# Patient Record
Sex: FEMALE | Race: WHITE | Employment: UNEMPLOYED | URBAN - NONMETROPOLITAN AREA
[De-identification: names, ages, dates, MRNs, and addresses within clinical notes are randomized per-mention and may not be internally consistent; named-entity substitution may affect disease eponyms.]

---

## 2017-01-21 DIAGNOSIS — F41.9 ANXIETY: ICD-10-CM

## 2017-01-23 RX ORDER — SERTRALINE HYDROCHLORIDE 100 MG/1
TABLET, FILM COATED ORAL
Qty: 30 TABLET | Refills: 5 | Status: SHIPPED | OUTPATIENT
Start: 2017-01-23 | End: 2017-07-20 | Stop reason: SDUPTHER

## 2017-02-07 DIAGNOSIS — E11.49 DM (DIABETES MELLITUS), TYPE 2 WITH NEUROLOGICAL COMPLICATIONS (HCC): ICD-10-CM

## 2017-02-07 DIAGNOSIS — I10 ESSENTIAL HYPERTENSION: ICD-10-CM

## 2017-02-07 RX ORDER — CARVEDILOL 3.12 MG/1
3.12 TABLET ORAL 2 TIMES DAILY WITH MEALS
Qty: 60 TABLET | Refills: 5 | Status: SHIPPED | OUTPATIENT
Start: 2017-02-07 | End: 2017-09-13 | Stop reason: SDUPTHER

## 2017-03-08 DIAGNOSIS — E11.49 DM (DIABETES MELLITUS), TYPE 2 WITH NEUROLOGICAL COMPLICATIONS (HCC): ICD-10-CM

## 2017-03-08 RX ORDER — FUROSEMIDE 20 MG/1
TABLET ORAL
Qty: 30 TABLET | Refills: 5 | OUTPATIENT
Start: 2017-03-08

## 2017-03-23 RX ORDER — POTASSIUM CHLORIDE 750 MG/1
CAPSULE, EXTENDED RELEASE ORAL
Qty: 30 CAPSULE | Refills: 5 | Status: SHIPPED | OUTPATIENT
Start: 2017-03-23 | End: 2017-09-13 | Stop reason: SDUPTHER

## 2017-04-17 ENCOUNTER — HOSPITAL ENCOUNTER (EMERGENCY)
Age: 57
Discharge: HOME | End: 2017-04-17
Attending: EMERGENCY MEDICINE
Payer: MEDICARE

## 2017-04-17 ENCOUNTER — APPOINTMENT (OUTPATIENT)
Dept: GENERAL RADIOLOGY | Age: 57
End: 2017-04-17
Payer: MEDICARE

## 2017-04-17 VITALS
TEMPERATURE: 97.9 F | SYSTOLIC BLOOD PRESSURE: 120 MMHG | HEART RATE: 80 BPM | OXYGEN SATURATION: 98 % | RESPIRATION RATE: 16 BRPM | DIASTOLIC BLOOD PRESSURE: 70 MMHG

## 2017-04-17 DIAGNOSIS — S82.141A TIBIAL PLATEAU FRACTURE, RIGHT, CLOSED, INITIAL ENCOUNTER: Primary | ICD-10-CM

## 2017-04-17 LAB
ABSOLUTE EOS #: 0.1 K/UL (ref 0–0.4)
ABSOLUTE LYMPH #: 1.9 K/UL (ref 1.1–2.7)
ABSOLUTE MONO #: 0.5 K/UL (ref 0–1)
ANION GAP SERPL CALCULATED.3IONS-SCNC: 16 MMOL/L (ref 9–17)
BASOPHILS # BLD: 0 % (ref 0–2)
BASOPHILS ABSOLUTE: 0 K/UL (ref 0–0.2)
BUN BLDV-MCNC: 20 MG/DL (ref 6–20)
BUN/CREAT BLD: 20 (ref 9–20)
CALCIUM SERPL-MCNC: 9 MG/DL (ref 8.6–10.4)
CHLORIDE BLD-SCNC: 96 MMOL/L (ref 98–107)
CO2: 21 MMOL/L (ref 20–31)
CREAT SERPL-MCNC: 1.01 MG/DL (ref 0.5–0.9)
DIFFERENTIAL TYPE: YES
EOSINOPHILS RELATIVE PERCENT: 1 % (ref 0–5)
ESTIMATED AVERAGE GLUCOSE: 171 MG/DL
GFR AFRICAN AMERICAN: >60 ML/MIN
GFR NON-AFRICAN AMERICAN: 56 ML/MIN
GFR SERPL CREATININE-BSD FRML MDRD: ABNORMAL ML/MIN/{1.73_M2}
GFR SERPL CREATININE-BSD FRML MDRD: ABNORMAL ML/MIN/{1.73_M2}
GLUCOSE BLD-MCNC: 223 MG/DL (ref 70–99)
HBA1C MFR BLD: 7.6 % (ref 4.8–5.9)
HCT VFR BLD CALC: 30.6 % (ref 36–46)
HEMOGLOBIN: 10.4 G/DL (ref 12–16)
INR BLD: 0.9
LYMPHOCYTES # BLD: 22 % (ref 15–40)
MCH RBC QN AUTO: 29.7 PG (ref 26–34)
MCHC RBC AUTO-ENTMCNC: 34.1 G/DL (ref 31–37)
MCV RBC AUTO: 87.1 FL (ref 80–100)
MONOCYTES # BLD: 6 % (ref 4–8)
PARTIAL THROMBOPLASTIN TIME: 26.3 SEC (ref 21–33)
PDW BLD-RTO: 15.7 % (ref 12.1–15.2)
PLATELET # BLD: 238 K/UL (ref 140–450)
PLATELET ESTIMATE: ABNORMAL
PMV BLD AUTO: ABNORMAL FL (ref 6–12)
POTASSIUM SERPL-SCNC: 4.2 MMOL/L (ref 3.7–5.3)
PROTHROMBIN TIME: 9.7 SEC (ref 9–11.6)
RBC # BLD: 3.51 M/UL (ref 4–5.2)
RBC # BLD: ABNORMAL 10*6/UL
SEG NEUTROPHILS: 71 % (ref 47–75)
SEGMENTED NEUTROPHILS ABSOLUTE COUNT: 6.1 K/UL (ref 2.5–7)
SODIUM BLD-SCNC: 133 MMOL/L (ref 135–144)
WBC # BLD: 8.6 K/UL (ref 3.5–11)
WBC # BLD: ABNORMAL 10*3/UL

## 2017-04-17 PROCEDURE — 85025 COMPLETE CBC W/AUTO DIFF WBC: CPT

## 2017-04-17 PROCEDURE — 80048 BASIC METABOLIC PNL TOTAL CA: CPT

## 2017-04-17 PROCEDURE — 29515 APPLICATION SHORT LEG SPLINT: CPT

## 2017-04-17 PROCEDURE — 99284 EMERGENCY DEPT VISIT MOD MDM: CPT

## 2017-04-17 PROCEDURE — 83036 HEMOGLOBIN GLYCOSYLATED A1C: CPT

## 2017-04-17 PROCEDURE — 73564 X-RAY EXAM KNEE 4 OR MORE: CPT

## 2017-04-17 PROCEDURE — 85610 PROTHROMBIN TIME: CPT

## 2017-04-17 PROCEDURE — 36415 COLL VENOUS BLD VENIPUNCTURE: CPT

## 2017-04-17 PROCEDURE — 71010 XR CHEST LIMITED ONE VIEW: CPT

## 2017-04-17 PROCEDURE — 93005 ELECTROCARDIOGRAM TRACING: CPT

## 2017-04-17 PROCEDURE — 85730 THROMBOPLASTIN TIME PARTIAL: CPT

## 2017-04-17 PROCEDURE — 6370000000 HC RX 637 (ALT 250 FOR IP): Performed by: EMERGENCY MEDICINE

## 2017-04-17 RX ORDER — HYDROCODONE BITARTRATE AND ACETAMINOPHEN 5; 325 MG/1; MG/1
1 TABLET ORAL ONCE
Status: COMPLETED | OUTPATIENT
Start: 2017-04-17 | End: 2017-04-17

## 2017-04-17 RX ORDER — HYDROCODONE BITARTRATE AND ACETAMINOPHEN 5; 325 MG/1; MG/1
1 TABLET ORAL EVERY 6 HOURS PRN
Qty: 6 TABLET | Refills: 0 | Status: SHIPPED | OUTPATIENT
Start: 2017-04-17 | End: 2017-05-02 | Stop reason: SDUPTHER

## 2017-04-17 RX ADMIN — HYDROCODONE BITARTRATE AND ACETAMINOPHEN 1 TABLET: 5; 325 TABLET ORAL at 16:00

## 2017-04-17 ASSESSMENT — PAIN SCALES - GENERAL
PAINLEVEL_OUTOF10: 9
PAINLEVEL_OUTOF10: 0
PAINLEVEL_OUTOF10: 8

## 2017-04-17 ASSESSMENT — PAIN DESCRIPTION - LOCATION: LOCATION: KNEE

## 2017-04-17 ASSESSMENT — PAIN DESCRIPTION - PAIN TYPE: TYPE: ACUTE PAIN

## 2017-04-17 ASSESSMENT — PAIN DESCRIPTION - ORIENTATION: ORIENTATION: RIGHT

## 2017-04-18 ENCOUNTER — OFFICE VISIT (OUTPATIENT)
Dept: FAMILY MEDICINE CLINIC | Age: 57
End: 2017-04-18
Payer: MEDICARE

## 2017-04-18 VITALS
HEART RATE: 105 BPM | OXYGEN SATURATION: 97 % | SYSTOLIC BLOOD PRESSURE: 100 MMHG | HEIGHT: 66 IN | WEIGHT: 158 LBS | DIASTOLIC BLOOD PRESSURE: 60 MMHG | BODY MASS INDEX: 25.39 KG/M2

## 2017-04-18 DIAGNOSIS — S82.141D TIBIAL PLATEAU FRACTURE, RIGHT, CLOSED, WITH ROUTINE HEALING, SUBSEQUENT ENCOUNTER: Primary | ICD-10-CM

## 2017-04-18 DIAGNOSIS — E11.49 DM (DIABETES MELLITUS), TYPE 2 WITH NEUROLOGICAL COMPLICATIONS (HCC): ICD-10-CM

## 2017-04-18 DIAGNOSIS — I25.83 CORONARY ARTERY DISEASE DUE TO LIPID RICH PLAQUE: ICD-10-CM

## 2017-04-18 DIAGNOSIS — Z01.818 PRE-OPERATIVE CLEARANCE: ICD-10-CM

## 2017-04-18 DIAGNOSIS — I25.10 CORONARY ARTERY DISEASE DUE TO LIPID RICH PLAQUE: ICD-10-CM

## 2017-04-18 PROCEDURE — 99214 OFFICE O/P EST MOD 30 MIN: CPT | Performed by: FAMILY MEDICINE

## 2017-04-18 ASSESSMENT — ENCOUNTER SYMPTOMS
RESPIRATORY NEGATIVE: 1
GASTROINTESTINAL NEGATIVE: 1
RHINORRHEA: 1

## 2017-05-02 ENCOUNTER — OFFICE VISIT (OUTPATIENT)
Dept: ORTHOPEDIC SURGERY | Age: 57
End: 2017-05-02
Payer: MEDICARE

## 2017-05-02 VITALS — BODY MASS INDEX: 25.4 KG/M2 | HEIGHT: 66 IN | WEIGHT: 158.07 LBS

## 2017-05-02 DIAGNOSIS — S82.131A RIGHT MEDIAL TIBIAL PLATEAU FRACTURE, CLOSED, INITIAL ENCOUNTER: Primary | ICD-10-CM

## 2017-05-02 PROCEDURE — 99204 OFFICE O/P NEW MOD 45 MIN: CPT | Performed by: ORTHOPAEDIC SURGERY

## 2017-05-02 RX ORDER — HYDROCODONE BITARTRATE AND ACETAMINOPHEN 5; 325 MG/1; MG/1
1 TABLET ORAL EVERY 6 HOURS PRN
Qty: 30 TABLET | Refills: 0 | Status: SHIPPED | OUTPATIENT
Start: 2017-05-02 | End: 2017-06-19 | Stop reason: ALTCHOICE

## 2017-05-11 ENCOUNTER — TELEPHONE (OUTPATIENT)
Dept: ORTHOPEDIC SURGERY | Age: 57
End: 2017-05-11

## 2017-06-19 ENCOUNTER — HOSPITAL ENCOUNTER (EMERGENCY)
Age: 57
Discharge: HOME | End: 2017-06-19
Attending: FAMILY MEDICINE
Payer: MEDICARE

## 2017-06-19 VITALS
RESPIRATION RATE: 20 BRPM | TEMPERATURE: 97.2 F | DIASTOLIC BLOOD PRESSURE: 79 MMHG | OXYGEN SATURATION: 98 % | HEART RATE: 102 BPM | SYSTOLIC BLOOD PRESSURE: 112 MMHG

## 2017-06-19 DIAGNOSIS — K04.7 DENTAL ABSCESS: Primary | ICD-10-CM

## 2017-06-19 PROCEDURE — 99282 EMERGENCY DEPT VISIT SF MDM: CPT

## 2017-06-19 PROCEDURE — 6370000000 HC RX 637 (ALT 250 FOR IP): Performed by: FAMILY MEDICINE

## 2017-06-19 RX ORDER — HYDROCODONE BITARTRATE AND ACETAMINOPHEN 5; 325 MG/1; MG/1
1 TABLET ORAL ONCE
Status: COMPLETED | OUTPATIENT
Start: 2017-06-19 | End: 2017-06-19

## 2017-06-19 RX ORDER — HYDROCODONE BITARTRATE AND ACETAMINOPHEN 7.5; 325 MG/1; MG/1
1 TABLET ORAL EVERY 8 HOURS PRN
Qty: 8 TABLET | Refills: 0 | Status: SHIPPED | OUTPATIENT
Start: 2017-06-19 | End: 2017-09-13 | Stop reason: ALTCHOICE

## 2017-06-19 RX ORDER — AMOXICILLIN 500 MG/1
500 CAPSULE ORAL 3 TIMES DAILY
Qty: 30 CAPSULE | Refills: 0 | Status: SHIPPED | OUTPATIENT
Start: 2017-06-19 | End: 2017-06-29

## 2017-06-19 RX ORDER — AMOXICILLIN 500 MG/1
500 CAPSULE ORAL ONCE
Status: COMPLETED | OUTPATIENT
Start: 2017-06-19 | End: 2017-06-19

## 2017-06-19 RX ADMIN — AMOXICILLIN 500 MG: 500 CAPSULE ORAL at 22:41

## 2017-06-19 RX ADMIN — HYDROCODONE BITARTRATE AND ACETAMINOPHEN 1 TABLET: 5; 325 TABLET ORAL at 22:42

## 2017-06-19 ASSESSMENT — PAIN SCALES - GENERAL
PAINLEVEL_OUTOF10: 9
PAINLEVEL_OUTOF10: 8

## 2017-07-20 DIAGNOSIS — F41.9 ANXIETY: ICD-10-CM

## 2017-07-20 RX ORDER — SERTRALINE HYDROCHLORIDE 100 MG/1
TABLET, FILM COATED ORAL
Qty: 30 TABLET | Refills: 0 | Status: SHIPPED | OUTPATIENT
Start: 2017-07-20 | End: 2017-09-13 | Stop reason: SDUPTHER

## 2017-09-07 DIAGNOSIS — E11.49 DM (DIABETES MELLITUS), TYPE 2 WITH NEUROLOGICAL COMPLICATIONS (HCC): ICD-10-CM

## 2017-09-13 ENCOUNTER — OFFICE VISIT (OUTPATIENT)
Dept: FAMILY MEDICINE CLINIC | Age: 57
End: 2017-09-13
Payer: MEDICARE

## 2017-09-13 ENCOUNTER — TELEPHONE (OUTPATIENT)
Dept: CARDIOLOGY CLINIC | Age: 57
End: 2017-09-13

## 2017-09-13 VITALS — BODY MASS INDEX: 25.71 KG/M2 | DIASTOLIC BLOOD PRESSURE: 42 MMHG | SYSTOLIC BLOOD PRESSURE: 86 MMHG | WEIGHT: 160 LBS

## 2017-09-13 DIAGNOSIS — E11.49 DM (DIABETES MELLITUS), TYPE 2 WITH NEUROLOGICAL COMPLICATIONS (HCC): ICD-10-CM

## 2017-09-13 DIAGNOSIS — E78.00 PURE HYPERCHOLESTEROLEMIA: ICD-10-CM

## 2017-09-13 DIAGNOSIS — J20.9 ACUTE BRONCHITIS, UNSPECIFIED ORGANISM: ICD-10-CM

## 2017-09-13 DIAGNOSIS — Z95.810 ICD (IMPLANTABLE CARDIOVERTER-DEFIBRILLATOR) IN PLACE: ICD-10-CM

## 2017-09-13 DIAGNOSIS — Z98.890 H/O MITRAL VALVE REPAIR: ICD-10-CM

## 2017-09-13 DIAGNOSIS — I10 ESSENTIAL HYPERTENSION: Primary | ICD-10-CM

## 2017-09-13 DIAGNOSIS — E55.9 VITAMIN D DEFICIENCY: ICD-10-CM

## 2017-09-13 DIAGNOSIS — I25.10 CORONARY ARTERY DISEASE INVOLVING NATIVE CORONARY ARTERY OF NATIVE HEART WITHOUT ANGINA PECTORIS: Primary | ICD-10-CM

## 2017-09-13 DIAGNOSIS — Z95.1 HX OF CABG: ICD-10-CM

## 2017-09-13 DIAGNOSIS — I25.2 HX OF MYOCARDIAL INFARCTION: ICD-10-CM

## 2017-09-13 DIAGNOSIS — F41.9 ANXIETY: ICD-10-CM

## 2017-09-13 DIAGNOSIS — E78.5 HYPERLIPIDEMIA, UNSPECIFIED HYPERLIPIDEMIA TYPE: ICD-10-CM

## 2017-09-13 DIAGNOSIS — Z12.11 SCREENING FOR COLON CANCER: ICD-10-CM

## 2017-09-13 PROCEDURE — 99214 OFFICE O/P EST MOD 30 MIN: CPT | Performed by: FAMILY MEDICINE

## 2017-09-13 RX ORDER — POTASSIUM CHLORIDE 750 MG/1
CAPSULE, EXTENDED RELEASE ORAL
Qty: 30 CAPSULE | Refills: 5 | Status: SHIPPED | OUTPATIENT
Start: 2017-09-13 | End: 2018-04-11 | Stop reason: SDUPTHER

## 2017-09-13 RX ORDER — SERTRALINE HYDROCHLORIDE 100 MG/1
TABLET, FILM COATED ORAL
Qty: 30 TABLET | Refills: 5 | Status: SHIPPED | OUTPATIENT
Start: 2017-09-13 | End: 2018-04-11 | Stop reason: SDUPTHER

## 2017-09-13 RX ORDER — DOXYCYCLINE HYCLATE 100 MG
100 TABLET ORAL 2 TIMES DAILY
Qty: 14 TABLET | Refills: 0 | Status: SHIPPED | OUTPATIENT
Start: 2017-09-13 | End: 2017-09-20

## 2017-09-13 RX ORDER — CARVEDILOL 3.12 MG/1
3.12 TABLET ORAL 2 TIMES DAILY WITH MEALS
Qty: 60 TABLET | Refills: 5 | Status: SHIPPED | OUTPATIENT
Start: 2017-09-13 | End: 2018-03-19 | Stop reason: SDUPTHER

## 2017-09-13 RX ORDER — GABAPENTIN 300 MG/1
300 CAPSULE ORAL 2 TIMES DAILY
Qty: 60 CAPSULE | Refills: 5 | Status: SHIPPED | OUTPATIENT
Start: 2017-09-13 | End: 2018-03-16 | Stop reason: SDUPTHER

## 2017-09-13 RX ORDER — CLOPIDOGREL BISULFATE 75 MG/1
TABLET ORAL
Qty: 30 TABLET | Refills: 5 | Status: SHIPPED | OUTPATIENT
Start: 2017-09-13 | End: 2018-04-11 | Stop reason: SDUPTHER

## 2017-09-13 RX ORDER — GENTAMICIN SULFATE 1 MG/G
CREAM TOPICAL
Refills: 0 | COMMUNITY
Start: 2017-07-26 | End: 2018-10-29

## 2017-09-13 RX ORDER — FUROSEMIDE 20 MG/1
TABLET ORAL
Qty: 30 TABLET | Refills: 5 | Status: SHIPPED | OUTPATIENT
Start: 2017-09-13 | End: 2018-04-11 | Stop reason: SDUPTHER

## 2017-09-13 RX ORDER — LISINOPRIL 2.5 MG/1
TABLET ORAL
Qty: 30 TABLET | Refills: 5 | Status: SHIPPED | OUTPATIENT
Start: 2017-09-13 | End: 2018-04-11 | Stop reason: SDUPTHER

## 2017-09-13 RX ORDER — ATORVASTATIN CALCIUM 80 MG/1
80 TABLET, FILM COATED ORAL DAILY
Qty: 30 TABLET | Refills: 5 | Status: SHIPPED | OUTPATIENT
Start: 2017-09-13 | End: 2018-03-19 | Stop reason: SDUPTHER

## 2017-09-13 ASSESSMENT — ENCOUNTER SYMPTOMS
BLURRED VISION: 0
DIARRHEA: 0
VISUAL CHANGE: 0
EYE REDNESS: 0
SHORTNESS OF BREATH: 0
RHINORRHEA: 1
TROUBLE SWALLOWING: 0
VOMITING: 0
ABDOMINAL PAIN: 0
EYE DISCHARGE: 0
BLOOD IN STOOL: 0
COUGH: 1
SORE THROAT: 0

## 2017-09-13 ASSESSMENT — PATIENT HEALTH QUESTIONNAIRE - PHQ9
1. LITTLE INTEREST OR PLEASURE IN DOING THINGS: 0
SUM OF ALL RESPONSES TO PHQ9 QUESTIONS 1 & 2: 0
2. FEELING DOWN, DEPRESSED OR HOPELESS: 0
SUM OF ALL RESPONSES TO PHQ QUESTIONS 1-9: 0

## 2017-09-15 ENCOUNTER — HOSPITAL ENCOUNTER (EMERGENCY)
Age: 57
Discharge: HOME | End: 2017-09-15
Attending: EMERGENCY MEDICINE
Payer: MEDICARE

## 2017-09-15 VITALS
RESPIRATION RATE: 16 BRPM | DIASTOLIC BLOOD PRESSURE: 74 MMHG | SYSTOLIC BLOOD PRESSURE: 116 MMHG | OXYGEN SATURATION: 95 % | TEMPERATURE: 98.2 F | HEART RATE: 95 BPM

## 2017-09-15 DIAGNOSIS — K08.89 TOOTHACHE: Primary | ICD-10-CM

## 2017-09-15 PROCEDURE — 99282 EMERGENCY DEPT VISIT SF MDM: CPT

## 2017-09-15 PROCEDURE — 6370000000 HC RX 637 (ALT 250 FOR IP): Performed by: EMERGENCY MEDICINE

## 2017-09-15 RX ORDER — PENICILLIN V POTASSIUM 250 MG/1
500 TABLET ORAL ONCE
Status: COMPLETED | OUTPATIENT
Start: 2017-09-15 | End: 2017-09-15

## 2017-09-15 RX ORDER — PENICILLIN V POTASSIUM 500 MG/1
500 TABLET ORAL 3 TIMES DAILY
Qty: 30 TABLET | Refills: 0 | Status: SHIPPED | OUTPATIENT
Start: 2017-09-15 | End: 2017-09-25

## 2017-09-15 RX ORDER — TRAMADOL HYDROCHLORIDE 50 MG/1
50 TABLET ORAL EVERY 6 HOURS PRN
Qty: 4 TABLET | Refills: 0 | Status: SHIPPED | OUTPATIENT
Start: 2017-09-15 | End: 2017-09-16

## 2017-09-15 RX ADMIN — PENICILLIN V POTASIUM 500 MG: 250 TABLET ORAL at 15:15

## 2017-09-15 ASSESSMENT — ENCOUNTER SYMPTOMS
ALLERGIC/IMMUNOLOGIC NEGATIVE: 1
FACIAL SWELLING: 0
RHINORRHEA: 0
GASTROINTESTINAL NEGATIVE: 1
EYES NEGATIVE: 1
RESPIRATORY NEGATIVE: 1
TRISMUS: 0
SINUS PRESSURE: 0
SORE THROAT: 0

## 2017-09-15 ASSESSMENT — PAIN DESCRIPTION - PAIN TYPE: TYPE: ACUTE PAIN

## 2017-09-15 ASSESSMENT — PAIN DESCRIPTION - ONSET: ONSET: SUDDEN

## 2017-09-15 ASSESSMENT — PAIN DESCRIPTION - LOCATION: LOCATION: TEETH

## 2017-09-15 ASSESSMENT — PAIN SCALES - GENERAL: PAINLEVEL_OUTOF10: 8

## 2017-09-15 ASSESSMENT — PAIN DESCRIPTION - ORIENTATION: ORIENTATION: RIGHT;LOWER

## 2017-09-15 ASSESSMENT — PAIN DESCRIPTION - PROGRESSION: CLINICAL_PROGRESSION: NOT CHANGED

## 2017-09-15 ASSESSMENT — PAIN DESCRIPTION - FREQUENCY: FREQUENCY: CONTINUOUS

## 2017-09-15 ASSESSMENT — PAIN DESCRIPTION - DESCRIPTORS: DESCRIPTORS: ACHING

## 2017-09-22 ENCOUNTER — HOSPITAL ENCOUNTER (OUTPATIENT)
Age: 57
Discharge: HOME | End: 2017-09-22
Payer: MEDICARE

## 2017-09-22 DIAGNOSIS — F41.9 ANXIETY: ICD-10-CM

## 2017-09-22 DIAGNOSIS — Z98.890 H/O MITRAL VALVE REPAIR: ICD-10-CM

## 2017-09-22 DIAGNOSIS — I25.2 HX OF MYOCARDIAL INFARCTION: ICD-10-CM

## 2017-09-22 DIAGNOSIS — E11.49 DM (DIABETES MELLITUS), TYPE 2 WITH NEUROLOGICAL COMPLICATIONS (HCC): ICD-10-CM

## 2017-09-22 DIAGNOSIS — E55.9 VITAMIN D DEFICIENCY: ICD-10-CM

## 2017-09-22 DIAGNOSIS — E78.5 HYPERLIPIDEMIA, UNSPECIFIED HYPERLIPIDEMIA TYPE: ICD-10-CM

## 2017-09-22 DIAGNOSIS — Z95.810 ICD (IMPLANTABLE CARDIOVERTER-DEFIBRILLATOR) IN PLACE: ICD-10-CM

## 2017-09-22 DIAGNOSIS — Z95.1 HX OF CABG: ICD-10-CM

## 2017-09-22 DIAGNOSIS — I25.10 CORONARY ARTERY DISEASE INVOLVING NATIVE CORONARY ARTERY OF NATIVE HEART WITHOUT ANGINA PECTORIS: ICD-10-CM

## 2017-09-22 LAB
ABSOLUTE EOS #: 0.1 K/UL (ref 0–0.4)
ABSOLUTE LYMPH #: 3 K/UL (ref 1–4.8)
ABSOLUTE MONO #: 0.6 K/UL (ref 0–1)
ALBUMIN SERPL-MCNC: 3.7 G/DL (ref 3.5–5.2)
ALBUMIN/GLOBULIN RATIO: ABNORMAL (ref 1–2.5)
ALP BLD-CCNC: 129 U/L (ref 35–104)
ALT SERPL-CCNC: 18 U/L (ref 5–33)
ANION GAP SERPL CALCULATED.3IONS-SCNC: 12 MMOL/L (ref 9–17)
AST SERPL-CCNC: 10 U/L
BASOPHILS # BLD: 1 %
BASOPHILS ABSOLUTE: 0 K/UL (ref 0–0.2)
BILIRUB SERPL-MCNC: 0.26 MG/DL (ref 0.3–1.2)
BUN BLDV-MCNC: 37 MG/DL (ref 6–20)
BUN/CREAT BLD: 28 (ref 9–20)
CALCIUM SERPL-MCNC: 9.5 MG/DL (ref 8.6–10.4)
CHLORIDE BLD-SCNC: 102 MMOL/L (ref 98–107)
CHOLESTEROL/HDL RATIO: 11.4
CHOLESTEROL: 286 MG/DL
CO2: 22 MMOL/L (ref 20–31)
CREAT SERPL-MCNC: 1.31 MG/DL (ref 0.5–0.9)
CREATININE URINE: 133.3 MG/DL (ref 28–217)
DIFFERENTIAL TYPE: YES
EOSINOPHILS RELATIVE PERCENT: 2 %
ESTIMATED AVERAGE GLUCOSE: 148 MG/DL
GFR AFRICAN AMERICAN: 51 ML/MIN
GFR NON-AFRICAN AMERICAN: 42 ML/MIN
GFR SERPL CREATININE-BSD FRML MDRD: ABNORMAL ML/MIN/{1.73_M2}
GFR SERPL CREATININE-BSD FRML MDRD: ABNORMAL ML/MIN/{1.73_M2}
GLUCOSE BLD-MCNC: 108 MG/DL (ref 70–99)
HBA1C MFR BLD: 6.8 % (ref 4.8–5.9)
HCT VFR BLD CALC: 32.2 % (ref 36–46)
HDLC SERPL-MCNC: 25 MG/DL
HEMOGLOBIN: 11.1 G/DL (ref 12–16)
LDL CHOLESTEROL: ABNORMAL MG/DL (ref 0–130)
LYMPHOCYTES # BLD: 36 %
MAGNESIUM: 1.8 MG/DL (ref 1.6–2.6)
MCH RBC QN AUTO: 30.1 PG (ref 26–34)
MCHC RBC AUTO-ENTMCNC: 34.3 G/DL (ref 31–37)
MCV RBC AUTO: 87.8 FL (ref 80–100)
MICROALBUMIN/CREAT 24H UR: 38 MG/L
MICROALBUMIN/CREAT UR-RTO: 29 MCG/MG CREAT
MONOCYTES # BLD: 7 %
PATIENT FASTING?: YES
PDW BLD-RTO: 15.7 % (ref 12.1–15.2)
PLATELET # BLD: 234 K/UL (ref 140–450)
PLATELET ESTIMATE: ABNORMAL
PMV BLD AUTO: ABNORMAL FL (ref 6–12)
POTASSIUM SERPL-SCNC: 4.9 MMOL/L (ref 3.7–5.3)
RBC # BLD: 3.67 M/UL (ref 4–5.2)
RBC # BLD: ABNORMAL 10*6/UL
SEG NEUTROPHILS: 54 %
SEGMENTED NEUTROPHILS ABSOLUTE COUNT: 4.5 K/UL (ref 2.5–7)
SODIUM BLD-SCNC: 136 MMOL/L (ref 135–144)
TOTAL PROTEIN: 7.9 G/DL (ref 6.4–8.3)
TRIGL SERPL-MCNC: 892 MG/DL
TSH SERPL DL<=0.05 MIU/L-ACNC: 0.51 MIU/L (ref 0.3–5)
VITAMIN D 25-HYDROXY: 7.8 NG/ML (ref 30–100)
VLDLC SERPL CALC-MCNC: ABNORMAL MG/DL (ref 1–30)
WBC # BLD: 8.3 K/UL (ref 3.5–11)
WBC # BLD: ABNORMAL 10*3/UL

## 2017-09-22 PROCEDURE — 85025 COMPLETE CBC W/AUTO DIFF WBC: CPT

## 2017-09-22 PROCEDURE — 80053 COMPREHEN METABOLIC PANEL: CPT

## 2017-09-22 PROCEDURE — 83036 HEMOGLOBIN GLYCOSYLATED A1C: CPT

## 2017-09-22 PROCEDURE — 93005 ELECTROCARDIOGRAM TRACING: CPT

## 2017-09-22 PROCEDURE — 82570 ASSAY OF URINE CREATININE: CPT

## 2017-09-22 PROCEDURE — 83721 ASSAY OF BLOOD LIPOPROTEIN: CPT

## 2017-09-22 PROCEDURE — 83735 ASSAY OF MAGNESIUM: CPT

## 2017-09-22 PROCEDURE — 36415 COLL VENOUS BLD VENIPUNCTURE: CPT

## 2017-09-22 PROCEDURE — 80061 LIPID PANEL: CPT

## 2017-09-22 PROCEDURE — 82306 VITAMIN D 25 HYDROXY: CPT

## 2017-09-22 PROCEDURE — 84443 ASSAY THYROID STIM HORMONE: CPT

## 2017-09-22 PROCEDURE — 82043 UR ALBUMIN QUANTITATIVE: CPT

## 2017-09-24 LAB — LDL CHOLESTEROL DIRECT: 36 MG/DL

## 2017-09-25 ENCOUNTER — OFFICE VISIT (OUTPATIENT)
Dept: CARDIOLOGY CLINIC | Age: 57
End: 2017-09-25
Payer: MEDICARE

## 2017-09-25 VITALS
SYSTOLIC BLOOD PRESSURE: 110 MMHG | WEIGHT: 157 LBS | BODY MASS INDEX: 25.23 KG/M2 | OXYGEN SATURATION: 98 % | HEART RATE: 105 BPM | DIASTOLIC BLOOD PRESSURE: 60 MMHG

## 2017-09-25 DIAGNOSIS — E11.49 DM (DIABETES MELLITUS), TYPE 2 WITH NEUROLOGICAL COMPLICATIONS (HCC): Primary | ICD-10-CM

## 2017-09-25 DIAGNOSIS — I42.9 CARDIOMYOPATHY, UNSPECIFIED TYPE (HCC): ICD-10-CM

## 2017-09-25 DIAGNOSIS — I25.10 CORONARY ARTERY DISEASE INVOLVING NATIVE CORONARY ARTERY OF NATIVE HEART WITHOUT ANGINA PECTORIS: ICD-10-CM

## 2017-09-25 DIAGNOSIS — E55.9 VITAMIN D DEFICIENCY DISEASE: ICD-10-CM

## 2017-09-25 DIAGNOSIS — Z98.890 H/O MITRAL VALVE REPAIR: ICD-10-CM

## 2017-09-25 DIAGNOSIS — I10 ESSENTIAL HYPERTENSION: ICD-10-CM

## 2017-09-25 DIAGNOSIS — Z95.1 HX OF CABG: ICD-10-CM

## 2017-09-25 DIAGNOSIS — Z95.810 ICD (IMPLANTABLE CARDIOVERTER-DEFIBRILLATOR) IN PLACE: ICD-10-CM

## 2017-09-25 DIAGNOSIS — I25.2 HX OF MYOCARDIAL INFARCTION: ICD-10-CM

## 2017-09-25 DIAGNOSIS — E78.00 PURE HYPERCHOLESTEROLEMIA: ICD-10-CM

## 2017-09-25 PROCEDURE — 99214 OFFICE O/P EST MOD 30 MIN: CPT | Performed by: INTERNAL MEDICINE

## 2017-09-25 PROCEDURE — 93289 INTERROG DEVICE EVAL HEART: CPT | Performed by: INTERNAL MEDICINE

## 2017-09-25 NOTE — LETTER
Mere Meraz M.D. [address] [place] [address], [place] [address]  [phone]          2017          Suzette Mina MD  [address] [place] [address], [facility]      RE:  Keturah Lombardi  :     Dear Dr. Jason Austin:    CHIEF COMPLAINT:  Fatigue. HISTORY OF PRESENT ILLNESS:  I had the pleasure of seeing Mrs. Halie Powers in our office on 2017. She is a pleasant 58-year-old female who had acute chest discomfort on 2015, and was found to have a non-ST-elevation myocardial infarction, was transferred to [facility]. [facility]. Troponin was elevated with no changes in her EKG, with the troponin being 5.56. She had a catheterization on 2015, that showed severe CAD with an occluded large right coronary artery, LAD had 90% disease, diagonal 90%, circumflex small and nondominant. She had an EF of 20%. She had bypass surgery on 2015, with LIMA to the LAD, a vein graft to the diagonal, a vein graft to the right coronary artery. She also had a mitral valve repair with a 30 mm Andi-Pérez prosthetic mitral valve. EF increased to 30% to 35%. Three days later, she had a VF arrest.  Another catheterization on 2015, showed widely patent grafts, with patent LIMA to LAD, patent vein graft to the diagonal, patent vein graft to the right coronary artery. EF was 30% to 35%. She had an ICD placed by Dr. Mejia Bell on 2015, which is a [employer], model number is [phone], single ventricular lead. She has been treated for diabetes by Dr. Jason Austin. She, unfortunately, is not exercising. She continues to smoke, although she is hoping to be off smoking by . She has had no hospitalizations. She did break her wrist and fractured her kneecap from a fall, with the wrist in 2017 and knee in 2017. She denies any chest pain or heaviness. No palpitation.   No shortness of accommodation. Extraocular movements were intact. Mucous membranes were dry. NECK:  No JVD. Good carotid pulses. No carotid bruits. No lymphadenopathy or thyromegaly. CARDIOVASCULAR EXAM:  S1 and S2 were normal.  No S3 or S4. Soft systolic blowing type murmur. No diastolic murmur. PMI was normal.  No lift, thrust, or pericardial friction rub. LUNGS:  Quite clear to auscultation and percussion. ABDOMEN:  Soft and nontender. Good bowel sounds. The aorta was not enlarged. No hepatomegaly, splenomegaly. EXTREMITIES:  Good femoral pulses. Good pedal pulses. No pedal edema. Skin was warm and dry. No calf tenderness. Nail beds pink. Good cap refill. PULSES:  Bilateral symmetrical radial, brachial and carotid pulses. No carotid bruits. Good femoral and pedal pulses. NEUROLOGIC EXAM:  Within normal limits. PSYCHIATRIC EXAM:  Within normal limits. LABORATORY DATA:  From 09/22/2016, sodium 136, potassium 4.9, BUN 37, creatinine 1.31. Magnesium 1.8. GFR was 42. Calcium 9.5. Cholesterol 206, with an HDL of 25, triglycerides 892. ALT was 18, AST was 10. Hemoglobin A1c was 6.8. Vitamin D was 7.8. White count 8.3, hemoglobin 11.1, with a platelet count of 308,057. EKG showed sinus rhythm with nonspecific ST changes. IMPRESSION:  1. Severe coronary artery disease. 2.  Non-ST-elevation myocardial infarction on 05/25/2015. 3.  Catheterization on 05/26/2015, by Dr. Jana Pulido showed occluded right coronary artery with subtotal occluded, very large, the LAD 90%, with nondominant circumflex. EF at 20%. 4.  Open heart surgery by Dr. Seble Palmer on 06/04/2015, with LIMA to LAD, vein graft to the diagonal, vein graft to the right coronary artery. 5.  Ventricular fibrillation with cardiac arrest on 06/08, with repeat catheterization showing widely patent bypass grafts.   6.  ICD implanted on 06/09/2015, which is a [address] [place] [address] [employer], model number 2092, which is still at beginning of life. 7.  Ejection fraction of 40%. 8.  Severe hyperlipidemia, primarily with triglycerides being elevated now at 900.  9.  Chronic renal insufficiency with creatinine 1.43.  10.  Insulin dependent diabetes with a hemoglobin A1c of 6.8. PLAN:  1. See in 1 year in followup. 2.  Risk modification with stopping smoking. 3.  No change of medications. 4.  ICD check in 6 months in our office. 5.  Start vitamin D 5000 units daily. DISCUSSION:  Mrs. Osmel Vu is doing well from a cardiac standpoint. She has had no chest pain or chest discomfort. No unusual shortness of breath. She unfortunately is still smoking, hopefully she will be able to stop this. At this point, she is doing well with no chest pain and I will see her in 1 year in followup. Thank you very much for allowing me the privilege of seeing Mrs. Osmel Vu. If you have any questions on my thoughts, please do not hesitate to contact me.     Sincerely,        Lois Goldberg    D:09/25/2017 13:12:50               T: 09/26/2017 1:55:33     [id number]  Job#: [id number]     Doc#: [id number]

## 2017-09-25 NOTE — MR AVS SNAPSHOT
After Visit Summary             Laine Ahmadi [facility]   2017 11:30 AM   Office Visit    Description:  Female : 1960   Provider:  Benjamin Bennett MD   Department:  [facility]              Your Follow-Up and Future Appointments         Below is a list of your follow-up and future appointments. This may not be a complete list as you may have made appointments directly with providers that we are not aware of or your providers may have made some for you. Please call your providers to confirm appointments. It is important to keep your appointments. Please bring your current insurance card, photo ID, co-pay, and all medication bottles to your appointment. If self-pay, payment is expected at the time of service. Your To-Do List     Future Appointments Provider Department Dept Phone    2018 10:00 AM Benjamin Bennett MD [facility] [phone]    If this is a fasting lab, please do not eat or drink past midnight other than water. 3/13/2018 11:00 AM Bernarda Whitt MD [facility] [address] [phone]    Please arrive 15 minutes prior to appointment, bring photo ID and insurance card.     2018 1:00 PM Benjamin Bennett MD [facility] [phone]    Future Orders Complete By Expires    CBC Auto Differential [FPK0438 Custom]  2018    Comprehensive Metabolic Panel [GYN17 Custom]  2018    EKG 12 Lead [EKG1 Custom]  2018    Lipid Panel [LAB18 Custom]  2018    Magnesium [GMG880 Custom]  2018    TSH with Reflex [AQA0529 Custom]  2018    Vitamin D 25 Hydroxy [POO631 Custom]  2018    XR CHEST STANDARD (2 VW) [80054 Custom]  2018         Information from Your Visit        Department     Name Address Phone [facility] [address] [phone]      You Were Seen for: Comments    DM (diabetes mellitus), type 2 with neurological complications (HCC)   [094688]         Vital Signs     Blood Pressure Pulse Weight Oxygen Saturation Body Mass Index Smoking Status    110/60 105 157 lb (71.2 kg) 98% 25.23 kg/m2 Current Some Day Smoker      Additional Information about your Body Mass Index (BMI)           Your BMI as listed above is considered overweight (25.0-29.9). BMI is an estimate of body fat, calculated from your height and weight. The higher your BMI, the greater your risk of heart disease, high blood pressure, type 2 diabetes, stroke, gallstones, arthritis, sleep apnea, and certain cancers. BMI is not perfect. It may overestimate body fat in athletes and people who are more muscular. If your body fat is high you can improve your BMI by decreasing your calorie intake and becoming more physically active.      Learn more at: [email or web address]          Instructions    Start Vit D 5000 units daily otc  Will follow up in one year with [facility] check   Will check ICD/Herscher in 6 months here  In office-ICD check only in 6 months              Medications and Orders      Your Current Medications Are              Cholecalciferol (VITAMIN D3) 5000 units TABS Take 5,000 Units by mouth daily    penicillin v potassium (VEETID) 500 MG tablet Take 1 tablet by mouth 3 times daily for 10 days    carvedilol (COREG) 3.125 MG tablet Take 1 tablet by mouth 2 times daily (with meals)    atorvastatin (LIPITOR) 80 MG tablet Take 1 tablet by mouth daily    gabapentin (NEURONTIN) 300 MG capsule Take 1 capsule by mouth 2 times daily    insulin detemir (LEVEMIR FLEXPEN) 100 UNIT/ML injection pen Patient also uses a slide scale with the levem    potassium chloride (MICRO-K) 10 MEQ extended release capsule take 1 capsule by mouth daily    sertraline (ZOLOFT) 100 MG tablet take 1 tablet by mouth once daily    clopidogrel (PLAVIX) 75 MG tablet take 1 tablet by mouth daily lisinopril (PRINIVIL;ZESTRIL) 2.5 MG tablet take 1 tablet by mouth daily    furosemide (LASIX) 20 MG tablet take 1 tablet by mouth once daily    metFORMIN (GLUCOPHAGE) 1000 MG tablet Take 1 tablet by mouth 2 times daily (with meals)    gentamicin (GARAMYCIN) 0.1 % cream     glucose monitoring kit (FREESTYLE) monitoring kit 1 kit by Does not apply route daily as needed Please dispense meter covered by patients insurance. glucose blood VI test strips (EXACTECH TEST) strip 1 each by In Vitro route daily Pt testing BS once daily and prn    Lancets MISC Pt testing BS once daily and prn    Multiple Vitamins-Minerals (THERAPEUTIC MULTIVITAMIN-MINERALS) tablet Take 1 tablet by mouth daily (with breakfast)      Allergies              Nsaids     nausea    Codeine Nausea And Vomiting         Additional Information        Basic Information     Date Of Birth Sex Race Ethnicity Preferred Language    1960 Female White Non-/Non  English      Problem List as of 9/25/2017  Date Reviewed: 9/13/2017                DM (diabetes mellitus), type 2 with neurological complications ([facility]    Anxiety    CAD (coronary artery disease)    Hx of CABG    ICD (implantable cardioverter-defibrillator) in place    H/O mitral valve repair    Hx of myocardial infarction    Hyperlipidemia    Hypertension    Cardiomyopathy ([address]    Onychomycosis of toenail    S/P cardiac cath-Patent grafts 6/5/15    Acute coronary syndrome ([facility]      Immunizations as of 9/25/2017     Name Date    Influenza Virus Vaccine 11/19/2015      Preventive Care        Date Due    Hepatitis C screening is recommended for all adults regardless of risk factors born between [facility] at least once (lifetime) who have never been tested. 1960    HIV screening is recommended for all people regardless of risk factors  aged 15-65 years at least once (lifetime) who have never been HIV tested.  9/19/1974    Tetanus Combination Vaccine (1 - Tdap) 9/19/1978 8. Enter your e-mail address. You will receive e-mail notification when new information is available in [address]. Click Sign Up. You can now view your medical record. Additional Information  If you have questions, please contact the physician practice where you receive care. Remember, [employer] is NOT to be used for urgent needs. For medical emergencies, dial 911. For questions regarding your [employer] account call [phone]. If you have a clinical question, please call your doctor's office.

## 2017-09-26 LAB
EKG ATRIAL RATE: 81 BPM
EKG P AXIS: 58 DEGREES
EKG P-R INTERVAL: 156 MS
EKG Q-T INTERVAL: 388 MS
EKG QRS DURATION: 88 MS
EKG QTC CALCULATION (BAZETT): 450 MS
EKG R AXIS: 25 DEGREES
EKG T AXIS: 24 DEGREES
EKG VENTRICULAR RATE: 81 BPM

## 2017-10-01 NOTE — PROGRESS NOTES
Ov Dr. Lorin Floyd for one year follow up  ICD checked by [facility] today  Pt states medications have not changed  No hospitalizations  Broke wrist and fx knee cap from fall  Wrist in Feb and knee in June  Both from a fall-no surgery of either one  No chest pain heaviness or palpitations  No sob  No dizziness or lightheadedness  Right ankle swelling from injury   Still smoking but better  Hoping to be off by thanksgiving     Start Vit D 5000 units daily otc  Will follow up in one year with [facility] check   Will check ICD/Coggon in 6 months here  In office-ICD check only in 6 months
Extraocular movements were intact. Mucous membranes were dry. NECK:  No JVD. Good carotid pulses. No carotid bruits. No lymphadenopathy or thyromegaly. CARDIOVASCULAR EXAM:  S1 and S2 were normal.  No S3 or S4. Soft systolic blowing type murmur. No diastolic murmur. PMI was normal.  No lift, thrust, or pericardial friction rub. LUNGS:  Quite clear to auscultation and percussion. ABDOMEN:  Soft and nontender. Good bowel sounds. The aorta was not enlarged. No hepatomegaly, splenomegaly. EXTREMITIES:  Good femoral pulses. Good pedal pulses. No pedal edema. Skin was warm and dry. No calf tenderness. Nail beds pink. Good cap refill. PULSES:  Bilateral symmetrical radial, brachial and carotid pulses. No carotid bruits. Good femoral and pedal pulses. NEUROLOGIC EXAM:  Within normal limits. PSYCHIATRIC EXAM:  Within normal limits. LABORATORY DATA:  From 09/22/2016, sodium 136, potassium 4.9, BUN 37, creatinine 1.31. Magnesium 1.8. GFR was 42. Calcium 9.5. Cholesterol 206, with an HDL of 25, triglycerides 892. ALT was 18, AST was 10. Hemoglobin A1c was 6.8. Vitamin D was 7.8. White count 8.3, hemoglobin 11.1, with a platelet count of 735,543. EKG showed sinus rhythm with nonspecific ST changes. IMPRESSION:  1. Severe coronary artery disease. 2.  Non-ST-elevation myocardial infarction on 05/25/2015. 3.  Catheterization on 05/26/2015, by Dr. Sanders Dose showed occluded right coronary artery with subtotal occluded, very large, the LAD 90%, with nondominant circumflex. EF at 20%. 4.  Open heart surgery by Dr. Uri Gupta on 06/04/2015, with LIMA to LAD, vein graft to the diagonal, vein graft to the right coronary artery. 5.  Ventricular fibrillation with cardiac arrest on 06/08, with repeat catheterization showing widely patent bypass grafts. 6.  ICD implanted on 06/09/2015, which is a [employer], model number 2092, which is still at beginning of life.   7.  Ejection

## 2017-11-02 ENCOUNTER — TELEPHONE (OUTPATIENT)
Dept: OTHER | Facility: CLINIC | Age: 57
End: 2017-11-02

## 2017-11-02 DIAGNOSIS — E11.49 DM (DIABETES MELLITUS), TYPE 2 WITH NEUROLOGICAL COMPLICATIONS (HCC): ICD-10-CM

## 2017-11-02 NOTE — TELEPHONE ENCOUNTER
Patient in need of refill for her levemir   I do see that we sent in a prescription for her on 9-13-17 but pharm states they didn't get it and also it shows denied but [facility] is not sure why it is denied if they didn't even get the RX.    Pending to [facility] again

## 2017-11-07 ENCOUNTER — APPOINTMENT (OUTPATIENT)
Dept: GENERAL RADIOLOGY | Age: 57
End: 2017-11-07
Payer: MEDICARE

## 2017-11-07 ENCOUNTER — HOSPITAL ENCOUNTER (EMERGENCY)
Age: 57
Discharge: HOME | End: 2017-11-07
Attending: FAMILY MEDICINE
Payer: MEDICARE

## 2017-11-07 VITALS
HEIGHT: 67 IN | BODY MASS INDEX: 24.64 KG/M2 | TEMPERATURE: 97 F | WEIGHT: 157 LBS | DIASTOLIC BLOOD PRESSURE: 87 MMHG | SYSTOLIC BLOOD PRESSURE: 106 MMHG | RESPIRATION RATE: 16 BRPM | HEART RATE: 100 BPM | OXYGEN SATURATION: 100 %

## 2017-11-07 DIAGNOSIS — S82.102A CLOSED FRACTURE OF PROXIMAL END OF LEFT TIBIA, UNSPECIFIED FRACTURE MORPHOLOGY, INITIAL ENCOUNTER: Primary | ICD-10-CM

## 2017-11-07 DIAGNOSIS — S82.402A CLOSED FRACTURE OF SHAFT OF LEFT FIBULA, UNSPECIFIED FRACTURE MORPHOLOGY, INITIAL ENCOUNTER: ICD-10-CM

## 2017-11-07 LAB
GLUCOSE BLD-MCNC: 201 MG/DL
GLUCOSE BLD-MCNC: 201 MG/DL (ref 65–99)

## 2017-11-07 PROCEDURE — 6370000000 HC RX 637 (ALT 250 FOR IP): Performed by: FAMILY MEDICINE

## 2017-11-07 PROCEDURE — 82947 ASSAY GLUCOSE BLOOD QUANT: CPT

## 2017-11-07 PROCEDURE — 99284 EMERGENCY DEPT VISIT MOD MDM: CPT

## 2017-11-07 PROCEDURE — 73110 X-RAY EXAM OF WRIST: CPT

## 2017-11-07 PROCEDURE — 73564 X-RAY EXAM KNEE 4 OR MORE: CPT

## 2017-11-07 RX ORDER — ACETAMINOPHEN 500 MG
1000 TABLET ORAL ONCE
Status: COMPLETED | OUTPATIENT
Start: 2017-11-07 | End: 2017-11-07

## 2017-11-07 RX ORDER — TRAMADOL HYDROCHLORIDE 50 MG/1
50 TABLET ORAL EVERY 6 HOURS PRN
Qty: 12 TABLET | Refills: 0 | Status: SHIPPED | OUTPATIENT
Start: 2017-11-07 | End: 2018-04-11 | Stop reason: ALTCHOICE

## 2017-11-07 RX ADMIN — ACETAMINOPHEN 1000 MG: 500 TABLET ORAL at 18:11

## 2017-11-07 ASSESSMENT — PAIN DESCRIPTION - PAIN TYPE: TYPE: ACUTE PAIN

## 2017-11-07 ASSESSMENT — PAIN SCALES - GENERAL
PAINLEVEL_OUTOF10: 9
PAINLEVEL_OUTOF10: 9
PAINLEVEL_OUTOF10: 7

## 2017-11-07 ASSESSMENT — PAIN DESCRIPTION - ORIENTATION
ORIENTATION_2: LEFT
ORIENTATION: LEFT

## 2017-11-07 ASSESSMENT — PAIN DESCRIPTION - LOCATION
LOCATION_2: ARM
LOCATION: LEG

## 2017-11-07 ASSESSMENT — PAIN DESCRIPTION - DESCRIPTORS: DESCRIPTORS_2: CONSTANT

## 2017-11-07 ASSESSMENT — PAIN DESCRIPTION - INTENSITY: RATING_2: 7

## 2017-11-07 NOTE — ED PROVIDER NOTES
eMERGENCY dEPARTMENT eNCOUnter        Dash Rosales    Chief Complaint   Patient presents with    Leg Pain     Left knee to foot. Deadra Alar yesterday    Wrist Pain     Left medial.  Deadra Alar yesterday       HPI    Heriberto Wilburn is a 61 y.o. female who presents with left knee and wrist pain after falling yesterday at home. She used a  brace on her left knee to help bear weight and move around today. This knee brace was used last year on her right knee. The fall was resulted from an accident on a wet floor at home. No LOC. No apparent defib firing. No neck pain after fall. The left wrist and knee are the only injured areas after this fall. Pain is severe and not relieved by Tylenol. REVIEW OF SYSTEMS    All body systems reviewed and Pertinent positives and negative findings mentioned in the history of present illness.     PAST MEDICAL HISTORY    Past Medical History:   Diagnosis Date    CAD (coronary artery disease)     [facility]omy[facility]     Depression     Diabetes mellitus ([facility]     H/O mitral valve repair     History of fractured kneecap 2017    right    Hx of CABG     Hx of myocardial infarction     Hyperlipidemia     Hypertension     ICD (implantable cardioverter-defibrillator) in place     Kidney calculi     Osteoarthritis     Renal calculi        SURGICAL HISTORY    Past Surgical History:   Procedure Laterality Date    ANKLE SURGERY Right 2009    plate and screws     CARDIAC DEFIBRILLATOR PLACEMENT  06/01/15    CHOLECYSTECTOMY  1990    CORONARY ARTERY BYPASS GRAFT  05/28/15    X 3- Dr Phyllis CABELLO-LAD<SVG-Diag, SVG-PDA, MV Repair with 30 min CE IMR ring    HYSTERECTOMY  1990    KIDNEY STONE SURGERY Left 2010    MITRAL VALVE SURGERY  6/4/15    TUBAL LIGATION         CURRENT MEDICATIONS    Current Outpatient Rx   Medication Sig Dispense Refill    insulin detemir (LEVEMIR FLEXPEN) 100 UNIT/ML injection pen Patient also uses a slide scale with the levem 5 Pen 5    Cholecalciferol (VITAMIN D3) 5000 units TABS Take 5,000 Units by mouth daily      carvedilol (COREG) 3.125 MG tablet Take 1 tablet by mouth 2 times daily (with meals) 60 tablet 5    atorvastatin (LIPITOR) 80 MG tablet Take 1 tablet by mouth daily 30 tablet 5    gabapentin (NEURONTIN) 300 MG capsule Take 1 capsule by mouth 2 times daily 60 capsule 5    potassium chloride (MICRO-K) 10 MEQ extended release capsule take 1 capsule by mouth daily 30 capsule 5    sertraline (ZOLOFT) 100 MG tablet take 1 tablet by mouth once daily 30 tablet 5    clopidogrel (PLAVIX) 75 MG tablet take 1 tablet by mouth daily 30 tablet 5    lisinopril (PRINIVIL;ZESTRIL) 2.5 MG tablet take 1 tablet by mouth daily 30 tablet 5    furosemide (LASIX) 20 MG tablet take 1 tablet by mouth once daily 30 tablet 5    metFORMIN (GLUCOPHAGE) 1000 MG tablet Take 1 tablet by mouth 2 times daily (with meals) 60 tablet 5    gentamicin (GARAMYCIN) 0.1 % cream   0    glucose monitoring kit (FREESTYLE) monitoring kit 1 kit by Does not apply route daily as needed Please dispense meter covered by patients insurance.  1 kit 0    glucose blood VI test strips (EXACTECH TEST) strip 1 each by In Vitro route daily Pt testing BS once daily and prn 100 each 3    Lancets MISC Pt testing BS once daily and prn 100 each 3    Multiple Vitamins-Minerals (THERAPEUTIC MULTIVITAMIN-MINERALS) tablet Take 1 tablet by mouth daily (with breakfast) 60 tablet 3       ALLERGIES    Allergies   Allergen Reactions    Nsaids      nausea    Codeine Nausea And Vomiting       FAMILY HISTORY    Family History   Problem Relation Age of Onset    Stroke Father 76    Other Father      low BS    Diabetes Mother        SOCIAL HISTORY    Social History     Social History    Marital status:      Spouse name: N/A    Number of children: N/A    Years of education: N/A     Social History Main Topics    Smoking status: Current Some Day Smoker     Packs/day: 0.50     Years: 32.00     Types: Cigarettes     Last attempt to quit: 5/26/2015    Smokeless tobacco: Never Used    Alcohol use No    Drug use: No    Sexual activity: Not on file     Other Topics Concern    Not on file     Social History Narrative    No narrative on file       PHYSICAL EXAM    VITAL SIGNS: /87   Pulse 100   Temp 97 °F (36.1 °C) (Oral)   Resp 16   Ht 5' 7\" (1.702 m)   Wt 157 lb (71.2 kg)   SpO2 100%   BMI 24.59 kg/m²   Constitutional:  Well developed, well nourished, no acute distress, non-toxic appearance, significant left knee pain   HENT:  Atraumatic, external ears normal, nose normal, oropharynx moist.  Neck- normal range of motion, no tenderness, supple   Respiratory:  No respiratory distress, normal breath sounds. Cardiovascular:  Normal rate, normal rhythm, no murmurs, good peripheral pulses. GI:  Soft, nondistended, normal bowel sounds, nontender   Musculoskeletal:  Left wrist tenderness without gross deformity. Thumb movement preserved. Right hand movement normal and non painful. There is left knee pain and swelling. Flexion and extension are restricted but done. Tenderness on the anterior aspect of the knee with tibial plateau tenderness also. There is lateral knee tenderness without gross deformity. No hip tenderness. No ankle or foot tenderness in the left side. Integument:  Well hydrated, no rash   Neurologic:  Alert and oriented female. She has right wrist and left knee pain and tenderness. Her movements are voluntary and stable with no gross motor weakness noted. No tremor. Facial expression is symmetrical.    RADIOLOGY/PROCEDURES    Right wrist x-ray shows no fracture or dislocation. Left knee x-ray shows effusion and tibial plateau fracture as well as nondisplaced proximal fibular fracture. ED COURSE & MEDICAL DECISION MAKING    Pertinent Labs & Imaging studies reviewed.  (See chart for details)    Summation      Patient Course: 50-year-old female with right wrist and left knee pain after a fall at home. X-rays show no acute fracture dislocation but old deformity of the right distal radius. Left knee x-ray shows tibial plateau fracture and nondisplaced proximal fibular fracture. Patient treated with left knee immobilizer and Ace wrapping. Tramadol for pain. X-rays were shown to the patient. As well as discussion of treatment and use of crutches. She is accepting of treatment. Stable at discharge. ED Medications administered this visit:    Medications   acetaminophen (TYLENOL) tablet 1,000 mg (1,000 mg Oral Given 11/7/17 1811)       New Prescriptions from this visit:    Discharge Medication List as of 11/7/2017  7:40 PM      START taking these medications    Details   traMADol (ULTRAM) 50 MG tablet Take 1 tablet by mouth every 6 hours as needed for Pain ., Disp-12 tablet, R-0Print             Follow-up:  Vika Nelson DO  [facility] [address]    Schedule an appointment as soon as possible for a visit in 1 week          Final Impression:   1. Closed fracture of proximal end of left tibia, unspecified fracture morphology, initial encounter    2.  Closed fracture of shaft of left fibula, unspecified fracture morphology, initial encounter               (Please note that portions of this note were completed with a voice recognition program.  Efforts were made to edit the dictations but occasionally words are mis-transcribed.)       Sammi Good DO  11/09/17 0204

## 2018-01-29 ENCOUNTER — NURSE ONLY (OUTPATIENT)
Dept: CARDIOLOGY CLINIC | Age: 58
End: 2018-01-29
Payer: MEDICARE

## 2018-01-29 DIAGNOSIS — Z95.810 ICD (IMPLANTABLE CARDIOVERTER-DEFIBRILLATOR) IN PLACE: ICD-10-CM

## 2018-01-29 PROCEDURE — 93288 INTERROG EVL PM/LDLS PM IP: CPT | Performed by: INTERNAL MEDICINE

## 2018-03-16 DIAGNOSIS — E11.49 DM (DIABETES MELLITUS), TYPE 2 WITH NEUROLOGICAL COMPLICATIONS (HCC): ICD-10-CM

## 2018-03-16 RX ORDER — GABAPENTIN 300 MG/1
300 CAPSULE ORAL 2 TIMES DAILY
Qty: 60 CAPSULE | Refills: 0 | Status: SHIPPED | OUTPATIENT
Start: 2018-03-16 | End: 2018-04-11 | Stop reason: SDUPTHER

## 2018-03-16 NOTE — TELEPHONE ENCOUNTER
Gabapentin 300 mg    Liset    Rescheduled her appointment for 3/22. She said the only medicine she will run out of his her gabapentin. Next Visit Date:  Future Appointments  Date Time Provider Hong Mckeon   3[facility]22/2018 2:00 PM [facility]   9/24/2018 1:00 PM MD William Parra [facility]       Health Maintenance   Topic Date Due    Hepatitis C screen  1960    HIV screen  09/19/1974    DTaP/Tdap/Td vaccine (1 - Tdap) 09/19/1978    Pneumococcal med risk (1 of 1 - PPSV23) 09/19/1978    Cervical cancer screen  09/19/1980    Shingles Vaccine (1 of 2 - 2 Dose Series) 09/19/2009    Colon cancer screen colonoscopy  09/19/2009    Diabetic retinal exam  05/07/2017    Flu vaccine (1) 09/01/2017    Breast cancer screen  02/03/2018    Diabetic foot exam  07/25/2018    A1C test (Diabetic or Prediabetic)  09/22/2018    Diabetic microalbuminuria test  09/22/2018    Lipid screen  09/22/2018    Potassium monitoring  09/22/2018    Creatinine monitoring  09/22/2018       Hemoglobin A1C (%)   Date Value   09/22/2017 6.8 (H)   04/17/2017 7.6 (H)   12/23/2016 8.1             ( goal A1C is < 7)   Microalb/Crt.  Ratio (mcg/mg creat)   Date Value   09/22/2017 29 (H)     LDL Cholesterol (mg/dL)   Date Value   09/22/2017            (goal LDL is <100)   AST (U/L)   Date Value   09/22/2017 10     ALT (U/L)   Date Value   09/22/2017 18     BUN (mg/dL)   Date Value   09/22/2017 37 (H)     BP Readings from Last 3 Encounters:   11/07/17 106/87   09/25/17 110/60   09/15/17 116/74          (goal 120/80)    All Future Testing planned in [facility]  Lab Frequency Next Occurrence   EKG 12 Lead Once 09/13/2018   POCT Fecal Immunochemical Test (FIT) Once 09/13/2018   Vitamin D 25 Hydroxy Once 09/25/2018   TSH with Reflex Once 09/25/2018   CBC Auto Differential Once 09/25/2018   Comprehensive Metabolic Panel Once 57/81/4897   Lipid Panel Once 09/25/2018   Magnesium Once 09/25/2018   EKG 12 Lead

## 2018-03-19 DIAGNOSIS — E11.49 DM (DIABETES MELLITUS), TYPE 2 WITH NEUROLOGICAL COMPLICATIONS (HCC): ICD-10-CM

## 2018-03-19 DIAGNOSIS — I10 ESSENTIAL HYPERTENSION: ICD-10-CM

## 2018-03-19 RX ORDER — CARVEDILOL 3.12 MG/1
TABLET ORAL
Qty: 60 TABLET | Refills: 0 | Status: SHIPPED | OUTPATIENT
Start: 2018-03-19 | End: 2018-04-11 | Stop reason: SDUPTHER

## 2018-03-19 RX ORDER — ATORVASTATIN CALCIUM 80 MG/1
80 TABLET, FILM COATED ORAL DAILY
Qty: 30 TABLET | Refills: 0 | Status: SHIPPED | OUTPATIENT
Start: 2018-03-19 | End: 2018-04-11 | Stop reason: SDUPTHER

## 2018-04-11 ENCOUNTER — OFFICE VISIT (OUTPATIENT)
Dept: FAMILY MEDICINE CLINIC | Age: 58
End: 2018-04-11
Payer: MEDICARE

## 2018-04-11 VITALS
WEIGHT: 166 LBS | OXYGEN SATURATION: 94 % | HEART RATE: 84 BPM | DIASTOLIC BLOOD PRESSURE: 64 MMHG | SYSTOLIC BLOOD PRESSURE: 120 MMHG | BODY MASS INDEX: 26 KG/M2

## 2018-04-11 DIAGNOSIS — I10 ESSENTIAL HYPERTENSION: ICD-10-CM

## 2018-04-11 DIAGNOSIS — Z12.12 SCREENING FOR COLORECTAL CANCER: ICD-10-CM

## 2018-04-11 DIAGNOSIS — E11.49 DM (DIABETES MELLITUS), TYPE 2 WITH NEUROLOGICAL COMPLICATIONS (HCC): Primary | ICD-10-CM

## 2018-04-11 DIAGNOSIS — I25.10 CORONARY ARTERY DISEASE INVOLVING NATIVE CORONARY ARTERY OF NATIVE HEART WITHOUT ANGINA PECTORIS: ICD-10-CM

## 2018-04-11 DIAGNOSIS — Z12.31 VISIT FOR SCREENING MAMMOGRAM: ICD-10-CM

## 2018-04-11 DIAGNOSIS — F41.9 ANXIETY: ICD-10-CM

## 2018-04-11 DIAGNOSIS — E78.00 PURE HYPERCHOLESTEROLEMIA: ICD-10-CM

## 2018-04-11 DIAGNOSIS — Z23 NEED FOR 23-POLYVALENT PNEUMOCOCCAL POLYSACCHARIDE VACCINE: ICD-10-CM

## 2018-04-11 DIAGNOSIS — Z12.11 SCREENING FOR COLORECTAL CANCER: ICD-10-CM

## 2018-04-11 PROCEDURE — G8427 DOCREV CUR MEDS BY ELIG CLIN: HCPCS | Performed by: FAMILY MEDICINE

## 2018-04-11 PROCEDURE — 2022F DILAT RTA XM EVC RTNOPTHY: CPT | Performed by: FAMILY MEDICINE

## 2018-04-11 PROCEDURE — 90471 IMMUNIZATION ADMIN: CPT | Performed by: FAMILY MEDICINE

## 2018-04-11 PROCEDURE — 99214 OFFICE O/P EST MOD 30 MIN: CPT | Performed by: FAMILY MEDICINE

## 2018-04-11 PROCEDURE — 3046F HEMOGLOBIN A1C LEVEL >9.0%: CPT | Performed by: FAMILY MEDICINE

## 2018-04-11 PROCEDURE — G8419 CALC BMI OUT NRM PARAM NOF/U: HCPCS | Performed by: FAMILY MEDICINE

## 2018-04-11 PROCEDURE — 3014F SCREEN MAMMO DOC REV: CPT | Performed by: FAMILY MEDICINE

## 2018-04-11 PROCEDURE — 4004F PT TOBACCO SCREEN RCVD TLK: CPT | Performed by: FAMILY MEDICINE

## 2018-04-11 PROCEDURE — 3017F COLORECTAL CA SCREEN DOC REV: CPT | Performed by: FAMILY MEDICINE

## 2018-04-11 PROCEDURE — 90732 PPSV23 VACC 2 YRS+ SUBQ/IM: CPT | Performed by: FAMILY MEDICINE

## 2018-04-11 PROCEDURE — G8598 ASA/ANTIPLAT THER USED: HCPCS | Performed by: FAMILY MEDICINE

## 2018-04-11 RX ORDER — GABAPENTIN 300 MG/1
300 CAPSULE ORAL 2 TIMES DAILY
Qty: 60 CAPSULE | Refills: 5 | Status: ON HOLD | OUTPATIENT
Start: 2018-04-11 | End: 2018-09-04 | Stop reason: HOSPADM

## 2018-04-11 RX ORDER — POTASSIUM CHLORIDE 750 MG/1
CAPSULE, EXTENDED RELEASE ORAL
Qty: 30 CAPSULE | Refills: 5 | Status: SHIPPED | OUTPATIENT
Start: 2018-04-11 | End: 2018-09-24

## 2018-04-11 RX ORDER — CARVEDILOL 3.12 MG/1
TABLET ORAL
Qty: 60 TABLET | Refills: 5 | Status: SHIPPED | OUTPATIENT
Start: 2018-04-11 | End: 2018-09-24 | Stop reason: SDUPTHER

## 2018-04-11 RX ORDER — CLOPIDOGREL BISULFATE 75 MG/1
TABLET ORAL
Qty: 30 TABLET | Refills: 5 | Status: ON HOLD | OUTPATIENT
Start: 2018-04-11 | End: 2018-09-04

## 2018-04-11 RX ORDER — LISINOPRIL 2.5 MG/1
TABLET ORAL
Qty: 30 TABLET | Refills: 5 | Status: ON HOLD | OUTPATIENT
Start: 2018-04-11 | End: 2018-09-04 | Stop reason: HOSPADM

## 2018-04-11 RX ORDER — FUROSEMIDE 20 MG/1
TABLET ORAL
Qty: 30 TABLET | Refills: 5 | Status: ON HOLD | OUTPATIENT
Start: 2018-04-11 | End: 2018-09-04 | Stop reason: HOSPADM

## 2018-04-11 RX ORDER — ATORVASTATIN CALCIUM 80 MG/1
80 TABLET, FILM COATED ORAL DAILY
Qty: 30 TABLET | Refills: 5 | Status: SHIPPED | OUTPATIENT
Start: 2018-04-11 | End: 2018-09-24 | Stop reason: SDUPTHER

## 2018-04-11 RX ORDER — SERTRALINE HYDROCHLORIDE 100 MG/1
TABLET, FILM COATED ORAL
Qty: 30 TABLET | Refills: 5 | Status: SHIPPED | OUTPATIENT
Start: 2018-04-11 | End: 2018-09-24 | Stop reason: SDUPTHER

## 2018-04-11 ASSESSMENT — ENCOUNTER SYMPTOMS
ABDOMINAL PAIN: 0
CONSTIPATION: 0
EYE DISCHARGE: 0
VOMITING: 0
DIARRHEA: 0
NAUSEA: 0
BLOOD IN STOOL: 0
SHORTNESS OF BREATH: 0
EYE REDNESS: 0
COUGH: 0

## 2018-04-12 ASSESSMENT — ENCOUNTER SYMPTOMS: CHEST TIGHTNESS: 0

## 2018-04-13 ENCOUNTER — CARE COORDINATION (OUTPATIENT)
Dept: CARE COORDINATION | Age: 58
End: 2018-04-13

## 2018-04-16 ENCOUNTER — CARE COORDINATION (OUTPATIENT)
Dept: CARE COORDINATION | Age: 58
End: 2018-04-16

## 2018-04-20 ENCOUNTER — CARE COORDINATION (OUTPATIENT)
Dept: CARE COORDINATION | Age: 58
End: 2018-04-20

## 2018-04-24 DIAGNOSIS — E11.49 DM (DIABETES MELLITUS), TYPE 2 WITH NEUROLOGICAL COMPLICATIONS (HCC): ICD-10-CM

## 2018-08-16 ENCOUNTER — TELEPHONE (OUTPATIENT)
Dept: CARDIOLOGY CLINIC | Age: 58
End: 2018-08-16

## 2018-08-16 DIAGNOSIS — Z95.1 HX OF CABG: ICD-10-CM

## 2018-08-16 DIAGNOSIS — E78.49 OTHER HYPERLIPIDEMIA: ICD-10-CM

## 2018-08-16 DIAGNOSIS — I42.9 CARDIOMYOPATHY, UNSPECIFIED TYPE (HCC): ICD-10-CM

## 2018-08-16 DIAGNOSIS — Z98.890 H/O MITRAL VALVE REPAIR: ICD-10-CM

## 2018-08-16 DIAGNOSIS — E11.49 DM (DIABETES MELLITUS), TYPE 2 WITH NEUROLOGICAL COMPLICATIONS (HCC): ICD-10-CM

## 2018-08-16 DIAGNOSIS — Z95.810 ICD (IMPLANTABLE CARDIOVERTER-DEFIBRILLATOR) IN PLACE: ICD-10-CM

## 2018-08-16 DIAGNOSIS — E55.9 VITAMIN D DEFICIENCY: ICD-10-CM

## 2018-08-16 DIAGNOSIS — I25.10 CORONARY ARTERY DISEASE INVOLVING NATIVE CORONARY ARTERY OF NATIVE HEART WITHOUT ANGINA PECTORIS: Primary | ICD-10-CM

## 2018-08-16 DIAGNOSIS — I10 ESSENTIAL HYPERTENSION: ICD-10-CM

## 2018-08-23 ENCOUNTER — APPOINTMENT (OUTPATIENT)
Dept: GENERAL RADIOLOGY | Age: 58
End: 2018-08-23
Payer: MEDICARE

## 2018-08-23 ENCOUNTER — HOSPITAL ENCOUNTER (INPATIENT)
Age: 58
LOS: 12 days | Discharge: HOME | DRG: 469 | End: 2018-09-04
Attending: EMERGENCY MEDICINE | Admitting: INTERNAL MEDICINE
Payer: MEDICARE

## 2018-08-23 ENCOUNTER — APPOINTMENT (OUTPATIENT)
Dept: ULTRASOUND IMAGING | Age: 58
DRG: 469 | End: 2018-08-23
Payer: MEDICARE

## 2018-08-23 ENCOUNTER — APPOINTMENT (OUTPATIENT)
Dept: DIALYSIS | Age: 58
DRG: 469 | End: 2018-08-23
Payer: MEDICARE

## 2018-08-23 ENCOUNTER — APPOINTMENT (OUTPATIENT)
Dept: GENERAL RADIOLOGY | Age: 58
DRG: 469 | End: 2018-08-23
Payer: MEDICARE

## 2018-08-23 ENCOUNTER — APPOINTMENT (OUTPATIENT)
Dept: CT IMAGING | Age: 58
End: 2018-08-23
Payer: MEDICARE

## 2018-08-23 ENCOUNTER — APPOINTMENT (OUTPATIENT)
Dept: CT IMAGING | Age: 58
DRG: 469 | End: 2018-08-23
Payer: MEDICARE

## 2018-08-23 ENCOUNTER — HOSPITAL ENCOUNTER (EMERGENCY)
Age: 58
Discharge: ANOTHER HOSPITAL | End: 2018-08-23
Attending: EMERGENCY MEDICINE
Payer: MEDICARE

## 2018-08-23 VITALS
SYSTOLIC BLOOD PRESSURE: 112 MMHG | RESPIRATION RATE: 14 BRPM | OXYGEN SATURATION: 100 % | DIASTOLIC BLOOD PRESSURE: 40 MMHG | HEART RATE: 55 BPM

## 2018-08-23 DIAGNOSIS — N17.9 ACUTE RENAL FAILURE, UNSPECIFIED ACUTE RENAL FAILURE TYPE (HCC): ICD-10-CM

## 2018-08-23 DIAGNOSIS — E87.5 HYPERKALEMIA: Primary | ICD-10-CM

## 2018-08-23 DIAGNOSIS — I95.0 IDIOPATHIC HYPOTENSION: Primary | ICD-10-CM

## 2018-08-23 DIAGNOSIS — T68.XXXA HYPOTHERMIA, INITIAL ENCOUNTER: ICD-10-CM

## 2018-08-23 DIAGNOSIS — A41.9 SEPSIS, DUE TO UNSPECIFIED ORGANISM: ICD-10-CM

## 2018-08-23 DIAGNOSIS — E87.20 ACIDOSIS: ICD-10-CM

## 2018-08-23 DIAGNOSIS — A41.9 SEPTICEMIA (HCC): ICD-10-CM

## 2018-08-23 DIAGNOSIS — E87.5 HYPERKALEMIA: ICD-10-CM

## 2018-08-23 PROBLEM — R57.0 CARDIOGENIC SHOCK (HCC): Status: ACTIVE | Noted: 2018-08-23

## 2018-08-23 PROBLEM — E11.10 DKA (DIABETIC KETOACIDOSES): Status: ACTIVE | Noted: 2018-08-23

## 2018-08-23 PROBLEM — G93.41 METABOLIC ENCEPHALOPATHY: Status: ACTIVE | Noted: 2018-08-23

## 2018-08-23 PROBLEM — R65.10 SIRS (SYSTEMIC INFLAMMATORY RESPONSE SYNDROME) (HCC): Status: ACTIVE | Noted: 2018-08-23

## 2018-08-23 PROBLEM — G93.40 ENCEPHALOPATHY: Status: ACTIVE | Noted: 2018-08-23

## 2018-08-23 PROBLEM — G93.41 ACUTE METABOLIC ENCEPHALOPATHY: Status: ACTIVE | Noted: 2018-08-23

## 2018-08-23 PROBLEM — R07.9 CHEST PAIN: Status: ACTIVE | Noted: 2018-08-23

## 2018-08-23 LAB
-: ABNORMAL
-: NORMAL
ABSOLUTE EOS #: 0 K/UL (ref 0–0.4)
ABSOLUTE EOS #: 0 K/UL (ref 0–0.4)
ABSOLUTE IMMATURE GRANULOCYTE: 0.38 K/UL (ref 0–0.3)
ABSOLUTE IMMATURE GRANULOCYTE: ABNORMAL K/UL (ref 0–0.3)
ABSOLUTE LYMPH #: 1.53 K/UL (ref 1–4.8)
ABSOLUTE LYMPH #: 2.9 K/UL (ref 1–4.8)
ABSOLUTE MONO #: 0 K/UL (ref 0–1)
ABSOLUTE MONO #: 0.57 K/UL (ref 0.1–0.8)
ACTION: NORMAL
ALBUMIN SERPL-MCNC: 3.7 G/DL (ref 3.5–5.2)
ALBUMIN/GLOBULIN RATIO: ABNORMAL (ref 1–2.5)
ALLEN TEST: ABNORMAL
ALLEN TEST: NEGATIVE
ALLEN TEST: POSITIVE
ALP BLD-CCNC: 97 U/L (ref 35–104)
ALT SERPL-CCNC: 22 U/L (ref 5–33)
AMMONIA: 34 UMOL/L (ref 11–51)
AMORPHOUS: ABNORMAL
AMORPHOUS: NORMAL
ANION GAP SERPL CALCULATED.3IONS-SCNC: 17 MMOL/L (ref 9–17)
ANION GAP SERPL CALCULATED.3IONS-SCNC: 21 MMOL/L (ref 9–17)
ANION GAP SERPL CALCULATED.3IONS-SCNC: 22 MMOL/L (ref 9–17)
ANION GAP SERPL CALCULATED.3IONS-SCNC: 23 MMOL/L (ref 9–17)
ANION GAP SERPL CALCULATED.3IONS-SCNC: 29 MMOL/L (ref 9–17)
ANION GAP SERPL CALCULATED.3IONS-SCNC: ABNORMAL MMOL/L (ref 9–17)
ANION GAP: 11 MMOL/L (ref 7–16)
ANION GAP: 16 MMOL/L (ref 7–16)
AST SERPL-CCNC: 22 U/L
BACTERIA: ABNORMAL
BACTERIA: NORMAL
BASOPHILS # BLD: 0 % (ref 0–2)
BASOPHILS # BLD: 1 % (ref 0–2)
BASOPHILS ABSOLUTE: 0 K/UL (ref 0–0.2)
BASOPHILS ABSOLUTE: 0.1 K/UL (ref 0–0.2)
BETA-HYDROXYBUTYRATE: 3.81 MMOL/L (ref 0.02–0.27)
BILIRUB SERPL-MCNC: 0.28 MG/DL (ref 0.3–1.2)
BILIRUBIN URINE: NEGATIVE
BILIRUBIN URINE: NEGATIVE
BNP INTERPRETATION: ABNORMAL
BUN BLDV-MCNC: 42 MG/DL (ref 6–20)
BUN BLDV-MCNC: 47 MG/DL (ref 6–20)
BUN BLDV-MCNC: 47 MG/DL (ref 6–20)
BUN BLDV-MCNC: 51 MG/DL (ref 6–20)
BUN BLDV-MCNC: 85 MG/DL (ref 6–20)
BUN BLDV-MCNC: 88 MG/DL (ref 6–20)
BUN/CREAT BLD: 10 (ref 9–20)
BUN/CREAT BLD: ABNORMAL (ref 9–20)
CALCIUM IONIZED: 1.12 MMOL/L (ref 1.13–1.33)
CALCIUM SERPL-MCNC: 7.3 MG/DL (ref 8.6–10.4)
CALCIUM SERPL-MCNC: 7.8 MG/DL (ref 8.6–10.4)
CALCIUM SERPL-MCNC: 8.4 MG/DL (ref 8.6–10.4)
CALCIUM SERPL-MCNC: 8.5 MG/DL (ref 8.6–10.4)
CASTS UA: ABNORMAL /LPF
CASTS UA: NORMAL /LPF (ref 0–8)
CHLORIDE BLD-SCNC: 101 MMOL/L (ref 98–107)
CHLORIDE BLD-SCNC: 102 MMOL/L (ref 98–107)
CHLORIDE BLD-SCNC: 96 MMOL/L (ref 98–107)
CHLORIDE BLD-SCNC: 98 MMOL/L (ref 98–107)
CO2: 12 MMOL/L (ref 20–31)
CO2: 13 MMOL/L (ref 20–31)
CO2: 13 MMOL/L (ref 20–31)
CO2: 15 MMOL/L (ref 20–31)
CO2: 7 MMOL/L (ref 20–31)
CO2: <6 MMOL/L (ref 20–31)
COLOR: ABNORMAL
COLOR: YELLOW
COMMENT UA: ABNORMAL
COMMENT UA: ABNORMAL
CORTISOL COLLECTION INFO: ABNORMAL
CORTISOL: 130.5 UG/DL (ref 2.7–18.4)
CREAT SERPL-MCNC: 5.38 MG/DL (ref 0.5–0.9)
CREAT SERPL-MCNC: 5.47 MG/DL (ref 0.5–0.9)
CREAT SERPL-MCNC: 5.86 MG/DL (ref 0.5–0.9)
CREAT SERPL-MCNC: 6.21 MG/DL (ref 0.5–0.9)
CREAT SERPL-MCNC: 8.73 MG/DL (ref 0.5–0.9)
CREAT SERPL-MCNC: 9.04 MG/DL (ref 0.5–0.9)
CREATININE URINE: 42.4 MG/DL (ref 28–217)
CRYSTALS, UA: ABNORMAL /HPF
CRYSTALS, UA: NORMAL /HPF
DATE AND TIME: NORMAL
DIFFERENTIAL TYPE: ABNORMAL
DIFFERENTIAL TYPE: YES
DIGOXIN DATE LAST DOSE: ABNORMAL
DIGOXIN DOSE AMOUNT: ABNORMAL
DIGOXIN DOSE TIME: ABNORMAL
DIGOXIN LEVEL: <0.3 NG/ML (ref 0.5–2)
EKG ATRIAL RATE: 42 BPM
EKG ATRIAL RATE: 79 BPM
EKG P AXIS: -44 DEGREES
EKG P-R INTERVAL: 672 MS
EKG Q-T INTERVAL: 516 MS
EKG Q-T INTERVAL: 672 MS
EKG QRS DURATION: 170 MS
EKG QRS DURATION: 190 MS
EKG QTC CALCULATION (BAZETT): 561 MS
EKG QTC CALCULATION (BAZETT): 602 MS
EKG R AXIS: -70 DEGREES
EKG R AXIS: -82 DEGREES
EKG T AXIS: 47 DEGREES
EKG T AXIS: 79 DEGREES
EKG VENTRICULAR RATE: 42 BPM
EKG VENTRICULAR RATE: 82 BPM
EOSINOPHILS RELATIVE PERCENT: 0 % (ref 0–5)
EOSINOPHILS RELATIVE PERCENT: 0 % (ref 1–4)
EPITHELIAL CELLS UA: ABNORMAL /HPF
EPITHELIAL CELLS UA: NORMAL /HPF (ref 0–5)
FIO2: 100
FIO2: 30
FIO2: 50
FIO2: ABNORMAL
FREE KAPPA/LAMBDA RATIO: 2.98 (ref 0.26–1.65)
GFR AFRICAN AMERICAN: 10 ML/MIN
GFR AFRICAN AMERICAN: 10 ML/MIN
GFR AFRICAN AMERICAN: 5 ML/MIN
GFR AFRICAN AMERICAN: 6 ML/MIN
GFR AFRICAN AMERICAN: 8 ML/MIN
GFR AFRICAN AMERICAN: 9 ML/MIN
GFR NON-AFRICAN AMERICAN: 4 ML/MIN
GFR NON-AFRICAN AMERICAN: 5 ML/MIN
GFR NON-AFRICAN AMERICAN: 5 ML/MIN
GFR NON-AFRICAN AMERICAN: 7 ML/MIN
GFR NON-AFRICAN AMERICAN: 7 ML/MIN
GFR NON-AFRICAN AMERICAN: 8 ML/MIN
GFR NON-AFRICAN AMERICAN: 8 ML/MIN
GFR NON-AFRICAN AMERICAN: 9 ML/MIN
GFR SERPL CREATININE-BSD FRML MDRD: 11 ML/MIN
GFR SERPL CREATININE-BSD FRML MDRD: 6 ML/MIN
GFR SERPL CREATININE-BSD FRML MDRD: ABNORMAL ML/MIN/{1.73_M2}
GLUCOSE BLD-MCNC: 137 MG/DL (ref 65–105)
GLUCOSE BLD-MCNC: 142 MG/DL (ref 70–99)
GLUCOSE BLD-MCNC: 151 MG/DL (ref 65–105)
GLUCOSE BLD-MCNC: 152 MG/DL (ref 65–105)
GLUCOSE BLD-MCNC: 159 MG/DL (ref 65–105)
GLUCOSE BLD-MCNC: 169 MG/DL (ref 70–99)
GLUCOSE BLD-MCNC: 171 MG/DL (ref 65–105)
GLUCOSE BLD-MCNC: 177 MG/DL (ref 70–99)
GLUCOSE BLD-MCNC: 183 MG/DL (ref 65–105)
GLUCOSE BLD-MCNC: 192 MG/DL (ref 65–105)
GLUCOSE BLD-MCNC: 203 MG/DL (ref 70–99)
GLUCOSE BLD-MCNC: 205 MG/DL (ref 65–105)
GLUCOSE BLD-MCNC: 219 MG/DL (ref 70–99)
GLUCOSE BLD-MCNC: 222 MG/DL (ref 65–105)
GLUCOSE BLD-MCNC: 226 MG/DL (ref 65–105)
GLUCOSE BLD-MCNC: 232 MG/DL (ref 74–100)
GLUCOSE BLD-MCNC: 258 MG/DL (ref 70–99)
GLUCOSE BLD-MCNC: 265 MG/DL (ref 74–100)
GLUCOSE BLD-MCNC: 493 MG/DL (ref 74–100)
GLUCOSE URINE: ABNORMAL
GLUCOSE URINE: NEGATIVE
HAPTOGLOBIN: 146 MG/DL (ref 30–200)
HBV SURFACE AB TITR SER: <3.5 MIU/ML
HCO3 VENOUS: 5 MMOL/L (ref 24–28)
HCO3 VENOUS: 5.3 MMOL/L (ref 22–29)
HCT VFR BLD CALC: 32.3 % (ref 36–46)
HCT VFR BLD CALC: 34.6 % (ref 36.3–47.1)
HEMOGLOBIN: 10.3 G/DL (ref 11.9–15.1)
HEMOGLOBIN: 10.6 G/DL (ref 12–16)
HEPATITIS B CORE TOTAL ANTIBODY: NONREACTIVE
HEPATITIS B SURFACE ANTIGEN: NONREACTIVE
IMMATURE GRANULOCYTES: 2 %
IMMATURE GRANULOCYTES: ABNORMAL %
INR BLD: 1.1
KAPPA FREE LIGHT CHAINS QNT: 22.86 MG/DL (ref 0.37–1.94)
KETONES, URINE: ABNORMAL
KETONES, URINE: NEGATIVE
LACTATE DEHYDROGENASE: 309 U/L (ref 135–214)
LACTIC ACID, WHOLE BLOOD: 10.9 MMOL/L (ref 0.7–2.1)
LACTIC ACID, WHOLE BLOOD: 3.4 MMOL/L (ref 0.7–2.1)
LACTIC ACID, WHOLE BLOOD: 7.4 MMOL/L (ref 0.7–2.1)
LACTIC ACID: 8.9 MMOL/L (ref 0.5–2.2)
LAMBDA FREE LIGHT CHAINS QNT: 7.68 MG/DL (ref 0.57–2.63)
LEUKOCYTE ESTERASE, URINE: ABNORMAL
LEUKOCYTE ESTERASE, URINE: NEGATIVE
LYMPHOCYTES # BLD: 16 % (ref 15–40)
LYMPHOCYTES # BLD: 8 % (ref 24–44)
MAGNESIUM: 1.7 MG/DL (ref 1.6–2.6)
MAGNESIUM: 2 MG/DL (ref 1.6–2.6)
MAGNESIUM: 2.6 MG/DL (ref 1.6–2.6)
MCH RBC QN AUTO: 30.3 PG (ref 25.2–33.5)
MCH RBC QN AUTO: 30.3 PG (ref 26–34)
MCHC RBC AUTO-ENTMCNC: 29.8 G/DL (ref 28.4–34.8)
MCHC RBC AUTO-ENTMCNC: 32.8 G/DL (ref 31–37)
MCV RBC AUTO: 101.8 FL (ref 82.6–102.9)
MCV RBC AUTO: 92.3 FL (ref 80–100)
MODE: ABNORMAL
MONOCYTES # BLD: 0 % (ref 4–8)
MONOCYTES # BLD: 3 % (ref 1–7)
MORPHOLOGY: ABNORMAL
MRSA, DNA, NASAL: NORMAL
MUCUS: ABNORMAL
MUCUS: NORMAL
NEGATIVE BASE EXCESS, ART: 10 (ref 0–2)
NEGATIVE BASE EXCESS, ART: 24 (ref 0–2)
NEGATIVE BASE EXCESS, VEN: 27 (ref 0–2)
NEGATIVE BASE EXCESS, VEN: 27 (ref 0–5)
NITRITE, URINE: NEGATIVE
NITRITE, URINE: NEGATIVE
NOTIFY: NORMAL
NRBC AUTOMATED: 0 PER 100 WBC
NRBC AUTOMATED: ABNORMAL PER 100 WBC
O2 DEVICE/FLOW/%: ABNORMAL
O2 SAT, VEN: 65 % (ref 60–85)
O2 SAT, VEN: 99 % (ref 70–75)
OTHER OBSERVATIONS UA: ABNORMAL
OTHER OBSERVATIONS UA: NORMAL
PATIENT TEMP: ABNORMAL
PCO2, VEN: 22 MM HG (ref 41–51)
PCO2, VEN: 27.5 MM HG (ref 41–51)
PDW BLD-RTO: 14.5 % (ref 11.8–14.4)
PDW BLD-RTO: 15.9 % (ref 12.1–15.2)
PH UA: 5 (ref 5–8)
PH UA: 5.5 (ref 5–8)
PH VENOUS: 6.89 (ref 7.32–7.43)
PH VENOUS: 6.96 (ref 7.32–7.43)
PHOSPHORUS: 5.2 MG/DL (ref 2.6–4.5)
PHOSPHORUS: 6 MG/DL (ref 2.6–4.5)
PLATELET # BLD: 267 K/UL (ref 138–453)
PLATELET # BLD: 443 K/UL (ref 140–450)
PLATELET ESTIMATE: ABNORMAL
PLATELET ESTIMATE: ABNORMAL
PMV BLD AUTO: 10.3 FL (ref 8.1–13.5)
PMV BLD AUTO: ABNORMAL FL (ref 6–12)
PO2, VEN: 215 MM HG (ref 25–40)
PO2, VEN: 56.2 MM HG (ref 30–50)
POC CHLORIDE: 109 MMOL/L (ref 98–107)
POC CHLORIDE: 116 MMOL/L (ref 98–107)
POC CREATININE: 5.01 MG/DL (ref 0.51–1.19)
POC CREATININE: 8.56 MG/DL (ref 0.51–1.19)
POC HCO3: 15 MMOL/L (ref 21–28)
POC HCO3: 7 MMOL/L (ref 21–28)
POC HEMATOCRIT: 33 % (ref 36–46)
POC HEMATOCRIT: 41 % (ref 36–46)
POC HEMOGLOBIN: 11.1 G/DL (ref 12–16)
POC HEMOGLOBIN: 14.1 G/DL (ref 12–16)
POC IONIZED CALCIUM: 0.98 MMOL/L (ref 1.15–1.33)
POC IONIZED CALCIUM: 1.1 MMOL/L (ref 1.15–1.33)
POC LACTIC ACID: 10 MMOL/L (ref 0.56–1.39)
POC LACTIC ACID: 3.85 MMOL/L (ref 0.56–1.39)
POC O2 SATURATION: 95 % (ref 94–98)
POC O2 SATURATION: 99 % (ref 94–98)
POC PCO2 TEMP: ABNORMAL MM HG
POC PCO2: 29.6 MM HG (ref 35–48)
POC PCO2: 31.7 MM HG (ref 35–48)
POC PH TEMP: ABNORMAL
POC PH: 6.99 (ref 7.35–7.45)
POC PH: 7.28 (ref 7.35–7.45)
POC PO2 TEMP: ABNORMAL MM HG
POC PO2: 217.9 MM HG (ref 83–108)
POC PO2: 85.6 MM HG (ref 83–108)
POC POTASSIUM: 4.4 MMOL/L (ref 3.5–4.5)
POC POTASSIUM: 8.3 MMOL/L (ref 3.5–4.5)
POC SODIUM: 135 MMOL/L (ref 138–146)
POC SODIUM: 137 MMOL/L (ref 138–146)
POSITIVE BASE EXCESS, ART: ABNORMAL (ref 0–3)
POSITIVE BASE EXCESS, ART: ABNORMAL (ref 0–3)
POSITIVE BASE EXCESS, VEN: ABNORMAL (ref 0–3)
POSITIVE BASE EXCESS, VEN: ABNORMAL (ref 0–5)
POTASSIUM SERPL-SCNC: 4.7 MMOL/L (ref 3.7–5.3)
POTASSIUM SERPL-SCNC: 4.9 MMOL/L (ref 3.7–5.3)
POTASSIUM SERPL-SCNC: 5 MMOL/L (ref 3.7–5.3)
POTASSIUM SERPL-SCNC: 5 MMOL/L (ref 3.7–5.3)
POTASSIUM SERPL-SCNC: 8.5 MMOL/L (ref 3.7–5.3)
POTASSIUM SERPL-SCNC: 9.7 MMOL/L (ref 3.7–5.3)
PRO-BNP: 6488 PG/ML
PROTEIN UA: ABNORMAL
PROTEIN UA: ABNORMAL
PROTHROMBIN TIME: 11.2 SEC (ref 9–12)
RBC # BLD: 3.4 M/UL (ref 3.95–5.11)
RBC # BLD: 3.5 M/UL (ref 4–5.2)
RBC # BLD: ABNORMAL 10*6/UL
RBC # BLD: ABNORMAL 10*6/UL
RBC UA: ABNORMAL /HPF (ref 0–2)
RBC UA: NORMAL /HPF (ref 0–4)
READ BACK: NO
RENAL EPITHELIAL, UA: ABNORMAL /HPF
RENAL EPITHELIAL, UA: NORMAL /HPF
SAMPLE SITE: ABNORMAL
SEG NEUTROPHILS: 83 % (ref 47–75)
SEG NEUTROPHILS: 87 % (ref 36–66)
SEGMENTED NEUTROPHILS ABSOLUTE COUNT: 15.2 K/UL (ref 2.5–7)
SEGMENTED NEUTROPHILS ABSOLUTE COUNT: 16.62 K/UL (ref 1.8–7.7)
SODIUM BLD-SCNC: 132 MMOL/L (ref 135–144)
SODIUM BLD-SCNC: 133 MMOL/L (ref 135–144)
SODIUM BLD-SCNC: 134 MMOL/L (ref 135–144)
SODIUM BLD-SCNC: 135 MMOL/L (ref 135–144)
SODIUM BLD-SCNC: 135 MMOL/L (ref 135–144)
SODIUM BLD-SCNC: 138 MMOL/L (ref 135–144)
SPECIFIC GRAVITY UA: 1.01 (ref 1–1.03)
SPECIFIC GRAVITY UA: 1.02 (ref 1–1.03)
SPECIMEN DESCRIPTION: NORMAL
TCO2 (CALC), ART: 16 MMOL/L (ref 22–29)
TCO2 (CALC), ART: 8 MMOL/L (ref 22–29)
TOTAL CK: 221 U/L (ref 26–192)
TOTAL CO2, VENOUS: 6 MMOL/L (ref 21–31)
TOTAL CO2, VENOUS: 6 MMOL/L (ref 23–30)
TOTAL PROTEIN, URINE: 850 MG/DL
TOTAL PROTEIN: 7.1 G/DL (ref 6.4–8.3)
TRICHOMONAS: ABNORMAL
TRICHOMONAS: NORMAL
TROPONIN INTERP: ABNORMAL
TROPONIN T: 0.04 NG/ML
TROPONIN T: 0.11 NG/ML
TROPONIN T: 0.24 NG/ML
TROPONIN T: 0.36 NG/ML
TURBIDITY: ABNORMAL
TURBIDITY: CLEAR
URINE HGB: ABNORMAL
URINE HGB: ABNORMAL
UROBILINOGEN, URINE: NORMAL
UROBILINOGEN, URINE: NORMAL
WBC # BLD: 18.2 K/UL (ref 3.5–11)
WBC # BLD: 19.1 K/UL (ref 3.5–11.3)
WBC # BLD: ABNORMAL 10*3/UL
WBC # BLD: ABNORMAL 10*3/UL
WBC UA: ABNORMAL /HPF
WBC UA: NORMAL /HPF (ref 0–5)
YEAST: ABNORMAL
YEAST: NORMAL

## 2018-08-23 PROCEDURE — 71045 X-RAY EXAM CHEST 1 VIEW: CPT

## 2018-08-23 PROCEDURE — 83880 ASSAY OF NATRIURETIC PEPTIDE: CPT

## 2018-08-23 PROCEDURE — 84155 ASSAY OF PROTEIN SERUM: CPT

## 2018-08-23 PROCEDURE — 94002 VENT MGMT INPAT INIT DAY: CPT

## 2018-08-23 PROCEDURE — 83735 ASSAY OF MAGNESIUM: CPT

## 2018-08-23 PROCEDURE — 6370000000 HC RX 637 (ALT 250 FOR IP): Performed by: EMERGENCY MEDICINE

## 2018-08-23 PROCEDURE — 93005 ELECTROCARDIOGRAM TRACING: CPT

## 2018-08-23 PROCEDURE — 82803 BLOOD GASES ANY COMBINATION: CPT

## 2018-08-23 PROCEDURE — 76775 US EXAM ABDO BACK WALL LIM: CPT

## 2018-08-23 PROCEDURE — 96374 THER/PROPH/DIAG INJ IV PUSH: CPT

## 2018-08-23 PROCEDURE — 2580000003 HC RX 258: Performed by: STUDENT IN AN ORGANIZED HEALTH CARE EDUCATION/TRAINING PROGRAM

## 2018-08-23 PROCEDURE — 94664 DEMO&/EVAL PT USE INHALER: CPT

## 2018-08-23 PROCEDURE — 82010 KETONE BODYS QUAN: CPT

## 2018-08-23 PROCEDURE — 84132 ASSAY OF SERUM POTASSIUM: CPT

## 2018-08-23 PROCEDURE — 6360000002 HC RX W HCPCS: Performed by: STUDENT IN AN ORGANIZED HEALTH CARE EDUCATION/TRAINING PROGRAM

## 2018-08-23 PROCEDURE — 83516 IMMUNOASSAY NONANTIBODY: CPT

## 2018-08-23 PROCEDURE — S0028 INJECTION, FAMOTIDINE, 20 MG: HCPCS | Performed by: STUDENT IN AN ORGANIZED HEALTH CARE EDUCATION/TRAINING PROGRAM

## 2018-08-23 PROCEDURE — 83605 ASSAY OF LACTIC ACID: CPT

## 2018-08-23 PROCEDURE — 82947 ASSAY GLUCOSE BLOOD QUANT: CPT

## 2018-08-23 PROCEDURE — 81001 URINALYSIS AUTO W/SCOPE: CPT

## 2018-08-23 PROCEDURE — 96375 TX/PRO/DX INJ NEW DRUG ADDON: CPT

## 2018-08-23 PROCEDURE — 87641 MR-STAPH DNA AMP PROBE: CPT

## 2018-08-23 PROCEDURE — 80162 ASSAY OF DIGOXIN TOTAL: CPT

## 2018-08-23 PROCEDURE — 90935 HEMODIALYSIS ONE EVALUATION: CPT

## 2018-08-23 PROCEDURE — 82550 ASSAY OF CK (CPK): CPT

## 2018-08-23 PROCEDURE — 84165 PROTEIN E-PHORESIS SERUM: CPT

## 2018-08-23 PROCEDURE — 36556 INSERT NON-TUNNEL CV CATH: CPT

## 2018-08-23 PROCEDURE — 94762 N-INVAS EAR/PLS OXIMTRY CONT: CPT

## 2018-08-23 PROCEDURE — 84156 ASSAY OF PROTEIN URINE: CPT

## 2018-08-23 PROCEDURE — 94770 HC ETCO2 MONITOR DAILY: CPT

## 2018-08-23 PROCEDURE — 92950 HEART/LUNG RESUSCITATION CPR: CPT

## 2018-08-23 PROCEDURE — 36600 WITHDRAWAL OF ARTERIAL BLOOD: CPT

## 2018-08-23 PROCEDURE — 5A1945Z RESPIRATORY VENTILATION, 24-96 CONSECUTIVE HOURS: ICD-10-PCS | Performed by: INTERNAL MEDICINE

## 2018-08-23 PROCEDURE — 84484 ASSAY OF TROPONIN QUANT: CPT

## 2018-08-23 PROCEDURE — 82330 ASSAY OF CALCIUM: CPT

## 2018-08-23 PROCEDURE — 2500000003 HC RX 250 WO HCPCS: Performed by: STUDENT IN AN ORGANIZED HEALTH CARE EDUCATION/TRAINING PROGRAM

## 2018-08-23 PROCEDURE — 82435 ASSAY OF BLOOD CHLORIDE: CPT

## 2018-08-23 PROCEDURE — 80053 COMPREHEN METABOLIC PANEL: CPT

## 2018-08-23 PROCEDURE — 86317 IMMUNOASSAY INFECTIOUS AGENT: CPT

## 2018-08-23 PROCEDURE — 99285 EMERGENCY DEPT VISIT HI MDM: CPT

## 2018-08-23 PROCEDURE — 31500 INSERT EMERGENCY AIRWAY: CPT

## 2018-08-23 PROCEDURE — 80048 BASIC METABOLIC PNL TOTAL CA: CPT

## 2018-08-23 PROCEDURE — 94003 VENT MGMT INPAT SUBQ DAY: CPT

## 2018-08-23 PROCEDURE — 2500000003 HC RX 250 WO HCPCS

## 2018-08-23 PROCEDURE — 87040 BLOOD CULTURE FOR BACTERIA: CPT

## 2018-08-23 PROCEDURE — 70450 CT HEAD/BRAIN W/O DYE: CPT

## 2018-08-23 PROCEDURE — 99291 CRITICAL CARE FIRST HOUR: CPT | Performed by: INTERNAL MEDICINE

## 2018-08-23 PROCEDURE — 36415 COLL VENOUS BLD VENIPUNCTURE: CPT

## 2018-08-23 PROCEDURE — 83010 ASSAY OF HAPTOGLOBIN QUANT: CPT

## 2018-08-23 PROCEDURE — 83615 LACTATE (LD) (LDH) ENZYME: CPT

## 2018-08-23 PROCEDURE — 96376 TX/PRO/DX INJ SAME DRUG ADON: CPT

## 2018-08-23 PROCEDURE — 82565 ASSAY OF CREATININE: CPT

## 2018-08-23 PROCEDURE — 2500000003 HC RX 250 WO HCPCS: Performed by: EMERGENCY MEDICINE

## 2018-08-23 PROCEDURE — 6360000002 HC RX W HCPCS: Performed by: INTERNAL MEDICINE

## 2018-08-23 PROCEDURE — 82533 TOTAL CORTISOL: CPT

## 2018-08-23 PROCEDURE — 84100 ASSAY OF PHOSPHORUS: CPT

## 2018-08-23 PROCEDURE — 82570 ASSAY OF URINE CREATININE: CPT

## 2018-08-23 PROCEDURE — 87086 URINE CULTURE/COLONY COUNT: CPT

## 2018-08-23 PROCEDURE — 85025 COMPLETE CBC W/AUTO DIFF WBC: CPT

## 2018-08-23 PROCEDURE — 83883 ASSAY NEPHELOMETRY NOT SPEC: CPT

## 2018-08-23 PROCEDURE — 84295 ASSAY OF SERUM SODIUM: CPT

## 2018-08-23 PROCEDURE — 85610 PROTHROMBIN TIME: CPT

## 2018-08-23 PROCEDURE — 87340 HEPATITIS B SURFACE AG IA: CPT

## 2018-08-23 PROCEDURE — 6360000002 HC RX W HCPCS: Performed by: EMERGENCY MEDICINE

## 2018-08-23 PROCEDURE — 6370000000 HC RX 637 (ALT 250 FOR IP): Performed by: STUDENT IN AN ORGANIZED HEALTH CARE EDUCATION/TRAINING PROGRAM

## 2018-08-23 PROCEDURE — 2000000000 HC ICU R&B

## 2018-08-23 PROCEDURE — 6360000002 HC RX W HCPCS

## 2018-08-23 PROCEDURE — 82140 ASSAY OF AMMONIA: CPT

## 2018-08-23 PROCEDURE — 85014 HEMATOCRIT: CPT

## 2018-08-23 PROCEDURE — 86704 HEP B CORE ANTIBODY TOTAL: CPT

## 2018-08-23 PROCEDURE — 2580000003 HC RX 258: Performed by: EMERGENCY MEDICINE

## 2018-08-23 RX ORDER — CALCIUM GLUCONATE 94 MG/ML
INJECTION, SOLUTION INTRAVENOUS
Status: DISCONTINUED
Start: 2018-08-23 | End: 2018-08-23 | Stop reason: HOSPADM

## 2018-08-23 RX ORDER — HEPARIN SODIUM 5000 [USP'U]/ML
8000 INJECTION, SOLUTION INTRAVENOUS; SUBCUTANEOUS PRN
Status: DISCONTINUED | OUTPATIENT
Start: 2018-08-23 | End: 2018-09-05 | Stop reason: HOSPADM

## 2018-08-23 RX ORDER — VANCOMYCIN HYDROCHLORIDE 1 G/200ML
1000 INJECTION, SOLUTION INTRAVENOUS EVERY 12 HOURS
Status: DISCONTINUED | OUTPATIENT
Start: 2018-08-23 | End: 2018-08-23

## 2018-08-23 RX ORDER — CALCIUM CHLORIDE 100 MG/ML
INJECTION INTRAVENOUS; INTRAVENTRICULAR
Status: DISPENSED
Start: 2018-08-23 | End: 2018-08-23

## 2018-08-23 RX ORDER — DEXTROSE AND SODIUM CHLORIDE 5; .45 G/100ML; G/100ML
INJECTION, SOLUTION INTRAVENOUS CONTINUOUS PRN
Status: DISCONTINUED | OUTPATIENT
Start: 2018-08-23 | End: 2018-08-26

## 2018-08-23 RX ORDER — 0.9 % SODIUM CHLORIDE 0.9 %
1000 INTRAVENOUS SOLUTION INTRAVENOUS ONCE
Status: COMPLETED | OUTPATIENT
Start: 2018-08-23 | End: 2018-08-23

## 2018-08-23 RX ORDER — MAGNESIUM SULFATE 1 G/100ML
1 INJECTION INTRAVENOUS PRN
Status: DISCONTINUED | OUTPATIENT
Start: 2018-08-23 | End: 2018-08-23

## 2018-08-23 RX ORDER — DEXTROSE MONOHYDRATE 50 MG/ML
INJECTION, SOLUTION INTRAVENOUS
Status: DISCONTINUED
Start: 2018-08-23 | End: 2018-08-23 | Stop reason: HOSPADM

## 2018-08-23 RX ORDER — 0.9 % SODIUM CHLORIDE 0.9 %
15 INTRAVENOUS SOLUTION INTRAVENOUS ONCE
Status: COMPLETED | OUTPATIENT
Start: 2018-08-23 | End: 2018-08-23

## 2018-08-23 RX ORDER — DEXTROSE MONOHYDRATE 25 G/50ML
25 INJECTION, SOLUTION INTRAVENOUS ONCE
Status: COMPLETED | OUTPATIENT
Start: 2018-08-23 | End: 2018-08-23

## 2018-08-23 RX ORDER — ALBUTEROL SULFATE 2.5 MG/3ML
SOLUTION RESPIRATORY (INHALATION)
Status: COMPLETED
Start: 2018-08-23 | End: 2018-08-23

## 2018-08-23 RX ORDER — POTASSIUM CHLORIDE 7.45 MG/ML
10 INJECTION INTRAVENOUS PRN
Status: DISCONTINUED | OUTPATIENT
Start: 2018-08-23 | End: 2018-08-23

## 2018-08-23 RX ORDER — DEXTROSE MONOHYDRATE 25 G/50ML
12.5 INJECTION, SOLUTION INTRAVENOUS PRN
Status: DISCONTINUED | OUTPATIENT
Start: 2018-08-23 | End: 2018-09-05 | Stop reason: HOSPADM

## 2018-08-23 RX ORDER — CALCIUM CHLORIDE 100 MG/ML
1 INJECTION INTRAVENOUS; INTRAVENTRICULAR ONCE
Status: COMPLETED | OUTPATIENT
Start: 2018-08-23 | End: 2018-08-23

## 2018-08-23 RX ORDER — NICOTINE POLACRILEX 4 MG
15 LOZENGE BUCCAL PRN
Status: DISCONTINUED | OUTPATIENT
Start: 2018-08-23 | End: 2018-09-05 | Stop reason: HOSPADM

## 2018-08-23 RX ORDER — MIDAZOLAM HYDROCHLORIDE 1 MG/ML
2 INJECTION INTRAMUSCULAR; INTRAVENOUS ONCE
Status: COMPLETED | OUTPATIENT
Start: 2018-08-23 | End: 2018-08-23

## 2018-08-23 RX ORDER — SODIUM CHLORIDE 9 MG/ML
INJECTION, SOLUTION INTRAVENOUS CONTINUOUS
Status: DISCONTINUED | OUTPATIENT
Start: 2018-08-23 | End: 2018-08-26

## 2018-08-23 RX ORDER — HEPARIN SODIUM 5000 [USP'U]/ML
9500 INJECTION, SOLUTION INTRAVENOUS; SUBCUTANEOUS PRN
Status: DISCONTINUED | OUTPATIENT
Start: 2018-08-23 | End: 2018-09-05 | Stop reason: HOSPADM

## 2018-08-23 RX ORDER — DEXTROSE MONOHYDRATE 50 MG/ML
100 INJECTION, SOLUTION INTRAVENOUS PRN
Status: DISCONTINUED | OUTPATIENT
Start: 2018-08-23 | End: 2018-09-05 | Stop reason: HOSPADM

## 2018-08-23 RX ORDER — ETOMIDATE 2 MG/ML
15 INJECTION INTRAVENOUS ONCE
Status: COMPLETED | OUTPATIENT
Start: 2018-08-23 | End: 2018-08-23

## 2018-08-23 RX ORDER — HEPARIN SODIUM (PORCINE) LOCK FLUSH IV SOLN 100 UNIT/ML 100 UNIT/ML
300 SOLUTION INTRAVENOUS ONCE
Status: DISCONTINUED | OUTPATIENT
Start: 2018-08-23 | End: 2018-08-29

## 2018-08-23 RX ORDER — HEPARIN SODIUM 5000 [USP'U]/ML
5000 INJECTION, SOLUTION INTRAVENOUS; SUBCUTANEOUS EVERY 8 HOURS SCHEDULED
Status: DISCONTINUED | OUTPATIENT
Start: 2018-08-23 | End: 2018-09-03

## 2018-08-23 RX ORDER — SUCCINYLCHOLINE CHLORIDE 20 MG/ML
150 INJECTION INTRAMUSCULAR; INTRAVENOUS ONCE
Status: COMPLETED | OUTPATIENT
Start: 2018-08-23 | End: 2018-08-23

## 2018-08-23 RX ORDER — 0.9 % SODIUM CHLORIDE 0.9 %
30 INTRAVENOUS SOLUTION INTRAVENOUS ONCE
Status: COMPLETED | OUTPATIENT
Start: 2018-08-23 | End: 2018-08-23

## 2018-08-23 RX ADMIN — Medication 1 MG/HR: at 05:26

## 2018-08-23 RX ADMIN — HEPARIN SODIUM 9500 UNITS: 5000 INJECTION, SOLUTION INTRAVENOUS; SUBCUTANEOUS at 08:35

## 2018-08-23 RX ADMIN — HYDROCORTISONE SODIUM SUCCINATE 100 MG: 100 INJECTION, POWDER, FOR SOLUTION INTRAMUSCULAR; INTRAVENOUS at 12:15

## 2018-08-23 RX ADMIN — HYDROCORTISONE SODIUM SUCCINATE 100 MG: 100 INJECTION, POWDER, FOR SOLUTION INTRAMUSCULAR; INTRAVENOUS at 18:10

## 2018-08-23 RX ADMIN — MAGNESIUM SULFATE HEPTAHYDRATE 2 G: 500 INJECTION, SOLUTION INTRAMUSCULAR; INTRAVENOUS at 13:17

## 2018-08-23 RX ADMIN — CEFEPIME 2 G: 2 INJECTION, POWDER, FOR SOLUTION INTRAVENOUS at 09:42

## 2018-08-23 RX ADMIN — HYDROCORTISONE SODIUM SUCCINATE 100 MG: 100 INJECTION, POWDER, FOR SOLUTION INTRAMUSCULAR; INTRAVENOUS at 03:02

## 2018-08-23 RX ADMIN — SODIUM CHLORIDE 1000 ML: 9 INJECTION, SOLUTION INTRAVENOUS at 06:25

## 2018-08-23 RX ADMIN — SODIUM CHLORIDE 3.33 UNITS/HR: 9 INJECTION, SOLUTION INTRAVENOUS at 09:46

## 2018-08-23 RX ADMIN — HEPARIN SODIUM 5000 UNITS: 5000 INJECTION, SOLUTION INTRAVENOUS; SUBCUTANEOUS at 22:40

## 2018-08-23 RX ADMIN — SODIUM CHLORIDE 1000 ML: 9 INJECTION, SOLUTION INTRAVENOUS at 05:22

## 2018-08-23 RX ADMIN — Medication 16 MG: at 13:12

## 2018-08-23 RX ADMIN — MIDAZOLAM 2 MG: 1 INJECTION INTRAMUSCULAR; INTRAVENOUS at 03:00

## 2018-08-23 RX ADMIN — ETOMIDATE 15 MG: 2 INJECTION, SOLUTION INTRAVENOUS at 02:22

## 2018-08-23 RX ADMIN — DEXTROSE AND SODIUM CHLORIDE: 5; 450 INJECTION, SOLUTION INTRAVENOUS at 13:05

## 2018-08-23 RX ADMIN — Medication 1250 MG: at 10:41

## 2018-08-23 RX ADMIN — MIDAZOLAM 2 MG: 1 INJECTION INTRAMUSCULAR; INTRAVENOUS at 03:25

## 2018-08-23 RX ADMIN — SODIUM BICARBONATE 50 MEQ: 84 INJECTION, SOLUTION INTRAVENOUS at 02:58

## 2018-08-23 RX ADMIN — Medication 150 MG: at 02:23

## 2018-08-23 RX ADMIN — HEPARIN SODIUM 8000 UNITS: 5000 INJECTION, SOLUTION INTRAVENOUS; SUBCUTANEOUS at 08:34

## 2018-08-23 RX ADMIN — NOREPINEPHRINE BITARTRATE 2 MCG/MIN: 1 INJECTION INTRAVENOUS at 04:41

## 2018-08-23 RX ADMIN — INSULIN HUMAN 10 UNITS: 100 INJECTION, SOLUTION PARENTERAL at 04:59

## 2018-08-23 RX ADMIN — INSULIN HUMAN 10 UNITS: 100 INJECTION, SOLUTION PARENTERAL at 05:50

## 2018-08-23 RX ADMIN — SODIUM CHLORIDE 1000 ML: 9 INJECTION, SOLUTION INTRAVENOUS at 07:55

## 2018-08-23 RX ADMIN — MIDAZOLAM 2 MG: 1 INJECTION INTRAMUSCULAR; INTRAVENOUS at 02:40

## 2018-08-23 RX ADMIN — HEPARIN SODIUM 5000 UNITS: 5000 INJECTION, SOLUTION INTRAVENOUS; SUBCUTANEOUS at 14:09

## 2018-08-23 RX ADMIN — PIPERACILLIN SODIUM,TAZOBACTAM SODIUM 3.38 G: 3; .375 INJECTION, POWDER, FOR SOLUTION INTRAVENOUS at 03:25

## 2018-08-23 RX ADMIN — DEXTROSE MONOHYDRATE 25 G: 25 INJECTION, SOLUTION INTRAVENOUS at 04:55

## 2018-08-23 RX ADMIN — FAMOTIDINE 20 MG: 10 INJECTION, SOLUTION INTRAVENOUS at 12:14

## 2018-08-23 RX ADMIN — DEXTROSE AND SODIUM CHLORIDE: 5; 450 INJECTION, SOLUTION INTRAVENOUS at 20:16

## 2018-08-23 RX ADMIN — SODIUM CHLORIDE: 9 INJECTION, SOLUTION INTRAVENOUS at 03:27

## 2018-08-23 RX ADMIN — CALCIUM GLUCONATE 2 G: 94 INJECTION, SOLUTION INTRAVENOUS at 03:24

## 2018-08-23 RX ADMIN — ALBUTEROL SULFATE 5 MG: 2.5 SOLUTION RESPIRATORY (INHALATION) at 02:50

## 2018-08-23 RX ADMIN — SODIUM BICARBONATE 50 MEQ: 84 INJECTION, SOLUTION INTRAVENOUS at 03:03

## 2018-08-23 RX ADMIN — DEXTROSE AND SODIUM CHLORIDE: 5; 450 INJECTION, SOLUTION INTRAVENOUS at 09:51

## 2018-08-23 RX ADMIN — CALCIUM CHLORIDE 1 G: 100 INJECTION, SOLUTION INTRAVENOUS; INTRAVENTRICULAR at 04:45

## 2018-08-23 RX ADMIN — DEXTROSE MONOHYDRATE 25 G: 500 INJECTION PARENTERAL at 02:56

## 2018-08-23 RX ADMIN — INSULIN HUMAN 10 UNITS: 100 INJECTION, SOLUTION PARENTERAL at 02:55

## 2018-08-23 RX ADMIN — Medication 4 MG/HR: at 18:53

## 2018-08-23 RX ADMIN — SODIUM CHLORIDE 1145 ML: 9 INJECTION, SOLUTION INTRAVENOUS at 09:05

## 2018-08-23 ASSESSMENT — PAIN SCALES - GENERAL
PAINLEVEL_OUTOF10: 0

## 2018-08-23 ASSESSMENT — PULMONARY FUNCTION TESTS
PIF_VALUE: 23
PIF_VALUE: 12
PIF_VALUE: 14
PIF_VALUE: 10
PIF_VALUE: 13
PIF_VALUE: 24
PIF_VALUE: 12

## 2018-08-23 NOTE — PLAN OF CARE
Problem: OXYGENATION/RESPIRATORY FUNCTION  Goal: Patient will maintain patent airway  Outcome: Ongoing    Goal: Patient will achieve/maintain normal respiratory rate/effort  Respiratory rate and effort will be within normal limits for the patient   Outcome: Ongoing      Problem: MECHANICAL VENTILATION  Goal: Patient will maintain patent airway  Outcome: Ongoing    Goal: Oral health is maintained or improved  Outcome: Ongoing    Goal: ET tube will be managed safely  Outcome: Ongoing    Goal: Ability to express needs and understand communication  Outcome: Ongoing    Goal: Mobility/activity is maintained at optimum level for patient  Outcome: Ongoing      Problem: SKIN INTEGRITY  Goal: Skin integrity is maintained or improved  Outcome: Ongoing      Comments: MECHANICAL VENTILATION     [x]  PROVIDE OPTIMAL VENTILATION  [x]   ASSESS FOR EXTUBATION READINESS  [x]   ASSESS FOR WEANING READINESS  []  EXTUBATE AS TOLERATED  [x]  IMPLEMENT ADULT MECHANICAL VENTILATION PROTOCOL  [x]  MAINTAIN ADEQUATE OXYGENATION  [x]  PERFORM SPONTANEOUS WEANING TRIAL AS TOLERATED

## 2018-08-23 NOTE — ED PROVIDER NOTES
eMERGENCY dEPARTMENT eNCOUnter      CHIEF COMPLAINT    No chief complaint on file. HPI    Alesha Garduno is a 61 y.o. female who presentsto ED from home via EMS, they were called for CP, and general body weakness just prior to arrival. When EMS arrived her SBP in 60's and sats were 100%, HR in 40-50's. No drug abuse or overdose. Pt has HTN and on Lisinopril, coreg, and lasix. She is a diabetic and her FS was 183. No fever/ vomiting or abd pain. She has a pacemaker.  History is limited from EMS, and family unreachable    PAST MEDICAL HISTORY    Past Medical History:   Diagnosis Date    CAD (coronary artery disease)     [facility]     Depression     Diabetes mellitus ([facility]     H/O mitral valve repair     History of fractured kneecap 2017    right    Hx of CABG     Hx of myocardial infarction     Hyperlipidemia     Hypertension     ICD (implantable cardioverter-defibrillator) in place     Kidney calculi     Osteoarthritis     Renal calculi        SURGICAL HISTORY    Past Surgical History:   Procedure Laterality Date    ANKLE SURGERY Right 2009    plate and screws     CARDIAC DEFIBRILLATOR PLACEMENT  06/01/15    CHOLECYSTECTOMY  1990    CORONARY ARTERY BYPASS GRAFT  05/28/15    X 3- Dr Peña Favorite [place]-LAD<SVG-Diag, SVG-PDA, MV Repair with 30 min CE IMR ring    HYSTERECTOMY  1990    KIDNEY STONE SURGERY Left 2010    MITRAL VALVE SURGERY  6/4/15    TUBAL LIGATION         CURRENT MEDICATIONS    Current Outpatient Rx   Medication Sig Dispense Refill    Insulin Pen Needle 32G X 4 MM MISC 1 each by Does not apply route daily 100 each 3    insulin glargine (BASAGLAR KWIKPEN) 100 UNIT/ML injection pen Take 30 units SC every AM 5 pen 5    carvedilol (COREG) 3.125 MG tablet take 1 tablet by mouth twice a day with meals 60 tablet 5    atorvastatin (LIPITOR) 80 MG tablet Take 1 tablet by mouth daily 30 tablet 5    metFORMIN (GLUCOPHAGE) 1000 MG tablet take 1 tablet by mouth twice a day with meals 60 tablet 5    gabapentin (NEURONTIN) 300 MG capsule Take 1 capsule by mouth 2 times daily for 30 days. . 60 capsule 5    potassium chloride (MICRO-K) 10 MEQ extended release capsule take 1 capsule by mouth daily 30 capsule 5    sertraline (ZOLOFT) 100 MG tablet take 1 tablet by mouth once daily 30 tablet 5    clopidogrel (PLAVIX) 75 MG tablet take 1 tablet by mouth daily 30 tablet 5    lisinopril (PRINIVIL;ZESTRIL) 2.5 MG tablet take 1 tablet by mouth daily 30 tablet 5    furosemide (LASIX) 20 MG tablet take 1 tablet by mouth once daily 30 tablet 5    insulin detemir (LEVEMIR FLEXPEN) 100 UNIT/ML injection pen Patient also uses a slide scale with the levem 5 Pen 5    Cholecalciferol (VITAMIN D3) 5000 units TABS Take 5,000 Units by mouth daily      gentamicin (GARAMYCIN) 0.1 % cream   0    glucose monitoring kit (FREESTYLE) monitoring kit 1 kit by Does not apply route daily as needed Please dispense meter covered by patients insurance. 1 kit 0    glucose blood VI test strips (EXACTECH TEST) strip 1 each by In Vitro route daily Pt testing BS once daily and prn 100 each 3    Lancets MISC Pt testing BS once daily and prn 100 each 3    Multiple Vitamins-Minerals (THERAPEUTIC MULTIVITAMIN-MINERALS) tablet Take 1 tablet by mouth daily (with breakfast) 60 tablet 3       ALLERGIES    Allergies   Allergen Reactions    Nsaids      nausea    Codeine Nausea And Vomiting       FAMILY HISTORY    Family History   Problem Relation Age of Onset    Stroke Father 76    Other Father         low BS    Diabetes Mother        SOCIAL HISTORY    Social History     Social History    Marital status:      Spouse name: N/A    Number of children: N/A    Years of education: N/A     Social History Main Topics    Smoking status: Current Some Day Smoker     Packs/day: 0.50     Years: 32.00     Types: Cigarettes     Last attempt to quit: 5/26/2015    Smokeless tobacco: Never Used    Alcohol use No    Drug use:  No so for a repeat protection patient was intubated with succinylcholine etomidate while labs were pending. Later patient is found to be septic, acute hyperkalemia with acute renal failure, severe lactic acidosis. [facility] has been immediately initiated within few minutes upon patient arrival, discussed with Dr. Parul Lombardi at [facility] who has accepted the patient and he was being updated as patient's labs returned. Dr. Regan Barron arrived in [address] [place] [address] and continued to manage her care while they were getting ready to transport her to [facility]. Patient was given 2 A of bicarb, IV Zosyn, 3 L IV fluid, D50 1 amp, regular insulin 10 units, albuterol nebulizer for her hyperkalemia, calcium gluconate 2 g, Solu-Medrol 100 mg, Versed as needed for sedation. Dr. Parul Lombardi has been informed to notify and get the dialysis ready for emergent dialysis when patient arrives. Patient systolic blood pressure stayed above 100 at the time of transfer.     ED Medications administered this visit:    Medications   0.9 % sodium chloride bolus ( Intravenous New Bag 8/23/18 0327)   calcium gluconate 10 % injection (  Canceled Entry 8/23/18 0333)   dextrose 5 % solution (  Canceled Entry 8/23/18 0334)   piperacillin-tazobactam (ZOSYN) 3.375 g in dextrose 5 % 50 mL IVPB (mini-bag) (0 g Intravenous Patient Transferred to Other Facility 8/23/18 0329)   calcium gluconate 2 g in dextrose 5 % 100 mL IVPB (0 g Intravenous Stopped 8/23/18 0325)   insulin regular (HUMULIN R;NOVOLIN R) injection 10 Units (10 Units Intravenous Given 8/23/18 0255)   dextrose 50 % solution 25 g (25 g Intravenous Given 8/23/18 0256)   albuterol (PROVENTIL) (2.5 MG/3ML) 0.083% nebulizer solution (5 mg  Given 8/23/18 0250)   sodium bicarbonate 8.4 % injection 50 mEq (50 mEq Intravenous Given 8/23/18 0303)   sodium bicarbonate 8.4 % injection 50 mEq (50 mEq Intravenous Given 8/23/18 0258)   hydrocortisone sodium succinate PF (SOLU-CORTEF) injection 100 mg (100 mg Intravenous Given 8/23/18 0302)       New Prescriptions from this visit:    New Prescriptions    No medications on file       Follow-up:  No follow-up provider specified. CC time was 1 hr in direct pt care and d/w transfer physician. Final Impression:   1. Idiopathic hypotension    2. Hypothermia, initial encounter    3. Acidosis    4. Acute renal failure, unspecified acute renal failure type ([address]    5. Hyperkalemia    6.  Septicemia ([facility] [address]               (Please note that portions of this note were completed with a voice recognition program.  Efforts were made to edit the dictations but occasionally words are mis-transcribed.)          Linh Baltazar MD  08/23/18 8303

## 2018-08-23 NOTE — ED NOTES
Critical results from lab: Potassium 8.51, creatinine 8.73, CO2 6.5. Physician notified.       Jerry Kirk, RN  08/23/18 4739 Robin Lyon RN  08/23/18 1356

## 2018-08-23 NOTE — PROGRESS NOTES
Dialysis Post Treatment Note  Patient tolerated treatment well. Denies complaints at time of discharge. Vitals:    08/23/18 1117   BP: 122/70   Pulse: 96   Resp:    Temp:    SpO2:      Pre-Weight = 76.3kg  Post-weight = Weight: 166 lb 7.2 oz (75.5 kg)  Total Liters Processed = Total Liters Processed (l/min): 32.3 l/min  Rinseback Volume (mL) = Rinseback Volume (ml): 370 ml  Net Removal (mL) = 850  Pt tolerated treatment. Pt on vent and Levophed gtt. Orders received from Dr Kenny Starks to run patient for 2 hours and repeat K+ in 30 min.  Will cont to monitor

## 2018-08-23 NOTE — ED NOTES
Bed: 14  Expected date:   Expected time:   Means of arrival:   Comments:     Demetrius Newell RN  08/23/18 3714

## 2018-08-23 NOTE — PROGRESS NOTES
Pharmacy Note  Vancomycin Consult    Dat Holguin is a 61 y.o. female started on Vancomycin for empiric coverage; consult received from Dr. Sury Gardner to manage therapy. Also receiving the following antibiotics: Cefepime. Patient Active Problem List   Diagnosis    Acute coronary syndrome ([facility] [facility]    S/P cardiac cath-Patent grafts 6/5/15    Onychomycosis of toenail    CAD (coronary artery disease)    Hx of CABG    ICD (implantable cardioverter-defibrillator) in place    H/O mitral valve repair    Hx of myocardial infarction    Hyperlipidemia    Hypertension    Cardiomyopathy ([facility] [facility]    DM (diabetes mellitus), type 2 with neurological complications ([facility] [address] [facility]    Anxiety    [facility]    Hyperkalemia    Lactic acidosis    Acute metabolic encephalopathy    SIRS [facility]ic inflammatory response syndrome) ([facility]    Metabolic encephalopathy       Allergies:  Nsaids and Codeine     Temp max:   93.2  Recent Labs      08/23/18   0225  08/23/18   0433   BUN  88*  85*       Recent Labs      08/23/18   0430  08/23/18   0433   CREATININE  8.56*  8.73*       Recent Labs      08/23/18   0225  08/23/18   0433   WBC  18.2*  19.1*       No intake or output data in the 24 hours ending 08/23/18 0644    Culture Date      Source                       Results      Ht Readings from Last 1 Encounters:   08/23/18 5' 5\" (1.651 m)        Wt Readings from Last 1 Encounters:   08/23/18 165 lb (74.8 kg)         Body mass index is 27.46 kg/m². Estimated Creatinine Clearance: 7 mL/min (A) (based on SCr of 8.73 mg/dL [facility])). Goal Trough Level: 10-20 mcg/mL    Assessment/Plan:  Patient developed CHAD and had hemodialysis today. Will initiate vancomycin 1250 mg IV x1 dose now. Timing of trough level will be determined based on culture results, renal function, and clinical response. Thank you for the consult. Will continue to follow. Axel Meneses. D.    8/23/2018  6:46 AM

## 2018-08-23 NOTE — ED NOTES
Pt arrived to unit via [facility] from [facility] for AMS and low BP  Pt has elevated K, BUN, Creat  Pt given insulin and dextrose at [facility]  Pt given Versed enroute to [place]. Maury De Paz RN  08/23/18 1131

## 2018-08-23 NOTE — ED PROVIDER NOTES
[facility]  ED  Emergency Department Encounter  Emergency Medicine Resident     Pt Name: Romie Gonzalez  MRN: [id number]  [place] 1960  Date of evaluation: 8/23/18  PCP:  Bisi Lemus MD    [address] [place] [address]       Chief Complaint   Patient presents with    Altered Mental Status       HISTORY OF PRESENT ILLNESS  (Location/Symptom, Timing/Onset, Context/Setting, Quality, Duration, Modifying Factors, Severity.)      Romie Gonzalez is a 61 y.o. female who presents As a transfer patient from [facility]. The [facility] crew stated that patient went in to the emergency department with some chest pain, diaphoretic, not feeling well. Patient was intubated promptly due to tachypnea and acute respiratory distress. After labs are drawn and it was known the patient had an elevated potassium at 9.0, elevated creatinine, and abnormal EKG changes due to these abnormalities. Interventions were started per [facility] such as insulin, DuoNeb, calcium gluconate. Here in the emergency department patient arrived intubated and on sepsis protocol, having Zosyn started, fluids are hung, but source of infection is not known at this time. Patient is currently not making urine, sedated with Versed and vitals are appropriate and stable. Nephrology was contacted right away for emergent dialysis. Critical care unit was counseled for bed placement. It was agreed upon the patient will be transferred up to critical care unit, and she would go to dialysis from there. PAST MEDICAL / SURGICAL / SOCIAL / FAMILY HISTORY      has a past medical history of CAD (coronary artery disease); Cardiomyopathy ([address] Depression; Diabetes mellitus ([address] H/O mitral valve repair; History of fractured kneecap; Hx of CABG; Hx of myocardial infarction; Hyperlipidemia;  Hypertension; ICD (implantable cardioverter-defibrillator) in place; Kidney calculi; Osteoarthritis; and Renal calculi.     has a past surgical history that includes Cholecystectomy (1990); Hysterectomy (1990); Ankle surgery (Right, 2009); Kidney stone surgery (Left, 2010); Tubal ligation; Mitral valve surgery (6/4/15); Cardiac defibrillator placement (06/01/15); and Coronary artery bypass graft (05/28/15). Social History     Social History    Marital status:      Spouse name: N/A    Number of children: N/A    Years of education: N/A     Occupational History    Not on file. Social History Main Topics    Smoking status: Current Some Day Smoker     Packs/day: 0.50     Years: 32.00     Types: Cigarettes     Last attempt to quit: 5/26/2015    Smokeless tobacco: Never Used    Alcohol use No    Drug use: No    Sexual activity: Not on file     Other Topics Concern    Not on file     Social History Narrative    No narrative on file       Family History   Problem Relation Age of Onset    Stroke Father 76    Other Father         low BS    Diabetes Mother         Allergies:  Nsaids and Codeine    Home Medications:  Prior to Admission medications    Medication Sig Start Date End Date Taking? Authorizing Provider   Insulin Pen Needle 32G X 4 MM MISC 1 each by Does not apply route daily 4/25/18   Amie Shannon MD   insulin glargine Melodie Bottom) 100 UNIT/ML injection pen Take 30 units SC every AM 4/24/18   Amie Shannon MD   carvedilol (COREG) 3.125 MG tablet take 1 tablet by mouth twice a day with meals 4/11/18   Amie Shannon MD   atorvastatin (LIPITOR) 80 MG tablet Take 1 tablet by mouth daily 4/11/18   Amie Shannon MD   metFORMIN (GLUCOPHAGE) 1000 MG tablet take 1 tablet by mouth twice a day with meals 4/11/18   Amie Shannon MD   gabapentin (NEURONTIN) 300 MG capsule Take 1 capsule by mouth 2 times daily for 30 days. . 4/11/18 5/11/18  Amie Shannon MD   potassium chloride (MICRO-K) 10 MEQ extended release capsule take 1 capsule by mouth daily 4/11/18   Amie Shannon MD   sertraline (ZOLOFT) 100 MG tablet take 1 tablet by mouth once daily 4/11/18   Emiliano Rosa MD   clopidogrel (PLAVIX) 75 MG tablet take 1 tablet by mouth daily 4/11/18   Emiliano Rosa MD   lisinopril (PRINIVIL;ZESTRIL) 2.5 MG tablet take 1 tablet by mouth daily 4/11/18   Emiliano Rosa MD   furosemide (LASIX) 20 MG tablet take 1 tablet by mouth once daily 4/11/18   Emiliano Rosa MD   insulin detemir (LEVEMIR FLEXPEN) 100 UNIT/ML injection pen Patient also uses a slide scale with the levem 11/2/17   Emiliano Rosa MD   Cholecalciferol (VITAMIN D3) 5000 units TABS Take 5,000 Units by mouth daily    Historical Provider, MD   gentamicin (GARAMYCIN) 0.1 % cream  7/26/17   Historical Provider, MD   glucose monitoring kit (FREESTYLE) monitoring kit 1 kit by Does not apply route daily as needed Please dispense meter covered by patients insurance. 11/19/15   Emiliano Rosa MD   glucose blood VI test strips (EXACTECH TEST) strip 1 each by In Vitro route daily Pt testing BS once daily and prn 11/19/15   Emiliano Rosa MD   Lancets MISC Pt testing BS once daily and prn 11/19/15   Emiliano Rosa MD   Multiple Vitamins-Minerals (THERAPEUTIC MULTIVITAMIN-MINERALS) tablet Take 1 tablet by mouth daily (with breakfast) 6/12/15   Lucy Hernandez MD       REVIEW OF SYSTEMS    (2-9 systems for level 4, 10 or more for level 5)      Review of systems unable to be obtained due to patient being intubated at this time. PHYSICAL EXAM   (up to 7 for level 4, 8 or more for level 5)      INITIAL VITALS:   ED Triage Vitals   BP Temp Temp Source Pulse Resp SpO2 Height Weight   08/23/18 0402 08/23/18 0529 08/23/18 0529 08/23/18 0402 08/23/18 0402 08/23/18 0402 08/23/18 0417 08/23/18 0431   (!) 128/110 93.2 °F (34 °C) CORE 83 26 99 % 5' 5\" (1.651 m) 165 lb (74.8 kg)       Physical Exam   Constitutional: She appears well-developed. HENT:   Head: Normocephalic. Neck: No JVD present.    Pulmonary/Chest: Effort normal.   Bilateral breath sounds present   Abdominal: Soft.   Genitourinary:   Genitourinary Comments: Urinary catheter placed, patient is not making urine at this time   Neurological:   Intubated   Skin:   Cool and clammy       DIFFERENTIAL  DIAGNOSIS     PLAN (LABS / Dami Tiwari / EKG):  Orders Placed This Encounter   Procedures    MRSA DNA Probe, Nasal    XR CHEST PORTABLE    CT Head WO Contrast    Brain natriuretic peptide    Protime-INR    Digoxin level    Basic Metabolic Panel    Hemoglobin and hematocrit, blood    SODIUM (POC)    POTASSIUM (POC)    CHLORIDE (POC)    CALCIUM, IONIC (POC)    Lactic Acid, Whole Blood    CBC Auto Differential    Urinalysis Reflex to Culture    Beta-Hydroxybutyrate    Cortisol Total    HYPOGLYCEMIA TREATMENT: blood glucose less than 50 mg/dL and patient  ALERT and TOLERATING PO    HYPOGLYCEMIA TREATMENT: blood glucose less than 70 mg/dL and patient ALERT and TOLERATING PO    HYPOGLYCEMIA TREATMENT: blood glucose less than 70 mg/dL and patient NOT ALERT or NPO    Inpatient consult to Critical Care    Inpatient consult to Nephrology    Pharmacy to Dose: Vancomycin    Pulse oximetry, continuous    End Tidal CO2 Continuous    ABG draw    Initiate RT Adult Mechanical Ventilation Protocol    Mechanical Ventilation with default initial settings    Initiate Oxygen Therapy Protocol    ABG draw    POCT troponin    POC Blood Gas and Chemistry    Venous Blood Gas, POC    Creatinine W/GFR Point of Care    Lactic Acid, POC    POCT Glucose    Anion Gap (Calc) POC    Arterial Blood Gas, POC    POCT Glucose    POCT glucose    POCT Glucose    EKG 12 Lead    EKG 12 Lead    Hemodialysis inpatient    PATIENT STATUS (FROM ED OR OR/PROCEDURAL) Inpatient    Restraints non-violent or non-self-destructive       MEDICATIONS ORDERED:  Orders Placed This Encounter   Medications    midazolam (VERSED) 100 mg in dextrose 5% 100 mL infusion    calcium gluconate IVPB     MANUEL JAIN: cabinet override    calcium chloride 10 % injection     MANUEL JAIN: cabinet override    calcium chloride 10 % injection 1 g    insulin regular (HUMULIN R;NOVOLIN R) injection 10 Units    dextrose 50 % solution 25 g    0.9 % sodium chloride bolus    norepinephrine (LEVOPHED) 16 mg in dextrose 5 % 250 mL infusion    norepinephrine (LEVOPHED) infusion     MANUEL JAIN: cabinet override    heparin flush 100 UNIT/ML injection 300 Units    heparin flush 100 UNIT/ML injection 300 Units    insulin regular (HUMULIN R;NOVOLIN R) injection 10 Units    0.9 % sodium chloride bolus    insulin regular (HUMULIN R;NOVOLIN R) 100 Units in sodium chloride 0.9 % 100 mL infusion    cefepime (MAXIPIME) 2 g IVPB minibag     x1 dose per Dr Juan Shea's phone order,    DISCONTD: vancomycin (VANCOCIN) 1000 mg in dextrose 5% 200 mL IVPB    glucose (GLUTOSE) 40 % oral gel 15 g    dextrose 50 % solution 12.5 g    glucagon (rDNA) injection 1 mg    dextrose 5 % solution    vancomycin (VANCOCIN) 1250 mg in dextrose 5 % 250 mL IVPB    vancomycin (VANCOCIN) intermittent dosing (placeholder)    0.9 % sodium chloride bolus       DDX: Sepsis, hyperkalemia, arrhythmia, dysrhythmia, acute kidney injury, pneumonia, UTI, cerebral vascular accident     Initial MDM/Plan: 61 y.o. female who presents to the emergency department by [address], patient had argument intubated and worked up with sepsis protocol started on appropriate interventions for hyperkalemia. Prior to arriving here in the emergency department we were informed that patient's potassium was at 9.0, that 10 units of insulin with an amp of D50, and calcium gluconate had been started in route. When patient arrived emergency department nephrology was consult and an emergent dialysis was set up. Patient has had EKG changes with prolonged QRS above 190 ms.   While in the emergency department patient had a creatinine level of 8.73, received 5 L of normal saline, started on Zosyn antibiotics, received 20 units insulin with one amp of D50 due to hyperglycemia, a Julio Cesar catheter was placed for emergent dialysis in the right femoral vein, respiratory therapy performed and ABG, and levo fed presser was started due to persistent hypotension. Patient was transported up to critical care unit where dialysis team was waiting to perform emergent dialysis. Patient still needs a head CT without contrast.  Patient received this on the floor.     DIAGNOSTIC RESULTS / EMERGENCY DEPARTMENT COURSE / MDM     LABS:  Labs Reviewed   BRAIN NATRIURETIC PEPTIDE - Abnormal; Notable for the following:        Result Value    Pro-BNP 6,488 (*)     All other components within normal limits   DIGOXIN LEVEL - Abnormal; Notable for the following:     Digoxin Lvl <0.3 (*)     All other components within normal limits   BASIC METABOLIC PANEL - Abnormal; Notable for the following:     Glucose 258 (*)     BUN 85 (*)     CREATININE 8.73 (*)     Calcium 7.8 (*)     Sodium 134 (*)     Potassium 8.5 (*)     CO2 7 (*)     Anion Gap 29 (*)     GFR Non- 5 (*)     GFR  6 (*)     All other components within normal limits   HGB/HCT - Abnormal; Notable for the following:     POC Hemoglobin 11.1 (*)     POC Hematocrit 33 (*)     All other components within normal limits   SODIUM (POC) - Abnormal; Notable for the following:     POC Sodium 137 (*)     All other components within normal limits   POTASSIUM (POC) - Abnormal; Notable for the following:     POC Potassium 8.3 (*)     All other components within normal limits   CHLORIDE (POC) - Abnormal; Notable for the following:     POC Chloride 116 (*)     All other components within normal limits   CALCIUM, IONIC (POC) - Abnormal; Notable for the following:     POC Ionized Calcium 0.98 (*)     All other components within normal limits   LACTIC ACID, WHOLE BLOOD - Abnormal; Notable for the following:     Lactic Acid, Whole Blood 10.9 (*)     All other components within normal limits   CBC WITH AUTO DIFFERENTIAL - Abnormal; Notable for the following:     WBC 19.1 (*)     RBC 3.40 (*)     Hemoglobin 10.3 (*)     Hematocrit 34.6 (*)     RDW 14.5 (*)     Immature Granulocytes 2 (*)     Seg Neutrophils 87 (*)     Lymphocytes 8 (*)     Eosinophils % 0 (*)     Absolute Immature Granulocyte 0.38 (*)     Segs Absolute 16.62 (*)     All other components within normal limits   VENOUS BLOOD GAS, POINT OF CARE - Abnormal; Notable for the following:     pH, Galileo 6.890 (*)     pCO2, Galileo 27.5 (*)     pO2, Galileo 56.2 (*)     HCO3, Venous 5.3 (*)     Total CO2, Venous 6 (*)     Negative Base Excess, Galileo 27 (*)     All other components within normal limits   CREATININE W/GFR POINT OF CARE - Abnormal; Notable for the following:     POC Creatinine 8.56 (*)     GFR Comment 6 (*)     GFR Non- 5 (*)     All other components within normal limits   LACTIC ACID,POINT OF CARE - Abnormal; Notable for the following:     POC Lactic Acid 10.00 (*)     All other components within normal limits   POCT GLUCOSE - Abnormal; Notable for the following:     POC Glucose 265 (*)     All other components within normal limits   ARTERIAL BLOOD GAS, POC - Abnormal; Notable for the following:     POC pH 6.985 (*)     POC pCO2 29.6 (*)     POC PO2 217.9 (*)     POC HCO3 7.0 (*)     TCO2 (calc), Art 8 (*)     Negative Base Excess, Art 24 (*)     POC O2 SAT 99 (*)     All other components within normal limits   POCT GLUCOSE - Abnormal; Notable for the following:     POC Glucose 493 (*)     All other components within normal limits   MRSA DNA PROBE, NASAL   PROTIME-INR   URINE RT REFLEX TO CULTURE   BETA-HYDROXYBUTYRATE   CORTISOL TOTAL   ANION GAP (CALC) POC   POCT TROPONIN   POCT TROPONIN   POC BLOOD GAS AND CHEMISTRY   POCT GLUCOSE         RADIOLOGY:  Ct Head Wo Contrast    Result Date: 8/23/2018  No acute intracranial abnormality. 5 mm skin lesion adjacent to the left ear. Correlate with physical examination.      Xr Chest Portable    Result Date: 8/23/2018  IMAGES REVIEWED: XR CHEST PORTABLE COMPARISON: Chest radiograph dated 4/17/2017. CLINICAL INDICATION: Status post intubation. FINDINGS: An endotracheal tube terminates approximately 3.1 cm above the tylor. There are postsurgical changes of the chest with median sternotomy wires and a prosthetic cardiac valve in place. A left anterior chest cardiac pacemaker device is in a stable position. The cardiac silhouette is enlarged though stable in size. There is mild interstitial edema. There is no significant pneumothorax or pleural effusion. No acute osseous abnormality is seen. 1. Endotracheal tube terminating approximately 3.1 cm above the tylor. 2. Stable enlarged cardiac silhouette with mild interstitial edema. Xr Chest Portable    Result Date: 8/23/2018  EXAMINATION: SINGLE XRAY VIEW OF THE CHEST 8/23/2018 4:28 am COMPARISON: August 23, 2018 HISTORY: ORDERING SYSTEM PROVIDED HISTORY: intubated TECHNOLOGIST PROVIDED HISTORY: intubated FINDINGS: Endotracheal tube terminates 4.5 cm above the tylor. Left chest pacer device is stable. Cardiac valve prosthesis. Median sternotomy wires are present. Cardiomediastinal silhouette is stable with suggestion of cardiomegaly. Improved central pulmonary vascular congestion. No pneumothorax or significant pleural effusion. No pneumothorax. Enteric tube tip projects over left upper abdominal quadrant. Improved central pulmonary vascular congestion. Placement of enteric tube with tip projecting over left upper abdominal quadrant. Otherwise, stable examination. Stable cardiomegaly.          EKG  EKG Interpretation    Interpreted by me [id number]    Abnormal EKG  Widened QRS at 190 ms  Chief A at 82 bpm      Clinical Impression: no acute changes and normal EKG    All EKG's are interpreted by the Emergency Department Physician who either signs or Co-signs this chart in the absence of a cardiologist.    EMERGENCY DEPARTMENT COURSE:  ED Seldinger technique. The guidewire was then removed and all ports aspirated and flushed appropriately. The catheter then secured using silk suture and a temporary sterile dressing was applied. No immediate complication was evident. All sponge, instrument and needle counts were correct at the completion of the procedure. Kyle Bhardwaj DO  7:11 AM, 8/23/18         CONSULTS:  IP CONSULT TO CRITICAL CARE  IP CONSULT TO NEPHROLOGY  PHARMACY TO DOSE VANCOMYCIN    CRITICAL CARE:  Please see attending note    FINAL IMPRESSION      1. Hyperkalemia    2. Acute renal failure, unspecified acute renal failure type ([address]    3.  Sepsis, due to unspecified organism [facility])          DISPOSITION / [address] Admitted 08/23/2018 05:39:27 AM      PATIENT REFERRED TO:  Octavia Diggs MD  [address] [address]  [phone]            DISCHARGE MEDICATIONS:  Current Discharge Medication List          Daniela Mistry DO  Emergency Medicine Resident    (Please note that portions of this note were completed with a voice recognition program.  Efforts were made to edit the dictations but occasionally words are mis-transcribed.)       Daniela Mistry DO  Resident  08/23/18 1601

## 2018-08-23 NOTE — FLOWSHEET NOTE
Pt arrived to MICU via stretcher on monitor and ventilator from ED. Pt transferred into MICU bed and placed on bedside monitor. Old linen removed and weight taken so that emergent dialysis could be started. Will continue to monitor.

## 2018-08-23 NOTE — CONSULTS
Nephrology Consult Note    Reason for Consult:  Life-threatening hyperkalemia and acute renal failure  Requesting Physician:  ER services   Chief Complaint:  Brought in with chest pain     History Obtained   There is no clear history available. Most of this history is scattered by reviewing chart, discussion with ER physician and talking to her daughter. According to her daughter she had a dinner with her mother last night. By the time she left she was feeling fine and not complaining of anything. As per ER notes patient called EMS because of chest pain. When she presented to her local ER patient was hypotensive, bradycardic and had a markedly elevated potassium of 9.7 with EKG changes. Her creatinine was also elevated at 9.0 at the time of initial presentation. Patient receive conservative treatment for hyperkalemia and life flighted to send [facility] ER. I receive a call from send [facility] and machine was repaired in her room. Patient was dialyzed emergently in ICU. Patient received 2 hours of dialysis. Her most recent potassium is from 8:10 AM was 4.9. Patient has a high lactic acid levels of 11, her initial venous pH was 6.9 with FiO2 of 100% and her bicarbonate was less than 6. After dialysis her bicarb is now up to 12. Patient is anuric since admission. .           Her important past medical history includes a history of coronary artery bypass surgery, valve replacement in 2015. Patient also has a diagnosis of cardiomyopathy and is status post AICD placement. There is a history of recurrent urinary tract infection and sepsis in the past    Patient did not receive any CT scan with contrast.  There is no history of recent acute illness or use of antibiotic as per my information. Patient has a history of recurrent urinary tract infection.     Past Medical History:        Diagnosis Date    CAD (coronary artery disease)     Cardiomy[facility] [address]     Depression     Diabetes mellitus ([facility] ON 2018] cefepime (MAXIPIME) 1 g IVPB extended (mini-bag) Q24H   magnesium sulfate 2 g in dextrose 5 % 100 mL IVPB Once       Allergies:  Nsaids and Codeine    Social History:   Social History     Social History    Marital status:      Spouse name: N/A    Number of children: N/A    Years of education: N/A     Occupational History    Not on file. Social History Main Topics    Smoking status: Current Some Day Smoker     Packs/day: 0.50     Years: 32.00     Types: Cigarettes     Last attempt to quit: 2015    Smokeless tobacco: Never Used    Alcohol use No    Drug use: No    Sexual activity: Not on file     Other Topics Concern    Not on file     Social History Narrative    No narrative on file       Family History:   Family History   Problem Relation Age of Onset    Stroke Father 76    Other Father         low BS    Diabetes Mother        Review of Systems:    Patient is intubated and sedated    Objective:  CURRENT TEMPERATURE:  Temp: 97.2 °F (36.2 °C)  MAXIMUM TEMPERATURE OVER 24HRS:  Temp (24hrs), Av.7 °F (35.4 °C), Min:93.2 °F (34 °C), Max:97.7 °F (36.5 °C)    CURRENT RESPIRATORY RATE:  Resp: 20  CURRENT PULSE:  Pulse: 106  CURRENT BLOOD PRESSURE:  BP: 122/70  24HR BLOOD PRESSURE RANGE:  Systolic (14KOV), GEY:129 , Min:68 , BLN:782   ; Diastolic (08YJZ), TZB:50, Min:35, Max:110    24HR INTAKE/OUTPUT:    Intake/Output Summary (Last 24 hours) at 18 1152  Last data filed at 18 0825   Gross per 24 hour   Intake                0 ml   Output             1220 ml   Net            -1220 ml     Patient Vitals for the past 96 hrs (Last 3 readings):   Weight   18 0825 166 lb 7.2 oz (75.5 kg)   18 0553 168 lb 3.4 oz (76.3 kg)   18 0431 165 lb (74.8 kg)     Physical Exam:  General appearance: Aching was intubated and sedated.   She received some additional dose of fentanyl during line placement less than 15 minutes ago  Skin: Edema on upper extremity noted 6.  Chronic kidney disease baseline creatinine around 1-1.2 mg/dL and early stage III chronic kidney disease most likely due to diabetic nephrosclerosis  7. History of chronic kidney disease    Plan:  1. Will Check Renal Ultrasound to r/o element of obstruction and to assess the kidney size/echotexture. 2.  Urine for protein and creatinine  3. Repeat ABG, and serum electrolytes  4. Serum protein electrophoresis and free light chain  5. Vasculitis titers  #6. Agreed to continue normal saline at 150 mL per hour  7. Based on AVG will decide if there is any need for a bicarb Infusion    Patient is critically ill. Her daughter was updated with her clinical condition. Thank you for the consultation. Please do not hesitate to call with questions.     Electronically signed by Rozina Ortiz MD on 8/23/2018 at 11:52 AM

## 2018-08-23 NOTE — H&P
Laterality Date    ANKLE SURGERY Right 2009    plate and screws     CARDIAC DEFIBRILLATOR PLACEMENT  06/01/15    CHOLECYSTECTOMY  1990    CORONARY ARTERY BYPASS GRAFT  05/28/15    X 3- Dr Babs CABELLO-LAD<SVG-Diag, SVG-PDA, MV Repair with 30 min CE IMR ring    HYSTERECTOMY  1990    KIDNEY STONE SURGERY Left 2010    MITRAL VALVE SURGERY  6/4/15    TUBAL LIGATION         Allergies: Allergies   Allergen Reactions    Nsaids      nausea    Codeine Nausea And Vomiting         Home Meds:   Prior to Admission medications    Medication Sig Start Date End Date Taking? Authorizing Provider   Insulin Pen Needle 32G X 4 MM MISC 1 each by Does not apply route daily 4/25/18   Kashif Davis MD   insulin glargine [facility]) 100 UNIT/ML injection pen Take 30 units SC every AM 4/24/18   Kashif Davis MD   carvedilol (COREG) 3.125 MG tablet take 1 tablet by mouth twice a day with meals 4/11/18   Kashif Davis MD   atorvastatin (LIPITOR) 80 MG tablet Take 1 tablet by mouth daily 4/11/18   Kashif Davis MD   metFORMIN (GLUCOPHAGE) 1000 MG tablet take 1 tablet by mouth twice a day with meals 4/11/18   Kashif Davis MD   gabapentin (NEURONTIN) 300 MG capsule Take 1 capsule by mouth 2 times daily for 30 days. . 4/11/18 5/11/18  Kashif Davis MD   potassium chloride (MICRO-K) 10 MEQ extended release capsule take 1 capsule by mouth daily 4/11/18   Kashif Davis MD   sertraline (ZOLOFT) 100 MG tablet take 1 tablet by mouth once daily 4/11/18   Kashif Davis MD   clopidogrel (PLAVIX) 75 MG tablet take 1 tablet by mouth daily 4/11/18   Kashif Davis MD   lisinopril (PRINIVIL;ZESTRIL) 2.5 MG tablet take 1 tablet by mouth daily 4/11/18   Kashif Davis MD   furosemide (LASIX) 20 MG tablet take 1 tablet by mouth once daily 4/11/18   Kashif Davis MD   insulin detemir (LEVEMIR FLEXPEN) 100 UNIT/ML injection pen Patient also uses a slide scale with the levem 11/2/17   Kashif Davis MD Cholecalciferol (VITAMIN D3) 5000 units TABS Take 5,000 Units by mouth daily    Historical Provider, MD   gentamicin (GARAMYCIN) 0.1 % cream  7/26/17   Historical Provider, MD   glucose monitoring kit (FREESTYLE) monitoring kit 1 kit by Does not apply route daily as needed Please dispense meter covered by patients insurance. 11/19/15   Reynaldo Cardona MD   glucose blood VI test strips (EXACTECH TEST) strip 1 each by In Vitro route daily Pt testing BS once daily and prn 11/19/15   Reynaldo Cardona MD   Lancets MISC Pt testing BS once daily and prn 11/19/15   Reynaldo Cardona MD   Multiple Vitamins-Minerals (THERAPEUTIC MULTIVITAMIN-MINERALS) tablet Take 1 tablet by mouth daily (with breakfast) 6/12/15   Kayli Wolf MD       Social History:   TOBACCO:   reports that she has been smoking Cigarettes. She has a 16.00 pack-year smoking history. She has never used smokeless tobacco.  ETOH:   reports that she does not drink alcohol. DRUGS:  reports that she does not use drugs. OCCUPATION:      Family History:   Family History   Problem Relation Age of Onset    Stroke Father 76    Other Father         low BS    Diabetes Mother            REVIEW OF SYSTEMS (ROS):  Review of Systems -   · Could not be obtained as patient intubated    Physical Exam:    Vitals: BP (!) 92/56   Pulse 84   Resp 22   Ht 5' 5\" (1.651 m)   Wt 165 lb (74.8 kg)   SpO2 99%   BMI 27.46 kg/m²     Last Body weight:   Wt Readings from Last 3 Encounters:   08/23/18 165 lb (74.8 kg)   04/11/18 166 lb (75.3 kg)   11/07/17 157 lb (71.2 kg)       Body Mass Index : Body mass index is 27.46 kg/m². PHYSICAL EXAMINATION :  · General appearance: intubated , does not follow commands  · HEENT: Head: Normocephalic, no lesions, without obvious abnormality.   · Lungs: clear to auscultation bilaterally  · Heart: regular rate and rhythm, S1, S2 normal, no murmur  · Abdomen: soft, non-tender; bowel sounds normal; no masses,  no organomegaly  · Extremities: extremities normal, atraumatic, no cyanosis or edema  · Neurological: Intubated, does not follow commands    · Eye no icterus no redness  Right femoral Julio Cesar catheter    Any additional physical findings:    VENT SETTINGS (Comprehensive) (if applicable):  Vent Information  Ventilator Started: Yes  Vent Type: C2G5 Gann  Vent Mode: AC/APV  Vt Ordered: 460 mL  Rate Set: 15 bmp  FiO2 : 50 %  Sensitivity: 5  PEEP/CPAP: 5  I Time/ I Time %: 0.9 s  Humidification Source: E  Additional Respiratory  Assessments  Pulse: 84  Resp: 22  SpO2: 99 %  End Tidal CO2: 21 (%)  Position: Supine  Humidification Source: E    Laboratory findings:-    CBC:   Recent Labs      08/23/18 0225   WBC  18.2*   HGB  10.6*   PLT  443     BMP:    Recent Labs      08/23/18 0225   NA  132*   K  9.7*   CL  96*   CO2  <6*   BUN  88*   CREATININE  9.04*   GLUCOSE  169*     S. Calcium:  Recent Labs      08/23/18 0225   CALCIUM  8.4*     S. Ionized Calcium:No results for input(s): IONCA in the last 72 hours. S. Magnesium:  Recent Labs      08/23/18 0235   MG  2.6     S. Phosphorus:No results for input(s): PHOS in the last 72 hours. S. Glucose:No results for input(s): POCGLU in the last 72 hours. Glycosylated hemoglobin A1C: No results for input(s): LABA1C in the last 72 hours. INR: No results for input(s): INR in the last 72 hours. Hepatic functions:   Recent Labs      08/23/18 0225   ALKPHOS  97   ALT  22   AST  22   PROT  7.1   BILITOT  0.28*   LABALBU  3.7     Pancreatic functions:  Recent Labs      08/23/18 0225   LACTA  8.9*     S. Lactic Acid:   Recent Labs      08/23/18 0225   LACTA  8.9*     Cardiac enzymes:  Recent Labs      08/23/18 0241   CKTOTAL  221*     BNP:No results for input(s): BNP in the last 72 hours. Lipid profile: No results for input(s): CHOL, TRIG, HDL, LDLCALC in the last 72 hours.     Invalid input(s): LDL  Blood Gases: No results found for: PH, PCO2, PO2, HCO3, O2SAT  Thyroid functions:   Lab Results   Component Value Date    TSH 0.51 09/22/2017        Urinalysis:     Microbiology:    Cultures during this admission:     Blood cultures:                 [] None drawn      [] Negative             []  Positive (Details:  )  Urine Culture:                   [] None drawn      [] Negative             []  Positive (Details:  )  Sputum Culture:               [] None drawn       [] Negative             []  Positive (Details:  )   Endotracheal aspirate:     [] None drawn       [] Negative             []  Positive (Details:  )         -----------------------------------------------------------------  Radiological reports:    (See actual reports for details)      Recent Cardiac Catheterization summary:   (See actual reports for details)    Electrocardiogram:     Last Echocardiogram findings:   (See actual reports for details)       Assessment and Plan       Active Problems:    CHAD (acute kidney injury) ([facility] [address]    Hyperkalemia    Lactic acidosis    Acute metabolic encephalopathy    [facility] response [facility]  Resolved Problems:                 Plan:    -Continue with dialysis,follow-up nephrology recommendation  -Will give boluses of fluids, monitor urine output  -Trend  lactic acid  - Follow-up EKG   -follow-up 2-D echo, urine and blood culture  -Follow-up CBC and BMP  -Trend the Trops-IF trending up will consult cardiology  -Follow-up urine drug screen and blood tox screen  -Follow up ABG  -Beta hydroxy butyrate positive, started pt on DKA protocol  Follow-up cortisol level  -Will give 1 dose of  vancomycin and cefepime for now  -continue Levophed drip, will Place central line                Tyson Garcia M.D.  PGY -2  Department of Internal Medicine/ Critical care  [facility])             8/23/2018, 4:34 AM

## 2018-08-23 NOTE — PROGRESS NOTES
PHARMACY NOTE:    The electrolyte replacement protocol for potassium/magnesium has been discontinued per P&T guidelines because the patient has reduced renal function (CrCl < 30 mL/min). The patient's most recent potassium & magnesium levels are:  Recent Labs      08/23/18   0225  08/23/18   0235  08/23/18   0433  08/23/18   0810   K  9.7*   --   8.5*  4.9   MG   --   2.6   --   1.7     Estimated Creatinine Clearance: 11 mL/min (A) (based on SCr of 5.47 mg/dL [facility] AT [facility])). For patients with decreased renal function (below 30ml/min) needing potassium/magnesium supplementation, please order individual bolus doses with appropriate monitoring. Please contact the inpatient pharmacy with any concerns. Thank you. Dannielle Schwab, Pharm. D.   PGY2 Critical Care Pharmacy Resident  8/23/2018 9:48 AM

## 2018-08-23 NOTE — ED NOTES
IV fluids wide open x 2 IV sites, prepare for intubation to protect airway.        Pita Correa RN  08/23/18 [id number]

## 2018-08-23 NOTE — ED NOTES
Dtr Zahra Rogel calls asking for update, informed that mother was transferred early this am to [facility] and was assigned room [id number] [facility]  08/23/18 8555

## 2018-08-23 NOTE — PROGRESS NOTES
Cardiac Testing     ECHO 6/21/16: EF 40%, good result from MV repair with mild MR. ICD 6/9/15: [employer] device placed for VF arrest s/p CABG. CATH 6/4/15: Patent grafts. LIMA-LAD, SVG-PDA, SVG-D. EF 30%. CABG/MV REPAIR 5/28/15: LIMA-LAD, SVG-PDA, SVG-D. MV repair. Done by Dr. Dashawn Mckeon.

## 2018-08-23 NOTE — ED NOTES
Intubated with 7.5 ETT, 22 at the lip, bilateral breath sounds heard.        Kali Monreal RN  08/23/18 1662

## 2018-08-24 ENCOUNTER — APPOINTMENT (OUTPATIENT)
Dept: GENERAL RADIOLOGY | Age: 58
DRG: 469 | End: 2018-08-24
Payer: MEDICARE

## 2018-08-24 LAB
-: NORMAL
ABSOLUTE EOS #: <0.03 K/UL (ref 0–0.44)
ABSOLUTE IMMATURE GRANULOCYTE: 0.09 K/UL (ref 0–0.3)
ABSOLUTE LYMPH #: 1.01 K/UL (ref 1.1–3.7)
ABSOLUTE MONO #: 0.81 K/UL (ref 0.1–1.2)
ALBUMIN (CALCULATED): 4.1 G/DL (ref 3.2–5.2)
ALBUMIN PERCENT: 62 % (ref 45–65)
ALBUMIN SERPL-MCNC: 3.1 G/DL (ref 3.5–5.2)
ALLEN TEST: NEGATIVE
ALPHA 1 PERCENT: 3 % (ref 3–6)
ALPHA 2 PERCENT: 10 % (ref 6–13)
ALPHA-1-GLOBULIN: 0.2 G/DL (ref 0.1–0.4)
ALPHA-2-GLOBULIN: 0.7 G/DL (ref 0.5–0.9)
ANION GAP SERPL CALCULATED.3IONS-SCNC: 16 MMOL/L (ref 9–17)
ANION GAP SERPL CALCULATED.3IONS-SCNC: 17 MMOL/L (ref 9–17)
ANION GAP SERPL CALCULATED.3IONS-SCNC: 20 MMOL/L (ref 9–17)
ANION GAP SERPL CALCULATED.3IONS-SCNC: 20 MMOL/L (ref 9–17)
ANION GAP SERPL CALCULATED.3IONS-SCNC: 21 MMOL/L (ref 9–17)
BASOPHILS # BLD: 0 % (ref 0–2)
BASOPHILS ABSOLUTE: <0.03 K/UL (ref 0–0.2)
BETA GLOBULIN: 0.9 G/DL (ref 0.5–1.1)
BETA PERCENT: 13 % (ref 11–19)
BUN BLDV-MCNC: 48 MG/DL (ref 6–20)
BUN BLDV-MCNC: 49 MG/DL (ref 6–20)
BUN/CREAT BLD: ABNORMAL (ref 9–20)
CALCIUM IONIZED: 1.02 MMOL/L (ref 1.13–1.33)
CALCIUM IONIZED: 1.14 MMOL/L (ref 1.13–1.33)
CALCIUM SERPL-MCNC: 7.4 MG/DL (ref 8.6–10.4)
CALCIUM SERPL-MCNC: 7.5 MG/DL (ref 8.6–10.4)
CALCIUM SERPL-MCNC: 7.6 MG/DL (ref 8.6–10.4)
CALCIUM SERPL-MCNC: 7.7 MG/DL (ref 8.6–10.4)
CALCIUM SERPL-MCNC: 7.7 MG/DL (ref 8.6–10.4)
CHLORIDE BLD-SCNC: 100 MMOL/L (ref 98–107)
CHLORIDE BLD-SCNC: 103 MMOL/L (ref 98–107)
CHLORIDE BLD-SCNC: 96 MMOL/L (ref 98–107)
CHLORIDE BLD-SCNC: 96 MMOL/L (ref 98–107)
CHLORIDE BLD-SCNC: 99 MMOL/L (ref 98–107)
CO2: 12 MMOL/L (ref 20–31)
CO2: 13 MMOL/L (ref 20–31)
CO2: 13 MMOL/L (ref 20–31)
CO2: 14 MMOL/L (ref 20–31)
CO2: 17 MMOL/L (ref 20–31)
CREAT SERPL-MCNC: 6.24 MG/DL (ref 0.5–0.9)
CREAT SERPL-MCNC: 6.3 MG/DL (ref 0.5–0.9)
CREAT SERPL-MCNC: 6.42 MG/DL (ref 0.5–0.9)
CREAT SERPL-MCNC: 6.46 MG/DL (ref 0.5–0.9)
CREAT SERPL-MCNC: 6.54 MG/DL (ref 0.5–0.9)
DIFFERENTIAL TYPE: ABNORMAL
EKG ATRIAL RATE: 87 BPM
EKG ATRIAL RATE: 96 BPM
EKG P AXIS: 44 DEGREES
EKG P AXIS: 58 DEGREES
EKG P-R INTERVAL: 148 MS
EKG P-R INTERVAL: 150 MS
EKG Q-T INTERVAL: 332 MS
EKG Q-T INTERVAL: 338 MS
EKG QRS DURATION: 88 MS
EKG QRS DURATION: 92 MS
EKG QTC CALCULATION (BAZETT): 399 MS
EKG QTC CALCULATION (BAZETT): 427 MS
EKG R AXIS: -2 DEGREES
EKG R AXIS: 31 DEGREES
EKG T AXIS: -161 DEGREES
EKG T AXIS: -76 DEGREES
EKG VENTRICULAR RATE: 87 BPM
EKG VENTRICULAR RATE: 96 BPM
EOSINOPHILS RELATIVE PERCENT: 0 % (ref 1–4)
FIO2: 60
GAMMA GLOBULIN %: 12 % (ref 9–20)
GAMMA GLOBULIN: 0.8 G/DL (ref 0.5–1.5)
GFR AFRICAN AMERICAN: 8 ML/MIN
GFR NON-AFRICAN AMERICAN: 7 ML/MIN
GFR SERPL CREATININE-BSD FRML MDRD: ABNORMAL ML/MIN/{1.73_M2}
GLUCOSE BLD-MCNC: 119 MG/DL (ref 65–105)
GLUCOSE BLD-MCNC: 122 MG/DL (ref 65–105)
GLUCOSE BLD-MCNC: 122 MG/DL (ref 70–99)
GLUCOSE BLD-MCNC: 132 MG/DL (ref 65–105)
GLUCOSE BLD-MCNC: 133 MG/DL (ref 65–105)
GLUCOSE BLD-MCNC: 136 MG/DL (ref 65–105)
GLUCOSE BLD-MCNC: 137 MG/DL (ref 65–105)
GLUCOSE BLD-MCNC: 141 MG/DL (ref 65–105)
GLUCOSE BLD-MCNC: 143 MG/DL (ref 65–105)
GLUCOSE BLD-MCNC: 144 MG/DL (ref 70–99)
GLUCOSE BLD-MCNC: 149 MG/DL (ref 65–105)
GLUCOSE BLD-MCNC: 155 MG/DL (ref 65–105)
GLUCOSE BLD-MCNC: 156 MG/DL (ref 65–105)
GLUCOSE BLD-MCNC: 157 MG/DL (ref 65–105)
GLUCOSE BLD-MCNC: 160 MG/DL (ref 70–99)
GLUCOSE BLD-MCNC: 165 MG/DL (ref 65–105)
GLUCOSE BLD-MCNC: 168 MG/DL (ref 65–105)
GLUCOSE BLD-MCNC: 169 MG/DL (ref 65–105)
GLUCOSE BLD-MCNC: 172 MG/DL (ref 65–105)
GLUCOSE BLD-MCNC: 175 MG/DL (ref 70–99)
GLUCOSE BLD-MCNC: 178 MG/DL (ref 65–105)
GLUCOSE BLD-MCNC: 180 MG/DL (ref 65–105)
GLUCOSE BLD-MCNC: 202 MG/DL (ref 65–105)
GLUCOSE BLD-MCNC: 204 MG/DL (ref 65–105)
GLUCOSE BLD-MCNC: 232 MG/DL (ref 65–105)
GLUCOSE BLD-MCNC: 238 MG/DL (ref 70–99)
GLUCOSE BLD-MCNC: 243 MG/DL (ref 65–105)
HBV SURFACE AB TITR SER: <3.5 MIU/ML
HCT VFR BLD CALC: 26.6 % (ref 36.3–47.1)
HEMOGLOBIN: 8.6 G/DL (ref 11.9–15.1)
HEPATITIS B CORE TOTAL ANTIBODY: NONREACTIVE
HEPATITIS B SURFACE ANTIGEN: NONREACTIVE
IMMATURE GRANULOCYTES: 1 %
LYMPHOCYTES # BLD: 9 % (ref 24–43)
MAGNESIUM: 1.6 MG/DL (ref 1.6–2.6)
MAGNESIUM: 1.8 MG/DL (ref 1.6–2.6)
MCH RBC QN AUTO: 30.3 PG (ref 25.2–33.5)
MCHC RBC AUTO-ENTMCNC: 32.3 G/DL (ref 28.4–34.8)
MCV RBC AUTO: 93.7 FL (ref 82.6–102.9)
MODE: ABNORMAL
MONOCYTES # BLD: 7 % (ref 3–12)
NEGATIVE BASE EXCESS, ART: 9 (ref 0–2)
NRBC AUTOMATED: 0 PER 100 WBC
O2 DEVICE/FLOW/%: ABNORMAL
PATHOLOGIST: NORMAL
PATIENT TEMP: ABNORMAL
PDW BLD-RTO: 14.6 % (ref 11.8–14.4)
PHOSPHORUS: 5.9 MG/DL (ref 2.6–4.5)
PHOSPHORUS: 6 MG/DL (ref 2.6–4.5)
PLATELET # BLD: 143 K/UL (ref 138–453)
PLATELET ESTIMATE: ABNORMAL
PMV BLD AUTO: 10 FL (ref 8.1–13.5)
POC HCO3: 16 MMOL/L (ref 21–28)
POC O2 SATURATION: 95 % (ref 94–98)
POC PCO2 TEMP: ABNORMAL MM HG
POC PCO2: 31.1 MM HG (ref 35–48)
POC PH TEMP: ABNORMAL
POC PH: 7.32 (ref 7.35–7.45)
POC PO2 TEMP: ABNORMAL MM HG
POC PO2: 80.6 MM HG (ref 83–108)
POSITIVE BASE EXCESS, ART: ABNORMAL (ref 0–3)
POTASSIUM SERPL-SCNC: 4.3 MMOL/L (ref 3.7–5.3)
POTASSIUM SERPL-SCNC: 4.4 MMOL/L (ref 3.7–5.3)
POTASSIUM SERPL-SCNC: 4.7 MMOL/L (ref 3.7–5.3)
POTASSIUM SERPL-SCNC: 4.9 MMOL/L (ref 3.7–5.3)
POTASSIUM SERPL-SCNC: 5 MMOL/L (ref 3.7–5.3)
POTASSIUM SERPL-SCNC: 5.1 MMOL/L (ref 3.7–5.3)
PROTEIN ELECTROPHORESIS, SERUM: NORMAL
RBC # BLD: 2.84 M/UL (ref 3.95–5.11)
RBC # BLD: ABNORMAL 10*6/UL
REASON FOR REJECTION: NORMAL
SAMPLE SITE: ABNORMAL
SEG NEUTROPHILS: 83 % (ref 36–65)
SEGMENTED NEUTROPHILS ABSOLUTE COUNT: 9.64 K/UL (ref 1.5–8.1)
SODIUM BLD-SCNC: 129 MMOL/L (ref 135–144)
SODIUM BLD-SCNC: 130 MMOL/L (ref 135–144)
SODIUM BLD-SCNC: 132 MMOL/L (ref 135–144)
SODIUM BLD-SCNC: 133 MMOL/L (ref 135–144)
SODIUM BLD-SCNC: 133 MMOL/L (ref 135–144)
TCO2 (CALC), ART: 17 MMOL/L (ref 22–29)
TOTAL PROT. SUM,%: 100 % (ref 98–102)
TOTAL PROT. SUM: 6.7 G/DL (ref 6.3–8.2)
TOTAL PROTEIN: 6.6 G/DL (ref 6.4–8.3)
TSH SERPL DL<=0.05 MIU/L-ACNC: 0.49 MIU/L (ref 0.3–5)
VANCOMYCIN TROUGH DATE LAST DOSE: NORMAL
VANCOMYCIN TROUGH DOSE AMOUNT: NORMAL
VANCOMYCIN TROUGH TIME LAST DOSE: NORMAL
VANCOMYCIN TROUGH: 13.6 UG/ML (ref 10–20)
WBC # BLD: 11.6 K/UL (ref 3.5–11.3)
WBC # BLD: ABNORMAL 10*3/UL
ZZ NTE CLEAN UP: ORDERED TEST: NORMAL
ZZ NTE WITH NAME CLEAN UP: SPECIMEN SOURCE: NORMAL

## 2018-08-24 PROCEDURE — 6360000002 HC RX W HCPCS: Performed by: INTERNAL MEDICINE

## 2018-08-24 PROCEDURE — 87340 HEPATITIS B SURFACE AG IA: CPT

## 2018-08-24 PROCEDURE — 94003 VENT MGMT INPAT SUBQ DAY: CPT

## 2018-08-24 PROCEDURE — 94640 AIRWAY INHALATION TREATMENT: CPT

## 2018-08-24 PROCEDURE — 6370000000 HC RX 637 (ALT 250 FOR IP): Performed by: EMERGENCY MEDICINE

## 2018-08-24 PROCEDURE — 6360000002 HC RX W HCPCS: Performed by: EMERGENCY MEDICINE

## 2018-08-24 PROCEDURE — 2580000003 HC RX 258: Performed by: EMERGENCY MEDICINE

## 2018-08-24 PROCEDURE — 94770 HC ETCO2 MONITOR DAILY: CPT

## 2018-08-24 PROCEDURE — 93005 ELECTROCARDIOGRAM TRACING: CPT

## 2018-08-24 PROCEDURE — 94660 CPAP INITIATION&MGMT: CPT

## 2018-08-24 PROCEDURE — 86704 HEP B CORE ANTIBODY TOTAL: CPT

## 2018-08-24 PROCEDURE — 84132 ASSAY OF SERUM POTASSIUM: CPT

## 2018-08-24 PROCEDURE — 2580000003 HC RX 258: Performed by: STUDENT IN AN ORGANIZED HEALTH CARE EDUCATION/TRAINING PROGRAM

## 2018-08-24 PROCEDURE — 2500000003 HC RX 250 WO HCPCS: Performed by: STUDENT IN AN ORGANIZED HEALTH CARE EDUCATION/TRAINING PROGRAM

## 2018-08-24 PROCEDURE — 83735 ASSAY OF MAGNESIUM: CPT

## 2018-08-24 PROCEDURE — 82947 ASSAY GLUCOSE BLOOD QUANT: CPT

## 2018-08-24 PROCEDURE — 99291 CRITICAL CARE FIRST HOUR: CPT | Performed by: INTERNAL MEDICINE

## 2018-08-24 PROCEDURE — 82330 ASSAY OF CALCIUM: CPT

## 2018-08-24 PROCEDURE — 93306 TTE W/DOPPLER COMPLETE: CPT

## 2018-08-24 PROCEDURE — 80202 ASSAY OF VANCOMYCIN: CPT

## 2018-08-24 PROCEDURE — 84443 ASSAY THYROID STIM HORMONE: CPT

## 2018-08-24 PROCEDURE — 82040 ASSAY OF SERUM ALBUMIN: CPT

## 2018-08-24 PROCEDURE — 85025 COMPLETE CBC W/AUTO DIFF WBC: CPT

## 2018-08-24 PROCEDURE — 36415 COLL VENOUS BLD VENIPUNCTURE: CPT

## 2018-08-24 PROCEDURE — 94762 N-INVAS EAR/PLS OXIMTRY CONT: CPT

## 2018-08-24 PROCEDURE — S0028 INJECTION, FAMOTIDINE, 20 MG: HCPCS | Performed by: STUDENT IN AN ORGANIZED HEALTH CARE EDUCATION/TRAINING PROGRAM

## 2018-08-24 PROCEDURE — 71045 X-RAY EXAM CHEST 1 VIEW: CPT

## 2018-08-24 PROCEDURE — 82803 BLOOD GASES ANY COMBINATION: CPT

## 2018-08-24 PROCEDURE — 6370000000 HC RX 637 (ALT 250 FOR IP): Performed by: STUDENT IN AN ORGANIZED HEALTH CARE EDUCATION/TRAINING PROGRAM

## 2018-08-24 PROCEDURE — 2000000000 HC ICU R&B

## 2018-08-24 PROCEDURE — 84100 ASSAY OF PHOSPHORUS: CPT

## 2018-08-24 PROCEDURE — 6360000002 HC RX W HCPCS: Performed by: STUDENT IN AN ORGANIZED HEALTH CARE EDUCATION/TRAINING PROGRAM

## 2018-08-24 PROCEDURE — 80048 BASIC METABOLIC PNL TOTAL CA: CPT

## 2018-08-24 PROCEDURE — 86317 IMMUNOASSAY INFECTIOUS AGENT: CPT

## 2018-08-24 RX ORDER — ATORVASTATIN CALCIUM 80 MG/1
80 TABLET, FILM COATED ORAL DAILY
Status: DISCONTINUED | OUTPATIENT
Start: 2018-08-24 | End: 2018-09-05 | Stop reason: HOSPADM

## 2018-08-24 RX ORDER — 0.9 % SODIUM CHLORIDE 0.9 %
150 INTRAVENOUS SOLUTION INTRAVENOUS PRN
Status: DISCONTINUED | OUTPATIENT
Start: 2018-08-24 | End: 2018-09-05 | Stop reason: HOSPADM

## 2018-08-24 RX ORDER — CLOPIDOGREL BISULFATE 75 MG/1
75 TABLET ORAL DAILY
Status: DISCONTINUED | OUTPATIENT
Start: 2018-08-24 | End: 2018-08-31

## 2018-08-24 RX ORDER — 0.9 % SODIUM CHLORIDE 0.9 %
250 INTRAVENOUS SOLUTION INTRAVENOUS PRN
Status: DISCONTINUED | OUTPATIENT
Start: 2018-08-24 | End: 2018-09-05 | Stop reason: HOSPADM

## 2018-08-24 RX ORDER — ALBUTEROL SULFATE 2.5 MG/3ML
2.5 SOLUTION RESPIRATORY (INHALATION) EVERY 6 HOURS PRN
Status: DISCONTINUED | OUTPATIENT
Start: 2018-08-24 | End: 2018-09-05 | Stop reason: HOSPADM

## 2018-08-24 RX ORDER — FUROSEMIDE 10 MG/ML
40 INJECTION INTRAMUSCULAR; INTRAVENOUS ONCE
Status: COMPLETED | OUTPATIENT
Start: 2018-08-24 | End: 2018-08-24

## 2018-08-24 RX ADMIN — HEPARIN SODIUM 5000 UNITS: 5000 INJECTION, SOLUTION INTRAVENOUS; SUBCUTANEOUS at 13:39

## 2018-08-24 RX ADMIN — FAMOTIDINE 20 MG: 10 INJECTION, SOLUTION INTRAVENOUS at 09:02

## 2018-08-24 RX ADMIN — HYDROCORTISONE SODIUM SUCCINATE 100 MG: 100 INJECTION, POWDER, FOR SOLUTION INTRAMUSCULAR; INTRAVENOUS at 04:28

## 2018-08-24 RX ADMIN — CEFEPIME HYDROCHLORIDE 1 G: 1 INJECTION, POWDER, FOR SOLUTION INTRAMUSCULAR; INTRAVENOUS at 09:31

## 2018-08-24 RX ADMIN — ATORVASTATIN CALCIUM 80 MG: 80 TABLET, FILM COATED ORAL at 20:24

## 2018-08-24 RX ADMIN — DEXTROSE AND SODIUM CHLORIDE: 5; 450 INJECTION, SOLUTION INTRAVENOUS at 21:39

## 2018-08-24 RX ADMIN — DEXTROSE AND SODIUM CHLORIDE: 5; 450 INJECTION, SOLUTION INTRAVENOUS at 09:34

## 2018-08-24 RX ADMIN — HEPARIN SODIUM 5000 UNITS: 5000 INJECTION, SOLUTION INTRAVENOUS; SUBCUTANEOUS at 06:25

## 2018-08-24 RX ADMIN — VANCOMYCIN HYDROCHLORIDE 1250 MG: 10 INJECTION, POWDER, LYOPHILIZED, FOR SOLUTION INTRAVENOUS at 16:18

## 2018-08-24 RX ADMIN — SODIUM CHLORIDE 5.6 UNITS/HR: 9 INJECTION, SOLUTION INTRAVENOUS at 20:22

## 2018-08-24 RX ADMIN — CALCIUM GLUCONATE 2 G: 98 INJECTION, SOLUTION INTRAVENOUS at 22:38

## 2018-08-24 RX ADMIN — HYDROCORTISONE SODIUM SUCCINATE 100 MG: 100 INJECTION, POWDER, FOR SOLUTION INTRAMUSCULAR; INTRAVENOUS at 12:11

## 2018-08-24 RX ADMIN — HYDROCORTISONE SODIUM SUCCINATE 100 MG: 100 INJECTION, POWDER, FOR SOLUTION INTRAMUSCULAR; INTRAVENOUS at 20:24

## 2018-08-24 RX ADMIN — ALBUTEROL SULFATE 2.5 MG: 2.5 SOLUTION RESPIRATORY (INHALATION) at 15:33

## 2018-08-24 RX ADMIN — FUROSEMIDE 40 MG: 10 INJECTION, SOLUTION INTRAMUSCULAR; INTRAVENOUS at 12:37

## 2018-08-24 RX ADMIN — HEPARIN SODIUM 5000 UNITS: 5000 INJECTION, SOLUTION INTRAVENOUS; SUBCUTANEOUS at 21:40

## 2018-08-24 RX ADMIN — CLOPIDOGREL 75 MG: 75 TABLET, FILM COATED ORAL at 15:04

## 2018-08-24 ASSESSMENT — PULMONARY FUNCTION TESTS
PIF_VALUE: 17
PIF_VALUE: 15
PIF_VALUE: 17
PIF_VALUE: 12
PIF_VALUE: 15
PIF_VALUE: 17

## 2018-08-24 ASSESSMENT — PAIN SCALES - GENERAL
PAINLEVEL_OUTOF10: 0

## 2018-08-24 NOTE — PROGRESS NOTES
Patient extubated per order following positive leak test with ET tube cuff deflated. Placed on 3l nasal cannula; Sa02 98%. Procedure tolerated well.

## 2018-08-24 NOTE — PLAN OF CARE
Problem: MECHANICAL VENTILATION  Goal: Patient will maintain patent airway  Outcome: Met This Shift    Goal: Oral health is maintained or improved  Outcome: Met This Shift    Goal: ET tube will be managed safely  Outcome: Met This Shift    Goal: Ability to express needs and understand communication  Outcome: Met This Shift    Goal: Mobility/activity is maintained at optimum level for patient  Outcome: Met This Shift      Problem: SKIN INTEGRITY  Goal: Skin integrity is maintained or improved  Outcome: Met This Shift

## 2018-08-24 NOTE — PROGRESS NOTES
OSMOLARITY:  No results found for: OSMOU  URINE CREATININE:  Lab Results   Component Value Date    LABCREA 42.4 08/23/2018     URINE EOSINOPHILS: No components found for: EOSU  URINE PROTEIN:  No results found for: TPU  URINALYSIS:  U/A: Lab Results   Component Value Date    NITRU NEGATIVE 08/23/2018    COLORU ORANGE 08/23/2018    PHUR 5.5 08/23/2018    WBCUA TOO NUMEROUS TO COUNT 08/23/2018    RBCUA TOO NUMEROUS TO COUNT 08/23/2018    MUCUS NOT REPORTED 08/23/2018    TRICHOMONAS NOT REPORTED 08/23/2018    YEAST NOT REPORTED 08/23/2018    BACTERIA NOT REPORTED 08/23/2018    SPECGRAV 1.015 08/23/2018    LEUKOCYTESUR TRACE 08/23/2018    UROBILINOGEN Normal 08/23/2018    BILIRUBINUR NEGATIVE 08/23/2018    GLUCOSEU 2+ 08/23/2018    KETUA TRACE 08/23/2018    AMORPHOUS NOT REPORTED 08/23/2018     ANTIGBM:No results found for: GBMABIGG      RADIOLOGY      Reviewed as available. ASSESSMENT    1. Acute kidney injury of uncertain etiology. Patient was dialyzed yesterday for acute renal failure and hyperkalemia. Her potassium is 5 today but there has been significant improvement in her urine output  2. Severe hyperkalemia: Due to acute renal failure   3. Severe metabolic acidosis with lactic acidosis: Relatively better   4. Long-standing diabetes  5. CARIOMYOPATHY AND CHF   6. Chronic kidney disease baseline creatinine around 1-1.2 mg/dL and early stage III chronic kidney disease most likely due to diabetic nephrosclerosis      PLAN      1. Decrease IV fluid to 25 ML per hour after DKA protocol  2. Lasix 40 mg IV times one  3. Repeat BMP this afternoon  4. Will follow with you      Please do not hesitate to call with questions.     Electronically signed by Richy Miller MD on 8/24/2018 at 10:00 AM

## 2018-08-24 NOTE — PLAN OF CARE
Problem: Restraint Use - Nonviolent/Non-Self-Destructive Behavior:  Goal: Absence of restraint indications  Absence of restraint indications   Outcome: Completed Date Met: 08/24/18    Goal: Absence of restraint-related injury  Absence of restraint-related injury   Outcome: Completed Date Met: 08/24/18

## 2018-08-24 NOTE — PROGRESS NOTES
[] Anuric      OBJECTIVE:     VITAL SIGNS:  /83   Pulse 104   Temp 97.9 °F (36.6 °C) (Core)   Resp 27   Ht 5' 5\" (1.651 m)   Wt 166 lb 7.2 oz (75.5 kg)   SpO2 95%   BMI 27.70 kg/m²   Tmax over 24 hours:  Temp (24hrs), Av.9 °F (37.2 °C), Min:97.9 °F (36.6 °C), Max:99.9 °F (37.7 °C)      Patient Vitals for the past 6 hrs:   BP Temp Temp src Pulse Resp SpO2   18 1400 135/83 - - 104 27 95 %   18 1300 125/65 - - 93 20 100 %   18 1222 - - - 110 23 100 %   18 1200 108/60 97.9 °F (36.6 °C) CORE 89 20 100 %   18 1100 (!) 106/57 - - 88 20 100 %   18 1000 (!) 113/56 - - 92 22 99 %   18 0900 110/67 - - 86 21 99 %   18 0848 - - - 85 22 99 %         Intake/Output Summary (Last 24 hours) at 18 1405  Last data filed at 18 1300   Gross per 24 hour   Intake             3781 ml   Output             1254 ml   Net             2527 ml     Wt Readings from Last 2 Encounters:   18 166 lb 7.2 oz (75.5 kg)   18 166 lb (75.3 kg)     Body mass index is 27.7 kg/m².         PHYSICAL EXAMINATION:  Constitutional: intubated and sedated  EENT: PERRLA, EOMI, sclera clear  Respiratory: intubated; scattered expiratory wheezes; bilateral rhonchi  Cardiovascular: regular rate and rhythm, normal S1, S2, no murmur noted   Abdomen: soft, nontender, nondistended, no masses or organomegaly  Extremities:  peripheral pulses normal, no pedal edema, no clubbing or cyanosis  Neuro: moves all 4 extremities     Any additional physical findings:    MEDICATIONS:    Scheduled Meds:   atorvastatin  80 mg Oral Daily    [START ON 2018] vitamin D  5,000 Units Oral Daily    clopidogrel  75 mg Oral Daily    vancomycin  1,250 mg Intravenous Once    heparin flush  300 Units Intercatheter Once    heparin flush  300 Units Intercatheter Once    vancomycin (VANCOCIN) intermittent dosing (placeholder)   Other RX Placeholder    insulin regular  0.1 Units/kg Intravenous Once    heparin (porcine)  5,000 Units Subcutaneous 3 times per day    famotidine (PEPCID) injection  20 mg Intravenous Daily    hydrocortisone sodium succinate PF  100 mg Intravenous Q8H    cefepime (MAXIPIME) 1 g IVPB extended (mini-bag)  1 g Intravenous Q24H     Continuous Infusions:   midazolam Stopped (08/24/18 1238)    norepinephrine Stopped (08/23/18 2005)    dextrose      sodium chloride      dextrose 5 % and 0.45 % NaCl 150 mL/hr at 08/24/18 0934    insulin (HUMAN R) non-weight based infusion 2.1 Units/hr (08/24/18 1309)     PRN Meds:     sodium chloride 250 mL PRN   sodium chloride 150 mL PRN   albuterol 2.5 mg Q6H PRN   glucose 15 g PRN   dextrose 12.5 g PRN   glucagon (rDNA) 1 mg PRN   dextrose 100 mL/hr PRN   heparin (porcine) 9,500 Units PRN   heparin (porcine) 8,000 Units PRN   dextrose 12.5 g PRN   dextrose 5 % and 0.45 % NaCl  Continuous PRN         VENT SETTINGS (Comprehensive) (if applicable):  Vent Information  Ventilator Started: Yes  Ventilator Stopped: (S) Yes  Ventilation Day(s): 1  Vent Type: Servo i  Vent Mode: CPAP  Vt Ordered: 460 mL  Rate Set: 15 bmp  Pressure Support: 6 cmH20  FiO2 : 30 %  Sensitivity: 5  PEEP/CPAP: 5  I Time/ I Time %: 0.9 s  Humidification Source: HME  Additional Respiratory  Assessments  Pulse: 104  Resp: 27  SpO2: 95 %  End Tidal CO2: 25 (%)  Position: Supine  Humidification Source: HME  Oral Care Completed?: Yes  Oral Care: Mouth swabbed, Mouth suctioned, Lip moisturizer applied  Subglottic Suction Done?: Yes      Laboratory findings:    Complete Blood Count:   Recent Labs      08/23/18   0225  08/23/18   0433  08/24/18   1224   WBC  18.2*  19.1*  11.6*   HGB  10.6*  10.3*  8.6*   HCT  32.3*  34.6*  26.6*   PLT  443  267  143        Last 3 Blood Glucose:   Recent Labs      08/23/18   2333  08/24/18   0514  08/24/18   1224   GLUCOSE  122*  144*  175*        PT/INR:    Lab Results   Component Value Date    PROTIME 11.2 08/23/2018    INR 1.1 08/23/2018

## 2018-08-24 NOTE — PROGRESS NOTES
[facility]   Progress Note                   Date:   8/24/2018  Patient name: Nicole Ruiz  Date of admission:  8/23/2018  4:01 AM  MRN:   [id number]  YOB: 1960  PCP: Kathy Schaeffer MD    Reason for Admission:    Chief Complaint   Patient presents with    Altered Mental Status       Subjective:     Patient remains to be intubated, she is off pressors. Heart rate  and sinus. No episode of bradycardia Troponin trending upward. creatinine trending upward and bicarb low. AICD interrogation showed normal AICD function and no tachy events recorded.     Medications:   Scheduled Meds:   heparin flush  300 Units Intercatheter Once    heparin flush  300 Units Intercatheter Once    vancomycin (VANCOCIN) intermittent dosing (placeholder)   Other RX Placeholder    insulin regular  0.1 Units/kg Intravenous Once    heparin (porcine)  5,000 Units Subcutaneous 3 times per day    famotidine (PEPCID) injection  20 mg Intravenous Daily    hydrocortisone sodium succinate PF  100 mg Intravenous Q8H    cefepime (MAXIPIME) 1 g IVPB extended (mini-bag)  1 g Intravenous Q24H       Continuous Infusions:   midazolam 5 mg/hr (08/24/18 0445)    norepinephrine Stopped (08/23/18 2005)    dextrose      sodium chloride      dextrose 5 % and 0.45 % NaCl 150 mL/hr at 08/23/18 2016    insulin (HUMAN R) non-weight based infusion 0.97 Units/hr (08/24/18 0905)       CBC:   Recent Labs      08/23/18   0225  08/23/18   0433   WBC  18.2*  19.1*   HGB  10.6*  10.3*   PLT  443  267     BMP:    Recent Labs      08/23/18 2028  08/23/18   2333  08/24/18   0514   NA  133*  133*  133*   K  5.0  5.1  5.0  4.7   CL  101  100  103   CO2  15*  17*  13*   BUN  47*  48*  49*   CREATININE  6.21*  6.24*  6.42*   GLUCOSE  142*  122*  144*     Hepatic:   Recent Labs      08/23/18 0225   AST  22   ALT  22   BILITOT  0.28*   ALKPHOS  97     Troponin: No results for input(s): TROPONINI in the last 72 hours.  BNP: No results for input(s): BNP in the last 72 hours. Lipids: No results for input(s): CHOL, HDL in the last 72 hours. Invalid input(s): LDLCALCU  INR:   Recent Labs      08/23/18   0433   INR  1.1       Objective:   Vitals: BP (!) 109/57   Pulse 85   Temp 99.1 °F (37.3 °C) (Core)   Resp 22   Ht 5' 5\" (1.651 m)   Wt 166 lb 7.2 oz (75.5 kg)   SpO2 99%   BMI 27.70 kg/m²     Respiratory: On mechanical vet   Resp Auscultation: coarse breath sounds bilaterally   Cardiovascular:  · The apical impulse is not displaced  · Heart tones are crisp and normal. regular S1 and S2. Murmurs: None   · Jugular venous pulsation Normal  · Thre is no carotid bruit   · Peripheral pulses are symmetrical and full   Abdomen:  · No masses or tenderness  · Bowel sounds present  Extremities:  ·  No Cyanosis or Clubbing  ·  Lower extremity edema: No edema   ·  Skin: Warm and dry  Neurological:  · Not done          Previous cardiac testing:      ECHO 6/21/16: EF 40%, good result from MV repair with mild MR.      ICD 6/9/15: [employer] device placed for VF arrest s/p CABG.    CATH 6/4/15: Patent grafts. LIMA-LAD, SVG-PDA, SVG-D. EF 30%.      CABG/MV REPAIR 5/28/15: LIMA-LAD, SVG-PDA, SVG-D. MV repair. Done by Dr. Janee Conterras. Assessment:     1. Acute renal failure requiring HD  2. Severe hyperkalemia with ECG changes, resolved   3. Acute resp failure requiring mechanical vent   4. MV CAD S/p CABG and mitral valve repair in 2015   5. Ischemic CMP with H/o v fib arrest after CABG s/p ICD (last EF in 2016 was 40%)  6.  Elevated troponin likely secondary to CHAD on CKD and hypotension      Principal Problem:    Chest pain  Active Problems:    S/P cardiac cath-Patent grafts 6/5/15    CAD (coronary artery disease)    Hx of CABG    ICD (implantable cardioverter-defibrillator) in place    H/O mitral valve repair    Hypertension    DM (diabetes mellitus), type 2 with neurological complications ([facility] [address]    CHAD (acute kidney

## 2018-08-25 ENCOUNTER — APPOINTMENT (OUTPATIENT)
Dept: GENERAL RADIOLOGY | Age: 58
DRG: 469 | End: 2018-08-25
Payer: MEDICARE

## 2018-08-25 LAB
ABSOLUTE EOS #: 0 K/UL (ref 0–0.44)
ABSOLUTE IMMATURE GRANULOCYTE: 0.12 K/UL (ref 0–0.3)
ABSOLUTE LYMPH #: 0.37 K/UL (ref 1.1–3.7)
ABSOLUTE MONO #: 0.62 K/UL (ref 0.1–1.2)
ALLEN TEST: ABNORMAL
ANION GAP SERPL CALCULATED.3IONS-SCNC: 15 MMOL/L (ref 9–17)
ANION GAP SERPL CALCULATED.3IONS-SCNC: 17 MMOL/L (ref 9–17)
ANION GAP SERPL CALCULATED.3IONS-SCNC: 18 MMOL/L (ref 9–17)
ANION GAP SERPL CALCULATED.3IONS-SCNC: 19 MMOL/L (ref 9–17)
BASOPHILS # BLD: 0 % (ref 0–2)
BASOPHILS ABSOLUTE: 0 K/UL (ref 0–0.2)
BETA-HYDROXYBUTYRATE: 0.09 MMOL/L (ref 0.02–0.27)
BUN BLDV-MCNC: 49 MG/DL (ref 6–20)
BUN BLDV-MCNC: 50 MG/DL (ref 6–20)
BUN BLDV-MCNC: 51 MG/DL (ref 6–20)
BUN BLDV-MCNC: 52 MG/DL (ref 6–20)
BUN/CREAT BLD: ABNORMAL (ref 9–20)
CALCIUM IONIZED: 1.07 MMOL/L (ref 1.13–1.33)
CALCIUM IONIZED: 1.16 MMOL/L (ref 1.13–1.33)
CALCIUM SERPL-MCNC: 7.7 MG/DL (ref 8.6–10.4)
CALCIUM SERPL-MCNC: 7.9 MG/DL (ref 8.6–10.4)
CALCIUM SERPL-MCNC: 7.9 MG/DL (ref 8.6–10.4)
CALCIUM SERPL-MCNC: 8 MG/DL (ref 8.6–10.4)
CALCIUM SERPL-MCNC: 8.1 MG/DL (ref 8.6–10.4)
CALCIUM SERPL-MCNC: 8.3 MG/DL (ref 8.6–10.4)
CARBOXYHEMOGLOBIN: 1.7 % (ref 0–5)
CHLORIDE BLD-SCNC: 95 MMOL/L (ref 98–107)
CHLORIDE BLD-SCNC: 98 MMOL/L (ref 98–107)
CHLORIDE BLD-SCNC: 98 MMOL/L (ref 98–107)
CHLORIDE BLD-SCNC: 99 MMOL/L (ref 98–107)
CO2: 15 MMOL/L (ref 20–31)
CO2: 17 MMOL/L (ref 20–31)
CO2: 18 MMOL/L (ref 20–31)
CO2: 20 MMOL/L (ref 20–31)
CREAT SERPL-MCNC: 5.69 MG/DL (ref 0.5–0.9)
CREAT SERPL-MCNC: 5.98 MG/DL (ref 0.5–0.9)
CREAT SERPL-MCNC: 6.09 MG/DL (ref 0.5–0.9)
CREAT SERPL-MCNC: 6.12 MG/DL (ref 0.5–0.9)
CREAT SERPL-MCNC: 6.19 MG/DL (ref 0.5–0.9)
CREAT SERPL-MCNC: 6.38 MG/DL (ref 0.5–0.9)
CULTURE: NORMAL
DIFFERENTIAL TYPE: ABNORMAL
EOSINOPHILS RELATIVE PERCENT: 0 % (ref 1–4)
FIO2: ABNORMAL
GFR AFRICAN AMERICAN: 8 ML/MIN
GFR AFRICAN AMERICAN: 8 ML/MIN
GFR AFRICAN AMERICAN: 9 ML/MIN
GFR NON-AFRICAN AMERICAN: 7 ML/MIN
GFR NON-AFRICAN AMERICAN: 8 ML/MIN
GFR SERPL CREATININE-BSD FRML MDRD: ABNORMAL ML/MIN/{1.73_M2}
GLUCOSE BLD-MCNC: 130 MG/DL (ref 70–99)
GLUCOSE BLD-MCNC: 145 MG/DL (ref 65–105)
GLUCOSE BLD-MCNC: 149 MG/DL (ref 65–105)
GLUCOSE BLD-MCNC: 154 MG/DL (ref 70–99)
GLUCOSE BLD-MCNC: 157 MG/DL (ref 65–105)
GLUCOSE BLD-MCNC: 166 MG/DL (ref 65–105)
GLUCOSE BLD-MCNC: 167 MG/DL (ref 65–105)
GLUCOSE BLD-MCNC: 171 MG/DL (ref 65–105)
GLUCOSE BLD-MCNC: 172 MG/DL (ref 65–105)
GLUCOSE BLD-MCNC: 172 MG/DL (ref 70–99)
GLUCOSE BLD-MCNC: 178 MG/DL (ref 65–105)
GLUCOSE BLD-MCNC: 181 MG/DL (ref 70–99)
GLUCOSE BLD-MCNC: 185 MG/DL (ref 65–105)
GLUCOSE BLD-MCNC: 186 MG/DL (ref 65–105)
GLUCOSE BLD-MCNC: 187 MG/DL (ref 65–105)
GLUCOSE BLD-MCNC: 188 MG/DL (ref 65–105)
GLUCOSE BLD-MCNC: 189 MG/DL (ref 65–105)
GLUCOSE BLD-MCNC: 190 MG/DL (ref 65–105)
GLUCOSE BLD-MCNC: 190 MG/DL (ref 65–105)
GLUCOSE BLD-MCNC: 191 MG/DL (ref 65–105)
GLUCOSE BLD-MCNC: 192 MG/DL (ref 65–105)
GLUCOSE BLD-MCNC: 192 MG/DL (ref 70–99)
GLUCOSE BLD-MCNC: 192 MG/DL (ref 70–99)
GLUCOSE BLD-MCNC: 193 MG/DL (ref 65–105)
GLUCOSE BLD-MCNC: 193 MG/DL (ref 65–105)
GLUCOSE BLD-MCNC: 195 MG/DL (ref 65–105)
GLUCOSE BLD-MCNC: 197 MG/DL (ref 65–105)
GLUCOSE BLD-MCNC: 198 MG/DL (ref 65–105)
GLUCOSE BLD-MCNC: 198 MG/DL (ref 65–105)
GLUCOSE BLD-MCNC: 208 MG/DL (ref 65–105)
HCO3 VENOUS: 18.4 MMOL/L (ref 24–30)
HCT VFR BLD CALC: 30 % (ref 36.3–47.1)
HEMOGLOBIN: 10.1 G/DL (ref 11.9–15.1)
IMMATURE GRANULOCYTES: 1 %
LYMPHOCYTES # BLD: 3 % (ref 24–43)
Lab: NORMAL
MAGNESIUM: 1.5 MG/DL (ref 1.6–2.6)
MAGNESIUM: 2 MG/DL (ref 1.6–2.6)
MCH RBC QN AUTO: 30.1 PG (ref 25.2–33.5)
MCHC RBC AUTO-ENTMCNC: 33.7 G/DL (ref 28.4–34.8)
MCV RBC AUTO: 89.3 FL (ref 82.6–102.9)
METHEMOGLOBIN: ABNORMAL % (ref 0–1.5)
MODE: ABNORMAL
MONOCYTES # BLD: 5 % (ref 3–12)
MORPHOLOGY: NORMAL
NEGATIVE BASE EXCESS, VEN: 5.4 MMOL/L (ref 0–2)
NOTIFICATION TIME: ABNORMAL
NOTIFICATION: ABNORMAL
NRBC AUTOMATED: 0 PER 100 WBC
O2 DEVICE/FLOW/%: ABNORMAL
O2 SAT, VEN: 88.9 % (ref 60–85)
OXYHEMOGLOBIN: ABNORMAL % (ref 95–98)
PATIENT TEMP: 37
PCO2, VEN, TEMP ADJ: ABNORMAL MMHG (ref 39–55)
PCO2, VEN: 31.8 (ref 39–55)
PDW BLD-RTO: 14.1 % (ref 11.8–14.4)
PEEP/CPAP: ABNORMAL
PH VENOUS: 7.38 (ref 7.32–7.42)
PH, VEN, TEMP ADJ: ABNORMAL (ref 7.32–7.42)
PHOSPHORUS: 4.8 MG/DL (ref 2.6–4.5)
PHOSPHORUS: 5 MG/DL (ref 2.6–4.5)
PLATELET # BLD: 149 K/UL (ref 138–453)
PLATELET ESTIMATE: ABNORMAL
PMV BLD AUTO: 10.2 FL (ref 8.1–13.5)
PO2, VEN, TEMP ADJ: ABNORMAL MMHG (ref 30–50)
PO2, VEN: 56.2 (ref 30–50)
POSITIVE BASE EXCESS, VEN: ABNORMAL MMOL/L (ref 0–2)
POTASSIUM SERPL-SCNC: 3.7 MMOL/L (ref 3.7–5.3)
POTASSIUM SERPL-SCNC: 3.8 MMOL/L (ref 3.7–5.3)
POTASSIUM SERPL-SCNC: 3.8 MMOL/L (ref 3.7–5.3)
POTASSIUM SERPL-SCNC: 3.9 MMOL/L (ref 3.7–5.3)
POTASSIUM SERPL-SCNC: 4 MMOL/L (ref 3.7–5.3)
POTASSIUM SERPL-SCNC: 4 MMOL/L (ref 3.7–5.3)
POTASSIUM SERPL-SCNC: 4.1 MMOL/L (ref 3.7–5.3)
POTASSIUM SERPL-SCNC: 4.2 MMOL/L (ref 3.7–5.3)
PSV: ABNORMAL
PT. POSITION: ABNORMAL
RBC # BLD: 3.36 M/UL (ref 3.95–5.11)
RBC # BLD: ABNORMAL 10*6/UL
RESPIRATORY RATE: ABNORMAL
SAMPLE SITE: ABNORMAL
SEG NEUTROPHILS: 91 % (ref 36–65)
SEGMENTED NEUTROPHILS ABSOLUTE COUNT: 11.19 K/UL (ref 1.5–8.1)
SET RATE: ABNORMAL
SODIUM BLD-SCNC: 130 MMOL/L (ref 135–144)
SODIUM BLD-SCNC: 131 MMOL/L (ref 135–144)
SODIUM BLD-SCNC: 132 MMOL/L (ref 135–144)
SODIUM BLD-SCNC: 132 MMOL/L (ref 135–144)
SPECIMEN DESCRIPTION: NORMAL
STATUS: NORMAL
TEXT FOR RESPIRATORY: ABNORMAL
TOTAL HB: ABNORMAL G/DL (ref 12–16)
TOTAL RATE: ABNORMAL
VANCOMYCIN RANDOM DATE LAST DOSE: NORMAL
VANCOMYCIN RANDOM DOSE AMOUNT: NORMAL
VANCOMYCIN RANDOM TIME LAST DOSE: NORMAL
VANCOMYCIN RANDOM: 22.5 UG/ML
VT: ABNORMAL
WBC # BLD: 12.3 K/UL (ref 3.5–11.3)
WBC # BLD: ABNORMAL 10*3/UL

## 2018-08-25 PROCEDURE — 83735 ASSAY OF MAGNESIUM: CPT

## 2018-08-25 PROCEDURE — 82805 BLOOD GASES W/O2 SATURATION: CPT

## 2018-08-25 PROCEDURE — 97162 PT EVAL MOD COMPLEX 30 MIN: CPT

## 2018-08-25 PROCEDURE — 82330 ASSAY OF CALCIUM: CPT

## 2018-08-25 PROCEDURE — 82947 ASSAY GLUCOSE BLOOD QUANT: CPT

## 2018-08-25 PROCEDURE — 85025 COMPLETE CBC W/AUTO DIFF WBC: CPT

## 2018-08-25 PROCEDURE — 2000000000 HC ICU R&B

## 2018-08-25 PROCEDURE — 6370000000 HC RX 637 (ALT 250 FOR IP): Performed by: STUDENT IN AN ORGANIZED HEALTH CARE EDUCATION/TRAINING PROGRAM

## 2018-08-25 PROCEDURE — 6370000000 HC RX 637 (ALT 250 FOR IP): Performed by: EMERGENCY MEDICINE

## 2018-08-25 PROCEDURE — G8978 MOBILITY CURRENT STATUS: HCPCS

## 2018-08-25 PROCEDURE — 84132 ASSAY OF SERUM POTASSIUM: CPT

## 2018-08-25 PROCEDURE — 36415 COLL VENOUS BLD VENIPUNCTURE: CPT

## 2018-08-25 PROCEDURE — 80048 BASIC METABOLIC PNL TOTAL CA: CPT

## 2018-08-25 PROCEDURE — 94762 N-INVAS EAR/PLS OXIMTRY CONT: CPT

## 2018-08-25 PROCEDURE — 82010 KETONE BODYS QUAN: CPT

## 2018-08-25 PROCEDURE — 80202 ASSAY OF VANCOMYCIN: CPT

## 2018-08-25 PROCEDURE — G8979 MOBILITY GOAL STATUS: HCPCS

## 2018-08-25 PROCEDURE — 6360000002 HC RX W HCPCS: Performed by: NURSE PRACTITIONER

## 2018-08-25 PROCEDURE — 84100 ASSAY OF PHOSPHORUS: CPT

## 2018-08-25 PROCEDURE — 6360000002 HC RX W HCPCS: Performed by: STUDENT IN AN ORGANIZED HEALTH CARE EDUCATION/TRAINING PROGRAM

## 2018-08-25 PROCEDURE — 71045 X-RAY EXAM CHEST 1 VIEW: CPT

## 2018-08-25 PROCEDURE — 2580000003 HC RX 258: Performed by: STUDENT IN AN ORGANIZED HEALTH CARE EDUCATION/TRAINING PROGRAM

## 2018-08-25 PROCEDURE — 2580000003 HC RX 258: Performed by: HOSPITALIST

## 2018-08-25 PROCEDURE — 6360000002 HC RX W HCPCS: Performed by: HOSPITALIST

## 2018-08-25 PROCEDURE — 94660 CPAP INITIATION&MGMT: CPT

## 2018-08-25 PROCEDURE — 97116 GAIT TRAINING THERAPY: CPT

## 2018-08-25 PROCEDURE — 99291 CRITICAL CARE FIRST HOUR: CPT | Performed by: INTERNAL MEDICINE

## 2018-08-25 PROCEDURE — 97530 THERAPEUTIC ACTIVITIES: CPT

## 2018-08-25 RX ORDER — CARVEDILOL 3.12 MG/1
3.12 TABLET ORAL 2 TIMES DAILY
Status: DISCONTINUED | OUTPATIENT
Start: 2018-08-25 | End: 2018-09-05 | Stop reason: HOSPADM

## 2018-08-25 RX ORDER — FUROSEMIDE 10 MG/ML
40 INJECTION INTRAMUSCULAR; INTRAVENOUS ONCE
Status: COMPLETED | OUTPATIENT
Start: 2018-08-25 | End: 2018-08-25

## 2018-08-25 RX ADMIN — DEXTROSE AND SODIUM CHLORIDE: 5; 450 INJECTION, SOLUTION INTRAVENOUS at 21:59

## 2018-08-25 RX ADMIN — CEFEPIME HYDROCHLORIDE 1 G: 1 INJECTION, POWDER, FOR SOLUTION INTRAMUSCULAR; INTRAVENOUS at 09:31

## 2018-08-25 RX ADMIN — HYDROCORTISONE SODIUM SUCCINATE 50 MG: 100 INJECTION, POWDER, FOR SOLUTION INTRAMUSCULAR; INTRAVENOUS at 11:03

## 2018-08-25 RX ADMIN — HYDROCORTISONE SODIUM SUCCINATE 100 MG: 100 INJECTION, POWDER, FOR SOLUTION INTRAMUSCULAR; INTRAVENOUS at 03:10

## 2018-08-25 RX ADMIN — HEPARIN SODIUM 5000 UNITS: 5000 INJECTION, SOLUTION INTRAVENOUS; SUBCUTANEOUS at 08:14

## 2018-08-25 RX ADMIN — CARVEDILOL 3.12 MG: 3.12 TABLET, FILM COATED ORAL at 09:19

## 2018-08-25 RX ADMIN — HEPARIN SODIUM 5000 UNITS: 5000 INJECTION, SOLUTION INTRAVENOUS; SUBCUTANEOUS at 21:58

## 2018-08-25 RX ADMIN — VANCOMYCIN HYDROCHLORIDE 1250 MG: 10 INJECTION, POWDER, LYOPHILIZED, FOR SOLUTION INTRAVENOUS at 23:46

## 2018-08-25 RX ADMIN — HEPARIN SODIUM 5000 UNITS: 5000 INJECTION, SOLUTION INTRAVENOUS; SUBCUTANEOUS at 13:57

## 2018-08-25 RX ADMIN — DEXTROSE AND SODIUM CHLORIDE: 5; 450 INJECTION, SOLUTION INTRAVENOUS at 05:00

## 2018-08-25 RX ADMIN — MAGNESIUM SULFATE HEPTAHYDRATE 2 G: 500 INJECTION, SOLUTION INTRAMUSCULAR; INTRAVENOUS at 13:56

## 2018-08-25 RX ADMIN — ATORVASTATIN CALCIUM 80 MG: 80 TABLET, FILM COATED ORAL at 20:06

## 2018-08-25 RX ADMIN — DEXTROSE AND SODIUM CHLORIDE: 5; 450 INJECTION, SOLUTION INTRAVENOUS at 09:35

## 2018-08-25 RX ADMIN — CALCIUM GLUCONATE 2 G: 98 INJECTION, SOLUTION INTRAVENOUS at 11:21

## 2018-08-25 RX ADMIN — CARVEDILOL 3.12 MG: 3.12 TABLET, FILM COATED ORAL at 20:06

## 2018-08-25 RX ADMIN — CLOPIDOGREL 75 MG: 75 TABLET, FILM COATED ORAL at 09:20

## 2018-08-25 RX ADMIN — VITAMIN D, TAB 1000IU (100/BT) 5000 UNITS: 25 TAB at 09:19

## 2018-08-25 RX ADMIN — FUROSEMIDE 40 MG: 10 INJECTION, SOLUTION INTRAMUSCULAR; INTRAVENOUS at 09:35

## 2018-08-25 RX ADMIN — HYDROCORTISONE SODIUM SUCCINATE 50 MG: 100 INJECTION, POWDER, FOR SOLUTION INTRAMUSCULAR; INTRAVENOUS at 18:11

## 2018-08-25 ASSESSMENT — PULMONARY FUNCTION TESTS
PIF_VALUE: 19
PIF_VALUE: 15
PIF_VALUE: 15
PIF_VALUE: 18
PIF_VALUE: 16
PIF_VALUE: 18

## 2018-08-25 ASSESSMENT — PAIN SCALES - GENERAL
PAINLEVEL_OUTOF10: 0

## 2018-08-25 NOTE — PROGRESS NOTES
Physical Therapy    Facility/Department: [address] MICU  Initial Assessment    NAME: Uvaldo Philip  : 1960  MRN: [id number]    Date of Service: 2018    Discharge Recommendations:  Continue to assess pending progress, Subacute/Skilled Nursing Facility      Patient Diagnosis(es): The primary encounter diagnosis was Hyperkalemia. Diagnoses of Acute renal failure, unspecified acute renal failure type (HCC) and Sepsis, due to unspecified organism [facility]) were also pertinent to this visit. has a past medical history of CAD (coronary artery disease); Cardiomyopathy ([facility] [address] Depression; Diabetes mellitus ([facility] [address] H/O mitral valve repair; History of fractured kneecap; Hx of CABG; Hx of myocardial infarction; Hyperlipidemia; Hypertension; ICD (implantable cardioverter-defibrillator) in place; Kidney calculi; Osteoarthritis; and Renal calculi.   has a past surgical history that includes Cholecystectomy (); Hysterectomy (); Ankle surgery (Right, ); Kidney stone surgery (Left, ); Tubal ligation; Mitral valve surgery (6/4/15); Cardiac defibrillator placement (06/01/15); and Coronary artery bypass graft (05/28/15).     Restrictions  Restrictions/Precautions  Restrictions/Precautions: General Precautions, Fall Risk     Vision/Hearing  Vision: Within Functional Limits  Hearing: Within functional limits       Subjective  General  Chart Reviewed: Yes  Response To Previous Treatment: Not applicable  Family / Caregiver Present: No  Follows Commands: Within Functional Limits  Pain Screening  Patient Currently in Pain: Denies    Orientation  Orientation  Overall Orientation Status: Within Normal Limits    Social/Functional History  Social/Functional History  Lives With: Spouse  Type of Home:  ([facility])  Home Layout: One level  Home Access: Level entry  Bathroom Toilet: Standard  Bathroom Accessibility: Accessible  ADL Assistance: [address] [place] [address] Avenue: Independent  Homemaking Responsibilities:

## 2018-08-25 NOTE — PROGRESS NOTES
Nephrology Progress Note      SUBJECTIVE       F/U on acute hyperkalemia  Had dialysis on 18 secondary to severe hyperkalemia. Received Lasix 40mg IV X1 yesterday. Urine output 8225 last 24 hours. No change in weight. On D5.45 @ 140/hr for diabetic protocol. Potassium 4.0 this morning, with creatinine 6.12, CO2 15  Extubated yesterday but on BiPap. Patient alert and following commands. Blood pressures stable and off pressors      OBJECTIVE      CURRENT TEMPERATURE:  Temp: 100.4 °F (38 °C)  MAXIMUM TEMPERATURE OVER 24HRS:  Temp (24hrs), Av.9 °F (37.2 °C), Min:97.9 °F (36.6 °C), Max:100.4 °F (38 °C)    CURRENT RESPIRATORY RATE:  Resp: (!) 33  CURRENT PULSE:  Pulse: 119  CURRENT BLOOD PRESSURE:  BP: 132/73  24HR BLOOD PRESSURE RANGE:  Systolic (88JSK), KJI:183 , Min:106 , OPL:755   ; Diastolic (51GLQ), QIM:59, Min:56, Max:85    24HR INTAKE/OUTPUT:    Intake/Output Summary (Last 24 hours) at 18 4324  Last data filed at 18 0700   Gross per 24 hour   Intake             4032 ml   Output             8225 ml   Net            -4193 ml       PHYSICAL EXAM      GENERAL APPEARANCE:Awake, alert, mild respiratory distress  SKIN: warm and dry, no rash or erythema  EYES: conjunctivae normal and sclera anicteric  NECK: carotids without bruits bilaterally JVD: None Left IJ triple lumen  PULMONARY: coarse rhonchi present- throughout  CADRDIOVASCULAR: normal S1 and S2, no gallops and intact distal pulses.  Tachycardic  ABDOMEN: soft nontender, bowel sounds present, no organomegaly,  no ascites  EXTREMITIES: no cyanosis, clubbing or edema  Right Groin QM    CURRENT MEDICATIONS        carvedilol (COREG) tablet 3.125 mg BID   0.9 % sodium chloride bolus PRN   0.9 % sodium chloride bolus PRN   albuterol (PROVENTIL) nebulizer solution 2.5 mg Q6H PRN   atorvastatin (LIPITOR) tablet 80 mg Daily   vitamin D (CHOLECALCIFEROL) tablet 5,000 Units Daily   clopidogrel (PLAVIX) tablet 75 mg Daily   midazolam (VERSED) 100 mg results found for: ALEJANDRO  SPEP: Lab Results   Component Value Date    PROT 6.6 08/23/2018    ALBCAL 4.1 08/23/2018    ALBPCT 62 08/23/2018    LABALPH 0.2 08/23/2018    LABALPH 0.7 08/23/2018    A1PCT 3 08/23/2018    A2PCT 10 08/23/2018    LABBETA 0.9 08/23/2018    BETAPCT 13 08/23/2018    GAMGLOB 0.8 08/23/2018    GGPCT 12 08/23/2018    PATH ELECTRONICALLY SIGNED. Princess Ruth M.D. 08/23/2018     Urine Creatinine:    Lab Results   Component Value Date    LABCREA 42.4 08/23/2018     Urine Eosinophils: No components found for: EOSU  Urine Protein:  No results found for: TPU  Urinalysis:  U/A: Lab Results   Component Value Date    NITRU NEGATIVE 08/23/2018    COLORU ORANGE 08/23/2018    PHUR 5.5 08/23/2018    WBCUA TOO NUMEROUS TO COUNT 08/23/2018    RBCUA TOO NUMEROUS TO COUNT 08/23/2018    MUCUS NOT REPORTED 08/23/2018    TRICHOMONAS NOT REPORTED 08/23/2018    YEAST NOT REPORTED 08/23/2018    BACTERIA NOT REPORTED 08/23/2018    SPECGRAV 1.015 08/23/2018    LEUKOCYTESUR TRACE 08/23/2018    UROBILINOGEN Normal 08/23/2018    BILIRUBINUR NEGATIVE 08/23/2018    GLUCOSEU 2+ 08/23/2018    KETUA TRACE 08/23/2018    AMORPHOUS NOT REPORTED 08/23/2018       BMP: Recent Labs      08/25/18   0448   NA  132*   K  4.0   CL  98   CO2  15*   BUN  50*   CREATININE  6.12*   GLUCOSE  172*   CALCIUM  7.9*        Phosphorus:    Recent Labs      08/23/18 2040 08/24/18   1224  08/24/18 2039   PHOS  5.2*  6.0*  5.9*     Magnesium:   Recent Labs      08/23/18 2040 08/24/18   1224  08/24/18   2039   MG  2.0  1.8  1.6     Albumin:   Recent Labs      08/23/18 0225  08/24/18   1224   LABALBU  3.7  3.1*   Results for Janey Raffi (MRN [id number]) as of 8/25/2018 16:01   Ref. Range 8/23/2018 18:09   Creatinine, Ur Latest Ref Range: 28.0 - 217.0 mg/dL 42.4   Total Protein, Urine Latest Units: mg/dL 850       Radiology:  CXR 8/24/18  1.  Support devices as above.  The enteric tube is not well visualized past   the mid mediastinum,

## 2018-08-25 NOTE — PROGRESS NOTES
Pharmacy Vancomycin Consult     Vancomycin Day: 3  Current Dosing: dosing by levels    Indication: febrile with leukocytosis, positive SIRS, source of infection unknown  Goal range:15-20 mcg/mL    Temp max:  100.4F     Recent Labs      08/25/18   1219  08/25/18   1357   BUN  51*  49*       Recent Labs      08/25/18   1219  08/25/18   1357   CREATININE  6.09*  5.69*       Recent Labs      08/24/18   1224  08/25/18   0448   WBC  11.6*  12.3*         Intake/Output Summary (Last 24 hours) at 08/25/18 1725  Last data filed at 08/25/18 1600   Gross per 24 hour   Intake 4314 ml   Output 8160 ml   Net -3846 ml          Culture Date      Source                       Results  8/23/18                Urine Cx                      NGF  8/23/18                MRSA                          Negative  8/23/18                BCx                             NG @ 2 day    Ht Readings from Last 1 Encounters:   08/23/18 5' 5\" (1.651 m)        Wt Readings from Last 1 Encounters:   08/25/18 166 lb 0.1 oz (75.3 kg)         Body mass index is 27.62 kg/m². Estimated Creatinine Clearance: 11 mL/min (A) (based on SCr of 5.69 mg/dL [facility] [place] [facility])). Vancomycin level: 22.3 mcg/mL (drawn 22 hours post dose)    Assessment/Plan:  1) Per MICU team, will continue broad spectrum antibiotics for at least one more day. Based on patient's pharmacokinetics, therapeutic trough will be obtained by 8/26 @ 0000. Will schedule a dose of Vancomycin 1250 mg for this time. 2) The next vancomycin level will be ordered for 8/27/18 unless clinically indicated sooner. (Pharmacy will order)  3) Pharmacy will follow patient renal function, Vancomycin levels and doses with you during the course of therapy. Additional recommendations will appear in follow up notes. Yoko Caceres PharmJoon D.   PGY2 Critical Care Pharmacy Resident  8/25/2018 5:54 PM

## 2018-08-25 NOTE — PROGRESS NOTES
Critical Care Team - Daily Progress Note      Date and time: 8/25/2018 12:06 PM  Patient's name:  Tammy Moss  Medical Record Number: [id number]  Patient's account/billing number: [de-identified]  Patient's YOB: 1960  Age: 61 y.o. Date of Admission: 8/23/2018  4:01 AM  Length of stay during current admission: 2      Primary Care Physician: Octavia Diggs MD    Code Status: Prior    Reason for ICU admission: metabolic encephalopathy, elevated Cr, hyperkalemia,      SUBJECTIVE:     OVERNIGHT EVENTS:      No  acute events overnight. Patient seen chart reviewed. Had a temperature of 100.4, blood pressure stable. Tachycardic.blood sugars 190 mg/dL  Denies any fresh complaints. Leukocytosis increased 12.3, on broad spectrum antibiotics. Output good after diuresis 4 L.       AWAKE & FOLLOWING COMMANDS:  [] No   [x] Yes    CURRENT VENTILATION STATUS:     [] Ventilator  [x] BIPAP  [] Nasal Cannula [] Room Air      IF INTUBATED, ET TUBE MARKING AT LOWER LIP:       cms    SECRETIONS Amount:  [] Small [x] Moderate  [] Large  [] None  Color:     [] White [x] Colored  [] Bloody    SEDATION:  RAAS Score:  [] Propofol gtt  [] Versed gtt  [] Ativan gtt   [] No Sedation    PARALYZED:  [] No    [] Yes    DIARRHEA:                [x] No                [] Yes  (C. Difficile status: [] positive                                                                                                                       [] negative                                                                                                                     [] pending)    VASOPRESSORS:  [x] No    [] Yes    If yes -   [] Levophed       [] Dopamine     [] Vasopressin       [] Dobutamine  [] Phenylephrine         [] Epinephrine    CENTRAL LINES:     [] No   [x] Yes   (Date of Insertion:   )           If yes -     [] Right IJ     [x] Left IJ [] Right Femoral [] Left Femoral                   [] Right Subclavian [] Left Subclavian [] Negative             []  Positive (Details:  )     Other pertinent Labs:       Radiology/Imaging:       ASSESSMENT:     Principal Problem:    Chest pain  Active Problems:    S/P cardiac cath-Patent grafts 6/5/15    CAD (coronary artery disease)    Hx of CABG    ICD (implantable cardioverter-defibrillator) in place    H/O mitral valve repair    Hypertension    DM (diabetes mellitus), type 2 with neurological complications ([facility])    CHAD (acute kidney injury) ([facility] [address]    Hyperkalemia    Lactic acidosis    Acute metabolic encephalopathy    SIRS (systemic inflammatory response syndrome) ([facility])    Metabolic encephalopathy    DKA (diabetic ketoacidoses) ([facility] [address]    Cardiogenic shock ([facility] [address]  Resolved Problems:    * No resolved hospital problems. *        PLAN:     WEAN PER PROTOCOL:  [] No   [] Yes  [x] N/A    DISCONTINUE ANY LABS:   [x] No   [] Yes    ICU PROPHYLAXIS:  Stress ulcer:  [] PPI Agent  [x] [id number] [] Sucralfate  [] Other:  VTE:   [] Enoxaparin  [] Unfract. Heparin Subcut  [] EPC Cuffs    NUTRITION: (Diet: Diet NPO Effective Now)    HOME MEDICATIONS RECONCILED: [x] No  [] Yes    INSULIN DRIP:   [] No   [x] Yes    CONSULTATION NEEDED:  [] No   [x] Yes    FAMILY UPDATED:    [x] No   [] Yes    TRANSFER OUT OF ICU:   [x] No   [] Yes    ADDITIONAL PLAN:    Extubated yesterday and is on BiPAP  Is off pressors  Febrile with elevated white count, on broad-spectrum antibiotics vancomycin and cefepime. Hydrocortisone decreased to 50 mg every 8 hours. IN DKA on DKA protocol on insulin drip, Anion gap 19, carbonate 15, will check serum beta hydroxybutyrate and bridge to lantus. Dextrose half normal saline at 150 mL per hour. nothing mouth for now. Nephrology following possible dialysis. Hyponatremia stable. diuresis with Lasix intermittently. Echo shows EF of 40-45%, right ventricular ystolic pressure 34 mmHg. ICD with good functioning. Cardiology following along. Plavix Lipitor Coreg continued.   Heparin for DVT prophylaxis.       Edenilson Augustin MD      Department of [place]         8/25/2018, 12:06 PM

## 2018-08-25 NOTE — PROGRESS NOTES
BRONCHOSPASM/BRONCHOCONSTRICTION     [x]         IMPROVE AERATION/BREATH SOUNDS  [x]   ADMINISTER BRONCHODILATOR THERAPY AS APPROPRIATE  [x]   ASSESS BREATH SOUNDS  []   IMPLEMENT AEROSOL/MDI PROTOCOL  NON INVASIVE VENTILATION  PROVIDE OPTIMAL VENTILATION/ACCEPTABLE SP02  IMPLEMENT NON INVASIVE VENTILATION PROTOCOL  ASSESSMENT SKIN INTEGRITY  PATIENT EDUCATION AS NEEDED  BIPAP AS NEEDED[x]   PATIENT EDUCATION AS NEEDED

## 2018-08-26 LAB
ANION GAP SERPL CALCULATED.3IONS-SCNC: 16 MMOL/L (ref 9–17)
ANION GAP SERPL CALCULATED.3IONS-SCNC: 16 MMOL/L (ref 9–17)
ANION GAP SERPL CALCULATED.3IONS-SCNC: 17 MMOL/L (ref 9–17)
ANION GAP SERPL CALCULATED.3IONS-SCNC: 18 MMOL/L (ref 9–17)
BETA-HYDROXYBUTYRATE: 0.06 MMOL/L (ref 0.02–0.27)
BUN BLDV-MCNC: 52 MG/DL (ref 6–20)
BUN BLDV-MCNC: 56 MG/DL (ref 6–20)
BUN BLDV-MCNC: 59 MG/DL (ref 6–20)
BUN BLDV-MCNC: 60 MG/DL (ref 6–20)
BUN/CREAT BLD: ABNORMAL (ref 9–20)
CALCIUM IONIZED: 1.09 MMOL/L (ref 1.13–1.33)
CALCIUM IONIZED: 1.09 MMOL/L (ref 1.13–1.33)
CALCIUM SERPL-MCNC: 7.7 MG/DL (ref 8.6–10.4)
CALCIUM SERPL-MCNC: 7.9 MG/DL (ref 8.6–10.4)
CALCIUM SERPL-MCNC: 8 MG/DL (ref 8.6–10.4)
CALCIUM SERPL-MCNC: 8.2 MG/DL (ref 8.6–10.4)
CHLORIDE BLD-SCNC: 96 MMOL/L (ref 98–107)
CHLORIDE BLD-SCNC: 96 MMOL/L (ref 98–107)
CHLORIDE BLD-SCNC: 97 MMOL/L (ref 98–107)
CHLORIDE BLD-SCNC: 98 MMOL/L (ref 98–107)
CO2: 17 MMOL/L (ref 20–31)
CO2: 18 MMOL/L (ref 20–31)
CO2: 19 MMOL/L (ref 20–31)
CO2: 20 MMOL/L (ref 20–31)
CREAT SERPL-MCNC: 5.33 MG/DL (ref 0.5–0.9)
CREAT SERPL-MCNC: 5.62 MG/DL (ref 0.5–0.9)
CREAT SERPL-MCNC: 5.81 MG/DL (ref 0.5–0.9)
CREAT SERPL-MCNC: 5.89 MG/DL (ref 0.5–0.9)
CREATININE URINE: 33.5 MG/DL (ref 28–217)
GFR AFRICAN AMERICAN: 10 ML/MIN
GFR AFRICAN AMERICAN: 9 ML/MIN
GFR NON-AFRICAN AMERICAN: 7 ML/MIN
GFR NON-AFRICAN AMERICAN: 7 ML/MIN
GFR NON-AFRICAN AMERICAN: 8 ML/MIN
GFR NON-AFRICAN AMERICAN: 8 ML/MIN
GFR SERPL CREATININE-BSD FRML MDRD: ABNORMAL ML/MIN/{1.73_M2}
GLUCOSE BLD-MCNC: 160 MG/DL (ref 65–105)
GLUCOSE BLD-MCNC: 161 MG/DL (ref 65–105)
GLUCOSE BLD-MCNC: 164 MG/DL (ref 65–105)
GLUCOSE BLD-MCNC: 165 MG/DL (ref 65–105)
GLUCOSE BLD-MCNC: 167 MG/DL (ref 65–105)
GLUCOSE BLD-MCNC: 170 MG/DL (ref 65–105)
GLUCOSE BLD-MCNC: 173 MG/DL (ref 65–105)
GLUCOSE BLD-MCNC: 175 MG/DL (ref 65–105)
GLUCOSE BLD-MCNC: 176 MG/DL (ref 65–105)
GLUCOSE BLD-MCNC: 177 MG/DL (ref 65–105)
GLUCOSE BLD-MCNC: 179 MG/DL (ref 70–99)
GLUCOSE BLD-MCNC: 180 MG/DL (ref 65–105)
GLUCOSE BLD-MCNC: 181 MG/DL (ref 65–105)
GLUCOSE BLD-MCNC: 184 MG/DL (ref 65–105)
GLUCOSE BLD-MCNC: 184 MG/DL (ref 70–99)
GLUCOSE BLD-MCNC: 190 MG/DL (ref 65–105)
GLUCOSE BLD-MCNC: 193 MG/DL (ref 70–99)
GLUCOSE BLD-MCNC: 194 MG/DL (ref 70–99)
GLUCOSE BLD-MCNC: 199 MG/DL (ref 65–105)
HCT VFR BLD CALC: 25.7 % (ref 36.3–47.1)
HEMOGLOBIN: 8.6 G/DL (ref 11.9–15.1)
MAGNESIUM: 1.8 MG/DL (ref 1.6–2.6)
MCH RBC QN AUTO: 30 PG (ref 25.2–33.5)
MCHC RBC AUTO-ENTMCNC: 33.5 G/DL (ref 28.4–34.8)
MCV RBC AUTO: 89.5 FL (ref 82.6–102.9)
NRBC AUTOMATED: 0 PER 100 WBC
PDW BLD-RTO: 13.9 % (ref 11.8–14.4)
PHOSPHORUS: 4.6 MG/DL (ref 2.6–4.5)
PLATELET # BLD: ABNORMAL K/UL (ref 138–453)
PLATELET, FLUORESCENCE: 142 K/UL (ref 138–453)
PLATELET, IMMATURE FRACTION: 2.6 % (ref 1.1–10.3)
PMV BLD AUTO: ABNORMAL FL (ref 8.1–13.5)
POTASSIUM SERPL-SCNC: 3.6 MMOL/L (ref 3.7–5.3)
POTASSIUM SERPL-SCNC: 3.6 MMOL/L (ref 3.7–5.3)
POTASSIUM SERPL-SCNC: 3.7 MMOL/L (ref 3.7–5.3)
POTASSIUM SERPL-SCNC: 3.7 MMOL/L (ref 3.7–5.3)
RBC # BLD: 2.87 M/UL (ref 3.95–5.11)
RHEUMATOID FACTOR: 24.1 IU/ML
SODIUM BLD-SCNC: 130 MMOL/L (ref 135–144)
SODIUM BLD-SCNC: 131 MMOL/L (ref 135–144)
SODIUM BLD-SCNC: 133 MMOL/L (ref 135–144)
SODIUM BLD-SCNC: 134 MMOL/L (ref 135–144)
TOTAL PROTEIN, URINE: 33 MG/DL
URINE TOTAL PROTEIN CREATININE RATIO: 0.99 (ref 0–0.2)
VANCOMYCIN RANDOM DATE LAST DOSE: NORMAL
VANCOMYCIN RANDOM DOSE AMOUNT: NORMAL
VANCOMYCIN RANDOM TIME LAST DOSE: NORMAL
VANCOMYCIN RANDOM: 27.4 UG/ML
WBC # BLD: 10.1 K/UL (ref 3.5–11.3)

## 2018-08-26 PROCEDURE — 6360000002 HC RX W HCPCS: Performed by: HOSPITALIST

## 2018-08-26 PROCEDURE — 99291 CRITICAL CARE FIRST HOUR: CPT | Performed by: INTERNAL MEDICINE

## 2018-08-26 PROCEDURE — 86146 BETA-2 GLYCOPROTEIN ANTIBODY: CPT

## 2018-08-26 PROCEDURE — 82330 ASSAY OF CALCIUM: CPT

## 2018-08-26 PROCEDURE — 85055 RETICULATED PLATELET ASSAY: CPT

## 2018-08-26 PROCEDURE — 82010 KETONE BODYS QUAN: CPT

## 2018-08-26 PROCEDURE — 86147 CARDIOLIPIN ANTIBODY EA IG: CPT

## 2018-08-26 PROCEDURE — 83516 IMMUNOASSAY NONANTIBODY: CPT

## 2018-08-26 PROCEDURE — 84156 ASSAY OF PROTEIN URINE: CPT

## 2018-08-26 PROCEDURE — 86038 ANTINUCLEAR ANTIBODIES: CPT

## 2018-08-26 PROCEDURE — 85027 COMPLETE CBC AUTOMATED: CPT

## 2018-08-26 PROCEDURE — 6370000000 HC RX 637 (ALT 250 FOR IP): Performed by: STUDENT IN AN ORGANIZED HEALTH CARE EDUCATION/TRAINING PROGRAM

## 2018-08-26 PROCEDURE — 6370000000 HC RX 637 (ALT 250 FOR IP): Performed by: HOSPITALIST

## 2018-08-26 PROCEDURE — 80048 BASIC METABOLIC PNL TOTAL CA: CPT

## 2018-08-26 PROCEDURE — 83735 ASSAY OF MAGNESIUM: CPT

## 2018-08-26 PROCEDURE — 6360000002 HC RX W HCPCS: Performed by: STUDENT IN AN ORGANIZED HEALTH CARE EDUCATION/TRAINING PROGRAM

## 2018-08-26 PROCEDURE — 84100 ASSAY OF PHOSPHORUS: CPT

## 2018-08-26 PROCEDURE — 94762 N-INVAS EAR/PLS OXIMTRY CONT: CPT

## 2018-08-26 PROCEDURE — 94660 CPAP INITIATION&MGMT: CPT

## 2018-08-26 PROCEDURE — 6360000002 HC RX W HCPCS: Performed by: INTERNAL MEDICINE

## 2018-08-26 PROCEDURE — 80202 ASSAY OF VANCOMYCIN: CPT

## 2018-08-26 PROCEDURE — 2580000003 HC RX 258: Performed by: STUDENT IN AN ORGANIZED HEALTH CARE EDUCATION/TRAINING PROGRAM

## 2018-08-26 PROCEDURE — 6370000000 HC RX 637 (ALT 250 FOR IP): Performed by: EMERGENCY MEDICINE

## 2018-08-26 PROCEDURE — 36415 COLL VENOUS BLD VENIPUNCTURE: CPT

## 2018-08-26 PROCEDURE — 86431 RHEUMATOID FACTOR QUANT: CPT

## 2018-08-26 PROCEDURE — 2000000000 HC ICU R&B

## 2018-08-26 PROCEDURE — 82570 ASSAY OF URINE CREATININE: CPT

## 2018-08-26 RX ORDER — INSULIN GLARGINE 100 [IU]/ML
20 INJECTION, SOLUTION SUBCUTANEOUS ONCE
Status: COMPLETED | OUTPATIENT
Start: 2018-08-26 | End: 2018-08-26

## 2018-08-26 RX ORDER — POTASSIUM CHLORIDE 20MEQ/15ML
20 LIQUID (ML) ORAL ONCE
Status: COMPLETED | OUTPATIENT
Start: 2018-08-26 | End: 2018-08-26

## 2018-08-26 RX ORDER — FUROSEMIDE 10 MG/ML
40 INJECTION INTRAMUSCULAR; INTRAVENOUS ONCE
Status: COMPLETED | OUTPATIENT
Start: 2018-08-26 | End: 2018-08-26

## 2018-08-26 RX ORDER — SODIUM CHLORIDE 9 MG/ML
INJECTION, SOLUTION INTRAVENOUS CONTINUOUS
Status: DISCONTINUED | OUTPATIENT
Start: 2018-08-26 | End: 2018-08-29

## 2018-08-26 RX ADMIN — DEXTROSE AND SODIUM CHLORIDE: 5; 450 INJECTION, SOLUTION INTRAVENOUS at 08:23

## 2018-08-26 RX ADMIN — HEPARIN SODIUM 5000 UNITS: 5000 INJECTION, SOLUTION INTRAVENOUS; SUBCUTANEOUS at 05:53

## 2018-08-26 RX ADMIN — HYDROCORTISONE SODIUM SUCCINATE 50 MG: 100 INJECTION, POWDER, FOR SOLUTION INTRAMUSCULAR; INTRAVENOUS at 03:07

## 2018-08-26 RX ADMIN — POTASSIUM CHLORIDE 20 MEQ: 40 SOLUTION ORAL at 22:31

## 2018-08-26 RX ADMIN — CEFEPIME HYDROCHLORIDE 1 G: 1 INJECTION, POWDER, FOR SOLUTION INTRAMUSCULAR; INTRAVENOUS at 10:10

## 2018-08-26 RX ADMIN — HYDROCORTISONE SODIUM SUCCINATE 50 MG: 100 INJECTION, POWDER, FOR SOLUTION INTRAMUSCULAR; INTRAVENOUS at 10:11

## 2018-08-26 RX ADMIN — ATORVASTATIN CALCIUM 80 MG: 80 TABLET, FILM COATED ORAL at 20:07

## 2018-08-26 RX ADMIN — HEPARIN SODIUM 5000 UNITS: 5000 INJECTION, SOLUTION INTRAVENOUS; SUBCUTANEOUS at 21:56

## 2018-08-26 RX ADMIN — FUROSEMIDE 40 MG: 10 INJECTION, SOLUTION INTRAMUSCULAR; INTRAVENOUS at 15:23

## 2018-08-26 RX ADMIN — HYDROCORTISONE SODIUM SUCCINATE 50 MG: 100 INJECTION, POWDER, FOR SOLUTION INTRAMUSCULAR; INTRAVENOUS at 18:27

## 2018-08-26 RX ADMIN — DEXTROSE AND SODIUM CHLORIDE: 5; 450 INJECTION, SOLUTION INTRAVENOUS at 08:22

## 2018-08-26 RX ADMIN — CARVEDILOL 3.12 MG: 3.12 TABLET, FILM COATED ORAL at 08:46

## 2018-08-26 RX ADMIN — CARVEDILOL 3.12 MG: 3.12 TABLET, FILM COATED ORAL at 20:07

## 2018-08-26 RX ADMIN — CLOPIDOGREL 75 MG: 75 TABLET, FILM COATED ORAL at 08:46

## 2018-08-26 RX ADMIN — INSULIN LISPRO 1 UNITS: 100 INJECTION, SOLUTION INTRAVENOUS; SUBCUTANEOUS at 16:34

## 2018-08-26 RX ADMIN — Medication 1 G: at 23:41

## 2018-08-26 RX ADMIN — SODIUM CHLORIDE 2.6 UNITS/HR: 9 INJECTION, SOLUTION INTRAVENOUS at 07:20

## 2018-08-26 RX ADMIN — INSULIN LISPRO 1 UNITS: 100 INJECTION, SOLUTION INTRAVENOUS; SUBCUTANEOUS at 20:06

## 2018-08-26 RX ADMIN — SODIUM CHLORIDE: 9 INJECTION, SOLUTION INTRAVENOUS at 15:23

## 2018-08-26 RX ADMIN — VITAMIN D, TAB 1000IU (100/BT) 5000 UNITS: 25 TAB at 08:46

## 2018-08-26 RX ADMIN — INSULIN GLARGINE 20 UNITS: 100 INJECTION, SOLUTION SUBCUTANEOUS at 12:24

## 2018-08-26 RX ADMIN — HEPARIN SODIUM 5000 UNITS: 5000 INJECTION, SOLUTION INTRAVENOUS; SUBCUTANEOUS at 14:22

## 2018-08-26 ASSESSMENT — PULMONARY FUNCTION TESTS
PIF_VALUE: 18
PIF_VALUE: 16
PIF_VALUE: 14
PIF_VALUE: 15
PIF_VALUE: 16
PIF_VALUE: 17
PIF_VALUE: 17

## 2018-08-26 ASSESSMENT — PAIN SCALES - GENERAL
PAINLEVEL_OUTOF10: 0

## 2018-08-26 NOTE — PLAN OF CARE
Problem: Nutrition  Goal: Optimal nutrition therapy  Outcome: Ongoing  Nutrition Problem: Inadequate oral intake  Intervention: Food and/or Nutrient Delivery: Start Diabetic Tube Feeding with goal of 60 ml/hr  Nutritional Goals: Once nutrition initiated, meet % of estimated nutrition needs.

## 2018-08-26 NOTE — PROGRESS NOTES
Nutrition Assessment    Type and Reason for Visit: Reassess, Consult (TF ordering/management )    Nutrition Recommendations: Start Diabetic Tube Feeding-suggest goal of 60 ml/hr to provide 1728 kcal and 86 g pro/day. Monitor TF tolerance/adequacy and labs-modify TF as needed. Malnutrition Assessment:  · Malnutrition Status: Insufficient data    Nutrition Diagnosis:   · Problem: Inadequate oral intake  · Etiology: related to Impaired respiratory function-inability to consume food     Signs and symptoms:  as evidenced by NPO status, need for Nutrition Support-EN    Nutrition Assessment:  · Subjective Assessment: Pt was extubated on 8/24/18 and is currently on bipap. Per RN, pt is unable to stay off Bipap so plan to start tube feeding. · Current Nutrition Therapies:  · Oral Diet Orders: NPO   · Tube Feeding (TF) Orders:   · Formula: Diabetic  · Rate (ml/hr):ordered to start today     · Goal TF & Flush Orders Provides: goal of 60 ml/hr will provide 1728 kcal and 86 g pro per day  · Anthropometric Measures:  · Ht: 5' 5\" (165.1 cm)   · Current Body Wt: 167 lb 15.9 oz (76.2 kg)  · Admission Body Wt: 166 lb 7.2 oz (75.5 kg)  · Ideal Body Wt: 125 lb (56.7 kg), % Ideal Body 133%  · BMI Classification: BMI 25.0 - 29.9 Overweight  · Comparative Standards (Estimated Nutrition Needs):  · Estimated Daily Total Kcal: 0581-0928 kcal  · Estimated Daily Protein (g):  gm protein    Estimated Intake vs Estimated Needs: Intake Less Than Needs    Nutrition Risk Level: High    Nutrition Interventions:   Start Diabetic Tube Feeding-suggest goal of 60 ml/hr to provide 1728 kcal and 86 g pro/day. Continued Inpatient Monitoring, Education Not Indicated    Nutrition Evaluation:   · Evaluation: Progressing toward goals   · Goals: Once nutrition initiated, meet % of estimated nutrition needs.    · Monitoring: TF Intake, TF Tolerance, NPO Status, Diet Progression, Weight, Comparative Standards, Pertinent Labs    See

## 2018-08-26 NOTE — PROGRESS NOTES
BRONCHOSPASM/BRONCHOCONSTRICTION     [x]         IMPROVE AERATION/BREATH SOUNDS  [x]   ADMINISTER BRONCHODILATOR THERAPY AS APPROPRIATE  NON INVASIVE VENTILATION  PROVIDE OPTIMAL VENTILATION/ACCEPTABLE SP02  IMPLEMENT NON INVASIVE VENTILATION PROTOCOL  ASSESSMENT SKIN INTEGRITY  PATIENT EDUCATION AS NEEDED  BIPAP AS NEEDED   ASSESS BREATH SOUNDS  []   IMPLEMENT AEROSOL/MDI PROTOCOL  [x]   PATIENT EDUCATION AS NEEDED

## 2018-08-26 NOTE — PROGRESS NOTES
Oral care done. Mouth moisture applied. Pt. Instructed to cough. Non productive cough. Mask changed to full face proformax.

## 2018-08-26 NOTE — PROGRESS NOTES
PROVIDE ADEQUATE OXYGENATION WITH ACCEPTABLE SP02/ABG'S    [x]  IDENTIFY APPROPRIATE OXYGEN THERAPY  [x]   MONITOR SP02/ABG'S AS NEEDED   [x]   PATIENT EDUCATION AS NEEDED    NON INVASIVE VENTILATION  PROVIDE OPTIMAL VENTILATION/ACCEPTABLE SP02  IMPLEMENT NON INVASIVE VENTILATION PROTOCOL  ASSESSMENT SKIN INTEGRITY  PATIENT EDUCATION AS NEEDED  BIPAP AS NEEDED

## 2018-08-26 NOTE — PROGRESS NOTES
(CHOLECALCIFEROL) tablet 5,000 Units Daily   clopidogrel (PLAVIX) tablet 75 mg Daily   heparin flush 100 UNIT/ML injection 300 Units Once   heparin flush 100 UNIT/ML injection 300 Units Once   glucose (GLUTOSE) 40 % oral gel 15 g PRN   dextrose 50 % solution 12.5 g PRN   glucagon (rDNA) injection 1 mg PRN   dextrose 5 % solution PRN   vancomycin (VANCOCIN) intermittent dosing (placeholder) RX Placeholder   heparin (porcine) injection 9,500 Units PRN   heparin (porcine) injection 8,000 Units PRN   dextrose 50 % solution 12.5 g PRN   0.9 % sodium chloride infusion Continuous   dextrose 5 % and 0.45 % sodium chloride infusion Continuous PRN   insulin regular (HUMULIN R;NOVOLIN R) injection 8 Units Once   insulin regular (HUMULIN R;NOVOLIN R) 100 Units in sodium chloride 0.9 % 100 mL infusion Continuous   heparin (porcine) injection 5,000 Units 3 times per day   cefepime (MAXIPIME) 1 g IVPB extended (mini-bag) Q24H         LABS    CBC:   Recent Labs      08/24/18   1224  08/25/18   0448  08/26/18   0524   WBC  11.6*  12.3*  10.1   RBC  2.84*  3.36*  2.87*   HGB  8.6*  10.1*  8.6*   HCT  26.6*  30.0*  25.7*   MCV  93.7  89.3  89.5   MCH  30.3  30.1  30.0   MCHC  32.3  33.7  33.5   RDW  14.6*  14.1  13.9   PLT  143  149  See Reflexed IPF Result   MPV  10.0  10.2  NOT REPORTED      BMP:   Recent Labs      08/25/18   1357  08/25/18   1849  08/26/18   0021   NA  130*  130*  130*   K  3.8  3.8  3.7  3.7   CL  95*  95*  96*   CO2  17*  20  18*   BUN  49*  52*  52*   CREATININE  5.69*  5.98*  5.62*   GLUCOSE  192*  154*  193*   CALCIUM  8.3*  8.1*  7.9*      Phosphorus:    Recent Labs      08/26/18   0524   PHOS  4.6*     Magnesium:   Recent Labs      08/26/18   0524   MG  1.8     Albumin:   Recent Labs      08/24/18   1224   LABALBU  3.1*     ALEJANDRO: No results found for: ALEJANDRO  SPEP:   Lab Results   Component Value Date    PROT 6.6 08/23/2018    ALBCAL 4.1 08/23/2018    ALBPCT 62 08/23/2018    LABALPH 0.2 08/23/2018    LABALPH increased extensive bilateral pulmonary disease consistent with   developing pulmonary edema     CXR 8/24/18  1. Support devices as above.  The enteric tube is not well visualized past   the mid mediastinum, which may be related to technique. 2. Enlarged cardiac silhouette with prominence of the pulmonary vasculature. 3. Development of patchy opacities within the lungs bilaterally, which may   represent pulmonary edema     ECHO 8/24/18  Summary  Left ventricle is in the upper limits of normal in size with moderately  reduced systolic function. Estimated ejection fraction is 40-45%. Left atrium is mildly dilated. Normal right ventricular size and function. Pacemaker / ICD lead seen in right ventricle. Aortic sclerosis and mild focal calcification without stenosis. Mitral avril repair is noted. Mean gradient across the mitral valve is 6 mmHg  Mild mitral regurgitation. Mild tricuspid regurgitation. Estimated right ventricular systolic pressure is 34 mmHg. Assessment:  1. Acute Kidney Injury: Etiology for acute renal failure is not clear at this point. Her serology for vasculitis is still pending. A day before her hospitalization she was feeling absolutely fine. The only thing she can remember) this hospitalization is the weakness of her legs  2. Severe Hyperkalemia secondary to #1. Resolved  3. Chronic Kidney Disease with baseline 1-1.2 secondary to diabetic nephrosclerosis  4. Hypoxia and pulmonary congestion  5. DM  6. CMP and CHF:  EF 40-45% s/p ICD  7. CAD/CABG:  Cath 6/2018 grafts patent   8. Pulmonary congestion versus infiltrate      Plan:  1. Making excellent urine output. Over 4 liters last 24 hours   2. Consider decreasing or discontinuing IVF . Blood sugars <200 consistently  3. ?? Repeat IV Lasix today. Will d/w Dr. Sundar Skinner    Please do not hesitate to call with questions.     Electronically signed by Oanh Fabian CNP on 8/26/2018 at 8:03 AM     Attending Physician Statement  I have discussed the care of Caitlin Keyes, including pertinent history and exam findings with the resident/fellow. I have reviewed the key elements of all parts of the encounter with the resident/fellow. I have seen and examined the patient with the resident/fellow. I agree with the assessment and plan and status of the problem list as documented.   Addiitionally I recommend lasix 40 mg IV now  Follow vasculitis profiles  Hep lock IV fluids  Continue feedings  Heavy proteinuria based on protein to creatinine ratio and will repeat    Masha Martínez

## 2018-08-26 NOTE — PROGRESS NOTES
Critical Care Team - Daily Progress Note      Date and time: 8/26/2018 1:10 PM  Patient's name:  Regan Higgins  Medical Record Number: [id number]  Patient's account/billing number: [de-identified]  Patient's YOB: 1960  Age: 61 y.o.   Date of Admission: 8/23/2018  4:01 AM  Length of stay during current admission: 3      Primary Care Physician: Kevon Mauricio MD  ICU Attending Physician:   []Dr. Charisse Webb  []Dr. Keeley Rebollar  [x]Dr. Kirk Jacobson  []Dr. Law Factor    Code Status: Prior    Reason for ICU admission:   Metabolic encephalopathy, CHAD, DKA, hyperkalemia, intubation      SUBJECTIVE:     OVERNIGHT EVENTS:       None    AWAKE & FOLLOWING COMMANDS:  [] No   [x] Yes    CURRENT VENTILATION STATUS:     [] Ventilator  [x] BIPAP  [] Nasal Cannula [] Room Air      IF INTUBATED, ET TUBE MARKING AT LOWER LIP:       cms    SECRETIONS Amount:  [] Small [] Moderate  [] Large  [x] None  Color:     [] White [] Colored  [] Bloody    SEDATION:  RAAS Score:  [] Propofol gtt  [] Versed gtt  [] Ativan gtt   [x] No Sedation    PARALYZED:  [x] No    [] Yes    DIARRHEA:                [x] No                [] Yes  (C. Difficile status: [] positive                                                                                                                       [] negative                                                                                                                     [] pending)    VASOPRESSORS:  [x] No    [] Yes    If yes -   [] Levophed       [] Dopamine     [] Vasopressin       [] Dobutamine  [] Phenylephrine         [] Epinephrine    CENTRAL LINES:     [] No   [x] Yes   (Date of Insertion: 8/23 )           If yes -     [] Right IJ     [x] Left IJ [x] Right Femoral [] Left Femoral                   [] Right Subclavian [] Left Subclavian       DYER'S CATHETER:   [] No   [x] Yes  (Date of Insertion:   )     URINE OUTPUT:            [x] Good   [] Low              [] Anuric      OBJECTIVE:     VITAL SIGNS:  BP 113/66   Pulse 101   Temp 100.2 °F (37.9 °C) (Core)   Resp 27   Ht 5' 5\" (1.651 m)   Wt 167 lb 14.4 oz (76.2 kg)   SpO2 96%   BMI 27.94 kg/m²   Tmax over 24 hours:  Temp (24hrs), Av °F (37.8 °C), Min:99 °F (37.2 °C), Max:100.2 °F (37.9 °C)      Patient Vitals for the past 6 hrs:   BP Temp Temp src Pulse Resp SpO2   18 1200 113/66 100.2 °F (37.9 °C) CORE 101 27 96 %   18 1147 - - - 100 26 96 %   18 1100 (!) 107/58 - - 102 29 95 %   18 1000 92/61 - - 107 (!) 32 97 %   18 0900 106/62 - - 106 28 94 %   18 0807 - - - 107 28 98 %   18 0800 (!) 107/57 100.2 °F (37.9 °C) CORE 107 28 98 %         Intake/Output Summary (Last 24 hours) at 18 1310  Last data filed at 18 1300   Gross per 24 hour   Intake          3889.68 ml   Output             3690 ml   Net           199.68 ml     Wt Readings from Last 2 Encounters:   18 167 lb 14.4 oz (76.2 kg)   18 166 lb (75.3 kg)     Body mass index is 27.94 kg/m². PHYSICAL EXAMINATION:  Constitutional: Alert, follows commands  EENT: PERRLA, EOMI, sclera clear, anicteric, oropharynx clear, no lesions, neck supple with midline trachea. Left internal jugular central line dressing clean and dry  Neck: Supple, symmetrical, trachea midline, no adenopathy,  no jvd, skin normal  Respiratory: BiPAP in place, rhonchorous breath sounds, no wheezes or rales and unlabored breathing.  No intercostal tenderness  Cardiovascular: Mildly tachycardic, normal S1, S2, no murmur noted  Abdomen: soft, nontender, nondistended, no masses or organomegaly  Extremities:   no pedal edema, no clubbing or cyanosis         MEDICATIONS:    Scheduled Meds:   carvedilol  3.125 mg Oral BID    hydrocortisone sodium succinate PF  50 mg Intravenous Q8H    atorvastatin  80 mg Oral Daily    vitamin D  5,000 Units Oral Daily    clopidogrel  75 mg Oral Daily    heparin flush  300 Units Intercatheter Once    heparin flush  300 Units Intercatheter Once    vancomycin (VANCOCIN) intermittent dosing (placeholder)   Other RX Placeholder    insulin regular  0.1 Units/kg Intravenous Once    heparin (porcine)  5,000 Units Subcutaneous 3 times per day    cefepime (MAXIPIME) 1 g IVPB extended (mini-bag)  1 g Intravenous Q24H     Continuous Infusions:   dextrose      sodium chloride      dextrose 5 % and 0.45 % NaCl 150 mL/hr at 08/26/18 0823    insulin (HUMAN R) non-weight based infusion 2.14 Units/kg/hr (08/26/18 1300)     PRN Meds:     sodium chloride 250 mL PRN   sodium chloride 150 mL PRN   albuterol 2.5 mg Q6H PRN   glucose 15 g PRN   dextrose 12.5 g PRN   glucagon (rDNA) 1 mg PRN   dextrose 100 mL/hr PRN   heparin (porcine) 9,500 Units PRN   heparin (porcine) 8,000 Units PRN   dextrose 12.5 g PRN   dextrose 5 % and 0.45 % NaCl  Continuous PRN         VENT SETTINGS (Comprehensive) (if applicable):  Vent Information  Ventilator Started: Yes  Ventilator Stopped: (S) Yes  Ventilation Day(s): 1  Vent Type: Servo i  Vent Mode: NIV/PC  Vt Ordered: 460 mL  Pressure Ordered: 8  Rate Set: 14 bmp  Pressure Support: 8 cmH20  FiO2 : 40 %  Sensitivity: 5  PEEP/CPAP: 6  I Time/ I Time %: 0.9 s  Humidification Source: HME  Additional Respiratory  Assessments  Pulse: 101  Resp: 27  SpO2: 96 %  End Tidal CO2: 25 (%)  Position: Semi-Simon's  Humidification Source: HME  Oral Care Completed?: Yes  Oral Care: Mouth moisturizer, Mouth swabbed  Subglottic Suction Done?: Yes    ABGs:     Laboratory findings:    Complete Blood Count:   Recent Labs      08/24/18   1224  08/25/18   0448  08/26/18   0524   WBC  11.6*  12.3*  10.1   HGB  8.6*  10.1*  8.6*   HCT  26.6*  30.0*  25.7*   PLT  143  149  See Reflexed IPF Result        Last 3 Blood Glucose:   Recent Labs      08/25/18   1849  08/26/18   0021  08/26/18   0827   GLUCOSE  154*  193*  184*        PT/INR:    Lab Results   Component Value Date    PROTIME 11.2 08/23/2018    INR 1.1 08/23/2018     PTT:    Lab Results Component Value Date    APTT 26.3 04/17/2017       Comprehensive Metabolic Profile:   Recent Labs      08/24/18   1224   08/25/18   1849  08/26/18   0021  08/26/18   0827   NA  132*   < >  130*  130*  131*   K  4.9   < >  3.7  3.7  3.6*   CL  99   < >  95*  96*  98   CO2  12*   < >  20  18*  17*   BUN  48*   < >  52*  52*  56*   CREATININE  6.54*   < >  5.98*  5.62*  5.33*   GLUCOSE  175*   < >  154*  193*  184*   CALCIUM  7.4*   < >  8.1*  7.9*  7.7*   LABALBU  3.1*   --    --    --    --     < > = values in this interval not displayed. Magnesium:   Lab Results   Component Value Date    MG 1.8 08/26/2018     Phosphorus:   Lab Results   Component Value Date    PHOS 4.6 08/26/2018     Ionized Calcium:   Lab Results   Component Value Date    CAION 1.09 08/26/2018        Urinalysis:     Troponin: No results for input(s): TROPONINI in the last 72 hours.     Microbiology:    Cultures during this admission:     Blood cultures:                 [] None drawn      [x] Negative             []  Positive (Details:  )  Urine Culture:                   [] None drawn      [x] Negative             []  Positive (Details:  )  Sputum Culture:               [x] None drawn       [] Negative             []  Positive (Details:  )   Endotracheal aspirate:     [x] None drawn       [] Negative             []  Positive (Details:  )     Other pertinent Labs:       Radiology/Imaging:     Chest Xray (8/26/2018):    ASSESSMENT:     Patient Active Problem List    Diagnosis Date Noted    CHAD (acute kidney injury) ([facility] 08/23/2018    Hyperkalemia 08/23/2018    Lactic acidosis 08/23/2018    Acute metabolic encephalopathy 49/31/6655    SIRS (systemic inflammatory response syndrome) ([address] 11/92/4047    Metabolic encephalopathy 91/43/2748    Chest pain 08/23/2018    DKA (diabetic ketoacidoses) ([facility] 08/23/2018    Cardiogenic shock ([facility] [facility] 08/23/2018    DM (diabetes mellitus), type 2 with neurological complications ([facility] [facility] 88/11/4598    Anxiety 01/26/2016    Hyperlipidemia 12/07/2015    CAD (coronary artery disease)     Hx of CABG     ICD (implantable cardioverter-defibrillator) in place     H/O mitral valve repair     Hx of myocardial infarction     Hypertension     Cardiomyopathy ([facility]     Onychomycosis of toenail 11/19/2015    S/P cardiac cath-Patent grafts 6/5/15 06/04/2015    Acute coronary syndrome ([facility] 05/26/2015       PLAN:     WEAN PER PROTOCOL:  [] No   [] Yes  [x] N/A    DISCONTINUE ANY LABS:   [x] No   [] Yes    ICU PROPHYLAXIS:  Stress ulcer:  [] PPI Agent  [] [id number] [] Sucralfate  [x] Other:  VTE:   [] Enoxaparin  [x] Unfract. Heparin Subcut  [] EPC Cuffs    NUTRITION:  [x] NPO [] Tube Feeding (Specify: ) [] TPN  [] PO (Diet: DIET TUBE FEED CONTINUOUS/CYCLIC NPO; Diabetic; Nasogastric)    HOME MEDICATIONS RECONCILED: [x] No  [] Yes    INSULIN DRIP:   [] No   [x] Yes    CONSULTATION NEEDED:  [] No   [x] Yes    FAMILY UPDATED:    [x] No   [] Yes    TRANSFER OUT OF ICU:   [x] No   [] Yes    Neurological  - Alert, follows commands  Cardiovascular  - HR and BP stable  - Cardiology consult- increase coreg, continue lipitor/plavix  Respiratory  - Stable on bipap  - CXR (8/25)- developing pulmonary edema  GI/Endo  - Diabetic Ketoacidosis- resolved  - GI Prophylaxis- none  - Bowel Regimen- none  - Diet: start tube feeds  - betahydroxybutyrate 0.06 despite anion gap 16, bicarbonate 18- likely related to CHAD  - Discontinue insulin drip- start long acting insulin, discontinue dextrose containing solution  Renal/Fluid/Electrolyte  - Acute kidney injury requiring hemodialysis  - Urine output- 2.3 mL/kg/hr  - IV fluids- D5 0.45% NS at 150 mL/hr switch to normal saline  - BUN  52  Cr 5.62 stable from prior  - Last hemodialysis 8/23  Heme/ID  - Positive SIRS  - T Max 37.9 C  - Urine and blood cultures- no growth  - Continue Cefepime/vancomycin  Other  - DVT Prophylaxis- heparin    ADDITIONAL PLAN:  1. Switch to long acting insulin  2. Discontinue dextrose containing IV fluids  3. Start tube feeds  4. Continue antibiotics  5. CHAD- stable from prior  6. Continue to monitor respiratory status  7. Discontinue indwelling villa  8.  Check ALEJANDRO, RF, anti-GBM      Tracie Altman DO  Critical Care Resident, PGY-2  Department of Internal Medicine/ Critical care  [facility])             8/26/2018, 1:10 PM

## 2018-08-26 NOTE — PROGRESS NOTES
[id number])       CBC:   Recent Labs      08/24/18   1224  08/25/18   0448  08/26/18   0524   WBC  11.6*  12.3*  10.1   HGB  8.6*  10.1*  8.6*   PLT  143  149  See Reflexed IPF Result     BMP:    Recent Labs      08/25/18   1357  08/25/18   1849  08/26/18   0021   NA  130*  130*  130*   K  3.8  3.8  3.7  3.7   CL  95*  95*  96*   CO2  17*  20  18*   BUN  49*  52*  52*   CREATININE  5.69*  5.98*  5.62*   GLUCOSE  192*  154*  193*     Hepatic:   No results for input(s): AST, ALT, ALB, BILITOT, ALKPHOS in the last 72 hours. Troponin: No results for input(s): TROPONINI in the last 72 hours. BNP: No results for input(s): BNP in the last 72 hours. Lipids: No results for input(s): CHOL, HDL in the last 72 hours. Invalid input(s): LDLCALCU  INR:   No results for input(s): INR in the last 72 hours. Objective:   Vitals: BP (!) 111/54   Pulse 110   Temp 100.2 °F (37.9 °C) (Axillary)   Resp 28   Ht 5' 5\" (1.651 m)   Wt 166 lb 0.1 oz (75.3 kg)   SpO2 97%   BMI 27.62 kg/m²     Respiratory:  Resp Auscultation: coarse breath sounds bilaterally. Cardiovascular:  · The apical impulse is not displaced  · Heart tones are crisp and normal. regular S1 and S2. Murmurs: None   · Jugular venous pulsation Normal  · Thre is no carotid bruit   · Peripheral pulses are symmetrical and full   Abdomen:  · No masses or tenderness  · Bowel sounds present  Extremities:  ·  No Cyanosis or Clubbing  ·  Lower extremity edema: No edema   ·  Skin: Warm and dry  Neurological:  · Not done         Previous cardiac testing:      ECHO 6/21/16: EF 40%, good result from MV repair with mild MR.      ICD 6/9/15: [employer] device placed for VF arrest s/p CABG.    CATH 6/4/15: Patent grafts. LIMA-LAD, SVG-PDA, SVG-D. EF 30%.      CABG/MV REPAIR 5/28/15: LIMA-LAD, SVG-PDA, SVG-D. MV repair. Done by Dr. Darien Hoover.     ECHO 08/24/2018  Summary  Left ventricle is in the upper limits of normal in size with moderately  reduced systolic

## 2018-08-27 ENCOUNTER — APPOINTMENT (OUTPATIENT)
Dept: GENERAL RADIOLOGY | Age: 58
DRG: 469 | End: 2018-08-27
Payer: MEDICARE

## 2018-08-27 PROBLEM — E87.20 LACTIC ACIDOSIS: Status: RESOLVED | Noted: 2018-08-23 | Resolved: 2018-08-27

## 2018-08-27 PROBLEM — G93.41 METABOLIC ENCEPHALOPATHY: Status: RESOLVED | Noted: 2018-08-23 | Resolved: 2018-08-27

## 2018-08-27 PROBLEM — E87.5 HYPERKALEMIA: Status: RESOLVED | Noted: 2018-08-23 | Resolved: 2018-08-27

## 2018-08-27 PROBLEM — E11.10 DKA (DIABETIC KETOACIDOSES): Status: RESOLVED | Noted: 2018-08-23 | Resolved: 2018-08-27

## 2018-08-27 PROBLEM — G93.41 ACUTE METABOLIC ENCEPHALOPATHY: Status: RESOLVED | Noted: 2018-08-23 | Resolved: 2018-08-27

## 2018-08-27 PROBLEM — R07.9 CHEST PAIN: Status: RESOLVED | Noted: 2018-08-23 | Resolved: 2018-08-27

## 2018-08-27 PROBLEM — R57.0 CARDIOGENIC SHOCK (HCC): Status: RESOLVED | Noted: 2018-08-23 | Resolved: 2018-08-27

## 2018-08-27 LAB
ANION GAP SERPL CALCULATED.3IONS-SCNC: 17 MMOL/L (ref 9–17)
ANION GAP SERPL CALCULATED.3IONS-SCNC: 20 MMOL/L (ref 9–17)
ANTI-NUCLEAR ANTIBODY (ANA): ABNORMAL
BUN BLDV-MCNC: 67 MG/DL (ref 6–20)
BUN BLDV-MCNC: 74 MG/DL (ref 6–20)
BUN/CREAT BLD: ABNORMAL (ref 9–20)
BUN/CREAT BLD: ABNORMAL (ref 9–20)
CALCIUM IONIZED: 1.01 MMOL/L (ref 1.13–1.33)
CALCIUM IONIZED: 1.16 MMOL/L (ref 1.13–1.33)
CALCIUM SERPL-MCNC: 8.3 MG/DL (ref 8.6–10.4)
CALCIUM SERPL-MCNC: 8.4 MG/DL (ref 8.6–10.4)
CHLORIDE BLD-SCNC: 100 MMOL/L (ref 98–107)
CHLORIDE BLD-SCNC: 100 MMOL/L (ref 98–107)
CO2: 16 MMOL/L (ref 20–31)
CO2: 18 MMOL/L (ref 20–31)
COMPLEMENT C3: 111 MG/DL (ref 90–180)
COMPLEMENT C4: 35 MG/DL (ref 10–40)
CREAT SERPL-MCNC: 5.92 MG/DL (ref 0.5–0.9)
CREAT SERPL-MCNC: 6.01 MG/DL (ref 0.5–0.9)
GFR AFRICAN AMERICAN: 9 ML/MIN
GFR AFRICAN AMERICAN: 9 ML/MIN
GFR NON-AFRICAN AMERICAN: 7 ML/MIN
GFR NON-AFRICAN AMERICAN: 7 ML/MIN
GFR SERPL CREATININE-BSD FRML MDRD: ABNORMAL ML/MIN/{1.73_M2}
GLUCOSE BLD-MCNC: 161 MG/DL (ref 65–105)
GLUCOSE BLD-MCNC: 188 MG/DL (ref 65–105)
GLUCOSE BLD-MCNC: 203 MG/DL (ref 70–99)
GLUCOSE BLD-MCNC: 205 MG/DL (ref 65–105)
GLUCOSE BLD-MCNC: 231 MG/DL (ref 65–105)
GLUCOSE BLD-MCNC: 247 MG/DL (ref 70–99)
MAGNESIUM: 2 MG/DL (ref 1.6–2.6)
PHOSPHORUS: 5.3 MG/DL (ref 2.6–4.5)
POTASSIUM SERPL-SCNC: 4.1 MMOL/L (ref 3.7–5.3)
POTASSIUM SERPL-SCNC: 4.2 MMOL/L (ref 3.7–5.3)
SODIUM BLD-SCNC: 135 MMOL/L (ref 135–144)
SODIUM BLD-SCNC: 136 MMOL/L (ref 135–144)
VANCOMYCIN RANDOM DATE LAST DOSE: NORMAL
VANCOMYCIN RANDOM DOSE AMOUNT: NORMAL
VANCOMYCIN RANDOM TIME LAST DOSE: NORMAL
VANCOMYCIN RANDOM: 28.7 UG/ML

## 2018-08-27 PROCEDURE — 94660 CPAP INITIATION&MGMT: CPT

## 2018-08-27 PROCEDURE — 82330 ASSAY OF CALCIUM: CPT

## 2018-08-27 PROCEDURE — 82947 ASSAY GLUCOSE BLOOD QUANT: CPT

## 2018-08-27 PROCEDURE — 2580000003 HC RX 258: Performed by: STUDENT IN AN ORGANIZED HEALTH CARE EDUCATION/TRAINING PROGRAM

## 2018-08-27 PROCEDURE — 6370000000 HC RX 637 (ALT 250 FOR IP): Performed by: HOSPITALIST

## 2018-08-27 PROCEDURE — 80202 ASSAY OF VANCOMYCIN: CPT

## 2018-08-27 PROCEDURE — 6360000002 HC RX W HCPCS: Performed by: HOSPITALIST

## 2018-08-27 PROCEDURE — 2000000000 HC ICU R&B

## 2018-08-27 PROCEDURE — 97530 THERAPEUTIC ACTIVITIES: CPT

## 2018-08-27 PROCEDURE — 99291 CRITICAL CARE FIRST HOUR: CPT | Performed by: INTERNAL MEDICINE

## 2018-08-27 PROCEDURE — 80048 BASIC METABOLIC PNL TOTAL CA: CPT

## 2018-08-27 PROCEDURE — 83735 ASSAY OF MAGNESIUM: CPT

## 2018-08-27 PROCEDURE — 86334 IMMUNOFIX E-PHORESIS SERUM: CPT

## 2018-08-27 PROCEDURE — 6360000002 HC RX W HCPCS: Performed by: STUDENT IN AN ORGANIZED HEALTH CARE EDUCATION/TRAINING PROGRAM

## 2018-08-27 PROCEDURE — 94762 N-INVAS EAR/PLS OXIMTRY CONT: CPT

## 2018-08-27 PROCEDURE — 6360000002 HC RX W HCPCS: Performed by: INTERNAL MEDICINE

## 2018-08-27 PROCEDURE — 84100 ASSAY OF PHOSPHORUS: CPT

## 2018-08-27 PROCEDURE — 6370000000 HC RX 637 (ALT 250 FOR IP): Performed by: EMERGENCY MEDICINE

## 2018-08-27 PROCEDURE — 86160 COMPLEMENT ANTIGEN: CPT

## 2018-08-27 PROCEDURE — 71045 X-RAY EXAM CHEST 1 VIEW: CPT

## 2018-08-27 PROCEDURE — 97110 THERAPEUTIC EXERCISES: CPT

## 2018-08-27 PROCEDURE — 90935 HEMODIALYSIS ONE EVALUATION: CPT

## 2018-08-27 PROCEDURE — 6370000000 HC RX 637 (ALT 250 FOR IP): Performed by: STUDENT IN AN ORGANIZED HEALTH CARE EDUCATION/TRAINING PROGRAM

## 2018-08-27 PROCEDURE — 36415 COLL VENOUS BLD VENIPUNCTURE: CPT

## 2018-08-27 PROCEDURE — 5A1D70Z PERFORMANCE OF URINARY FILTRATION, INTERMITTENT, LESS THAN 6 HOURS PER DAY: ICD-10-PCS | Performed by: INTERNAL MEDICINE

## 2018-08-27 RX ORDER — INSULIN GLARGINE 100 [IU]/ML
20 INJECTION, SOLUTION SUBCUTANEOUS DAILY
Status: DISCONTINUED | OUTPATIENT
Start: 2018-08-28 | End: 2018-08-29

## 2018-08-27 RX ORDER — FUROSEMIDE 10 MG/ML
40 INJECTION INTRAMUSCULAR; INTRAVENOUS ONCE
Status: COMPLETED | OUTPATIENT
Start: 2018-08-27 | End: 2018-08-27

## 2018-08-27 RX ORDER — INSULIN GLARGINE 100 [IU]/ML
20 INJECTION, SOLUTION SUBCUTANEOUS ONCE
Status: COMPLETED | OUTPATIENT
Start: 2018-08-27 | End: 2018-08-27

## 2018-08-27 RX ORDER — DIPHENHYDRAMINE HCL 25 MG
25 TABLET ORAL ONCE
Status: COMPLETED | OUTPATIENT
Start: 2018-08-28 | End: 2018-08-28

## 2018-08-27 RX ADMIN — HEPARIN SODIUM 9500 UNITS: 5000 INJECTION, SOLUTION INTRAVENOUS; SUBCUTANEOUS at 19:15

## 2018-08-27 RX ADMIN — CARVEDILOL 3.12 MG: 3.12 TABLET, FILM COATED ORAL at 21:14

## 2018-08-27 RX ADMIN — CEFEPIME HYDROCHLORIDE 1 G: 1 INJECTION, POWDER, FOR SOLUTION INTRAMUSCULAR; INTRAVENOUS at 10:07

## 2018-08-27 RX ADMIN — HYDROCORTISONE SODIUM SUCCINATE 50 MG: 100 INJECTION, POWDER, FOR SOLUTION INTRAMUSCULAR; INTRAVENOUS at 11:00

## 2018-08-27 RX ADMIN — SODIUM CHLORIDE: 9 INJECTION, SOLUTION INTRAVENOUS at 02:53

## 2018-08-27 RX ADMIN — HEPARIN SODIUM 5000 UNITS: 5000 INJECTION, SOLUTION INTRAVENOUS; SUBCUTANEOUS at 21:08

## 2018-08-27 RX ADMIN — HEPARIN SODIUM 5000 UNITS: 5000 INJECTION, SOLUTION INTRAVENOUS; SUBCUTANEOUS at 06:08

## 2018-08-27 RX ADMIN — HEPARIN SODIUM 5000 UNITS: 5000 INJECTION, SOLUTION INTRAVENOUS; SUBCUTANEOUS at 14:22

## 2018-08-27 RX ADMIN — HEPARIN SODIUM 8000 UNITS: 5000 INJECTION, SOLUTION INTRAVENOUS; SUBCUTANEOUS at 19:15

## 2018-08-27 RX ADMIN — ATORVASTATIN CALCIUM 80 MG: 80 TABLET, FILM COATED ORAL at 21:14

## 2018-08-27 RX ADMIN — VITAMIN D, TAB 1000IU (100/BT) 5000 UNITS: 25 TAB at 08:02

## 2018-08-27 RX ADMIN — INSULIN LISPRO 2 UNITS: 100 INJECTION, SOLUTION INTRAVENOUS; SUBCUTANEOUS at 08:12

## 2018-08-27 RX ADMIN — INSULIN LISPRO 2 UNITS: 100 INJECTION, SOLUTION INTRAVENOUS; SUBCUTANEOUS at 16:27

## 2018-08-27 RX ADMIN — HYDROCORTISONE SODIUM SUCCINATE 50 MG: 100 INJECTION, POWDER, FOR SOLUTION INTRAMUSCULAR; INTRAVENOUS at 02:49

## 2018-08-27 RX ADMIN — CARVEDILOL 3.12 MG: 3.12 TABLET, FILM COATED ORAL at 08:02

## 2018-08-27 RX ADMIN — INSULIN LISPRO 1 UNITS: 100 INJECTION, SOLUTION INTRAVENOUS; SUBCUTANEOUS at 12:10

## 2018-08-27 RX ADMIN — FUROSEMIDE 40 MG: 10 INJECTION, SOLUTION INTRAMUSCULAR; INTRAVENOUS at 13:19

## 2018-08-27 RX ADMIN — INSULIN GLARGINE 20 UNITS: 100 INJECTION, SOLUTION SUBCUTANEOUS at 14:22

## 2018-08-27 RX ADMIN — CLOPIDOGREL 75 MG: 75 TABLET, FILM COATED ORAL at 08:02

## 2018-08-27 RX ADMIN — INSULIN LISPRO 1 UNITS: 100 INJECTION, SOLUTION INTRAVENOUS; SUBCUTANEOUS at 21:07

## 2018-08-27 ASSESSMENT — PAIN SCALES - GENERAL
PAINLEVEL_OUTOF10: 0

## 2018-08-27 ASSESSMENT — PULMONARY FUNCTION TESTS
PIF_VALUE: 18
PIF_VALUE: 18
PIF_VALUE: 17

## 2018-08-27 NOTE — PROGRESS NOTES
Reflexed IPF Result      Last 3 Blood Glucose:   Recent Labs      08/26/18   1723  08/26/18   2140  08/27/18   0828   GLUCOSE  194*  179*  203*      PT/INR:    Lab Results   Component Value Date    PROTIME 11.2 08/23/2018    INR 1.1 08/23/2018     PTT:    Lab Results   Component Value Date    APTT 26.3 04/17/2017       Comprehensive Metabolic Profile:   Recent Labs      08/26/18   1723  08/26/18   2140  08/27/18   0828   NA  134*  133*  135   K  3.7  3.6*  4.1   CL  97*  96*  100   CO2  19*  20  18*   BUN  59*  60*  67*   CREATININE  5.81*  5.89*  5.92*   GLUCOSE  194*  179*  203*   CALCIUM  8.2*  8.0*  8.3*      Magnesium:   Lab Results   Component Value Date    MG 2.0 08/27/2018     Phosphorus:   Lab Results   Component Value Date    PHOS 5.3 08/27/2018     Ionized Calcium:   Lab Results   Component Value Date    CAION 1.16 08/27/2018      Urinalysis:     Troponin: No results for input(s): TROPONINI in the last 72 hours.     Microbiology:    Cultures during this admission:     Blood cultures:                 [] None drawn      [x] Negative             []  Positive (Details:  )  Urine Culture:                   [] None drawn      [x] Negative             []  Positive (Details:  )  Sputum Culture:               [x] None drawn       [] Negative             []  Positive (Details:  )   Endotracheal aspirate:     [x] None drawn       [] Negative             []  Positive (Details:  )     Other pertinent Labs:       Radiology/Imaging:     Chest Xray (8/27/2018): slight decrease in pulmonary edema when compared to CXR from 8/26    ASSESSMENT:     Principal Problem (Resolved):    Chest pain  Active Problems:    S/P cardiac cath-Patent grafts 6/5/15    CAD (coronary artery disease)    Hx of CABG    ICD (implantable cardioverter-defibrillator) in place    H/O mitral valve repair    Hypertension    DM (diabetes mellitus), type 2 with neurological complications ([facility] [address]    CHAD (acute kidney injury) ([facility] [address]    SIRS (systemic inflammatory response syndrome) ([facility])  Resolved Problems:    Hyperkalemia    Lactic acidosis    Acute metabolic encephalopathy    Metabolic encephalopathy    DKA (diabetic ketoacidoses) ([facility])    Cardiogenic shock ([facility])      PLAN:     WEAN PER PROTOCOL:  [] No   [] Yes  [x] N/A    DISCONTINUE ANY LABS:   [x] No   [] Yes    ICU PROPHYLAXIS:  Stress ulcer:  [] PPI Agent  [] [id number] [] Sucralfate  [x] Other:  VTE:   [] Enoxaparin  [x] Unfract. Heparin Subcut  [] EPC Cuffs    NUTRITION:  [] NPO [x] Tube Feeding (Specify: at goal ) [] TPN  [] PO (Diet: DIET TUBE FEED CONTINUOUS/CYCLIC NPO; Diabetic; Nasogastric; 10; 60; 24)    HOME MEDICATIONS RECONCILED: [x] No  [] Yes    INSULIN DRIP:   [x] No   [] Yes    CONSULTATION NEEDED:  [] No   [x] Yes    FAMILY UPDATED:    [x] No   [] Yes    TRANSFER OUT OF ICU:   [x] No   [] Yes    ADDITIONAL PLAN:    Neurological  - Alert, follows commands  Cardiovascular  - HR and BP stable  - Cardiology consult- continue coreg, lipitor/plavix  Respiratory  - Stable on high flow nasal cannula, BiPAP when napping/overnight  - CXR (8/27)- pulmonary edema with some improvement  GI/Endo  - Diabetic Ketoacidosis- resolved  - GI Prophylaxis- none  - Bowel Regimen- none  - Diet: tube feeds at goal  - Continue long acting insulin and and low dose ISS  Renal/Fluid/Electrolyte  - Acute renal failure requiring hemodialysis on 8/23-resolved  -serology for vasculitis (possible etiology for acute renal failure)-RF elevated at 24, rest of serology pending  - Urine output- 1.4 mL/kg/hr   - IV fluids- heplock  - BUN  67  Cr 5.92 -both trending upwards  - Last hemodialysis 8/23  Heme/ID  - Positive SIRS  - T Max 37.9 C  - Urine and blood cultures- no growth  - Continue Cefepime/vancomycin  Other  - DVT Prophylaxis- heparin  - Dispo- remain in ICU     ADDITIONAL PLAN:  1. Continue long acting insulin  2. Continue low dose ISS  3. Continue tube feeds  4. Continue antibiotics  5.  CHAD- creatinine trending upwards; given dose of Lasix 40mg today  6. Continue to monitor respiratory status  7. Follow up ALEJANDRO, RF, anti-GBM    Jamta PHILIP Navarro.              Critical care resident, PGY-2  Department of Internal Medicine/ Critical care  [facility])             8/27/2018, 1:26 PM

## 2018-08-27 NOTE — CARE COORDINATION
Attempted to meet with pt to discuss possible need for OP dialysis. Pt on Bipap and lab present. Will f/u.

## 2018-08-27 NOTE — PROGRESS NOTES
recommendations will appear in follow up notes. Pradeep Tomlin Pharm. D.   PGY2 Critical Care Pharmacy Resident  8/27/2018 3:35 PM

## 2018-08-27 NOTE — PLAN OF CARE
Problem: Nutrition  Goal: Optimal nutrition therapy  Outcome: Ongoing  Nutrition Problem: Inadequate oral intake  Intervention: Food and/or Nutrient Delivery: Continue Tube Feeding  Nutritional Goals: meet % of estimated nutrition needs

## 2018-08-27 NOTE — PROGRESS NOTES
Nephrology Progress Note      SUBJECTIVE      Patient was seen and examined. Had dialysis on 18 secondary to severe hyperkalemia. Patient on BIPAP, still has +ve sob. Patient denied any complaints, chest pain, SOB, nausea, vomiting, abdominal pain, diarrhea  CXR with pulmonary congestion  -120  Creatinine 6.01, ALEJANDRO equivocal, CO2 16, protein creatinine ratio 0.99, K 4.2  Urine output 2.4 l last 24 hrs    OBJECTIVE      CURRENT TEMPERATURE:  Temp: 98.2 °F (36.8 °C)  MAXIMUM TEMPERATURE OVER 24HRS:  Temp (24hrs), Av.9 °F (37.2 °C), Min:98.2 °F (36.8 °C), Max:99.7 °F (37.6 °C)    CURRENT RESPIRATORY RATE:  Resp: 21  CURRENT PULSE:  Pulse: 91  CURRENT BLOOD PRESSURE:  BP: 105/61  24HR BLOOD PRESSURE RANGE:  Systolic (90SNL), KWT:679 , Min:102 , CIE:154   ; Diastolic (32IPI), UWV:91, Min:56, Max:91    24HR INTAKE/OUTPUT:      Intake/Output Summary (Last 24 hours) at 18 1333  Last data filed at 18 1200   Gross per 24 hour   Intake          1073.15 ml   Output             2140 ml   Net         -1066.85 ml       PHYSICAL EXAM      GENERAL APPEARANCE:Awake, alert, mild respiratory distress  SKIN: warm and dry, no rash or erythema  EYES: conjunctivae normal and sclera anicteric  NECK: carotids without bruits bilaterally JVD: None Left IJ triple lumen  PULMONARY: coarse rhonchi present- throughout  CADRDIOVASCULAR: normal S1 and S2, no gallops and intact distal pulses.  Tachycardic  ABDOMEN: soft nontender, bowel sounds present, no organomegaly,  no ascites  EXTREMITIES: no cyanosis, clubbing or edema  Right Groin QM    CURRENT MEDICATIONS        insulin glargine (LANTUS) injection vial 20 Units Once   insulin lispro (HUMALOG) injection vial 0-6 Units TID WC   insulin lispro (HUMALOG) injection vial 0-3 Units Nightly   0.9 % sodium chloride infusion Continuous   carvedilol (COREG) tablet 3.125 mg BID   0.9 % sodium chloride bolus PRN   0.9 % sodium chloride bolus PRN   albuterol (PROVENTIL) nebulizer solution 2.5 mg Q6H PRN   atorvastatin (LIPITOR) tablet 80 mg Daily   vitamin D (CHOLECALCIFEROL) tablet 5,000 Units Daily   clopidogrel (PLAVIX) tablet 75 mg Daily   heparin flush 100 UNIT/ML injection 300 Units Once   heparin flush 100 UNIT/ML injection 300 Units Once   glucose (GLUTOSE) 40 % oral gel 15 g PRN   dextrose 50 % solution 12.5 g PRN   glucagon (rDNA) injection 1 mg PRN   dextrose 5 % solution PRN   vancomycin (VANCOCIN) intermittent dosing (placeholder) RX Placeholder   heparin (porcine) injection 9,500 Units PRN   heparin (porcine) injection 8,000 Units PRN   dextrose 50 % solution 12.5 g PRN   insulin regular (HUMULIN R;NOVOLIN R) injection 8 Units Once   heparin (porcine) injection 5,000 Units 3 times per day   cefepime (MAXIPIME) 1 g IVPB extended (mini-bag) Q24H         LABS    CBC:   Recent Labs      08/25/18   0448  08/26/18   0524   WBC  12.3*  10.1   RBC  3.36*  2.87*   HGB  10.1*  8.6*   HCT  30.0*  25.7*   MCV  89.3  89.5   MCH  30.1  30.0   MCHC  33.7  33.5   RDW  14.1  13.9   PLT  149  See Reflexed IPF Result   MPV  10.2  NOT REPORTED      BMP:   Recent Labs      08/26/18   1723  08/26/18   2140  08/27/18   0828   NA  134*  133*  135   K  3.7  3.6*  4.1   CL  97*  96*  100   CO2  19*  20  18*   BUN  59*  60*  67*   CREATININE  5.81*  5.89*  5.92*   GLUCOSE  194*  179*  203*   CALCIUM  8.2*  8.0*  8.3*      Phosphorus:    Recent Labs      08/27/18   0448   PHOS  5.3*     Magnesium:   Recent Labs      08/27/18   0448   MG  2.0     Albumin:   No results for input(s): LABALBU in the last 72 hours. ALEJANDRO:   Lab Results   Component Value Date    ALEJANDRO  08/26/2018     This test is equivocal, which represents a borderline ALEJANDRO result.   Suggest     SPEP:   Lab Results   Component Value Date    PROT 6.6 08/23/2018    ALBCAL 4.1 08/23/2018    ALBPCT 62 08/23/2018    LABALPH 0.2 08/23/2018    LABALPH 0.7 08/23/2018    A1PCT 3 08/23/2018    A2PCT 10 08/23/2018    LABBETA 0.9 08/23/2018 with interstitial opacity on more   confluent airspace disease.       A nasogastric tube extends into the stomach.  Left-sided AICD again noted. Costophrenic angles appear sharp.  No pneumothorax.           Impression   New and increased extensive bilateral pulmonary disease consistent with   developing pulmonary edema     CXR 8/24/18  1. Support devices as above.  The enteric tube is not well visualized past   the mid mediastinum, which may be related to technique. 2. Enlarged cardiac silhouette with prominence of the pulmonary vasculature. 3. Development of patchy opacities within the lungs bilaterally, which may   represent pulmonary edema     ECHO 8/24/18  Summary  Left ventricle is in the upper limits of normal in size with moderately  reduced systolic function. Estimated ejection fraction is 40-45%. Left atrium is mildly dilated. Normal right ventricular size and function. Pacemaker / ICD lead seen in right ventricle. Aortic sclerosis and mild focal calcification without stenosis. Mitral avril repair is noted. Mean gradient across the mitral valve is 6 mmHg  Mild mitral regurgitation. Mild tricuspid regurgitation. Estimated right ventricular systolic pressure is 34 mmHg. Assessment:  1. Acute Kidney Injury: Etiology for acute renal failure is not clear at this point. Her serology for vasculitis is still pending. A day before her hospitalization she was feeling absolutely fine. The only thing she can remember this hospitalization is the weakness of her legs. 2. Severe Hyperkalemia secondary to #1. Resolved  3. Chronic Kidney Disease with baseline 1-1.2 secondary to diabetic nephrosclerosis  4. Hypoxia and pulmonary congestion  5. DM  6. CMP and CHF:  EF 40-45% s/p ICD  7. CAD/CABG:  Cath 6/2018 grafts patent   8. Pulmonary congestion versus infiltrate      Plan:  1. Will do hemodialysis today. Orders confirmed with HD nurse. 2. Monitor intake and output, daily weights  3.  Follow up

## 2018-08-27 NOTE — PROGRESS NOTES
Nutrition Assessment    Type and Reason for Visit: Reassess    Nutrition Recommendations:Continue current Tube Feeding. Monitor TF tolerance/intake and labs- modify formula as needed. Malnutrition Assessment:  · Malnutrition Status: Insufficient data    Nutrition Diagnosis:   · Problem: Inadequate oral intake  · Etiology: related to resp distress, need for bipap/high flow NC   Signs and symptoms:  as evidenced by NPO status due to medical condition, Nutrition support - EN    Nutrition Assessment:  · Subjective Assessment: Pt remains NPO. TF started yesterday- tolerating well per RN. Noted BM x 2 today. · Current Nutrition Therapies:  · Oral Diet Orders: NPO   · Tube Feeding (TF) Orders:   · Formula: Diabetic  · Rate (ml/hr):60 ml/hr    · Volume (ml/day): 1440 ml/day   · Duration: Continuous 24hrs  · TF Residuals: Less than or equal to 250ml  · Current TF & Flush Orders Provides: 1728 kcal and 86 g pro per day  · Goal TF & Flush Orders Provides: 1728 kcal and 86 g pro per day  · Anthropometric Measures:  · Ht: 5' 5\" (165.1 cm)   · Current Body Wt: 165 lb 12.6 oz (75.2 kg)  · Admission Body Wt: 166 lb 7.2 oz (75.5 kg)  · Ideal Body Wt: 125 lb (56.7 kg), % Ideal Body 133%  · BMI Classification: BMI 25.0 - 29.9 Overweight  · Comparative Standards (Estimated Nutrition Needs):  · Estimated Daily Total Kcal: 5107-0318 kcal  · Estimated Daily Protein (g):  gm protein    Estimated Intake vs Estimated Needs: Intake Meets Needs    Nutrition Risk Level: High    Nutrition Interventions:   Continue current Tube Feeding. Monitor TF tolerance/intake and labs- modify formula as needed.   Continued Inpatient Monitoring, Education Not Indicated    Nutrition Evaluation:   · Evaluation: Goal achieved   · Goals: meet % of estimated nutrition needs   · Monitoring: TF Intake, TF Tolerance, Weight, Comparative Standards, Pertinent Labs, NPO Status, Diet Progression    See Adult Nutrition Doc Flowsheet for more

## 2018-08-27 NOTE — PLAN OF CARE
Problem: MECHANICAL VENTILATION  Goal: Patient will maintain patent airway  Outcome: Completed Date Met: 08/27/18    Goal: Oral health is maintained or improved  Outcome: Completed Date Met: 08/27/18    Goal: ET tube will be managed safely  Outcome: Completed Date Met: 08/27/18    Goal: Ability to express needs and understand communication  Outcome: Completed Date Met: 08/27/18    Goal: Mobility/activity is maintained at optimum level for patient  Outcome: Completed Date Met: 08/27/18

## 2018-08-27 NOTE — PLAN OF CARE
Problem: Risk for Impaired Skin Integrity  Goal: Tissue integrity - skin and mucous membranes  Structural intactness and normal physiological function of skin and  mucous membranes.    Outcome: Ongoing      Problem: Falls - Risk of:  Goal: Will remain free from falls  Will remain free from falls   Outcome: Ongoing    Goal: Absence of physical injury  Absence of physical injury   Outcome: Ongoing

## 2018-08-27 NOTE — PROGRESS NOTES
increased RR with mobility)  Ambulation 1  Surface: level tile       Supine Exercises: Ankle Pumps, Gluteal sets, Hamstring Sets, Heel Slides, Hip ABD/ADD, Hip IR/ER, Quad Sets, SAQ, SLR. Reps: 10x AROM  Upper extremity exercises: Bicep curl, shoulder flexion/extension, punches, tricep curl, shoulder abduction/adduction. Reps: 10x AROM            Assessment   Body structures, Functions, Activity limitations: Decreased functional mobility ; Decreased endurance;Decreased balance;Decreased strength  Assessment: Pt participated in supine ther ex and bed mobility this date on high flow. SPO2 maintained 95% or greater throughout, RR increased to 35+ with exercises and mobility. Prognosis: Good  Patient Education: importance of mobility, plan for tx  REQUIRES PT FOLLOW UP: Yes  Activity Tolerance  Activity Tolerance: Patient limited by endurance          Goals  Short term goals  Time Frame for Short term goals: 14 visit  Short term goal 1: Pt to ambulate 50ft with least restrictive AD and no LOB. Short term goal 2: Pt to demonstrate good standing balance. Short term goal 3: Pt to perform bed mob and transfers independently. Short term goal 4: Pt to tolerate 20-30 mins ther ex/act for improved strength and endurance for ADL's. Patient Goals   Patient goals : none stated    Plan    Plan  Times per week: 5-6 x week  Times per day: Daily  Current Treatment Recommendations: Strengthening, Balance Training, Functional Mobility Training, Transfer Training, Gait Training, Endurance Training, Safety Education & Training, Home Exercise Program, Neuromuscular Re-education  Safety Devices  Type of devices:  All fall risk precautions in place  Restraints  Initially in place: No     Therapy Time   Individual Concurrent Group Co-treatment   Time In 1010         Time Out 1035         Minutes [address] [place], [place]

## 2018-08-28 LAB
ANCA MYELOPEROXIDASE: 11 AU/ML
ANCA MYELOPEROXIDASE: 7 AU/ML
ANCA PROTEINASE 3: 15 AU/ML
ANCA PROTEINASE 3: 20 AU/ML
ANION GAP SERPL CALCULATED.3IONS-SCNC: 16 MMOL/L (ref 9–17)
ANTICARDIOLIPIN IGA ANTIBODY: 2.1 APU
ANTICARDIOLIPIN IGG ANTIBODY: 2 GPU
BUN BLDV-MCNC: 39 MG/DL (ref 6–20)
BUN BLDV-MCNC: 48 MG/DL (ref 6–20)
BUN BLDV-MCNC: 60 MG/DL (ref 6–20)
BUN/CREAT BLD: ABNORMAL (ref 9–20)
CALCIUM IONIZED: 1.13 MMOL/L (ref 1.13–1.33)
CALCIUM SERPL-MCNC: 8.3 MG/DL (ref 8.6–10.4)
CALCIUM SERPL-MCNC: 8.5 MG/DL (ref 8.6–10.4)
CALCIUM SERPL-MCNC: 8.8 MG/DL (ref 8.6–10.4)
CARDIOLIPIN AB IGM: 2.6 MPU
CHLORIDE BLD-SCNC: 92 MMOL/L (ref 98–107)
CHLORIDE BLD-SCNC: 95 MMOL/L (ref 98–107)
CHLORIDE BLD-SCNC: 95 MMOL/L (ref 98–107)
CO2: 21 MMOL/L (ref 20–31)
CO2: 22 MMOL/L (ref 20–31)
CO2: 24 MMOL/L (ref 20–31)
CREAT SERPL-MCNC: 3.53 MG/DL (ref 0.5–0.9)
CREAT SERPL-MCNC: 3.91 MG/DL (ref 0.5–0.9)
CREAT SERPL-MCNC: 4.59 MG/DL (ref 0.5–0.9)
GBM ANTIBODY IGG: 12 AU/ML
GFR AFRICAN AMERICAN: 12 ML/MIN
GFR AFRICAN AMERICAN: 14 ML/MIN
GFR AFRICAN AMERICAN: 16 ML/MIN
GFR NON-AFRICAN AMERICAN: 10 ML/MIN
GFR NON-AFRICAN AMERICAN: 12 ML/MIN
GFR NON-AFRICAN AMERICAN: 13 ML/MIN
GFR SERPL CREATININE-BSD FRML MDRD: ABNORMAL ML/MIN/{1.73_M2}
GLUCOSE BLD-MCNC: 131 MG/DL (ref 65–105)
GLUCOSE BLD-MCNC: 139 MG/DL (ref 65–105)
GLUCOSE BLD-MCNC: 139 MG/DL (ref 70–99)
GLUCOSE BLD-MCNC: 153 MG/DL (ref 70–99)
GLUCOSE BLD-MCNC: 158 MG/DL (ref 65–105)
GLUCOSE BLD-MCNC: 166 MG/DL (ref 70–99)
GLUCOSE BLD-MCNC: 182 MG/DL (ref 65–105)
MAGNESIUM: 1.8 MG/DL (ref 1.6–2.6)
PATHOLOGIST: NORMAL
PHOSPHORUS: 3.7 MG/DL (ref 2.6–4.5)
POTASSIUM SERPL-SCNC: 3.8 MMOL/L (ref 3.7–5.3)
POTASSIUM SERPL-SCNC: 3.8 MMOL/L (ref 3.7–5.3)
POTASSIUM SERPL-SCNC: 4.5 MMOL/L (ref 3.7–5.3)
SERUM IFX INTERP: NORMAL
SODIUM BLD-SCNC: 132 MMOL/L (ref 135–144)
SODIUM BLD-SCNC: 132 MMOL/L (ref 135–144)
SODIUM BLD-SCNC: 133 MMOL/L (ref 135–144)

## 2018-08-28 PROCEDURE — 6370000000 HC RX 637 (ALT 250 FOR IP): Performed by: STUDENT IN AN ORGANIZED HEALTH CARE EDUCATION/TRAINING PROGRAM

## 2018-08-28 PROCEDURE — 6370000000 HC RX 637 (ALT 250 FOR IP): Performed by: HOSPITALIST

## 2018-08-28 PROCEDURE — 82330 ASSAY OF CALCIUM: CPT

## 2018-08-28 PROCEDURE — 2060000000 HC ICU INTERMEDIATE R&B

## 2018-08-28 PROCEDURE — 82947 ASSAY GLUCOSE BLOOD QUANT: CPT

## 2018-08-28 PROCEDURE — 99254 IP/OBS CNSLTJ NEW/EST MOD 60: CPT | Performed by: INTERNAL MEDICINE

## 2018-08-28 PROCEDURE — 97110 THERAPEUTIC EXERCISES: CPT

## 2018-08-28 PROCEDURE — 36415 COLL VENOUS BLD VENIPUNCTURE: CPT

## 2018-08-28 PROCEDURE — 6360000002 HC RX W HCPCS: Performed by: STUDENT IN AN ORGANIZED HEALTH CARE EDUCATION/TRAINING PROGRAM

## 2018-08-28 PROCEDURE — 6360000002 HC RX W HCPCS: Performed by: HOSPITALIST

## 2018-08-28 PROCEDURE — 6360000002 HC RX W HCPCS: Performed by: EMERGENCY MEDICINE

## 2018-08-28 PROCEDURE — 84100 ASSAY OF PHOSPHORUS: CPT

## 2018-08-28 PROCEDURE — 2580000003 HC RX 258: Performed by: INTERNAL MEDICINE

## 2018-08-28 PROCEDURE — 6370000000 HC RX 637 (ALT 250 FOR IP): Performed by: EMERGENCY MEDICINE

## 2018-08-28 PROCEDURE — 94762 N-INVAS EAR/PLS OXIMTRY CONT: CPT

## 2018-08-28 PROCEDURE — 6360000002 HC RX W HCPCS: Performed by: INTERNAL MEDICINE

## 2018-08-28 PROCEDURE — 94640 AIRWAY INHALATION TREATMENT: CPT

## 2018-08-28 PROCEDURE — 80048 BASIC METABOLIC PNL TOTAL CA: CPT

## 2018-08-28 PROCEDURE — 51798 US URINE CAPACITY MEASURE: CPT

## 2018-08-28 PROCEDURE — 83735 ASSAY OF MAGNESIUM: CPT

## 2018-08-28 PROCEDURE — 2580000003 HC RX 258: Performed by: HOSPITALIST

## 2018-08-28 PROCEDURE — 97530 THERAPEUTIC ACTIVITIES: CPT

## 2018-08-28 RX ORDER — FUROSEMIDE 10 MG/ML
60 INJECTION INTRAMUSCULAR; INTRAVENOUS 2 TIMES DAILY
Status: DISCONTINUED | OUTPATIENT
Start: 2018-08-28 | End: 2018-08-29

## 2018-08-28 RX ORDER — ACETAMINOPHEN 325 MG/1
650 TABLET ORAL EVERY 8 HOURS PRN
Status: DISCONTINUED | OUTPATIENT
Start: 2018-08-28 | End: 2018-09-05 | Stop reason: HOSPADM

## 2018-08-28 RX ORDER — IPRATROPIUM BROMIDE AND ALBUTEROL SULFATE 2.5; .5 MG/3ML; MG/3ML
1 SOLUTION RESPIRATORY (INHALATION) EVERY 4 HOURS PRN
Status: DISCONTINUED | OUTPATIENT
Start: 2018-08-28 | End: 2018-09-05 | Stop reason: HOSPADM

## 2018-08-28 RX ADMIN — ALBUTEROL SULFATE 2.5 MG: 2.5 SOLUTION RESPIRATORY (INHALATION) at 09:36

## 2018-08-28 RX ADMIN — ACETAMINOPHEN 650 MG: 325 TABLET ORAL at 08:02

## 2018-08-28 RX ADMIN — HEPARIN SODIUM 5000 UNITS: 5000 INJECTION, SOLUTION INTRAVENOUS; SUBCUTANEOUS at 22:38

## 2018-08-28 RX ADMIN — FUROSEMIDE 60 MG: 10 INJECTION, SOLUTION INTRAMUSCULAR; INTRAVENOUS at 20:33

## 2018-08-28 RX ADMIN — INSULIN LISPRO 1 UNITS: 100 INJECTION, SOLUTION INTRAVENOUS; SUBCUTANEOUS at 20:29

## 2018-08-28 RX ADMIN — VITAMIN D, TAB 1000IU (100/BT) 5000 UNITS: 25 TAB at 08:29

## 2018-08-28 RX ADMIN — HEPARIN SODIUM 5000 UNITS: 5000 INJECTION, SOLUTION INTRAVENOUS; SUBCUTANEOUS at 06:44

## 2018-08-28 RX ADMIN — INSULIN GLARGINE 20 UNITS: 100 INJECTION, SOLUTION SUBCUTANEOUS at 08:05

## 2018-08-28 RX ADMIN — ATORVASTATIN CALCIUM 80 MG: 80 TABLET, FILM COATED ORAL at 20:23

## 2018-08-28 RX ADMIN — Medication 1 G: at 03:24

## 2018-08-28 RX ADMIN — FUROSEMIDE 60 MG: 10 INJECTION, SOLUTION INTRAMUSCULAR; INTRAVENOUS at 14:58

## 2018-08-28 RX ADMIN — INSULIN LISPRO 1 UNITS: 100 INJECTION, SOLUTION INTRAVENOUS; SUBCUTANEOUS at 08:02

## 2018-08-28 RX ADMIN — CARVEDILOL 3.12 MG: 3.12 TABLET, FILM COATED ORAL at 20:23

## 2018-08-28 RX ADMIN — CEFEPIME HYDROCHLORIDE 1 G: 1 INJECTION, POWDER, FOR SOLUTION INTRAMUSCULAR; INTRAVENOUS at 10:24

## 2018-08-28 RX ADMIN — CARVEDILOL 3.12 MG: 3.12 TABLET, FILM COATED ORAL at 08:31

## 2018-08-28 RX ADMIN — HEPARIN SODIUM 5000 UNITS: 5000 INJECTION, SOLUTION INTRAVENOUS; SUBCUTANEOUS at 14:56

## 2018-08-28 RX ADMIN — DIPHENHYDRAMINE HCL 25 MG: 25 TABLET ORAL at 00:36

## 2018-08-28 RX ADMIN — CLOPIDOGREL 75 MG: 75 TABLET, FILM COATED ORAL at 08:31

## 2018-08-28 ASSESSMENT — PAIN SCALES - GENERAL
PAINLEVEL_OUTOF10: 0
PAINLEVEL_OUTOF10: 8

## 2018-08-28 ASSESSMENT — PAIN DESCRIPTION - PAIN TYPE: TYPE: ACUTE PAIN

## 2018-08-28 ASSESSMENT — PAIN DESCRIPTION - LOCATION: LOCATION: HEAD

## 2018-08-28 NOTE — PROGRESS NOTES
x 2    Current Vitals:     /65   Pulse 95   Temp 99.5 °F (37.5 °C)   Resp 25   Ht 5' 5\" (1.651 m)   Wt 157 lb 10.1 oz (71.5 kg)   SpO2 92%   BMI 26.23 kg/m²     Cultures:     Blood cultures:                 [] None drawn      [x] Negative             []  Positive (Details:  )  Urine Culture:                   [] None drawn      [x] Negative             []  Positive (Details:  )  Sputum Culture:               [x] None drawn       [] Negative             []  Positive (Details:  )   Endotracheal aspirate:     [x] None drawn       [] Negative             []  Positive (Details:  )     Consults:     1. Cardiology  2. Nephrology  3. Heme/onc (f/u recommendations)    Assessment:     Patient Active Problem List    Diagnosis Date Noted    CHAD (acute kidney injury) ([facility] 08/23/2018    SIRS (systemic inflammatory response syndrome) ([facility] 08/23/2018    DM (diabetes mellitus), type 2 with neurological complications ([facility] 44/93/1403    Anxiety 01/26/2016    Hyperlipidemia 12/07/2015    CAD (coronary artery disease)     Hx of CABG     ICD (implantable cardioverter-defibrillator) in place     H/O mitral valve repair     Hx of myocardial infarction     Hypertension     Cardiomyopathy ([facility]     Onychomycosis of toenail 11/19/2015    S/P cardiac cath-Patent grafts 6/5/15 06/04/2015    Acute coronary syndrome ([facility] 05/26/2015     Recommended Follow-up:     1. Continue long acting insulin  2. Continue low dose ISS  3. Continue renal diet  4. Continue antibiotics, last day is 8/29  5. Continue to monitor kidney function/urine output; f/u nephrology recommendations  6. F/U heme/onc recommendations (consulted due to elevated free light chains)  7. Continue to monitor respiratory status    Above mentioned assessment and plan was discussed by me with the admitting medicine resident.  The medicine team assigned to the patient by medicine admitting resident will be following up the patient from now onwards on the

## 2018-08-28 NOTE — PROGRESS NOTES
valve repair in 2015   Ischemic CMP with H/o v fib arrest after CABG s/p ICD (last EF in 2016 was 40%)      Plan of Treatment:     1. HD and diuresis per nephro  2. Cont plavix, statin and BB  3.  Will need further ischemia work up with a functional stress test once acute issues are resolved       Electronically signed by Marcin Alcaraz MD on 8/28/2018 at [address] [place] [address] Cardiology  [phone]

## 2018-08-28 NOTE — PROGRESS NOTES
[] Right Subclavian [] Left Subclavian     DYER'S CATHETER:   [x] No   [] Yes  (Date of Insertion:   )     URINE OUTPUT:            [x] Good   [] Low              [] Anuric    OBJECTIVE:     VITAL SIGNS:  /65   Pulse 95   Temp 99.5 °F (37.5 °C)   Resp 25   Ht 5' 5\" (1.651 m)   Wt 157 lb 10.1 oz (71.5 kg)   SpO2 92%   BMI 26.23 kg/m²   Tmax over 24 hours:  Temp (24hrs), Av.2 °F (36.8 °C), Min:97.7 °F (36.5 °C), Max:99.5 °F (37.5 °C)    Patient Vitals for the past 6 hrs:   Resp SpO2   18 0938 25 92 %       Intake/Output Summary (Last 24 hours) at 18 1507  Last data filed at 18 0800   Gross per 24 hour   Intake             1446 ml   Output             4000 ml   Net            -2554 ml     Wt Readings from Last 2 Encounters:   18 157 lb 10.1 oz (71.5 kg)   18 166 lb (75.3 kg)     Body mass index is 26.23 kg/m². PHYSICAL EXAMINATION:  Constitutional: Awake, Alert, following commands  EENT: PERRLA, EOMI, sclera clear, anicteric, oropharynx clear, no lesions, neck supple with midline trachea. Left internal jugular central line dressing clean and dry  Neck: Supple, symmetrical, trachea midline, no adenopathy,  no jvd, skin normal  Respiratory: High flow nasal cannula in place, rhonchorous breath sounds with minimal rales at the bases, no wheezes or labored breathing.  No intercostal tenderness  Cardiovascular: regular rate and rhythm, normal S1, S2, no murmur noted  Abdomen: soft, nontender, nondistended, no masses or organomegaly  Extremities:   no pedal edema, no clubbing or cyanosis    MEDICATIONS:    Scheduled Meds:   furosemide  60 mg Intravenous BID    insulin glargine  20 Units Subcutaneous Daily    insulin lispro  0-6 Units Subcutaneous TID WC    insulin lispro  0-3 Units Subcutaneous Nightly    carvedilol  3.125 mg Oral BID    atorvastatin  80 mg Oral Daily    vitamin D  5,000 Units Oral Daily    clopidogrel  75 mg Oral Daily    heparin flush  300 Units Intercatheter Once    heparin flush  300 Units Intercatheter Once    vancomycin (VANCOCIN) intermittent dosing (placeholder)   Other RX Placeholder    insulin regular  0.1 Units/kg Intravenous Once    heparin (porcine)  5,000 Units Subcutaneous 3 times per day    cefepime (MAXIPIME) 1 g IVPB extended (mini-bag)  1 g Intravenous Q24H     Continuous Infusions:   sodium chloride 10 mL/hr at 08/27/18 0253    dextrose       PRN Meds:     acetaminophen 650 mg Q8H PRN   ipratropium-albuterol 1 ampule Q4H PRN   sodium chloride 250 mL PRN   sodium chloride 150 mL PRN   albuterol 2.5 mg Q6H PRN   glucose 15 g PRN   dextrose 12.5 g PRN   glucagon (rDNA) 1 mg PRN   dextrose 100 mL/hr PRN   heparin (porcine) 9,500 Units PRN   heparin (porcine) 8,000 Units PRN   dextrose 12.5 g PRN     VENT SETTINGS (Comprehensive) (if applicable):  Vent Information  Ventilator Started: Yes  Ventilator Stopped: (S) Yes  Ventilation Day(s): 1  Vent Type: Servo i  Vent Mode: NIV/PC  Vt Ordered: 460 mL  Pressure Ordered: 8  Rate Set: 14 bmp  Pressure Support: 8 cmH20  FiO2 : (S) 30 %  Sensitivity: 5  PEEP/CPAP: 8  I Time/ I Time %: 0.9 s  Humidification Source: Heated wire  Humidification Temp: 31  Humidification Temp Measured: 32  Circuit Condensation: Drained  Additional Respiratory  Assessments  Pulse: 95  Resp: 25  SpO2: 92 %  End Tidal CO2: 25 (%)  Position: Semi-Simon's  Humidification Source: Heated wire  Humidification Temp: 31  Circuit Condensation: Drained  Oral Care Completed?: Yes  Oral Care: Mouth moisturizer  Subglottic Suction Done?: Yes    ABGs:     Laboratory findings:    Complete Blood Count:   Recent Labs      08/26/18   0524   WBC  10.1   HGB  8.6*   HCT  25.7*   PLT  See Reflexed IPF Result      Last 3 Blood Glucose:   Recent Labs      08/27/18   1515  08/28/18   0017  08/28/18   0745   GLUCOSE  247*  139*  153*      PT/INR:    Lab Results   Component Value Date    PROTIME 11.2 08/23/2018    INR 1.1 08/23/2018 PROTOCOL:  [] No   [] Yes  [x] N/A    DISCONTINUE ANY LABS:   [x] No   [] Yes    ICU PROPHYLAXIS:  Stress ulcer:  [] PPI Agent  [] [id number] [] Sucralfate  [x] Other:  VTE:   [] Enoxaparin  [x] Unfract. Heparin Subcut  [] EPC Cuffs    NUTRITION:  [] NPO [x] Tube Feeding (Specify: at goal ) [] TPN  [] PO (Diet: DIET RENAL;)    HOME MEDICATIONS RECONCILED: [x] No  [] Yes    INSULIN DRIP:   [x] No   [] Yes    CONSULTATION NEEDED:  [] No   [x] Yes    FAMILY UPDATED:    [x] No   [] Yes    TRANSFER OUT OF ICU:   [x] No   [] Yes    ADDITIONAL PLAN:    Neurological  - Alert, follows commands  Cardiovascular  - HR and BP stable  - Cardiology consult- continue coreg, lipitor/plavix  Respiratory  - Stable on high flow nasal cannula  - CXR (8/27)- pulmonary edema with some improvement from prior  GI/Endo  - Diabetic Ketoacidosis- resolved  - GI Prophylaxis- none  - Bowel Regimen- none  - currently receiving tube feeds via NG, at goal, able to take PO meds  - Continue long acting insulin and and low dose ISS, blood sugar well controlled 158 this AM  Renal/Fluid/Electrolyte  - Acute renal failure requiring hemodialysis on 8/23; repeat dialysis on 8/27 due to creatinine trending upwards, 3.4L taken off  -creatinine today 3.91 (5.92 yesterday)  -RF elevated at 24, C3/C4 Complement levels normal; immunofixation neg for monoclonal immunoglobulin  - Urine output- adequate, continue to monitor  - IV fluids- heplock  Heme/ID  - T Max 37.9 C  - Urine and blood cultures- no growth  - Continue Cefepime/vancomycin (7 day course, last day is tomorrow)  -consult Heme/onc for elevated free light chains; f/u recommendations  Other  - DVT Prophylaxis- heparin  - Dispo- transfer to [facility]     ADDITIONAL PLAN:  1. Continue long acting insulin  2. Continue low dose ISS  3. Start on renal diet, remove NG  4. Continue antibiotics  5. Continue to monitor kidney function  6. Continue to monitor respiratory status  7.  Removal L IJ central line    Brisa Jose Antonio Zayas M.D.              Critical care resident, PGY-2  Department of Internal Medicine/ Critical care  [facility])             8/28/2018, 10:32 AM

## 2018-08-28 NOTE — PROGRESS NOTES
Nephrology Progress Note      SUBJECTIVE      Patient was seen and examined. Had dialysis on 18 secondary to severe hyperkalemia and repeat on 18. Was volume overloaded yesterday and HD helped, she is off BIPAP now and on NC improving on 3.5 L now. Got 3.4 L off with HD on 18 and made 1.2 L of urine last 24 hrs. Patient denied any complaints, chest pain, SOB, nausea, vomiting, abdominal pain, diarrhea  -120    Urine output 1240 in last 24 hrs, weight down by 3.2 kg yesterday   ANCA neg, anti GBM antibody negative, complement levels normal     Pre-Weight = 74.7kg  Post-weight = Weight: 157 lb 10.1 oz (71.5 kg)  Total Liters Processed = Total Liters Processed (l/min): 55.5 l/min  Rinseback Volume (mL) = Rinseback Volume (ml): 370 ml  Net Removal (mL) = 3030  Total Fluid removed = 3400  Length of treatment=180 minutes  OBJECTIVE      CURRENT TEMPERATURE:  Temp: 99.5 °F (37.5 °C)  MAXIMUM TEMPERATURE OVER 24HRS:  Temp (24hrs), Av.2 °F (36.8 °C), Min:97.7 °F (36.5 °C), Max:99.5 °F (37.5 °C)    CURRENT RESPIRATORY RATE:  Resp: 25  CURRENT PULSE:  Pulse: 95  CURRENT BLOOD PRESSURE:  BP: 129/65  24HR BLOOD PRESSURE RANGE:  Systolic (90BBC), BLX:224 , Min:82 , RUT:450   ; Diastolic (98PNF), XP, Min:53, Max:73    24HR INTAKE/OUTPUT:      Intake/Output Summary (Last 24 hours) at 18 1240  Last data filed at 18 0800   Gross per 24 hour   Intake             1446 ml   Output             4000 ml   Net            -2554 ml       PHYSICAL EXAM      GENERAL APPEARANCE:Awake, alert on NC  SKIN: warm and dry, no rash or erythema  EYES: conjunctivae normal and sclera anicteric  NECK: carotids without bruits bilaterally JVD: None Left IJ triple lumen  PULMONARY: coarse rhonchi present- throughout, expiratory wheezing   CADRDIOVASCULAR: normal S1 and S2, no gallops and intact distal pulses.    ABDOMEN: soft nontender, bowel sounds present, no organomegaly,  no ascites  EXTREMITIES: no Albumin:   No results for input(s): LABALBU in the last 72 hours. ALEJANDRO:   Lab Results   Component Value Date    ALEJANDRO  08/26/2018     This test is equivocal, which represents a borderline ALEJANDRO result. Suggest     SPEP:   Lab Results   Component Value Date    PROT 6.6 08/23/2018    ALBCAL 4.1 08/23/2018    ALBPCT 62 08/23/2018    LABALPH 0.2 08/23/2018    LABALPH 0.7 08/23/2018    A1PCT 3 08/23/2018    A2PCT 10 08/23/2018    LABBETA 0.9 08/23/2018    BETAPCT 13 08/23/2018    GAMGLOB 0.8 08/23/2018    GGPCT 12 08/23/2018    PATH ELECTRONICALLY SIGNED. Princess Ruth M.D. 08/23/2018     Urine Creatinine:    Lab Results   Component Value Date    LABCREA 33.5 08/26/2018     Urine Eosinophils: No components found for: EOSU  Urine Protein:  No results found for: TPU  Urinalysis:  U/A:   Lab Results   Component Value Date    NITRU NEGATIVE 08/23/2018    COLORU ORANGE 08/23/2018    PHUR 5.5 08/23/2018    WBCUA TOO NUMEROUS TO COUNT 08/23/2018    RBCUA TOO NUMEROUS TO COUNT 08/23/2018    MUCUS NOT REPORTED 08/23/2018    TRICHOMONAS NOT REPORTED 08/23/2018    YEAST NOT REPORTED 08/23/2018    BACTERIA NOT REPORTED 08/23/2018    SPECGRAV 1.015 08/23/2018    LEUKOCYTESUR TRACE 08/23/2018    UROBILINOGEN Normal 08/23/2018    BILIRUBINUR NEGATIVE 08/23/2018    GLUCOSEU 2+ 08/23/2018    KETUA TRACE 08/23/2018    AMORPHOUS NOT REPORTED 08/23/2018       BMP:   Recent Labs      08/28/18   0745   NA  133*   K  3.8   CL  95*   CO2  22   BUN  48*   CREATININE  3.91*   GLUCOSE  153*   CALCIUM  8.5*        Phosphorus:    Recent Labs      08/26/18   0524  08/27/18   0448  08/28/18   0457   PHOS  4.6*  5.3*  3.7     Magnesium:   Recent Labs      08/26/18   0524  08/27/18   0448  08/28/18   0457   MG  1.8  2.0  1.8     Albumin:   No results for input(s): LABALBU in the last 72 hours. Results for Janey Rater (MRN [id number]) as of 8/25/2018 16:01   Ref.  Range 8/23/2018 18:09   Creatinine, Ur Latest Ref Range: 28.0 - 217.0 mg/dL 42.4 to atrophy/medical renal  disease.  No hydronephrosis of either kidney. Assessment:  1. Acute Kidney Injury: non-oliguric likely due to Ischemic ATN was in DKA, hypotensive required pressors. Baseline Cr 1-1.2. Required HD on admission day due electrolyte imbalances. ALEJANDRO equivocal, RF 24, K 22.86, L 7.68, K/L 2.98, ANCA neg, anti GBM antibody negative, complement levels normal, SPEP normal, IFX pending. UPC 0.99.  US 8/23/18: Right Kidney 12.2mc, Left 11.3cm, Right renal cortex 1.0cm, Left cortex 0.7 cm. Parenchymal thinning of both kidneys can relate to atrophy/medical renal  disease.  No hydronephrosis of either kidney. 2. Severe Hyperkalemia secondary to #1. Resolved  3. Chronic Kidney Disease with baseline 1-1.2 secondary to diabetic nephrosclerosis  4. Hypoxia and pulmonary congestion  5. DM 2  6. CMP and CHF:  EF 40-45% s/p ICD  7. CAD/CABG:  Cath 6/2018 grafts patent  8. Pulmonary congestion versus infiltrate  9. DKA- resolved. 10. Hypotension - off pressors now. Plan:  1. No acute need for hemodialysis today, will continue to assess daily. 2. Monitor intake and output, daily weights. 3. Follow up BMP, IFX  4. Start Lasix 60mg IV BID   5. Check PVR if >150, please place villa and if retention +ve might need Urology eval.   6. Will continue to follow       Please do not hesitate to call with questions. Attending Physician Statement  I have discussed the care of Pola Hampton, including pertinent history and exam findings,  with the Fellow/Resident/CNP. I have reviewed the key elements of all parts of the encounter with the Fellow/ Resident. I agree with the assessment, plan and orders as documented by the resident. Jacek Angel MD   Nephrology [address] [place] [address]

## 2018-08-28 NOTE — PLAN OF CARE
BRONCHOSPASM/BRONCHOCONSTRICTION     [x]         IMPROVE AERATION/BREATH SOUNDS  [x]   ADMINISTER BRONCHODILATOR THERAPY AS APPROPRIATE  [x]   ASSESS BREATH SOUNDS  []   IMPLEMENT AEROSOL/MDI PROTOCOL  [x]   PATIENT EDUCATION AS NEEDED    PROVIDE ADEQUATE OXYGENATION WITH ACCEPTABLE SP02/ABG'S    [x]  IDENTIFY APPROPRIATE OXYGEN THERAPY  [x]   MONITOR SP02/ABG'S AS NEEDED   [x]   PATIENT EDUCATION AS NEEDED

## 2018-08-28 NOTE — CARE COORDINATION
Nephrology monitoring and evaluating daily for dialysis need. Per Nephrology SW to hold off on making OP dialysis arrangements at this time. SW will continue to follow/monitor and assist with OP dialysis arrangements if needed.

## 2018-08-28 NOTE — CONSULTS
Provider Last Rate Last Dose    acetaminophen (TYLENOL) tablet 650 mg  650 mg Oral Q8H PRN Brisa Trent DO   650 mg at 08/28/18 0802    ipratropium-albuterol (DUONEB) nebulizer solution 1 ampule  1 ampule Inhalation Q4H PRN Nella Bradley MD        furosemide (LASIX) injection 60 mg  60 mg Intravenous BID Mary Jo Morataya MD   60 mg at 08/28/18 1458    insulin glargine (LANTUS) injection vial 20 Units  20 Units Subcutaneous Daily Mars Sol MD   20 Units at 08/28/18 0805    insulin lispro (HUMALOG) injection vial 0-6 Units  0-6 Units Subcutaneous TID WC Mars Sol MD   1 Units at 08/28/18 0802    insulin lispro (HUMALOG) injection vial 0-3 Units  0-3 Units Subcutaneous Nightly Mars Sol MD   1 Units at 08/27/18 2107    0.9 % sodium chloride infusion   Intravenous Continuous Luiza Jasso MD 10 mL/hr at 08/27/18 0253      carvedilol (COREG) tablet 3.125 mg  3.125 mg Oral BID Perez Clifton MD   3.125 mg at 08/28/18 0831    0.9 % sodium chloride bolus  250 mL Intravenous PRN Alex Kim MD        0.9 % sodium chloride bolus  150 mL Intravenous PRN Alex Kim MD        albuterol (PROVENTIL) nebulizer solution 2.5 mg  2.5 mg Nebulization Q6H PRN Rosalinda Prabhakar MD   2.5 mg at 08/28/18 0936    atorvastatin (LIPITOR) tablet 80 mg  80 mg Oral Daily Rosalinda Prabhakar MD   80 mg at 08/27/18 2114    vitamin D (CHOLECALCIFEROL) tablet 5,000 Units  5,000 Units Oral Daily Rosalinda Prabhakar MD   5,000 Units at 08/28/18 0829    clopidogrel (PLAVIX) tablet 75 mg  75 mg Oral Daily Rosalinda Prabhakar MD   75 mg at 08/28/18 0831    heparin flush 100 UNIT/ML injection 300 Units  300 Units Intercatheter Once [employer], DO        heparin flush 100 UNIT/ML injection 300 Units  300 Units Intercatheter Once [employer], DO        glucose (GLUTOSE) 40 % oral gel 15 g  15 g Oral PRN Marija Shea MD        dextrose 50 % solution 12.5 g  12.5 g Intravenous PRN My Kern MD        glucagon (HH) 0.50 - 0.90 mg/dL    Bun/Cre Ratio NOT REPORTED 9 - 20    Calcium 7.7 (L) 8.6 - 10.4 mg/dL    Sodium 130 (L) 135 - 144 mmol/L    Potassium 4.3 3.7 - 5.3 mmol/L    Chloride 96 (L) 98 - 107 mmol/L    CO2 14 (L) 20 - 31 mmol/L    Anion Gap 20 (H) 9 - 17 mmol/L    GFR Non-African American 7 (L) >60 mL/min    GFR  8 (L) >60 mL/min    GFR Comment          GFR Staging NOT REPORTED    CALCIUM, IONIZED   Result Value Ref Range    Calcium, Ion 1.02 (L) 1.13 - 1.33 mmol/L   Magnesium   Result Value Ref Range    Magnesium 1.6 1.6 - 2.6 mg/dL   Phosphorus   Result Value Ref Range    Phosphorus 5.9 (H) 2.6 - 4.5 mg/dL   Basic Metabolic Panel   Result Value Ref Range    Glucose 130 (H) 70 - 99 mg/dL    BUN 50 (H) 6 - 20 mg/dL    CREATININE 6.38 (HH) 0.50 - 0.90 mg/dL    Bun/Cre Ratio NOT REPORTED 9 - 20    Calcium 7.7 (L) 8.6 - 10.4 mg/dL    Sodium 132 (L) 135 - 144 mmol/L    Potassium 4.2 3.7 - 5.3 mmol/L    Chloride 99 98 - 107 mmol/L    CO2 15 (L) 20 - 31 mmol/L    Anion Gap 18 (H) 9 - 17 mmol/L    GFR Non-African American 7 (L) >60 mL/min    GFR  8 (L) >60 mL/min    GFR Comment          GFR Staging NOT REPORTED    Vancomycin, trough   Result Value Ref Range    Vancomycin Tr 13.6 10.0 - 20.0 ug/mL    Vancomycin Trough Dose amount NOT REPORTED     Vancomycin Trough Date last dose NOT REPORTED     Vancomycin Trough Time last dose NOT REPORTED    POTASSIUM   Result Value Ref Range    Potassium 4.1 3.7 - 5.3 mmol/L   TSH without Reflex   Result Value Ref Range    TSH 0.49 0.30 - 5.00 mIU/L   Basic Metabolic Panel   Result Value Ref Range    Glucose 172 (H) 70 - 99 mg/dL    BUN 50 (H) 6 - 20 mg/dL    CREATININE 6.12 (HH) 0.50 - 0.90 mg/dL    Bun/Cre Ratio NOT REPORTED 9 - 20    Calcium 7.9 (L) 8.6 - 10.4 mg/dL    Sodium 132 (L) 135 - 144 mmol/L    Potassium 4.0 3.7 - 5.3 mmol/L    Chloride 98 98 - 107 mmol/L    CO2 15 (L) 20 - 31 mmol/L    Anion Gap 19 (H) 9 - 17 mmol/L    GFR Non-African Phosphorus   Result Value Ref Range    Phosphorus 4.8 (H) 2.6 - 4.5 mg/dL   BLOOD GAS, VENOUS   Result Value Ref Range    pH, Galileo 7.382 7.320 - 7.420    pCO2, Galileo 31.8 (L) 39 - 55    pO2, Galileo 56.2 (H) 30 - 50    HCO3, Venous 18.4 (L) 24 - 30 mmol/L    Positive Base Excess, Galileo NOT REPORTED 0.0 - 2.0 mmol/L    Negative Base Excess, Galileo 5.4 (H) 0.0 - 2.0 mmol/L    O2 Sat, Galileo 88.9 (H) 60.0 - 85.0 %    Total Hb NOT REPORTED 12.0 - 16.0 g/dl    Oxyhemoglobin NOT REPORTED 95.0 - 98.0 %    Carboxyhemoglobin 1.7 0 - 5 %    Methemoglobin NOT REPORTED 0.0 - 1.5 %    Pt Temp 37.0     pH, Galileo, Temp Adj NOT REPORTED 7.320 - 7.420    pCO2, Galileo, Temp Adj NOT REPORTED 39 - 55 mmHg    pO2, Galileo, Temp Adj NOT REPORTED 30 - 50 mmHg    O2 Device/Flow/% NOT REPORTED     Respiratory Rate NOT REPORTED     Antonio Test NOT REPORTED     Sample Site NOT REPORTED     Pt.  Position NOT REPORTED     Mode NOT REPORTED     Set Rate NOT REPORTED     Total Rate NOT REPORTED     VT NOT REPORTED     FIO2 INFORMATION NOT PROVIDED     Peep/Cpap NOT REPORTED     PSV NOT REPORTED     Text for Respiratory NOT REPORTED     NOTIFICATION NOT REPORTED     NOTIFICATION TIME NOT REPORTED    Beta-Hydroxybutyrate   Result Value Ref Range    Beta-Hydroxybutyrate 0.09 0.02 - 0.27 mmol/L   CBC   Result Value Ref Range    WBC 10.1 3.5 - 11.3 k/uL    RBC 2.87 (L) 3.95 - 5.11 m/uL    Hemoglobin 8.6 (L) 11.9 - 15.1 g/dL    Hematocrit 25.7 (L) 36.3 - 47.1 %    MCV 89.5 82.6 - 102.9 fL    MCH 30.0 25.2 - 33.5 pg    MCHC 33.5 28.4 - 34.8 g/dL    RDW 13.9 11.8 - 14.4 %    Platelets See Reflexed IPF Result 138 - 453 k/uL    MPV NOT REPORTED 8.1 - 13.5 fL    NRBC Automated 0.0 0.0 per 100 WBC   Basic Metabolic Panel   Result Value Ref Range    Glucose 193 (H) 70 - 99 mg/dL    BUN 52 (H) 6 - 20 mg/dL    CREATININE 5.62 (HH) 0.50 - 0.90 mg/dL    Bun/Cre Ratio NOT REPORTED 9 - 20    Calcium 7.9 (L) 8.6 - 10.4 mg/dL    Sodium 130 (L) 135 - 144 mmol/L    Potassium 3.7 3.7 - 5.3 NOT REPORTED     POC pCO2 Temp NOT REPORTED mm Hg    POC pO2 Temp NOT REPORTED mm Hg   Creatinine W/GFR Point of Care   Result Value Ref Range    POC Creatinine 5.01 (HH) 0.51 - 1.19 mg/dL    GFR Comment 11 (L) >60 mL/min    GFR Non-African American 9 (L) >60 mL/min    GFR Comment         Lactic Acid, POC   Result Value Ref Range    POC Lactic Acid 3.85 (H) 0.56 - 1.39 mmol/L   POCT Glucose   Result Value Ref Range    POC Glucose 232 (H) 74 - 100 mg/dL   Notification Panel, POC   Result Value Ref Range    Action NotifyRN     NOTIFY asim     READ BACK No     Date/Time 08/23/201812:33:00    Anion Gap (Calc) POC   Result Value Ref Range    Anion Gap 11 7 - 16 mmol/L   POC Glucose Fingerstick   Result Value Ref Range    POC Glucose 222 (H) 65 - 105 mg/dL   POC Glucose Fingerstick   Result Value Ref Range    POC Glucose 192 (H) 65 - 105 mg/dL   POC Glucose Fingerstick   Result Value Ref Range    POC Glucose 183 (H) 65 - 105 mg/dL   POC Glucose Fingerstick   Result Value Ref Range    POC Glucose 151 (H) 65 - 105 mg/dL   POC Glucose Fingerstick   Result Value Ref Range    POC Glucose 159 (H) 65 - 105 mg/dL   POC Glucose Fingerstick   Result Value Ref Range    POC Glucose 152 (H) 65 - 105 mg/dL   POC Glucose Fingerstick   Result Value Ref Range    POC Glucose 137 (H) 65 - 105 mg/dL   POC Glucose Fingerstick   Result Value Ref Range    POC Glucose 122 (H) 65 - 105 mg/dL   POC Glucose Fingerstick   Result Value Ref Range    POC Glucose 133 (H) 65 - 105 mg/dL   POC Glucose Fingerstick   Result Value Ref Range    POC Glucose 132 (H) 65 - 105 mg/dL   Arterial Blood Gas, POC   Result Value Ref Range    POC pH 7.320 (L) 7.350 - 7.450    POC pCO2 31.1 (L) 35.0 - 48.0 mm Hg    POC PO2 80.6 (L) 83.0 - 108.0 mm Hg    POC HCO3 16.0 (L) 21.0 - 28.0 mmol/L    TCO2 (calc), Art 17 (L) 22.0 - 29.0 mmol/L    Negative Base Excess, Art 9 (H) 0.0 - 2.0    Positive Base Excess, Art NOT REPORTED 0.0 - 3.0    POC O2 SAT 95 94.0 - 98.0 %    O2 Device/Flow/% Adult Ventilator     Antonio Test NEGATIVE     Sample Site Left Radial Artery     Mode NOT REPORTED     FIO2 60.0     Pt Temp NOT REPORTED     POC pH Temp NOT REPORTED     POC pCO2 Temp NOT REPORTED mm Hg    POC pO2 Temp NOT REPORTED mm Hg   POC Glucose Fingerstick   Result Value Ref Range    POC Glucose 119 (H) 65 - 105 mg/dL   POC Glucose Fingerstick   Result Value Ref Range    POC Glucose 137 (H) 65 - 105 mg/dL   POC Glucose Fingerstick   Result Value Ref Range    POC Glucose 149 (H) 65 - 105 mg/dL   POC Glucose Fingerstick   Result Value Ref Range    POC Glucose 143 (H) 65 - 105 mg/dL   POC Glucose Fingerstick   Result Value Ref Range    POC Glucose 178 (H) 65 - 105 mg/dL   POC Glucose Fingerstick   Result Value Ref Range    POC Glucose 155 (H) 65 - 105 mg/dL   POC Glucose Fingerstick   Result Value Ref Range    POC Glucose 157 (H) 65 - 105 mg/dL   POC Glucose Fingerstick   Result Value Ref Range    POC Glucose 204 (H) 65 - 105 mg/dL   POC Glucose Fingerstick   Result Value Ref Range    POC Glucose 180 (H) 65 - 105 mg/dL   POC Glucose Fingerstick   Result Value Ref Range    POC Glucose 165 (H) 65 - 105 mg/dL   POC Glucose Fingerstick   Result Value Ref Range    POC Glucose 168 (H) 65 - 105 mg/dL   POC Glucose Fingerstick   Result Value Ref Range    POC Glucose 169 (H) 65 - 105 mg/dL   POC Glucose Fingerstick   Result Value Ref Range    POC Glucose 202 (H) 65 - 105 mg/dL   POC Glucose Fingerstick   Result Value Ref Range    POC Glucose 243 (H) 65 - 105 mg/dL   POC Glucose Fingerstick   Result Value Ref Range    POC Glucose 232 (H) 65 - 105 mg/dL   POC Glucose Fingerstick   Result Value Ref Range    POC Glucose 172 (H) 65 - 105 mg/dL   POC Glucose Fingerstick   Result Value Ref Range    POC Glucose 156 (H) 65 - 105 mg/dL   POC Glucose Fingerstick   Result Value Ref Range    POC Glucose 141 (H) 65 - 105 mg/dL   POC Glucose Fingerstick   Result Value Ref Range    POC Glucose 136 (H) 65 - 105 mg/dL   POC mg/dL   POC Glucose Fingerstick   Result Value Ref Range    POC Glucose 197 (H) 65 - 105 mg/dL   POC Glucose Fingerstick   Result Value Ref Range    POC Glucose 193 (H) 65 - 105 mg/dL   POC Glucose Fingerstick   Result Value Ref Range    POC Glucose 184 (H) 65 - 105 mg/dL   POC Glucose Fingerstick   Result Value Ref Range    POC Glucose 199 (H) 65 - 105 mg/dL   POC Glucose Fingerstick   Result Value Ref Range    POC Glucose 175 (H) 65 - 105 mg/dL   POC Glucose Fingerstick   Result Value Ref Range    POC Glucose 181 (H) 65 - 105 mg/dL   POC Glucose Fingerstick   Result Value Ref Range    POC Glucose 164 (H) 65 - 105 mg/dL   POC Glucose Fingerstick   Result Value Ref Range    POC Glucose 173 (H) 65 - 105 mg/dL   POC Glucose Fingerstick   Result Value Ref Range    POC Glucose 165 (H) 65 - 105 mg/dL   POC Glucose Fingerstick   Result Value Ref Range    POC Glucose 190 (H) 65 - 105 mg/dL   POC Glucose Fingerstick   Result Value Ref Range    POC Glucose 180 (H) 65 - 105 mg/dL   POC Glucose Fingerstick   Result Value Ref Range    POC Glucose 175 (H) 65 - 105 mg/dL   POC Glucose Fingerstick   Result Value Ref Range    POC Glucose 175 (H) 65 - 105 mg/dL   POC Glucose Fingerstick   Result Value Ref Range    POC Glucose 160 (H) 65 - 105 mg/dL   POC Glucose Fingerstick   Result Value Ref Range    POC Glucose 161 (H) 65 - 105 mg/dL   POC Glucose Fingerstick   Result Value Ref Range    POC Glucose 167 (H) 65 - 105 mg/dL   POC Glucose Fingerstick   Result Value Ref Range    POC Glucose 170 (H) 65 - 105 mg/dL   POC Glucose Fingerstick   Result Value Ref Range    POC Glucose 177 (H) 65 - 105 mg/dL   POC Glucose Fingerstick   Result Value Ref Range    POC Glucose 176 (H) 65 - 105 mg/dL   POC Glucose Fingerstick   Result Value Ref Range    POC Glucose 205 (H) 65 - 105 mg/dL   POC Glucose Fingerstick   Result Value Ref Range    POC Glucose 161 (H) 65 - 105 mg/dL   POC Glucose Fingerstick   Result Value Ref Range    POC Glucose 231 (H) There is no evidence of hydrocephalus. Vascular calcifications present. ORBITS: The visualized portion of the orbits demonstrate no acute abnormality. SINUSES: The visualized paranasal sinuses and mastoid air cells demonstrate no acute abnormality. SOFT TISSUES/SKULL:  Left periauricular 5 mm skin lesion, not appreciated on prior exam.  No acute calvarial fracture identified. Nasal bone deviates toward the left. No acute intracranial abnormality. 5 mm skin lesion adjacent to the left ear. Correlate with physical examination. Xr Chest Portable    Result Date: 8/27/2018  EXAMINATION: SINGLE XRAY VIEW OF THE CHEST 8/27/2018 5:42 am COMPARISON: 08/25/2018 HISTORY: ORDERING SYSTEM PROVIDED HISTORY: follow up infiltrates TECHNOLOGIST PROVIDED HISTORY: follow up infiltrates FINDINGS: NG tube and left subclavian central line remains in place. Left transvenous ICD and prosthetic heart valve unchanged. Stable cardiomediastinal silhouette. Diffuse interstitial and airspace opacities again demonstrated but showing slight decrease in severity. No pleural effusion or pneumothorax. Slight decrease in pulmonary edema. Xr Chest Portable    Result Date: 8/25/2018  EXAMINATION: SINGLE XRAY VIEW OF THE CHEST 8/25/2018 7:01 pm COMPARISON: August 24, 2018 HISTORY: ORDERING SYSTEM PROVIDED HISTORY: CHF TECHNOLOGIST PROVIDED HISTORY: CHF Acuity: Unknown Type of Exam: Unknown FINDINGS: There is new diffuse opacity of both lungs, with interstitial opacity on more confluent airspace disease. A nasogastric tube extends into the stomach. Left-sided AICD again noted. Costophrenic angles appear sharp. No pneumothorax. New and increased extensive bilateral pulmonary disease consistent with developing pulmonary edema     Xr Chest Portable    Result Date: 8/24/2018  EXAMINATION: SINGLE XRAY VIEW OF THE CHEST 8/24/2018 8:27 am COMPARISON: 08/23/2018.  HISTORY: ORDERING SYSTEM PROVIDED HISTORY: thick secretions TECHNOLOGIST postsurgical changes of the chest with median sternotomy wires and a prosthetic cardiac valve in place. A left anterior chest cardiac pacemaker device is in a stable position. The cardiac silhouette is enlarged though stable in size. There is mild interstitial edema. There is no significant pneumothorax or pleural effusion. No acute osseous abnormality is seen. 1. Endotracheal tube terminating approximately 3.1 cm above the tylor. 2. Stable enlarged cardiac silhouette with mild interstitial edema. Xr Chest Portable    Result Date: 8/23/2018  EXAMINATION: SINGLE XRAY VIEW OF THE CHEST 8/23/2018 4:28 am COMPARISON: August 23, 2018 HISTORY: ORDERING SYSTEM PROVIDED HISTORY: intubated TECHNOLOGIST PROVIDED HISTORY: intubated FINDINGS: Endotracheal tube terminates 4.5 cm above the tylor. Left chest pacer device is stable. Cardiac valve prosthesis. Median sternotomy wires are present. Cardiomediastinal silhouette is stable with suggestion of cardiomegaly. Improved central pulmonary vascular congestion. No pneumothorax or significant pleural effusion. No pneumothorax. Enteric tube tip projects over left upper abdominal quadrant. Improved central pulmonary vascular congestion. Placement of enteric tube with tip projecting over left upper abdominal quadrant. Otherwise, stable examination. Stable cardiomegaly. Us Retroperitoneal Limited    Result Date: 8/23/2018  EXAMINATION: RETROPERITONEAL ULTRASOUND OF THE KIDNEYS AND URINARY BLADDER 8/23/2018 COMPARISON: None available at the time of dictation. HISTORY: ORDERING SYSTEM PROVIDED HISTORY: RENAL FAILURE, ACUTE (KIDNEY INJURY) FINDINGS: Kidneys: The right kidney measures 12.2 cm in length and the left kidney measures 11.3 cm in length. Right renal cortex measures approximately 1.0 cm. Left renal cortex measures approximately 0.7 cm. Kidneys demonstrate normal cortical echogenicity. No evidence of hydronephrosis or intrarenal stones.  Bladder: Urinary bladder is decompressed and not visualized secondary to Garcia catheter placement. Parenchymal thinning of both kidneys can relate to atrophy/medical renal disease. No hydronephrosis of either kidney. Primary Problem  Chest pain    Active Hospital Problems    Diagnosis Date Noted    CHAD (acute kidney injury) ([facility] [address] [N17.9] 08/23/2018    SIRS (systemic inflammatory response syndrome) (HCC) [R65.10] 08/23/2018    DM (diabetes mellitus), type 2 with neurological complications ([facility] [address] [A93.37] 01/26/2016    CAD (coronary artery disease) [I25.10]     Hx of CABG [Z95.1]     ICD (implantable cardioverter-defibrillator) in place [Z95.810]     H/O mitral valve repair [Z98.890]     Hypertension [I10]     S/P cardiac cath-Patent grafts 6/5/15 [Z98.890] 06/04/2015         IMPRESSION:     Acute kidney injury  Abnormal free light chain ratio  Congestive heart failure and had a myopathy  Coronary artery disease  Diabetic ketoacidosis  anemia      RECOMMENDATIONS:    Personally reviewed results of lab workup imaging studies. Patient has abnormal free light chain ratio which can be explained by renal insufficiency however free light chain only paraproteinemia is also in differential.  We will order urine immunofixation studies. Will order w/u anemia   Do not recommend bone marrow biopsy at this point. Continue supportive care and current management. Discussed with patient and Nurse. Thank you for asking us to see this patient. Darlene Bassett MD          This note is created with the assistance of a speech recognition program.  While intending to generate a document that actually reflects the content of the visit, the document can still have some errors including those of syntax and sound a like substitutions which may escape proof reading. It such instances, actual meaning can be extrapolated by contextual diversion.

## 2018-08-29 LAB
ANION GAP SERPL CALCULATED.3IONS-SCNC: 18 MMOL/L (ref 9–17)
ANTI B2-GLYCOPROTEIN IGG: 0 SGU (ref 0–20)
ANTI B2-GLYCOPROTEIN IGM: 3 SMU (ref 0–20)
BUN BLDV-MCNC: 67 MG/DL (ref 6–20)
BUN/CREAT BLD: ABNORMAL (ref 9–20)
CALCIUM IONIZED: 1.14 MMOL/L (ref 1.13–1.33)
CALCIUM SERPL-MCNC: 8.4 MG/DL (ref 8.6–10.4)
CHLORIDE BLD-SCNC: 97 MMOL/L (ref 98–107)
CO2: 21 MMOL/L (ref 20–31)
CREAT SERPL-MCNC: 5.4 MG/DL (ref 0.5–0.9)
CULTURE: NORMAL
CULTURE: NORMAL
GFR AFRICAN AMERICAN: 10 ML/MIN
GFR NON-AFRICAN AMERICAN: 8 ML/MIN
GFR SERPL CREATININE-BSD FRML MDRD: ABNORMAL ML/MIN/{1.73_M2}
GFR SERPL CREATININE-BSD FRML MDRD: ABNORMAL ML/MIN/{1.73_M2}
GLUCOSE BLD-MCNC: 109 MG/DL (ref 65–105)
GLUCOSE BLD-MCNC: 125 MG/DL (ref 65–105)
GLUCOSE BLD-MCNC: 163 MG/DL (ref 65–105)
GLUCOSE BLD-MCNC: 179 MG/DL (ref 65–105)
GLUCOSE BLD-MCNC: 90 MG/DL (ref 65–105)
GLUCOSE BLD-MCNC: 93 MG/DL (ref 70–99)
LACTIC ACID, WHOLE BLOOD: 0.7 MMOL/L (ref 0.7–2.1)
LACTIC ACID: NORMAL MMOL/L
Lab: NORMAL
Lab: NORMAL
MAGNESIUM: 2.1 MG/DL (ref 1.6–2.6)
PHOSPHORUS: 5.5 MG/DL (ref 2.6–4.5)
POTASSIUM SERPL-SCNC: 4 MMOL/L (ref 3.7–5.3)
SODIUM BLD-SCNC: 136 MMOL/L (ref 135–144)
SPECIMEN DESCRIPTION: NORMAL
SPECIMEN DESCRIPTION: NORMAL
STATUS: NORMAL
STATUS: NORMAL
TROPONIN INTERP: ABNORMAL
TROPONIN T: 0.47 NG/ML
TROPONIN T: 0.51 NG/ML
TROPONIN T: 0.56 NG/ML
URINE IFX INTERP: NORMAL
URINE IFX SPECIMEN: NORMAL
URINE TOTAL PROTEIN: 85 MG/DL
VOLUME: NORMAL ML

## 2018-08-29 PROCEDURE — 82330 ASSAY OF CALCIUM: CPT

## 2018-08-29 PROCEDURE — 2580000003 HC RX 258: Performed by: STUDENT IN AN ORGANIZED HEALTH CARE EDUCATION/TRAINING PROGRAM

## 2018-08-29 PROCEDURE — 6370000000 HC RX 637 (ALT 250 FOR IP): Performed by: STUDENT IN AN ORGANIZED HEALTH CARE EDUCATION/TRAINING PROGRAM

## 2018-08-29 PROCEDURE — 84156 ASSAY OF PROTEIN URINE: CPT

## 2018-08-29 PROCEDURE — 51702 INSERT TEMP BLADDER CATH: CPT

## 2018-08-29 PROCEDURE — 2060000002 HC BURN ICU INTERMEDIATE R&B

## 2018-08-29 PROCEDURE — 97530 THERAPEUTIC ACTIVITIES: CPT

## 2018-08-29 PROCEDURE — 94762 N-INVAS EAR/PLS OXIMTRY CONT: CPT

## 2018-08-29 PROCEDURE — 99233 SBSQ HOSP IP/OBS HIGH 50: CPT | Performed by: INTERNAL MEDICINE

## 2018-08-29 PROCEDURE — 80307 DRUG TEST PRSMV CHEM ANLYZR: CPT

## 2018-08-29 PROCEDURE — 83036 HEMOGLOBIN GLYCOSYLATED A1C: CPT

## 2018-08-29 PROCEDURE — G0480 DRUG TEST DEF 1-7 CLASSES: HCPCS

## 2018-08-29 PROCEDURE — 6360000002 HC RX W HCPCS: Performed by: STUDENT IN AN ORGANIZED HEALTH CARE EDUCATION/TRAINING PROGRAM

## 2018-08-29 PROCEDURE — 84484 ASSAY OF TROPONIN QUANT: CPT

## 2018-08-29 PROCEDURE — 99232 SBSQ HOSP IP/OBS MODERATE 35: CPT | Performed by: INTERNAL MEDICINE

## 2018-08-29 PROCEDURE — 83605 ASSAY OF LACTIC ACID: CPT

## 2018-08-29 PROCEDURE — 36415 COLL VENOUS BLD VENIPUNCTURE: CPT

## 2018-08-29 PROCEDURE — 6370000000 HC RX 637 (ALT 250 FOR IP): Performed by: HOSPITALIST

## 2018-08-29 PROCEDURE — 97116 GAIT TRAINING THERAPY: CPT

## 2018-08-29 PROCEDURE — 84100 ASSAY OF PHOSPHORUS: CPT

## 2018-08-29 PROCEDURE — 97110 THERAPEUTIC EXERCISES: CPT

## 2018-08-29 PROCEDURE — 83735 ASSAY OF MAGNESIUM: CPT

## 2018-08-29 PROCEDURE — 86335 IMMUNFIX E-PHORSIS/URINE/CSF: CPT

## 2018-08-29 PROCEDURE — 82947 ASSAY GLUCOSE BLOOD QUANT: CPT

## 2018-08-29 PROCEDURE — 6370000000 HC RX 637 (ALT 250 FOR IP): Performed by: EMERGENCY MEDICINE

## 2018-08-29 PROCEDURE — 6360000002 HC RX W HCPCS: Performed by: INTERNAL MEDICINE

## 2018-08-29 PROCEDURE — 80048 BASIC METABOLIC PNL TOTAL CA: CPT

## 2018-08-29 RX ORDER — SODIUM CHLORIDE 0.9 % (FLUSH) 0.9 %
10 SYRINGE (ML) INJECTION PRN
Status: DISCONTINUED | OUTPATIENT
Start: 2018-08-29 | End: 2018-09-05 | Stop reason: HOSPADM

## 2018-08-29 RX ORDER — SODIUM CHLORIDE 0.9 % (FLUSH) 0.9 %
10 SYRINGE (ML) INJECTION EVERY 12 HOURS SCHEDULED
Status: DISCONTINUED | OUTPATIENT
Start: 2018-08-29 | End: 2018-09-05 | Stop reason: HOSPADM

## 2018-08-29 RX ORDER — ONDANSETRON 2 MG/ML
4 INJECTION INTRAMUSCULAR; INTRAVENOUS EVERY 6 HOURS PRN
Status: DISCONTINUED | OUTPATIENT
Start: 2018-08-29 | End: 2018-09-05 | Stop reason: HOSPADM

## 2018-08-29 RX ORDER — 0.9 % SODIUM CHLORIDE 0.9 %
2000 INTRAVENOUS SOLUTION INTRAVENOUS ONCE
Status: DISCONTINUED | OUTPATIENT
Start: 2018-08-29 | End: 2018-08-29

## 2018-08-29 RX ORDER — FUROSEMIDE 10 MG/ML
80 INJECTION INTRAMUSCULAR; INTRAVENOUS 2 TIMES DAILY
Status: DISCONTINUED | OUTPATIENT
Start: 2018-08-29 | End: 2018-08-30

## 2018-08-29 RX ORDER — SODIUM CHLORIDE 9 MG/ML
INJECTION, SOLUTION INTRAVENOUS CONTINUOUS
Status: DISCONTINUED | OUTPATIENT
Start: 2018-08-29 | End: 2018-08-29

## 2018-08-29 RX ORDER — INSULIN GLARGINE 100 [IU]/ML
15 INJECTION, SOLUTION SUBCUTANEOUS DAILY
Status: DISCONTINUED | OUTPATIENT
Start: 2018-08-30 | End: 2018-08-30

## 2018-08-29 RX ADMIN — SODIUM CHLORIDE 2000 ML: 9 INJECTION, SOLUTION INTRAVENOUS at 12:30

## 2018-08-29 RX ADMIN — VITAMIN D, TAB 1000IU (100/BT) 5000 UNITS: 25 TAB at 09:36

## 2018-08-29 RX ADMIN — FUROSEMIDE 60 MG: 10 INJECTION, SOLUTION INTRAMUSCULAR; INTRAVENOUS at 09:42

## 2018-08-29 RX ADMIN — CLOPIDOGREL 75 MG: 75 TABLET, FILM COATED ORAL at 09:36

## 2018-08-29 RX ADMIN — CARVEDILOL 3.12 MG: 3.12 TABLET, FILM COATED ORAL at 09:36

## 2018-08-29 RX ADMIN — HEPARIN SODIUM 5000 UNITS: 5000 INJECTION, SOLUTION INTRAVENOUS; SUBCUTANEOUS at 16:43

## 2018-08-29 RX ADMIN — INSULIN LISPRO 1 UNITS: 100 INJECTION, SOLUTION INTRAVENOUS; SUBCUTANEOUS at 09:38

## 2018-08-29 RX ADMIN — INSULIN LISPRO 1 UNITS: 100 INJECTION, SOLUTION INTRAVENOUS; SUBCUTANEOUS at 22:11

## 2018-08-29 RX ADMIN — HEPARIN SODIUM 5000 UNITS: 5000 INJECTION, SOLUTION INTRAVENOUS; SUBCUTANEOUS at 07:33

## 2018-08-29 RX ADMIN — CARVEDILOL 3.12 MG: 3.12 TABLET, FILM COATED ORAL at 22:04

## 2018-08-29 RX ADMIN — SODIUM CHLORIDE: 9 INJECTION, SOLUTION INTRAVENOUS at 04:30

## 2018-08-29 RX ADMIN — FUROSEMIDE 80 MG: 10 INJECTION, SOLUTION INTRAMUSCULAR; INTRAVENOUS at 18:01

## 2018-08-29 RX ADMIN — Medication 10 ML: at 21:00

## 2018-08-29 RX ADMIN — ATORVASTATIN CALCIUM 80 MG: 80 TABLET, FILM COATED ORAL at 22:04

## 2018-08-29 ASSESSMENT — PAIN SCALES - GENERAL
PAINLEVEL_OUTOF10: 0

## 2018-08-29 NOTE — PROGRESS NOTES
negative for myalgias, arthralgias, pain, joint swelling,and bone pain   Neurological: negative for headaches, dizziness, seizures, weakness, numbness        Objective:     Vitals: BP (!) 141/65   Pulse 87   Temp 97.7 °F (36.5 °C) (Oral)   Resp 17   Ht 5' 5\" (1.651 m)   Wt 159 lb 9.8 oz (72.4 kg)   SpO2 99%   BMI 26.56 kg/m²   General appearance - well appearing, no in pain or distress   Mental status - alert and cooperative   Eyes - pupils equal and reactive, extraocular eye movements intact   Ears - bilateral TM's and external ear canals normal   Mouth - mucous membranes moist, pharynx normal without lesions   Neck - supple, no significant adenopathy   Lymphatics - no palpable lymphadenopathy, no hepatosplenomegaly   Chest - clear to auscultation, no wheezes, rales or rhonchi, symmetric air entry   Heart - normal rate, regular rhythm, normal S1, S2, no murmurs  Abdomen - soft, nontender, nondistended, no masses or organomegaly   Neurological - alert, oriented, normal speech, no focal findings or movement disorder noted   Musculoskeletal - no joint tenderness, deformity or swelling   Extremities - peripheral pulses normal, no pedal edema, no clubbing or cyanosis   Skin - normal coloration and turgor, no rashes, no suspicious skin lesions noted ,        Data:    I/O this shift:  In: 1300 [I.V.:1300]  Out: 58 [Urine:58]  In: 1449 [I.V.:1449]  Out: 315 [Urine:315]    CBC: No results for input(s): WBC, HGB, PLT in the last 72 hours. BMP:    Recent Labs      08/28/18   0745  08/28/18   1554  08/29/18   0540   NA  133*  132*  136   K  3.8  4.5  4.0   CL  95*  95*  97*   CO2  22  21  21   BUN  48*  60*  67*   CREATININE  3.91*  4.59*  5.40*   GLUCOSE  153*  166*  93     Hepatic: No results for input(s): AST, ALT, ALB, BILITOT, ALKPHOS in the last 72 hours. INR: No results for input(s): INR in the last 72 hours. PTT:No results for input(s): PTT in the last 72 hours.            PMH:  Past Medical History: Diagnosis Date    CAD (coronary artery disease)     Cardiomyopathy [facility] [address]     Depression     Diabetes mellitus ([address]     H/O mitral valve repair     History of fractured kneecap 2017    right    Hx of CABG     Hx of myocardial infarction     Hyperlipidemia     Hypertension     ICD (implantable cardioverter-defibrillator) in place     Kidney calculi     Osteoarthritis     Renal calculi       Allergies: Allergies   Allergen Reactions    Nsaids      nausea    Codeine Nausea And Vomiting        Assessment        Primary Problem:  Chest pain   Current Problems:  Active Hospital Problems    Diagnosis Date Noted    Elevated serum immunoglobulin free light chains [R76.8]     CHAD (acute kidney injury) ([facility] [address] [N17.9] 08/23/2018    SIRS (systemic inflammatory response syndrome) (HCC) [R65.10] 08/23/2018    DM (diabetes mellitus), type 2 with neurological complications ([facility] [address] [E78.70] 01/26/2016    CAD (coronary artery disease) [I25.10]     Hx of CABG [Z95.1]     ICD (implantable cardioverter-defibrillator) in place [Z95.810]     H/O mitral valve repair [Z98.890]     Hypertension [I10]     S/P cardiac cath-Patent grafts 6/5/15 [Z98.890] 06/04/2015     Acute kidney injury  Abnormal free light chain ratio  Congestive heart failure and had a myopathy  Coronary artery disease  Diabetic ketoacidosis  anemia      Plan   Personally reviewed results of lab workup imaging studies. Patient has abnormal free light chain ratio which can be explained by renal insufficiency however free light chain only paraproteinemia is also in differential.  We will order urine immunofixation studies. Do not recommend bone marrow biopsy at this point. Monitor H&H  Continue supportive care and current management.        Discussed with patient and Nurse.        Thank you for asking us to see this patient.       This note is created with the assistance of a speech recognition program.  While intending to generate a document that actually reflects the content of the visit, the document can still have some errors including those of syntax and sound a like substitutions which may escape proof reading. It such instances, actual meaning can be extrapolated by contextual diversion.

## 2018-08-29 NOTE — PROGRESS NOTES
ICU PATIENT TRANSFER NOTE        Patient:  Regan Higgins  YOB: 1960    MRN: [id number]     Acct: [de-identified]     Admit date: 8/23/2018    Code Status:-  Full    Reason for ICU Admission:-       SUPPORT DEVICES: [] Ventilator [] BIPAP  [] Nasal Cannula [x] Room Air    Consultations:- [] Cardiology [] Nephrology  [] Hemo onco  [] GI                               [] ID [] ENT  [] Rheum [] Endo   []Physiotherapy                                 Others    NUTRITION:  [] NPO [] Tube Feeding (Specify:  [] TPN  [] PO    Central Lines:- [] No   [] Yes           If yes - Days/Date of Insertion        Pt seen and Chart reviewed. ICU COURSE :-    58-year-old female with past medical history coronary artery disease status post CABG, ICD in place, hypertension, diabetes, hyperlipidemia presented to the hospital after being a transfer from outlying facility. She was found to be in metabolic encephalopathy secondary to acute renal failure as well as respiratory failure for which she was intubated. She was found to be in lactic acidosis hyperkalemic diabetic Ketoacidosis, was dialyzed on the day of admission and started on pressors. During her stay she was extubated next day on 8/24, transition to BiPAP and, pressors was weaned off. .    Echo done showed ejection fraction of 40-45%. During her stay in the ICU she was dialyzed twice first on the day of admission then on 8/27. She was found to have bilateral pulmonary infiltrates and was treated with vancomycin and cefepime.   Blood cultures have been negative so far        Physical Exam:  Vitals: BP (!) 141/65   Pulse 87   Temp 97.7 °F (36.5 °C) (Oral)   Resp 17   Ht 5' 5\" (1.651 m)   Wt 159 lb 9.8 oz (72.4 kg)   SpO2 99%   BMI 26.56 kg/m²   24 hour intake/output:  Intake/Output Summary (Last 24 hours) at 08/29/18 1419  Last data filed at 08/29/18 1301   Gross per 24 hour   Intake

## 2018-08-29 NOTE — PROGRESS NOTES
Critical Care Team - Daily Progress Note      Date and time: 8/29/2018 9:03 AM  Patient's name:  Familia Soriano Record Number: [id number]  Patient's account/billing number: [de-identified]  Patient's YOB: 1960  Age: 61 y.o. Date of Admission: 8/23/2018  4:01 AM  Length of stay during current admission: 6    Primary Care Physician: Debo Agudelo MD  ICU Attending Physician: Dr. Susan Schmidt    Code Status: Prior    Reason for ICU admission: Metabolic encephalopathy, respiratory failure w/ intubation, acute renal failure, DKA, hyperkalemia    SUBJECTIVE:     OVERNIGHT EVENTS:    No acute events overnight  Afebrile,VSS  Patient eating and drinking fine  Continue chest pain, nausea vomiting, vomiting, shortness of breath          Urine output in the last 24 hours: In: 302 [P. O.:50; I.V.:252]  Out: 100 [Urine:100]    Patient Vitals for the past 96 hrs (Last 3 readings):   Weight   08/27/18 1915 157 lb 10.1 oz (71.5 kg)   08/27/18 1600 164 lb 10.9 oz (74.7 kg)   08/27/18 0400 165 lb 11.2 oz (75.2 kg)     AWAKE & FOLLOWING COMMANDS:  [] No   [x] Yes    CURRENT VENTILATION STATUS:     [] Ventilator  [] BIPAP  [x] high flow Nasal Cannula [] Room Air      SECRETIONS Amount:  [] Small [] Moderate  [] Large  [x] None  Color:     [] White [] Colored  [] Bloody    SEDATION:  RAAS Score:  [] Propofol gtt  [] Versed gtt  [] Ativan gtt   [x] No Sedation    PARALYZED:  [x] No    [] Yes    DIARRHEA:                [x] No                [] Yes  (C. Difficile status: [] positive                                                                                                                       [] negative                                                                                                                     [] pending)    VASOPRESSORS:  [x] No    [] Yes    If yes -   [] Levophed       [] Dopamine     [] Vasopressin       [] Dobutamine  [] Phenylephrine         [] Epinephrine    CENTRAL LINES:     [] No Meds:   furosemide  60 mg Intravenous BID    insulin glargine  20 Units Subcutaneous Daily    insulin lispro  0-6 Units Subcutaneous TID WC    insulin lispro  0-3 Units Subcutaneous Nightly    carvedilol  3.125 mg Oral BID    atorvastatin  80 mg Oral Daily    vitamin D  5,000 Units Oral Daily    clopidogrel  75 mg Oral Daily    heparin flush  300 Units Intercatheter Once    heparin flush  300 Units Intercatheter Once    vancomycin (VANCOCIN) intermittent dosing (placeholder)   Other RX Placeholder    insulin regular  0.1 Units/kg Intravenous Once    heparin (porcine)  5,000 Units Subcutaneous 3 times per day    cefepime (MAXIPIME) 1 g IVPB extended (mini-bag)  1 g Intravenous Q24H     Continuous Infusions:   sodium chloride 10 mL/hr at 08/29/18 0430    dextrose       PRN Meds:     acetaminophen 650 mg Q8H PRN   ipratropium-albuterol 1 ampule Q4H PRN   sodium chloride 250 mL PRN   sodium chloride 150 mL PRN   albuterol 2.5 mg Q6H PRN   glucose 15 g PRN   dextrose 12.5 g PRN   glucagon (rDNA) 1 mg PRN   dextrose 100 mL/hr PRN   heparin (porcine) 9,500 Units PRN   heparin (porcine) 8,000 Units PRN   dextrose 12.5 g PRN     VENT SETTINGS (Comprehensive) (if applicable):  Vent Information  Ventilator Started: Yes  Ventilator Stopped: (S) Yes  Ventilation Day(s): 1  Vent Type: Servo i  Vent Mode: NIV/PC  Vt Ordered: 460 mL  Pressure Ordered: 8  Rate Set: 14 bmp  Pressure Support: 8 cmH20  FiO2 : (S) 30 %  Sensitivity: 5  PEEP/CPAP: 8  I Time/ I Time %: 0.9 s  Humidification Source: Heated wire  Humidification Temp: 31  Humidification Temp Measured: 32  Circuit Condensation: Drained  Additional Respiratory  Assessments  Pulse: 92  Resp: 20  SpO2: 99 %  End Tidal CO2: 25 (%)  Position: Semi-Simon's  Humidification Source: Heated wire  Humidification Temp: 31  Circuit Condensation: Drained  Oral Care Completed?: Yes  Oral Care: Mouth moisturizer  Subglottic Suction Done?: Yes    ABGs:     Laboratory

## 2018-08-29 NOTE — PLAN OF CARE
Problem: OXYGENATION/RESPIRATORY FUNCTION  Goal: Patient will maintain patent airway  Outcome: Ongoing    Goal: Patient will achieve/maintain normal respiratory rate/effort  Respiratory rate and effort will be within normal limits for the patient   Outcome: Ongoing      Problem: SKIN INTEGRITY  Goal: Skin integrity is maintained or improved  Outcome: Ongoing      Problem: Nutrition  Goal: Optimal nutrition therapy  Outcome: Ongoing      Problem: Risk for Impaired Skin Integrity  Goal: Tissue integrity - skin and mucous membranes  Structural intactness and normal physiological function of skin and  mucous membranes.    Outcome: Ongoing      Problem: Falls - Risk of:  Goal: Will remain free from falls  Will remain free from falls   Outcome: Ongoing    Goal: Absence of physical injury  Absence of physical injury   Outcome: Ongoing      Problem: Musculor/Skeletal Functional Status  Goal: Highest potential functional level  Outcome: Ongoing    Goal: Absence of falls  Outcome: Ongoing

## 2018-08-29 NOTE — PROGRESS NOTES
Nephrology Progress Note      SUBJECTIVE      Patient was seen and examined. Had dialysis on 18 secondary to severe hyperkalemia and repeat on 18. Cr 5.4 today, previous 4.59, K 4, Bicarb 21. Got 3.4 L off with HD on 18 and UOP not accurate in EHR for last 24 hrs, had unmeasured ones also. Patient denied any complaints, chest pain, SOB, nausea, vomiting, abdominal pain, diarrhea  SBP 's    Weights since last 3 days 75.2 --> 72 kgs --> 72.4 kgs    ANCA neg, anti GBM antibody negative, complement levels normal     OBJECTIVE      CURRENT TEMPERATURE:  Temp: 98.2 °F (36.8 °C)  MAXIMUM TEMPERATURE OVER 24HRS:  Temp (24hrs), Av.6 °F (37 °C), Min:98.2 °F (36.8 °C), Max:99.5 °F (37.5 °C)    CURRENT RESPIRATORY RATE:  Resp: 20  CURRENT PULSE:  Pulse: 92  CURRENT BLOOD PRESSURE:  BP: 127/62  24HR BLOOD PRESSURE RANGE:  Systolic (94UQY), CHRISTOPHER:110 , Min:94 , OYU:675   ; Diastolic (74FNJ), AFM:88, Min:42, Max:72    24HR INTAKE/OUTPUT:      Intake/Output Summary (Last 24 hours) at 18 1153  Last data filed at 18 1100   Gross per 24 hour   Intake              520 ml   Output              100 ml   Net              420 ml       PHYSICAL EXAM      GENERAL APPEARANCE:Awake, alert on NC  SKIN: warm and dry, no rash or erythema  EYES: conjunctivae normal and sclera anicteric  NECK: carotids without bruits bilaterally JVD: None   PULMONARY: coarse rhonchi present- throughout, expiratory wheezing   CADRDIOVASCULAR: normal S1 and S2, no gallops and intact distal pulses.    ABDOMEN: soft nontender, bowel sounds present, no organomegaly,  no ascites  EXTREMITIES: no cyanosis, clubbing or edema  Right Groin Julio Cesar    CURRENT MEDICATIONS        [START ON 2018] insulin glargine (LANTUS) injection vial 15 Units Daily   acetaminophen (TYLENOL) tablet 650 mg Q8H PRN   ipratropium-albuterol (DUONEB) nebulizer solution 1 ampule Q4H PRN   furosemide (LASIX) injection 60 mg BID   insulin lispro (HUMALOG) injection vial 0-6 Units TID WC   insulin lispro (HUMALOG) injection vial 0-3 Units Nightly   0.9 % sodium chloride infusion Continuous   carvedilol (COREG) tablet 3.125 mg BID   0.9 % sodium chloride bolus PRN   0.9 % sodium chloride bolus PRN   albuterol (PROVENTIL) nebulizer solution 2.5 mg Q6H PRN   atorvastatin (LIPITOR) tablet 80 mg Daily   vitamin D (CHOLECALCIFEROL) tablet 5,000 Units Daily   clopidogrel (PLAVIX) tablet 75 mg Daily   heparin flush 100 UNIT/ML injection 300 Units Once   heparin flush 100 UNIT/ML injection 300 Units Once   glucose (GLUTOSE) 40 % oral gel 15 g PRN   dextrose 50 % solution 12.5 g PRN   glucagon (rDNA) injection 1 mg PRN   dextrose 5 % solution PRN   heparin (porcine) injection 9,500 Units PRN   heparin (porcine) injection 8,000 Units PRN   dextrose 50 % solution 12.5 g PRN   insulin regular (HUMULIN R;NOVOLIN R) injection 8 Units Once   heparin (porcine) injection 5,000 Units 3 times per day         LABS    CBC:   No results for input(s): WBC, RBC, HGB, HCT, MCV, MCH, MCHC, RDW, PLT, MPV in the last 72 hours. BMP:   Recent Labs      08/28/18   0745  08/28/18   1554  08/29/18   0540   NA  133*  132*  136   K  3.8  4.5  4.0   CL  95*  95*  97*   CO2  22  21  21   BUN  48*  60*  67*   CREATININE  3.91*  4.59*  5.40*   GLUCOSE  153*  166*  93   CALCIUM  8.5*  8.8  8.4*      Phosphorus:    Recent Labs      08/29/18   0540   PHOS  5.5*     Magnesium:   Recent Labs      08/29/18   0540   MG  2.1     ALEJANDRO:   Lab Results   Component Value Date    ALEJANDRO  08/26/2018     This test is equivocal, which represents a borderline ALEJANDRO result.   Suggest     SPEP:   Lab Results   Component Value Date    PROT 6.6 08/23/2018    ALBCAL 4.1 08/23/2018    ALBPCT 62 08/23/2018    LABALPH 0.2 08/23/2018    LABALPH 0.7 08/23/2018    A1PCT 3 08/23/2018    A2PCT 10 08/23/2018    LABBETA 0.9 08/23/2018    BETAPCT 13 08/23/2018    GAMGLOB 0.8 08/23/2018    GGPCT 12 08/23/2018    PATH ELECTRONICALLY SIGNED. Aung Freedman M.D. 08/27/2018     Urine Creatinine:    Lab Results   Component Value Date    LABCREA 33.5 08/26/2018     Urinalysis:  U/A:   Lab Results   Component Value Date    NITRU NEGATIVE 08/23/2018    COLORU ORANGE 08/23/2018    PHUR 5.5 08/23/2018    WBCUA TOO NUMEROUS TO COUNT 08/23/2018    RBCUA TOO NUMEROUS TO COUNT 08/23/2018    MUCUS NOT REPORTED 08/23/2018    TRICHOMONAS NOT REPORTED 08/23/2018    YEAST NOT REPORTED 08/23/2018    BACTERIA NOT REPORTED 08/23/2018    SPECGRAV 1.015 08/23/2018    LEUKOCYTESUR TRACE 08/23/2018    UROBILINOGEN Normal 08/23/2018    BILIRUBINUR NEGATIVE 08/23/2018    GLUCOSEU 2+ 08/23/2018    KETUA TRACE 08/23/2018    AMORPHOUS NOT REPORTED 08/23/2018       BMP:   Recent Labs      08/29/18   0540   NA  136   K  4.0   CL  97*   CO2  21   BUN  67*   CREATININE  5.40*   GLUCOSE  93   CALCIUM  8.4*        Phosphorus:    Recent Labs      08/27/18   0448  08/28/18   0457  08/29/18   0540   PHOS  5.3*  3.7  5.5*     Magnesium:   Recent Labs      08/27/18   0448  08/28/18   0457  08/29/18   0540   MG  2.0  1.8  2.1     Albumin:   No results for input(s): LABALBU in the last 72 hours. Results for Nghia Baeza (MRN [id number]) as of 8/25/2018 16:01   Ref. Range 8/23/2018 18:09   Creatinine, Ur Latest Ref Range: 28.0 - 217.0 mg/dL 42.4   Total Protein, Urine Latest Units: mg/dL 850       Radiology:    CXR 8/27/2018  NG tube and left subclavian central line remains in place.  Left transvenous  ICD and prosthetic heart valve unchanged.  Stable cardiomediastinal  silhouette.  Diffuse interstitial and airspace opacities again demonstrated  but showing slight decrease in severity.  No pleural effusion or pneumothorax. CXR 8/25/18  FINDINGS:   There is new diffuse opacity of both lungs, with interstitial opacity on more   confluent airspace disease.       A nasogastric tube extends into the stomach.  Left-sided AICD again noted.    Costophrenic angles appear sharp.  No pneumothorax.           Impression   New and increased extensive bilateral pulmonary disease consistent with   developing pulmonary edema     CXR 8/24/18  1. Support devices as above.  The enteric tube is not well visualized past   the mid mediastinum, which may be related to technique. 2. Enlarged cardiac silhouette with prominence of the pulmonary vasculature. 3. Development of patchy opacities within the lungs bilaterally, which may   represent pulmonary edema     ECHO 8/24/18  Summary  Left ventricle is in the upper limits of normal in size with moderately  reduced systolic function. Estimated ejection fraction is 40-45%. Left atrium is mildly dilated. Normal right ventricular size and function. Pacemaker / ICD lead seen in right ventricle. Aortic sclerosis and mild focal calcification without stenosis. Mitral avril repair is noted. Mean gradient across the mitral valve is 6 mmHg  Mild mitral regurgitation. Mild tricuspid regurgitation. Estimated right ventricular systolic pressure is 34 mmHg. Renal US 8/23/18: The right kidney measures 12.2 cm in length and the left kidney measures 11.3  cm in length.  Right renal cortex measures approximately 1.0 cm.  Left renal  cortex measures approximately 0.7 cm. Impression:   Parenchymal thinning of both kidneys can relate to atrophy/medical renal  disease.  No hydronephrosis of either kidney. Assessment:  1. Acute Kidney Injury: non-oliguric likely due to Ischemic ATN was in DKA, hypotensive required pressors. Baseline Cr 1-1.2. Required HD on admission day due electrolyte imbalances and repeat on 8/27/18. ALEJANDRO equivocal, RF 24, K 22.86, L 7.68, K/L 2.98, ANCA neg, anti GBM antibody negative, complement levels normal, SPEP normal, IFX Neg., UPC 0.99.  US 8/23/18: Right Kidney 12.2mc, Left 11.3cm, Right renal cortex 1.0cm, Left cortex 0.7 cm.    Parenchymal thinning of both kidneys can relate to atrophy/medical renal  disease.  No hydronephrosis of

## 2018-08-29 NOTE — PROGRESS NOTES
Luanne Day, [facility]. Metabolic encephalopathy [V60.99] . Vitals:    08/29/18 1203   BP: (!) 141/65   Pulse: 87   Resp: 17   Temp: 97.7 °F (36.5 °C)   SpO2: 99%   . Patients home meds are   Prior to Admission medications    Medication Sig Start Date End Date Taking? Authorizing Provider   Insulin Pen Needle 32G X 4 MM MISC 1 each by Does not apply route daily 4/25/18   Misael Dhillon MD   insulin glargine [facility]) 100 UNIT/ML injection pen Take 30 units SC every AM 4/24/18   Misael Dhillon MD   carvedilol (COREG) 3.125 MG tablet take 1 tablet by mouth twice a day with meals 4/11/18   Misael Dhillon MD   atorvastatin (LIPITOR) 80 MG tablet Take 1 tablet by mouth daily 4/11/18   Misael Dhillon MD   metFORMIN (GLUCOPHAGE) 1000 MG tablet take 1 tablet by mouth twice a day with meals 4/11/18   Misael Dhillon MD   gabapentin (NEURONTIN) 300 MG capsule Take 1 capsule by mouth 2 times daily for 30 days. . 4/11/18 5/11/18  Misael Dhillon MD   potassium chloride (MICRO-K) 10 MEQ extended release capsule take 1 capsule by mouth daily 4/11/18   Misael Dhillon MD   sertraline (ZOLOFT) 100 MG tablet take 1 tablet by mouth once daily 4/11/18   Misael Dhillon MD   clopidogrel (PLAVIX) 75 MG tablet take 1 tablet by mouth daily 4/11/18   Misael Dhillon MD   lisinopril (PRINIVIL;ZESTRIL) 2.5 MG tablet take 1 tablet by mouth daily 4/11/18   Misael Dhillon MD   furosemide (LASIX) 20 MG tablet take 1 tablet by mouth once daily 4/11/18   Misael Dhillon MD   insulin detemir (LEVEMIR FLEXPEN) 100 UNIT/ML injection pen Patient also uses a slide scale with the levem 11/2/17   Misael Dhillon MD   Cholecalciferol (VITAMIN D3) 5000 units TABS Take 5,000 Units by mouth daily    Historical Provider, MD   gentamicin (GARAMYCIN) 0.1 % cream  7/26/17   Historical Provider, MD   glucose monitoring kit (FREESTYLE) monitoring kit 1 kit by Does not apply route daily as needed Please dispense

## 2018-08-29 NOTE — PROGRESS NOTES
Physical Therapy  Facility/Department: [address] BURN UNIT  Daily Treatment Note  NAME: Jeimy Talbot  : 1960  MRN: [id number]    Date of Service: 2018    Discharge Recommendations:  IP Rehab   In my professional opinion, this patient could tolerate a total of 3 hours of combined therapies post-acute care. Patient Diagnosis(es): The primary encounter diagnosis was Hyperkalemia. Diagnoses of Acute renal failure, unspecified acute renal failure type (HCC) and Sepsis, due to unspecified organism [facility]) were also pertinent to this visit. has a past medical history of CAD (coronary artery disease); Cardiomyopathy ([facility] [address] Depression; Diabetes mellitus ([facility] [address] H/O mitral valve repair; History of fractured kneecap; Hx of CABG; Hx of myocardial infarction; Hyperlipidemia; Hypertension; ICD (implantable cardioverter-defibrillator) in place; Kidney calculi; Osteoarthritis; and Renal calculi.   has a past surgical history that includes Cholecystectomy (); Hysterectomy (); Ankle surgery (Right, ); Kidney stone surgery (Left, ); Tubal ligation; Mitral valve surgery (6/4/15); Cardiac defibrillator placement (06/01/15); and Coronary artery bypass graft (05/28/15). Restrictions  Restrictions/Precautions  Restrictions/Precautions: General Precautions, Fall Risk  Position Activity Restriction  Other position/activity restrictions: OOB to chair okay per RN  Subjective   General  Chart Reviewed: Yes  Response To Previous Treatment: Patient with no complaints from previous session. Family / Caregiver Present: No  Subjective  Subjective: Per RN, patient okay for PT. Patient agreeable. Denied pain. Eager to ambulate.            Orientation  Orientation  Overall Orientation Status: Within Normal Limits  Objective   Bed mobility  Rolling to Left: Stand by assistance  Rolling to Right: Stand by assistance  Supine to Sit: Stand by assistance (HOB elevated and time/encouragement to complete)  Sit to Supine: Minimal assistance (LE management)  Scooting: Stand by assistance  Transfers  Sit to Stand: Minimal Assistance  Stand to sit: Minimal Assistance  Comment: Verbal cues for controlled descent. Multiple trials of STS. Educated on Daren Marquis. Ambulation  Ambulation?: Yes  Ambulation 1  Surface: level tile  Device: Rolling Walker  Other Apparatus: Wheelchair follow;O2 (2L)  Assistance: Contact guard assistance  Quality of Gait: narrow DURAN, very slow lexx but has increased step length  Distance: 75ft x3  Comments: SpO2 stable. Extended time provided to complete. Balance  Posture: Good  Sitting - Static: Good  Sitting - Dynamic: Good  Standing - Static: Good  Standing - Dynamic: Fair  Comments: standing balance assessed with RW  Exercises  Quad Sets: 10x  Gluteal Sets: 10x  Ankle Pumps: 10x   Seated LE exercise program: Long Arc Quads, hip abduction/adduction, heel/toe raises, and marches. Reps: 15x    Assessment   Body structures, Functions, Activity limitations: Decreased functional mobility ; Decreased endurance;Decreased balance;Decreased strength  Assessment: Sigificant improvement with gait training; 75ft x2 with RW and CGA w/c follow and 2L O2 - seated rest breaks. Recommending IP rehab to return patient to [facility]. Prognosis: Good  Patient Education: Dc recs  REQUIRES PT FOLLOW UP: Yes  Activity Tolerance  Activity Tolerance: Patient limited by endurance; Patient Tolerated treatment well     Goals  Short term goals  Time Frame for Short term goals: 14 visit  Short term goal 1: Pt to ambulate 50ft with least restrictive AD and no LOB. Short term goal 2: Pt to demonstrate good standing balance. Short term goal 3: Pt to perform bed mob and transfers independently. Short term goal 4: Pt to tolerate 20-30 mins ther ex/act for improved strength and endurance for ADL's.    Patient Goals   Patient goals : none stated    Plan    Plan  Times per week: 5-6 x week  Times per day: Daily  Current Treatment Recommendations: Strengthening, Balance Training, Functional Mobility Training, Transfer Training, Gait Training, Endurance Training, Safety Education & Training, Home Exercise Program, Neuromuscular Re-education  Safety Devices  Type of devices: Call light within reach, Gait belt, Nurse notified  Restraints  Initially in place: No     Therapy Time   Individual Concurrent Group Co-treatment   Time In 0951         Time Out 1039         Minutes [address] [place], [place]

## 2018-08-30 ENCOUNTER — APPOINTMENT (OUTPATIENT)
Dept: DIALYSIS | Age: 58
DRG: 469 | End: 2018-08-30
Payer: MEDICARE

## 2018-08-30 LAB
-: NORMAL
ABSOLUTE EOS #: 0.16 K/UL (ref 0–0.44)
ABSOLUTE IMMATURE GRANULOCYTE: 0.19 K/UL (ref 0–0.3)
ABSOLUTE LYMPH #: 1.42 K/UL (ref 1.1–3.7)
ABSOLUTE MONO #: 0.69 K/UL (ref 0.1–1.2)
ABSOLUTE RETIC #: 0.03 M/UL (ref 0.03–0.08)
ALBUMIN SERPL-MCNC: 2.7 G/DL (ref 3.5–5.2)
ALBUMIN/GLOBULIN RATIO: 0.7 (ref 1–2.5)
ALP BLD-CCNC: 85 U/L (ref 35–104)
ALT SERPL-CCNC: 10 U/L (ref 5–33)
AMPHETAMINE SCREEN URINE: NEGATIVE
ANION GAP SERPL CALCULATED.3IONS-SCNC: 21 MMOL/L (ref 9–17)
AST SERPL-CCNC: 11 U/L
BARBITURATE SCREEN URINE: NEGATIVE
BASOPHILS # BLD: 0 % (ref 0–2)
BASOPHILS ABSOLUTE: <0.03 K/UL (ref 0–0.2)
BENZODIAZEPINE SCREEN, URINE: POSITIVE
BILIRUB SERPL-MCNC: 0.29 MG/DL (ref 0.3–1.2)
BILIRUBIN DIRECT: 0.1 MG/DL
BILIRUBIN, INDIRECT: 0.19 MG/DL (ref 0–1)
BLOOD BANK SPECIMEN: NORMAL
BUN BLDV-MCNC: 81 MG/DL (ref 6–20)
BUN/CREAT BLD: ABNORMAL (ref 9–20)
BUPRENORPHINE URINE: ABNORMAL
CALCIUM IONIZED: 1.09 MMOL/L (ref 1.13–1.33)
CALCIUM SERPL-MCNC: 8.2 MG/DL (ref 8.6–10.4)
CANNABINOID SCREEN URINE: NEGATIVE
CHLORIDE BLD-SCNC: 101 MMOL/L (ref 98–107)
CO2: 17 MMOL/L (ref 20–31)
COCAINE METABOLITE, URINE: NEGATIVE
CREAT SERPL-MCNC: 7.2 MG/DL (ref 0.5–0.9)
DIFFERENTIAL TYPE: ABNORMAL
EOSINOPHILS RELATIVE PERCENT: 2 % (ref 1–4)
ESTIMATED AVERAGE GLUCOSE: 117 MG/DL
FERRITIN: 862 UG/L (ref 13–150)
GFR AFRICAN AMERICAN: 7 ML/MIN
GFR NON-AFRICAN AMERICAN: 6 ML/MIN
GFR SERPL CREATININE-BSD FRML MDRD: ABNORMAL ML/MIN/{1.73_M2}
GFR SERPL CREATININE-BSD FRML MDRD: ABNORMAL ML/MIN/{1.73_M2}
GLOBULIN: ABNORMAL G/DL (ref 1.5–3.8)
GLUCOSE BLD-MCNC: 116 MG/DL (ref 70–99)
GLUCOSE BLD-MCNC: 149 MG/DL (ref 65–105)
GLUCOSE BLD-MCNC: 179 MG/DL (ref 65–105)
GLUCOSE BLD-MCNC: 218 MG/DL (ref 65–105)
GLUCOSE BLD-MCNC: 98 MG/DL (ref 65–105)
HBA1C MFR BLD: 5.7 % (ref 4–6)
HCT VFR BLD CALC: 21.8 % (ref 36.3–47.1)
HCT VFR BLD CALC: 27.3 % (ref 36.3–47.1)
HCT VFR BLD CALC: 27.8 % (ref 36.3–47.1)
HEMOGLOBIN: 6.6 G/DL (ref 11.9–15.1)
HEMOGLOBIN: 8.6 G/DL (ref 11.9–15.1)
HEMOGLOBIN: 8.7 G/DL (ref 11.9–15.1)
IMMATURE GRANULOCYTES: 3 %
IMMATURE RETIC FRACT: 10.6 % (ref 2.7–18.3)
IRON SATURATION: 30 % (ref 20–55)
IRON: 62 UG/DL (ref 37–145)
LYMPHOCYTES # BLD: 21 % (ref 24–43)
MAGNESIUM: 2.2 MG/DL (ref 1.6–2.6)
MCH RBC QN AUTO: 29.5 PG (ref 25.2–33.5)
MCHC RBC AUTO-ENTMCNC: 30.3 G/DL (ref 28.4–34.8)
MCV RBC AUTO: 97.3 FL (ref 82.6–102.9)
MDMA URINE: ABNORMAL
METHADONE SCREEN, URINE: NEGATIVE
METHAMPHETAMINE, URINE: ABNORMAL
MONOCYTES # BLD: 10 % (ref 3–12)
NRBC AUTOMATED: 0 PER 100 WBC
OPIATES, URINE: NEGATIVE
OXYCODONE SCREEN URINE: NEGATIVE
PDW BLD-RTO: 13.5 % (ref 11.8–14.4)
PHENCYCLIDINE, URINE: NEGATIVE
PHOSPHORUS: 7.3 MG/DL (ref 2.6–4.5)
PLATELET # BLD: 191 K/UL (ref 138–453)
PLATELET ESTIMATE: ABNORMAL
PMV BLD AUTO: 10.5 FL (ref 8.1–13.5)
POTASSIUM SERPL-SCNC: 4.3 MMOL/L (ref 3.7–5.3)
PROPOXYPHENE, URINE: ABNORMAL
RBC # BLD: 2.24 M/UL (ref 3.95–5.11)
RBC # BLD: ABNORMAL 10*6/UL
REASON FOR REJECTION: NORMAL
RETIC %: 1.4 % (ref 0.5–1.9)
RETIC HEMOGLOBIN: 28.2 PG (ref 28.2–35.7)
SEG NEUTROPHILS: 63 % (ref 36–65)
SEGMENTED NEUTROPHILS ABSOLUTE COUNT: 4.21 K/UL (ref 1.5–8.1)
SODIUM BLD-SCNC: 139 MMOL/L (ref 135–144)
TEST INFORMATION: ABNORMAL
TOTAL IRON BINDING CAPACITY: 210 UG/DL (ref 250–450)
TOTAL PROTEIN: 6.6 G/DL (ref 6.4–8.3)
TRICYCLIC ANTIDEPRESSANTS, UR: ABNORMAL
TROPONIN INTERP: ABNORMAL
TROPONIN T: 0.49 NG/ML
TROPONIN T: 0.51 NG/ML
TROPONIN T: 0.52 NG/ML
UNSATURATED IRON BINDING CAPACITY: 148 UG/DL (ref 112–347)
WBC # BLD: 6.7 K/UL (ref 3.5–11.3)
WBC # BLD: ABNORMAL 10*3/UL
ZZ NTE CLEAN UP: ORDERED TEST: NORMAL
ZZ NTE WITH NAME CLEAN UP: SPECIMEN SOURCE: NORMAL

## 2018-08-30 PROCEDURE — 83550 IRON BINDING TEST: CPT

## 2018-08-30 PROCEDURE — 86920 COMPATIBILITY TEST SPIN: CPT

## 2018-08-30 PROCEDURE — 2060000002 HC BURN ICU INTERMEDIATE R&B

## 2018-08-30 PROCEDURE — 6370000000 HC RX 637 (ALT 250 FOR IP): Performed by: EMERGENCY MEDICINE

## 2018-08-30 PROCEDURE — 6370000000 HC RX 637 (ALT 250 FOR IP): Performed by: INTERNAL MEDICINE

## 2018-08-30 PROCEDURE — 85018 HEMOGLOBIN: CPT

## 2018-08-30 PROCEDURE — 90935 HEMODIALYSIS ONE EVALUATION: CPT

## 2018-08-30 PROCEDURE — 85025 COMPLETE CBC W/AUTO DIFF WBC: CPT

## 2018-08-30 PROCEDURE — 6370000000 HC RX 637 (ALT 250 FOR IP): Performed by: STUDENT IN AN ORGANIZED HEALTH CARE EDUCATION/TRAINING PROGRAM

## 2018-08-30 PROCEDURE — 51702 INSERT TEMP BLADDER CATH: CPT

## 2018-08-30 PROCEDURE — 82947 ASSAY GLUCOSE BLOOD QUANT: CPT

## 2018-08-30 PROCEDURE — 6360000002 HC RX W HCPCS: Performed by: INTERNAL MEDICINE

## 2018-08-30 PROCEDURE — 82330 ASSAY OF CALCIUM: CPT

## 2018-08-30 PROCEDURE — 2580000003 HC RX 258: Performed by: STUDENT IN AN ORGANIZED HEALTH CARE EDUCATION/TRAINING PROGRAM

## 2018-08-30 PROCEDURE — 36415 COLL VENOUS BLD VENIPUNCTURE: CPT

## 2018-08-30 PROCEDURE — 80048 BASIC METABOLIC PNL TOTAL CA: CPT

## 2018-08-30 PROCEDURE — 80076 HEPATIC FUNCTION PANEL: CPT

## 2018-08-30 PROCEDURE — 84484 ASSAY OF TROPONIN QUANT: CPT

## 2018-08-30 PROCEDURE — 6370000000 HC RX 637 (ALT 250 FOR IP): Performed by: HOSPITALIST

## 2018-08-30 PROCEDURE — 86900 BLOOD TYPING SEROLOGIC ABO: CPT

## 2018-08-30 PROCEDURE — 86901 BLOOD TYPING SEROLOGIC RH(D): CPT

## 2018-08-30 PROCEDURE — 85014 HEMATOCRIT: CPT

## 2018-08-30 PROCEDURE — P9016 RBC LEUKOCYTES REDUCED: HCPCS

## 2018-08-30 PROCEDURE — 83540 ASSAY OF IRON: CPT

## 2018-08-30 PROCEDURE — 6360000002 HC RX W HCPCS: Performed by: STUDENT IN AN ORGANIZED HEALTH CARE EDUCATION/TRAINING PROGRAM

## 2018-08-30 PROCEDURE — 85045 AUTOMATED RETICULOCYTE COUNT: CPT

## 2018-08-30 PROCEDURE — 99233 SBSQ HOSP IP/OBS HIGH 50: CPT | Performed by: INTERNAL MEDICINE

## 2018-08-30 PROCEDURE — 82728 ASSAY OF FERRITIN: CPT

## 2018-08-30 PROCEDURE — 83735 ASSAY OF MAGNESIUM: CPT

## 2018-08-30 PROCEDURE — 84100 ASSAY OF PHOSPHORUS: CPT

## 2018-08-30 PROCEDURE — 36430 TRANSFUSION BLD/BLD COMPNT: CPT

## 2018-08-30 PROCEDURE — 86850 RBC ANTIBODY SCREEN: CPT

## 2018-08-30 RX ORDER — MORPHINE SULFATE 2 MG/ML
2 INJECTION, SOLUTION INTRAMUSCULAR; INTRAVENOUS ONCE
Status: DISCONTINUED | OUTPATIENT
Start: 2018-08-30 | End: 2018-08-30

## 2018-08-30 RX ORDER — 0.9 % SODIUM CHLORIDE 0.9 %
250 INTRAVENOUS SOLUTION INTRAVENOUS ONCE
Status: DISCONTINUED | OUTPATIENT
Start: 2018-08-30 | End: 2018-09-05 | Stop reason: HOSPADM

## 2018-08-30 RX ORDER — UREA 10 %
1 LOTION (ML) TOPICAL ONCE
Status: COMPLETED | OUTPATIENT
Start: 2018-08-30 | End: 2018-08-30

## 2018-08-30 RX ORDER — FUROSEMIDE 40 MG/1
80 TABLET ORAL 2 TIMES DAILY
Status: DISCONTINUED | OUTPATIENT
Start: 2018-08-30 | End: 2018-09-04

## 2018-08-30 RX ADMIN — Medication 10 ML: at 21:00

## 2018-08-30 RX ADMIN — CARVEDILOL 3.12 MG: 3.12 TABLET, FILM COATED ORAL at 21:00

## 2018-08-30 RX ADMIN — FUROSEMIDE 80 MG: 40 TABLET ORAL at 17:26

## 2018-08-30 RX ADMIN — Medication 1 MG: at 01:31

## 2018-08-30 RX ADMIN — HEPARIN SODIUM 8000 UNITS: 5000 INJECTION, SOLUTION INTRAVENOUS; SUBCUTANEOUS at 12:15

## 2018-08-30 RX ADMIN — INSULIN LISPRO 1 UNITS: 100 INJECTION, SOLUTION INTRAVENOUS; SUBCUTANEOUS at 14:03

## 2018-08-30 RX ADMIN — ATORVASTATIN CALCIUM 80 MG: 80 TABLET, FILM COATED ORAL at 21:01

## 2018-08-30 RX ADMIN — HEPARIN SODIUM 5000 UNITS: 5000 INJECTION, SOLUTION INTRAVENOUS; SUBCUTANEOUS at 14:03

## 2018-08-30 RX ADMIN — INSULIN LISPRO 1 UNITS: 100 INJECTION, SOLUTION INTRAVENOUS; SUBCUTANEOUS at 21:06

## 2018-08-30 RX ADMIN — HEPARIN SODIUM 9500 UNITS: 5000 INJECTION, SOLUTION INTRAVENOUS; SUBCUTANEOUS at 12:15

## 2018-08-30 RX ADMIN — HEPARIN SODIUM 5000 UNITS: 5000 INJECTION, SOLUTION INTRAVENOUS; SUBCUTANEOUS at 01:34

## 2018-08-30 RX ADMIN — ACETAMINOPHEN 650 MG: 325 TABLET ORAL at 21:06

## 2018-08-30 RX ADMIN — VITAMIN D, TAB 1000IU (100/BT) 5000 UNITS: 25 TAB at 08:24

## 2018-08-30 RX ADMIN — INSULIN LISPRO 1 UNITS: 100 INJECTION, SOLUTION INTRAVENOUS; SUBCUTANEOUS at 17:26

## 2018-08-30 RX ADMIN — ACETAMINOPHEN 650 MG: 325 TABLET ORAL at 01:31

## 2018-08-30 RX ADMIN — Medication 10 ML: at 08:24

## 2018-08-30 RX ADMIN — INSULIN GLARGINE 15 UNITS: 100 INJECTION, SOLUTION SUBCUTANEOUS at 08:25

## 2018-08-30 RX ADMIN — CARVEDILOL 3.12 MG: 3.12 TABLET, FILM COATED ORAL at 08:24

## 2018-08-30 RX ADMIN — CLOPIDOGREL 75 MG: 75 TABLET, FILM COATED ORAL at 08:24

## 2018-08-30 ASSESSMENT — PAIN SCALES - GENERAL
PAINLEVEL_OUTOF10: 0
PAINLEVEL_OUTOF10: 7
PAINLEVEL_OUTOF10: 6
PAINLEVEL_OUTOF10: 0

## 2018-08-30 ASSESSMENT — ENCOUNTER SYMPTOMS
NAUSEA: 0
RESPIRATORY NEGATIVE: 1
GASTROINTESTINAL NEGATIVE: 1
VOMITING: 0

## 2018-08-30 NOTE — PROGRESS NOTES
Dialysis Post Treatment Note  Patient tolerated treatment well. Denies complaints at time of discharge. Vitals:    08/30/18 1300   BP: 107/71   Pulse: 77   Resp: 16   Temp: 97.9 °F (36.6 °C)   SpO2: 100%     Pre-Weight = 73.3kg  Post-weight = Weight: 158 lb 1.1 oz (71.7 kg)  Total Liters Processed = Total Liters Processed (l/min): 69.7 l/min  Rinseback Volume (mL) = Rinseback Volume (ml): 370 ml   Intake  = 300 ml (1 unit of blood)  Total fluid removed = 2610  Net Removal (mL) = 1940  Length of treatment=180  Lines flushed and aspirated w/o difficulty, upon initiation of tx, high AP pressure noted, lines flushed but high AP persisted. Lines reversed, tx completed with reversed lines. Pt received 1 unit of PRBC.

## 2018-08-30 NOTE — CARE COORDINATION
Called [place] at [facility] at [facility]. She is checking to see if they are in network. Return call from [place], they are out of network. Will need new SNF choice. Pt will need a Permcath and outpatient dialysis set up. Per [place] making those arrangements. 1500 spoke with pt. She would like referrals sent to   1. [facility] ([facility])  [phone] f  [phone] p  [facility]  3. [place] in [place]  Referrals sent to [facility]. 1610 spoke with Jeff Baez at [facility]. She states they can accept pt and they are in network with [facility]. Updated pt would need permcath and OP dialysis arranged. Isaac Galvan will be covering tomorrow for admissions. Will need to know anticipated dc date to start precert. 1630 spoke with Cortney Hernandes at [facility] 13 bed. They do not take [facility].

## 2018-08-30 NOTE — CARE COORDINATION
Called [facility] and spoke with Vashti Nelson. Vashti Nelson stated that pt has been financially cleared and is just pending authorization. Vashti Nelson called [facility] clinic for chair time, but clinic is currently closed. Lesley to call writer back in the morning and provide a chair schedule. Writer called [facility], and facility is closed for the day. Writer to f/u with [facility] for chair schedule.

## 2018-08-30 NOTE — PLAN OF CARE
Problem: OXYGENATION/RESPIRATORY FUNCTION  Goal: Patient will maintain patent airway  Outcome: Met This Shift    Goal: Patient will achieve/maintain normal respiratory rate/effort  Respiratory rate and effort will be within normal limits for the patient   Outcome: Met This Shift      Problem: SKIN INTEGRITY  Goal: Skin integrity is maintained or improved  Outcome: Ongoing      Problem: Risk for Impaired Skin Integrity  Goal: Tissue integrity - skin and mucous membranes  Structural intactness and normal physiological function of skin and  mucous membranes.    Outcome: Ongoing      Problem: Falls - Risk of:  Goal: Will remain free from falls  Will remain free from falls   Outcome: Ongoing    Goal: Absence of physical injury  Absence of physical injury   Outcome: Ongoing

## 2018-08-30 NOTE — CARE COORDINATION
Dialysis social worker has been following for potential OP dialysis needs. Spoke with pt about potential need for OP dialysis yesterday, 8/29/18 and pt was agreeable to writer sending a referral to [facility]missions for the [facility] location. Notified by Dr. Arabella Willingham, Nephrology that pt will need OP dialysis and a tunnel cath. Called [facility]missions to check on the status of referral sent in yesterday, 8/29/18. Reginald Bazzi is the coordinator working on case, but is on vacation til Tues. Admissions informed writer that pt's records have been sent to the [facility] location and that they are awaiting financial clearance. Writer informed to call after lunch for update. Called [facility] and spoke with Omar Keating. Omar Keating stated that if pt is financially cleared and able to come to their facility pt would follow with Dr. Fernanda Pimentel. SW will continue to f/u. Will update pt as able. Addendum 11:06 am.   Faxed updated Nephrology notes to [facility] and requested unit [facility].

## 2018-08-30 NOTE — PROGRESS NOTES
[facility]   Progress Note                   Date:   8/30/2018  Patient name: Ailyn John  Date of admission:  8/23/2018  4:01 AM  MRN:   [id number]  YOB: 1960  PCP: Reynaldo Cardona MD    Reason for Admission: Metabolic encephalopathy [Y06.20]    Subjective:       Clinical Changes / Abnormalities: Seen on HD. Denies CP or SOB. States she is tired. H/H today noted - 6.6/21.8. NO new cardiac issues/concers noted. ROS    Medications:   Scheduled Meds:   sodium chloride  250 mL Intravenous Once    furosemide  80 mg Oral BID    sodium chloride flush  10 mL Intravenous 2 times per day    insulin lispro  0-6 Units Subcutaneous TID WC    insulin lispro  0-3 Units Subcutaneous Nightly    carvedilol  3.125 mg Oral BID    atorvastatin  80 mg Oral Daily    vitamin D  5,000 Units Oral Daily    clopidogrel  75 mg Oral Daily    heparin (porcine)  5,000 Units Subcutaneous 3 times per day     Continuous Infusions:   dextrose       CBC:   Recent Labs      08/30/18   0439   WBC  6.7   HGB  6.6*   PLT  191     BMP:    Recent Labs      08/28/18   1554  08/29/18   0540  08/30/18   0439   NA  132*  136  139   K  4.5  4.0  4.3   CL  95*  97*  101   CO2  21  21  17*   BUN  60*  67*  81*   CREATININE  4.59*  5.40*  7.20*   GLUCOSE  166*  93  116*     Hepatic: No results for input(s): AST, ALT, ALB, BILITOT, ALKPHOS in the last 72 hours. Troponin: No results for input(s): TROPONINI in the last 72 hours. BNP: No results for input(s): BNP in the last 72 hours. Lipids: No results for input(s): CHOL, HDL in the last 72 hours. Invalid input(s): LDLCALCU  INR: No results for input(s): INR in the last 72 hours.     Objective:   Vitals: /67   Pulse 77   Temp 97.4 °F (36.3 °C) (Oral)   Resp 17   Ht 5' 5\" (1.651 m)   Wt 161 lb 9.6 oz (73.3 kg)   SpO2 100%   BMI 26.89 kg/m²   General appearance: alert and cooperative with exam  HEENT: Head: Normocephalic, no lesions, without obvious abnormality. Neck: no JVD, trachea midline, no adenopathy  Lungs: Clear to auscultation, dimishished throughout. NO rales  Heart: Regular rate and rhythm, s1/s2 auscultated, no murmurs. SR  Abdomen: soft, non-tender, bowel sounds active  Extremities: +2 b/l LE edema  Neurologic: not done    ECHO 6/21/16: EF 40%, good result from MV repair with mild MR.      ICD 6/9/15: [employer] device placed for VF arrest s/p CABG.    CATH 6/4/15: Patent grafts. LIMA-LAD, SVG-PDA, SVG-D. EF 30%.      CABG/MV REPAIR 5/28/15: LIMA-LAD, SVG-PDA, SVG-D. MV repair. Done by Dr. Robert Ayoub.        ECHO 08/24/2018  Summary  Left ventricle is in the upper limits of normal in size with moderately  reduced systolic function. Estimated ejection fraction is 40-45%. Left atrium is mildly dilated. Normal right ventricular size and function. Pacemaker / ICD lead seen in right ventricle. Aortic sclerosis and mild focal calcification without stenosis. Mitral avril repair is noted. Mean gradient across the mitral valve is 6 mmHg  Mild mitral regurgitation. Mild tricuspid regurgitation. Estimated right ventricular systolic pressure is 34 mmHg.       Assessment / Acute Cardiac Problems:   1. Acute renal failure requiring HD  2. Severe hyperkalemia  3. Acute resp failure s/p extubation  4. Type II MI secondary to CHAD, hypoxia  5. MV CAD s/p CABG 2015  6. MV repair  7.  Ischemic CMP with h/o VF arrest post CABG - AICD in situ    Patient Active Problem List:     Acute coronary syndrome (HCC)     S/P cardiac cath-Patent grafts 6/5/15     Onychomycosis of toenail     CAD (coronary artery disease)     Hx of CABG     ICD (implantable cardioverter-defibrillator) in place     H/O mitral valve repair     Hx of myocardial infarction     Hyperlipidemia     Hypertension     Cardiomyopathy ([address]     DM (diabetes mellitus), type 2 with neurological complications ([address]     Anxiety     CHAD (acute kidney injury) ([address]     SIRS (systemic

## 2018-08-30 NOTE — PROGRESS NOTES
Intake             1332 ml   Output              583 ml   Net              749 ml     Last 3 weights: Wt Readings from Last 3 Encounters:   08/29/18 159 lb 9.8 oz (72.4 kg)   04/11/18 166 lb (75.3 kg)   11/07/17 157 lb (71.2 kg)       Physical Examination:   Physical Exam   Constitutional: She is oriented to person, place, and time. She appears well-developed. HENT:   Head: Normocephalic. Eyes: Pupils are equal, round, and reactive to light. Neck: Normal range of motion. Cardiovascular: Normal rate and regular rhythm. Pulmonary/Chest: Effort normal. No respiratory distress. She has no wheezes. Abdominal: Soft. She exhibits no distension. Musculoskeletal: Normal range of motion. She exhibits no edema. Neurological: She is alert and oriented to person, place, and time. Skin: Skin is warm. No erythema. Labs:-    CBC:   Recent Labs      08/30/18   0439   WBC  6.7   HGB  6.6*   PLT  191     BMP:  Recent Labs      08/28/18   1554  08/29/18   0540  08/30/18   0439   NA  132*  136  139   K  4.5  4.0  4.3   CL  95*  97*  101   CO2  21  21  17*   BUN  60*  67*  81*   CREATININE  4.59*  5.40*  7.20*   GLUCOSE  166*  93  116*     Calcium:  Recent Labs      08/30/18   0439   CALCIUM  8.2*     Ionized Calcium:No results for input(s): IONCA in the last 72 hours. Magnesium:  Recent Labs      08/30/18   0439   MG  2.2     Phosphorus:  Recent Labs      08/30/18   0439   PHOS  7.3*     BNP:No results for input(s): BNP in the last 72 hours. Glucose:  Recent Labs      08/29/18   1837  08/29/18   2208  08/30/18   0817   POCGLU  109*  179*  98     HgbA1C:   Recent Labs      08/29/18   1256   LABA1C  5.7     INR: No results for input(s): INR in the last 72 hours. Hepatic: No results for input(s): ALKPHOS, ALT, AST, PROT, BILITOT, BILIDIR, LABALBU in the last 72 hours.   Amylase and Lipase:  Recent Labs      08/29/18   1256   LACTA  NOT REPORTED     Lactic Acid:   Recent Labs      08/29/18   1256   LACTA  NOT REPORTED     CARDIAC ENZYMES:No results for input(s): CKTOTAL, CKMB, CKMBINDEX, TROPONINI in the last 72 hours. BNP: No results for input(s): BNP in the last 72 hours. Lipids: No results for input(s): CHOL, TRIG, HDL, LDLCALC in the last 72 hours. Invalid input(s): LDL  ABGs: No results found for: PH, PCO2, PO2, HCO3, O2SAT  Thyroid:   Lab Results   Component Value Date    TSH 0.49 08/24/2018      Urinalysis: Color, UA   Date Value Ref Range Status   08/23/2018 ORANGE (A) YEL Final     Comment:     INTERPRET WITH CAUTION DUE TO INTENSE COLOR OF URINE.     pH, UA   Date Value Ref Range Status   08/23/2018 5.5 5.0 - 8.0 Final     Specific Gravity, UA   Date Value Ref Range Status   08/23/2018 1.015 1.005 - 1.030 Final     Protein, UA   Date Value Ref Range Status   08/23/2018 4+ (A) NEG Final     RBC, UA   Date Value Ref Range Status   08/23/2018 TOO NUMEROUS TO COUNT 0 - 4 /HPF Final     Comment:     Reference range defined for non-centrifuged specimen.      Bacteria, UA   Date Value Ref Range Status   08/23/2018 NOT REPORTED NONE Final     Nitrite, Urine   Date Value Ref Range Status   08/23/2018 NEGATIVE NEG Final     WBC, UA   Date Value Ref Range Status   08/23/2018 TOO NUMEROUS TO COUNT 0 - 5 /HPF Final     Leukocyte Esterase, Urine   Date Value Ref Range Status   08/23/2018 TRACE (A) NEG Final     Yeast, UA   Date Value Ref Range Status   08/23/2018 NOT REPORTED NONE Final     Glucose, Ur   Date Value Ref Range Status   08/23/2018 2+ (A) NEG Final     Bilirubin Urine   Date Value Ref Range Status   08/23/2018 NEGATIVE NEG Final         Assessment:  Principal Problem (Resolved):    Chest pain  Active Problems:    S/P cardiac cath-Patent grafts 6/5/15    CAD (coronary artery disease)    Hx of CABG    ICD (implantable cardioverter-defibrillator) in place    H/O mitral valve repair    Hypertension    DM (diabetes mellitus), type 2 with neurological complications ([facility]    CHAD (acute kidney injury) ([facility]    SIRS

## 2018-08-31 ENCOUNTER — APPOINTMENT (OUTPATIENT)
Dept: DIALYSIS | Age: 58
DRG: 469 | End: 2018-08-31
Payer: MEDICARE

## 2018-08-31 LAB
ABSOLUTE EOS #: 0.15 K/UL (ref 0–0.44)
ABSOLUTE IMMATURE GRANULOCYTE: 0.32 K/UL (ref 0–0.3)
ABSOLUTE LYMPH #: 1.96 K/UL (ref 1.1–3.7)
ABSOLUTE MONO #: 0.86 K/UL (ref 0.1–1.2)
ACETAMINOPHEN LEVEL: <5 UG/ML (ref 10–30)
AMPHETAMINE: NEGATIVE NG/ML
ANION GAP SERPL CALCULATED.3IONS-SCNC: 17 MMOL/L (ref 9–17)
BARBITURATES: NEGATIVE NG/ML
BASOPHILS # BLD: 0 % (ref 0–2)
BASOPHILS ABSOLUTE: <0.03 K/UL (ref 0–0.2)
BENZODIAZEPINES: POSITIVE NG/ML
BUN BLDV-MCNC: 60 MG/DL (ref 6–20)
BUN/CREAT BLD: ABNORMAL (ref 9–20)
BUPRENORPHINE: NEGATIVE NG/ML
CALCIUM IONIZED: 1.07 MMOL/L (ref 1.13–1.33)
CALCIUM SERPL-MCNC: 8.3 MG/DL (ref 8.6–10.4)
CHLORIDE BLD-SCNC: 100 MMOL/L (ref 98–107)
CO2: 18 MMOL/L (ref 20–31)
COCAINE: NEGATIVE NG/ML
CREAT SERPL-MCNC: 5.78 MG/DL (ref 0.5–0.9)
DIFFERENTIAL TYPE: ABNORMAL
DRUGS OF ABUSE COMMENT: ABNORMAL
EOSINOPHILS RELATIVE PERCENT: 2 % (ref 1–4)
ETHANOL PERCENT: <0.01 %
ETHANOL: <10 MG/DL
GFR AFRICAN AMERICAN: 9 ML/MIN
GFR NON-AFRICAN AMERICAN: 8 ML/MIN
GFR SERPL CREATININE-BSD FRML MDRD: ABNORMAL ML/MIN/{1.73_M2}
GFR SERPL CREATININE-BSD FRML MDRD: ABNORMAL ML/MIN/{1.73_M2}
GLUCOSE BLD-MCNC: 107 MG/DL (ref 70–99)
GLUCOSE BLD-MCNC: 142 MG/DL (ref 65–105)
GLUCOSE BLD-MCNC: 186 MG/DL (ref 65–105)
GLUCOSE BLD-MCNC: 231 MG/DL (ref 65–105)
HCT VFR BLD CALC: 26.1 % (ref 36.3–47.1)
HEMOGLOBIN: 8.1 G/DL (ref 11.9–15.1)
IMMATURE GRANULOCYTES: 4 %
LYMPHOCYTES # BLD: 22 % (ref 24–43)
MAGNESIUM: 2.2 MG/DL (ref 1.6–2.6)
MCH RBC QN AUTO: 29.2 PG (ref 25.2–33.5)
MCHC RBC AUTO-ENTMCNC: 31 G/DL (ref 28.4–34.8)
MCV RBC AUTO: 94.2 FL (ref 82.6–102.9)
METHADONE: NEGATIVE NG/ML
METHAMPHETAMINE: NEGATIVE NG/ML
MONOCYTES # BLD: 10 % (ref 3–12)
NRBC AUTOMATED: 0 PER 100 WBC
OPIATES: NEGATIVE NG/ML
OXYCODONE: NEGATIVE NG/ML
PDW BLD-RTO: 14.1 % (ref 11.8–14.4)
PHENCYCLIDINE: NEGATIVE NG/ML
PHOSPHORUS: 5.9 MG/DL (ref 2.6–4.5)
PLATELET # BLD: 224 K/UL (ref 138–453)
PLATELET ESTIMATE: ABNORMAL
PMV BLD AUTO: 10.6 FL (ref 8.1–13.5)
POTASSIUM SERPL-SCNC: 4.6 MMOL/L (ref 3.7–5.3)
RBC # BLD: 2.77 M/UL (ref 3.95–5.11)
RBC # BLD: ABNORMAL 10*6/UL
SALICYLATE LEVEL: <1 MG/DL (ref 3–10)
SEG NEUTROPHILS: 64 % (ref 36–65)
SEGMENTED NEUTROPHILS ABSOLUTE COUNT: 5.75 K/UL (ref 1.5–8.1)
SODIUM BLD-SCNC: 135 MMOL/L (ref 135–144)
SURGICAL PATHOLOGY REPORT: NORMAL
THC: POSITIVE NG/ML
TOXIC TRICYCLIC SC,BLOOD: NEGATIVE
WBC # BLD: 9.1 K/UL (ref 3.5–11.3)
WBC # BLD: ABNORMAL 10*3/UL

## 2018-08-31 PROCEDURE — 6370000000 HC RX 637 (ALT 250 FOR IP): Performed by: HOSPITALIST

## 2018-08-31 PROCEDURE — 6360000002 HC RX W HCPCS: Performed by: INTERNAL MEDICINE

## 2018-08-31 PROCEDURE — 99233 SBSQ HOSP IP/OBS HIGH 50: CPT | Performed by: INTERNAL MEDICINE

## 2018-08-31 PROCEDURE — 6360000002 HC RX W HCPCS: Performed by: STUDENT IN AN ORGANIZED HEALTH CARE EDUCATION/TRAINING PROGRAM

## 2018-08-31 PROCEDURE — 97530 THERAPEUTIC ACTIVITIES: CPT

## 2018-08-31 PROCEDURE — 99232 SBSQ HOSP IP/OBS MODERATE 35: CPT | Performed by: INTERNAL MEDICINE

## 2018-08-31 PROCEDURE — 82330 ASSAY OF CALCIUM: CPT

## 2018-08-31 PROCEDURE — 6370000000 HC RX 637 (ALT 250 FOR IP): Performed by: INTERNAL MEDICINE

## 2018-08-31 PROCEDURE — 2580000003 HC RX 258: Performed by: STUDENT IN AN ORGANIZED HEALTH CARE EDUCATION/TRAINING PROGRAM

## 2018-08-31 PROCEDURE — 90937 HEMODIALYSIS REPEATED EVAL: CPT

## 2018-08-31 PROCEDURE — 36415 COLL VENOUS BLD VENIPUNCTURE: CPT

## 2018-08-31 PROCEDURE — 80048 BASIC METABOLIC PNL TOTAL CA: CPT

## 2018-08-31 PROCEDURE — 2060000002 HC BURN ICU INTERMEDIATE R&B

## 2018-08-31 PROCEDURE — 97166 OT EVAL MOD COMPLEX 45 MIN: CPT | Performed by: OCCUPATIONAL THERAPIST

## 2018-08-31 PROCEDURE — 6370000000 HC RX 637 (ALT 250 FOR IP): Performed by: STUDENT IN AN ORGANIZED HEALTH CARE EDUCATION/TRAINING PROGRAM

## 2018-08-31 PROCEDURE — G8988 SELF CARE GOAL STATUS: HCPCS | Performed by: OCCUPATIONAL THERAPIST

## 2018-08-31 PROCEDURE — 97110 THERAPEUTIC EXERCISES: CPT

## 2018-08-31 PROCEDURE — 85025 COMPLETE CBC W/AUTO DIFF WBC: CPT

## 2018-08-31 PROCEDURE — 86900 BLOOD TYPING SEROLOGIC ABO: CPT

## 2018-08-31 PROCEDURE — 6370000000 HC RX 637 (ALT 250 FOR IP): Performed by: EMERGENCY MEDICINE

## 2018-08-31 PROCEDURE — 84100 ASSAY OF PHOSPHORUS: CPT

## 2018-08-31 PROCEDURE — 97535 SELF CARE MNGMENT TRAINING: CPT | Performed by: OCCUPATIONAL THERAPIST

## 2018-08-31 PROCEDURE — G8987 SELF CARE CURRENT STATUS: HCPCS | Performed by: OCCUPATIONAL THERAPIST

## 2018-08-31 PROCEDURE — 94762 N-INVAS EAR/PLS OXIMTRY CONT: CPT

## 2018-08-31 PROCEDURE — P9016 RBC LEUKOCYTES REDUCED: HCPCS

## 2018-08-31 PROCEDURE — 51702 INSERT TEMP BLADDER CATH: CPT

## 2018-08-31 PROCEDURE — 82947 ASSAY GLUCOSE BLOOD QUANT: CPT

## 2018-08-31 PROCEDURE — 83735 ASSAY OF MAGNESIUM: CPT

## 2018-08-31 RX ORDER — UREA 10 %
3 LOTION (ML) TOPICAL NIGHTLY PRN
Status: DISCONTINUED | OUTPATIENT
Start: 2018-08-31 | End: 2018-08-31

## 2018-08-31 RX ORDER — LEVOFLOXACIN 500 MG/1
500 TABLET, FILM COATED ORAL DAILY
Status: DISCONTINUED | OUTPATIENT
Start: 2018-08-31 | End: 2018-09-01

## 2018-08-31 RX ORDER — UREA 10 %
3 LOTION (ML) TOPICAL ONCE
Status: COMPLETED | OUTPATIENT
Start: 2018-08-31 | End: 2018-08-31

## 2018-08-31 RX ORDER — 0.9 % SODIUM CHLORIDE 0.9 %
250 INTRAVENOUS SOLUTION INTRAVENOUS ONCE
Status: DISCONTINUED | OUTPATIENT
Start: 2018-08-31 | End: 2018-09-05 | Stop reason: HOSPADM

## 2018-08-31 RX ADMIN — FUROSEMIDE 80 MG: 40 TABLET ORAL at 17:13

## 2018-08-31 RX ADMIN — HEPARIN SODIUM 8000 UNITS: 5000 INJECTION, SOLUTION INTRAVENOUS; SUBCUTANEOUS at 12:00

## 2018-08-31 RX ADMIN — HEPARIN SODIUM 5000 UNITS: 5000 INJECTION, SOLUTION INTRAVENOUS; SUBCUTANEOUS at 00:00

## 2018-08-31 RX ADMIN — LEVOFLOXACIN 500 MG: 500 TABLET, FILM COATED ORAL at 13:04

## 2018-08-31 RX ADMIN — ATORVASTATIN CALCIUM 80 MG: 80 TABLET, FILM COATED ORAL at 20:38

## 2018-08-31 RX ADMIN — HEPARIN SODIUM 5000 UNITS: 5000 INJECTION, SOLUTION INTRAVENOUS; SUBCUTANEOUS at 14:16

## 2018-08-31 RX ADMIN — FUROSEMIDE 80 MG: 40 TABLET ORAL at 13:05

## 2018-08-31 RX ADMIN — HEPARIN SODIUM 9500 UNITS: 5000 INJECTION, SOLUTION INTRAVENOUS; SUBCUTANEOUS at 12:00

## 2018-08-31 RX ADMIN — VITAMIN D, TAB 1000IU (100/BT) 5000 UNITS: 25 TAB at 13:04

## 2018-08-31 RX ADMIN — CARVEDILOL 3.12 MG: 3.12 TABLET, FILM COATED ORAL at 13:05

## 2018-08-31 RX ADMIN — INSULIN LISPRO 1 UNITS: 100 INJECTION, SOLUTION INTRAVENOUS; SUBCUTANEOUS at 21:48

## 2018-08-31 RX ADMIN — Medication 10 ML: at 20:39

## 2018-08-31 RX ADMIN — Medication 3 MG: at 02:04

## 2018-08-31 RX ADMIN — Medication 10 ML: at 13:04

## 2018-08-31 RX ADMIN — HEPARIN SODIUM 5000 UNITS: 5000 INJECTION, SOLUTION INTRAVENOUS; SUBCUTANEOUS at 06:20

## 2018-08-31 RX ADMIN — INSULIN LISPRO 1 UNITS: 100 INJECTION, SOLUTION INTRAVENOUS; SUBCUTANEOUS at 17:22

## 2018-08-31 RX ADMIN — INSULIN LISPRO 1 UNITS: 100 INJECTION, SOLUTION INTRAVENOUS; SUBCUTANEOUS at 12:55

## 2018-08-31 RX ADMIN — HEPARIN SODIUM 5000 UNITS: 5000 INJECTION, SOLUTION INTRAVENOUS; SUBCUTANEOUS at 21:47

## 2018-08-31 RX ADMIN — Medication 3 MG: at 23:35

## 2018-08-31 ASSESSMENT — ENCOUNTER SYMPTOMS
VOMITING: 0
NAUSEA: 0
RESPIRATORY NEGATIVE: 1
GASTROINTESTINAL NEGATIVE: 1

## 2018-08-31 ASSESSMENT — PAIN SCALES - GENERAL
PAINLEVEL_OUTOF10: 0

## 2018-08-31 NOTE — PROGRESS NOTES
Physical Therapy  Facility/Department: [address] BURN UNIT  Daily Treatment Note  NAME: Eileen Taveras  : 1960  MRN: [id number]    Date of Service: 2018    Discharge Recommendations:  IP Rehab   PT Equipment Recommendations  Equipment Needed:  (CTA, pt currently using RW and does not own AD)    Patient Diagnosis(es): The primary encounter diagnosis was Hyperkalemia. Diagnoses of Acute renal failure, unspecified acute renal failure type (HCC) and Sepsis, due to unspecified organism [facility]) were also pertinent to this visit. has a past medical history of CAD (coronary artery disease); Cardiomyopathy ([facility] Depression; Diabetes mellitus ([facility] H/O mitral valve repair; History of fractured kneecap; Hx of CABG; Hx of myocardial infarction; Hyperlipidemia; Hypertension; ICD (implantable cardioverter-defibrillator) in place; Kidney calculi; Osteoarthritis; and Renal calculi.   has a past surgical history that includes Cholecystectomy (); Hysterectomy (); Ankle surgery (Right, ); Kidney stone surgery (Left, ); Tubal ligation; Mitral valve surgery (6/4/15); Cardiac defibrillator placement (06/01/15); and Coronary artery bypass graft (05/28/15). Restrictions  Restrictions/Precautions  Restrictions/Precautions: General Precautions, Fall Risk  Position Activity Restriction  Other position/activity restrictions: OOB to chair okay per RN  Subjective   General  Response To Previous Treatment: Patient with no complaints from previous session. Family / Caregiver Present: No  Subjective  Subjective: RN and pt agreeable to PT. Pt supine in bed upon arrival, very pleasant and cooperative throughout.   Pain Screening  Patient Currently in Pain: Denies  Vital Signs  Patient Currently in Pain: Denies            Objective   Bed mobility  Rolling to Left: Supervision  Rolling to Right: Supervision  Supine to Sit: Supervision  Sit to Supine: Supervision  Scooting: Supervision  Comment: significant improvement

## 2018-08-31 NOTE — PLAN OF CARE
Problem: Nutrition  Goal: Optimal nutrition therapy  Outcome: Ongoing  Nutrition Problem: Inadequate oral intake  Intervention: Food and/or Nutrient Delivery: Continue current diet - Encourage intakes and provide ONS as/if needed.   Nutritional Goals: meet % of estimated nutrition needs

## 2018-08-31 NOTE — PROGRESS NOTES
7 20.13 3.68 92%   8 22.86 3.43 86%   9 25.33 3.17 80%   10 27.31 2.96 75%   11 29.04 2.79 70%   12 30.60 2.68 67%   13 32.03 2.62 63%   14 33.39 2.61 60%   15 34.69 2.65 56%   16 35.96 2.71 53%   17 37.26 2.82 50%   18 38.66 2.97 47%   19 40.22 3.20 43%   20 42.03 3.55 38%   21 44.27 4.08 33%   22 47.10 4.81 26%   23 51.12 5.88 16%   24 57.54 7.36 0%     Goals  Short term goals  Time Frame for Short term goals: By discharge pt analisa. Rosangela Alter term goal 1: demo functional mobility with supervision  Short term goal 2: demo functional transfers with supervision  Short term goal 3: demo UB ADL with set up and supervision  Short term goal 4: demo LB ADL with set up and CGA  Short term goal 5: demo 30+ min activity tolerance to engage in functional tasks with no signs of SOB       Therapy Time:   Individual Concurrent Group Co-treatment   Time In  1:18-1:28  3:47-4:06         Time Out           Minutes  29 min               OLLIE Bingham/JOVANI

## 2018-08-31 NOTE — PROGRESS NOTES
with neurological complications (HCC)     Anxiety     CHAD (acute kidney injury) ([facility]     SIRS (systemic inflammatory response syndrome) (HCC)     Elevated serum immunoglobulin free light chains      Plan of Treatment:   1. Diuretics per nephro. 2. Continue  statin, & BB. Restart Plavix after perma cath. 3. HGB improved, 8.1  today. . Recommend HGB >=9 for optimal cardiac perfusion  4. Further ischemic work-up once acute issues resolve.  Will plan OP stress test.      Electronically signed by MICKEY Johnson CNP on 8/31/2018 at 8:47 [id number] [facility].  [phone]

## 2018-08-31 NOTE — PROGRESS NOTES
changes, change in bowel habits and hair loss   Musculoskeletal: negative for myalgias, arthralgias, pain, joint swelling,and bone pain   Neurological: negative for headaches, dizziness, seizures, weakness, numbness        Objective:     Vitals: BP (!) 103/52   Pulse 77   Temp 98 °F (36.7 °C) (Oral)   Resp 22   Ht 5' 5\" (1.651 m)   Wt 155 lb 10.3 oz (70.6 kg)   SpO2 96%   BMI 25.90 kg/m²   General appearance - well appearing, no in pain or distress   Mental status - alert and cooperative   Eyes - pupils equal and reactive, extraocular eye movements intact   Ears - bilateral TM's and external ear canals normal   Mouth - mucous membranes moist, pharynx normal without lesions   Neck - supple, no significant adenopathy   Lymphatics - no palpable lymphadenopathy, no hepatosplenomegaly   Chest - clear to auscultation, no wheezes, rales or rhonchi, symmetric air entry   Heart - normal rate, regular rhythm, normal S1, S2, no murmurs  Abdomen - soft, nontender, nondistended, no masses or organomegaly   Neurological - alert, oriented, normal speech, no focal findings or movement disorder noted   Musculoskeletal - no joint tenderness, deformity or swelling   Extremities - peripheral pulses normal, no pedal edema, no clubbing or cyanosis   Skin - normal coloration and turgor, no rashes, no suspicious skin lesions noted ,        Data:    No intake/output data recorded.   In: 48 [P.O.:50]  Out: 115 [Urine:115]    CBC:   Recent Labs      08/30/18   0439  08/30/18   1447  08/30/18   1836  08/31/18   0548   WBC  6.7   --    --   9.1   HGB  6.6*  8.7*  8.6*  8.1*   PLT  191   --    --   224     BMP:    Recent Labs      08/29/18   0540  08/30/18   0439  08/31/18   0548   NA  136  139  135   K  4.0  4.3  4.6   CL  97*  101  100   CO2  21  17*  18*   BUN  67*  81*  60*   CREATININE  5.40*  7.20*  5.78*   GLUCOSE  93  116*  107*     Hepatic:   Recent Labs      08/30/18   0825   AST  11   ALT  10   BILITOT  0.29*   ALKPHOS  85 INR: No results for input(s): INR in the last 72 hours. PTT:No results for input(s): PTT in the last 72 hours. PMH:  Past Medical History:   Diagnosis Date    CAD (coronary artery disease)     Cardiomyopathy ([facility]     Depression     Diabetes mellitus ([facility]     H/O mitral valve repair     History of fractured kneecap 2017    right    Hx of CABG     Hx of myocardial infarction     Hyperlipidemia     Hypertension     ICD (implantable cardioverter-defibrillator) in place     Kidney calculi     Osteoarthritis     Renal calculi       Allergies: Allergies   Allergen Reactions    Nsaids      nausea    Codeine Nausea And Vomiting        Assessment        Primary Problem:  Chest pain   Current Problems:  Active Hospital Problems    Diagnosis Date Noted    Elevated serum immunoglobulin free light chains [R76.8]     [facility] [N17.9] 08/23/2018    SIRS (systemic inflammatory response syndrome) (HCC) [R65.10] 08/23/2018    Diabetic ketoacidosis without coma associated with type 2 diabetes mellitus ([facility] [E11.10] 08/23/2018    DM (diabetes mellitus), type 2 with neurological complications ([facility] [W83.74] 01/26/2016    CAD (coronary artery disease) [I25.10]     Hx of CABG [Z95.1]     ICD (implantable cardioverter-defibrillator) in place [Z95.810]     H/O mitral valve repair [Z98.890]     Hypertension [I10]     S/P cardiac cath-Patent grafts 6/5/15 [Z98.890] 06/04/2015     Acute kidney injury  Abnormal free light chain ratio  Congestive heart failure and had a myopathy  Coronary artery disease  Diabetic ketoacidosis  anemia      Plan   Personally reviewed results of lab workup imaging studies. Patient has abnormal free light chain ratio which can be explained by renal insufficiency. Urine immunofixation ordered to rule out free light chain only paraproteinemia, report release by lab but immunofixation results not reported? ?  Do not recommend bone marrow biopsy at

## 2018-08-31 NOTE — PROGRESS NOTES
[address]  Occupational Therapy Not Seen Note     Patient not available for Occupational Therapy due to:     [] Testing:     [x] Hemodialysis     [] Blood Transfusion in Progress     []Refusal by Patient:      [] Surgery/Procedure:     [] Strict Bedrest     [] Sedation     [] Spine Precautions      [] Pt being transferred to palliative care at this time. Spoke with pt/family and OT services to be defered.     [] Pt independent with functional mobility and functional tasks.  Pt with no OT acute care needs at this time, will defer OT eval.     [] Other    Next Scheduled Treatment: 9/1/18     Signature: OLLIE Jim/L

## 2018-08-31 NOTE — PROGRESS NOTES
Dialysis Post Treatment Note  Patient tolerated treatment well. Denies complaints at time of discharge. Vitals:    08/31/18 1130   BP: 111/71   Pulse: 81   Resp:    Temp: 98.1 °F (36.7 °C)   SpO2:    *  Pre-Weight =72.6  Post-weight = Weight: 160 lb 0.9 oz (72.6 kg)  Total Liters Processed = Total Liters Processed (l/min): 66.6 l/min  Rinseback Volume (mL) = Rinseback Volume (ml): 310 ml  Net Removal (mL) = @[id number]  Length of treatment=3 hrs    Pt tolerated tx well denies discomfort. Pt received 1 unit of PRBC during treatment.  No complaints at this time

## 2018-08-31 NOTE — PROGRESS NOTES
IM RESIDENT PROGRESS NOTE        Patient:  Mady Lazcano  YOB: 1960    MRN: [id number]     Acct: [de-identified]     Admit date: 8/23/2018    Pt seen and Chart reviewed. Subjective:   No acute issues overnight  No fevers, chills, or rigors   No nausea, vomiting, or diarrhea reported   Was being taken again for dialysis this am  No chest pain or shortness of breath       Review of Systems   Constitutional: Negative. Negative for chills and fever. HENT: Negative. Respiratory: Negative. Cardiovascular: Negative. Negative for chest pain and leg swelling. Gastrointestinal: Negative. Negative for nausea and vomiting. Genitourinary: Negative.         Diet:  DIET CARB CONTROL;      Medications:Current Inpatient    Scheduled Meds:   sodium chloride  250 mL Intravenous Once    furosemide  80 mg Oral BID    sodium chloride flush  10 mL Intravenous 2 times per day    insulin lispro  0-6 Units Subcutaneous TID WC    insulin lispro  0-3 Units Subcutaneous Nightly    carvedilol  3.125 mg Oral BID    atorvastatin  80 mg Oral Daily    vitamin D  5,000 Units Oral Daily    clopidogrel  75 mg Oral Daily    heparin (porcine)  5,000 Units Subcutaneous 3 times per day     Continuous Infusions:   dextrose       PRN Meds:sodium chloride flush, magnesium hydroxide, ondansetron, acetaminophen, ipratropium-albuterol, sodium chloride, sodium chloride, albuterol, glucose, dextrose, glucagon (rDNA), dextrose, heparin (porcine), heparin (porcine), dextrose    Objective:    Physical Exam:  Vitals: BP (!) 103/52   Pulse 77   Temp 98 °F (36.7 °C) (Oral)   Resp 22   Ht 5' 5\" (1.651 m)   Wt 155 lb 10.3 oz (70.6 kg)   SpO2 96%   BMI 25.90 kg/m²   24 hour intake/output:    Intake/Output Summary (Last 24 hours) at 08/31/18 0845  Last data filed at 08/31/18 0400   Gross per 24 hour   Intake              450 ml   Output             2760 ml Net            -2310 ml     Last 3 weights: Wt Readings from Last 3 Encounters:   08/30/18 155 lb 10.3 oz (70.6 kg)   04/11/18 166 lb (75.3 kg)   11/07/17 157 lb (71.2 kg)       Physical Examination:   Physical Exam   Constitutional: She is oriented to person, place, and time. She appears well-developed. HENT:   Head: Normocephalic. Eyes: Pupils are equal, round, and reactive to light. Neck: Normal range of motion. Cardiovascular: Normal rate and regular rhythm. Pulmonary/Chest: Effort normal. No respiratory distress. She has no wheezes. Abdominal: Soft. She exhibits no distension. Musculoskeletal: Normal range of motion. She exhibits no edema. Neurological: She is alert and oriented to person, place, and time. Skin: Skin is warm. No erythema. Labs:-    CBC:   Recent Labs      08/30/18   0439  08/30/18   1447  08/30/18   1836  08/31/18   0548   WBC  6.7   --    --   9.1   HGB  6.6*  8.7*  8.6*  8.1*   PLT  191   --    --   224     BMP:    Recent Labs      08/29/18   0540  08/30/18   0439  08/31/18   0548   NA  136  139  135   K  4.0  4.3  4.6   CL  97*  101  100   CO2  21  17*  18*   BUN  67*  81*  60*   CREATININE  5.40*  7.20*  5.78*   GLUCOSE  93  116*  107*     Calcium:  Recent Labs      08/31/18   0548   CALCIUM  8.3*     Ionized Calcium:No results for input(s): IONCA in the last 72 hours. Magnesium:  Recent Labs      08/31/18   0548   MG  2.2     Phosphorus:  Recent Labs      08/31/18   0548   PHOS  5.9*     BNP:No results for input(s): BNP in the last 72 hours. Glucose:  Recent Labs      08/30/18   1307  08/30/18   1715  08/30/18   1946   POCGLU  149*  179*  218*     HgbA1C:   Recent Labs      08/29/18   1256   LABA1C  5.7     INR: No results for input(s): INR in the last 72 hours.   Hepatic:   Recent Labs      08/30/18   0825   ALKPHOS  85   ALT  10   AST  11   PROT  6.6   BILITOT  0.29*   BILIDIR  0.10   LABALBU  2.7*     Amylase and Lipase:  Recent Labs      08/29/18   0544 LACTA  NOT REPORTED     Lactic Acid:   Recent Labs      08/29/18   1256   LACTA  NOT REPORTED     CARDIAC ENZYMES:No results for input(s): CKTOTAL, CKMB, CKMBINDEX, TROPONINI in the last 72 hours. BNP: No results for input(s): BNP in the last 72 hours. Lipids: No results for input(s): CHOL, TRIG, HDL, LDLCALC in the last 72 hours. Invalid input(s): LDL  ABGs: No results found for: PH, PCO2, PO2, HCO3, O2SAT  Thyroid:   Lab Results   Component Value Date    TSH 0.49 08/24/2018      Urinalysis:   Color, UA   Date Value Ref Range Status   08/23/2018 ORANGE (A) YEL Final     Comment:     INTERPRET WITH CAUTION DUE TO INTENSE COLOR OF URINE.     pH, UA   Date Value Ref Range Status   08/23/2018 5.5 5.0 - 8.0 Final     Specific Gravity, UA   Date Value Ref Range Status   08/23/2018 1.015 1.005 - 1.030 Final     Protein, UA   Date Value Ref Range Status   08/23/2018 4+ (A) NEG Final     RBC, UA   Date Value Ref Range Status   08/23/2018 TOO NUMEROUS TO COUNT 0 - 4 /HPF Final     Comment:     Reference range defined for non-centrifuged specimen.      Bacteria, UA   Date Value Ref Range Status   08/23/2018 NOT REPORTED NONE Final     Nitrite, Urine   Date Value Ref Range Status   08/23/2018 NEGATIVE NEG Final     WBC, UA   Date Value Ref Range Status   08/23/2018 TOO NUMEROUS TO COUNT 0 - 5 /HPF Final     Leukocyte Esterase, Urine   Date Value Ref Range Status   08/23/2018 TRACE (A) NEG Final     Yeast, UA   Date Value Ref Range Status   08/23/2018 NOT REPORTED NONE Final     Glucose, Ur   Date Value Ref Range Status   08/23/2018 2+ (A) NEG Final     Bilirubin Urine   Date Value Ref Range Status   08/23/2018 NEGATIVE NEG Final         Assessment:  Principal Problem (Resolved):    Chest pain  Active Problems:    S/P cardiac cath-Patent grafts 6/5/15    CAD (coronary artery disease)    Hx of CABG    ICD (implantable cardioverter-defibrillator) in place    H/O mitral valve repair    Hypertension    DM (diabetes mellitus), type 2 with neurological complications (HCC)    CHAD (acute kidney injury) ([facility]    SIRS (systemic inflammatory response syndrome) ([facility]    Diabetic ketoacidosis without coma associated with type 2 diabetes mellitus (HCC)    Elevated serum immunoglobulin free light chains  Resolved Problems:    Hyperkalemia    Lactic acidosis    Acute metabolic encephalopathy    Metabolic encephalopathy    Cardiogenic shock ([facility]      Plan:  Acute kidney injury on CKD with baseline 1-1.2 creatinine 2/2 diabetic nephrosclerosis  nephrology on board,   On Lasix IV 80 mg BID per them,   HD planned again for today  Will need out patient HD  Plan for perm cath  After stopping plavix for 5 days  Parenchymal thinning of both kidneys can relate to atrophy/medical renal  disease  Anemia  Hb 8.1 this morning  Cardio recommend >9 hb for proper cardiac perfusion  1 unit blood transfusion yesterday, transfuse 2nd unit now per cardiology    Pulmonary infiltrates received Cefepime and Vancomycin in ICU,   will start Levofloxacin Po for 3 more days    Diabetes mellitus-   hemoglobin A1c 5.7,   will d/c Lantus, keep on medium dose insulin sliding scale,   need Metformin on d/c    History of coronary artery disease-  continue Coreg, Lipitor  Stop plavix for 5 days for perm cath insertion    Elevated Troponin`s likely 2/2 to fluid overload, will continue monitoring  Cardio to follow out pt  No acute complains    PT/OT/SW for discharge plan      Freddy Maza MD             8/31/2018, 8:45 AM     Attending Physician Statement  I have discussed the care of Ailyn John, including pertinent history and exam findings with the resident. I have reviewed the key elements of all parts of the encounter with the resident. I have seen and examined the patient with the resident and the key elements of all parts of the encounter have been performed by me.     Principal Problem (Resolved):    Chest pain  Active Problems:    S/P cardiac cath-Patent grafts

## 2018-09-01 LAB
ABO/RH: NORMAL
ANION GAP SERPL CALCULATED.3IONS-SCNC: 17 MMOL/L (ref 9–17)
ANTIBODY SCREEN: NEGATIVE
ARM BAND NUMBER: NORMAL
BLD PROD TYP BPU: NORMAL
BLD PROD TYP BPU: NORMAL
BUN BLDV-MCNC: 47 MG/DL (ref 6–20)
BUN/CREAT BLD: ABNORMAL (ref 9–20)
CALCIUM IONIZED: 1.13 MMOL/L (ref 1.13–1.33)
CALCIUM SERPL-MCNC: 8.4 MG/DL (ref 8.6–10.4)
CHLORIDE BLD-SCNC: 101 MMOL/L (ref 98–107)
CO2: 19 MMOL/L (ref 20–31)
CREAT SERPL-MCNC: 5.24 MG/DL (ref 0.5–0.9)
CROSSMATCH RESULT: NORMAL
CROSSMATCH RESULT: NORMAL
DISPENSE STATUS BLOOD BANK: NORMAL
DISPENSE STATUS BLOOD BANK: NORMAL
EXPIRATION DATE: NORMAL
GFR AFRICAN AMERICAN: 10 ML/MIN
GFR NON-AFRICAN AMERICAN: 8 ML/MIN
GFR SERPL CREATININE-BSD FRML MDRD: ABNORMAL ML/MIN/{1.73_M2}
GFR SERPL CREATININE-BSD FRML MDRD: ABNORMAL ML/MIN/{1.73_M2}
GLUCOSE BLD-MCNC: 118 MG/DL (ref 65–105)
GLUCOSE BLD-MCNC: 135 MG/DL (ref 70–99)
GLUCOSE BLD-MCNC: 154 MG/DL (ref 65–105)
GLUCOSE BLD-MCNC: 157 MG/DL (ref 65–105)
GLUCOSE BLD-MCNC: 206 MG/DL (ref 65–105)
MAGNESIUM: 1.8 MG/DL (ref 1.6–2.6)
PATHOLOGIST REVIEW: NORMAL
PHOSPHORUS: 5.5 MG/DL (ref 2.6–4.5)
POTASSIUM SERPL-SCNC: 4 MMOL/L (ref 3.7–5.3)
SODIUM BLD-SCNC: 137 MMOL/L (ref 135–144)
TRANSFUSION STATUS: NORMAL
TRANSFUSION STATUS: NORMAL
UNIT DIVISION: 0
UNIT DIVISION: 0
UNIT NUMBER: NORMAL
UNIT NUMBER: NORMAL

## 2018-09-01 PROCEDURE — 99232 SBSQ HOSP IP/OBS MODERATE 35: CPT | Performed by: INTERNAL MEDICINE

## 2018-09-01 PROCEDURE — 2580000003 HC RX 258: Performed by: STUDENT IN AN ORGANIZED HEALTH CARE EDUCATION/TRAINING PROGRAM

## 2018-09-01 PROCEDURE — 6370000000 HC RX 637 (ALT 250 FOR IP): Performed by: STUDENT IN AN ORGANIZED HEALTH CARE EDUCATION/TRAINING PROGRAM

## 2018-09-01 PROCEDURE — 83735 ASSAY OF MAGNESIUM: CPT

## 2018-09-01 PROCEDURE — 6370000000 HC RX 637 (ALT 250 FOR IP): Performed by: HOSPITALIST

## 2018-09-01 PROCEDURE — 36415 COLL VENOUS BLD VENIPUNCTURE: CPT

## 2018-09-01 PROCEDURE — 84100 ASSAY OF PHOSPHORUS: CPT

## 2018-09-01 PROCEDURE — 80048 BASIC METABOLIC PNL TOTAL CA: CPT

## 2018-09-01 PROCEDURE — 94762 N-INVAS EAR/PLS OXIMTRY CONT: CPT

## 2018-09-01 PROCEDURE — 82947 ASSAY GLUCOSE BLOOD QUANT: CPT

## 2018-09-01 PROCEDURE — 6370000000 HC RX 637 (ALT 250 FOR IP): Performed by: EMERGENCY MEDICINE

## 2018-09-01 PROCEDURE — 6370000000 HC RX 637 (ALT 250 FOR IP): Performed by: INTERNAL MEDICINE

## 2018-09-01 PROCEDURE — 6360000002 HC RX W HCPCS: Performed by: STUDENT IN AN ORGANIZED HEALTH CARE EDUCATION/TRAINING PROGRAM

## 2018-09-01 PROCEDURE — 2060000002 HC BURN ICU INTERMEDIATE R&B

## 2018-09-01 PROCEDURE — 82330 ASSAY OF CALCIUM: CPT

## 2018-09-01 RX ADMIN — ATORVASTATIN CALCIUM 80 MG: 80 TABLET, FILM COATED ORAL at 20:55

## 2018-09-01 RX ADMIN — CARVEDILOL 3.12 MG: 3.12 TABLET, FILM COATED ORAL at 20:55

## 2018-09-01 RX ADMIN — INSULIN LISPRO 1 UNITS: 100 INJECTION, SOLUTION INTRAVENOUS; SUBCUTANEOUS at 21:00

## 2018-09-01 RX ADMIN — HEPARIN SODIUM 5000 UNITS: 5000 INJECTION, SOLUTION INTRAVENOUS; SUBCUTANEOUS at 17:18

## 2018-09-01 RX ADMIN — INSULIN LISPRO 2 UNITS: 100 INJECTION, SOLUTION INTRAVENOUS; SUBCUTANEOUS at 12:15

## 2018-09-01 RX ADMIN — LEVOFLOXACIN 500 MG: 500 TABLET, FILM COATED ORAL at 08:59

## 2018-09-01 RX ADMIN — FUROSEMIDE 80 MG: 40 TABLET ORAL at 08:59

## 2018-09-01 RX ADMIN — Medication 10 ML: at 20:55

## 2018-09-01 RX ADMIN — INSULIN LISPRO 1 UNITS: 100 INJECTION, SOLUTION INTRAVENOUS; SUBCUTANEOUS at 08:59

## 2018-09-01 RX ADMIN — Medication 10 ML: at 09:06

## 2018-09-01 RX ADMIN — FUROSEMIDE 80 MG: 40 TABLET ORAL at 19:06

## 2018-09-01 RX ADMIN — HEPARIN SODIUM 5000 UNITS: 5000 INJECTION, SOLUTION INTRAVENOUS; SUBCUTANEOUS at 06:20

## 2018-09-01 RX ADMIN — VITAMIN D, TAB 1000IU (100/BT) 5000 UNITS: 25 TAB at 08:59

## 2018-09-01 ASSESSMENT — PAIN SCALES - GENERAL: PAINLEVEL_OUTOF10: 0

## 2018-09-01 NOTE — PROGRESS NOTES
NEPHROLOGY PROGRESS NOTE      SUBJECTIVE     Had HD yesterday. Uneventful. Feels fine. Oliguric. BP stable. PO decent. OBJECTIVE     Vitals:    09/01/18 0000 09/01/18 0507 09/01/18 0840 09/01/18 0856   BP: 115/65 (!) 107/57 105/62 105/62   Pulse: 88 88 89    Resp: 22 19 16    Temp:  98 °F (36.7 °C) 98.8 °F (37.1 °C)    TempSrc:  Oral Oral    SpO2: 95% 97% 96%    Weight:       Height:         24HR INTAKE/OUTPUT:      Intake/Output Summary (Last 24 hours) at 09/01/18 1317  Last data filed at 09/01/18 0929   Gross per 24 hour   Intake              200 ml   Output              315 ml   Net             -115 ml       General appearance:Awake, alert, in no acute distress  HEENT: PERRLA  Respiratory::vesicular breath sounds,no wheeze/crackles  Cardiovascular:S1 S2 normal,no gallop or organic murmur. Abdomen:Non tender/non distended. Bowel sounds present  Extremities: No Cyanosis or Clubbing,Lower extremity edema  Neurological:Alert and oriented. No abnormalities of mood, affect, memory, mentation, or behavior are noted      MEDICATIONS     Scheduled Meds:    sodium chloride  250 mL Intravenous Once    sodium chloride  250 mL Intravenous Once    furosemide  80 mg Oral BID    sodium chloride flush  10 mL Intravenous 2 times per day    insulin lispro  0-6 Units Subcutaneous TID WC    insulin lispro  0-3 Units Subcutaneous Nightly    carvedilol  3.125 mg Oral BID    atorvastatin  80 mg Oral Daily    vitamin D  5,000 Units Oral Daily    heparin (porcine)  5,000 Units Subcutaneous 3 times per day     Continuous Infusions:    dextrose       PRN Meds:  sodium chloride flush, magnesium hydroxide, ondansetron, acetaminophen, ipratropium-albuterol, sodium chloride, sodium chloride, albuterol, glucose, dextrose, glucagon (rDNA), dextrose, heparin (porcine), heparin (porcine), dextrose  Home Meds:                Prescriptions Prior to Admission: Insulin Pen Needle 32G X 4 MM MISC, 1 each by Does not apply route daily  insulin --        Last 3 CBC:  Recent Labs      08/30/18   0439  08/30/18   1447  08/30/18   1836  08/31/18   0548   WBC  6.7   --    --   9.1   RBC  2.24*   --    --   2.77*   HGB  6.6*  8.7*  8.6*  8.1*   HCT  21.8*  27.8*  27.3*  26.1*   MCV  97.3   --    --   94.2   MCH  29.5   --    --   29.2   MCHC  30.3   --    --   31.0   RDW  13.5   --    --   14.1   PLT  191   --    --   224   MPV  10.5   --    --   10.6       ASSESSMENT   1. Acute Kidney Injury: non-oliguric likely due to Ischemic ATN - DKA, hypotensive required pressors - HD Dependent    Baseline Cr 1-1.2. ALEJANDRO equivocal, RF 24, K 22.86, L 7.68, K/L 2.98, ANCA neg, anti GBM antibody negative, complement levels normal, SPEP normal, IFX Neg., UPC 0.99.  US 8/23/18: Right Kidney 12.2mc, Left 11.3cm, Right renal cortex 1.0cm, Left cortex 0.7 cm. Parenchymal thinning of both kidneys can relate to atrophy/medical renal  disease.  No hydronephrosis of either kidney. 2. Chronic Kidney Disease with baseline 1-1.2 secondary to diabetic nephrosclerosis  3. DKA - DM 2  4. CMP and CHF:  EF 40-45% s/p ICD  5. CAD/CABG:  Cath 6/2018 grafts patent    . PLAN     1.DC Radha  2. Perm cath next week Tuesday  3. HD Monday  4.  Plavix on hold    Please do not hesitate to call with questions    This note is created with the assistance of a speech-recognition program. While intending to generate a document that actually reflects the content of the visit, no guarantees can be provided that every mistake has been identified and corrected by editing    Maribeth Gillespie MD, [facility]), [address]   9/1/2018 1:17 PM  [facility]

## 2018-09-01 NOTE — PROGRESS NOTES
NEPHROLOGY PROGRESS NOTE      SUBJECTIVE     Patient was seen and examined on HD. No e/o renal recovery. Has groin cath. Tolerating HD well. No SOB  PO decent    OBJECTIVE     Vitals:    08/31/18 1249 08/31/18 1304 08/31/18 1700 08/31/18 2031   BP: 119/75 119/75 121/66 95/63   Pulse: 90 90 84 90   Resp: 14 21 23 19   Temp:   98.2 °F (36.8 °C) 97.8 °F (36.6 °C)   TempSrc:   Oral Oral   SpO2:  94% 96% 95%   Weight:       Height:         24HR INTAKE/OUTPUT:      Intake/Output Summary (Last 24 hours) at 08/31/18 2109  Last data filed at 08/31/18 1809   Gross per 24 hour   Intake              350 ml   Output              185 ml   Net              165 ml       General appearance:Awake, alert, in no acute distress  HEENT: PERRLA  Respiratory::vesicular breath sounds,no wheeze/crackles  Cardiovascular:S1 S2 normal,no gallop or organic murmur. Abdomen:Non tender/non distended. Bowel sounds present  Extremities: No Cyanosis or Clubbing,Lower extremity edema  Neurological:Alert and oriented. No abnormalities of mood, affect, memory, mentation, or behavior are noted      MEDICATIONS     Scheduled Meds:    sodium chloride  250 mL Intravenous Once    levofloxacin  500 mg Oral Daily    sodium chloride  250 mL Intravenous Once    furosemide  80 mg Oral BID    sodium chloride flush  10 mL Intravenous 2 times per day    insulin lispro  0-6 Units Subcutaneous TID WC    insulin lispro  0-3 Units Subcutaneous Nightly    carvedilol  3.125 mg Oral BID    atorvastatin  80 mg Oral Daily    vitamin D  5,000 Units Oral Daily    heparin (porcine)  5,000 Units Subcutaneous 3 times per day     Continuous Infusions:    dextrose       PRN Meds:  sodium chloride flush, magnesium hydroxide, ondansetron, acetaminophen, ipratropium-albuterol, sodium chloride, sodium chloride, albuterol, glucose, dextrose, glucagon (rDNA), dextrose, heparin (porcine), heparin (porcine), dextrose  Home Meds:                Prescriptions Prior to 0.29*   --    ALKPHOS   --    --   85   --    AST   --    --   11   --    ALT   --    --   10   --        Last 3 CBC:  Recent Labs      08/30/18   0439  08/30/18   1447  08/30/18   1836  08/31/18   0548   WBC  6.7   --    --   9.1   RBC  2.24*   --    --   2.77*   HGB  6.6*  8.7*  8.6*  8.1*   HCT  21.8*  27.8*  27.3*  26.1*   MCV  97.3   --    --   94.2   MCH  29.5   --    --   29.2   MCHC  30.3   --    --   31.0   RDW  13.5   --    --   14.1   PLT  191   --    --   224   MPV  10.5   --    --   10.6       ASSESSMENT   1. Acute Kidney Injury: non-oliguric likely due to Ischemic ATN - DKA, hypotensive required pressors - HD Dependent    Baseline Cr 1-1.2. ALEJANDRO equivocal, RF 24, K 22.86, L 7.68, K/L 2.98, ANCA neg, anti GBM antibody negative, complement levels normal, SPEP normal, IFX Neg., UPC 0.99.  US 8/23/18: Right Kidney 12.2mc, Left 11.3cm, Right renal cortex 1.0cm, Left cortex 0.7 cm. Parenchymal thinning of both kidneys can relate to atrophy/medical renal  disease.  No hydronephrosis of either kidney. 2. Chronic Kidney Disease with baseline 1-1.2 secondary to diabetic nephrosclerosis  3. DKA - DM 2  4. CMP and CHF:  EF 40-45% s/p ICD  5. CAD/CABG:  Cath 6/2018 grafts patent  6. Pulmonary congestion versus infiltrate  . PLAN     1. No evidence of renal recovery yet. Seen on HD. Orders reviewed with HD nurse. 2. On PO Lasix  3. Arranging for outpatient HD placement  4. Tunneled cath once ok with IR. Patient on Plavix. 5. Will follow     Please do not hesitate to call with questions    This note is created with the assistance of a speech-recognition program. While intending to generate a document that actually reflects the content of the visit, no guarantees can be provided that every mistake has been identified and corrected by editing    Jacek Vazquez MD  [facility]

## 2018-09-02 LAB
ANION GAP SERPL CALCULATED.3IONS-SCNC: 21 MMOL/L (ref 9–17)
BUN BLDV-MCNC: 65 MG/DL (ref 6–20)
BUN/CREAT BLD: ABNORMAL (ref 9–20)
CALCIUM IONIZED: 1.11 MMOL/L (ref 1.13–1.33)
CALCIUM SERPL-MCNC: 8.6 MG/DL (ref 8.6–10.4)
CHLORIDE BLD-SCNC: 98 MMOL/L (ref 98–107)
CO2: 17 MMOL/L (ref 20–31)
CREAT SERPL-MCNC: 7.01 MG/DL (ref 0.5–0.9)
GFR AFRICAN AMERICAN: 7 ML/MIN
GFR NON-AFRICAN AMERICAN: 6 ML/MIN
GFR SERPL CREATININE-BSD FRML MDRD: ABNORMAL ML/MIN/{1.73_M2}
GFR SERPL CREATININE-BSD FRML MDRD: ABNORMAL ML/MIN/{1.73_M2}
GLUCOSE BLD-MCNC: 133 MG/DL (ref 65–105)
GLUCOSE BLD-MCNC: 141 MG/DL (ref 70–99)
GLUCOSE BLD-MCNC: 147 MG/DL (ref 65–105)
GLUCOSE BLD-MCNC: 168 MG/DL (ref 65–105)
GLUCOSE BLD-MCNC: 191 MG/DL (ref 65–105)
MAGNESIUM: 1.9 MG/DL (ref 1.6–2.6)
PHOSPHORUS: 7 MG/DL (ref 2.6–4.5)
POTASSIUM SERPL-SCNC: 4.5 MMOL/L (ref 3.7–5.3)
SODIUM BLD-SCNC: 136 MMOL/L (ref 135–144)

## 2018-09-02 PROCEDURE — 6370000000 HC RX 637 (ALT 250 FOR IP): Performed by: HOSPITALIST

## 2018-09-02 PROCEDURE — 80048 BASIC METABOLIC PNL TOTAL CA: CPT

## 2018-09-02 PROCEDURE — 6370000000 HC RX 637 (ALT 250 FOR IP): Performed by: EMERGENCY MEDICINE

## 2018-09-02 PROCEDURE — 94762 N-INVAS EAR/PLS OXIMTRY CONT: CPT

## 2018-09-02 PROCEDURE — 2060000002 HC BURN ICU INTERMEDIATE R&B

## 2018-09-02 PROCEDURE — 82330 ASSAY OF CALCIUM: CPT

## 2018-09-02 PROCEDURE — 6370000000 HC RX 637 (ALT 250 FOR IP): Performed by: STUDENT IN AN ORGANIZED HEALTH CARE EDUCATION/TRAINING PROGRAM

## 2018-09-02 PROCEDURE — 99232 SBSQ HOSP IP/OBS MODERATE 35: CPT | Performed by: INTERNAL MEDICINE

## 2018-09-02 PROCEDURE — 6360000002 HC RX W HCPCS: Performed by: STUDENT IN AN ORGANIZED HEALTH CARE EDUCATION/TRAINING PROGRAM

## 2018-09-02 PROCEDURE — 82947 ASSAY GLUCOSE BLOOD QUANT: CPT

## 2018-09-02 PROCEDURE — 83735 ASSAY OF MAGNESIUM: CPT

## 2018-09-02 PROCEDURE — 97110 THERAPEUTIC EXERCISES: CPT

## 2018-09-02 PROCEDURE — 2580000003 HC RX 258: Performed by: STUDENT IN AN ORGANIZED HEALTH CARE EDUCATION/TRAINING PROGRAM

## 2018-09-02 PROCEDURE — 6370000000 HC RX 637 (ALT 250 FOR IP): Performed by: INTERNAL MEDICINE

## 2018-09-02 PROCEDURE — 84100 ASSAY OF PHOSPHORUS: CPT

## 2018-09-02 PROCEDURE — 36415 COLL VENOUS BLD VENIPUNCTURE: CPT

## 2018-09-02 RX ORDER — UREA 10 %
3 LOTION (ML) TOPICAL NIGHTLY PRN
Status: DISCONTINUED | OUTPATIENT
Start: 2018-09-02 | End: 2018-09-05 | Stop reason: HOSPADM

## 2018-09-02 RX ADMIN — Medication 10 ML: at 08:44

## 2018-09-02 RX ADMIN — INSULIN LISPRO 1 UNITS: 100 INJECTION, SOLUTION INTRAVENOUS; SUBCUTANEOUS at 12:24

## 2018-09-02 RX ADMIN — FUROSEMIDE 80 MG: 40 TABLET ORAL at 08:44

## 2018-09-02 RX ADMIN — ACETAMINOPHEN 650 MG: 325 TABLET ORAL at 09:06

## 2018-09-02 RX ADMIN — ATORVASTATIN CALCIUM 80 MG: 80 TABLET, FILM COATED ORAL at 20:12

## 2018-09-02 RX ADMIN — HEPARIN SODIUM 5000 UNITS: 5000 INJECTION, SOLUTION INTRAVENOUS; SUBCUTANEOUS at 01:04

## 2018-09-02 RX ADMIN — VITAMIN D, TAB 1000IU (100/BT) 5000 UNITS: 25 TAB at 08:44

## 2018-09-02 RX ADMIN — FUROSEMIDE 80 MG: 40 TABLET ORAL at 17:19

## 2018-09-02 RX ADMIN — INSULIN LISPRO 1 UNITS: 100 INJECTION, SOLUTION INTRAVENOUS; SUBCUTANEOUS at 20:13

## 2018-09-02 RX ADMIN — Medication 10 ML: at 20:14

## 2018-09-02 RX ADMIN — Medication 3 MG: at 22:30

## 2018-09-02 RX ADMIN — INSULIN LISPRO 1 UNITS: 100 INJECTION, SOLUTION INTRAVENOUS; SUBCUTANEOUS at 17:27

## 2018-09-02 RX ADMIN — HEPARIN SODIUM 5000 UNITS: 5000 INJECTION, SOLUTION INTRAVENOUS; SUBCUTANEOUS at 17:05

## 2018-09-02 RX ADMIN — CARVEDILOL 3.12 MG: 3.12 TABLET, FILM COATED ORAL at 20:12

## 2018-09-02 ASSESSMENT — PAIN DESCRIPTION - ONSET: ONSET: ON-GOING

## 2018-09-02 ASSESSMENT — PAIN DESCRIPTION - FREQUENCY: FREQUENCY: INTERMITTENT

## 2018-09-02 ASSESSMENT — PAIN DESCRIPTION - ORIENTATION: ORIENTATION: RIGHT;LEFT

## 2018-09-02 ASSESSMENT — PAIN SCALES - GENERAL: PAINLEVEL_OUTOF10: 6

## 2018-09-02 ASSESSMENT — PAIN DESCRIPTION - PAIN TYPE: TYPE: CHRONIC PAIN

## 2018-09-02 ASSESSMENT — PAIN DESCRIPTION - DESCRIPTORS: DESCRIPTORS: BURNING;CRAMPING

## 2018-09-02 ASSESSMENT — PAIN DESCRIPTION - LOCATION: LOCATION: FOOT

## 2018-09-02 NOTE — PROGRESS NOTES
2.77*   HGB  8.7*  8.6*  8.1*   HCT  27.8*  27.3*  26.1*   MCV   --    --   94.2   RDW   --    --   14.1   PLT   --    --   224     BMP: Recent Labs      08/31/18   0548  09/01/18   0441  09/02/18   0437   NA  135  137  136   K  4.6  4.0  4.5   CL  100  101  98   CO2  18*  19*  17*   PHOS  5.9*  5.5*  7.0*   BUN  60*  47*  65*   CREATININE  5.78*  5.24*  7.01*     FASTING LIPID PANEL:  Lab Results   Component Value Date    CHOL 286 (H) 09/22/2017    HDL 25 (L) 09/22/2017    TRIG 892 (H) 09/22/2017     LIVER PROFILE: Recent Labs      08/30/18   0825   AST  11   ALT  10   BILIDIR  0.10   BILITOT  0.29*   ALKPHOS  85      Assessment and Plan:     1. Acute kidney injury on CKD with baseline 1-1.2 creatinine 2/2 diabetic nephrosclerosis              nephrology on board              On Lasix IV 80 mg BID              Plan for perm cath after stopping plavix for 5 days. Heparin SQ morning dose on Tuesday not to be given to this patient. Garcia to be removed today   HD on monday     2. Anemia              Hb 8.1 today               Cardio recommend >9 hb for proper cardiac perfusion              Will continue to monitor H&H     3. Pulmonary infiltrates               received Cefepime and Vancomycin in ICU     4. Diabetes mellitus              Hemoglobin A1c 5.7              On medium dose insulin sliding scale, Lantus discontinued                5. History of coronary artery disease              Continue Coreg, Lipitor              Stop plavix for 5 days for perm cath insertion     6. Elevated Troponin`s likely 2/2 to fluid overload, will continue monitoring              Cardio to follow out pt              No acute complains       Discharge planning - Possible Tuesday after [facility] Placement. Dialysis placement at [facility].       Abelino Quigley MD  PGY-1, Department of Internal Medicine  [place], [place]  9/2/2018 7:47 AM    Attending [address] [place] [address] Problems    Diagnosis Date Noted    Elevated serum immunoglobulin free light chains [R76.8]     [facility] [N17.9] 08/23/2018    SIRS (systemic inflammatory response syndrome) (HCC) [R65.10] 08/23/2018    Diabetic ketoacidosis without coma associated with type 2 diabetes mellitus ([facility] [E11.10] 08/23/2018    DM (diabetes mellitus), type 2 with neurological complications [facility] [N67.40] 01/26/2016    CAD (coronary artery disease) [I25.10]     Hx of CABG [Z95.1]     ICD (implantable cardioverter-defibrillator) in place [Z95.810]     H/O mitral valve repair [Z98.890]     Hypertension [I10]     S/P cardiac cath-Patent grafts 6/5/15 [Z98.890] 06/04/2015       I have discussed the case, including pertinent history and exam findings with the resident and the team.  I have seen and examined the patient and the key elements of the encounter have been performed by me. I agree with the assessment, plan and orders as documented by the resident.     Await perm cath for continuing HD    Debby Cardona MD, [address] [place] [address] [facility] Medicine Associate & [facility]  Associate  Department of Internal Medicine  Internal Medicine Clerkship - [facility]la D[facility]  9/2/2018, 6:14 PM

## 2018-09-02 NOTE — PROGRESS NOTES
8.1*   HCT  27.8*  27.3*  26.1*   MCV   --    --   94.2   MCH   --    --   29.2   MCHC   --    --   31.0   RDW   --    --   14.1   PLT   --    --   224   MPV   --    --   10.6       ASSESSMENT     1. Acute Kidney Injury: non-oliguric likely due to Ischemic ATN - DKA, hypotensive required pressors - HD Dependent    Baseline Cr 1-1.2. ALEJANDRO equivocal, RF 24, K 22.86, L 7.68, K/L 2.98, ANCA neg, anti GBM antibody negative, complement levels normal, SPEP normal, IFX Neg., UPC 0.99.  US 8/23/18: Right Kidney 12.2mc, Left 11.3cm, Right renal cortex 1.0cm, Left cortex 0.7 cm. Parenchymal thinning of both kidneys can relate to atrophy/medical renal  disease.  No hydronephrosis of either kidney. 2. Chronic Kidney Disease with baseline 1-1.2 secondary to diabetic nephrosclerosis  3. DKA - DM 2  4. CMP and CHF:  EF 40-45% s/p ICD  5. CAD/CABG:  Cath 6/2018 grafts patent    . PLAN     1. Avoid Nephrotoxins  2. Perm cath next week Tuesday  3. HD Monday  4. Plavix on hold  5.   Arranging for outpatient hemodialysis placement at [facility]    Please do not hesitate to call with questions    This note is created with the assistance of a speech-recognition program. While intending to generate a document that actually reflects the content of the visit, no guarantees can be provided that every mistake has been identified and corrected by editing    Shelia Fulton MD, MRCP [facility] Staffrod   9/2/2018 12:30 PM  [facility]

## 2018-09-02 NOTE — PROGRESS NOTES
Physical Therapy  Facility/Department: [address] BURN UNIT  Daily Treatment Note  NAME: Ailyn John  : 1960  MRN: [id number]    Date of Service: 2018    Discharge Recommendations:  IP Rehab        Patient Diagnosis(es): The primary encounter diagnosis was Hyperkalemia. Diagnoses of Acute renal failure, unspecified acute renal failure type (HCC) and Sepsis, due to unspecified organism [facility]) were also pertinent to this visit. has a past medical history of CAD (coronary artery disease); Cardiomyopathy ([facility] Depression; Diabetes mellitus ([facility] H/O mitral valve repair; History of fractured kneecap; Hx of CABG; Hx of myocardial infarction; Hyperlipidemia; Hypertension; ICD (implantable cardioverter-defibrillator) in place; Kidney calculi; Osteoarthritis; and Renal calculi.   has a past surgical history that includes Cholecystectomy (); Hysterectomy (); Ankle surgery (Right, ); Kidney stone surgery (Left, ); Tubal ligation; Mitral valve surgery (6/4/15); Cardiac defibrillator placement (06/01/15); and Coronary artery bypass graft (05/28/15). Restrictions  Restrictions/Precautions  Restrictions/Precautions: General Precautions, Fall Risk  Position Activity Restriction  Other position/activity restrictions: OOB to chair okay per RN  Subjective   General  Chart Reviewed: Yes  Response To Previous Treatment: Patient with no complaints from previous session. Family / Caregiver Present: No  Subjective  Subjective: Per RN, patient okay for PT. RN requesting patient stay in bed d/t femoral line. Patient agreeable to bed exercises. Denied pain. Would still like to DC home. Orientation  Orientation  Overall Orientation Status: Within Normal Limits  Objective   Bed mobility  Comment: JOSE DAVID  Transfers  Comment: JOSE DAVID  Ambulation  Ambulation?: No  Stairs/Curb  Stairs?: No     Balance  Comments: JOSE DAVID    Exercise  3 HEP provided - 2 UE's and 1 LE.  Red tband provided (placed on bed hand

## 2018-09-03 ENCOUNTER — APPOINTMENT (OUTPATIENT)
Dept: DIALYSIS | Age: 58
DRG: 469 | End: 2018-09-03
Payer: MEDICARE

## 2018-09-03 PROBLEM — R65.10 SIRS (SYSTEMIC INFLAMMATORY RESPONSE SYNDROME) (HCC): Status: RESOLVED | Noted: 2018-08-23 | Resolved: 2018-09-03

## 2018-09-03 LAB
ANION GAP SERPL CALCULATED.3IONS-SCNC: 20 MMOL/L (ref 9–17)
BUN BLDV-MCNC: 76 MG/DL (ref 6–20)
BUN/CREAT BLD: ABNORMAL (ref 9–20)
CALCIUM IONIZED: 1.15 MMOL/L (ref 1.13–1.33)
CALCIUM SERPL-MCNC: 8.5 MG/DL (ref 8.6–10.4)
CANNABINOID, BLOOD: 62 NG/ML
CHLORIDE BLD-SCNC: 100 MMOL/L (ref 98–107)
CO2: 17 MMOL/L (ref 20–31)
CREAT SERPL-MCNC: 8.84 MG/DL (ref 0.5–0.9)
GFR AFRICAN AMERICAN: 6 ML/MIN
GFR NON-AFRICAN AMERICAN: 5 ML/MIN
GFR SERPL CREATININE-BSD FRML MDRD: ABNORMAL ML/MIN/{1.73_M2}
GFR SERPL CREATININE-BSD FRML MDRD: ABNORMAL ML/MIN/{1.73_M2}
GLUCOSE BLD-MCNC: 143 MG/DL (ref 65–105)
GLUCOSE BLD-MCNC: 159 MG/DL (ref 70–99)
GLUCOSE BLD-MCNC: 163 MG/DL (ref 65–105)
GLUCOSE BLD-MCNC: 172 MG/DL (ref 65–105)
MAGNESIUM: 1.7 MG/DL (ref 1.6–2.6)
PHOSPHORUS: 7.7 MG/DL (ref 2.6–4.5)
POTASSIUM SERPL-SCNC: 4.5 MMOL/L (ref 3.7–5.3)
SODIUM BLD-SCNC: 137 MMOL/L (ref 135–144)

## 2018-09-03 PROCEDURE — 6370000000 HC RX 637 (ALT 250 FOR IP): Performed by: HOSPITALIST

## 2018-09-03 PROCEDURE — 83735 ASSAY OF MAGNESIUM: CPT

## 2018-09-03 PROCEDURE — 82947 ASSAY GLUCOSE BLOOD QUANT: CPT

## 2018-09-03 PROCEDURE — 90937 HEMODIALYSIS REPEATED EVAL: CPT

## 2018-09-03 PROCEDURE — 6370000000 HC RX 637 (ALT 250 FOR IP): Performed by: EMERGENCY MEDICINE

## 2018-09-03 PROCEDURE — 94762 N-INVAS EAR/PLS OXIMTRY CONT: CPT

## 2018-09-03 PROCEDURE — 80048 BASIC METABOLIC PNL TOTAL CA: CPT

## 2018-09-03 PROCEDURE — 82330 ASSAY OF CALCIUM: CPT

## 2018-09-03 PROCEDURE — 99232 SBSQ HOSP IP/OBS MODERATE 35: CPT | Performed by: INTERNAL MEDICINE

## 2018-09-03 PROCEDURE — 6360000002 HC RX W HCPCS: Performed by: INTERNAL MEDICINE

## 2018-09-03 PROCEDURE — 2060000002 HC BURN ICU INTERMEDIATE R&B

## 2018-09-03 PROCEDURE — 84100 ASSAY OF PHOSPHORUS: CPT

## 2018-09-03 PROCEDURE — 6360000002 HC RX W HCPCS: Performed by: STUDENT IN AN ORGANIZED HEALTH CARE EDUCATION/TRAINING PROGRAM

## 2018-09-03 PROCEDURE — 2580000003 HC RX 258: Performed by: STUDENT IN AN ORGANIZED HEALTH CARE EDUCATION/TRAINING PROGRAM

## 2018-09-03 PROCEDURE — 36415 COLL VENOUS BLD VENIPUNCTURE: CPT

## 2018-09-03 RX ORDER — HEPARIN SODIUM 5000 [USP'U]/ML
8000 INJECTION, SOLUTION INTRAVENOUS; SUBCUTANEOUS ONCE
Status: COMPLETED | OUTPATIENT
Start: 2018-09-03 | End: 2018-09-03

## 2018-09-03 RX ORDER — HEPARIN SODIUM 5000 [USP'U]/ML
9500 INJECTION, SOLUTION INTRAVENOUS; SUBCUTANEOUS ONCE
Status: COMPLETED | OUTPATIENT
Start: 2018-09-03 | End: 2018-09-03

## 2018-09-03 RX ORDER — HEPARIN SODIUM 5000 [USP'U]/ML
5000 INJECTION, SOLUTION INTRAVENOUS; SUBCUTANEOUS EVERY 8 HOURS SCHEDULED
Status: DISCONTINUED | OUTPATIENT
Start: 2018-09-04 | End: 2018-09-05 | Stop reason: HOSPADM

## 2018-09-03 RX ORDER — TRAZODONE HYDROCHLORIDE 50 MG/1
50 TABLET ORAL NIGHTLY
Status: COMPLETED | OUTPATIENT
Start: 2018-09-04 | End: 2018-09-04

## 2018-09-03 RX ADMIN — INSULIN LISPRO 1 UNITS: 100 INJECTION, SOLUTION INTRAVENOUS; SUBCUTANEOUS at 09:38

## 2018-09-03 RX ADMIN — SERTRALINE 100 MG: 50 TABLET, FILM COATED ORAL at 03:35

## 2018-09-03 RX ADMIN — ATORVASTATIN CALCIUM 80 MG: 80 TABLET, FILM COATED ORAL at 20:04

## 2018-09-03 RX ADMIN — ALTEPLASE 2 MG: 2.2 INJECTION, POWDER, LYOPHILIZED, FOR SOLUTION INTRAVENOUS at 15:05

## 2018-09-03 RX ADMIN — HEPARIN SODIUM 5000 UNITS: 5000 INJECTION, SOLUTION INTRAVENOUS; SUBCUTANEOUS at 00:29

## 2018-09-03 RX ADMIN — INSULIN LISPRO 1 UNITS: 100 INJECTION, SOLUTION INTRAVENOUS; SUBCUTANEOUS at 19:04

## 2018-09-03 RX ADMIN — INSULIN LISPRO 1 UNITS: 100 INJECTION, SOLUTION INTRAVENOUS; SUBCUTANEOUS at 21:06

## 2018-09-03 RX ADMIN — ALTEPLASE 2 MG: 2.2 INJECTION, POWDER, LYOPHILIZED, FOR SOLUTION INTRAVENOUS at 15:00

## 2018-09-03 RX ADMIN — VITAMIN D, TAB 1000IU (100/BT) 5000 UNITS: 25 TAB at 09:31

## 2018-09-03 RX ADMIN — Medication 10 ML: at 09:32

## 2018-09-03 RX ADMIN — HEPARIN SODIUM 9500 UNITS: 5000 INJECTION, SOLUTION INTRAVENOUS; SUBCUTANEOUS at 18:47

## 2018-09-03 RX ADMIN — Medication 3 MG: at 21:06

## 2018-09-03 RX ADMIN — HEPARIN SODIUM 5000 UNITS: 5000 INJECTION, SOLUTION INTRAVENOUS; SUBCUTANEOUS at 19:05

## 2018-09-03 RX ADMIN — INSULIN LISPRO 1 UNITS: 100 INJECTION, SOLUTION INTRAVENOUS; SUBCUTANEOUS at 13:02

## 2018-09-03 RX ADMIN — HEPARIN SODIUM 5000 UNITS: 5000 INJECTION, SOLUTION INTRAVENOUS; SUBCUTANEOUS at 09:31

## 2018-09-03 RX ADMIN — HEPARIN SODIUM 8000 UNITS: 5000 INJECTION, SOLUTION INTRAVENOUS; SUBCUTANEOUS at 18:45

## 2018-09-03 ASSESSMENT — PAIN SCALES - GENERAL
PAINLEVEL_OUTOF10: 0

## 2018-09-03 ASSESSMENT — ENCOUNTER SYMPTOMS
COUGH: 0
SHORTNESS OF BREATH: 0
ABDOMINAL PAIN: 0
NAUSEA: 0
VOMITING: 0

## 2018-09-03 NOTE — PROGRESS NOTES
Dialysis Post Treatment Note  Patient tolerated treatment well. Denies complaints at time of discharge.    Vitals:    09/03/18 1900   BP: 100/64   Pulse: 91   Resp: 18   Temp: 98.6 °F (37 °C)   SpO2: 98%     Pre-Weight = 71.7kg  Post-weight = Weight: 153 lb 10.6 oz (69.7 kg)  Total Liters Processed = Total Liters Processed (l/min): 41 l/min (see flowsheet for first half of tx LP)  Rinseback Volume (mL) = Rinseback Volume (ml): 370 ml  Net Removal (mL) = 69.7kg  Length of treatment= 3 hours

## 2018-09-03 NOTE — PROGRESS NOTES
NEPHROLOGY PROGRESS NOTE      SUBJECTIVE     Seen in dialysis. Needs perm cath. Plavix has been on hold  No c/o chest pain or dyspnea. Urine output yesterday 850. On Lasix 80mg IV BID. OBJECTIVE     Vitals:    09/03/18 0340 09/03/18 0800 09/03/18 0915 09/03/18 1215   BP: 116/65 95/68 101/76 128/75   Pulse: 85 70 77 78   Resp: 15 20 18 20   Temp: 97.9 °F (36.6 °C) 97.7 °F (36.5 °C)  98.2 °F (36.8 °C)   TempSrc: Oral Oral  Oral   SpO2: 98% 97% 97% 95%   Weight:       Height:         24HR INTAKE/OUTPUT:      Intake/Output Summary (Last 24 hours) at 09/03/18 1438  Last data filed at 09/03/18 1215   Gross per 24 hour   Intake              255 ml   Output              550 ml   Net             -295 ml     General appearance:Awake, alert, in no acute distress  HEENT: PERRLA  Respiratory::vesicular breath sounds,no wheeze/crackles  Cardiovascular:S1 S2 normal,no gallop or organic murmur. Abdomen:Non tender/non distended. Bowel sounds present  Extremities: No Cyanosis or Clubbing,Lower extremity edema  Neurological:Alert and oriented. No abnormalities of mood, affect, memory, mentation, or behavior are noted  Right groin QM    MEDICATIONS     Scheduled Meds:    sertraline  100 mg Oral Daily    sodium chloride  250 mL Intravenous Once    sodium chloride  250 mL Intravenous Once    furosemide  80 mg Oral BID    sodium chloride flush  10 mL Intravenous 2 times per day    insulin lispro  0-6 Units Subcutaneous TID WC    insulin lispro  0-3 Units Subcutaneous Nightly    carvedilol  3.125 mg Oral BID    atorvastatin  80 mg Oral Daily    vitamin D  5,000 Units Oral Daily    heparin (porcine)  5,000 Units Subcutaneous 3 times per day   history    INVESTIGATIONS     Last 3 CMP:    Recent Labs      09/01/18   0441  09/02/18   0437  09/03/18   0458   NA  137  136  137   K  4.0  4.5  4.5   CL  101  98  100   CO2  19*  17*  17*   BUN  47*  65*  76*   CREATININE  5.24*  7.01*  8.84*   CALCIUM  8.4*  8.6  8.5*

## 2018-09-03 NOTE — PROGRESS NOTES
Notified Dr. Dwayne Benavidez of pt's elevated arterial pressures, despite all interventions. Tx terminated, all blood returned. Cathflo instilled into arterial and venous lumens, per MD orders.

## 2018-09-03 NOTE — PROGRESS NOTES
[facility]     Department of Internal Medicine - Staff Internal Medicine Service     DAILY PROGRESS NOTE - TEAM       Patient:  Nataly Valadez  YOB: 1960  MRN: [id number]     Acct: [de-identified]     Admit date: 8/23/2018  Admitting Diagnosis: CHAD (acute kidney injury) ([facility] [address]    Subjective:   Pt seen and Chart reviewed. Pt has been waiting for TR[facility] procedure tomorrow  Has no symptoms today  Denies any chest pain, SOB, palpitation  Did not sleep well last night- home medication trazodone was resumed  No nausea or vomiting, eating well  Ambulating in room  HD today  Remains oliguric  SPO2 dropped in night to 70 as per RN. Currently saturating at 99. Will take off oxygen for now and will monitor for any desaturation. Review of Systems   Constitutional: Negative for chills and fever. Respiratory: Negative for cough and shortness of breath. Cardiovascular: Negative for chest pain and leg swelling. Gastrointestinal: Negative for abdominal pain, nausea and vomiting. Skin: Negative. Neurological: Negative for dizziness. Objective:   /65   Pulse 85   Temp 97.9 °F (36.6 °C) (Oral)   Resp 15   Ht 5' 5\" (1.651 m)   Wt 160 lb 0.9 oz (72.6 kg)   SpO2 98%   BMI 26.63 kg/m²       PHYSICAL EXAMINATION :    Physical Exam   Constitutional: She is oriented to person, place, and time. HENT:   Mouth/Throat: Oropharynx is clear and moist.   Neck: Neck supple. Cardiovascular: Normal rate, regular rhythm and normal heart sounds. Exam reveals no friction rub. No murmur heard. Pulmonary/Chest: No respiratory distress. She has no rales. Abdominal: Soft. She exhibits no distension. There is no tenderness. Musculoskeletal: She exhibits no edema. Neurological: She is alert and oriented to person, place, and time.              Intake/Output Summary (Last 24 hours) at 09/03/18 0858  Last data filed at 09/02/18 2003   Gross per 24 hour   Intake Internal Medicine  Internal Medicine Clerkship - Don Ferguson  9/3/2018, 4:17 PM

## 2018-09-03 NOTE — PROGRESS NOTES
Cathflo withdrawn, arterial and venous lumens patent, and flush well. Tx initiated per orders for remaining two hours of tx. Pt vitals stable, no acute distress noted.

## 2018-09-04 ENCOUNTER — APPOINTMENT (OUTPATIENT)
Dept: INTERVENTIONAL RADIOLOGY/VASCULAR | Age: 58
DRG: 469 | End: 2018-09-04
Payer: MEDICARE

## 2018-09-04 VITALS
SYSTOLIC BLOOD PRESSURE: 142 MMHG | RESPIRATION RATE: 23 BRPM | TEMPERATURE: 97.9 F | HEIGHT: 65 IN | BODY MASS INDEX: 25.6 KG/M2 | WEIGHT: 153.66 LBS | HEART RATE: 84 BPM | OXYGEN SATURATION: 98 % | DIASTOLIC BLOOD PRESSURE: 62 MMHG

## 2018-09-04 LAB
ANION GAP SERPL CALCULATED.3IONS-SCNC: 19 MMOL/L (ref 9–17)
BUN BLDV-MCNC: 51 MG/DL (ref 6–20)
BUN/CREAT BLD: ABNORMAL (ref 9–20)
CALCIUM IONIZED: 1.06 MMOL/L (ref 1.13–1.33)
CALCIUM SERPL-MCNC: 8.2 MG/DL (ref 8.6–10.4)
CHLORIDE BLD-SCNC: 100 MMOL/L (ref 98–107)
CO2: 17 MMOL/L (ref 20–31)
CREAT SERPL-MCNC: 6.38 MG/DL (ref 0.5–0.9)
GFR AFRICAN AMERICAN: 8 ML/MIN
GFR NON-AFRICAN AMERICAN: 7 ML/MIN
GFR SERPL CREATININE-BSD FRML MDRD: ABNORMAL ML/MIN/{1.73_M2}
GFR SERPL CREATININE-BSD FRML MDRD: ABNORMAL ML/MIN/{1.73_M2}
GLUCOSE BLD-MCNC: 135 MG/DL (ref 65–105)
GLUCOSE BLD-MCNC: 159 MG/DL (ref 65–105)
GLUCOSE BLD-MCNC: 159 MG/DL (ref 65–105)
GLUCOSE BLD-MCNC: 203 MG/DL (ref 70–99)
GLUCOSE BLD-MCNC: 240 MG/DL (ref 65–105)
INR BLD: 0.9
MAGNESIUM: 1.6 MG/DL (ref 1.6–2.6)
PARTIAL THROMBOPLASTIN TIME: 24.7 SEC (ref 20.5–30.5)
PHOSPHORUS: 6.3 MG/DL (ref 2.6–4.5)
POTASSIUM SERPL-SCNC: 4 MMOL/L (ref 3.7–5.3)
PROTHROMBIN TIME: 10.2 SEC (ref 9–12)
SODIUM BLD-SCNC: 136 MMOL/L (ref 135–144)

## 2018-09-04 PROCEDURE — 97110 THERAPEUTIC EXERCISES: CPT

## 2018-09-04 PROCEDURE — 77001 FLUOROGUIDE FOR VEIN DEVICE: CPT | Performed by: RADIOLOGY

## 2018-09-04 PROCEDURE — 99232 SBSQ HOSP IP/OBS MODERATE 35: CPT | Performed by: INTERNAL MEDICINE

## 2018-09-04 PROCEDURE — 6370000000 HC RX 637 (ALT 250 FOR IP): Performed by: STUDENT IN AN ORGANIZED HEALTH CARE EDUCATION/TRAINING PROGRAM

## 2018-09-04 PROCEDURE — 6370000000 HC RX 637 (ALT 250 FOR IP): Performed by: EMERGENCY MEDICINE

## 2018-09-04 PROCEDURE — 6360000002 HC RX W HCPCS: Performed by: RADIOLOGY

## 2018-09-04 PROCEDURE — C1769 GUIDE WIRE: HCPCS

## 2018-09-04 PROCEDURE — 02HV33Z INSERTION OF INFUSION DEVICE INTO SUPERIOR VENA CAVA, PERCUTANEOUS APPROACH: ICD-10-PCS | Performed by: RADIOLOGY

## 2018-09-04 PROCEDURE — 76937 US GUIDE VASCULAR ACCESS: CPT | Performed by: RADIOLOGY

## 2018-09-04 PROCEDURE — 85730 THROMBOPLASTIN TIME PARTIAL: CPT

## 2018-09-04 PROCEDURE — 6370000000 HC RX 637 (ALT 250 FOR IP): Performed by: INTERNAL MEDICINE

## 2018-09-04 PROCEDURE — C1894 INTRO/SHEATH, NON-LASER: HCPCS

## 2018-09-04 PROCEDURE — C1750 CATH, HEMODIALYSIS,LONG-TERM: HCPCS

## 2018-09-04 PROCEDURE — 83735 ASSAY OF MAGNESIUM: CPT

## 2018-09-04 PROCEDURE — 36558 INSERT TUNNELED CV CATH: CPT | Performed by: RADIOLOGY

## 2018-09-04 PROCEDURE — 97535 SELF CARE MNGMENT TRAINING: CPT

## 2018-09-04 PROCEDURE — 2580000003 HC RX 258: Performed by: RADIOLOGY

## 2018-09-04 PROCEDURE — 85610 PROTHROMBIN TIME: CPT

## 2018-09-04 PROCEDURE — 84100 ASSAY OF PHOSPHORUS: CPT

## 2018-09-04 PROCEDURE — 2709999900 HC NON-CHARGEABLE SUPPLY

## 2018-09-04 PROCEDURE — 80048 BASIC METABOLIC PNL TOTAL CA: CPT

## 2018-09-04 PROCEDURE — 6370000000 HC RX 637 (ALT 250 FOR IP): Performed by: HOSPITALIST

## 2018-09-04 PROCEDURE — 36415 COLL VENOUS BLD VENIPUNCTURE: CPT

## 2018-09-04 PROCEDURE — 82947 ASSAY GLUCOSE BLOOD QUANT: CPT

## 2018-09-04 PROCEDURE — 82330 ASSAY OF CALCIUM: CPT

## 2018-09-04 RX ORDER — FUROSEMIDE 80 MG
80 TABLET ORAL DAILY
Qty: 60 TABLET | Refills: 3 | Status: SHIPPED | OUTPATIENT
Start: 2018-09-05 | End: 2018-10-29

## 2018-09-04 RX ORDER — LANOLIN ALCOHOL/MO/W.PET/CERES
325 CREAM (GRAM) TOPICAL 2 TIMES DAILY
Qty: 90 TABLET | Refills: 3 | Status: SHIPPED | OUTPATIENT
Start: 2018-09-04 | End: 2018-09-24

## 2018-09-04 RX ORDER — CLOPIDOGREL BISULFATE 75 MG/1
TABLET ORAL
Qty: 30 TABLET | Refills: 5 | Status: ON HOLD | OUTPATIENT
Start: 2018-09-05 | End: 2018-11-06

## 2018-09-04 RX ORDER — FENTANYL CITRATE 50 UG/ML
INJECTION, SOLUTION INTRAMUSCULAR; INTRAVENOUS
Status: COMPLETED | OUTPATIENT
Start: 2018-09-04 | End: 2018-09-04

## 2018-09-04 RX ORDER — INSULIN GLARGINE 100 [IU]/ML
5 INJECTION, SOLUTION SUBCUTANEOUS NIGHTLY
Status: DISCONTINUED | OUTPATIENT
Start: 2018-09-04 | End: 2018-09-05 | Stop reason: HOSPADM

## 2018-09-04 RX ORDER — MIDODRINE HYDROCHLORIDE 5 MG/1
5 TABLET ORAL 3 TIMES DAILY
Status: DISCONTINUED | OUTPATIENT
Start: 2018-09-04 | End: 2018-09-05 | Stop reason: HOSPADM

## 2018-09-04 RX ORDER — INSULIN GLARGINE 100 [IU]/ML
5 INJECTION, SOLUTION SUBCUTANEOUS NIGHTLY
Qty: 1 VIAL | Refills: 3 | Status: SHIPPED | OUTPATIENT
Start: 2018-09-04 | End: 2018-10-26

## 2018-09-04 RX ORDER — MIDODRINE HYDROCHLORIDE 5 MG/1
5 TABLET ORAL 3 TIMES DAILY
Qty: 90 TABLET | Refills: 3 | Status: SHIPPED | OUTPATIENT
Start: 2018-09-04 | End: 2019-04-03 | Stop reason: SDUPTHER

## 2018-09-04 RX ORDER — HEPARIN SODIUM 5000 [USP'U]/ML
INJECTION, SOLUTION INTRAVENOUS; SUBCUTANEOUS
Status: COMPLETED | OUTPATIENT
Start: 2018-09-04 | End: 2018-09-04

## 2018-09-04 RX ORDER — FUROSEMIDE 40 MG/1
80 TABLET ORAL DAILY
Status: DISCONTINUED | OUTPATIENT
Start: 2018-09-05 | End: 2018-09-05 | Stop reason: HOSPADM

## 2018-09-04 RX ORDER — ACETAMINOPHEN 325 MG/1
650 TABLET ORAL EVERY 8 HOURS PRN
Qty: 120 TABLET | Refills: 0 | Status: SHIPPED | OUTPATIENT
Start: 2018-09-04 | End: 2018-11-03 | Stop reason: ALTCHOICE

## 2018-09-04 RX ORDER — CEFAZOLIN SODIUM 1 G/50ML
1 INJECTION, SOLUTION INTRAVENOUS ONCE
Status: COMPLETED | OUTPATIENT
Start: 2018-09-04 | End: 2018-09-04

## 2018-09-04 RX ORDER — SODIUM CHLORIDE 9 MG/ML
INJECTION, SOLUTION INTRAVENOUS CONTINUOUS
Status: DISCONTINUED | OUTPATIENT
Start: 2018-09-04 | End: 2018-09-05 | Stop reason: HOSPADM

## 2018-09-04 RX ADMIN — VITAMIN D, TAB 1000IU (100/BT) 5000 UNITS: 25 TAB at 20:34

## 2018-09-04 RX ADMIN — MIDODRINE HYDROCHLORIDE 5 MG: 5 TABLET ORAL at 20:34

## 2018-09-04 RX ADMIN — CEFAZOLIN SODIUM 1 G: 1 INJECTION, SOLUTION INTRAVENOUS at 17:30

## 2018-09-04 RX ADMIN — TRAZODONE HYDROCHLORIDE 50 MG: 50 TABLET ORAL at 00:11

## 2018-09-04 RX ADMIN — HEPARIN SODIUM 1000 UNITS: 5000 INJECTION, SOLUTION INTRAVENOUS; SUBCUTANEOUS at 18:50

## 2018-09-04 RX ADMIN — MIDODRINE HYDROCHLORIDE 5 MG: 5 TABLET ORAL at 12:19

## 2018-09-04 RX ADMIN — SODIUM CHLORIDE: 9 INJECTION, SOLUTION INTRAVENOUS at 08:56

## 2018-09-04 RX ADMIN — CARVEDILOL 3.12 MG: 3.12 TABLET, FILM COATED ORAL at 08:56

## 2018-09-04 RX ADMIN — INSULIN LISPRO 1 UNITS: 100 INJECTION, SOLUTION INTRAVENOUS; SUBCUTANEOUS at 14:01

## 2018-09-04 RX ADMIN — FENTANYL CITRATE 50 MCG: 50 INJECTION INTRAMUSCULAR; INTRAVENOUS at 18:40

## 2018-09-04 RX ADMIN — HEPARIN SODIUM 1000 UNITS: 5000 INJECTION, SOLUTION INTRAVENOUS; SUBCUTANEOUS at 18:49

## 2018-09-04 RX ADMIN — SERTRALINE 100 MG: 50 TABLET, FILM COATED ORAL at 08:56

## 2018-09-04 RX ADMIN — INSULIN LISPRO 2 UNITS: 100 INJECTION, SOLUTION INTRAVENOUS; SUBCUTANEOUS at 21:03

## 2018-09-04 RX ADMIN — CARVEDILOL 3.12 MG: 3.12 TABLET, FILM COATED ORAL at 20:34

## 2018-09-04 RX ADMIN — ATORVASTATIN CALCIUM 80 MG: 80 TABLET, FILM COATED ORAL at 20:34

## 2018-09-04 ASSESSMENT — ENCOUNTER SYMPTOMS
HEARTBURN: 0
COUGH: 0
NAUSEA: 0
HEMOPTYSIS: 0

## 2018-09-04 ASSESSMENT — PAIN SCALES - GENERAL: PAINLEVEL_OUTOF10: 0

## 2018-09-04 NOTE — PROGRESS NOTES
PATIENT REFUSES TO WEAR BIPAP     [x] Risks and benefits explained to patient   [x] Patient refuses to wear Bipap stating  Don't need it any more  [x] Patient verbalizes understanding of information presented.

## 2018-09-04 NOTE — CARE COORDINATION
Noted pt to get perm cath for dialysis this day. Pt has a confirmed chair schedule at [facility] at 7 am. Pt to start OP dialysis at [facility], Wednesday 9/5/18 at 6:20 am, if discharged this day. Pt will be following Dr. Jd Wu. Nephrology updated on OP dialysis schedule. Provided pt and CM with updated OP dialysis schedule and facility information. Pt voiced no questions/concerns at this time. Noted CM working on pre cert. Addendum 3:48 pm.  Faxed updated notes to [facility].

## 2018-09-04 NOTE — BRIEF OP NOTE
Brief Postoperative Note    Henny Simons  YOB: 1960  [id number]    Pre-operative Diagnosis: Diabetic ketoacidosis w CHAD; groin Julio Cesar. For D/C    Post-operative Diagnosis: Same    Procedure: Rt IJ permcath insertion    Anesthesia: Local    Surgeons/Assistants: Berkley Hernandez MD; Lucy ROY    Estimated Blood Loss: less than 50     Complications: None    Specimens: Was Not Obtained    Findings: 19 cm x 14.5 F Permcath inserted via right IJ with tip in the RA; good blood withdrawals demonstrated. Catheter flushed and ready for use at this time.     Electronically signed by Berkley Hernandez MD on 9/4/2018 at 6:51 PM

## 2018-09-04 NOTE — PROGRESS NOTES
Physical Therapy  Facility/Department: [address] BURN UNIT  Daily Treatment Note  NAME: Caitlin Keyes  : 1960  MRN: [id number]    Date of Service: 2018    Discharge Recommendations:  IP Rehab        Patient Diagnosis(es): The primary encounter diagnosis was Hyperkalemia. Diagnoses of Acute renal failure, unspecified acute renal failure type (HCC) and Sepsis, due to unspecified organism [facility]) were also pertinent to this visit. has a past medical history of CAD (coronary artery disease); Cardiomyopathy ([facility] [address] Depression; Diabetes mellitus ([facility] [address] H/O mitral valve repair; History of fractured kneecap; Hx of CABG; Hx of myocardial infarction; Hyperlipidemia; Hypertension; ICD (implantable cardioverter-defibrillator) in place; Kidney calculi; Osteoarthritis; and Renal calculi.   has a past surgical history that includes Cholecystectomy (); Hysterectomy (); Ankle surgery (Right, ); Kidney stone surgery (Left, ); Tubal ligation; Mitral valve surgery (6/4/15); Cardiac defibrillator placement (06/01/15); and Coronary artery bypass graft (05/28/15). Restrictions  Restrictions/Precautions  Restrictions/Precautions: General Precautions, Fall Risk  Position Activity Restriction  Other position/activity restrictions: OOB to chair okay per RN  Subjective   General  Chart Reviewed: Yes  Response To Previous Treatment: Patient with no complaints from previous session. Family / Caregiver Present: No  Subjective  Subjective: Per RN, patient okay for PT. RN requesting patient stay in bed d/t femoral line. Patient agreeable to bed exercises. Denied pain. Orientation  Orientation  Overall Orientation Status: Within Normal Limits  Objective   Bed mobility  Comment: JOSE DAVID  Transfers  Comment: JOSE DAVID  Ambulation  Ambulation?: No  Stairs/Curb  Stairs?: No  Balance  Comments: JOSE DAVID     Exercise  B ankle pumps, quad sets, HS sets, glut set. Reps: 10x  L LE exercises: heel slides, SLR, hip abd/add, hip IR/ER.

## 2018-09-04 NOTE — PROGRESS NOTES
Occupational Therapy  Facility/Department: [address] BURN UNIT  Daily Treatment Note  NAME: Jennifer Wood  : 1960  MRN: [id number]    Date of Service: 2018    Discharge Recommendations:  Subacute/Skilled Nursing Facility       Patient Diagnosis(es): The primary encounter diagnosis was Hyperkalemia. Diagnoses of Acute renal failure, unspecified acute renal failure type (HCC) and Sepsis, due to unspecified organism [facility]) were also pertinent to this visit. has a past medical history of CAD (coronary artery disease); Cardiomyopathy ([facility] [address] Depression; Diabetes mellitus ([facility] [address] H/O mitral valve repair; History of fractured kneecap; Hx of CABG; Hx of myocardial infarction; Hyperlipidemia; Hypertension; ICD (implantable cardioverter-defibrillator) in place; Kidney calculi; Osteoarthritis; and Renal calculi.   has a past surgical history that includes Cholecystectomy (); Hysterectomy (); Ankle surgery (Right, ); Kidney stone surgery (Left, ); Tubal ligation; Mitral valve surgery (6/4/15); Cardiac defibrillator placement (06/01/15); and Coronary artery bypass graft (05/28/15). Restrictions  Restrictions/Precautions  Restrictions/Precautions: General Precautions, Fall Risk  Position Activity Restriction  Other position/activity restrictions: OOB to chair okay per RN  Subjective   General  Patient assessed for rehabilitation services?: Yes  Family / Caregiver Present: No  Pain Assessment  Patient Currently in Pain: No  Vital Signs  Patient Currently in Pain: No   Orientation  Orientation  Overall Orientation Status: Within Functional Limits  Objective    Pt in bed upon arrival scheduled to leave soon for perm cath  and removal of groin cath. Pt reports completing ADLs this am but agreeable to demo transfers. Pt receptive to ed while seated on EOB and retired to bed at end of session.       Balance  Sitting Balance: Independent (seated on EOB)  Standing Balance: Stand by assistance  Standing

## 2018-09-04 NOTE — PROGRESS NOTES
NEPHROLOGY PROGRESS NOTE      SUBJECTIVE     Had HD yesterday. Uneventful. Blood pressures running on the low normal side. She is asymptomatic. Scheduled to get tunnel catheter placement done today. She also has a groin catheter which will be removed. Appetite is decent. No chest pain or shortness of breath. She already has a scheduled appointment for outpatient hemodialysis. OBJECTIVE     Vitals:    09/04/18 0202 09/04/18 0400 09/04/18 0505 09/04/18 0605   BP: (!) 97/55 (!) 96/52 (!) 94/58 (!) 90/49   Pulse: 86 81 89 84   Resp: 24 22 20 19   Temp:  98.1 °F (36.7 °C)     TempSrc:  Oral     SpO2:  98%     Weight:       Height:         24HR INTAKE/OUTPUT:      Intake/Output Summary (Last 24 hours) at 09/04/18 0936  Last data filed at 09/03/18 1215   Gross per 24 hour   Intake               75 ml   Output                0 ml   Net               75 ml       General appearance:Awake, alert, in no acute distress  HEENT: PERRLA  Respiratory::vesicular breath sounds,no wheeze/crackles  Cardiovascular:S1 S2 normal,no gallop or organic murmur. Abdomen:Non tender/non distended. Bowel sounds present  Extremities: No Cyanosis or Clubbing,Lower extremity edema  Neurological:Alert and oriented. No abnormalities of mood, affect, memory, mentation, or behavior are noted      MEDICATIONS     Scheduled Meds:    ceFAZolin  1 g Intravenous Once    [START ON 9/5/2018] furosemide  80 mg Oral Daily    midodrine  5 mg Oral TID    sertraline  100 mg Oral Daily    sterile water  2.2 mL Other Once    sterile water  2.2 mL Other Once    heparin (porcine)  5,000 Units Subcutaneous 3 times per day    sodium chloride  250 mL Intravenous Once    sodium chloride  250 mL Intravenous Once    sodium chloride flush  10 mL Intravenous 2 times per day    insulin lispro  0-6 Units Subcutaneous TID WC    insulin lispro  0-3 Units Subcutaneous Nightly    carvedilol  3.125 mg Oral BID    atorvastatin  80 mg Oral Daily    vitamin D

## 2018-09-04 NOTE — PROGRESS NOTES
hours) at 09/04/18 1108  Last data filed at 09/03/18 1215   Gross per 24 hour   Intake               75 ml   Output                0 ml   Net               75 ml         Medications:      ceFAZolin  1 g Intravenous Once    [START ON 9/5/2018] furosemide  80 mg Oral Daily    midodrine  5 mg Oral TID    sertraline  100 mg Oral Daily    sterile water  2.2 mL Other Once    sterile water  2.2 mL Other Once    heparin (porcine)  5,000 Units Subcutaneous 3 times per day    sodium chloride  250 mL Intravenous Once    sodium chloride  250 mL Intravenous Once    sodium chloride flush  10 mL Intravenous 2 times per day    insulin lispro  0-6 Units Subcutaneous TID WC    insulin lispro  0-3 Units Subcutaneous Nightly    carvedilol  3.125 mg Oral BID    atorvastatin  80 mg Oral Daily    vitamin D  5,000 Units Oral Daily       Diagnostic Labs and Imaging      BMP: Recent Labs      09/02/18   0437  09/03/18   0458  09/04/18   0447   NA  136  137  136   K  4.5  4.5  4.0   CL  98  100  100   CO2  17*  17*  17*   PHOS  7.0*  7.7*  6.3*   BUN  65*  76*  51*   CREATININE  7.01*  8.84*  6.38*     PT/INR:   Recent Labs      09/04/18 0818   PROTIME  10.2   INR  0.9     APTT:   Recent Labs      09/04/18 0818   APTT  24.7     FASTING LIPID PANEL:  Lab Results   Component Value Date    CHOL 286 (H) 09/22/2017    HDL 25 (L) 09/22/2017    TRIG 892 (H) 09/22/2017     Assessment and Plan:     1. Permcath placement today, D/C groin cath. On Lasix 80mg BID. Plavix to be resumed from tomorrow  2. Continue low dose Insulin Correction scale. Added Lantus 5 units nightly. Will continue to monitor blood glucose. 3. Hb 8.1 as of 8/31. Stable. Will check for H&H. Will be discharged of FeSO4 tablets. 4. Continue Coreg, lipitor for CAD. Plavix will be resumed tomorrow  5. Zoloft resumed  6. DVT Prophylaxis - On 5000 units SQ Every 8 hours     Discharge planning - Possibly today after PermCath placement.  Dialysis placement at [facility] on MWF. Enid Edwards MD  PGY-1, Department of Internal Medicine  [facility]  9/4/2018 11:08 AM    Attending Physician Statement    Active Hospital Problems    Diagnosis Date Noted    Elevated serum immunoglobulin free light chains [R76.8]     [facility] [N17.9] 08/23/2018    Diabetic ketoacidosis without coma associated with type 2 diabetes mellitus ([facility] [E11.10] 08/23/2018    DM (diabetes mellitus), type 2 with neurological complications ([facility] [address] [E17.27] 01/26/2016    CAD (coronary artery disease) [I25.10]     Hx of CABG [Z95.1]     ICD (implantable cardioverter-defibrillator) in place [Z95.810]     H/O mitral valve repair [Z98.890]     Hypertension [I10]     S/P cardiac cath-Patent grafts 6/5/15 [Z98.890] 06/04/2015       I have discussed the case, including pertinent history and exam findings with the resident and the team.  I have seen and examined the patient and the key elements of the encounter have been performed by me. I agree with the assessment, plan and orders as documented by the resident.       Bianca Eid MD, [address] [place] [address] Internal Medicine Associate & [facility]  Associate  Department of Internal Medicine  Internal Medicine Clerkship - [place]  9/4/2018, 11:45 AM

## 2018-09-05 NOTE — CARE COORDINATION
Discharge [facility] Case Management Department  Written by: William Moreno RN    Patient Name: Nataly Valadez  Attending Provider: No att. providers found  Admit Date: 2018  4:01 AM  MRN: [id number]  Account: [de-identified]                     : 1960  Discharge Date: 2018      Disposition: home with family.     William Moreno RN

## 2018-09-06 LAB
7-AMINOCLONAZEPAM: <5 NG/ML
ALPHA HYDROXYALPRAZOLAM: <5 NG/ML
ALPRAZOLAM: <5 NG/ML
CHLORDIAZEPOXIDE: <20 NG/ML
CLONAZEPAM: <5 NG/ML
DIAZEPAM: <5 NG/ML
LORAZEPAM: <20 NG/ML
MIDAZOLAM: <20 NG/ML
MIDAZOLAM: <20 NG/ML
NORDIAZEPAM: <20 NG/ML
OXAZEPAM: <20 NG/ML
TEMAZEPAM: <20 NG/ML

## 2018-09-07 NOTE — DISCHARGE SUMMARY
capsule take 1 capsule by mouth daily, Disp-30 capsule, R-5Normal      sertraline (ZOLOFT) 100 MG tablet take 1 tablet by mouth once daily, Disp-30 tablet, R-5Normal      Cholecalciferol (VITAMIN D3) 5000 units TABS Take 5,000 Units by mouth dailyHistorical Med      gentamicin (GARAMYCIN) 0.1 % cream R-0, Historical Med      glucose monitoring kit (FREESTYLE) monitoring kit DAILY PRN Starting 11/19/2015, Until Discontinued, Disp-1 kit, R-0, NormalPlease dispense meter covered by patients insurance.       glucose blood VI test strips (EXACTECH TEST) strip DAILY Starting 11/19/2015, Until Discontinued, Disp-100 each, R-3, NormalPt testing BS once daily and prn      Lancets MISC Disp-100 each, R-3, NormalPt testing BS once daily and prn      Multiple Vitamins-Minerals (THERAPEUTIC MULTIVITAMIN-MINERALS) tablet Take 1 tablet by mouth daily (with breakfast), Disp-60 tablet, R-3         STOP taking these medications       metFORMIN (GLUCOPHAGE) 1000 MG tablet Comments:   Reason for Stopping:         gabapentin (NEURONTIN) 300 MG capsule Comments:   Reason for Stopping:         lisinopril (PRINIVIL;ZESTRIL) 2.5 MG tablet Comments:   Reason for Stopping:         insulin detemir (LEVEMIR FLEXPEN) 100 UNIT/ML injection pen Comments:   Reason for Stopping:               Activity: activity as tolerated    Diet: renal diet    Follow-up:    Yoseph Albarran MD  [address]  [phone]            Follow up labs: None     Follow up imaging: None    Note that over 30 minutes was spent in preparing discharge papers, discussing discharge with patient, medication review, etc.      Jens Gonzales MD         Department of Internal Medicine  [facility] [place]         9/7/2018, 1:52 PM

## 2018-09-24 ENCOUNTER — OFFICE VISIT (OUTPATIENT)
Dept: FAMILY MEDICINE CLINIC | Age: 58
End: 2018-09-24
Payer: MEDICARE

## 2018-09-24 VITALS
HEART RATE: 100 BPM | DIASTOLIC BLOOD PRESSURE: 52 MMHG | SYSTOLIC BLOOD PRESSURE: 80 MMHG | OXYGEN SATURATION: 98 % | WEIGHT: 151 LBS | BODY MASS INDEX: 25.13 KG/M2

## 2018-09-24 DIAGNOSIS — Z12.11 SCREENING FOR COLON CANCER: ICD-10-CM

## 2018-09-24 DIAGNOSIS — I25.810 CORONARY ARTERY DISEASE INVOLVING AUTOLOGOUS VEIN CORONARY BYPASS GRAFT WITHOUT ANGINA PECTORIS: ICD-10-CM

## 2018-09-24 DIAGNOSIS — Z12.31 VISIT FOR SCREENING MAMMOGRAM: ICD-10-CM

## 2018-09-24 DIAGNOSIS — E78.00 PURE HYPERCHOLESTEROLEMIA: ICD-10-CM

## 2018-09-24 DIAGNOSIS — F41.9 ANXIETY: ICD-10-CM

## 2018-09-24 DIAGNOSIS — I10 ESSENTIAL HYPERTENSION: ICD-10-CM

## 2018-09-24 DIAGNOSIS — E11.49 DM (DIABETES MELLITUS), TYPE 2 WITH NEUROLOGICAL COMPLICATIONS (HCC): Primary | ICD-10-CM

## 2018-09-24 LAB
CREATININE URINE POCT: NORMAL
MICROALBUMIN/CREAT 24H UR: NORMAL MG/G{CREAT}
MICROALBUMIN/CREAT UR-RTO: NORMAL

## 2018-09-24 PROCEDURE — G8427 DOCREV CUR MEDS BY ELIG CLIN: HCPCS | Performed by: FAMILY MEDICINE

## 2018-09-24 PROCEDURE — 3044F HG A1C LEVEL LT 7.0%: CPT | Performed by: FAMILY MEDICINE

## 2018-09-24 PROCEDURE — G8419 CALC BMI OUT NRM PARAM NOF/U: HCPCS | Performed by: FAMILY MEDICINE

## 2018-09-24 PROCEDURE — 99214 OFFICE O/P EST MOD 30 MIN: CPT | Performed by: FAMILY MEDICINE

## 2018-09-24 PROCEDURE — 1111F DSCHRG MED/CURRENT MED MERGE: CPT | Performed by: FAMILY MEDICINE

## 2018-09-24 PROCEDURE — 2022F DILAT RTA XM EVC RTNOPTHY: CPT | Performed by: FAMILY MEDICINE

## 2018-09-24 PROCEDURE — 3017F COLORECTAL CA SCREEN DOC REV: CPT | Performed by: FAMILY MEDICINE

## 2018-09-24 PROCEDURE — 82044 UR ALBUMIN SEMIQUANTITATIVE: CPT | Performed by: FAMILY MEDICINE

## 2018-09-24 PROCEDURE — 4004F PT TOBACCO SCREEN RCVD TLK: CPT | Performed by: FAMILY MEDICINE

## 2018-09-24 PROCEDURE — G8598 ASA/ANTIPLAT THER USED: HCPCS | Performed by: FAMILY MEDICINE

## 2018-09-24 RX ORDER — SERTRALINE HYDROCHLORIDE 100 MG/1
TABLET, FILM COATED ORAL
Qty: 30 TABLET | Refills: 5 | Status: SHIPPED | OUTPATIENT
Start: 2018-09-24 | End: 2019-08-07 | Stop reason: SDUPTHER

## 2018-09-24 RX ORDER — ATORVASTATIN CALCIUM 80 MG/1
80 TABLET, FILM COATED ORAL DAILY
Qty: 30 TABLET | Refills: 5 | Status: SHIPPED | OUTPATIENT
Start: 2018-09-24 | End: 2019-05-06 | Stop reason: SDUPTHER

## 2018-09-24 RX ORDER — CARVEDILOL 3.12 MG/1
TABLET ORAL
Qty: 60 TABLET | Refills: 5 | Status: SHIPPED | OUTPATIENT
Start: 2018-09-24 | End: 2019-09-12 | Stop reason: SDUPTHER

## 2018-09-24 ASSESSMENT — ENCOUNTER SYMPTOMS
ABDOMINAL PAIN: 0
COUGH: 0
EYE REDNESS: 0
CHEST TIGHTNESS: 0
FACIAL SWELLING: 0
BLOOD IN STOOL: 0
DIARRHEA: 0
SHORTNESS OF BREATH: 0
EYE DISCHARGE: 0
CONSTIPATION: 0
VISUAL CHANGE: 0
NAUSEA: 0
VOMITING: 0

## 2018-09-24 ASSESSMENT — PATIENT HEALTH QUESTIONNAIRE - PHQ9
SUM OF ALL RESPONSES TO PHQ QUESTIONS 1-9: 0
SUM OF ALL RESPONSES TO PHQ9 QUESTIONS 1 & 2: 0
1. LITTLE INTEREST OR PLEASURE IN DOING THINGS: 0
2. FEELING DOWN, DEPRESSED OR HOPELESS: 0
SUM OF ALL RESPONSES TO PHQ QUESTIONS 1-9: 0

## 2018-09-24 NOTE — PROGRESS NOTES
MD   Insulin Pen Needle 32G X 4 MM MISC 1 each by Does not apply route daily 4/25/18   Bianca Kaiser MD   gentamicin (GARAMYCIN) 0.1 % cream  7/26/17   Historical Provider, MD   glucose monitoring kit (FREESTYLE) monitoring kit 1 kit by Does not apply route daily as needed Please dispense meter covered by patients insurance. 11/19/15   Bianca Kaiser MD   glucose blood VI test strips (EXACTECH TEST) strip 1 each by In Vitro route daily Pt testing BS once daily and prn 11/19/15   Bianca Kaiser MD   Lancets MISC Pt testing BS once daily and prn 11/19/15   Bianca Kaiser MD        Allergies:       Nsaids and Codeine    Social History:     Tobacco:    reports that she has been smoking Cigarettes. She has a 16.00 pack-year smoking history. She has never used smokeless tobacco.  Alcohol:      reports that she does not drink alcohol. Drug Use:  reports that she does not use drugs. Family History:     Family History   Problem Relation Age of Onset    Stroke Father 76    Other Father         low BS    Diabetes Mother        Review of Systems:       Review of Systems   Constitutional: Negative for chills, fatigue, fever, unexpected weight change and weight gain. HENT: Negative for congestion and facial swelling. Eyes: Negative for discharge, redness and visual disturbance. Respiratory: Negative for cough, chest tightness and shortness of breath. Cardiovascular: Negative for chest pain, palpitations and leg swelling. Gastrointestinal: Negative for abdominal pain, blood in stool, constipation, diarrhea, nausea and vomiting. Genitourinary: Negative for difficulty urinating, dysuria and hematuria. Musculoskeletal: Negative for joint swelling and neck stiffness. Skin: Negative for rash. Neurological: Negative for dizziness, light-headedness and headaches. Psychiatric/Behavioral: Negative for confusion and sleep disturbance.          Physical Exam:     Physical Exam   Constitutional: She is oriented to person, place, and time. She appears well-developed and well-nourished. HENT:   Head: Normocephalic and atraumatic. Eyes: Pupils are equal, round, and reactive to light. Conjunctivae are normal. Right eye exhibits no discharge. Left eye exhibits no discharge. Neck: Neck supple. No thyromegaly present. Cardiovascular: Normal rate, regular rhythm and normal heart sounds. No murmur heard. Pulmonary/Chest: Effort normal and breath sounds normal. No respiratory distress. She has no wheezes. Abdominal: Soft. Bowel sounds are normal. She exhibits no distension. There is no tenderness. Musculoskeletal: She exhibits no edema. Lymphadenopathy:     She has no cervical adenopathy. Neurological: She is alert and oriented to person, place, and time. Skin: Skin is warm and dry. No rash noted. No erythema. Psychiatric: She has a normal mood and affect. Her behavior is normal.   Vitals reviewed. Vitals:  BP (!) 80/52 (Site: Left Upper Arm)   Pulse 100   Wt 151 lb (68.5 kg)   SpO2 98%   BMI 25.13 kg/m²       Data:     Lab Results   Component Value Date     09/04/2018    K 4.0 09/04/2018     09/04/2018    CO2 17 09/04/2018    BUN 51 09/04/2018    CREATININE 6.38 09/04/2018    GLUCOSE 203 09/04/2018    PROT 6.6 08/30/2018    LABALBU 2.7 08/30/2018    BILITOT 0.29 08/30/2018    ALKPHOS 85 08/30/2018    AST 11 08/30/2018    ALT 10 08/30/2018     Lab Results   Component Value Date    WBC 9.1 08/31/2018    RBC 2.77 08/31/2018    HGB 8.1 08/31/2018    HCT 26.1 08/31/2018    MCV 94.2 08/31/2018    MCH 29.2 08/31/2018    MCHC 31.0 08/31/2018    RDW 14.1 08/31/2018     08/31/2018    MPV 10.6 08/31/2018     Lab Results   Component Value Date    TSH 0.49 08/24/2018     Lab Results   Component Value Date    CHOL 286 09/22/2017    HDL 25 09/22/2017    LABA1C 5.7 08/29/2018          Assessment/Plan:       1. Pure hypercholesterolemia  Stable on on lipitor     2.  Essential hypertension  Stable on the coreg   - carvedilol (COREG) 3.125 MG tablet; take 1 tablet by mouth twice a day with meals  Dispense: 60 tablet; Refill: 5    3. DM (diabetes mellitus), type 2 with [facility] complications ([facility]  F/U 6mos, in office  HgbA1C. Do daily foot checks and yearly eye exams  Pt will have all other labs next month with Dr Jay Lopez  - POCT microalbumin  -  DIABETES FOOT EXAM    4. Coronary artery disease involving autologous vein coronary bypass graft without angina pectoris  Stable and has 6mos ck up in Oct with Dr Jay Lopez-- EKG, CXR and labs    5. Anxiety  Stable on the zoloft  - sertraline (ZOLOFT) 100 MG tablet; take 1 tablet by mouth once daily  Dispense: 30 tablet; Refill: 5    6. Visit for screening mammogram  Needs order   - ALEE DIGITAL SCREEN W CAD BILATERAL; Future    7. Screening for colon cancer  Return FIT test   - POCT Fecal Immunochemical Test (FIT); Future        Valentina received counseling on the following healthy behaviors: nutrition and exercise  Reviewed prior labs and health maintenance  Continue current medications, diet and exercise. Discussed use, benefit, and side effects of prescribed medications. Barriers to medication compliance addressed. Patient given educational materials - see patient instructions  Was a self-tracking handout given in paper form or via [employer]? Yes    Requested Prescriptions     Signed Prescriptions Disp Refills    carvedilol (COREG) 3.125 MG tablet 60 tablet 5     Sig: take 1 tablet by mouth twice a day with meals    atorvastatin (LIPITOR) 80 MG tablet 30 tablet 5     Sig: Take 1 tablet by mouth daily    sertraline (ZOLOFT) 100 MG tablet 30 tablet 5     Sig: take 1 tablet by mouth once daily       All patient questions answered. Patient voiced understanding. Quality Measures    Body mass index is 25.13 kg/m². Normal. Weight control planned discussed Healthy diet and regular exercise. BP: (!) 80/52 Blood pressure is low.  Treatment plan

## 2018-10-11 ENCOUNTER — HOSPITAL ENCOUNTER (OUTPATIENT)
Dept: MAMMOGRAPHY | Age: 58
Discharge: HOME | End: 2018-10-13
Payer: MEDICARE

## 2018-10-11 DIAGNOSIS — Z12.31 VISIT FOR SCREENING MAMMOGRAM: ICD-10-CM

## 2018-10-11 PROCEDURE — 77067 SCR MAMMO BI INCL CAD: CPT

## 2018-10-16 DIAGNOSIS — E11.49 DM (DIABETES MELLITUS), TYPE 2 WITH NEUROLOGICAL COMPLICATIONS (HCC): ICD-10-CM

## 2018-10-16 DIAGNOSIS — I10 ESSENTIAL HYPERTENSION: ICD-10-CM

## 2018-10-16 RX ORDER — GABAPENTIN 300 MG/1
CAPSULE ORAL
Qty: 60 CAPSULE | Refills: 5 | OUTPATIENT
Start: 2018-10-16

## 2018-10-16 RX ORDER — LISINOPRIL 2.5 MG/1
TABLET ORAL
Qty: 30 TABLET | Refills: 5 | OUTPATIENT
Start: 2018-10-16

## 2018-10-23 ENCOUNTER — TELEPHONE (OUTPATIENT)
Dept: FAMILY MEDICINE CLINIC | Age: 58
End: 2018-10-23

## 2018-10-23 NOTE — TELEPHONE ENCOUNTER
If only new medicine then she may try to stop the calcium acetate? ? Is she following up with kidney specialist?? Also she may try taking zyrtec 10mg and stop the benadryl so not causing her to be as sleepy. Also if continues for another few weeks she may need to make an appt.

## 2018-10-26 ENCOUNTER — HOSPITAL ENCOUNTER (OUTPATIENT)
Age: 58
Discharge: HOME | End: 2018-10-26
Payer: MEDICARE

## 2018-10-26 ENCOUNTER — HOSPITAL ENCOUNTER (OUTPATIENT)
Age: 58
Discharge: HOME | End: 2018-10-28
Payer: MEDICARE

## 2018-10-26 ENCOUNTER — TELEPHONE (OUTPATIENT)
Dept: FAMILY MEDICINE CLINIC | Age: 58
End: 2018-10-26

## 2018-10-26 ENCOUNTER — HOSPITAL ENCOUNTER (OUTPATIENT)
Dept: GENERAL RADIOLOGY | Age: 58
Discharge: HOME | End: 2018-10-28
Payer: MEDICARE

## 2018-10-26 DIAGNOSIS — Z98.890 H/O MITRAL VALVE REPAIR: ICD-10-CM

## 2018-10-26 DIAGNOSIS — E11.49 DM (DIABETES MELLITUS), TYPE 2 WITH NEUROLOGICAL COMPLICATIONS (HCC): ICD-10-CM

## 2018-10-26 DIAGNOSIS — Z95.1 HX OF CABG: ICD-10-CM

## 2018-10-26 DIAGNOSIS — E55.9 VITAMIN D DEFICIENCY: ICD-10-CM

## 2018-10-26 DIAGNOSIS — Z95.810 ICD (IMPLANTABLE CARDIOVERTER-DEFIBRILLATOR) IN PLACE: ICD-10-CM

## 2018-10-26 DIAGNOSIS — I42.9 CARDIOMYOPATHY, UNSPECIFIED TYPE (HCC): ICD-10-CM

## 2018-10-26 DIAGNOSIS — I10 ESSENTIAL HYPERTENSION: ICD-10-CM

## 2018-10-26 DIAGNOSIS — I25.10 CORONARY ARTERY DISEASE INVOLVING NATIVE CORONARY ARTERY OF NATIVE HEART WITHOUT ANGINA PECTORIS: ICD-10-CM

## 2018-10-26 DIAGNOSIS — E78.49 OTHER HYPERLIPIDEMIA: ICD-10-CM

## 2018-10-26 LAB
ABSOLUTE EOS #: 0.1 K/UL (ref 0–0.4)
ABSOLUTE IMMATURE GRANULOCYTE: ABNORMAL K/UL (ref 0–0.3)
ABSOLUTE LYMPH #: 1.7 K/UL (ref 1–4.8)
ABSOLUTE MONO #: 0.5 K/UL (ref 0–1)
ALBUMIN SERPL-MCNC: 4.4 G/DL (ref 3.5–5.2)
ALBUMIN/GLOBULIN RATIO: ABNORMAL (ref 1–2.5)
ALP BLD-CCNC: 106 U/L (ref 35–104)
ALT SERPL-CCNC: 14 U/L (ref 5–33)
ANION GAP SERPL CALCULATED.3IONS-SCNC: 14 MMOL/L (ref 9–17)
AST SERPL-CCNC: 10 U/L
BASOPHILS # BLD: 0 % (ref 0–2)
BASOPHILS ABSOLUTE: 0 K/UL (ref 0–0.2)
BILIRUB SERPL-MCNC: 0.44 MG/DL (ref 0.3–1.2)
BUN BLDV-MCNC: 29 MG/DL (ref 6–20)
BUN/CREAT BLD: 24 (ref 9–20)
CALCIUM SERPL-MCNC: 9.9 MG/DL (ref 8.6–10.4)
CHLORIDE BLD-SCNC: 98 MMOL/L (ref 98–107)
CHOLESTEROL/HDL RATIO: 8.6
CHOLESTEROL: 267 MG/DL
CO2: 22 MMOL/L (ref 20–31)
CREAT SERPL-MCNC: 1.19 MG/DL (ref 0.5–0.9)
DIFFERENTIAL TYPE: YES
EKG ATRIAL RATE: 91 BPM
EKG P AXIS: 73 DEGREES
EKG P-R INTERVAL: 156 MS
EKG Q-T INTERVAL: 392 MS
EKG QRS DURATION: 88 MS
EKG QTC CALCULATION (BAZETT): 482 MS
EKG R AXIS: 74 DEGREES
EKG T AXIS: -14 DEGREES
EKG VENTRICULAR RATE: 91 BPM
EOSINOPHILS RELATIVE PERCENT: 2 % (ref 0–5)
ESTIMATED AVERAGE GLUCOSE: 186 MG/DL
GFR AFRICAN AMERICAN: 56 ML/MIN
GFR NON-AFRICAN AMERICAN: 46 ML/MIN
GFR SERPL CREATININE-BSD FRML MDRD: ABNORMAL ML/MIN/{1.73_M2}
GFR SERPL CREATININE-BSD FRML MDRD: ABNORMAL ML/MIN/{1.73_M2}
GLUCOSE BLD-MCNC: 392 MG/DL (ref 70–99)
HBA1C MFR BLD: 8.1 % (ref 4.8–5.9)
HCT VFR BLD CALC: 32.2 % (ref 36–46)
HDLC SERPL-MCNC: 31 MG/DL
HEMOGLOBIN: 10.9 G/DL (ref 12–16)
IMMATURE GRANULOCYTES: ABNORMAL %
LDL CHOLESTEROL DIRECT: 43 MG/DL
LDL CHOLESTEROL: ABNORMAL MG/DL (ref 0–130)
LYMPHOCYTES # BLD: 19 % (ref 15–40)
MAGNESIUM: 2 MG/DL (ref 1.6–2.6)
MCH RBC QN AUTO: 31.6 PG (ref 26–34)
MCHC RBC AUTO-ENTMCNC: 33.9 G/DL (ref 31–37)
MCV RBC AUTO: 93.3 FL (ref 80–100)
MONOCYTES # BLD: 6 % (ref 4–8)
NRBC AUTOMATED: ABNORMAL PER 100 WBC
PATIENT FASTING?: YES
PDW BLD-RTO: 16.7 % (ref 12.1–15.2)
PLATELET # BLD: 235 K/UL (ref 140–450)
PLATELET ESTIMATE: ABNORMAL
PMV BLD AUTO: ABNORMAL FL (ref 6–12)
POTASSIUM SERPL-SCNC: 4.8 MMOL/L (ref 3.7–5.3)
RBC # BLD: 3.45 M/UL (ref 4–5.2)
RBC # BLD: ABNORMAL 10*6/UL
SEG NEUTROPHILS: 73 % (ref 47–75)
SEGMENTED NEUTROPHILS ABSOLUTE COUNT: 6.5 K/UL (ref 2.5–7)
SODIUM BLD-SCNC: 134 MMOL/L (ref 135–144)
TOTAL PROTEIN: 7.9 G/DL (ref 6.4–8.3)
TRIGL SERPL-MCNC: 1427 MG/DL
TSH SERPL DL<=0.05 MIU/L-ACNC: 1.16 MIU/L (ref 0.3–5)
VITAMIN D 25-HYDROXY: 11.6 NG/ML (ref 30–100)
VLDLC SERPL CALC-MCNC: ABNORMAL MG/DL (ref 1–30)
WBC # BLD: 8.8 K/UL (ref 3.5–11)
WBC # BLD: ABNORMAL 10*3/UL

## 2018-10-26 PROCEDURE — 71046 X-RAY EXAM CHEST 2 VIEWS: CPT

## 2018-10-26 PROCEDURE — 84443 ASSAY THYROID STIM HORMONE: CPT

## 2018-10-26 PROCEDURE — 82306 VITAMIN D 25 HYDROXY: CPT

## 2018-10-26 PROCEDURE — 80061 LIPID PANEL: CPT

## 2018-10-26 PROCEDURE — 93005 ELECTROCARDIOGRAM TRACING: CPT

## 2018-10-26 PROCEDURE — 83036 HEMOGLOBIN GLYCOSYLATED A1C: CPT

## 2018-10-26 PROCEDURE — 85025 COMPLETE CBC W/AUTO DIFF WBC: CPT

## 2018-10-26 PROCEDURE — 36415 COLL VENOUS BLD VENIPUNCTURE: CPT

## 2018-10-26 PROCEDURE — 80053 COMPREHEN METABOLIC PANEL: CPT

## 2018-10-26 PROCEDURE — 83735 ASSAY OF MAGNESIUM: CPT

## 2018-10-26 PROCEDURE — 83721 ASSAY OF BLOOD LIPOPROTEIN: CPT

## 2018-10-26 NOTE — TELEPHONE ENCOUNTER
received call from nicholas moreira as he had received critical lab for patients sugar of 392  After speaking with pt she is feeling just fine but she is unable to get the lantus from the pharm as insurance has denied it. After speaking with Dr Maria Leon (Dr Kenna Charles off on [address]) pt informed to  basaglar in the office and we will send over a rx for this to [facility] and she is to take 15 units of this.    rx sent to [facility] for basaglar

## 2018-10-29 ENCOUNTER — OFFICE VISIT (OUTPATIENT)
Dept: CARDIOLOGY CLINIC | Age: 58
End: 2018-10-29
Payer: MEDICARE

## 2018-10-29 DIAGNOSIS — E78.00 PURE HYPERCHOLESTEROLEMIA: ICD-10-CM

## 2018-10-29 DIAGNOSIS — E11.49 DM (DIABETES MELLITUS), TYPE 2 WITH NEUROLOGICAL COMPLICATIONS (HCC): ICD-10-CM

## 2018-10-29 DIAGNOSIS — I25.10 CORONARY ARTERY DISEASE INVOLVING NATIVE CORONARY ARTERY OF NATIVE HEART WITHOUT ANGINA PECTORIS: Primary | ICD-10-CM

## 2018-10-29 DIAGNOSIS — Z95.810 ICD (IMPLANTABLE CARDIOVERTER-DEFIBRILLATOR) IN PLACE: ICD-10-CM

## 2018-10-29 DIAGNOSIS — E55.9 VITAMIN D DEFICIENCY DISEASE: ICD-10-CM

## 2018-10-29 DIAGNOSIS — I10 ESSENTIAL HYPERTENSION: ICD-10-CM

## 2018-10-29 PROCEDURE — 3017F COLORECTAL CA SCREEN DOC REV: CPT | Performed by: INTERNAL MEDICINE

## 2018-10-29 PROCEDURE — G8427 DOCREV CUR MEDS BY ELIG CLIN: HCPCS | Performed by: INTERNAL MEDICINE

## 2018-10-29 PROCEDURE — G8598 ASA/ANTIPLAT THER USED: HCPCS | Performed by: INTERNAL MEDICINE

## 2018-10-29 PROCEDURE — 3045F PR MOST RECENT HEMOGLOBIN A1C LEVEL 7.0-9.0%: CPT | Performed by: INTERNAL MEDICINE

## 2018-10-29 PROCEDURE — G8419 CALC BMI OUT NRM PARAM NOF/U: HCPCS | Performed by: INTERNAL MEDICINE

## 2018-10-29 PROCEDURE — G8484 FLU IMMUNIZE NO ADMIN: HCPCS | Performed by: INTERNAL MEDICINE

## 2018-10-29 PROCEDURE — 2022F DILAT RTA XM EVC RTNOPTHY: CPT | Performed by: INTERNAL MEDICINE

## 2018-10-29 PROCEDURE — 4004F PT TOBACCO SCREEN RCVD TLK: CPT | Performed by: INTERNAL MEDICINE

## 2018-10-29 PROCEDURE — 99214 OFFICE O/P EST MOD 30 MIN: CPT | Performed by: INTERNAL MEDICINE

## 2018-10-29 NOTE — LETTER
Anise Service, M.D. [address] [place] [address], [place] [address]  [phone]        2018        MD [place] [address], [facility]    RE:   lGoria Jensen  :  1960    Dear Dr. John Diaz:    CHIEF COMPLAINT:  1. Coronary artery disease. 2.  VF arrest status post ICD placed on 2015, which is [employer]. HISTORY OF PRESENT ILLNESS:  I had the pleasure of seeing Mrs. Harley King in the office on 10/29/2018. She is a pleasant 71-year-old female who had acute chest discomfort on 2015, and was found to have a non-ST-elevation myocardial infarction, transferred to [facility]. Madan. Her troponin was 5.56. Catheterization on 2015, showed occluded large right coronary artery, LAD had 90% disease, diagonal 90% disease, circumflex small and nondominant, with an EF of 20%. She had bypass surgery on 2015 with a LIMA to the LAD, vein graft to the diagonal and vein graft to the right coronary artery, with a mitral valve repair with a 30 mm Andi-Pérez prosthetic mitral valve, EF improved to 30% to 35%. Three days later, she had a VF arrest and another catheterization on 2015, showed widely patent bypass grafts. EF was 30% to 35%. She had an ICD placed by Dr. Azeem Bullard on 2015, which is a [employer], model# 7136 single ventricular lead. She continues to smoke 1 pack of cigarettes per day. She became very ill on , with hypotension and sepsis. She was intubated and transferred to [facility]. Edin's on 2018, she was discharged on 2018. She had acute renal injury and was dialyzed for approximately 2 months. She was also intubated and was on vasopressors with an elevated lactic acidosis. She was treated with vancomycin and cefepime. She has actually recovered fairly nicely.   She went off dialysis and her

## 2018-11-03 ENCOUNTER — HOSPITAL ENCOUNTER (EMERGENCY)
Age: 58
Discharge: ANOTHER HOSPITAL | End: 2018-11-04
Attending: FAMILY MEDICINE
Payer: MEDICARE

## 2018-11-03 ENCOUNTER — APPOINTMENT (OUTPATIENT)
Dept: CT IMAGING | Age: 58
End: 2018-11-03
Payer: MEDICARE

## 2018-11-03 DIAGNOSIS — J81.0 ACUTE PULMONARY EDEMA (HCC): ICD-10-CM

## 2018-11-03 DIAGNOSIS — E86.0 DEHYDRATION: Primary | ICD-10-CM

## 2018-11-03 DIAGNOSIS — E11.65 UNCONTROLLED TYPE 2 DIABETES MELLITUS WITH HYPERGLYCEMIA (HCC): ICD-10-CM

## 2018-11-03 DIAGNOSIS — R09.02 HYPOXIA: ICD-10-CM

## 2018-11-03 DIAGNOSIS — N20.1 URETEROLITHIASIS: ICD-10-CM

## 2018-11-03 LAB
ABSOLUTE EOS #: 0.1 K/UL (ref 0–0.4)
ABSOLUTE IMMATURE GRANULOCYTE: ABNORMAL K/UL (ref 0–0.3)
ABSOLUTE LYMPH #: 1.4 K/UL (ref 1–4.8)
ABSOLUTE MONO #: 0.5 K/UL (ref 0–1)
ALBUMIN SERPL-MCNC: 4.4 G/DL (ref 3.5–5.2)
ALBUMIN/GLOBULIN RATIO: ABNORMAL (ref 1–2.5)
ALP BLD-CCNC: 100 U/L (ref 35–104)
ALT SERPL-CCNC: 11 U/L (ref 5–33)
ANION GAP SERPL CALCULATED.3IONS-SCNC: 13 MMOL/L (ref 9–17)
AST SERPL-CCNC: 10 U/L
BASOPHILS # BLD: 0 % (ref 0–2)
BASOPHILS ABSOLUTE: 0 K/UL (ref 0–0.2)
BILIRUB SERPL-MCNC: 0.3 MG/DL (ref 0.3–1.2)
BUN BLDV-MCNC: 31 MG/DL (ref 6–20)
BUN/CREAT BLD: 24 (ref 9–20)
CALCIUM SERPL-MCNC: 9.4 MG/DL (ref 8.6–10.4)
CHLORIDE BLD-SCNC: 100 MMOL/L (ref 98–107)
CO2: 22 MMOL/L (ref 20–31)
CREAT SERPL-MCNC: 1.3 MG/DL (ref 0.5–0.9)
DIFFERENTIAL TYPE: YES
EOSINOPHILS RELATIVE PERCENT: 1 % (ref 0–5)
GFR AFRICAN AMERICAN: 51 ML/MIN
GFR NON-AFRICAN AMERICAN: 42 ML/MIN
GFR SERPL CREATININE-BSD FRML MDRD: ABNORMAL ML/MIN/{1.73_M2}
GFR SERPL CREATININE-BSD FRML MDRD: ABNORMAL ML/MIN/{1.73_M2}
GLUCOSE BLD-MCNC: 364 MG/DL (ref 70–99)
HCT VFR BLD CALC: 30.8 % (ref 36–46)
HEMOGLOBIN: 10.6 G/DL (ref 12–16)
IMMATURE GRANULOCYTES: ABNORMAL %
LYMPHOCYTES # BLD: 15 % (ref 15–40)
MCH RBC QN AUTO: 32 PG (ref 26–34)
MCHC RBC AUTO-ENTMCNC: 34.3 G/DL (ref 31–37)
MCV RBC AUTO: 93.1 FL (ref 80–100)
MONOCYTES # BLD: 5 % (ref 4–8)
NRBC AUTOMATED: ABNORMAL PER 100 WBC
PDW BLD-RTO: 15.3 % (ref 12.1–15.2)
PLATELET # BLD: 192 K/UL (ref 140–450)
PLATELET ESTIMATE: ABNORMAL
PMV BLD AUTO: ABNORMAL FL (ref 6–12)
POTASSIUM SERPL-SCNC: 4.3 MMOL/L (ref 3.7–5.3)
RBC # BLD: 3.31 M/UL (ref 4–5.2)
RBC # BLD: ABNORMAL 10*6/UL
SEG NEUTROPHILS: 79 % (ref 47–75)
SEGMENTED NEUTROPHILS ABSOLUTE COUNT: 7.1 K/UL (ref 2.5–7)
SODIUM BLD-SCNC: 135 MMOL/L (ref 135–144)
TOTAL PROTEIN: 7.9 G/DL (ref 6.4–8.3)
WBC # BLD: 9 K/UL (ref 3.5–11)
WBC # BLD: ABNORMAL 10*3/UL

## 2018-11-03 PROCEDURE — 96374 THER/PROPH/DIAG INJ IV PUSH: CPT

## 2018-11-03 PROCEDURE — 2580000003 HC RX 258: Performed by: FAMILY MEDICINE

## 2018-11-03 PROCEDURE — 96375 TX/PRO/DX INJ NEW DRUG ADDON: CPT

## 2018-11-03 PROCEDURE — 80053 COMPREHEN METABOLIC PANEL: CPT

## 2018-11-03 PROCEDURE — 81001 URINALYSIS AUTO W/SCOPE: CPT

## 2018-11-03 PROCEDURE — 51702 INSERT TEMP BLADDER CATH: CPT

## 2018-11-03 PROCEDURE — 96361 HYDRATE IV INFUSION ADD-ON: CPT

## 2018-11-03 PROCEDURE — 74176 CT ABD & PELVIS W/O CONTRAST: CPT

## 2018-11-03 PROCEDURE — 6360000002 HC RX W HCPCS: Performed by: FAMILY MEDICINE

## 2018-11-03 PROCEDURE — 85025 COMPLETE CBC W/AUTO DIFF WBC: CPT

## 2018-11-03 PROCEDURE — 99285 EMERGENCY DEPT VISIT HI MDM: CPT

## 2018-11-03 RX ORDER — 0.9 % SODIUM CHLORIDE 0.9 %
500 INTRAVENOUS SOLUTION INTRAVENOUS ONCE
Status: COMPLETED | OUTPATIENT
Start: 2018-11-03 | End: 2018-11-04

## 2018-11-03 RX ORDER — ONDANSETRON 2 MG/ML
4 INJECTION INTRAMUSCULAR; INTRAVENOUS ONCE
Status: COMPLETED | OUTPATIENT
Start: 2018-11-03 | End: 2018-11-03

## 2018-11-03 RX ADMIN — HYDROMORPHONE HYDROCHLORIDE 1 MG: 1 INJECTION, SOLUTION INTRAMUSCULAR; INTRAVENOUS; SUBCUTANEOUS at 23:41

## 2018-11-03 RX ADMIN — SODIUM CHLORIDE 500 ML: 9 INJECTION, SOLUTION INTRAVENOUS at 23:39

## 2018-11-03 RX ADMIN — ONDANSETRON 4 MG: 2 INJECTION INTRAMUSCULAR; INTRAVENOUS at 23:41

## 2018-11-03 ASSESSMENT — PAIN SCALES - GENERAL
PAINLEVEL_OUTOF10: 9
PAINLEVEL_OUTOF10: 9

## 2018-11-03 ASSESSMENT — PAIN DESCRIPTION - LOCATION: LOCATION: FLANK

## 2018-11-03 ASSESSMENT — PAIN DESCRIPTION - DESCRIPTORS: DESCRIPTORS: SHARP;ACHING

## 2018-11-03 ASSESSMENT — PAIN DESCRIPTION - FREQUENCY: FREQUENCY: CONTINUOUS

## 2018-11-03 ASSESSMENT — PAIN DESCRIPTION - ORIENTATION: ORIENTATION: LEFT

## 2018-11-03 ASSESSMENT — PAIN DESCRIPTION - PAIN TYPE: TYPE: ACUTE PAIN

## 2018-11-03 NOTE — Clinical Note
Patient will be transferred to [facility] in [place]. I spoke with Zhao Hernandes N.P. and she accepted transfer. She will be admitted to Dr. Justo Munoz.

## 2018-11-04 ENCOUNTER — APPOINTMENT (OUTPATIENT)
Dept: GENERAL RADIOLOGY | Age: 58
End: 2018-11-04
Payer: MEDICARE

## 2018-11-04 ENCOUNTER — APPOINTMENT (OUTPATIENT)
Dept: CT IMAGING | Age: 58
End: 2018-11-04
Payer: MEDICARE

## 2018-11-04 ENCOUNTER — HOSPITAL ENCOUNTER (INPATIENT)
Age: 58
LOS: 2 days | Discharge: HOME | DRG: 465 | End: 2018-11-06
Attending: FAMILY MEDICINE | Admitting: FAMILY MEDICINE
Payer: MEDICARE

## 2018-11-04 VITALS
RESPIRATION RATE: 19 BRPM | DIASTOLIC BLOOD PRESSURE: 64 MMHG | OXYGEN SATURATION: 95 % | HEART RATE: 91 BPM | TEMPERATURE: 97.9 F | HEIGHT: 65 IN | BODY MASS INDEX: 27.49 KG/M2 | WEIGHT: 165 LBS | SYSTOLIC BLOOD PRESSURE: 134 MMHG

## 2018-11-04 DIAGNOSIS — J81.0 ACUTE PULMONARY EDEMA (HCC): Primary | ICD-10-CM

## 2018-11-04 PROBLEM — J81.1 CHRONIC PULMONARY EDEMA: Status: ACTIVE | Noted: 2018-11-04

## 2018-11-04 PROBLEM — N13.30 HYDRONEPHROSIS: Status: ACTIVE | Noted: 2018-11-04

## 2018-11-04 PROBLEM — N13.2 URETERAL STONE WITH HYDRONEPHROSIS: Status: ACTIVE | Noted: 2018-11-04

## 2018-11-04 LAB
-: ABNORMAL
AMORPHOUS: ABNORMAL
ANION GAP SERPL CALCULATED.3IONS-SCNC: 13 MMOL/L (ref 9–17)
BACTERIA: ABNORMAL
BILIRUBIN URINE: NEGATIVE
BNP INTERPRETATION: ABNORMAL
BUN BLDV-MCNC: 24 MG/DL (ref 6–20)
BUN/CREAT BLD: 20 (ref 9–20)
CALCIUM SERPL-MCNC: 8.9 MG/DL (ref 8.6–10.4)
CASTS UA: ABNORMAL /LPF
CHLORIDE BLD-SCNC: 104 MMOL/L (ref 98–107)
CHP ED QC CHECK: NORMAL
CHP ED QC CHECK: NORMAL
CO2: 22 MMOL/L (ref 20–31)
COLOR: YELLOW
COMMENT UA: ABNORMAL
CREAT SERPL-MCNC: 1.22 MG/DL (ref 0.5–0.9)
CRYSTALS, UA: ABNORMAL /HPF
D-DIMER QUANTITATIVE: 0.62 MG/L FEU (ref 0–0.5)
EKG ATRIAL RATE: 91 BPM
EKG ATRIAL RATE: 97 BPM
EKG P AXIS: 42 DEGREES
EKG P AXIS: 53 DEGREES
EKG P-R INTERVAL: 154 MS
EKG P-R INTERVAL: 160 MS
EKG Q-T INTERVAL: 374 MS
EKG Q-T INTERVAL: 416 MS
EKG QRS DURATION: 90 MS
EKG QRS DURATION: 92 MS
EKG QTC CALCULATION (BAZETT): 474 MS
EKG QTC CALCULATION (BAZETT): 511 MS
EKG R AXIS: 17 DEGREES
EKG R AXIS: 20 DEGREES
EKG T AXIS: 47 DEGREES
EKG T AXIS: 8 DEGREES
EKG VENTRICULAR RATE: 91 BPM
EKG VENTRICULAR RATE: 97 BPM
EPITHELIAL CELLS UA: ABNORMAL /HPF
ESTIMATED AVERAGE GLUCOSE: 200 MG/DL
GFR AFRICAN AMERICAN: 55 ML/MIN
GFR NON-AFRICAN AMERICAN: 45 ML/MIN
GFR SERPL CREATININE-BSD FRML MDRD: ABNORMAL ML/MIN/{1.73_M2}
GFR SERPL CREATININE-BSD FRML MDRD: ABNORMAL ML/MIN/{1.73_M2}
GLUCOSE BLD-MCNC: 221 MG/DL (ref 74–100)
GLUCOSE BLD-MCNC: 283 MG/DL
GLUCOSE BLD-MCNC: 283 MG/DL (ref 65–99)
GLUCOSE BLD-MCNC: 302 MG/DL (ref 65–99)
GLUCOSE BLD-MCNC: 310 MG/DL (ref 70–99)
GLUCOSE BLD-MCNC: 325 MG/DL (ref 65–99)
GLUCOSE BLD-MCNC: 347 MG/DL (ref 65–99)
GLUCOSE BLD-MCNC: 366 MG/DL (ref 70–99)
GLUCOSE BLD-MCNC: 373 MG/DL
GLUCOSE BLD-MCNC: 373 MG/DL (ref 65–99)
GLUCOSE URINE: ABNORMAL
HBA1C MFR BLD: 8.6 % (ref 4.8–5.9)
KETONES, URINE: NEGATIVE
LACTIC ACID, WHOLE BLOOD: NORMAL MMOL/L (ref 0.7–2.1)
LACTIC ACID: 2.1 MMOL/L (ref 0.5–2.2)
LEUKOCYTE ESTERASE, URINE: NEGATIVE
MUCUS: ABNORMAL
NITRITE, URINE: NEGATIVE
OTHER OBSERVATIONS UA: ABNORMAL
PH UA: 6 (ref 5–8)
POTASSIUM SERPL-SCNC: 4 MMOL/L (ref 3.7–5.3)
PRO-BNP: ABNORMAL PG/ML
PROTEIN UA: ABNORMAL
RBC UA: ABNORMAL /HPF (ref 0–2)
RENAL EPITHELIAL, UA: ABNORMAL /HPF
SODIUM BLD-SCNC: 139 MMOL/L (ref 135–144)
SPECIFIC GRAVITY UA: 1.01 (ref 1–1.03)
TRICHOMONAS: ABNORMAL
TROPONIN INTERP: NORMAL
TROPONIN INTERP: NORMAL
TROPONIN T: <0.03 NG/ML
TROPONIN T: <0.03 NG/ML
TURBIDITY: CLEAR
URINE HGB: ABNORMAL
UROBILINOGEN, URINE: NORMAL
WBC UA: ABNORMAL /HPF
YEAST: ABNORMAL

## 2018-11-04 PROCEDURE — 36415 COLL VENOUS BLD VENIPUNCTURE: CPT

## 2018-11-04 PROCEDURE — 83036 HEMOGLOBIN GLYCOSYLATED A1C: CPT

## 2018-11-04 PROCEDURE — 83880 ASSAY OF NATRIURETIC PEPTIDE: CPT

## 2018-11-04 PROCEDURE — 6360000002 HC RX W HCPCS: Performed by: FAMILY MEDICINE

## 2018-11-04 PROCEDURE — 2580000003 HC RX 258: Performed by: FAMILY MEDICINE

## 2018-11-04 PROCEDURE — 96375 TX/PRO/DX INJ NEW DRUG ADDON: CPT

## 2018-11-04 PROCEDURE — 82947 ASSAY GLUCOSE BLOOD QUANT: CPT

## 2018-11-04 PROCEDURE — 71045 X-RAY EXAM CHEST 1 VIEW: CPT

## 2018-11-04 PROCEDURE — 93005 ELECTROCARDIOGRAM TRACING: CPT

## 2018-11-04 PROCEDURE — 6370000000 HC RX 637 (ALT 250 FOR IP): Performed by: FAMILY MEDICINE

## 2018-11-04 PROCEDURE — 96361 HYDRATE IV INFUSION ADD-ON: CPT

## 2018-11-04 PROCEDURE — 84443 ASSAY THYROID STIM HORMONE: CPT

## 2018-11-04 PROCEDURE — 87040 BLOOD CULTURE FOR BACTERIA: CPT

## 2018-11-04 PROCEDURE — 2580000003 HC RX 258

## 2018-11-04 PROCEDURE — 96376 TX/PRO/DX INJ SAME DRUG ADON: CPT

## 2018-11-04 PROCEDURE — 85379 FIBRIN DEGRADATION QUANT: CPT

## 2018-11-04 PROCEDURE — 1200000000 HC SEMI PRIVATE

## 2018-11-04 PROCEDURE — 80048 BASIC METABOLIC PNL TOTAL CA: CPT

## 2018-11-04 PROCEDURE — 84439 ASSAY OF FREE THYROXINE: CPT

## 2018-11-04 PROCEDURE — 83605 ASSAY OF LACTIC ACID: CPT

## 2018-11-04 PROCEDURE — 87086 URINE CULTURE/COLONY COUNT: CPT

## 2018-11-04 PROCEDURE — 84484 ASSAY OF TROPONIN QUANT: CPT

## 2018-11-04 RX ORDER — CARVEDILOL 3.12 MG/1
3.12 TABLET ORAL 2 TIMES DAILY WITH MEALS
Status: DISCONTINUED | OUTPATIENT
Start: 2018-11-04 | End: 2018-11-06 | Stop reason: HOSPADM

## 2018-11-04 RX ORDER — FUROSEMIDE 10 MG/ML
20 INJECTION INTRAMUSCULAR; INTRAVENOUS ONCE
Status: COMPLETED | OUTPATIENT
Start: 2018-11-04 | End: 2018-11-04

## 2018-11-04 RX ORDER — ATORVASTATIN CALCIUM 40 MG/1
80 TABLET, FILM COATED ORAL DAILY
Status: DISCONTINUED | OUTPATIENT
Start: 2018-11-04 | End: 2018-11-06 | Stop reason: HOSPADM

## 2018-11-04 RX ORDER — FUROSEMIDE 10 MG/ML
40 INJECTION INTRAMUSCULAR; INTRAVENOUS 2 TIMES DAILY
Status: DISCONTINUED | OUTPATIENT
Start: 2018-11-04 | End: 2018-11-06 | Stop reason: HOSPADM

## 2018-11-04 RX ORDER — SERTRALINE HYDROCHLORIDE 100 MG/1
100 TABLET, FILM COATED ORAL DAILY
Status: DISCONTINUED | OUTPATIENT
Start: 2018-11-04 | End: 2018-11-06 | Stop reason: HOSPADM

## 2018-11-04 RX ORDER — ONDANSETRON 2 MG/ML
4 INJECTION INTRAMUSCULAR; INTRAVENOUS EVERY 6 HOURS PRN
Status: DISCONTINUED | OUTPATIENT
Start: 2018-11-04 | End: 2018-11-06 | Stop reason: HOSPADM

## 2018-11-04 RX ORDER — LISINOPRIL 5 MG/1
5 TABLET ORAL DAILY
Status: DISCONTINUED | OUTPATIENT
Start: 2018-11-04 | End: 2018-11-06 | Stop reason: HOSPADM

## 2018-11-04 RX ORDER — 0.9 % SODIUM CHLORIDE 0.9 %
1000 INTRAVENOUS SOLUTION INTRAVENOUS ONCE
Status: COMPLETED | OUTPATIENT
Start: 2018-11-04 | End: 2018-11-04

## 2018-11-04 RX ORDER — SODIUM CHLORIDE 0.9 % (FLUSH) 0.9 %
10 SYRINGE (ML) INJECTION EVERY 12 HOURS SCHEDULED
Status: DISCONTINUED | OUTPATIENT
Start: 2018-11-04 | End: 2018-11-06 | Stop reason: HOSPADM

## 2018-11-04 RX ORDER — MIDODRINE HYDROCHLORIDE 5 MG/1
5 TABLET ORAL 3 TIMES DAILY
Status: DISCONTINUED | OUTPATIENT
Start: 2018-11-04 | End: 2018-11-06 | Stop reason: HOSPADM

## 2018-11-04 RX ORDER — CEFTRIAXONE 1 G/1
1 INJECTION, POWDER, FOR SOLUTION INTRAMUSCULAR; INTRAVENOUS ONCE
Status: DISCONTINUED | OUTPATIENT
Start: 2018-11-04 | End: 2018-11-04

## 2018-11-04 RX ORDER — NICOTINE POLACRILEX 4 MG
15 LOZENGE BUCCAL PRN
Status: DISCONTINUED | OUTPATIENT
Start: 2018-11-04 | End: 2018-11-06 | Stop reason: HOSPADM

## 2018-11-04 RX ORDER — DEXTROSE MONOHYDRATE 25 G/50ML
12.5 INJECTION, SOLUTION INTRAVENOUS PRN
Status: DISCONTINUED | OUTPATIENT
Start: 2018-11-04 | End: 2018-11-06 | Stop reason: HOSPADM

## 2018-11-04 RX ORDER — SODIUM CHLORIDE 0.9 % (FLUSH) 0.9 %
10 SYRINGE (ML) INJECTION PRN
Status: DISCONTINUED | OUTPATIENT
Start: 2018-11-04 | End: 2018-11-06 | Stop reason: HOSPADM

## 2018-11-04 RX ORDER — DEXTROSE MONOHYDRATE 50 MG/ML
100 INJECTION, SOLUTION INTRAVENOUS PRN
Status: DISCONTINUED | OUTPATIENT
Start: 2018-11-04 | End: 2018-11-06 | Stop reason: HOSPADM

## 2018-11-04 RX ORDER — CLOPIDOGREL BISULFATE 75 MG/1
75 TABLET ORAL DAILY
Status: DISCONTINUED | OUTPATIENT
Start: 2018-11-04 | End: 2018-11-06 | Stop reason: HOSPADM

## 2018-11-04 RX ORDER — ONDANSETRON 2 MG/ML
4 INJECTION INTRAMUSCULAR; INTRAVENOUS ONCE
Status: COMPLETED | OUTPATIENT
Start: 2018-11-04 | End: 2018-11-04

## 2018-11-04 RX ADMIN — FUROSEMIDE 40 MG: 10 INJECTION, SOLUTION INTRAMUSCULAR; INTRAVENOUS at 19:52

## 2018-11-04 RX ADMIN — INSULIN GLARGINE 15 UNITS: 100 INJECTION, SOLUTION SUBCUTANEOUS at 20:00

## 2018-11-04 RX ADMIN — ATORVASTATIN CALCIUM 80 MG: 40 TABLET, FILM COATED ORAL at 19:51

## 2018-11-04 RX ADMIN — WATER 10 ML: 1 INJECTION INTRAMUSCULAR; INTRAVENOUS; SUBCUTANEOUS at 20:06

## 2018-11-04 RX ADMIN — INSULIN HUMAN 5 UNITS: 100 INJECTION, SOLUTION PARENTERAL at 01:19

## 2018-11-04 RX ADMIN — INSULIN HUMAN 10 UNITS: 100 INJECTION, SOLUTION PARENTERAL at 05:25

## 2018-11-04 RX ADMIN — SERTRALINE 100 MG: 100 TABLET, FILM COATED ORAL at 19:51

## 2018-11-04 RX ADMIN — CEFTRIAXONE 1 G: 1 INJECTION, POWDER, FOR SOLUTION INTRAMUSCULAR; INTRAVENOUS at 20:06

## 2018-11-04 RX ADMIN — CLOPIDOGREL BISULFATE 75 MG: 75 TABLET ORAL at 19:51

## 2018-11-04 RX ADMIN — Medication 10 ML: at 20:53

## 2018-11-04 RX ADMIN — INSULIN HUMAN 5 UNITS: 100 INJECTION, SOLUTION PARENTERAL at 00:12

## 2018-11-04 RX ADMIN — LISINOPRIL 5 MG: 5 TABLET ORAL at 19:51

## 2018-11-04 RX ADMIN — SODIUM CHLORIDE 1000 ML: 9 INJECTION, SOLUTION INTRAVENOUS at 02:59

## 2018-11-04 RX ADMIN — FUROSEMIDE 20 MG: 10 INJECTION, SOLUTION INTRAMUSCULAR; INTRAVENOUS at 07:04

## 2018-11-04 RX ADMIN — SODIUM CHLORIDE 1000 ML: 9 INJECTION, SOLUTION INTRAVENOUS at 01:19

## 2018-11-04 RX ADMIN — ONDANSETRON 4 MG: 2 INJECTION INTRAMUSCULAR; INTRAVENOUS at 02:57

## 2018-11-04 RX ADMIN — SODIUM CHLORIDE 1000 ML: 9 INJECTION, SOLUTION INTRAVENOUS at 05:25

## 2018-11-04 RX ADMIN — ONDANSETRON 4 MG: 2 INJECTION INTRAMUSCULAR; INTRAVENOUS at 20:55

## 2018-11-04 RX ADMIN — INSULIN LISPRO 1 UNITS: 100 INJECTION, SOLUTION INTRAVENOUS; SUBCUTANEOUS at 20:00

## 2018-11-04 RX ADMIN — CARVEDILOL 3.12 MG: 3.12 TABLET, FILM COATED ORAL at 19:52

## 2018-11-04 RX ADMIN — MIDODRINE HYDROCHLORIDE 5 MG: 5 TABLET ORAL at 19:51

## 2018-11-04 ASSESSMENT — PAIN DESCRIPTION - ORIENTATION
ORIENTATION: LEFT
ORIENTATION: LEFT

## 2018-11-04 ASSESSMENT — PAIN SCALES - GENERAL
PAINLEVEL_OUTOF10: 3
PAINLEVEL_OUTOF10: 3
PAINLEVEL_OUTOF10: 8
PAINLEVEL_OUTOF10: 5
PAINLEVEL_OUTOF10: 2
PAINLEVEL_OUTOF10: 8

## 2018-11-04 ASSESSMENT — PAIN DESCRIPTION - LOCATION
LOCATION: BACK

## 2018-11-04 ASSESSMENT — PAIN DESCRIPTION - PAIN TYPE
TYPE: ACUTE PAIN

## 2018-11-04 ASSESSMENT — PAIN DESCRIPTION - FREQUENCY
FREQUENCY: CONTINUOUS
FREQUENCY: CONTINUOUS

## 2018-11-04 ASSESSMENT — PAIN DESCRIPTION - DESCRIPTORS
DESCRIPTORS: ACHING
DESCRIPTORS: ACHING

## 2018-11-04 NOTE — ED PROVIDER NOTES
Summation      Patient Course: Patient's care was transferred to me by Dr. Quique Higgins. Patient is in stable condition. Pain is controlled 3 on scale 1-10. No beds available at [facility].  Patient is discussed with Dr. Elvin Sanchez and Dr. Karen Zuluaga. ED Medications administered this visit:    Medications   HYDROmorphone (DILAUDID) injection 1 mg (1 mg Intravenous Given 11/3/18 2341)   ondansetron (ZOFRAN) injection 4 mg (4 mg Intravenous Given 11/3/18 2341)   0.9 % sodium chloride bolus (0 mLs Intravenous Stopped 11/4/18 0119)   insulin regular (HUMULIN R;NOVOLIN R) injection 5 Units (5 Units Intravenous Given 11/4/18 0012)   insulin regular (HUMULIN R;NOVOLIN R) injection 5 Units (5 Units Intravenous Given 11/4/18 0119)   0.9 % sodium chloride bolus (0 mLs Intravenous Stopped 11/4/18 0203)   0.9 % sodium chloride bolus (0 mLs Intravenous Stopped 11/4/18 0725)   ondansetron (ZOFRAN) injection 4 mg (4 mg Intravenous Given 11/4/18 0257)   0.9 % sodium chloride bolus (0 mLs Intravenous Stopped 11/4/18 0550)   insulin regular (HUMULIN R;NOVOLIN R) injection 10 Units (10 Units Intravenous Given 11/4/18 0525)   furosemide (LASIX) injection 20 mg (20 mg Intravenous Given 11/4/18 0704)       New Prescriptions from this visit:    New Prescriptions    No medications on file       Follow-up:  No follow-up provider specified. Final Impression:   1. Dehydration    2. Ureterolithiasis    3. Uncontrolled type 2 diabetes mellitus with hyperglycemia ([facility]    4. Acute pulmonary edema (HCC)    5.  Hypoxia               (Please note that portions of this note were completed with a voice recognition program.  Efforts were made to edit the dictations but occasionally words are mis-transcribed.)       Emily Weller MD  11/04/18 [id number]       Emily Weller MD  11/04/18 1655

## 2018-11-04 NOTE — ED PROVIDER NOTES
eMERGENCY dEPARTMENT eNCOUnter      [address] [facility]    Chief Complaint   Patient presents with    Flank Pain     states has L flank pain and nausea. States has long history of kidney issues. HPI    Hayden Dunbar is a 60 y.o. female who presents with left flank pain for one day. Patient is also experiencing nausea with this. She does have a history of kidney stones in the past.  Patient describes her symptoms as being severe. Patient also has a history of diabetes. REVIEW OF SYSTEMS    All systems reviewed and positives in the history of present illness.     PAST MEDICAL HISTORY    Past Medical History:   Diagnosis Date    CAD (coronary artery disease)     Cardiomyopathy ([address]     Depression     Diabetes mellitus ([address]     H/O mitral valve repair     History of fractured kneecap 2017    right    Hx of CABG     Hx of myocardial infarction     Hyperlipidemia     Hypertension     ICD (implantable cardioverter-defibrillator) in place     Kidney calculi     Osteoarthritis     Renal calculi        SURGICAL HISTORY    Past Surgical History:   Procedure Laterality Date    ANKLE SURGERY Right 2009    plate and screws     CARDIAC DEFIBRILLATOR PLACEMENT  06/01/15    CHOLECYSTECTOMY  1990    CORONARY ARTERY BYPASS GRAFT  05/28/15    X 3- Dr Yari CABELLO-LAD<SVG-Diag, SVG-PDA, MV Repair with 30 min CE IMR ring    HYSTERECTOMY  1990    KIDNEY STONE SURGERY Left 2010    MITRAL VALVE SURGERY  6/4/15    TUBAL LIGATION         CURRENT MEDICATIONS    Current Outpatient Rx   Medication Sig Dispense Refill    carvedilol (COREG) 3.125 MG tablet take 1 tablet by mouth twice a day with meals 60 tablet 5    atorvastatin (LIPITOR) 80 MG tablet Take 1 tablet by mouth daily 30 tablet 5    sertraline (ZOLOFT) 100 MG tablet take 1 tablet by mouth once daily 30 tablet 5    clopidogrel (PLAVIX) 75 MG tablet take 1 tablet by mouth daily 30 tablet 5    Cholecalciferol (VITAMIN D3) 5000 units TABS Take

## 2018-11-04 NOTE — H&P
pulmonary edema (HCC)  Resolved Problems:    * No resolved hospital problems. *      Patient Active Problem List    Diagnosis Date Noted    Ureteral stone with hydronephrosis 11/04/2018    Acute pulmonary edema (HCC) 11/04/2018    Elevated serum immunoglobulin free light chains     CHAD (acute kidney injury) ([facility] 08/23/2018    Diabetic ketoacidosis without coma associated with type 2 diabetes mellitus ([address] 08/23/2018    DM (diabetes mellitus), type 2 with neurological complications ([facility] 05/10/0567    Anxiety 01/26/2016    Hyperlipidemia 12/07/2015    CAD (coronary artery disease)     Hx of CABG     ICD (implantable cardioverter-defibrillator) in place     H/O mitral valve repair     Hx of myocardial infarction     Hypertension     Cardiomyopathy ([facility]     Onychomycosis of toenail 11/19/2015    S/P cardiac cath-Patent grafts 6/5/15 06/04/2015    Acute coronary syndrome ([facility] 05/26/2015       Plan:     · This patient requires inpatient admission because of lt ureteric stone with hydronephrosis requiring pain control  · Factors affecting the medical complexity of this patient include pulmonary edema,recent acute renal failure,ashd, recent h/o respiratory failure  · Estimated length of stay is 2 days  · Cardiology and urology evaluation  · High risk medication monitoring: none    CORE MEASURES  DVT prophylaxis: Lovenox  Decubitus ulcer present on admission: No  CODE STATUS: FULL CODE  Nutrition Status: fair   Physical therapy: NA   Old Charts reviewed: Yes  EKG Reviewed:  Yes  Advance Directive Addressed: Yes    Sonia Iglesias MD  11/4/2018, 6:33 PM

## 2018-11-05 ENCOUNTER — APPOINTMENT (OUTPATIENT)
Dept: GENERAL RADIOLOGY | Age: 58
DRG: 465 | End: 2018-11-05
Attending: FAMILY MEDICINE
Payer: MEDICARE

## 2018-11-05 ENCOUNTER — APPOINTMENT (OUTPATIENT)
Dept: NON INVASIVE DIAGNOSTICS | Age: 58
DRG: 465 | End: 2018-11-05
Attending: FAMILY MEDICINE
Payer: MEDICARE

## 2018-11-05 LAB
ANION GAP SERPL CALCULATED.3IONS-SCNC: 14 MMOL/L (ref 9–17)
BUN BLDV-MCNC: 24 MG/DL (ref 6–20)
BUN/CREAT BLD: 15 (ref 9–20)
CALCIUM SERPL-MCNC: 8.6 MG/DL (ref 8.6–10.4)
CHLORIDE BLD-SCNC: 96 MMOL/L (ref 98–107)
CHOLESTEROL/HDL RATIO: 7.4
CHOLESTEROL: 253 MG/DL
CO2: 24 MMOL/L (ref 20–31)
CREAT SERPL-MCNC: 1.61 MG/DL (ref 0.5–0.9)
CULTURE: NO GROWTH
GFR AFRICAN AMERICAN: 40 ML/MIN
GFR NON-AFRICAN AMERICAN: 33 ML/MIN
GFR SERPL CREATININE-BSD FRML MDRD: ABNORMAL ML/MIN/{1.73_M2}
GFR SERPL CREATININE-BSD FRML MDRD: ABNORMAL ML/MIN/{1.73_M2}
GLUCOSE BLD-MCNC: 230 MG/DL (ref 74–100)
GLUCOSE BLD-MCNC: 266 MG/DL (ref 74–100)
GLUCOSE BLD-MCNC: 283 MG/DL (ref 74–100)
GLUCOSE BLD-MCNC: 290 MG/DL (ref 70–99)
GLUCOSE BLD-MCNC: 323 MG/DL (ref 74–100)
HDLC SERPL-MCNC: 34 MG/DL
LDL CHOLESTEROL DIRECT: 90 MG/DL
LDL CHOLESTEROL: ABNORMAL MG/DL (ref 0–130)
LV EF: 43 %
LVEF MODALITY: NORMAL
Lab: NORMAL
MAGNESIUM: 1.8 MG/DL (ref 1.6–2.6)
POTASSIUM SERPL-SCNC: 3.7 MMOL/L (ref 3.7–5.3)
SODIUM BLD-SCNC: 134 MMOL/L (ref 135–144)
SPECIMEN DESCRIPTION: NORMAL
STATUS: NORMAL
THYROXINE, FREE: 1.05 NG/DL (ref 0.93–1.7)
TRIGL SERPL-MCNC: 823 MG/DL
TSH SERPL DL<=0.05 MIU/L-ACNC: 1.13 MIU/L (ref 0.3–5)
VLDLC SERPL CALC-MCNC: ABNORMAL MG/DL (ref 1–30)

## 2018-11-05 PROCEDURE — 93306 TTE W/DOPPLER COMPLETE: CPT

## 2018-11-05 PROCEDURE — 99254 IP/OBS CNSLTJ NEW/EST MOD 60: CPT | Performed by: INTERNAL MEDICINE

## 2018-11-05 PROCEDURE — 82947 ASSAY GLUCOSE BLOOD QUANT: CPT

## 2018-11-05 PROCEDURE — 36415 COLL VENOUS BLD VENIPUNCTURE: CPT

## 2018-11-05 PROCEDURE — 1200000000 HC SEMI PRIVATE

## 2018-11-05 PROCEDURE — 6360000002 HC RX W HCPCS: Performed by: FAMILY MEDICINE

## 2018-11-05 PROCEDURE — 80048 BASIC METABOLIC PNL TOTAL CA: CPT

## 2018-11-05 PROCEDURE — 6370000000 HC RX 637 (ALT 250 FOR IP): Performed by: FAMILY MEDICINE

## 2018-11-05 PROCEDURE — 83735 ASSAY OF MAGNESIUM: CPT

## 2018-11-05 PROCEDURE — 83721 ASSAY OF BLOOD LIPOPROTEIN: CPT

## 2018-11-05 PROCEDURE — 2580000003 HC RX 258: Performed by: FAMILY MEDICINE

## 2018-11-05 PROCEDURE — 80061 LIPID PANEL: CPT

## 2018-11-05 PROCEDURE — 71046 X-RAY EXAM CHEST 2 VIEWS: CPT

## 2018-11-05 RX ADMIN — VITAMIN D, TAB 1000IU (100/BT) 5000 UNITS: 25 TAB at 08:13

## 2018-11-05 RX ADMIN — ATORVASTATIN CALCIUM 80 MG: 40 TABLET, FILM COATED ORAL at 08:10

## 2018-11-05 RX ADMIN — Medication 10 ML: at 08:15

## 2018-11-05 RX ADMIN — INSULIN LISPRO 4 UNITS: 100 INJECTION, SOLUTION INTRAVENOUS; SUBCUTANEOUS at 12:43

## 2018-11-05 RX ADMIN — ENOXAPARIN SODIUM 40 MG: 40 INJECTION SUBCUTANEOUS at 08:14

## 2018-11-05 RX ADMIN — INSULIN LISPRO 3 UNITS: 100 INJECTION, SOLUTION INTRAVENOUS; SUBCUTANEOUS at 17:40

## 2018-11-05 RX ADMIN — FUROSEMIDE 40 MG: 10 INJECTION, SOLUTION INTRAMUSCULAR; INTRAVENOUS at 21:32

## 2018-11-05 RX ADMIN — CARVEDILOL 3.12 MG: 3.12 TABLET, FILM COATED ORAL at 08:10

## 2018-11-05 RX ADMIN — INSULIN GLARGINE 15 UNITS: 100 INJECTION, SOLUTION SUBCUTANEOUS at 21:34

## 2018-11-05 RX ADMIN — MIDODRINE HYDROCHLORIDE 5 MG: 5 TABLET ORAL at 08:10

## 2018-11-05 RX ADMIN — INSULIN LISPRO 3 UNITS: 100 INJECTION, SOLUTION INTRAVENOUS; SUBCUTANEOUS at 07:49

## 2018-11-05 RX ADMIN — CLOPIDOGREL BISULFATE 75 MG: 75 TABLET ORAL at 08:10

## 2018-11-05 RX ADMIN — Medication 10 ML: at 21:33

## 2018-11-05 RX ADMIN — MIDODRINE HYDROCHLORIDE 5 MG: 5 TABLET ORAL at 21:32

## 2018-11-05 RX ADMIN — SERTRALINE 100 MG: 100 TABLET, FILM COATED ORAL at 08:10

## 2018-11-05 RX ADMIN — FUROSEMIDE 40 MG: 10 INJECTION, SOLUTION INTRAMUSCULAR; INTRAVENOUS at 08:14

## 2018-11-05 RX ADMIN — LISINOPRIL 5 MG: 5 TABLET ORAL at 08:10

## 2018-11-05 RX ADMIN — INSULIN LISPRO 1 UNITS: 100 INJECTION, SOLUTION INTRAVENOUS; SUBCUTANEOUS at 21:34

## 2018-11-05 RX ADMIN — CARVEDILOL 3.12 MG: 3.12 TABLET, FILM COATED ORAL at 17:39

## 2018-11-05 RX ADMIN — MIDODRINE HYDROCHLORIDE 5 MG: 5 TABLET ORAL at 13:39

## 2018-11-05 ASSESSMENT — PAIN DESCRIPTION - LOCATION
LOCATION: HEAD
LOCATION: BACK

## 2018-11-05 ASSESSMENT — PAIN SCALES - GENERAL
PAINLEVEL_OUTOF10: 2
PAINLEVEL_OUTOF10: 0
PAINLEVEL_OUTOF10: 3

## 2018-11-05 ASSESSMENT — PAIN DESCRIPTION - PAIN TYPE
TYPE: ACUTE PAIN
TYPE: ACUTE PAIN

## 2018-11-05 ASSESSMENT — PAIN DESCRIPTION - DESCRIPTORS
DESCRIPTORS: ACHING
DESCRIPTORS: ACHING

## 2018-11-05 ASSESSMENT — PAIN DESCRIPTION - ORIENTATION: ORIENTATION: LEFT

## 2018-11-05 NOTE — PROGRESS NOTES
11/04/18   1228  11/05/18   0506   NA  135   --    --   139  134*   K  4.3   --    --   4.0  3.7   CL  100   --    --   104  96*   CO2  22   --    --   22  24   BUN  31*   --    --   24*  24*   CREATININE  1.30*   --    --   1.22*  1.61*   GLUCOSE  364*   < >  283  310*  290*   ALT  11   --    --    --    --    ALKPHOS  100   --    --    --    --    GFR        NOT REPORTED   --    --         NOT REPORTED                < > = values in this interval not displayed. Lab Results   Component Value Date    LABALBU 4.4 11/03/2018      Lab Results   Component Value Date    LABA1C 8.6 11/04/2018     Recent Labs      11/04/18   0149  11/04/18   0215  11/04/18   0350  11/04/18   0625  11/04/18   1013  11/04/18   1957  11/05/18   0740   POCGLU  347*  325*  373*  302*  283*  221*  283*     Lab Results   Component Value Date    TRIG 823 11/05/2018    HDL 34 11/05/2018    LDLDIRECT 90 11/05/2018     Estimated Intake vs Estimated Needs: Intake Less Than Needs    Nutrition Risk Level: Low, Moderate  Pt with severely elevated TG level. A1c with poor control. Glucose levels are trending downward. Renal indices are elevated, Na is low. PO is poor this morning, only meal intake recorded. Will f/u as needed.    Nutrition Interventions:   Continue current diet  Continued Inpatient Monitoring, Education declined, Coordination of Care    Nutrition Evaluation:   · Evaluation: Goals set   · Goals: PO > 75% of meals    · Monitoring: Meal Intake, Weight, Pertinent Labs      Electronically signed by Noel Madlonado RD, LD on 11/5/18 at 9:24 AM    Contact Number: [phone]

## 2018-11-05 NOTE — CONSULTS
6/12/15   René Lenz MD       FAMILY HISTORY: family history includes Diabetes in her mother; Other in her father; Stroke (age of onset: 76) in her father. Physical Examination:     /61   Pulse 90   Temp 97.8 °F (36.6 °C) (Temporal)   Resp 20   Ht 5' 5\" (1.651 m)   Wt 161 lb 14.4 oz (73.4 kg)   SpO2 94%   BMI 26.94 kg/m²  Body mass index is 26.94 kg/m². Constitutional: She is oriented to person, place, and time. She appears well-developed and well-nourished. In no acute distress. HEENT: Normocephalic and atraumatic. No JVD present. Carotid bruit is not present. No mass and no thyromegaly present. No lymphadenopathy present. Cardiovascular: Normal rate, regular rhythm, normal heart sounds. Exam reveals no gallop and no friction rubs. No heart murmur heard. Pulmonary/Chest: Effort normal and breath sounds normal. No respiratory distress. She has no wheezes, rhonchi or rales. Abdominal: Soft, non-tender. Bowel sounds and aorta are normal. She exhibits no organomegaly, mass or bruit. Extremities: Trace lower extremity edema. No cyanosis and no clubbing. Pulses are 2+ radial and carotid pulses. 1+ dorsalis pedis and posterior tibial pulses bilaterally. Neurological: She is alert and oriented to person, place, and time. No evidence of gross cranial nerve deficit. Coordination appeared normal.   Skin: Skin is warm and dry. There is no rash or diaphoresis. Psychiatric: She has a normal mood and affect.  Her speech is normal and behavior is normal.      MOST RECENT LABS ON RECORD:   Lab Results   Component Value Date    WBC 9.0 11/03/2018    HGB 10.6 (L) 11/03/2018    HCT 30.8 (L) 11/03/2018     11/03/2018    CHOL 253 (H) 11/05/2018    TRIG 823 (H) 11/05/2018    HDL 34 (L) 11/05/2018    LDLDIRECT 90 11/05/2018    ALT 11 11/03/2018    AST 10 11/03/2018     (L) 11/05/2018    K 3.7 11/05/2018    CL 96 (L) 11/05/2018    CREATININE 1.61 (H) 11/05/2018    BUN 24 (H) 11/05/2018    CO2 24 11/05/2018    TSH 1.13 11/04/2018    INR 0.9 09/04/2018    LABA1C 8.6 (H) 11/04/2018    LABMICR 29 (H) 09/22/2017       ASSESSMENT:     Patient Active Problem List    Diagnosis Date Noted    Ureteral stone with hydronephrosis 11/04/2018    Acute pulmonary edema (HCC) 11/04/2018    Elevated serum immunoglobulin free light chains     CHAD (acute kidney injury) ([facility] 08/23/2018    Diabetic ketoacidosis without coma associated with type 2 diabetes mellitus ([facility] 08/23/2018    DM (diabetes mellitus), type 2 with neurological complications ([facility] 19/66/4743    Anxiety 01/26/2016    Hyperlipidemia 12/07/2015    CAD (coronary artery disease)     Hx of CABG     ICD (implantable cardioverter-defibrillator) in place     H/O mitral valve repair     Hx of myocardial infarction     Hypertension     Cardiomyopathy ([facility]     Onychomycosis of toenail 11/19/2015    S/P cardiac cath-Patent grafts 6/5/15 06/04/2015    Acute coronary syndrome ([facility] 05/26/2015        PLAN:      Pre-Op Clearance: Intermediate risk of perioperative cardiac complications  Based on my evaluation of Ms. Ashanti Crane, I do not believe that any further testing or intervention would be likely to decrease this risk and therefore recommend that you proceed with surgery as clinically indicated. Medical management to reduce perioperative risk:  Antiplatelet Agent: OK to hold 5-7 days clopidogrel prior to the procedure. However, if this would lead to an unacceptable bleeding risk, I would suggest that this be restarted as soon as safely possible. Additional Recommendations: I would also suggest that he continue her beta blocker and statin throughout the perioperative period. Atherosclerotic Heart Disease: S/P CABG  Antiplatelet Agent: Continue clopidogrel (Plavix) 75 mg daily.  I also reminded her to watch for signs of blood in her stool or black tarry stools and stop the medication immediately if this develops as this could be life

## 2018-11-06 VITALS
SYSTOLIC BLOOD PRESSURE: 133 MMHG | BODY MASS INDEX: 26.66 KG/M2 | RESPIRATION RATE: 16 BRPM | DIASTOLIC BLOOD PRESSURE: 72 MMHG | OXYGEN SATURATION: 97 % | HEART RATE: 97 BPM | HEIGHT: 65 IN | TEMPERATURE: 97.8 F | WEIGHT: 160 LBS

## 2018-11-06 LAB
ANION GAP SERPL CALCULATED.3IONS-SCNC: 17 MMOL/L (ref 9–17)
BNP INTERPRETATION: ABNORMAL
BUN BLDV-MCNC: 28 MG/DL (ref 6–20)
BUN/CREAT BLD: 18 (ref 9–20)
CALCIUM SERPL-MCNC: 8.4 MG/DL (ref 8.6–10.4)
CHLORIDE BLD-SCNC: 95 MMOL/L (ref 98–107)
CO2: 23 MMOL/L (ref 20–31)
CREAT SERPL-MCNC: 1.56 MG/DL (ref 0.5–0.9)
GFR AFRICAN AMERICAN: 41 ML/MIN
GFR NON-AFRICAN AMERICAN: 34 ML/MIN
GFR SERPL CREATININE-BSD FRML MDRD: ABNORMAL ML/MIN/{1.73_M2}
GFR SERPL CREATININE-BSD FRML MDRD: ABNORMAL ML/MIN/{1.73_M2}
GLUCOSE BLD-MCNC: 226 MG/DL (ref 74–100)
GLUCOSE BLD-MCNC: 229 MG/DL (ref 70–99)
GLUCOSE BLD-MCNC: 310 MG/DL (ref 74–100)
MAGNESIUM: 1.8 MG/DL (ref 1.6–2.6)
POTASSIUM SERPL-SCNC: 3.6 MMOL/L (ref 3.7–5.3)
PRO-BNP: 2443 PG/ML
SODIUM BLD-SCNC: 135 MMOL/L (ref 135–144)

## 2018-11-06 PROCEDURE — 83735 ASSAY OF MAGNESIUM: CPT

## 2018-11-06 PROCEDURE — 80048 BASIC METABOLIC PNL TOTAL CA: CPT

## 2018-11-06 PROCEDURE — 2580000003 HC RX 258: Performed by: FAMILY MEDICINE

## 2018-11-06 PROCEDURE — 36415 COLL VENOUS BLD VENIPUNCTURE: CPT

## 2018-11-06 PROCEDURE — 82947 ASSAY GLUCOSE BLOOD QUANT: CPT

## 2018-11-06 PROCEDURE — 6370000000 HC RX 637 (ALT 250 FOR IP): Performed by: FAMILY MEDICINE

## 2018-11-06 PROCEDURE — 6360000002 HC RX W HCPCS: Performed by: FAMILY MEDICINE

## 2018-11-06 PROCEDURE — 83880 ASSAY OF NATRIURETIC PEPTIDE: CPT

## 2018-11-06 RX ORDER — LISINOPRIL 5 MG/1
5 TABLET ORAL DAILY
Qty: 30 TABLET | Refills: 0 | Status: SHIPPED | OUTPATIENT
Start: 2018-11-07 | End: 2019-01-03 | Stop reason: SDUPTHER

## 2018-11-06 RX ORDER — CLOPIDOGREL BISULFATE 75 MG/1
TABLET ORAL
Qty: 30 TABLET | Refills: 5 | Status: SHIPPED | OUTPATIENT
Start: 2018-11-06 | End: 2019-04-26 | Stop reason: SDUPTHER

## 2018-11-06 RX ORDER — FUROSEMIDE 40 MG/1
40 TABLET ORAL DAILY
Qty: 30 TABLET | Refills: 0 | Status: SHIPPED | OUTPATIENT
Start: 2018-11-06 | End: 2018-12-11 | Stop reason: SDUPTHER

## 2018-11-06 RX ADMIN — CARVEDILOL 3.12 MG: 3.12 TABLET, FILM COATED ORAL at 08:48

## 2018-11-06 RX ADMIN — INSULIN LISPRO 4 UNITS: 100 INJECTION, SOLUTION INTRAVENOUS; SUBCUTANEOUS at 12:01

## 2018-11-06 RX ADMIN — MIDODRINE HYDROCHLORIDE 5 MG: 5 TABLET ORAL at 13:50

## 2018-11-06 RX ADMIN — INSULIN LISPRO 2 UNITS: 100 INJECTION, SOLUTION INTRAVENOUS; SUBCUTANEOUS at 08:49

## 2018-11-06 RX ADMIN — VITAMIN D, TAB 1000IU (100/BT) 5000 UNITS: 25 TAB at 08:48

## 2018-11-06 RX ADMIN — Medication 10 ML: at 08:49

## 2018-11-06 RX ADMIN — FUROSEMIDE 40 MG: 10 INJECTION, SOLUTION INTRAMUSCULAR; INTRAVENOUS at 08:49

## 2018-11-06 RX ADMIN — CARVEDILOL 3.12 MG: 3.12 TABLET, FILM COATED ORAL at 16:41

## 2018-11-06 RX ADMIN — MIDODRINE HYDROCHLORIDE 5 MG: 5 TABLET ORAL at 08:48

## 2018-11-06 RX ADMIN — SERTRALINE 100 MG: 100 TABLET, FILM COATED ORAL at 08:49

## 2018-11-06 RX ADMIN — LISINOPRIL 5 MG: 5 TABLET ORAL at 08:49

## 2018-11-06 RX ADMIN — ATORVASTATIN CALCIUM 80 MG: 40 TABLET, FILM COATED ORAL at 08:49

## 2018-11-06 RX ADMIN — CLOPIDOGREL BISULFATE 75 MG: 75 TABLET ORAL at 08:48

## 2018-11-06 RX ADMIN — ENOXAPARIN SODIUM 40 MG: 40 INJECTION SUBCUTANEOUS at 08:49

## 2018-11-06 ASSESSMENT — PAIN SCALES - GENERAL
PAINLEVEL_OUTOF10: 0
PAINLEVEL_OUTOF10: 0

## 2018-11-06 NOTE — DISCHARGE SUMMARY
Discharge Summary    Saadia Posadas  :  1960  MRN:  [id number]    Admit date:  2018      Discharge date: 2018     Admitting Physician:  Stanislav Aranda MD    Consultants:  Dr. Aga Campos, urology, Dr. Wilfred Díaz, cardiology    Procedures: none    Complications: none    Discharge Condition: stable    Hospital Course:   Saadia Posadas is a 60 y.o. female admitted as transfer from [facility]. She presented there with left flank pain and nausea. H/o kidney stones in the past.  Also H/O CAD, CABG, prosthetic valve, DM, recent acute renal failure requiring dialysis (off HD for 2 weeks), and recent respratory failure. Imaging showed hydronephrosis and acute pulmonary edema. There were no beds at [facility] and decision was made to transfer to [facility] as urology was available here. She was seen by urology. Urine culture was negative. She was seen by cardiology for pulmonary edema and cardiac clearance. She diuresed well and CXR improved. Plavix will be held for urology procedure. Her flank pain did resolve. She does have a sizable stone and will return 18 for HLL and stent per urology. She clinically improved and will be discharged to home. Will get BMP to follow up kidney function as we are diuresing and she had recent RENAL failure requiring HD. She needs close f/u with both her PCP and her personal cardiologist. Discharge to home.      Exam:  GEN:  alert and oriented to person, place and time, well-developed and well-nourished, in no acute distress  EYES:  PERRL  NECK: normal, supple  PULM: clear to auscultation bilaterally - no wheezes, rales or rhonchi, normal air movement, no respiratory distress  COR:   regular rate & rhythm and no murmurs  ABD:    soft, non-tender, non-distended, normal bowel sounds, no masses or organomegaly  : NO FLANK PAIN  EXT:    no cyanosis, clubbing or edema present    NEURO: follows commands, CRUMP, no deficits  SKIN:   no rashes or significant lesions    Significant Diagnostic Studies:   Lab Results   Component Value Date    WBC 9.0 11/03/2018    HGB 10.6 (L) 11/03/2018     11/03/2018       Lab Results   Component Value Date    BUN 28 (H) 11/06/2018    CREATININE 1.56 (H) 11/06/2018     11/06/2018    K 3.6 (L) 11/06/2018    CALCIUM 8.4 (L) 11/06/2018    CL 95 (L) 11/06/2018    CO2 23 11/06/2018    LABGLOM 34 (L) 11/06/2018       Lab Results   Component Value Date    WBCUA NOT REPORTED 11/03/2018    RBCUA NOT REPORTED 11/03/2018    EPITHUA NOT REPORTED 11/03/2018    LEUKOCYTESUR NEGATIVE 11/03/2018    SPECGRAV 1.010 11/03/2018    GLUCOSEU 1000 mg/dL (A) 11/03/2018    KETUA NEGATIVE 11/03/2018    PROTEINU 1+ (A) 11/03/2018    HGBUR TRACE (A) 11/03/2018    CASTUA NOT REPORTED 11/03/2018    CRYSTUA NOT REPORTED 11/03/2018    BACTERIA NOT REPORTED 11/03/2018    YEAST NOT REPORTED 11/03/2018       Ct Abdomen Pelvis Wo Contrast    Result Date: 11/4/2018  EXAM: CT ABDOMEN PELVIS WO CONTRAST CLINICAL STATEMENT: Left flank pain. COMPARISON: CT abdomen and pelvis examination dated 3/22/2014. TECHNIQUE: CT examination of the abdomen and pelvis without IV contrast. Coronal and sagittal reformations were performed. Dose reduction techniques were achieved by using automated exposure control and/or adjustment of mA and/or kV according to patient size and/or use of iterative reconstruction technique. FINDINGS: There is interlobular septal thickening in the lung bases with scattered groundglass opacities. Partially imaged are postsurgical changes of the chest with median sternotomy wires in place. A suspected prosthetic mitral valve is noted. Coronary artery disease is seen. Cardiac pacemaker leads are partially imaged.  ABDOMEN: Please note that the sensitivity for detection of focal lesions or vascular disease is markedly reduced without intravenous contrast. There is an indeterminant 1.0 cm hypodensity within the right hepatic lobe (series 2, image 56) [N13.2] 11/04/2018    Acute pulmonary edema [facility] [J81.0] 11/04/2018    DM (diabetes mellitus), type 2 with neurological complications ([facility] [D06.26] 01/26/2016    Hypertension [I10]     CAD (coronary artery disease) [I25.10]        Discharge Medications:       Juma Batch   Home Medication Instructions [facility]:[id number]    Printed on:11/06/18 1121   Medication Information                      atorvastatin (LIPITOR) 80 MG tablet  Take 1 tablet by mouth daily             carvedilol (COREG) 3.125 MG tablet  take 1 tablet by mouth twice a day with meals             Cholecalciferol (VITAMIN D3) 5000 units TABS  Take 5,000 Units by mouth daily             clopidogrel (PLAVIX) 75 MG tablet  take 1 tablet by mouth daily             furosemide (LASIX) 40 MG tablet  Take 1 tablet by mouth daily             glucose blood VI test strips (EXACTECH TEST) strip  1 each by In Vitro route daily Pt testing BS once daily and prn             glucose monitoring kit (FREESTYLE) monitoring kit  1 kit by Does not apply route daily as needed Please dispense meter covered by patients insurance. insulin glargine (BASAGLAR KWIKPEN) 100 UNIT/ML injection pen  15 units SC nightly             Insulin Pen Needle 32G X 4 MM MISC  1 each by Does not apply route daily             Lancets MISC  Pt testing BS once daily and prn             lisinopril (PRINIVIL;ZESTRIL) 5 MG tablet  Take 1 tablet by mouth daily             midodrine (PROAMATINE) 5 MG tablet  Take 1 tablet by mouth 3 times daily             Multiple Vitamins-Minerals (THERAPEUTIC MULTIVITAMIN-MINERALS) tablet  Take 1 tablet by mouth daily (with breakfast)             sertraline (ZOLOFT) 100 MG tablet  take 1 tablet by mouth once daily                 Patient Instructions:    Activity: activity as tolerated  Diet: cardiac diet and diabetic diet  Wound Care: none needed  Other: hold plavix  BMP later this week  Return for urology procedure 11/8/18     Disposition:

## 2018-11-06 NOTE — CONSULTS
Patient:  Sherin Modi  MRN: [id number]    Chief Complaint:  Left flank pain, ureteral stone    HISTORY OF PRESENT ILLNESS:   The patient is a 60 y.o. female who presents with left flank pain. She did present to the ER yesterday with complaints of left flank pain. CT was obtained while in the ER. This film was independently reviewed shows a 5mm left proximal ureteral stone with hydroureteronephrosis. I do not identify any stones on the right. Patient denies any current flank pain, but she does have gross hematuria that started today. She does have a villa catheter in place. She does admit to a history of stones and has required lithotripsy in the past. She was discharged from [facility] on 9/4/2018 after being admitted for metabolic encaphlopathy secondary to acute renal failure as well as respiratory failure for which she was intubated. She was found to be in lactic acidosis, hyperkalemia and diabetic ketoacidosis. She was dialyzed on the day of admission. She finished dialysis at the end of October. Renal function on admission: BUN 24, creatines 1.22, GFR 45. Renal function today: BUN 24, creatinine 1.61, GFR 33. Urine culture is pending. She is receiving empiric rocephin. She denies current pain. She is tolerating villa catheter. She is currently sitting up in bed eating lunch.      Past Medical History:    Past Medical History:   Diagnosis Date    CAD (coronary artery disease)     Cardiomy[facility]     Depression     Diabetes mellitus ([facility]     H/O mitral valve repair     History of fractured kneecap 2017    right    Hx of CABG     Hx of myocardial infarction     Hyperlipidemia     Hypertension     ICD (implantable cardioverter-defibrillator) in place     Kidney calculi     Osteoarthritis     Renal calculi        Past Surgical History:    Past Surgical History:   Procedure Laterality Date    ANKLE SURGERY Right 2009    plate and screws     CARDIAC DEFIBRILLATOR PLACEMENT  06/01/15   

## 2018-11-06 NOTE — PROGRESS NOTES
CRUMP, no deficits  SKIN:  no rashes or significant lesions    -----------------------------------------------------------------  Diagnostic Data:  Lab Results   Component Value Date    WBC 9.0 11/03/2018    HGB 10.6 (L) 11/03/2018    HCT 30.8 (L) 11/03/2018     11/03/2018    CHOL 253 (H) 11/05/2018    TRIG 823 (H) 11/05/2018    HDL 34 (L) 11/05/2018    LDLDIRECT 90 11/05/2018    ALT 11 11/03/2018    AST 10 11/03/2018     11/06/2018    K 3.6 (L) 11/06/2018    CL 95 (L) 11/06/2018    CREATININE 1.56 (H) 11/06/2018    BUN 28 (H) 11/06/2018    CO2 23 11/06/2018    TSH 1.13 11/04/2018    INR 0.9 09/04/2018    LABA1C 8.6 (H) 11/04/2018    LABMICR 29 (H) 09/22/2017       ASSESSMENT:    Principal Problem:    Ureteral stone with hydronephrosis  Active Problems:    CAD (coronary artery disease)    Hypertension    DM (diabetes mellitus), type 2 with neurological complications (HCC)    Acute pulmonary edema (HCC)    Dyslipidemia  Resolved Problems:    * No resolved hospital problems.  *      Patient Active Problem List    Diagnosis Date Noted    Dyslipidemia     Ureteral stone with hydronephrosis 11/04/2018    Acute pulmonary edema (HCC) 11/04/2018    Elevated serum immunoglobulin free light chains     [facility]AD (acute kidney injury) ([facility] 08/23/2018    Diabetic ketoacidosis without coma associated with type 2 diabetes mellitus [facility] 08/23/2018    DM (diabetes mellitus), type 2 with neurological complications ([facility] 01/69/2748    Anxiety 01/26/2016    Hyperlipidemia 12/07/2015    CAD (coronary artery disease)     Hx of CABG     ICD (implantable cardioverter-defibrillator) in place     H/O mitral valve repair     Hx of myocardial infarction     Hypertension     Cardiomyopathy ([facility]     Onychomycosis of toenail 11/19/2015    S/P cardiac cath-Patent grafts 6/5/15 06/04/2015    Acute coronary syndrome ([facility] [address] 05/26/2015       PLAN:    Ureteral stone with hydronephrosis   HLL and stent 11/8/18   Appreciate

## 2018-11-07 ENCOUNTER — ANESTHESIA EVENT (OUTPATIENT)
Dept: OPERATING ROOM | Age: 58
End: 2018-11-07
Payer: MEDICARE

## 2018-11-07 DIAGNOSIS — E11.49 DM (DIABETES MELLITUS), TYPE 2 WITH NEUROLOGICAL COMPLICATIONS (HCC): Primary | ICD-10-CM

## 2018-11-07 RX ORDER — LANCETS 30 GAUGE
1 EACH MISCELLANEOUS DAILY
Qty: 100 EACH | Refills: 3 | Status: SHIPPED | OUTPATIENT
Start: 2018-11-07 | End: 2020-06-09

## 2018-11-07 RX ORDER — GLUCOSAMINE HCL/CHONDROITIN SU 500-400 MG
CAPSULE ORAL
Qty: 100 STRIP | Refills: 5 | Status: SHIPPED | OUTPATIENT
Start: 2018-11-07

## 2018-11-08 ENCOUNTER — HOSPITAL ENCOUNTER (OUTPATIENT)
Age: 58
Setting detail: OUTPATIENT SURGERY
Discharge: HOME | End: 2018-11-08
Attending: UROLOGY | Admitting: UROLOGY
Payer: MEDICARE

## 2018-11-08 ENCOUNTER — APPOINTMENT (OUTPATIENT)
Dept: GENERAL RADIOLOGY | Age: 58
End: 2018-11-08
Attending: UROLOGY
Payer: MEDICARE

## 2018-11-08 ENCOUNTER — ANESTHESIA (OUTPATIENT)
Dept: OPERATING ROOM | Age: 58
End: 2018-11-08
Payer: MEDICARE

## 2018-11-08 VITALS
HEART RATE: 88 BPM | WEIGHT: 165 LBS | RESPIRATION RATE: 16 BRPM | BODY MASS INDEX: 26.52 KG/M2 | TEMPERATURE: 97.6 F | OXYGEN SATURATION: 97 % | HEIGHT: 66 IN | DIASTOLIC BLOOD PRESSURE: 59 MMHG | SYSTOLIC BLOOD PRESSURE: 101 MMHG

## 2018-11-08 VITALS
OXYGEN SATURATION: 100 % | TEMPERATURE: 98 F | DIASTOLIC BLOOD PRESSURE: 63 MMHG | RESPIRATION RATE: 10 BRPM | SYSTOLIC BLOOD PRESSURE: 97 MMHG

## 2018-11-08 LAB — GLUCOSE BLD-MCNC: 285 MG/DL (ref 74–100)

## 2018-11-08 PROCEDURE — C2617 STENT, NON-COR, TEM W/O DEL: HCPCS | Performed by: UROLOGY

## 2018-11-08 PROCEDURE — 6370000000 HC RX 637 (ALT 250 FOR IP): Performed by: UROLOGY

## 2018-11-08 PROCEDURE — 3600000014 HC SURGERY LEVEL 4 ADDTL 15MIN: Performed by: UROLOGY

## 2018-11-08 PROCEDURE — 3700000001 HC ADD 15 MINUTES (ANESTHESIA): Performed by: UROLOGY

## 2018-11-08 PROCEDURE — 2709999900 HC NON-CHARGEABLE SUPPLY: Performed by: UROLOGY

## 2018-11-08 PROCEDURE — 7100000001 HC PACU RECOVERY - ADDTL 15 MIN: Performed by: UROLOGY

## 2018-11-08 PROCEDURE — 87086 URINE CULTURE/COLONY COUNT: CPT

## 2018-11-08 PROCEDURE — C1769 GUIDE WIRE: HCPCS | Performed by: UROLOGY

## 2018-11-08 PROCEDURE — 7100000011 HC PHASE II RECOVERY - ADDTL 15 MIN: Performed by: UROLOGY

## 2018-11-08 PROCEDURE — 7100000000 HC PACU RECOVERY - FIRST 15 MIN: Performed by: UROLOGY

## 2018-11-08 PROCEDURE — 2500000003 HC RX 250 WO HCPCS: Performed by: NURSE ANESTHETIST, CERTIFIED REGISTERED

## 2018-11-08 PROCEDURE — C1758 CATHETER, URETERAL: HCPCS | Performed by: UROLOGY

## 2018-11-08 PROCEDURE — 6360000002 HC RX W HCPCS: Performed by: NURSE ANESTHETIST, CERTIFIED REGISTERED

## 2018-11-08 PROCEDURE — 2580000003 HC RX 258: Performed by: UROLOGY

## 2018-11-08 PROCEDURE — 3700000000 HC ANESTHESIA ATTENDED CARE: Performed by: UROLOGY

## 2018-11-08 PROCEDURE — 7100000010 HC PHASE II RECOVERY - FIRST 15 MIN: Performed by: UROLOGY

## 2018-11-08 PROCEDURE — 74018 RADEX ABDOMEN 1 VIEW: CPT

## 2018-11-08 PROCEDURE — 82947 ASSAY GLUCOSE BLOOD QUANT: CPT

## 2018-11-08 PROCEDURE — 3600000004 HC SURGERY LEVEL 4 BASE: Performed by: UROLOGY

## 2018-11-08 PROCEDURE — 6360000002 HC RX W HCPCS: Performed by: UROLOGY

## 2018-11-08 DEVICE — URETERAL STENT
Type: IMPLANTABLE DEVICE | Site: URETER | Status: FUNCTIONAL
Brand: PERCUFLEX™ PLUS

## 2018-11-08 RX ORDER — FENTANYL CITRATE 50 UG/ML
25 INJECTION, SOLUTION INTRAMUSCULAR; INTRAVENOUS EVERY 5 MIN PRN
Status: DISCONTINUED | OUTPATIENT
Start: 2018-11-08 | End: 2018-11-08 | Stop reason: HOSPADM

## 2018-11-08 RX ORDER — FENTANYL CITRATE 50 UG/ML
INJECTION, SOLUTION INTRAMUSCULAR; INTRAVENOUS PRN
Status: DISCONTINUED | OUTPATIENT
Start: 2018-11-08 | End: 2018-11-08 | Stop reason: SDUPTHER

## 2018-11-08 RX ORDER — CIPROFLOXACIN 500 MG/1
500 TABLET, FILM COATED ORAL 2 TIMES DAILY
Qty: 14 TABLET | Refills: 0 | Status: SHIPPED | OUTPATIENT
Start: 2018-11-08 | End: 2018-11-15

## 2018-11-08 RX ORDER — SODIUM CHLORIDE, SODIUM LACTATE, POTASSIUM CHLORIDE, CALCIUM CHLORIDE 600; 310; 30; 20 MG/100ML; MG/100ML; MG/100ML; MG/100ML
INJECTION, SOLUTION INTRAVENOUS CONTINUOUS
Status: DISCONTINUED | OUTPATIENT
Start: 2018-11-08 | End: 2018-11-08 | Stop reason: HOSPADM

## 2018-11-08 RX ORDER — MIDAZOLAM HYDROCHLORIDE 1 MG/ML
INJECTION INTRAMUSCULAR; INTRAVENOUS PRN
Status: DISCONTINUED | OUTPATIENT
Start: 2018-11-08 | End: 2018-11-08 | Stop reason: SDUPTHER

## 2018-11-08 RX ORDER — FENTANYL CITRATE 50 UG/ML
50 INJECTION, SOLUTION INTRAMUSCULAR; INTRAVENOUS EVERY 5 MIN PRN
Status: DISCONTINUED | OUTPATIENT
Start: 2018-11-08 | End: 2018-11-08 | Stop reason: HOSPADM

## 2018-11-08 RX ORDER — CIPROFLOXACIN 2 MG/ML
400 INJECTION, SOLUTION INTRAVENOUS
Status: COMPLETED | OUTPATIENT
Start: 2018-11-08 | End: 2018-11-08

## 2018-11-08 RX ORDER — HYDROCODONE BITARTRATE AND ACETAMINOPHEN 5; 325 MG/1; MG/1
2 TABLET ORAL PRN
Status: DISCONTINUED | OUTPATIENT
Start: 2018-11-08 | End: 2018-11-08 | Stop reason: HOSPADM

## 2018-11-08 RX ORDER — ONDANSETRON 2 MG/ML
4 INJECTION INTRAMUSCULAR; INTRAVENOUS
Status: DISCONTINUED | OUTPATIENT
Start: 2018-11-08 | End: 2018-11-08 | Stop reason: HOSPADM

## 2018-11-08 RX ORDER — DIMENHYDRINATE 50 MG/1
50 TABLET ORAL ONCE
Status: COMPLETED | OUTPATIENT
Start: 2018-11-08 | End: 2018-11-08

## 2018-11-08 RX ORDER — ONDANSETRON 2 MG/ML
INJECTION INTRAMUSCULAR; INTRAVENOUS PRN
Status: DISCONTINUED | OUTPATIENT
Start: 2018-11-08 | End: 2018-11-08 | Stop reason: SDUPTHER

## 2018-11-08 RX ORDER — METOCLOPRAMIDE HYDROCHLORIDE 5 MG/ML
10 INJECTION INTRAMUSCULAR; INTRAVENOUS
Status: DISCONTINUED | OUTPATIENT
Start: 2018-11-08 | End: 2018-11-08 | Stop reason: HOSPADM

## 2018-11-08 RX ORDER — ACETAMINOPHEN 325 MG/1
650 TABLET ORAL ONCE
Status: COMPLETED | OUTPATIENT
Start: 2018-11-08 | End: 2018-11-08

## 2018-11-08 RX ORDER — DEXAMETHASONE SODIUM PHOSPHATE 4 MG/ML
INJECTION, SOLUTION INTRA-ARTICULAR; INTRALESIONAL; INTRAMUSCULAR; INTRAVENOUS; SOFT TISSUE PRN
Status: DISCONTINUED | OUTPATIENT
Start: 2018-11-08 | End: 2018-11-08 | Stop reason: SDUPTHER

## 2018-11-08 RX ORDER — HYDROCODONE BITARTRATE AND ACETAMINOPHEN 5; 325 MG/1; MG/1
1 TABLET ORAL PRN
Status: DISCONTINUED | OUTPATIENT
Start: 2018-11-08 | End: 2018-11-08 | Stop reason: HOSPADM

## 2018-11-08 RX ORDER — LIDOCAINE HYDROCHLORIDE 20 MG/ML
INJECTION, SOLUTION INFILTRATION; PERINEURAL PRN
Status: DISCONTINUED | OUTPATIENT
Start: 2018-11-08 | End: 2018-11-08 | Stop reason: SDUPTHER

## 2018-11-08 RX ORDER — PROPOFOL 10 MG/ML
INJECTION, EMULSION INTRAVENOUS PRN
Status: DISCONTINUED | OUTPATIENT
Start: 2018-11-08 | End: 2018-11-08 | Stop reason: SDUPTHER

## 2018-11-08 RX ADMIN — PROPOFOL 150 MG: 10 INJECTION, EMULSION INTRAVENOUS at 11:45

## 2018-11-08 RX ADMIN — DEXAMETHASONE SODIUM PHOSPHATE 4 MG: 4 INJECTION, SOLUTION INTRAMUSCULAR; INTRAVENOUS at 11:56

## 2018-11-08 RX ADMIN — ACETAMINOPHEN 650 MG: 325 TABLET ORAL at 10:16

## 2018-11-08 RX ADMIN — MIDAZOLAM HYDROCHLORIDE 2 MG: 1 INJECTION, SOLUTION INTRAMUSCULAR; INTRAVENOUS at 11:43

## 2018-11-08 RX ADMIN — ONDANSETRON 4 MG: 2 INJECTION INTRAMUSCULAR; INTRAVENOUS at 12:03

## 2018-11-08 RX ADMIN — PHENYLEPHRINE HYDROCHLORIDE 100 MCG: 10 INJECTION INTRAVENOUS at 11:47

## 2018-11-08 RX ADMIN — FENTANYL CITRATE 50 MCG: 50 INJECTION INTRAMUSCULAR; INTRAVENOUS at 11:50

## 2018-11-08 RX ADMIN — PHENYLEPHRINE HYDROCHLORIDE 100 MCG: 10 INJECTION INTRAVENOUS at 11:53

## 2018-11-08 RX ADMIN — SODIUM CHLORIDE, POTASSIUM CHLORIDE, SODIUM LACTATE AND CALCIUM CHLORIDE: 600; 310; 30; 20 INJECTION, SOLUTION INTRAVENOUS at 10:19

## 2018-11-08 RX ADMIN — LIDOCAINE HYDROCHLORIDE 100 MG: 20 INJECTION, SOLUTION INFILTRATION; PERINEURAL at 11:45

## 2018-11-08 RX ADMIN — CIPROFLOXACIN 400 MG: 2 INJECTION, SOLUTION INTRAVENOUS at 11:38

## 2018-11-08 RX ADMIN — FENTANYL CITRATE 50 MCG: 50 INJECTION INTRAMUSCULAR; INTRAVENOUS at 11:45

## 2018-11-08 RX ADMIN — DIMENHYDRINATE 50 MG: 50 TABLET ORAL at 10:16

## 2018-11-08 ASSESSMENT — PAIN SCALES - GENERAL
PAINLEVEL_OUTOF10: 0
PAINLEVEL_OUTOF10: 4

## 2018-11-08 ASSESSMENT — PULMONARY FUNCTION TESTS
PIF_VALUE: 1
PIF_VALUE: 1
PIF_VALUE: 12
PIF_VALUE: 14
PIF_VALUE: 15
PIF_VALUE: 11
PIF_VALUE: 0
PIF_VALUE: 14
PIF_VALUE: 8
PIF_VALUE: 1
PIF_VALUE: 1
PIF_VALUE: 8
PIF_VALUE: 1
PIF_VALUE: 14
PIF_VALUE: 13
PIF_VALUE: 14
PIF_VALUE: 12
PIF_VALUE: 14
PIF_VALUE: 8
PIF_VALUE: 12
PIF_VALUE: 14

## 2018-11-08 ASSESSMENT — LIFESTYLE VARIABLES: SMOKING_STATUS: 1

## 2018-11-08 ASSESSMENT — PAIN DESCRIPTION - DESCRIPTORS: DESCRIPTORS: ACHING

## 2018-11-08 ASSESSMENT — PAIN - FUNCTIONAL ASSESSMENT: PAIN_FUNCTIONAL_ASSESSMENT: 0-10

## 2018-11-08 NOTE — ANESTHESIA PRE PROCEDURE
kneecap 2017    right    Hx of CABG     Hx of myocardial infarction     Hyperlipidemia     Hypertension     ICD (implantable cardioverter-defibrillator) in place     Kidney calculi     Osteoarthritis     Renal calculi        Past Surgical History:        Procedure Laterality Date    ANKLE SURGERY Right 2009    plate and screws     CARDIAC DEFIBRILLATOR PLACEMENT  06/01/15    CHOLECYSTECTOMY  1990    CORONARY ARTERY BYPASS GRAFT  05/28/15    X 3- Dr Mervat Lee LIMA-LAD<SVG-Diag, SVG-PDA, MV Repair with 30 min CE IMR ring    HYSTERECTOMY  1990    KIDNEY STONE SURGERY Left 2010    MITRAL VALVE SURGERY  6/4/15    TUBAL LIGATION         Social History:    Social History   Substance Use Topics    Smoking status: Current Some Day Smoker     Packs/day: 0.50     Years: 32.00     Types: Cigarettes     Last attempt to quit: 5/26/2015    Smokeless tobacco: Never Used      Comment: PT SMOKED TODAY AT 0830 11/8/18    Alcohol use No                                Ready to quit: Not Answered  Counseling given: Not Answered      Vital Signs (Current):   Vitals:    11/07/18 1123 11/08/18 0956 11/08/18 1007   BP:   114/69   Pulse:   92   Resp:   16   Temp:   36.6 °C (97.8 °F)   TempSrc:   Temporal   SpO2:   96%   Weight: 160 lb (72.6 kg) 165 lb (74.8 kg)    Height: 5' 5\" (1.651 m) 5' 6\" (1.676 m)                                               BP Readings from Last 3 Encounters:   11/08/18 114/69   11/06/18 133/72   11/04/18 134/64       NPO Status: Time of last liquid consumption: 2345                        Time of last solid consumption: 2230                        Date of last liquid consumption: 11/07/18                        Date of last solid food consumption: 11/07/18    BMI:   Wt Readings from Last 3 Encounters:   11/08/18 165 lb (74.8 kg)   11/06/18 160 lb (72.6 kg)   11/03/18 165 lb (74.8 kg)     Body mass index is 26.63 kg/m².     CBC:   Lab Results   Component Value Date    WBC 9.0 11/03/2018    RBC 3.31 intravenous. MIPS: Prophylactic antiemetics administered. Anesthetic plan and risks discussed with patient. Plan discussed with CRNA.                   MICKEY Sky - JESUSITA   11/8/2018

## 2018-11-08 NOTE — H&P (VIEW-ONLY)
alcohol. OCCUPATION: none    Allergies:  Nsaids and Codeine    Medications Prior to Admission:    Prior to Admission medications    Medication Sig Start Date End Date Taking? Authorizing Provider   insulin glargine [facility]) 100 UNIT/ML injection pen 15 units SC nightly 10/26/18   Jennifer Milligan MD   carvedilol (COREG) 3.125 MG tablet take 1 tablet by mouth twice a day with meals 9/24/18   Jennifer Milligan MD   atorvastatin (LIPITOR) 80 MG tablet Take 1 tablet by mouth daily 9/24/18   Jennifer Milligan MD   sertraline (ZOLOFT) 100 MG tablet take 1 tablet by mouth once daily 9/24/18   Jennifer Milligan MD   midodrine (PROAMATINE) 5 MG tablet Take 1 tablet by mouth 3 times daily 9/4/18   Miguelangel Hartman MD   clopidogrel (PLAVIX) 75 MG tablet take 1 tablet by mouth daily 9/5/18   Christiano Ndiaye MD   Insulin Pen Needle 32G X 4 MM MISC 1 each by Does not apply route daily 4/25/18   Jennifer Milligan MD   Cholecalciferol (VITAMIN D3) 5000 units TABS Take 5,000 Units by mouth daily    Historical Provider, MD   glucose monitoring kit (FREESTYLE) monitoring kit 1 kit by Does not apply route daily as needed Please dispense meter covered by patients insurance. 11/19/15   Jennifer Milligan MD   glucose blood VI test strips (EXACTECH TEST) strip 1 each by In Vitro route daily Pt testing BS once daily and prn 11/19/15   Jennifer Milligan MD   Lancets MISC Pt testing BS once daily and prn 11/19/15   Jennifer Milligan MD   Multiple Vitamins-Minerals (THERAPEUTIC MULTIVITAMIN-MINERALS) tablet Take 1 tablet by mouth daily (with breakfast) 6/12/15   Bouchra Antoine MD       Review of Systems:  Constitutional:negative  for fevers, and negative for chills.   Eyes: negative for visual disturbance   ENT: negative for sore throat, negative nasal congestion, and negative for earache  Respiratory: negative for shortness of breath, positive for cough, and negative for wheezing  Cardiovascular: negative for chest pain, negative for pulmonary edema (HCC)  Resolved Problems:    * No resolved hospital problems. *      Patient Active Problem List    Diagnosis Date Noted    Ureteral stone with hydronephrosis 11/04/2018    Acute pulmonary edema (HCC) 11/04/2018    Elevated serum immunoglobulin free light chains     CHAD (acute kidney injury) ([facility] 08/23/2018    Diabetic ketoacidosis without coma associated with type 2 diabetes mellitus ([facility] 08/23/2018    DM (diabetes mellitus), type 2 with neurological complications ([facility] [address] 06/68/7681    Anxiety 01/26/2016    Hyperlipidemia 12/07/2015    CAD (coronary artery disease)     Hx of CABG     ICD (implantable cardioverter-defibrillator) in place     H/O mitral valve repair     Hx of myocardial infarction     Hypertension     Cardiomyopathy ([facility]     Onychomycosis of toenail 11/19/2015    S/P cardiac cath-Patent grafts 6/5/15 06/04/2015    Acute coronary syndrome ([facility] [address] 05/26/2015       Plan:     · This patient requires inpatient admission because of lt ureteric stone with hydronephrosis requiring pain control  · Factors affecting the medical complexity of this patient include pulmonary edema,recent acute renal failure,ashd, recent h/o respiratory failure  · Estimated length of stay is 2 days  · Cardiology and urology evaluation  · High risk medication monitoring: none    CORE MEASURES  DVT prophylaxis: Lovenox  Decubitus ulcer present on admission: No  CODE STATUS: FULL CODE  Nutrition Status: fair   Physical therapy: NA   Old Charts reviewed: Yes  EKG Reviewed:  Yes  Advance Directive Addressed: Yes    Grace Dick MD  11/4/2018, 6:33 PM

## 2018-11-08 NOTE — INTERVAL H&P NOTE
Here for definitive therapy of the left    History and Physical reviewed  I have examined the patient and no changes    Jose Francisco Buchanan MD

## 2018-11-09 LAB
CULTURE: NO GROWTH
Lab: NORMAL
SPECIMEN DESCRIPTION: NORMAL
STATUS: NORMAL

## 2018-11-09 NOTE — OP NOTE
[address]                                OPERATIVE REPORT    PATIENT NAME: Eladio Schmitt                  :        1960  MED REC NO:   [id number]                              ROOM:  ACCOUNT NO:   [de-identified]                           ADMIT DATE: 2018  PROVIDER:     Yvette Dorman    DATE OF PROCEDURE:  2018    SURGEON:  Dr. Yvette Dorman. ASSISTANT:  None. PREOPERATIVE DIAGNOSIS:  Left ureteral calculus. POSTOPERATIVE DIAGNOSIS:  Left ureteral calculus. PROCEDURES PERFORMED:  Cystoscopy and left ureteral stent placement. ANESTHESIA:  General.    SPECIMENS:  Urine for culture and sensitivity. PROSTHESIS:  A 6-Greek 26-cm JJ ureteral stent. DISPOSITION:  Stable. FINDINGS:  Left ureteral obstruction. INDICATIONS:  The patient is a 69-year-old female who had left ureteral  calculus diagnosed by CT scan. The patient is here now for definitive  therapy. DESCRIPTION OF PROCEDURE:  The patient was taken back to the operating  room. After informed consent including all risks, benefits, and  alternatives were obtained, the patient was transferred from the [facility]  onto the operating table where she was induced under general anesthesia,  given IV Cipro for preoperative antibiotic prophylaxis. To begin the  case, she was prepped and draped in normal sterile fashion. She was  placed in dorsal lithotomy. She had a 22-Greek sheath with a 30-degree  lens passed through the urethra into the bladder. Once in the bladder,  the left ureteral orifice was identified. A 0.035-inch wire was passed  up the left ureter into the kidney, confirmed via fluoroscopy. We did  see copious amount of blood and purulence coming out of this left  ureter. This did continue to come down for sometime. At this point in  time, it was thought unwise to proceed with definitive stone therapy.

## 2018-11-10 ENCOUNTER — HOSPITAL ENCOUNTER (OUTPATIENT)
Age: 58
Discharge: HOME | End: 2018-11-10
Payer: MEDICARE

## 2018-11-10 LAB
ANION GAP SERPL CALCULATED.3IONS-SCNC: 14 MMOL/L (ref 9–17)
BUN BLDV-MCNC: 51 MG/DL (ref 6–20)
BUN/CREAT BLD: 32 (ref 9–20)
CALCIUM SERPL-MCNC: 9.3 MG/DL (ref 8.6–10.4)
CHLORIDE BLD-SCNC: 102 MMOL/L (ref 98–107)
CO2: 21 MMOL/L (ref 20–31)
CREAT SERPL-MCNC: 1.57 MG/DL (ref 0.5–0.9)
CULTURE: NORMAL
GFR AFRICAN AMERICAN: 41 ML/MIN
GFR NON-AFRICAN AMERICAN: 34 ML/MIN
GFR SERPL CREATININE-BSD FRML MDRD: ABNORMAL ML/MIN/{1.73_M2}
GFR SERPL CREATININE-BSD FRML MDRD: ABNORMAL ML/MIN/{1.73_M2}
GLUCOSE BLD-MCNC: 344 MG/DL (ref 70–99)
Lab: NORMAL
POTASSIUM SERPL-SCNC: 4.5 MMOL/L (ref 3.7–5.3)
SODIUM BLD-SCNC: 137 MMOL/L (ref 135–144)
SPECIMEN DESCRIPTION: NORMAL
STATUS: NORMAL

## 2018-11-10 PROCEDURE — 36415 COLL VENOUS BLD VENIPUNCTURE: CPT

## 2018-11-10 PROCEDURE — 80048 BASIC METABOLIC PNL TOTAL CA: CPT

## 2018-11-12 ENCOUNTER — TELEPHONE (OUTPATIENT)
Dept: UROLOGY | Age: 58
End: 2018-11-12

## 2018-11-12 ENCOUNTER — OFFICE VISIT (OUTPATIENT)
Dept: FAMILY MEDICINE CLINIC | Age: 58
End: 2018-11-12
Payer: MEDICARE

## 2018-11-12 ENCOUNTER — ANESTHESIA EVENT (OUTPATIENT)
Dept: OPERATING ROOM | Age: 58
End: 2018-11-12
Payer: MEDICARE

## 2018-11-12 VITALS
SYSTOLIC BLOOD PRESSURE: 80 MMHG | BODY MASS INDEX: 26.31 KG/M2 | HEART RATE: 92 BPM | WEIGHT: 163 LBS | OXYGEN SATURATION: 98 % | DIASTOLIC BLOOD PRESSURE: 54 MMHG

## 2018-11-12 DIAGNOSIS — N13.2 URETERAL STONE WITH HYDRONEPHROSIS: Primary | ICD-10-CM

## 2018-11-12 PROCEDURE — G8419 CALC BMI OUT NRM PARAM NOF/U: HCPCS | Performed by: FAMILY MEDICINE

## 2018-11-12 PROCEDURE — 3017F COLORECTAL CA SCREEN DOC REV: CPT | Performed by: FAMILY MEDICINE

## 2018-11-12 PROCEDURE — 1111F DSCHRG MED/CURRENT MED MERGE: CPT | Performed by: FAMILY MEDICINE

## 2018-11-12 PROCEDURE — 4004F PT TOBACCO SCREEN RCVD TLK: CPT | Performed by: FAMILY MEDICINE

## 2018-11-12 PROCEDURE — G8484 FLU IMMUNIZE NO ADMIN: HCPCS | Performed by: FAMILY MEDICINE

## 2018-11-12 PROCEDURE — G8427 DOCREV CUR MEDS BY ELIG CLIN: HCPCS | Performed by: FAMILY MEDICINE

## 2018-11-12 PROCEDURE — 99213 OFFICE O/P EST LOW 20 MIN: CPT | Performed by: FAMILY MEDICINE

## 2018-11-12 PROCEDURE — G8598 ASA/ANTIPLAT THER USED: HCPCS | Performed by: FAMILY MEDICINE

## 2018-11-12 RX ORDER — GABAPENTIN 300 MG/1
300 CAPSULE ORAL 2 TIMES DAILY
Qty: 60 CAPSULE | Refills: 2 | Status: SHIPPED | OUTPATIENT
Start: 2018-11-12 | End: 2019-02-04 | Stop reason: SDUPTHER

## 2018-11-12 ASSESSMENT — ENCOUNTER SYMPTOMS
FACIAL SWELLING: 0
EYE REDNESS: 0
CONSTIPATION: 0
ABDOMINAL PAIN: 0
SHORTNESS OF BREATH: 0
EYE DISCHARGE: 0
DIARRHEA: 0
BLOOD IN STOOL: 0
NAUSEA: 0
COUGH: 0
VOMITING: 0

## 2018-11-12 NOTE — PROGRESS NOTES
8th grade, wanting to be an [employer] agent. Her 11year-old grandson has a history of violence and is now being treated with Risperdal.  She has a 3year-old boy also. REVIEW OF SYSTEMS:  Cardiac as above. Other systems reviewed including constitutional, eyes, ears, nose and throat, cardiovascular, respiratory, GI, , musculoskeletal, integumentary, neurologic, psychiatric, endocrine, hematologic and allergic/immunologic and are negative except for what is described above. PHYSICAL EXAMINATION:   VITAL SIGNS:  Her blood pressure was 130/70, with a heart rate of 60 and regular. Respiratory rate 18. GENERAL:  She is a pleasant 71-year-old female. Denied pain. She was oriented to person, place and time. Answered questions appropriately. SKIN:  No unusual skin changes. HEENT:  The pupils are equally round and intact. Mucous membranes were dry. NECK:  No JVD. Good carotid pulses. No carotid bruits. No lymphadenopathy or thyromegaly. CARDIOVASCULAR EXAM:  S1 and S2 were normal.  No S3 or S4. Soft systolic blowing type murmur. No diastolic murmur. PMI was normal.  No lift, thrust, or pericardial friction rub. LUNGS:  Quite clear to auscultation and percussion. ABDOMEN:  Soft and nontender. Good bowel sounds. The aorta was not enlarged. No hepatomegaly, splenomegaly. EXTREMITIES:  Good femoral pulses. Good pedal pulses. No pedal edema. Skin was warm and dry. No calf tenderness. Nail beds pink. Good cap refill. PULSES:  Bilateral symmetrical radial, brachial and carotid pulses. No carotid bruits. Good femoral and pedal pulses. NEUROLOGIC EXAM:  Within normal limits. PSYCHIATRIC EXAM:  Within normal limits. LABORATORY DATA:  From 10/26/2018, sodium 134, potassium 4.8, BUN was 29, creatinine 1.19. GFR was 46. Magnesium was 2.0, glucose 292. Cholesterol was 267 with an HDL of 31, LDL was 43, triglycerides 1427. ALT was 14, AST was 10. Hemoglobin A1c was 8.1. TSH 1.16.   Vitamin D was

## 2018-11-12 NOTE — PROGRESS NOTES
HPI Notes    Name: Reno Schwab  : 1960        Chief Complaint:     Chief Complaint   Patient presents with    Follow-Up from Hospital     Pt transfered from [facility] to [facility] on 18 Pt cam e to ER with severe Left flank pain. Pt D/C on 18  Pt had  cystoscopy with stent insertion left uretor. Pt put on Cipro for UTI Pt will finish taking on Thursday. History of Present Illness:     Reno Schwab is a 60 y.o.  female who presents with Follow-Up from Hospital (Pt transfered from [facility] to [facility] on 18 Pt cam e to ER with severe Left flank pain. Pt D/C on 18  Pt had  cystoscopy with stent insertion left uretor. Pt put on Cipro for UTI Pt will finish taking on Thursday. )      HPI  Kidney stones - Pt started to have pain on Lt lower back on 11/3 and it was a sharp pain. Pt went to the ER in [facility]. Pt was having a lot of pain and found to have approx 5mm kidney stone. Pt was transferred to [facility] on 11/3 and found to have UTI. Pt was sent home on  with antibx-- cipro and increase water. Pt then saw Dr Stephon Donald on  who put in the urethral stent. Pt to see Dr. Stephon Donald again on 11/15 and if stone still there she states they will \"blast it\". Yesterday pt started to have some blood in urine again but no F/C/N/V. No abdominal or back pain. Pt is still on cipro.      Past Medical History:     Past Medical History:   Diagnosis Date    CAD (coronary artery disease)     Cardiomyopathy ([facility] [address]     Depression     Diabetes mellitus ([facility] [address]     H/O mitral valve repair     History of fractured kneecap 2017    right    Hx of CABG     Hx of myocardial infarction     Hyperlipidemia     Hypertension     ICD (implantable cardioverter-defibrillator) in place     Kidney calculi     Osteoarthritis     Renal calculi       Reviewed all health maintenance requirements and ordered appropriate tests  Health Maintenance Due   Topic Date Due    Hepatitis C screen for cough and shortness of breath. Cardiovascular: Negative for chest pain, palpitations and leg swelling. Gastrointestinal: Negative for abdominal pain, blood in stool, constipation, diarrhea, nausea and vomiting. Genitourinary: Positive for dysuria and hematuria. Negative for difficulty urinating. Skin: Negative for pallor and rash. Neurological: Negative for dizziness, light-headedness and headaches. Psychiatric/Behavioral: Negative for confusion and sleep disturbance. Physical Exam:     Physical Exam   Constitutional: She is oriented to person, place, and time. She appears well-developed and well-nourished. HENT:   Head: Normocephalic and atraumatic. Eyes: Pupils are equal, round, and reactive to light. Conjunctivae are normal. Right eye exhibits no discharge. Left eye exhibits no discharge. Neck: Neck supple. No thyromegaly present. Cardiovascular: Normal rate and regular rhythm. No murmur heard. Pulmonary/Chest: Effort normal and breath sounds normal.   Abdominal: Soft. Bowel sounds are normal. She exhibits no distension. There is no tenderness. Genitourinary:   Genitourinary Comments: No CVA tenderness   Musculoskeletal: She exhibits no edema. Lymphadenopathy:     She has no cervical adenopathy. Neurological: She is alert and oriented to person, place, and time. Skin: Skin is warm. No rash noted. No erythema. Psychiatric: She has a normal mood and affect. Her behavior is normal.   Vitals reviewed.       Vitals:  BP (!) 80/54 (Site: Left Upper Arm)   Pulse 92   Wt 163 lb (73.9 kg)   SpO2 98%   BMI 26.31 kg/m²       Data:     Lab Results   Component Value Date     11/10/2018    K 4.5 11/10/2018     11/10/2018    CO2 21 11/10/2018    BUN 51 11/10/2018    CREATININE 1.57 11/10/2018    GLUCOSE 344 11/10/2018    PROT 7.9 11/03/2018    LABALBU 4.4 11/03/2018    BILITOT 0.30 11/03/2018    ALKPHOS 100 11/03/2018    AST 10 11/03/2018    ALT 11 11/03/2018     Lab Results   Component Value Date    WBC 9.0 11/03/2018    RBC 3.31 11/03/2018    HGB 10.6 11/03/2018    HCT 30.8 11/03/2018    MCV 93.1 11/03/2018    MCH 32.0 11/03/2018    MCHC 34.3 11/03/2018    RDW 15.3 11/03/2018     11/03/2018    MPV NOT REPORTED 11/03/2018     Lab Results   Component Value Date    TSH 1.13 11/04/2018     Lab Results   Component Value Date    CHOL 253 11/05/2018    HDL 34 11/05/2018    LABA1C 8.6 11/04/2018          Assessment/Plan:       1. Ureteral stone with hydronephrosis  Pt stable and pushing water and monitoring her urine. Pt will f/u on 11/15 with Dr Maira Alexis        No Follow-up on file.       Electronically signed by Alessandro Palacios MD on 11/12/2018 at 12:00 PM

## 2018-11-15 ENCOUNTER — APPOINTMENT (OUTPATIENT)
Dept: GENERAL RADIOLOGY | Age: 58
End: 2018-11-15
Attending: UROLOGY
Payer: MEDICARE

## 2018-11-15 ENCOUNTER — HOSPITAL ENCOUNTER (OUTPATIENT)
Age: 58
Setting detail: OUTPATIENT SURGERY
Discharge: HOME | End: 2018-11-15
Attending: UROLOGY | Admitting: UROLOGY
Payer: MEDICARE

## 2018-11-15 ENCOUNTER — ANESTHESIA (OUTPATIENT)
Dept: OPERATING ROOM | Age: 58
End: 2018-11-15
Payer: MEDICARE

## 2018-11-15 VITALS
BODY MASS INDEX: 27.16 KG/M2 | RESPIRATION RATE: 18 BRPM | OXYGEN SATURATION: 97 % | WEIGHT: 163 LBS | TEMPERATURE: 97.5 F | HEART RATE: 97 BPM | HEIGHT: 65 IN | DIASTOLIC BLOOD PRESSURE: 64 MMHG | SYSTOLIC BLOOD PRESSURE: 110 MMHG

## 2018-11-15 VITALS
SYSTOLIC BLOOD PRESSURE: 156 MMHG | OXYGEN SATURATION: 100 % | TEMPERATURE: 96.8 F | RESPIRATION RATE: 4 BRPM | DIASTOLIC BLOOD PRESSURE: 97 MMHG

## 2018-11-15 LAB — GLUCOSE BLD-MCNC: 264 MG/DL (ref 65–99)

## 2018-11-15 PROCEDURE — 2709999900 HC NON-CHARGEABLE SUPPLY: Performed by: UROLOGY

## 2018-11-15 PROCEDURE — 3700000001 HC ADD 15 MINUTES (ANESTHESIA): Performed by: UROLOGY

## 2018-11-15 PROCEDURE — 6360000002 HC RX W HCPCS: Performed by: UROLOGY

## 2018-11-15 PROCEDURE — 3600000005 HC SURGERY LEVEL 5 BASE: Performed by: UROLOGY

## 2018-11-15 PROCEDURE — 2500000003 HC RX 250 WO HCPCS: Performed by: NURSE ANESTHETIST, CERTIFIED REGISTERED

## 2018-11-15 PROCEDURE — 82947 ASSAY GLUCOSE BLOOD QUANT: CPT

## 2018-11-15 PROCEDURE — 3600000015 HC SURGERY LEVEL 5 ADDTL 15MIN: Performed by: UROLOGY

## 2018-11-15 PROCEDURE — 2580000003 HC RX 258: Performed by: UROLOGY

## 2018-11-15 PROCEDURE — 7100000010 HC PHASE II RECOVERY - FIRST 15 MIN: Performed by: UROLOGY

## 2018-11-15 PROCEDURE — 7100000011 HC PHASE II RECOVERY - ADDTL 15 MIN: Performed by: UROLOGY

## 2018-11-15 PROCEDURE — 6370000000 HC RX 637 (ALT 250 FOR IP): Performed by: UROLOGY

## 2018-11-15 PROCEDURE — 94664 DEMO&/EVAL PT USE INHALER: CPT

## 2018-11-15 PROCEDURE — 6360000002 HC RX W HCPCS: Performed by: NURSE ANESTHETIST, CERTIFIED REGISTERED

## 2018-11-15 PROCEDURE — 3700000000 HC ANESTHESIA ATTENDED CARE: Performed by: UROLOGY

## 2018-11-15 PROCEDURE — 76000 FLUOROSCOPY <1 HR PHYS/QHP: CPT

## 2018-11-15 PROCEDURE — C2617 STENT, NON-COR, TEM W/O DEL: HCPCS | Performed by: UROLOGY

## 2018-11-15 PROCEDURE — C1758 CATHETER, URETERAL: HCPCS | Performed by: UROLOGY

## 2018-11-15 PROCEDURE — C1769 GUIDE WIRE: HCPCS | Performed by: UROLOGY

## 2018-11-15 DEVICE — URETERAL STENT
Type: IMPLANTABLE DEVICE | Site: URETER | Status: FUNCTIONAL
Brand: PERCUFLEX™ PLUS

## 2018-11-15 RX ORDER — FENTANYL CITRATE 50 UG/ML
INJECTION, SOLUTION INTRAMUSCULAR; INTRAVENOUS PRN
Status: DISCONTINUED | OUTPATIENT
Start: 2018-11-15 | End: 2018-11-15 | Stop reason: SDUPTHER

## 2018-11-15 RX ORDER — ONDANSETRON 2 MG/ML
4 INJECTION INTRAMUSCULAR; INTRAVENOUS EVERY 8 HOURS PRN
Status: DISCONTINUED | OUTPATIENT
Start: 2018-11-15 | End: 2018-11-15 | Stop reason: HOSPADM

## 2018-11-15 RX ORDER — LIDOCAINE HYDROCHLORIDE 10 MG/ML
INJECTION, SOLUTION EPIDURAL; INFILTRATION; INTRACAUDAL; PERINEURAL PRN
Status: DISCONTINUED | OUTPATIENT
Start: 2018-11-15 | End: 2018-11-15 | Stop reason: SDUPTHER

## 2018-11-15 RX ORDER — CIPROFLOXACIN 2 MG/ML
400 INJECTION, SOLUTION INTRAVENOUS
Status: COMPLETED | OUTPATIENT
Start: 2018-11-15 | End: 2018-11-15

## 2018-11-15 RX ORDER — CIPROFLOXACIN 2 MG/ML
INJECTION, SOLUTION INTRAVENOUS PRN
Status: DISCONTINUED | OUTPATIENT
Start: 2018-11-15 | End: 2018-11-15 | Stop reason: SDUPTHER

## 2018-11-15 RX ORDER — ALBUTEROL SULFATE 2.5 MG/3ML
2.5 SOLUTION RESPIRATORY (INHALATION) ONCE
Status: COMPLETED | OUTPATIENT
Start: 2018-11-15 | End: 2018-11-15

## 2018-11-15 RX ORDER — SODIUM CHLORIDE 0.9 % (FLUSH) 0.9 %
10 SYRINGE (ML) INJECTION PRN
Status: DISCONTINUED | OUTPATIENT
Start: 2018-11-15 | End: 2018-11-15 | Stop reason: HOSPADM

## 2018-11-15 RX ORDER — ACETAMINOPHEN 10 MG/ML
INJECTION, SOLUTION INTRAVENOUS PRN
Status: DISCONTINUED | OUTPATIENT
Start: 2018-11-15 | End: 2018-11-15 | Stop reason: SDUPTHER

## 2018-11-15 RX ORDER — SODIUM CHLORIDE 0.9 % (FLUSH) 0.9 %
10 SYRINGE (ML) INJECTION EVERY 12 HOURS SCHEDULED
Status: DISCONTINUED | OUTPATIENT
Start: 2018-11-15 | End: 2018-11-15 | Stop reason: HOSPADM

## 2018-11-15 RX ORDER — PROPOFOL 10 MG/ML
INJECTION, EMULSION INTRAVENOUS PRN
Status: DISCONTINUED | OUTPATIENT
Start: 2018-11-15 | End: 2018-11-15 | Stop reason: SDUPTHER

## 2018-11-15 RX ORDER — HYDROMORPHONE HCL 110MG/55ML
0.5 PATIENT CONTROLLED ANALGESIA SYRINGE INTRAVENOUS EVERY 4 HOURS PRN
Status: DISCONTINUED | OUTPATIENT
Start: 2018-11-15 | End: 2018-11-15 | Stop reason: HOSPADM

## 2018-11-15 RX ORDER — SODIUM CHLORIDE, SODIUM LACTATE, POTASSIUM CHLORIDE, CALCIUM CHLORIDE 600; 310; 30; 20 MG/100ML; MG/100ML; MG/100ML; MG/100ML
INJECTION, SOLUTION INTRAVENOUS CONTINUOUS
Status: DISCONTINUED | OUTPATIENT
Start: 2018-11-15 | End: 2018-11-15 | Stop reason: HOSPADM

## 2018-11-15 RX ORDER — DEXAMETHASONE SODIUM PHOSPHATE 10 MG/ML
INJECTION INTRAMUSCULAR; INTRAVENOUS PRN
Status: DISCONTINUED | OUTPATIENT
Start: 2018-11-15 | End: 2018-11-15 | Stop reason: SDUPTHER

## 2018-11-15 RX ORDER — ALBUTEROL SULFATE 2.5 MG/3ML
2.5 SOLUTION RESPIRATORY (INHALATION) ONCE
Status: DISCONTINUED | OUTPATIENT
Start: 2018-11-15 | End: 2018-11-15 | Stop reason: HOSPADM

## 2018-11-15 RX ORDER — ONDANSETRON 2 MG/ML
INJECTION INTRAMUSCULAR; INTRAVENOUS PRN
Status: DISCONTINUED | OUTPATIENT
Start: 2018-11-15 | End: 2018-11-15 | Stop reason: SDUPTHER

## 2018-11-15 RX ADMIN — FENTANYL CITRATE 25 MCG: 50 INJECTION, SOLUTION INTRAMUSCULAR; INTRAVENOUS at 11:21

## 2018-11-15 RX ADMIN — CIPROFLOXACIN 400 MG: 2 INJECTION, SOLUTION INTRAVENOUS at 11:09

## 2018-11-15 RX ADMIN — DEXAMETHASONE SODIUM PHOSPHATE 5 MG: 10 INJECTION INTRAMUSCULAR; INTRAVENOUS at 11:22

## 2018-11-15 RX ADMIN — ONDANSETRON 4 MG: 2 INJECTION INTRAMUSCULAR; INTRAVENOUS at 11:40

## 2018-11-15 RX ADMIN — FENTANYL CITRATE 25 MCG: 50 INJECTION, SOLUTION INTRAMUSCULAR; INTRAVENOUS at 11:18

## 2018-11-15 RX ADMIN — FENTANYL CITRATE 25 MCG: 50 INJECTION, SOLUTION INTRAMUSCULAR; INTRAVENOUS at 11:43

## 2018-11-15 RX ADMIN — PROPOFOL 120 MG: 10 INJECTION, EMULSION INTRAVENOUS at 11:12

## 2018-11-15 RX ADMIN — FENTANYL CITRATE 25 MCG: 50 INJECTION, SOLUTION INTRAMUSCULAR; INTRAVENOUS at 11:40

## 2018-11-15 RX ADMIN — PHENYLEPHRINE HYDROCHLORIDE 100 MCG: 10 INJECTION INTRAVENOUS at 11:25

## 2018-11-15 RX ADMIN — CIPROFLOXACIN 400 MG: 2 INJECTION, SOLUTION INTRAVENOUS at 10:06

## 2018-11-15 RX ADMIN — LIDOCAINE HYDROCHLORIDE 40 MG: 10 INJECTION, SOLUTION EPIDURAL; INFILTRATION; INTRACAUDAL; PERINEURAL at 11:12

## 2018-11-15 RX ADMIN — ACETAMINOPHEN 1000 MG: 10 INJECTION, SOLUTION INTRAVENOUS at 11:20

## 2018-11-15 RX ADMIN — SODIUM CHLORIDE, POTASSIUM CHLORIDE, SODIUM LACTATE AND CALCIUM CHLORIDE: 600; 310; 30; 20 INJECTION, SOLUTION INTRAVENOUS at 08:55

## 2018-11-15 RX ADMIN — ALBUTEROL SULFATE 2.5 MG: 2.5 SOLUTION RESPIRATORY (INHALATION) at 10:15

## 2018-11-15 ASSESSMENT — PAIN - FUNCTIONAL ASSESSMENT: PAIN_FUNCTIONAL_ASSESSMENT: 0-10

## 2018-11-15 ASSESSMENT — LIFESTYLE VARIABLES: SMOKING_STATUS: 1

## 2018-11-15 ASSESSMENT — PAIN SCALES - GENERAL
PAINLEVEL_OUTOF10: 0

## 2018-11-15 NOTE — H&P (VIEW-ONLY)
pulmonary edema (HCC)  Resolved Problems:    * No resolved hospital problems. *      Patient Active Problem List    Diagnosis Date Noted    Ureteral stone with hydronephrosis 11/04/2018    Acute pulmonary edema (HCC) 11/04/2018    Elevated serum immunoglobulin free light chains     CHAD (acute kidney injury) ([facility] [address] 08/23/2018    Diabetic ketoacidosis without coma associated with type 2 diabetes mellitus ([facility] [address] 08/23/2018    DM (diabetes mellitus), type 2 with neurological complications ([facility] [address] 31/96/5315    Anxiety 01/26/2016    Hyperlipidemia 12/07/2015    CAD (coronary artery disease)     Hx of CABG     ICD (implantable cardioverter-defibrillator) in place     H/O mitral valve repair     Hx of myocardial infarction     Hypertension     Cardiomyopathy ([facility] [address]     Onychomycosis of toenail 11/19/2015    S/P cardiac cath-Patent grafts 6/5/15 06/04/2015    Acute coronary syndrome ([facility] [address] 05/26/2015       Plan:     · This patient requires inpatient admission because of lt ureteric stone with hydronephrosis requiring pain control  · Factors affecting the medical complexity of this patient include pulmonary edema,recent acute renal failure,ashd, recent h/o respiratory failure  · Estimated length of stay is 2 days  · Cardiology and urology evaluation  · High risk medication monitoring: none    CORE MEASURES  DVT prophylaxis: Lovenox  Decubitus ulcer present on admission: No  CODE STATUS: FULL CODE  Nutrition Status: fair   Physical therapy: NA   Old Charts reviewed: Yes  EKG Reviewed:  Yes  Advance Directive Addressed: Yes    Pradip Gill MD  11/4/2018, 6:33 PM

## 2018-11-15 NOTE — PROGRESS NOTES
Up to bathroom with steady gait. Voids qs blood tinged urine. Stent in place. Discharge Criteria  Outpatients must meet criteria 1 through 7. Up to restroom, void sufficient amount. Yes    1. Minimum 30 minutes after last dose of sedative medication, minimum 120 minutes after last dose of reversal agent. Yes    2. Systolic BP stable within 20 mmHg for 30 minutes & systolic BP between 90 & 251 or within 10 mmHg of baseline. Yes    3. Pulse between 60 and 100 or within 10 bpm of baseline. Yes    4. Spontaneous respiratory rate >/= 10 per minute. Yes    5. SaO2 >/= 95 or  >/= baseline. Yes    6. Able to cough and swallow or return to baseline function. Yes    7. Alert and oriented or return to baseline mental status. Yes    8. Demonstrates controlled, coordinated movements, ambulates with steady gait, or return to baseline activity function. Yes    9. Minimal or no pain or nausea, or at a level tolerable and acceptable to patient. Yes    10. Takes and retains oral fluids as allowed. Yes    11. Procedural / perioperative site stable. Minimal or no bleeding. Yes        12. If GI endoscopy procedure, minimal or no abdominal distention or passing flatus. Yes    13. Written discharge instructions and emergency telephone number provided. Yes    14. Accompanied by a responsible adult. Yes    Adult patient discharged from facility without responsible person meets above criteria plus the following:   a) remains awake without stimulus for 30 minutes     b) oriented appropriate for age      c) all vital signs stable   d) no significant risk of losing protective reflexes      e) able to maintain pre-procedure mobility without assistance   f) no nausea or dizziness      g) transportation arrangements that do not require patient to operate motor   Vehicle.   Yes

## 2018-11-15 NOTE — INTERVAL H&P NOTE
Here for definitive therapy left HLL    History and Physical reviewed  I have examined the patient and no changes    Lucio Hameed MD

## 2018-11-20 NOTE — OP NOTE
[place] [address]                                OPERATIVE REPORT    PATIENT NAME: Tariq Engle                  :        1960  MED REC NO:   [id number]                              ROOM:  ACCOUNT NO:   [de-identified]                           ADMIT DATE: 11/15/2018  PROVIDER:     Con Rivera      DATE OF PROCEDURE:  11/15/2018    SURGEON:  Dr. Con Rivera. ASSISTANT:  None. PREOPERATIVE DIAGNOSIS:  Left ureteral calculus. POSTOPERATIVE DIAGNOSIS:  Left ureteral calculus. PROCEDURE:  Cystoscopy, left ureteroscopy, left holmium laser  lithotripsy, left ureteral stent exchange. ANESTHESIA:  General.    COMPLICATIONS:  None. EBL:  Minimal.    SPECIMENS:  None. PROSTHESIS:  A 6 Belarusian, 24 cm double-J ureteral stent. DISPOSITION:  Stable. INDICATIONS:  The patient is a 76-year-old female with known left  ureteral calculus, did have a stent placed, here now for definitive  therapy. OPERATIVE PROCEDURE:  The patient was taken back to the operating room  after informed consent including all risks, benefits and alternatives  obtained. The patient was transferred from the [facility] onto the  operating table where she was induced under general anesthesia and given  IV Cipro for preoperative antibiotic prophylaxis. To begin the case,  she was prepped and draped in normal sterile fashion, placed in dorsal  lithotomy. She had a 22-Belarusian sheath with a 30-degree lens pass  through the urethra into the bladder. Once in the bladder, we  identified the left ureteral stent. We grasped this, removed this  through the meatus. A 0.035 inch wire was passed up the left ureter. Stent was removed. Dual lumen catheter was used, placed an additional  Glidewire. We were then able to pass the flexible ureteroscope.   We  identified the stone in the mid ureter,

## 2018-11-21 ENCOUNTER — HOSPITAL ENCOUNTER (OUTPATIENT)
Age: 58
Setting detail: SPECIMEN
Discharge: HOME | End: 2018-11-21
Payer: MEDICARE

## 2018-11-21 ENCOUNTER — OFFICE VISIT (OUTPATIENT)
Dept: UROLOGY | Age: 58
End: 2018-11-21
Payer: MEDICARE

## 2018-11-21 VITALS
DIASTOLIC BLOOD PRESSURE: 78 MMHG | BODY MASS INDEX: 26.36 KG/M2 | TEMPERATURE: 97.5 F | HEIGHT: 66 IN | WEIGHT: 164 LBS | HEART RATE: 97 BPM | SYSTOLIC BLOOD PRESSURE: 123 MMHG

## 2018-11-21 DIAGNOSIS — N13.2 URETERAL STONE WITH HYDRONEPHROSIS: ICD-10-CM

## 2018-11-21 DIAGNOSIS — N13.2 URETERAL STONE WITH HYDRONEPHROSIS: Primary | ICD-10-CM

## 2018-11-21 LAB
-: ABNORMAL
AMORPHOUS: ABNORMAL
BACTERIA: ABNORMAL
BILIRUBIN URINE: NEGATIVE
CASTS UA: ABNORMAL /LPF
COLOR: YELLOW
COMMENT UA: ABNORMAL
CRYSTALS, UA: ABNORMAL /HPF
EPITHELIAL CELLS UA: ABNORMAL /HPF (ref 0–25)
GLUCOSE URINE: ABNORMAL
KETONES, URINE: NEGATIVE
LEUKOCYTE ESTERASE, URINE: NEGATIVE
MUCUS: ABNORMAL
NITRITE, URINE: NEGATIVE
OTHER OBSERVATIONS UA: ABNORMAL
PH UA: 6 (ref 5–9)
PROTEIN UA: NEGATIVE
RBC UA: ABNORMAL /HPF (ref 0–2)
RENAL EPITHELIAL, UA: ABNORMAL /HPF
SPECIFIC GRAVITY UA: 1.01 (ref 1.01–1.02)
TRICHOMONAS: ABNORMAL
TURBIDITY: CLEAR
URINE HGB: ABNORMAL
UROBILINOGEN, URINE: NORMAL
WBC UA: ABNORMAL /HPF (ref 0–5)
YEAST: ABNORMAL

## 2018-11-21 PROCEDURE — 81001 URINALYSIS AUTO W/SCOPE: CPT

## 2018-11-21 PROCEDURE — G8484 FLU IMMUNIZE NO ADMIN: HCPCS | Performed by: NURSE PRACTITIONER

## 2018-11-21 PROCEDURE — G8419 CALC BMI OUT NRM PARAM NOF/U: HCPCS | Performed by: NURSE PRACTITIONER

## 2018-11-21 PROCEDURE — 87086 URINE CULTURE/COLONY COUNT: CPT

## 2018-11-21 PROCEDURE — G8598 ASA/ANTIPLAT THER USED: HCPCS | Performed by: NURSE PRACTITIONER

## 2018-11-21 PROCEDURE — 4004F PT TOBACCO SCREEN RCVD TLK: CPT | Performed by: NURSE PRACTITIONER

## 2018-11-21 PROCEDURE — 1111F DSCHRG MED/CURRENT MED MERGE: CPT | Performed by: NURSE PRACTITIONER

## 2018-11-21 PROCEDURE — G8427 DOCREV CUR MEDS BY ELIG CLIN: HCPCS | Performed by: NURSE PRACTITIONER

## 2018-11-21 PROCEDURE — 99213 OFFICE O/P EST LOW 20 MIN: CPT | Performed by: NURSE PRACTITIONER

## 2018-11-21 PROCEDURE — 3017F COLORECTAL CA SCREEN DOC REV: CPT | Performed by: NURSE PRACTITIONER

## 2018-11-21 ASSESSMENT — ENCOUNTER SYMPTOMS
NAUSEA: 0
SHORTNESS OF BREATH: 0
VOMITING: 0
BACK PAIN: 0
ABDOMINAL PAIN: 0
EYE REDNESS: 0
CONSTIPATION: 0
COLOR CHANGE: 0
EYE PAIN: 0
WHEEZING: 0
COUGH: 0

## 2018-11-21 NOTE — PROGRESS NOTES
Pt had ureteral stent placed on 11/15/2018 following left HLL. Stent removed via string in office today without difficulty. Pt tolerated removal well.
monitor strips Use to test daily 100 strip 5    blood glucose monitor kit and supplies Use to test daily 1 kit 0    Lancets MISC 1 each by Does not apply route daily 100 each 3    lisinopril (PRINIVIL;ZESTRIL) 5 MG tablet Take 1 tablet by mouth daily 30 tablet 0    furosemide (LASIX) 40 MG tablet Take 1 tablet by mouth daily 30 tablet 0    insulin glargine (BASAGLAR KWIKPEN) 100 UNIT/ML injection pen 15 units SC nightly 5 pen 0    carvedilol (COREG) 3.125 MG tablet take 1 tablet by mouth twice a day with meals 60 tablet 5    atorvastatin (LIPITOR) 80 MG tablet Take 1 tablet by mouth daily 30 tablet 5    sertraline (ZOLOFT) 100 MG tablet take 1 tablet by mouth once daily 30 tablet 5    midodrine (PROAMATINE) 5 MG tablet Take 1 tablet by mouth 3 times daily 90 tablet 3    Insulin Pen Needle 32G X 4 MM MISC 1 each by Does not apply route daily 100 each 3    Cholecalciferol (VITAMIN D3) 5000 units TABS Take 5,000 Units by mouth daily      glucose monitoring kit (FREESTYLE) monitoring kit 1 kit by Does not apply route daily as needed Please dispense meter covered by patients insurance. 1 kit 0    glucose blood VI test strips (EXACTECH TEST) strip 1 each by In Vitro route daily Pt testing BS once daily and prn 100 each 3    Lancets MISC Pt testing BS once daily and prn 100 each 3    Multiple Vitamins-Minerals (THERAPEUTIC MULTIVITAMIN-MINERALS) tablet Take 1 tablet by mouth daily (with breakfast) 60 tablet 3    clopidogrel (PLAVIX) 75 MG tablet take 1 tablet by mouth daily 30 tablet 5     No facility-administered encounter medications on file as of 11/21/2018.       Codeine and Nsaids  History   Smoking Status    Current Every Day Smoker    Packs/day: 0.50    Years: 32.00    Types: Cigarettes    Last attempt to quit: 5/26/2015   Smokeless Tobacco    Never Used     Comment: PT SMOKED TODAY AT 0830 11/18/18     History   Alcohol Use No       Vitals:    11/21/18 0938   BP: 123/78   Pulse: 97   Temp: 97.5

## 2018-11-22 LAB
CULTURE: NORMAL
Lab: NORMAL
SPECIMEN DESCRIPTION: NORMAL
STATUS: NORMAL

## 2018-11-26 ENCOUNTER — TELEPHONE (OUTPATIENT)
Dept: UROLOGY | Age: 58
End: 2018-11-26

## 2018-12-11 RX ORDER — FUROSEMIDE 40 MG/1
40 TABLET ORAL DAILY
Qty: 30 TABLET | Refills: 5 | Status: SHIPPED | OUTPATIENT
Start: 2018-12-11 | End: 2019-06-17 | Stop reason: SDUPTHER

## 2018-12-28 ENCOUNTER — APPOINTMENT (OUTPATIENT)
Dept: GENERAL RADIOLOGY | Age: 58
DRG: 720 | End: 2018-12-28
Payer: MEDICARE

## 2018-12-28 ENCOUNTER — HOSPITAL ENCOUNTER (INPATIENT)
Age: 58
LOS: 5 days | Discharge: HOME | DRG: 720 | End: 2019-01-02
Attending: EMERGENCY MEDICINE | Admitting: INTERNAL MEDICINE
Payer: MEDICARE

## 2018-12-28 DIAGNOSIS — J44.9 CHRONIC OBSTRUCTIVE PULMONARY DISEASE, UNSPECIFIED COPD TYPE (HCC): ICD-10-CM

## 2018-12-28 DIAGNOSIS — R53.1 GENERALIZED WEAKNESS: ICD-10-CM

## 2018-12-28 DIAGNOSIS — J18.9 PNEUMONIA OF LEFT UPPER LOBE DUE TO INFECTIOUS ORGANISM: Primary | ICD-10-CM

## 2018-12-28 DIAGNOSIS — Z86.39 HISTORY OF DIABETES MELLITUS: ICD-10-CM

## 2018-12-28 LAB
ABSOLUTE EOS #: ABNORMAL K/UL (ref 0–0.4)
ABSOLUTE IMMATURE GRANULOCYTE: ABNORMAL K/UL (ref 0–0.3)
ABSOLUTE LYMPH #: 0.32 K/UL (ref 1–4.8)
ABSOLUTE MONO #: 0.64 K/UL (ref 0–1)
ALBUMIN SERPL-MCNC: 3.7 G/DL (ref 3.5–5.2)
ALBUMIN/GLOBULIN RATIO: ABNORMAL (ref 1–2.5)
ALP BLD-CCNC: 89 U/L (ref 35–104)
ALT SERPL-CCNC: 9 U/L (ref 5–33)
ANION GAP SERPL CALCULATED.3IONS-SCNC: 17 MMOL/L (ref 9–17)
AST SERPL-CCNC: 5 U/L
BASOPHILS # BLD: ABNORMAL % (ref 0–2)
BASOPHILS ABSOLUTE: ABNORMAL K/UL (ref 0–0.2)
BILIRUB SERPL-MCNC: 1.34 MG/DL (ref 0.3–1.2)
BUN BLDV-MCNC: 31 MG/DL (ref 6–20)
BUN/CREAT BLD: ABNORMAL (ref 9–20)
CALCIUM SERPL-MCNC: 9.8 MG/DL (ref 8.6–10.4)
CHLORIDE BLD-SCNC: 87 MMOL/L (ref 98–107)
CO2: 22 MMOL/L (ref 20–31)
CREAT SERPL-MCNC: 1.24 MG/DL (ref 0.5–0.9)
DIFFERENTIAL TYPE: ABNORMAL
EKG ATRIAL RATE: 115 BPM
EKG P AXIS: 47 DEGREES
EKG P-R INTERVAL: 142 MS
EKG Q-T INTERVAL: 320 MS
EKG QRS DURATION: 96 MS
EKG QTC CALCULATION (BAZETT): 442 MS
EKG R AXIS: 8 DEGREES
EKG T AXIS: -13 DEGREES
EKG VENTRICULAR RATE: 115 BPM
EOSINOPHILS RELATIVE PERCENT: ABNORMAL % (ref 0–5)
GFR AFRICAN AMERICAN: 54 ML/MIN
GFR NON-AFRICAN AMERICAN: 44 ML/MIN
GFR SERPL CREATININE-BSD FRML MDRD: ABNORMAL ML/MIN/{1.73_M2}
GFR SERPL CREATININE-BSD FRML MDRD: ABNORMAL ML/MIN/{1.73_M2}
GLUCOSE BLD-MCNC: 463 MG/DL (ref 70–99)
HCT VFR BLD CALC: 30.6 % (ref 36–46)
HEMOGLOBIN: 10.2 G/DL (ref 12–16)
IMMATURE GRANULOCYTES: ABNORMAL %
LYMPHOCYTES # BLD: 2 % (ref 15–40)
MCH RBC QN AUTO: 30.3 PG (ref 26–34)
MCHC RBC AUTO-ENTMCNC: 33.4 G/DL (ref 31–37)
MCV RBC AUTO: 90.6 FL (ref 80–100)
MONOCYTES # BLD: 4 % (ref 4–8)
MORPHOLOGY: ABNORMAL
NRBC AUTOMATED: ABNORMAL PER 100 WBC
PDW BLD-RTO: 14.2 % (ref 12.1–15.2)
PLATELET # BLD: 167 K/UL (ref 140–450)
PLATELET ESTIMATE: ABNORMAL
PMV BLD AUTO: ABNORMAL FL (ref 6–12)
POTASSIUM SERPL-SCNC: 3.9 MMOL/L (ref 3.7–5.3)
RBC # BLD: 3.37 M/UL (ref 4–5.2)
RBC # BLD: ABNORMAL 10*6/UL
SEG NEUTROPHILS: 94 % (ref 47–75)
SEGMENTED NEUTROPHILS ABSOLUTE COUNT: 14.94 K/UL (ref 2.5–7)
SODIUM BLD-SCNC: 126 MMOL/L (ref 135–144)
TOTAL PROTEIN: 8.2 G/DL (ref 6.4–8.3)
TROPONIN INTERP: NORMAL
TROPONIN T: <0.03 NG/ML
TROPONIN, HIGH SENSITIVITY: NORMAL NG/L (ref 0–14)
WBC # BLD: 15.9 K/UL (ref 3.5–11)
WBC # BLD: ABNORMAL 10*3/UL

## 2018-12-28 PROCEDURE — 6360000002 HC RX W HCPCS: Performed by: EMERGENCY MEDICINE

## 2018-12-28 PROCEDURE — 2580000003 HC RX 258: Performed by: EMERGENCY MEDICINE

## 2018-12-28 PROCEDURE — 36415 COLL VENOUS BLD VENIPUNCTURE: CPT

## 2018-12-28 PROCEDURE — 6370000000 HC RX 637 (ALT 250 FOR IP): Performed by: EMERGENCY MEDICINE

## 2018-12-28 PROCEDURE — 96365 THER/PROPH/DIAG IV INF INIT: CPT

## 2018-12-28 PROCEDURE — 94664 DEMO&/EVAL PT USE INHALER: CPT

## 2018-12-28 PROCEDURE — 93005 ELECTROCARDIOGRAM TRACING: CPT

## 2018-12-28 PROCEDURE — 83036 HEMOGLOBIN GLYCOSYLATED A1C: CPT

## 2018-12-28 PROCEDURE — 85025 COMPLETE CBC W/AUTO DIFF WBC: CPT

## 2018-12-28 PROCEDURE — 84484 ASSAY OF TROPONIN QUANT: CPT

## 2018-12-28 PROCEDURE — 87040 BLOOD CULTURE FOR BACTERIA: CPT

## 2018-12-28 PROCEDURE — 83605 ASSAY OF LACTIC ACID: CPT

## 2018-12-28 PROCEDURE — 80053 COMPREHEN METABOLIC PANEL: CPT

## 2018-12-28 PROCEDURE — 71046 X-RAY EXAM CHEST 2 VIEWS: CPT

## 2018-12-28 PROCEDURE — 99285 EMERGENCY DEPT VISIT HI MDM: CPT

## 2018-12-28 PROCEDURE — 1200000000 HC SEMI PRIVATE

## 2018-12-28 RX ORDER — 0.9 % SODIUM CHLORIDE 0.9 %
1000 INTRAVENOUS SOLUTION INTRAVENOUS ONCE
Status: COMPLETED | OUTPATIENT
Start: 2018-12-28 | End: 2018-12-29

## 2018-12-28 RX ORDER — IPRATROPIUM BROMIDE AND ALBUTEROL SULFATE 2.5; .5 MG/3ML; MG/3ML
1 SOLUTION RESPIRATORY (INHALATION) ONCE
Status: COMPLETED | OUTPATIENT
Start: 2018-12-28 | End: 2018-12-28

## 2018-12-28 RX ADMIN — SODIUM CHLORIDE 1000 ML: 9 INJECTION, SOLUTION INTRAVENOUS at 23:54

## 2018-12-28 RX ADMIN — IPRATROPIUM BROMIDE AND ALBUTEROL SULFATE 1 AMPULE: .5; 3 SOLUTION RESPIRATORY (INHALATION) at 22:39

## 2018-12-28 RX ADMIN — PIPERACILLIN SODIUM,TAZOBACTAM SODIUM 3.38 G: 3; .375 INJECTION, POWDER, FOR SOLUTION INTRAVENOUS at 23:16

## 2018-12-28 ASSESSMENT — PAIN DESCRIPTION - LOCATION: LOCATION: RIB CAGE

## 2018-12-28 ASSESSMENT — PAIN DESCRIPTION - DESCRIPTORS: DESCRIPTORS: SHARP

## 2018-12-28 ASSESSMENT — PAIN DESCRIPTION - PAIN TYPE: TYPE: ACUTE PAIN

## 2018-12-28 ASSESSMENT — PAIN DESCRIPTION - FREQUENCY: FREQUENCY: INTERMITTENT

## 2018-12-28 ASSESSMENT — PAIN SCALES - GENERAL: PAINLEVEL_OUTOF10: 7

## 2018-12-28 ASSESSMENT — PAIN DESCRIPTION - ORIENTATION: ORIENTATION: LEFT

## 2018-12-29 ENCOUNTER — APPOINTMENT (OUTPATIENT)
Dept: CT IMAGING | Age: 58
DRG: 720 | End: 2018-12-29
Payer: MEDICARE

## 2018-12-29 LAB
ABSOLUTE EOS #: 0 K/UL (ref 0–0.4)
ABSOLUTE IMMATURE GRANULOCYTE: ABNORMAL K/UL (ref 0–0.3)
ABSOLUTE LYMPH #: 0.5 K/UL (ref 1–4.8)
ABSOLUTE MONO #: 0.7 K/UL (ref 0–1)
ANION GAP SERPL CALCULATED.3IONS-SCNC: 15 MMOL/L (ref 9–17)
BASOPHILS # BLD: 0 % (ref 0–2)
BASOPHILS ABSOLUTE: 0.1 K/UL (ref 0–0.2)
BUN BLDV-MCNC: 30 MG/DL (ref 6–20)
BUN/CREAT BLD: 24 (ref 9–20)
CALCIUM SERPL-MCNC: 9 MG/DL (ref 8.6–10.4)
CHLORIDE BLD-SCNC: 93 MMOL/L (ref 98–107)
CHP ED QC CHECK: NORMAL
CO2: 22 MMOL/L (ref 20–31)
CREAT SERPL-MCNC: 1.24 MG/DL (ref 0.5–0.9)
DIFFERENTIAL TYPE: YES
DIRECT EXAM: NORMAL
EOSINOPHILS RELATIVE PERCENT: 0 % (ref 0–5)
ESTIMATED AVERAGE GLUCOSE: 258 MG/DL
GFR AFRICAN AMERICAN: 54 ML/MIN
GFR NON-AFRICAN AMERICAN: 44 ML/MIN
GFR SERPL CREATININE-BSD FRML MDRD: ABNORMAL ML/MIN/{1.73_M2}
GFR SERPL CREATININE-BSD FRML MDRD: ABNORMAL ML/MIN/{1.73_M2}
GLUCOSE BLD-MCNC: 224 MG/DL (ref 65–99)
GLUCOSE BLD-MCNC: 260 MG/DL (ref 65–99)
GLUCOSE BLD-MCNC: 273 MG/DL (ref 65–99)
GLUCOSE BLD-MCNC: 374 MG/DL (ref 65–99)
GLUCOSE BLD-MCNC: 380 MG/DL
GLUCOSE BLD-MCNC: 380 MG/DL (ref 65–99)
GLUCOSE BLD-MCNC: 398 MG/DL (ref 65–99)
GLUCOSE BLD-MCNC: 454 MG/DL (ref 65–99)
GLUCOSE BLD-MCNC: 466 MG/DL (ref 70–99)
HBA1C MFR BLD: 10.6 % (ref 4.8–5.9)
HCT VFR BLD CALC: 27 % (ref 36–46)
HEMOGLOBIN: 9.1 G/DL (ref 12–16)
IMMATURE GRANULOCYTES: ABNORMAL %
LACTIC ACID, WHOLE BLOOD: NORMAL MMOL/L (ref 0.7–2.1)
LACTIC ACID: 1.4 MMOL/L (ref 0.5–2.2)
LACTIC ACID: 1.5 MMOL/L (ref 0.5–2.2)
LACTIC ACID: 1.7 MMOL/L (ref 0.5–2.2)
LYMPHOCYTES # BLD: 4 % (ref 15–40)
Lab: NORMAL
MAGNESIUM: 2 MG/DL (ref 1.6–2.6)
MCH RBC QN AUTO: 30.7 PG (ref 26–34)
MCHC RBC AUTO-ENTMCNC: 33.9 G/DL (ref 31–37)
MCV RBC AUTO: 90.7 FL (ref 80–100)
MONOCYTES # BLD: 6 % (ref 4–8)
NRBC AUTOMATED: ABNORMAL PER 100 WBC
PDW BLD-RTO: 14.4 % (ref 12.1–15.2)
PLATELET # BLD: 145 K/UL (ref 140–450)
PLATELET ESTIMATE: ABNORMAL
PMV BLD AUTO: ABNORMAL FL (ref 6–12)
POTASSIUM SERPL-SCNC: 3.5 MMOL/L (ref 3.7–5.3)
RBC # BLD: 2.97 M/UL (ref 4–5.2)
RBC # BLD: ABNORMAL 10*6/UL
SEG NEUTROPHILS: 90 % (ref 47–75)
SEGMENTED NEUTROPHILS ABSOLUTE COUNT: 11.8 K/UL (ref 2.5–7)
SODIUM BLD-SCNC: 130 MMOL/L (ref 135–144)
SPECIMEN DESCRIPTION: NORMAL
STATUS: NORMAL
WBC # BLD: 13.2 K/UL (ref 3.5–11)
WBC # BLD: ABNORMAL 10*3/UL

## 2018-12-29 PROCEDURE — 2580000003 HC RX 258: Performed by: INTERNAL MEDICINE

## 2018-12-29 PROCEDURE — 96375 TX/PRO/DX INJ NEW DRUG ADDON: CPT

## 2018-12-29 PROCEDURE — 80048 BASIC METABOLIC PNL TOTAL CA: CPT

## 2018-12-29 PROCEDURE — 94761 N-INVAS EAR/PLS OXIMETRY MLT: CPT

## 2018-12-29 PROCEDURE — 6370000000 HC RX 637 (ALT 250 FOR IP): Performed by: EMERGENCY MEDICINE

## 2018-12-29 PROCEDURE — 83605 ASSAY OF LACTIC ACID: CPT

## 2018-12-29 PROCEDURE — 94640 AIRWAY INHALATION TREATMENT: CPT

## 2018-12-29 PROCEDURE — 82947 ASSAY GLUCOSE BLOOD QUANT: CPT

## 2018-12-29 PROCEDURE — 6370000000 HC RX 637 (ALT 250 FOR IP): Performed by: INTERNAL MEDICINE

## 2018-12-29 PROCEDURE — 87449 NOS EACH ORGANISM AG IA: CPT

## 2018-12-29 PROCEDURE — 6360000002 HC RX W HCPCS: Performed by: INTERNAL MEDICINE

## 2018-12-29 PROCEDURE — 1200000000 HC SEMI PRIVATE

## 2018-12-29 PROCEDURE — 85025 COMPLETE CBC W/AUTO DIFF WBC: CPT

## 2018-12-29 PROCEDURE — 83735 ASSAY OF MAGNESIUM: CPT

## 2018-12-29 PROCEDURE — 36415 COLL VENOUS BLD VENIPUNCTURE: CPT

## 2018-12-29 PROCEDURE — 2700000000 HC OXYGEN THERAPY PER DAY

## 2018-12-29 PROCEDURE — 71250 CT THORAX DX C-: CPT

## 2018-12-29 RX ORDER — SODIUM CHLORIDE 0.9 % (FLUSH) 0.9 %
10 SYRINGE (ML) INJECTION PRN
Status: DISCONTINUED | OUTPATIENT
Start: 2018-12-29 | End: 2019-01-02 | Stop reason: HOSPADM

## 2018-12-29 RX ORDER — ATORVASTATIN CALCIUM 20 MG/1
80 TABLET, FILM COATED ORAL DAILY
Status: DISCONTINUED | OUTPATIENT
Start: 2018-12-29 | End: 2019-01-02 | Stop reason: HOSPADM

## 2018-12-29 RX ORDER — INSULIN GLARGINE 100 [IU]/ML
10 INJECTION, SOLUTION SUBCUTANEOUS
Status: DISCONTINUED | OUTPATIENT
Start: 2018-12-29 | End: 2018-12-29

## 2018-12-29 RX ORDER — CLOPIDOGREL BISULFATE 75 MG/1
75 TABLET ORAL DAILY
Status: DISCONTINUED | OUTPATIENT
Start: 2018-12-29 | End: 2019-01-02 | Stop reason: HOSPADM

## 2018-12-29 RX ORDER — INSULIN GLARGINE 100 [IU]/ML
20 INJECTION, SOLUTION SUBCUTANEOUS NIGHTLY
Status: DISCONTINUED | OUTPATIENT
Start: 2018-12-29 | End: 2019-01-02 | Stop reason: HOSPADM

## 2018-12-29 RX ORDER — ONDANSETRON 2 MG/ML
4 INJECTION INTRAMUSCULAR; INTRAVENOUS EVERY 6 HOURS PRN
Status: DISCONTINUED | OUTPATIENT
Start: 2018-12-29 | End: 2019-01-02 | Stop reason: HOSPADM

## 2018-12-29 RX ORDER — DEXTROSE MONOHYDRATE 25 G/50ML
12.5 INJECTION, SOLUTION INTRAVENOUS PRN
Status: DISCONTINUED | OUTPATIENT
Start: 2018-12-29 | End: 2019-01-02 | Stop reason: HOSPADM

## 2018-12-29 RX ORDER — M-VIT,TX,IRON,MINS/CALC/FOLIC 27MG-0.4MG
1 TABLET ORAL
Status: DISCONTINUED | OUTPATIENT
Start: 2018-12-29 | End: 2019-01-02 | Stop reason: HOSPADM

## 2018-12-29 RX ORDER — GUAIFENESIN 600 MG/1
600 TABLET, EXTENDED RELEASE ORAL 2 TIMES DAILY
Status: DISCONTINUED | OUTPATIENT
Start: 2018-12-29 | End: 2019-01-02 | Stop reason: HOSPADM

## 2018-12-29 RX ORDER — GABAPENTIN 300 MG/1
300 CAPSULE ORAL 2 TIMES DAILY
Status: DISCONTINUED | OUTPATIENT
Start: 2018-12-29 | End: 2019-01-02 | Stop reason: HOSPADM

## 2018-12-29 RX ORDER — POTASSIUM CHLORIDE 750 MG/1
40 TABLET, FILM COATED, EXTENDED RELEASE ORAL ONCE
Status: COMPLETED | OUTPATIENT
Start: 2018-12-29 | End: 2018-12-29

## 2018-12-29 RX ORDER — SODIUM CHLORIDE 9 MG/ML
INJECTION, SOLUTION INTRAVENOUS CONTINUOUS
Status: DISCONTINUED | OUTPATIENT
Start: 2018-12-29 | End: 2018-12-31

## 2018-12-29 RX ORDER — SODIUM CHLORIDE 0.9 % (FLUSH) 0.9 %
10 SYRINGE (ML) INJECTION EVERY 12 HOURS SCHEDULED
Status: DISCONTINUED | OUTPATIENT
Start: 2018-12-29 | End: 2019-01-02 | Stop reason: HOSPADM

## 2018-12-29 RX ORDER — INSULIN GLARGINE 100 [IU]/ML
20 INJECTION, SOLUTION SUBCUTANEOUS
Status: DISCONTINUED | OUTPATIENT
Start: 2018-12-30 | End: 2018-12-29

## 2018-12-29 RX ORDER — LACTOBACILLUS RHAMNOSUS GG 10B CELL
1 CAPSULE ORAL 2 TIMES DAILY WITH MEALS
Status: DISCONTINUED | OUTPATIENT
Start: 2018-12-29 | End: 2019-01-02 | Stop reason: HOSPADM

## 2018-12-29 RX ORDER — NICOTINE POLACRILEX 4 MG
15 LOZENGE BUCCAL PRN
Status: DISCONTINUED | OUTPATIENT
Start: 2018-12-29 | End: 2019-01-02 | Stop reason: HOSPADM

## 2018-12-29 RX ORDER — LEVALBUTEROL INHALATION SOLUTION 1.25 MG/3ML
1.25 SOLUTION RESPIRATORY (INHALATION)
Status: DISCONTINUED | OUTPATIENT
Start: 2018-12-29 | End: 2019-01-02 | Stop reason: HOSPADM

## 2018-12-29 RX ORDER — LEVOFLOXACIN 5 MG/ML
500 INJECTION, SOLUTION INTRAVENOUS EVERY 24 HOURS
Status: DISCONTINUED | OUTPATIENT
Start: 2018-12-29 | End: 2019-01-01

## 2018-12-29 RX ORDER — CARVEDILOL 3.12 MG/1
3.12 TABLET ORAL 2 TIMES DAILY WITH MEALS
Status: DISCONTINUED | OUTPATIENT
Start: 2018-12-29 | End: 2019-01-02 | Stop reason: HOSPADM

## 2018-12-29 RX ORDER — INSULIN GLARGINE 100 [IU]/ML
20 INJECTION, SOLUTION SUBCUTANEOUS NIGHTLY
Status: DISCONTINUED | OUTPATIENT
Start: 2018-12-29 | End: 2018-12-29

## 2018-12-29 RX ORDER — LEVALBUTEROL INHALATION SOLUTION 0.63 MG/3ML
0.63 SOLUTION RESPIRATORY (INHALATION)
Status: DISCONTINUED | OUTPATIENT
Start: 2018-12-29 | End: 2018-12-29

## 2018-12-29 RX ORDER — MIDODRINE HYDROCHLORIDE 5 MG/1
5 TABLET ORAL
Status: DISCONTINUED | OUTPATIENT
Start: 2018-12-29 | End: 2019-01-02 | Stop reason: HOSPADM

## 2018-12-29 RX ORDER — DEXTROSE MONOHYDRATE 50 MG/ML
100 INJECTION, SOLUTION INTRAVENOUS PRN
Status: DISCONTINUED | OUTPATIENT
Start: 2018-12-29 | End: 2019-01-02 | Stop reason: HOSPADM

## 2018-12-29 RX ORDER — ACETAMINOPHEN 325 MG/1
650 TABLET ORAL EVERY 4 HOURS PRN
Status: DISCONTINUED | OUTPATIENT
Start: 2018-12-29 | End: 2019-01-02 | Stop reason: HOSPADM

## 2018-12-29 RX ORDER — INSULIN GLARGINE 100 [IU]/ML
20 INJECTION, SOLUTION SUBCUTANEOUS
Status: DISCONTINUED | OUTPATIENT
Start: 2018-12-29 | End: 2018-12-31

## 2018-12-29 RX ADMIN — CARVEDILOL 3.12 MG: 3.12 TABLET, FILM COATED ORAL at 09:23

## 2018-12-29 RX ADMIN — GUAIFENESIN 600 MG: 600 TABLET, EXTENDED RELEASE ORAL at 21:24

## 2018-12-29 RX ADMIN — MIDODRINE HYDROCHLORIDE 5 MG: 5 TABLET ORAL at 11:52

## 2018-12-29 RX ADMIN — ENOXAPARIN SODIUM 40 MG: 40 INJECTION SUBCUTANEOUS at 08:33

## 2018-12-29 RX ADMIN — LEVALBUTEROL HYDROCHLORIDE 1.25 MG: 1.25 SOLUTION RESPIRATORY (INHALATION) at 21:13

## 2018-12-29 RX ADMIN — LEVALBUTEROL HYDROCHLORIDE 1.25 MG: 1.25 SOLUTION RESPIRATORY (INHALATION) at 18:01

## 2018-12-29 RX ADMIN — INSULIN LISPRO 3 UNITS: 100 INJECTION, SOLUTION INTRAVENOUS; SUBCUTANEOUS at 21:23

## 2018-12-29 RX ADMIN — SERTRALINE HYDROCHLORIDE 100 MG: 50 TABLET ORAL at 08:42

## 2018-12-29 RX ADMIN — LEVALBUTEROL HYDROCHLORIDE 1.25 MG: 1.25 SOLUTION RESPIRATORY (INHALATION) at 13:27

## 2018-12-29 RX ADMIN — GABAPENTIN 300 MG: 300 CAPSULE ORAL at 08:35

## 2018-12-29 RX ADMIN — VANCOMYCIN HYDROCHLORIDE 750 MG: 750 INJECTION, POWDER, LYOPHILIZED, FOR SOLUTION INTRAVENOUS at 04:46

## 2018-12-29 RX ADMIN — LEVALBUTEROL HYDROCHLORIDE 1.25 MG: 1.25 SOLUTION RESPIRATORY (INHALATION) at 09:52

## 2018-12-29 RX ADMIN — MIDODRINE HYDROCHLORIDE 5 MG: 5 TABLET ORAL at 08:42

## 2018-12-29 RX ADMIN — Medication 20 UNITS: at 08:33

## 2018-12-29 RX ADMIN — INSULIN LISPRO 4 UNITS: 100 INJECTION, SOLUTION INTRAVENOUS; SUBCUTANEOUS at 11:48

## 2018-12-29 RX ADMIN — SODIUM CHLORIDE: 9 INJECTION, SOLUTION INTRAVENOUS at 03:36

## 2018-12-29 RX ADMIN — MIDODRINE HYDROCHLORIDE 5 MG: 5 TABLET ORAL at 16:53

## 2018-12-29 RX ADMIN — CLOPIDOGREL BISULFATE 75 MG: 75 TABLET ORAL at 08:35

## 2018-12-29 RX ADMIN — GUAIFENESIN 600 MG: 600 TABLET, EXTENDED RELEASE ORAL at 08:35

## 2018-12-29 RX ADMIN — SODIUM CHLORIDE: 9 INJECTION, SOLUTION INTRAVENOUS at 21:25

## 2018-12-29 RX ADMIN — GABAPENTIN 300 MG: 300 CAPSULE ORAL at 03:37

## 2018-12-29 RX ADMIN — LEVOFLOXACIN 500 MG: 5 INJECTION, SOLUTION INTRAVENOUS at 03:36

## 2018-12-29 RX ADMIN — Medication 1 CAPSULE: at 16:53

## 2018-12-29 RX ADMIN — GABAPENTIN 300 MG: 300 CAPSULE ORAL at 21:24

## 2018-12-29 RX ADMIN — INSULIN LISPRO 15 UNITS: 100 INJECTION, SOLUTION INTRAVENOUS; SUBCUTANEOUS at 06:21

## 2018-12-29 RX ADMIN — ATORVASTATIN CALCIUM 80 MG: 20 TABLET, FILM COATED ORAL at 08:36

## 2018-12-29 RX ADMIN — ACETAMINOPHEN 650 MG: 325 TABLET, FILM COATED ORAL at 16:10

## 2018-12-29 RX ADMIN — VITAMIN D, TAB 1000IU (100/BT) 5000 UNITS: 25 TAB at 08:35

## 2018-12-29 RX ADMIN — INSULIN HUMAN 10 UNITS: 100 INJECTION, SOLUTION PARENTERAL at 00:49

## 2018-12-29 RX ADMIN — Medication 20 UNITS: at 03:37

## 2018-12-29 RX ADMIN — Medication 1 CAPSULE: at 08:35

## 2018-12-29 RX ADMIN — CARVEDILOL 3.12 MG: 3.12 TABLET, FILM COATED ORAL at 16:53

## 2018-12-29 RX ADMIN — INSULIN LISPRO 6 UNITS: 100 INJECTION, SOLUTION INTRAVENOUS; SUBCUTANEOUS at 03:37

## 2018-12-29 RX ADMIN — VANCOMYCIN HYDROCHLORIDE 750 MG: 750 INJECTION, POWDER, LYOPHILIZED, FOR SOLUTION INTRAVENOUS at 16:53

## 2018-12-29 RX ADMIN — Medication 20 UNITS: at 21:59

## 2018-12-29 RX ADMIN — INSULIN LISPRO 6 UNITS: 100 INJECTION, SOLUTION INTRAVENOUS; SUBCUTANEOUS at 16:48

## 2018-12-29 RX ADMIN — POTASSIUM CHLORIDE 40 MEQ: 750 TABLET, FILM COATED, EXTENDED RELEASE ORAL at 08:35

## 2018-12-29 RX ADMIN — INSULIN LISPRO 10 UNITS: 100 INJECTION, SOLUTION INTRAVENOUS; SUBCUTANEOUS at 08:34

## 2018-12-29 RX ADMIN — MULTIPLE VITAMINS W/ MINERALS TAB 1 TABLET: TAB at 08:35

## 2018-12-29 ASSESSMENT — PAIN DESCRIPTION - ORIENTATION
ORIENTATION: LEFT
ORIENTATION: LEFT;ANTERIOR;UPPER

## 2018-12-29 ASSESSMENT — PAIN DESCRIPTION - PAIN TYPE
TYPE: ACUTE PAIN
TYPE: ACUTE PAIN

## 2018-12-29 ASSESSMENT — PAIN DESCRIPTION - LOCATION
LOCATION: CHEST
LOCATION: CHEST

## 2018-12-29 ASSESSMENT — PAIN DESCRIPTION - DESCRIPTORS: DESCRIPTORS: ACHING;RADIATING

## 2018-12-29 ASSESSMENT — PAIN SCALES - GENERAL
PAINLEVEL_OUTOF10: 5
PAINLEVEL_OUTOF10: 0
PAINLEVEL_OUTOF10: 7
PAINLEVEL_OUTOF10: 5
PAINLEVEL_OUTOF10: 3
PAINLEVEL_OUTOF10: 0
PAINLEVEL_OUTOF10: 0

## 2018-12-29 ASSESSMENT — PAIN DESCRIPTION - PROGRESSION: CLINICAL_PROGRESSION: NOT CHANGED

## 2018-12-29 ASSESSMENT — PAIN DESCRIPTION - ONSET: ONSET: PROGRESSIVE

## 2018-12-29 ASSESSMENT — PAIN DESCRIPTION - FREQUENCY: FREQUENCY: CONTINUOUS

## 2018-12-30 LAB
ABSOLUTE EOS #: 0 K/UL (ref 0–0.4)
ABSOLUTE IMMATURE GRANULOCYTE: ABNORMAL K/UL (ref 0–0.3)
ABSOLUTE LYMPH #: 0.8 K/UL (ref 1–4.8)
ABSOLUTE MONO #: 0.6 K/UL (ref 0–1)
ANION GAP SERPL CALCULATED.3IONS-SCNC: 13 MMOL/L (ref 9–17)
BASOPHILS # BLD: 0 % (ref 0–2)
BASOPHILS ABSOLUTE: 0 K/UL (ref 0–0.2)
BUN BLDV-MCNC: 38 MG/DL (ref 6–20)
BUN/CREAT BLD: 28 (ref 9–20)
CALCIUM SERPL-MCNC: 9 MG/DL (ref 8.6–10.4)
CHLORIDE BLD-SCNC: 97 MMOL/L (ref 98–107)
CO2: 22 MMOL/L (ref 20–31)
CREAT SERPL-MCNC: 1.35 MG/DL (ref 0.5–0.9)
DIFFERENTIAL TYPE: YES
EOSINOPHILS RELATIVE PERCENT: 0 % (ref 0–5)
GFR AFRICAN AMERICAN: 49 ML/MIN
GFR NON-AFRICAN AMERICAN: 40 ML/MIN
GFR SERPL CREATININE-BSD FRML MDRD: ABNORMAL ML/MIN/{1.73_M2}
GFR SERPL CREATININE-BSD FRML MDRD: ABNORMAL ML/MIN/{1.73_M2}
GLUCOSE BLD-MCNC: 208 MG/DL (ref 65–99)
GLUCOSE BLD-MCNC: 222 MG/DL (ref 70–99)
GLUCOSE BLD-MCNC: 280 MG/DL (ref 65–99)
GLUCOSE BLD-MCNC: 287 MG/DL (ref 65–99)
GLUCOSE BLD-MCNC: 410 MG/DL (ref 65–99)
HCT VFR BLD CALC: 26 % (ref 36–46)
HEMOGLOBIN: 8.7 G/DL (ref 12–16)
IMMATURE GRANULOCYTES: ABNORMAL %
LYMPHOCYTES # BLD: 7 % (ref 15–40)
MCH RBC QN AUTO: 30.3 PG (ref 26–34)
MCHC RBC AUTO-ENTMCNC: 33.5 G/DL (ref 31–37)
MCV RBC AUTO: 90.4 FL (ref 80–100)
MONOCYTES # BLD: 5 % (ref 4–8)
NRBC AUTOMATED: ABNORMAL PER 100 WBC
PDW BLD-RTO: 14.6 % (ref 12.1–15.2)
PLATELET # BLD: 170 K/UL (ref 140–450)
PLATELET ESTIMATE: ABNORMAL
PMV BLD AUTO: ABNORMAL FL (ref 6–12)
POTASSIUM SERPL-SCNC: 3.4 MMOL/L (ref 3.7–5.3)
RBC # BLD: 2.87 M/UL (ref 4–5.2)
RBC # BLD: ABNORMAL 10*6/UL
SEG NEUTROPHILS: 88 % (ref 47–75)
SEGMENTED NEUTROPHILS ABSOLUTE COUNT: 10.6 K/UL (ref 2.5–7)
SODIUM BLD-SCNC: 132 MMOL/L (ref 135–144)
WBC # BLD: 12.1 K/UL (ref 3.5–11)
WBC # BLD: ABNORMAL 10*3/UL

## 2018-12-30 PROCEDURE — 85025 COMPLETE CBC W/AUTO DIFF WBC: CPT

## 2018-12-30 PROCEDURE — 6360000002 HC RX W HCPCS: Performed by: INTERNAL MEDICINE

## 2018-12-30 PROCEDURE — 82947 ASSAY GLUCOSE BLOOD QUANT: CPT

## 2018-12-30 PROCEDURE — 2580000003 HC RX 258: Performed by: INTERNAL MEDICINE

## 2018-12-30 PROCEDURE — 2700000000 HC OXYGEN THERAPY PER DAY

## 2018-12-30 PROCEDURE — 80048 BASIC METABOLIC PNL TOTAL CA: CPT

## 2018-12-30 PROCEDURE — 6370000000 HC RX 637 (ALT 250 FOR IP): Performed by: INTERNAL MEDICINE

## 2018-12-30 PROCEDURE — 1200000000 HC SEMI PRIVATE

## 2018-12-30 PROCEDURE — 36415 COLL VENOUS BLD VENIPUNCTURE: CPT

## 2018-12-30 PROCEDURE — 94640 AIRWAY INHALATION TREATMENT: CPT

## 2018-12-30 PROCEDURE — 94761 N-INVAS EAR/PLS OXIMETRY MLT: CPT

## 2018-12-30 RX ORDER — POTASSIUM CHLORIDE 750 MG/1
40 TABLET, FILM COATED, EXTENDED RELEASE ORAL ONCE
Status: COMPLETED | OUTPATIENT
Start: 2018-12-30 | End: 2018-12-30

## 2018-12-30 RX ADMIN — Medication 1 CAPSULE: at 16:47

## 2018-12-30 RX ADMIN — GUAIFENESIN 600 MG: 600 TABLET, EXTENDED RELEASE ORAL at 22:02

## 2018-12-30 RX ADMIN — Medication 20 UNITS: at 22:13

## 2018-12-30 RX ADMIN — ACETAMINOPHEN 650 MG: 325 TABLET, FILM COATED ORAL at 03:43

## 2018-12-30 RX ADMIN — CARVEDILOL 3.12 MG: 3.12 TABLET, FILM COATED ORAL at 16:47

## 2018-12-30 RX ADMIN — GABAPENTIN 300 MG: 300 CAPSULE ORAL at 08:37

## 2018-12-30 RX ADMIN — ATORVASTATIN CALCIUM 80 MG: 20 TABLET, FILM COATED ORAL at 08:37

## 2018-12-30 RX ADMIN — CARVEDILOL 3.12 MG: 3.12 TABLET, FILM COATED ORAL at 09:06

## 2018-12-30 RX ADMIN — MIDODRINE HYDROCHLORIDE 5 MG: 5 TABLET ORAL at 09:06

## 2018-12-30 RX ADMIN — INSULIN LISPRO 6 UNITS: 100 INJECTION, SOLUTION INTRAVENOUS; SUBCUTANEOUS at 17:04

## 2018-12-30 RX ADMIN — INSULIN LISPRO 10 UNITS: 100 INJECTION, SOLUTION INTRAVENOUS; SUBCUTANEOUS at 22:13

## 2018-12-30 RX ADMIN — CLOPIDOGREL BISULFATE 75 MG: 75 TABLET ORAL at 08:37

## 2018-12-30 RX ADMIN — GUAIFENESIN 600 MG: 600 TABLET, EXTENDED RELEASE ORAL at 08:38

## 2018-12-30 RX ADMIN — INSULIN LISPRO 4 UNITS: 100 INJECTION, SOLUTION INTRAVENOUS; SUBCUTANEOUS at 08:36

## 2018-12-30 RX ADMIN — INSULIN LISPRO 6 UNITS: 100 INJECTION, SOLUTION INTRAVENOUS; SUBCUTANEOUS at 11:32

## 2018-12-30 RX ADMIN — SODIUM CHLORIDE: 9 INJECTION, SOLUTION INTRAVENOUS at 11:17

## 2018-12-30 RX ADMIN — VANCOMYCIN HYDROCHLORIDE 750 MG: 750 INJECTION, POWDER, LYOPHILIZED, FOR SOLUTION INTRAVENOUS at 05:08

## 2018-12-30 RX ADMIN — LEVALBUTEROL HYDROCHLORIDE 1.25 MG: 1.25 SOLUTION RESPIRATORY (INHALATION) at 09:24

## 2018-12-30 RX ADMIN — ACETAMINOPHEN 650 MG: 325 TABLET, FILM COATED ORAL at 11:32

## 2018-12-30 RX ADMIN — LEVALBUTEROL HYDROCHLORIDE 1.25 MG: 1.25 SOLUTION RESPIRATORY (INHALATION) at 13:28

## 2018-12-30 RX ADMIN — LEVALBUTEROL HYDROCHLORIDE 1.25 MG: 1.25 SOLUTION RESPIRATORY (INHALATION) at 20:44

## 2018-12-30 RX ADMIN — Medication 20 UNITS: at 08:37

## 2018-12-30 RX ADMIN — MIDODRINE HYDROCHLORIDE 5 MG: 5 TABLET ORAL at 11:32

## 2018-12-30 RX ADMIN — LEVALBUTEROL HYDROCHLORIDE 1.25 MG: 1.25 SOLUTION RESPIRATORY (INHALATION) at 04:54

## 2018-12-30 RX ADMIN — LEVOFLOXACIN 500 MG: 5 INJECTION, SOLUTION INTRAVENOUS at 02:13

## 2018-12-30 RX ADMIN — Medication 1 CAPSULE: at 08:38

## 2018-12-30 RX ADMIN — POTASSIUM CHLORIDE 40 MEQ: 750 TABLET, FILM COATED, EXTENDED RELEASE ORAL at 08:38

## 2018-12-30 RX ADMIN — ACETAMINOPHEN 650 MG: 325 TABLET, FILM COATED ORAL at 18:43

## 2018-12-30 RX ADMIN — LEVALBUTEROL HYDROCHLORIDE 1.25 MG: 1.25 SOLUTION RESPIRATORY (INHALATION) at 00:42

## 2018-12-30 RX ADMIN — MULTIPLE VITAMINS W/ MINERALS TAB 1 TABLET: TAB at 08:38

## 2018-12-30 RX ADMIN — LEVALBUTEROL HYDROCHLORIDE 1.25 MG: 1.25 SOLUTION RESPIRATORY (INHALATION) at 16:33

## 2018-12-30 RX ADMIN — MIDODRINE HYDROCHLORIDE 5 MG: 5 TABLET ORAL at 16:47

## 2018-12-30 RX ADMIN — GABAPENTIN 300 MG: 300 CAPSULE ORAL at 22:00

## 2018-12-30 RX ADMIN — ENOXAPARIN SODIUM 40 MG: 40 INJECTION SUBCUTANEOUS at 08:37

## 2018-12-30 RX ADMIN — SERTRALINE HYDROCHLORIDE 100 MG: 50 TABLET ORAL at 09:06

## 2018-12-30 RX ADMIN — VITAMIN D, TAB 1000IU (100/BT) 5000 UNITS: 25 TAB at 08:37

## 2018-12-30 RX ADMIN — VANCOMYCIN HYDROCHLORIDE 750 MG: 750 INJECTION, POWDER, LYOPHILIZED, FOR SOLUTION INTRAVENOUS at 16:47

## 2018-12-30 ASSESSMENT — PAIN DESCRIPTION - LOCATION
LOCATION: SHOULDER
LOCATION: CHEST;BACK
LOCATION: BACK;CHEST

## 2018-12-30 ASSESSMENT — PAIN SCALES - GENERAL
PAINLEVEL_OUTOF10: 3
PAINLEVEL_OUTOF10: 4
PAINLEVEL_OUTOF10: 6
PAINLEVEL_OUTOF10: 3
PAINLEVEL_OUTOF10: 0
PAINLEVEL_OUTOF10: 6
PAINLEVEL_OUTOF10: 0

## 2018-12-30 ASSESSMENT — PAIN DESCRIPTION - PAIN TYPE
TYPE: ACUTE PAIN
TYPE: ACUTE PAIN

## 2018-12-30 ASSESSMENT — PAIN DESCRIPTION - ORIENTATION
ORIENTATION: LEFT

## 2018-12-31 LAB
ABSOLUTE EOS #: 0 K/UL (ref 0–0.4)
ABSOLUTE IMMATURE GRANULOCYTE: ABNORMAL K/UL (ref 0–0.3)
ABSOLUTE LYMPH #: 0.7 K/UL (ref 1–4.8)
ABSOLUTE MONO #: 0.7 K/UL (ref 0–1)
ANION GAP SERPL CALCULATED.3IONS-SCNC: 12 MMOL/L (ref 9–17)
BASOPHILS # BLD: 0 % (ref 0–2)
BASOPHILS ABSOLUTE: 0 K/UL (ref 0–0.2)
BUN BLDV-MCNC: 32 MG/DL (ref 6–20)
BUN/CREAT BLD: 21 (ref 9–20)
CALCIUM SERPL-MCNC: 8.7 MG/DL (ref 8.6–10.4)
CHLORIDE BLD-SCNC: 101 MMOL/L (ref 98–107)
CO2: 19 MMOL/L (ref 20–31)
CREAT SERPL-MCNC: 1.52 MG/DL (ref 0.5–0.9)
DIFFERENTIAL TYPE: YES
EOSINOPHILS RELATIVE PERCENT: 0 % (ref 0–5)
GFR AFRICAN AMERICAN: 42 ML/MIN
GFR NON-AFRICAN AMERICAN: 35 ML/MIN
GFR SERPL CREATININE-BSD FRML MDRD: ABNORMAL ML/MIN/{1.73_M2}
GFR SERPL CREATININE-BSD FRML MDRD: ABNORMAL ML/MIN/{1.73_M2}
GLUCOSE BLD-MCNC: 203 MG/DL (ref 65–99)
GLUCOSE BLD-MCNC: 248 MG/DL (ref 65–99)
GLUCOSE BLD-MCNC: 258 MG/DL (ref 65–99)
GLUCOSE BLD-MCNC: 283 MG/DL (ref 70–99)
GLUCOSE BLD-MCNC: 298 MG/DL (ref 65–99)
HCT VFR BLD CALC: 25.9 % (ref 36–46)
HEMOGLOBIN: 8.6 G/DL (ref 12–16)
IMMATURE GRANULOCYTES: ABNORMAL %
LYMPHOCYTES # BLD: 8 % (ref 15–40)
MCH RBC QN AUTO: 30.1 PG (ref 26–34)
MCHC RBC AUTO-ENTMCNC: 33.3 G/DL (ref 31–37)
MCV RBC AUTO: 90.4 FL (ref 80–100)
MONOCYTES # BLD: 8 % (ref 4–8)
NRBC AUTOMATED: ABNORMAL PER 100 WBC
PDW BLD-RTO: 14.5 % (ref 12.1–15.2)
PLATELET # BLD: 178 K/UL (ref 140–450)
PLATELET ESTIMATE: ABNORMAL
PMV BLD AUTO: ABNORMAL FL (ref 6–12)
POTASSIUM SERPL-SCNC: 4.1 MMOL/L (ref 3.7–5.3)
RBC # BLD: 2.87 M/UL (ref 4–5.2)
RBC # BLD: ABNORMAL 10*6/UL
SEG NEUTROPHILS: 84 % (ref 47–75)
SEGMENTED NEUTROPHILS ABSOLUTE COUNT: 7.4 K/UL (ref 2.5–7)
SODIUM BLD-SCNC: 132 MMOL/L (ref 135–144)
VANCOMYCIN TROUGH DATE LAST DOSE: NORMAL
VANCOMYCIN TROUGH DOSE AMOUNT: NORMAL
VANCOMYCIN TROUGH TIME LAST DOSE: NORMAL
VANCOMYCIN TROUGH: 16.8 UG/ML (ref 10–20)
WBC # BLD: 8.8 K/UL (ref 3.5–11)
WBC # BLD: ABNORMAL 10*3/UL

## 2018-12-31 PROCEDURE — 94640 AIRWAY INHALATION TREATMENT: CPT

## 2018-12-31 PROCEDURE — 36415 COLL VENOUS BLD VENIPUNCTURE: CPT

## 2018-12-31 PROCEDURE — 80202 ASSAY OF VANCOMYCIN: CPT

## 2018-12-31 PROCEDURE — 94761 N-INVAS EAR/PLS OXIMETRY MLT: CPT

## 2018-12-31 PROCEDURE — 6370000000 HC RX 637 (ALT 250 FOR IP): Performed by: INTERNAL MEDICINE

## 2018-12-31 PROCEDURE — 80048 BASIC METABOLIC PNL TOTAL CA: CPT

## 2018-12-31 PROCEDURE — 85025 COMPLETE CBC W/AUTO DIFF WBC: CPT

## 2018-12-31 PROCEDURE — 6360000002 HC RX W HCPCS: Performed by: INTERNAL MEDICINE

## 2018-12-31 PROCEDURE — 94664 DEMO&/EVAL PT USE INHALER: CPT

## 2018-12-31 PROCEDURE — 2580000003 HC RX 258: Performed by: INTERNAL MEDICINE

## 2018-12-31 PROCEDURE — 1200000000 HC SEMI PRIVATE

## 2018-12-31 PROCEDURE — 2700000000 HC OXYGEN THERAPY PER DAY

## 2018-12-31 PROCEDURE — 82947 ASSAY GLUCOSE BLOOD QUANT: CPT

## 2018-12-31 RX ORDER — INSULIN GLARGINE 100 [IU]/ML
30 INJECTION, SOLUTION SUBCUTANEOUS
Status: DISCONTINUED | OUTPATIENT
Start: 2018-12-31 | End: 2019-01-01

## 2018-12-31 RX ORDER — FUROSEMIDE 40 MG/1
40 TABLET ORAL DAILY
Status: DISCONTINUED | OUTPATIENT
Start: 2018-12-31 | End: 2019-01-01

## 2018-12-31 RX ADMIN — MIDODRINE HYDROCHLORIDE 5 MG: 5 TABLET ORAL at 09:26

## 2018-12-31 RX ADMIN — FUROSEMIDE 40 MG: 40 TABLET ORAL at 09:26

## 2018-12-31 RX ADMIN — Medication 20 UNITS: at 23:32

## 2018-12-31 RX ADMIN — GUAIFENESIN 600 MG: 600 TABLET, EXTENDED RELEASE ORAL at 20:01

## 2018-12-31 RX ADMIN — LEVALBUTEROL HYDROCHLORIDE 1.25 MG: 1.25 SOLUTION RESPIRATORY (INHALATION) at 17:42

## 2018-12-31 RX ADMIN — CLOPIDOGREL BISULFATE 75 MG: 75 TABLET ORAL at 09:26

## 2018-12-31 RX ADMIN — ENOXAPARIN SODIUM 40 MG: 40 INJECTION SUBCUTANEOUS at 09:26

## 2018-12-31 RX ADMIN — CARVEDILOL 3.12 MG: 3.12 TABLET, FILM COATED ORAL at 16:11

## 2018-12-31 RX ADMIN — LEVALBUTEROL HYDROCHLORIDE 1.25 MG: 1.25 SOLUTION RESPIRATORY (INHALATION) at 12:56

## 2018-12-31 RX ADMIN — VITAMIN D, TAB 1000IU (100/BT) 5000 UNITS: 25 TAB at 09:26

## 2018-12-31 RX ADMIN — VANCOMYCIN HYDROCHLORIDE 750 MG: 750 INJECTION, POWDER, LYOPHILIZED, FOR SOLUTION INTRAVENOUS at 16:11

## 2018-12-31 RX ADMIN — MULTIPLE VITAMINS W/ MINERALS TAB 1 TABLET: TAB at 09:26

## 2018-12-31 RX ADMIN — INSULIN LISPRO 4 UNITS: 100 INJECTION, SOLUTION INTRAVENOUS; SUBCUTANEOUS at 08:04

## 2018-12-31 RX ADMIN — Medication 1 CAPSULE: at 16:11

## 2018-12-31 RX ADMIN — ATORVASTATIN CALCIUM 80 MG: 20 TABLET, FILM COATED ORAL at 09:26

## 2018-12-31 RX ADMIN — Medication 10 ML: at 16:16

## 2018-12-31 RX ADMIN — LEVALBUTEROL HYDROCHLORIDE 1.25 MG: 1.25 SOLUTION RESPIRATORY (INHALATION) at 00:45

## 2018-12-31 RX ADMIN — LEVALBUTEROL HYDROCHLORIDE 1.25 MG: 1.25 SOLUTION RESPIRATORY (INHALATION) at 05:22

## 2018-12-31 RX ADMIN — GABAPENTIN 300 MG: 300 CAPSULE ORAL at 09:26

## 2018-12-31 RX ADMIN — SERTRALINE HYDROCHLORIDE 100 MG: 50 TABLET ORAL at 09:25

## 2018-12-31 RX ADMIN — INSULIN LISPRO 4 UNITS: 100 INJECTION, SOLUTION INTRAVENOUS; SUBCUTANEOUS at 16:11

## 2018-12-31 RX ADMIN — MIDODRINE HYDROCHLORIDE 5 MG: 5 TABLET ORAL at 12:37

## 2018-12-31 RX ADMIN — Medication 10 ML: at 13:53

## 2018-12-31 RX ADMIN — CARVEDILOL 3.12 MG: 3.12 TABLET, FILM COATED ORAL at 09:26

## 2018-12-31 RX ADMIN — Medication 10 ML: at 09:26

## 2018-12-31 RX ADMIN — SODIUM CHLORIDE: 9 INJECTION, SOLUTION INTRAVENOUS at 01:47

## 2018-12-31 RX ADMIN — MIDODRINE HYDROCHLORIDE 5 MG: 5 TABLET ORAL at 16:11

## 2018-12-31 RX ADMIN — INSULIN LISPRO 6 UNITS: 100 INJECTION, SOLUTION INTRAVENOUS; SUBCUTANEOUS at 11:32

## 2018-12-31 RX ADMIN — INSULIN LISPRO 3 UNITS: 100 INJECTION, SOLUTION INTRAVENOUS; SUBCUTANEOUS at 20:01

## 2018-12-31 RX ADMIN — Medication 10 ML: at 20:02

## 2018-12-31 RX ADMIN — GUAIFENESIN 600 MG: 600 TABLET, EXTENDED RELEASE ORAL at 09:26

## 2018-12-31 RX ADMIN — GABAPENTIN 300 MG: 300 CAPSULE ORAL at 20:01

## 2018-12-31 RX ADMIN — Medication 1 CAPSULE: at 09:26

## 2018-12-31 RX ADMIN — LEVOFLOXACIN 500 MG: 5 INJECTION, SOLUTION INTRAVENOUS at 03:00

## 2018-12-31 RX ADMIN — VANCOMYCIN HYDROCHLORIDE 750 MG: 750 INJECTION, POWDER, LYOPHILIZED, FOR SOLUTION INTRAVENOUS at 05:19

## 2018-12-31 RX ADMIN — LEVALBUTEROL HYDROCHLORIDE 1.25 MG: 1.25 SOLUTION RESPIRATORY (INHALATION) at 21:57

## 2018-12-31 RX ADMIN — LEVALBUTEROL HYDROCHLORIDE 1.25 MG: 1.25 SOLUTION RESPIRATORY (INHALATION) at 08:56

## 2018-12-31 RX ADMIN — Medication 30 UNITS: at 08:04

## 2018-12-31 ASSESSMENT — PAIN SCALES - GENERAL
PAINLEVEL_OUTOF10: 3
PAINLEVEL_OUTOF10: 0
PAINLEVEL_OUTOF10: 0
PAINLEVEL_OUTOF10: 2

## 2018-12-31 ASSESSMENT — PAIN DESCRIPTION - PAIN TYPE
TYPE: ACUTE PAIN
TYPE: ACUTE PAIN

## 2018-12-31 ASSESSMENT — PAIN DESCRIPTION - LOCATION
LOCATION: CHEST;BACK
LOCATION: CHEST;BACK

## 2018-12-31 ASSESSMENT — PAIN DESCRIPTION - ORIENTATION: ORIENTATION: LEFT

## 2019-01-01 LAB
ABSOLUTE EOS #: 0 K/UL (ref 0–0.4)
ABSOLUTE IMMATURE GRANULOCYTE: ABNORMAL K/UL (ref 0–0.3)
ABSOLUTE LYMPH #: 0.9 K/UL (ref 1–4.8)
ABSOLUTE MONO #: 0.8 K/UL (ref 0–1)
ANION GAP SERPL CALCULATED.3IONS-SCNC: 14 MMOL/L (ref 9–17)
BASOPHILS # BLD: 0 % (ref 0–2)
BASOPHILS ABSOLUTE: 0 K/UL (ref 0–0.2)
BUN BLDV-MCNC: 29 MG/DL (ref 6–20)
BUN/CREAT BLD: 17 (ref 9–20)
CALCIUM SERPL-MCNC: 9.1 MG/DL (ref 8.6–10.4)
CHLORIDE BLD-SCNC: 100 MMOL/L (ref 98–107)
CO2: 22 MMOL/L (ref 20–31)
CREAT SERPL-MCNC: 1.67 MG/DL (ref 0.5–0.9)
DIFFERENTIAL TYPE: YES
EOSINOPHILS RELATIVE PERCENT: 0 % (ref 0–5)
GFR AFRICAN AMERICAN: 38 ML/MIN
GFR NON-AFRICAN AMERICAN: 31 ML/MIN
GFR SERPL CREATININE-BSD FRML MDRD: ABNORMAL ML/MIN/{1.73_M2}
GFR SERPL CREATININE-BSD FRML MDRD: ABNORMAL ML/MIN/{1.73_M2}
GLUCOSE BLD-MCNC: 190 MG/DL (ref 70–99)
GLUCOSE BLD-MCNC: 196 MG/DL (ref 65–99)
GLUCOSE BLD-MCNC: 207 MG/DL (ref 65–99)
GLUCOSE BLD-MCNC: 236 MG/DL (ref 65–99)
GLUCOSE BLD-MCNC: 299 MG/DL (ref 65–99)
GLUCOSE BLD-MCNC: 339 MG/DL (ref 65–99)
HCT VFR BLD CALC: 26.8 % (ref 36–46)
HEMOGLOBIN: 8.9 G/DL (ref 12–16)
IMMATURE GRANULOCYTES: ABNORMAL %
LYMPHOCYTES # BLD: 10 % (ref 15–40)
MCH RBC QN AUTO: 29.5 PG (ref 26–34)
MCHC RBC AUTO-ENTMCNC: 33 G/DL (ref 31–37)
MCV RBC AUTO: 89.4 FL (ref 80–100)
MONOCYTES # BLD: 9 % (ref 4–8)
NRBC AUTOMATED: ABNORMAL PER 100 WBC
PDW BLD-RTO: 15.1 % (ref 12.1–15.2)
PLATELET # BLD: 209 K/UL (ref 140–450)
PLATELET ESTIMATE: ABNORMAL
PMV BLD AUTO: ABNORMAL FL (ref 6–12)
POTASSIUM SERPL-SCNC: 3.9 MMOL/L (ref 3.7–5.3)
RBC # BLD: 3 M/UL (ref 4–5.2)
RBC # BLD: ABNORMAL 10*6/UL
SEG NEUTROPHILS: 81 % (ref 47–75)
SEGMENTED NEUTROPHILS ABSOLUTE COUNT: 7.3 K/UL (ref 2.5–7)
SODIUM BLD-SCNC: 136 MMOL/L (ref 135–144)
WBC # BLD: 9 K/UL (ref 3.5–11)
WBC # BLD: ABNORMAL 10*3/UL

## 2019-01-01 PROCEDURE — 80048 BASIC METABOLIC PNL TOTAL CA: CPT

## 2019-01-01 PROCEDURE — 94640 AIRWAY INHALATION TREATMENT: CPT

## 2019-01-01 PROCEDURE — 6360000002 HC RX W HCPCS: Performed by: INTERNAL MEDICINE

## 2019-01-01 PROCEDURE — 1200000000 HC SEMI PRIVATE

## 2019-01-01 PROCEDURE — 2700000000 HC OXYGEN THERAPY PER DAY

## 2019-01-01 PROCEDURE — 85025 COMPLETE CBC W/AUTO DIFF WBC: CPT

## 2019-01-01 PROCEDURE — 82947 ASSAY GLUCOSE BLOOD QUANT: CPT

## 2019-01-01 PROCEDURE — 2580000003 HC RX 258: Performed by: INTERNAL MEDICINE

## 2019-01-01 PROCEDURE — 94761 N-INVAS EAR/PLS OXIMETRY MLT: CPT

## 2019-01-01 PROCEDURE — 6370000000 HC RX 637 (ALT 250 FOR IP): Performed by: INTERNAL MEDICINE

## 2019-01-01 PROCEDURE — 36415 COLL VENOUS BLD VENIPUNCTURE: CPT

## 2019-01-01 RX ORDER — INSULIN GLARGINE 100 [IU]/ML
40 INJECTION, SOLUTION SUBCUTANEOUS
Status: DISCONTINUED | OUTPATIENT
Start: 2019-01-01 | End: 2019-01-02 | Stop reason: HOSPADM

## 2019-01-01 RX ORDER — FUROSEMIDE 40 MG/1
40 TABLET ORAL DAILY
Status: DISCONTINUED | OUTPATIENT
Start: 2019-01-02 | End: 2019-01-02 | Stop reason: HOSPADM

## 2019-01-01 RX ORDER — LEVOFLOXACIN 5 MG/ML
250 INJECTION, SOLUTION INTRAVENOUS EVERY 24 HOURS
Status: DISCONTINUED | OUTPATIENT
Start: 2019-01-02 | End: 2019-01-02 | Stop reason: HOSPADM

## 2019-01-01 RX ADMIN — CARVEDILOL 3.12 MG: 3.12 TABLET, FILM COATED ORAL at 16:30

## 2019-01-01 RX ADMIN — ENOXAPARIN SODIUM 40 MG: 40 INJECTION SUBCUTANEOUS at 08:36

## 2019-01-01 RX ADMIN — MIDODRINE HYDROCHLORIDE 5 MG: 5 TABLET ORAL at 08:36

## 2019-01-01 RX ADMIN — Medication 40 UNITS: at 08:39

## 2019-01-01 RX ADMIN — ACETAMINOPHEN 650 MG: 325 TABLET, FILM COATED ORAL at 08:57

## 2019-01-01 RX ADMIN — INSULIN LISPRO 4 UNITS: 100 INJECTION, SOLUTION INTRAVENOUS; SUBCUTANEOUS at 21:10

## 2019-01-01 RX ADMIN — INSULIN LISPRO 4 UNITS: 100 INJECTION, SOLUTION INTRAVENOUS; SUBCUTANEOUS at 08:40

## 2019-01-01 RX ADMIN — INSULIN LISPRO 2 UNITS: 100 INJECTION, SOLUTION INTRAVENOUS; SUBCUTANEOUS at 17:22

## 2019-01-01 RX ADMIN — MIDODRINE HYDROCHLORIDE 5 MG: 5 TABLET ORAL at 11:56

## 2019-01-01 RX ADMIN — VANCOMYCIN HYDROCHLORIDE 750 MG: 750 INJECTION, POWDER, LYOPHILIZED, FOR SOLUTION INTRAVENOUS at 05:24

## 2019-01-01 RX ADMIN — LEVALBUTEROL HYDROCHLORIDE 1.25 MG: 1.25 SOLUTION RESPIRATORY (INHALATION) at 09:14

## 2019-01-01 RX ADMIN — GUAIFENESIN 600 MG: 600 TABLET, EXTENDED RELEASE ORAL at 21:12

## 2019-01-01 RX ADMIN — LEVOFLOXACIN 500 MG: 5 INJECTION, SOLUTION INTRAVENOUS at 02:19

## 2019-01-01 RX ADMIN — VANCOMYCIN HYDROCHLORIDE 750 MG: 750 INJECTION, POWDER, LYOPHILIZED, FOR SOLUTION INTRAVENOUS at 16:30

## 2019-01-01 RX ADMIN — CARVEDILOL 3.12 MG: 3.12 TABLET, FILM COATED ORAL at 08:36

## 2019-01-01 RX ADMIN — Medication 10 ML: at 21:14

## 2019-01-01 RX ADMIN — LEVALBUTEROL HYDROCHLORIDE 1.25 MG: 1.25 SOLUTION RESPIRATORY (INHALATION) at 20:46

## 2019-01-01 RX ADMIN — VITAMIN D, TAB 1000IU (100/BT) 5000 UNITS: 25 TAB at 08:35

## 2019-01-01 RX ADMIN — SERTRALINE HYDROCHLORIDE 100 MG: 50 TABLET ORAL at 08:35

## 2019-01-01 RX ADMIN — ACETAMINOPHEN 650 MG: 325 TABLET, FILM COATED ORAL at 01:17

## 2019-01-01 RX ADMIN — MULTIPLE VITAMINS W/ MINERALS TAB 1 TABLET: TAB at 08:36

## 2019-01-01 RX ADMIN — GUAIFENESIN 600 MG: 600 TABLET, EXTENDED RELEASE ORAL at 08:36

## 2019-01-01 RX ADMIN — Medication 20 UNITS: at 21:11

## 2019-01-01 RX ADMIN — INSULIN LISPRO 6 UNITS: 100 INJECTION, SOLUTION INTRAVENOUS; SUBCUTANEOUS at 11:48

## 2019-01-01 RX ADMIN — LEVALBUTEROL HYDROCHLORIDE 1.25 MG: 1.25 SOLUTION RESPIRATORY (INHALATION) at 16:56

## 2019-01-01 RX ADMIN — GABAPENTIN 300 MG: 300 CAPSULE ORAL at 08:36

## 2019-01-01 RX ADMIN — LEVALBUTEROL HYDROCHLORIDE 1.25 MG: 1.25 SOLUTION RESPIRATORY (INHALATION) at 13:06

## 2019-01-01 RX ADMIN — Medication 10 ML: at 02:19

## 2019-01-01 RX ADMIN — CLOPIDOGREL BISULFATE 75 MG: 75 TABLET ORAL at 08:36

## 2019-01-01 RX ADMIN — Medication 1 CAPSULE: at 08:36

## 2019-01-01 RX ADMIN — GABAPENTIN 300 MG: 300 CAPSULE ORAL at 21:12

## 2019-01-01 RX ADMIN — LEVALBUTEROL HYDROCHLORIDE 1.25 MG: 1.25 SOLUTION RESPIRATORY (INHALATION) at 01:03

## 2019-01-01 RX ADMIN — Medication 1 CAPSULE: at 16:30

## 2019-01-01 RX ADMIN — LEVALBUTEROL HYDROCHLORIDE 1.25 MG: 1.25 SOLUTION RESPIRATORY (INHALATION) at 05:26

## 2019-01-01 RX ADMIN — ATORVASTATIN CALCIUM 80 MG: 20 TABLET, FILM COATED ORAL at 08:36

## 2019-01-01 RX ADMIN — MIDODRINE HYDROCHLORIDE 5 MG: 5 TABLET ORAL at 16:30

## 2019-01-01 ASSESSMENT — PAIN SCALES - GENERAL
PAINLEVEL_OUTOF10: 4
PAINLEVEL_OUTOF10: 5
PAINLEVEL_OUTOF10: 0
PAINLEVEL_OUTOF10: 0
PAINLEVEL_OUTOF10: 5
PAINLEVEL_OUTOF10: 5
PAINLEVEL_OUTOF10: 3
PAINLEVEL_OUTOF10: 1
PAINLEVEL_OUTOF10: 0

## 2019-01-01 ASSESSMENT — PAIN DESCRIPTION - FREQUENCY: FREQUENCY: INTERMITTENT

## 2019-01-01 ASSESSMENT — PAIN DESCRIPTION - PAIN TYPE
TYPE: ACUTE PAIN
TYPE: ACUTE PAIN

## 2019-01-01 ASSESSMENT — PAIN DESCRIPTION - LOCATION
LOCATION: SHOULDER
LOCATION: HEAD

## 2019-01-01 ASSESSMENT — PAIN DESCRIPTION - ORIENTATION: ORIENTATION: LEFT

## 2019-01-01 ASSESSMENT — PAIN DESCRIPTION - DESCRIPTORS: DESCRIPTORS: ACHING

## 2019-01-02 VITALS
TEMPERATURE: 99.4 F | DIASTOLIC BLOOD PRESSURE: 66 MMHG | HEIGHT: 66 IN | BODY MASS INDEX: 26.57 KG/M2 | SYSTOLIC BLOOD PRESSURE: 116 MMHG | OXYGEN SATURATION: 95 % | RESPIRATION RATE: 20 BRPM | WEIGHT: 165.3 LBS | HEART RATE: 94 BPM

## 2019-01-02 LAB
ANION GAP SERPL CALCULATED.3IONS-SCNC: 15 MMOL/L (ref 9–17)
BUN BLDV-MCNC: 32 MG/DL (ref 6–20)
BUN/CREAT BLD: 20 (ref 9–20)
CALCIUM SERPL-MCNC: 9.2 MG/DL (ref 8.6–10.4)
CHLORIDE BLD-SCNC: 100 MMOL/L (ref 98–107)
CO2: 20 MMOL/L (ref 20–31)
CREAT SERPL-MCNC: 1.58 MG/DL (ref 0.5–0.9)
GFR AFRICAN AMERICAN: 41 ML/MIN
GFR NON-AFRICAN AMERICAN: 33 ML/MIN
GFR SERPL CREATININE-BSD FRML MDRD: ABNORMAL ML/MIN/{1.73_M2}
GFR SERPL CREATININE-BSD FRML MDRD: ABNORMAL ML/MIN/{1.73_M2}
GLUCOSE BLD-MCNC: 154 MG/DL (ref 65–99)
GLUCOSE BLD-MCNC: 173 MG/DL (ref 70–99)
POTASSIUM SERPL-SCNC: 4 MMOL/L (ref 3.7–5.3)
SODIUM BLD-SCNC: 135 MMOL/L (ref 135–144)
VANCOMYCIN TROUGH DATE LAST DOSE: ABNORMAL
VANCOMYCIN TROUGH DOSE AMOUNT: ABNORMAL
VANCOMYCIN TROUGH TIME LAST DOSE: ABNORMAL
VANCOMYCIN TROUGH: 20.8 UG/ML (ref 10–20)

## 2019-01-02 PROCEDURE — 36415 COLL VENOUS BLD VENIPUNCTURE: CPT

## 2019-01-02 PROCEDURE — 82947 ASSAY GLUCOSE BLOOD QUANT: CPT

## 2019-01-02 PROCEDURE — 6370000000 HC RX 637 (ALT 250 FOR IP): Performed by: INTERNAL MEDICINE

## 2019-01-02 PROCEDURE — 6360000002 HC RX W HCPCS: Performed by: INTERNAL MEDICINE

## 2019-01-02 PROCEDURE — 94640 AIRWAY INHALATION TREATMENT: CPT

## 2019-01-02 PROCEDURE — 94761 N-INVAS EAR/PLS OXIMETRY MLT: CPT

## 2019-01-02 PROCEDURE — 80048 BASIC METABOLIC PNL TOTAL CA: CPT

## 2019-01-02 PROCEDURE — 80202 ASSAY OF VANCOMYCIN: CPT

## 2019-01-02 PROCEDURE — 2580000003 HC RX 258: Performed by: INTERNAL MEDICINE

## 2019-01-02 RX ORDER — LACTOBACILLUS RHAMNOSUS GG 10B CELL
1 CAPSULE ORAL 2 TIMES DAILY WITH MEALS
Qty: 30 CAPSULE | Refills: 0 | Status: SHIPPED | OUTPATIENT
Start: 2019-01-02

## 2019-01-02 RX ORDER — LEVOFLOXACIN 250 MG/1
250 TABLET ORAL DAILY
Qty: 5 TABLET | Refills: 0 | Status: SHIPPED | OUTPATIENT
Start: 2019-01-02 | End: 2019-01-07

## 2019-01-02 RX ORDER — LEVALBUTEROL INHALATION SOLUTION 1.25 MG/3ML
1.25 SOLUTION RESPIRATORY (INHALATION) EVERY 4 HOURS PRN
Qty: 360 ML | Refills: 2 | Status: SHIPPED | OUTPATIENT
Start: 2019-01-02 | End: 2020-09-09 | Stop reason: SDUPTHER

## 2019-01-02 RX ADMIN — Medication 1 CAPSULE: at 08:32

## 2019-01-02 RX ADMIN — ATORVASTATIN CALCIUM 80 MG: 20 TABLET, FILM COATED ORAL at 08:32

## 2019-01-02 RX ADMIN — ENOXAPARIN SODIUM 40 MG: 40 INJECTION SUBCUTANEOUS at 08:33

## 2019-01-02 RX ADMIN — CLOPIDOGREL BISULFATE 75 MG: 75 TABLET ORAL at 08:32

## 2019-01-02 RX ADMIN — CARVEDILOL 3.12 MG: 3.12 TABLET, FILM COATED ORAL at 08:32

## 2019-01-02 RX ADMIN — GABAPENTIN 300 MG: 300 CAPSULE ORAL at 08:32

## 2019-01-02 RX ADMIN — Medication 40 UNITS: at 07:57

## 2019-01-02 RX ADMIN — MULTIPLE VITAMINS W/ MINERALS TAB 1 TABLET: TAB at 08:32

## 2019-01-02 RX ADMIN — FUROSEMIDE 40 MG: 40 TABLET ORAL at 08:32

## 2019-01-02 RX ADMIN — VITAMIN D, TAB 1000IU (100/BT) 5000 UNITS: 25 TAB at 08:32

## 2019-01-02 RX ADMIN — LEVOFLOXACIN 250 MG: 5 INJECTION, SOLUTION INTRAVENOUS at 02:10

## 2019-01-02 RX ADMIN — INSULIN LISPRO 2 UNITS: 100 INJECTION, SOLUTION INTRAVENOUS; SUBCUTANEOUS at 07:56

## 2019-01-02 RX ADMIN — GUAIFENESIN 600 MG: 600 TABLET, EXTENDED RELEASE ORAL at 08:32

## 2019-01-02 RX ADMIN — MIDODRINE HYDROCHLORIDE 5 MG: 5 TABLET ORAL at 08:33

## 2019-01-02 RX ADMIN — LEVALBUTEROL HYDROCHLORIDE 1.25 MG: 1.25 SOLUTION RESPIRATORY (INHALATION) at 01:05

## 2019-01-02 RX ADMIN — Medication 10 ML: at 09:59

## 2019-01-02 RX ADMIN — VANCOMYCIN HYDROCHLORIDE 1250 MG: 750 INJECTION, POWDER, LYOPHILIZED, FOR SOLUTION INTRAVENOUS at 09:59

## 2019-01-02 RX ADMIN — LEVALBUTEROL HYDROCHLORIDE 1.25 MG: 1.25 SOLUTION RESPIRATORY (INHALATION) at 05:21

## 2019-01-02 RX ADMIN — SERTRALINE HYDROCHLORIDE 100 MG: 50 TABLET ORAL at 08:32

## 2019-01-02 RX ADMIN — LEVALBUTEROL HYDROCHLORIDE 1.25 MG: 1.25 SOLUTION RESPIRATORY (INHALATION) at 09:13

## 2019-01-02 ASSESSMENT — PAIN SCALES - GENERAL
PAINLEVEL_OUTOF10: 0

## 2019-01-03 ENCOUNTER — TELEPHONE (OUTPATIENT)
Dept: FAMILY MEDICINE CLINIC | Age: 59
End: 2019-01-03

## 2019-01-03 ENCOUNTER — OFFICE VISIT (OUTPATIENT)
Dept: UROLOGY | Age: 59
End: 2019-01-03
Payer: MEDICARE

## 2019-01-03 ENCOUNTER — TELEPHONE (OUTPATIENT)
Dept: UROLOGY | Age: 59
End: 2019-01-03

## 2019-01-03 ENCOUNTER — HOSPITAL ENCOUNTER (OUTPATIENT)
Dept: GENERAL RADIOLOGY | Age: 59
Discharge: HOME | End: 2019-01-05
Payer: MEDICARE

## 2019-01-03 ENCOUNTER — HOSPITAL ENCOUNTER (OUTPATIENT)
Age: 59
Discharge: HOME | End: 2019-01-05
Payer: MEDICARE

## 2019-01-03 VITALS
BODY MASS INDEX: 25.88 KG/M2 | DIASTOLIC BLOOD PRESSURE: 62 MMHG | WEIGHT: 161 LBS | HEIGHT: 66 IN | SYSTOLIC BLOOD PRESSURE: 100 MMHG

## 2019-01-03 DIAGNOSIS — N13.2 URETERAL STONE WITH HYDRONEPHROSIS: ICD-10-CM

## 2019-01-03 DIAGNOSIS — N13.2 URETERAL STONE WITH HYDRONEPHROSIS: Primary | ICD-10-CM

## 2019-01-03 LAB
CULTURE: NORMAL
Lab: NORMAL
SPECIMEN DESCRIPTION: NORMAL
STATUS: NORMAL

## 2019-01-03 PROCEDURE — G8419 CALC BMI OUT NRM PARAM NOF/U: HCPCS | Performed by: NURSE PRACTITIONER

## 2019-01-03 PROCEDURE — G8484 FLU IMMUNIZE NO ADMIN: HCPCS | Performed by: NURSE PRACTITIONER

## 2019-01-03 PROCEDURE — 74018 RADEX ABDOMEN 1 VIEW: CPT

## 2019-01-03 PROCEDURE — 1111F DSCHRG MED/CURRENT MED MERGE: CPT | Performed by: NURSE PRACTITIONER

## 2019-01-03 PROCEDURE — 3017F COLORECTAL CA SCREEN DOC REV: CPT | Performed by: NURSE PRACTITIONER

## 2019-01-03 PROCEDURE — G8427 DOCREV CUR MEDS BY ELIG CLIN: HCPCS | Performed by: NURSE PRACTITIONER

## 2019-01-03 PROCEDURE — G8598 ASA/ANTIPLAT THER USED: HCPCS | Performed by: NURSE PRACTITIONER

## 2019-01-03 PROCEDURE — 1036F TOBACCO NON-USER: CPT | Performed by: NURSE PRACTITIONER

## 2019-01-03 PROCEDURE — 99213 OFFICE O/P EST LOW 20 MIN: CPT | Performed by: NURSE PRACTITIONER

## 2019-01-03 RX ORDER — LISINOPRIL 5 MG/1
5 TABLET ORAL DAILY
Qty: 30 TABLET | Refills: 5 | Status: SHIPPED | OUTPATIENT
Start: 2019-01-03 | End: 2019-06-28 | Stop reason: SDUPTHER

## 2019-01-03 ASSESSMENT — ENCOUNTER SYMPTOMS
SHORTNESS OF BREATH: 0
COUGH: 0
WHEEZING: 0
EYE PAIN: 0
VOMITING: 0
EYE REDNESS: 0
ABDOMINAL PAIN: 0
NAUSEA: 0
COLOR CHANGE: 0
BACK PAIN: 0
CONSTIPATION: 0

## 2019-01-08 ENCOUNTER — OFFICE VISIT (OUTPATIENT)
Dept: FAMILY MEDICINE CLINIC | Age: 59
End: 2019-01-08
Payer: MEDICARE

## 2019-01-08 VITALS
HEART RATE: 92 BPM | WEIGHT: 159 LBS | SYSTOLIC BLOOD PRESSURE: 100 MMHG | BODY MASS INDEX: 25.66 KG/M2 | TEMPERATURE: 98 F | OXYGEN SATURATION: 96 % | DIASTOLIC BLOOD PRESSURE: 60 MMHG

## 2019-01-08 DIAGNOSIS — R16.0 LIVER MASS: ICD-10-CM

## 2019-01-08 DIAGNOSIS — Z12.11 SCREENING FOR COLON CANCER: ICD-10-CM

## 2019-01-08 DIAGNOSIS — J18.9 PNEUMONIA OF BOTH LUNGS DUE TO INFECTIOUS ORGANISM, UNSPECIFIED PART OF LUNG: Primary | ICD-10-CM

## 2019-01-08 PROCEDURE — 99215 OFFICE O/P EST HI 40 MIN: CPT | Performed by: FAMILY MEDICINE

## 2019-01-08 ASSESSMENT — ENCOUNTER SYMPTOMS
ABDOMINAL PAIN: 0
BLOOD IN STOOL: 0
NAUSEA: 0
WHEEZING: 1
SORE THROAT: 0
SINUS PAIN: 0
EYE REDNESS: 0
VOMITING: 0
CONSTIPATION: 0
RHINORRHEA: 0
COUGH: 1
SHORTNESS OF BREATH: 0
TROUBLE SWALLOWING: 0
EYE DISCHARGE: 0
DIARRHEA: 0
FACIAL SWELLING: 0

## 2019-01-21 ENCOUNTER — TELEPHONE (OUTPATIENT)
Dept: FAMILY MEDICINE CLINIC | Age: 59
End: 2019-01-21

## 2019-01-21 DIAGNOSIS — R16.0 HEPATOMEGALY: Primary | ICD-10-CM

## 2019-01-22 ENCOUNTER — TELEPHONE (OUTPATIENT)
Dept: FAMILY MEDICINE CLINIC | Age: 59
End: 2019-01-22

## 2019-02-04 ENCOUNTER — HOSPITAL ENCOUNTER (EMERGENCY)
Age: 59
Discharge: HOME | End: 2019-02-04
Attending: FAMILY MEDICINE
Payer: MEDICARE

## 2019-02-04 ENCOUNTER — APPOINTMENT (OUTPATIENT)
Dept: GENERAL RADIOLOGY | Age: 59
End: 2019-02-04
Payer: MEDICARE

## 2019-02-04 ENCOUNTER — NURSE ONLY (OUTPATIENT)
Dept: CARDIOLOGY CLINIC | Age: 59
End: 2019-02-04

## 2019-02-04 VITALS
BODY MASS INDEX: 26.61 KG/M2 | HEART RATE: 89 BPM | DIASTOLIC BLOOD PRESSURE: 68 MMHG | SYSTOLIC BLOOD PRESSURE: 113 MMHG | RESPIRATION RATE: 20 BRPM | TEMPERATURE: 97.8 F | WEIGHT: 165.6 LBS | OXYGEN SATURATION: 100 % | HEIGHT: 66 IN
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
## 2019-02-04 DIAGNOSIS — S22.31XA CLOSED FRACTURE OF ONE RIB OF RIGHT SIDE, INITIAL ENCOUNTER: Primary | ICD-10-CM

## 2019-02-04 DIAGNOSIS — Z95.810 ICD (IMPLANTABLE CARDIOVERTER-DEFIBRILLATOR) IN PLACE: ICD-10-CM

## 2019-02-04 PROCEDURE — 73010 X-RAY EXAM OF SHOULDER BLADE: CPT

## 2019-02-04 PROCEDURE — 99283 EMERGENCY DEPT VISIT LOW MDM: CPT

## 2019-02-04 PROCEDURE — 71101 X-RAY EXAM UNILAT RIBS/CHEST: CPT

## 2019-02-04 RX ORDER — ACETAMINOPHEN AND CODEINE PHOSPHATE 300; 30 MG/1; MG/1
1 TABLET ORAL EVERY 4 HOURS PRN
Qty: 18 TABLET | Refills: 0 | Status: SHIPPED | OUTPATIENT
Start: 2019-02-04 | End: 2019-02-07

## 2019-02-04 RX ORDER — GABAPENTIN 300 MG/1
CAPSULE ORAL
Qty: 60 CAPSULE | Refills: 2 | Status: SHIPPED | OUTPATIENT
Start: 2019-02-04 | End: 2019-05-06 | Stop reason: SDUPTHER

## 2019-02-04 RX ORDER — GABAPENTIN 300 MG/1
300 CAPSULE ORAL 2 TIMES DAILY
Refills: 0 | COMMUNITY
Start: 2019-01-09 | End: 2019-05-01

## 2019-02-04 ASSESSMENT — PAIN DESCRIPTION - ONSET: ONSET: ON-GOING

## 2019-02-04 ASSESSMENT — PAIN SCALES - GENERAL: PAINLEVEL_OUTOF10: 8

## 2019-02-04 ASSESSMENT — PAIN DESCRIPTION - FREQUENCY: FREQUENCY: CONTINUOUS

## 2019-02-04 ASSESSMENT — PAIN DESCRIPTION - PROGRESSION: CLINICAL_PROGRESSION: GRADUALLY WORSENING

## 2019-02-04 ASSESSMENT — PAIN DESCRIPTION - DESCRIPTORS: DESCRIPTORS: CONSTANT

## 2019-02-04 ASSESSMENT — PAIN DESCRIPTION - LOCATION: LOCATION: RIB CAGE

## 2019-02-04 ASSESSMENT — PAIN DESCRIPTION - PAIN TYPE: TYPE: ACUTE PAIN

## 2019-02-04 ASSESSMENT — PAIN DESCRIPTION - ORIENTATION: ORIENTATION: RIGHT

## 2019-04-03 ENCOUNTER — OFFICE VISIT (OUTPATIENT)
Dept: FAMILY MEDICINE CLINIC | Age: 59
End: 2019-04-03
Payer: MEDICARE

## 2019-04-03 VITALS
BODY MASS INDEX: 26.36 KG/M2 | OXYGEN SATURATION: 98 % | HEIGHT: 66 IN | HEART RATE: 91 BPM | DIASTOLIC BLOOD PRESSURE: 44 MMHG | SYSTOLIC BLOOD PRESSURE: 76 MMHG | WEIGHT: 164 LBS

## 2019-04-03 DIAGNOSIS — E78.00 PURE HYPERCHOLESTEROLEMIA: ICD-10-CM

## 2019-04-03 DIAGNOSIS — I42.9 CARDIOMYOPATHY, UNSPECIFIED TYPE (HCC): ICD-10-CM

## 2019-04-03 DIAGNOSIS — I10 ESSENTIAL HYPERTENSION: Primary | ICD-10-CM

## 2019-04-03 DIAGNOSIS — E11.49 DM (DIABETES MELLITUS), TYPE 2 WITH NEUROLOGICAL COMPLICATIONS (HCC): ICD-10-CM

## 2019-04-03 DIAGNOSIS — Z12.11 COLON CANCER SCREENING: ICD-10-CM

## 2019-04-03 LAB — HBA1C MFR BLD: 6.9 %

## 2019-04-03 PROCEDURE — 1036F TOBACCO NON-USER: CPT | Performed by: FAMILY MEDICINE

## 2019-04-03 PROCEDURE — 3017F COLORECTAL CA SCREEN DOC REV: CPT | Performed by: FAMILY MEDICINE

## 2019-04-03 PROCEDURE — G8419 CALC BMI OUT NRM PARAM NOF/U: HCPCS | Performed by: FAMILY MEDICINE

## 2019-04-03 PROCEDURE — 3044F HG A1C LEVEL LT 7.0%: CPT | Performed by: FAMILY MEDICINE

## 2019-04-03 PROCEDURE — 99214 OFFICE O/P EST MOD 30 MIN: CPT | Performed by: FAMILY MEDICINE

## 2019-04-03 PROCEDURE — 2022F DILAT RTA XM EVC RTNOPTHY: CPT | Performed by: FAMILY MEDICINE

## 2019-04-03 PROCEDURE — G8598 ASA/ANTIPLAT THER USED: HCPCS | Performed by: FAMILY MEDICINE

## 2019-04-03 PROCEDURE — G8427 DOCREV CUR MEDS BY ELIG CLIN: HCPCS | Performed by: FAMILY MEDICINE

## 2019-04-03 PROCEDURE — 83036 HEMOGLOBIN GLYCOSYLATED A1C: CPT | Performed by: FAMILY MEDICINE

## 2019-04-03 RX ORDER — MIDODRINE HYDROCHLORIDE 5 MG/1
5 TABLET ORAL 3 TIMES DAILY
Qty: 90 TABLET | Refills: 3 | Status: SHIPPED | OUTPATIENT
Start: 2019-04-03 | End: 2019-10-04 | Stop reason: SDUPTHER

## 2019-04-03 ASSESSMENT — PATIENT HEALTH QUESTIONNAIRE - PHQ9
2. FEELING DOWN, DEPRESSED OR HOPELESS: 0
SUM OF ALL RESPONSES TO PHQ QUESTIONS 1-9: 0
1. LITTLE INTEREST OR PLEASURE IN DOING THINGS: 0
SUM OF ALL RESPONSES TO PHQ9 QUESTIONS 1 & 2: 0
SUM OF ALL RESPONSES TO PHQ QUESTIONS 1-9: 0

## 2019-04-03 ASSESSMENT — ENCOUNTER SYMPTOMS
EYE DISCHARGE: 0
NAUSEA: 0
EYE REDNESS: 0
COUGH: 0
BLURRED VISION: 0
DIARRHEA: 0
FACIAL SWELLING: 0
ABDOMINAL PAIN: 0
VOMITING: 0
BLOOD IN STOOL: 0
SHORTNESS OF BREATH: 0
CONSTIPATION: 0

## 2019-04-03 NOTE — PROGRESS NOTES
HPI Notes    Name: Dayan Lawrence  : 1960        Chief Complaint:     Chief Complaint   Patient presents with    Hypertension     States she is doing pretty good, did forget to get blood work done   [facility] Hyperlipidemia    Diabetes       History of Present Illness:     Dayan Lawrence is a 60 y.o.  female who presents with Hypertension (States she is doing pretty good, did forget to get blood work done); Hyperlipidemia; and Diabetes      Hypertension   This is a chronic problem. The current episode started more than 1 year ago. The problem is unchanged. Condition status: BP is low today BUt pt has been OUT of her midodrine so her BP has been low. But pt is doing well and denies any dizziness/lightheadedness. Pertinent negatives include no blurred vision, chest pain, headaches, palpitations, peripheral edema or shortness of breath. Risk factors for coronary artery disease include dyslipidemia and diabetes mellitus. The current treatment provides significant improvement. Hypertensive end-organ damage includes CAD/MI. Hyperlipidemia   This is a chronic problem. The current episode started more than 1 year ago. The problem is controlled. Recent lipid tests were reviewed and are normal. Exacerbating diseases include diabetes. She has no history of hypothyroidism. Pertinent negatives include no chest pain or shortness of breath. Current antihyperlipidemic treatment includes statins. The current treatment provides significant improvement of lipids. Risk factors for coronary artery disease include post-menopausal, dyslipidemia, diabetes mellitus and hypertension. Diabetes   She presents for her follow-up diabetic visit. She has type 2 diabetes mellitus. Her disease course has been stable. There are no hypoglycemic associated symptoms. Pertinent negatives for hypoglycemia include no dizziness, headaches or pallor.  Pertinent negatives for diabetes include no blurred vision, no chest pain, no fatigue, no 11/8/2018    CYSTOSCOPY STENT INSERTION performed by Julianna Vazquez MD at [facility] STENT INSRT Left 11/15/2018    LEFT CYSTOSCOPY URETEROSCOPY LASER,HLL, LEFT STENT EXCHANGE performed by Julianna Vazquez MD at [address]          Medications:       Prior to Admission medications    Medication Sig Start Date End Date Taking?  Authorizing Provider   midodrine (PROAMATINE) 5 MG tablet Take 1 tablet by mouth 3 times daily 4/3/19  Yes Sarah Price MD   insulin glargine [facility]) 100 UNIT/ML injection pen 40 units in the am and 20 units pm. 3/8/19  Yes Bishop Robles DO   gabapentin (NEURONTIN) 300 MG capsule take 1 capsule by mouth twice a day 2/4/19 5/5/19 Yes Sarah Price MD   lisinopril (PRINIVIL;ZESTRIL) 5 MG tablet Take 1 tablet by mouth daily 1/3/19  Yes Sarah Price MD   lactobacillus (CULTURELLE) capsule Take 1 capsule by mouth 2 times daily (with meals) 1/2/19  Yes Denny Sewell MD   levalbuterol (Dixie Leather) 1.25 MG/3ML nebulizer solution Take 3 mLs by nebulization every 4 hours as needed for Wheezing 1/2/19  Yes Denny Sewell MD   furosemide (LASIX) 40 MG tablet Take 1 tablet by mouth daily 12/11/18  Yes Sarah Price MD   blood glucose monitor strips Use to test daily 11/7/18  Yes Sarah Price MD   blood glucose monitor kit and supplies Use to test daily 11/7/18  Yes Sarah Price MD   clopidogrel (PLAVIX) 75 MG tablet take 1 tablet by mouth daily 11/6/18  Yes Octavia Mckeon PA-C   carvedilol (COREG) 3.125 MG tablet take 1 tablet by mouth twice a day with meals 9/24/18  Yes Sarah Price MD   atorvastatin (LIPITOR) 80 MG tablet Take 1 tablet by mouth daily 9/24/18  Yes Sarah Price MD   sertraline (ZOLOFT) 100 MG tablet take 1 tablet by mouth once daily 9/24/18  Yes Sarah Price MD   Insulin Pen Needle 32G X 4 MM MISC 1 each by Does not apply route daily 4/25/18  Yes Sarah Price MD   Cholecalciferol (VITAMIN D3) 5000 units TABS Take 5,000 Units by mouth daily   Yes Historical Provider, MD   glucose monitoring kit (FREESTYLE) monitoring kit 1 kit by Does not apply route daily as needed Please dispense meter covered by patients insurance. 11/19/15  Yes Grace Tipton MD   glucose blood VI test strips (EXACTECH TEST) strip 1 each by In Vitro route daily Pt testing BS once daily and prn 11/19/15  Yes MD Sue Herman MISC Pt testing BS once daily and prn 11/19/15  Yes Grace Tipton MD   Multiple Vitamins-Minerals (THERAPEUTIC MULTIVITAMIN-MINERALS) tablet Take 1 tablet by mouth daily (with breakfast) 6/12/15  Yes Danay Gil MD   gabapentin (NEURONTIN) 300 MG capsule Take 300 mg by mouth 2 times daily. . 1/9/19   Historical Provider, MD   Lancets MISC 1 each by Does not apply route daily 11/7/18   Grace Tipton MD        Allergies:       Codeine and Nsaids    Social History:     Tobacco:    reports that she quit smoking about 3 months ago. Her smoking use included cigarettes. She has a 3.20 pack-year smoking history. She has never used smokeless tobacco.  Alcohol:      reports that she does not drink alcohol. Drug Use:  reports that she does not use drugs. Family History:     Family History   Problem Relation Age of Onset    Stroke Father 76    Other Father         low BS    Diabetes Mother        Review of Systems:       Review of Systems   Constitutional: Negative for chills, fatigue, fever and unexpected weight change. HENT: Negative for congestion and facial swelling. Eyes: Negative for blurred vision, discharge and redness. Respiratory: Negative for cough and shortness of breath. Cardiovascular: Negative for chest pain, palpitations and leg swelling. Gastrointestinal: Negative for abdominal pain, blood in stool, constipation, diarrhea, nausea and vomiting. Endocrine: Negative for polydipsia and polyphagia. Genitourinary: Negative for dysuria and hematuria. Musculoskeletal: Negative for joint swelling and neck stiffness. Skin: Negative for pallor and rash. Neurological: Negative for dizziness, light-headedness and headaches. Psychiatric/Behavioral: Negative for decreased concentration and sleep disturbance. Physical Exam:     Physical Exam   Constitutional: She is oriented to person, place, and time. She appears well-developed and well-nourished. HENT:   Head: Normocephalic and atraumatic. Eyes: Pupils are equal, round, and reactive to light. Conjunctivae are normal. Right eye exhibits no discharge. Left eye exhibits no discharge. Neck: Neck supple. No thyromegaly present. Cardiovascular: Normal rate, regular rhythm, normal heart sounds and intact distal pulses. No murmur heard. Pulmonary/Chest: Effort normal. No respiratory distress. She has wheezes. Abdominal: Soft. Bowel sounds are normal. She exhibits no distension. There is no tenderness. Musculoskeletal: She exhibits no edema or deformity. Lymphadenopathy:     She has no cervical adenopathy. Neurological: She is alert and oriented to person, place, and time. Skin: No rash noted. No erythema. Psychiatric: She has a normal mood and affect. Her behavior is normal.   Vitals reviewed.       Vitals:  BP (!) 76/44 (Site: Left Upper Arm, Position: Sitting, Cuff Size: Medium Adult)   Pulse 91   Ht 5' 6\" (1.676 m)   Wt 164 lb (74.4 kg)   SpO2 98%   BMI 26.47 kg/m²       Data:     Lab Results   Component Value Date     01/02/2019    K 4.0 01/02/2019     01/02/2019    CO2 20 01/02/2019    BUN 32 01/02/2019    CREATININE 1.58 01/02/2019    GLUCOSE 173 01/02/2019    PROT 8.2 12/28/2018    LABALBU 3.7 12/28/2018    BILITOT 1.34 12/28/2018    ALKPHOS 89 12/28/2018    AST 5 12/28/2018    ALT 9 12/28/2018     Lab Results   Component Value Date    WBC 9.0 01/01/2019    RBC 3.00 01/01/2019    HGB 8.9 01/01/2019    HCT 26.8 01/01/2019    MCV 89.4 01/01/2019    MCH 29.5 01/01/2019

## 2019-04-03 NOTE — PATIENT INSTRUCTIONS
SURVEY:    You may be receiving a survey from [employer] regarding your visit today. Please complete the survey to enable us to provide the highest quality of care to you and your family. If you cannot score us a very good on any question, please call the office to discuss how we could of made your experience a very good one. Thank you.

## 2019-04-26 RX ORDER — CLOPIDOGREL BISULFATE 75 MG/1
TABLET ORAL
Qty: 30 TABLET | Refills: 5 | Status: SHIPPED | OUTPATIENT
Start: 2019-04-26 | End: 2019-10-11 | Stop reason: SDUPTHER

## 2019-04-26 NOTE — TELEPHONE ENCOUNTER
plavix 75 mg    Pramod    Last check up 4/3/19    Health Maintenance   Topic Date Due    Hepatitis C screen  1960    HIV screen  09/19/1974    Hepatitis B Vaccine (1 of 3 - Risk 3-dose series) 09/19/1978    DTaP/Tdap/Td vaccine (1 - Tdap) 09/19/1978    Cervical cancer screen  09/19/1980    Shingles Vaccine (1 of 2) 09/19/2009    Colon cancer screen colonoscopy  09/19/2009    Diabetic foot exam  09/24/2019    Diabetic microalbuminuria test  09/24/2019    Lipid screen  11/05/2019    Potassium monitoring  01/02/2020    Creatinine monitoring  01/02/2020    Diabetic retinal exam  03/30/2020    A1C test (Diabetic or Prediabetic)  04/03/2020    Breast cancer screen  10/11/2020    Flu vaccine  Completed    Pneumococcal 0-64 years Vaccine  Completed             (applicable per patient's age: Cancer Screenings, Depression Screening, Fall Risk Screening, Immunizations)    Hemoglobin A1C (%)   Date Value   04/03/2019 6.9   12/28/2018 10.6 (H)   11/04/2018 8.6 (H)     Microalb/Crt.  Ratio (mcg/mg creat)   Date Value   09/22/2017 29 (H)     LDL Cholesterol (mg/dL)   Date Value   11/05/2018          AST (U/L)   Date Value   12/28/2018 5     ALT (U/L)   Date Value   12/28/2018 9     BUN (mg/dL)   Date Value   01/02/2019 32 (H)      (goal A1C is < 7)   (goal LDL is <100) need 30-50% reduction from baseline     BP Readings from Last 3 Encounters:   04/03/19 (!) 76/44   02/04/19 113/68   01/08/19 100/60    (goal /80)      All Future Testing planned in [facility]:  Lab Frequency Next Occurrence   ALEE DIGITAL SCREEN W CAD BILATERAL Once 04/25/2018   POCT Fecal Immunochemical Test (FIT) Once 09/23/2019   CBC Auto Differential Once 10/29/2019   Comprehensive Metabolic Panel Once 02/97/7166   Vitamin D 25 Hydroxy Once 10/29/2019   Lipid Panel Once 10/29/2019   TSH with Reflex Once 10/29/2019   Magnesium Once 10/29/2019   EKG 12 Lead Once 10/29/2019   XR CHEST STANDARD (2 VW) Once 10/29/2019   Hemoglobin A1C Once 70/68/3387   Basic Metabolic Panel Once 21/16/5738   XR ABDOMEN (KUB) (SINGLE AP VIEW) Once 01/03/2020   POCT Fecal Immunochemical Test (FIT) Once 01/08/2020   MRI ABDOMEN W WO CONTRAST Once 01/08/2020   CT ABDOMEN PELVIS WO CONTRAST Once 02/10/2020   Transfuse RBC         Next Visit Date:  Future Appointments   Date Time Provider Hong Linda   7/29/2019 10:30 AM MD Bianca Sears [facility] [place]   10/21/2019 10:30 AM MD Bianca Sears W   12/19/2019  2:30 PM David Wills MD Will Urol [facility] [place]            Patient Active Problem List:     Acute coronary syndrome [facility])     S/P cardiac cath-Patent grafts 6/5/15     Onychomycosis of toenail     CAD (coronary artery disease)     Hx of CABG     ICD (implantable cardioverter-defibrillator) in place     H/O mitral valve repair     Hx of myocardial infarction     Hyperlipidemia     Hypertension     Cardiomyopathy ([address]     DM (diabetes mellitus), type 2 with neurological complications ([address]     Anxiety     CHAD (acute kidney injury) ([address]     Diabetic ketoacidosis without coma associated with type 2 diabetes mellitus (HCC)     Elevated serum immunoglobulin free light chains     Ureteral stone with hydronephrosis     Acute pulmonary edema (HCC)     Dyslipidemia     Pneumonia due to infectious organism     Pneumonia

## 2019-05-01 ENCOUNTER — HOSPITAL ENCOUNTER (EMERGENCY)
Age: 59
Discharge: HOME | End: 2019-05-01
Attending: FAMILY MEDICINE
Payer: MEDICARE

## 2019-05-01 VITALS
SYSTOLIC BLOOD PRESSURE: 106 MMHG | TEMPERATURE: 98.6 F | RESPIRATION RATE: 24 BRPM | HEART RATE: 99 BPM | DIASTOLIC BLOOD PRESSURE: 56 MMHG | BODY MASS INDEX: 25.89 KG/M2 | WEIGHT: 161.1 LBS | OXYGEN SATURATION: 95 % | HEIGHT: 66 IN

## 2019-05-01 DIAGNOSIS — J44.1 COPD EXACERBATION (HCC): Primary | ICD-10-CM

## 2019-05-01 PROCEDURE — 99283 EMERGENCY DEPT VISIT LOW MDM: CPT

## 2019-05-01 RX ORDER — GUAIFENESIN/DEXTROMETHORPHAN 100-10MG/5
5 SYRUP ORAL 3 TIMES DAILY PRN
Qty: 120 ML | Refills: 0 | Status: SHIPPED | OUTPATIENT
Start: 2019-05-01 | End: 2019-05-11

## 2019-05-01 RX ORDER — AZITHROMYCIN 250 MG/1
TABLET, FILM COATED ORAL
Qty: 1 PACKET | Refills: 0 | Status: SHIPPED | OUTPATIENT
Start: 2019-05-01 | End: 2019-05-05

## 2019-05-01 ASSESSMENT — PAIN DESCRIPTION - PAIN TYPE: TYPE: ACUTE PAIN

## 2019-05-01 NOTE — ED PROVIDER NOTES
eMERGENCY dEPARTMENT eNCOUnter        Cole Vences    Chief Complaint   Patient presents with    Cough     started yesterday with cough and she is coughing up yellow stuff       HPI    Sherry Denny is a 60 y.o. female who presents with cough. Productive of yellow sputum. She is a smoker. She has been sick for 2 days. She has taken Tylenol for the aches. Nothing use for the cough. No hemoptysis. Denies fever and sore throat. Does have body aches. Patient had a 154 BS this AM. Here with 3 grandchildren  REVIEW OF SYSTEMS    All body systems reviewed with pertinent positive findings mentioned in history of present illness.     PAST MEDICAL HISTORY    Past Medical History:   Diagnosis Date    CAD (coronary artery disease)     Cardiomyopathy ([address]     Depression     Diabetes mellitus ([address]     H/O mitral valve repair     History of fractured kneecap 2017    right    Hx of CABG     Hx of myocardial infarction     Hyperlipidemia     Hypertension     ICD (implantable cardioverter-defibrillator) in place     Kidney calculi     Osteoarthritis     Pneumonia     Renal calculi        SURGICAL HISTORY    Past Surgical History:   Procedure Laterality Date    ANKLE SURGERY Right 2009    plate and screws     CARDIAC DEFIBRILLATOR PLACEMENT  06/01/15    CHOLECYSTECTOMY  1990    CORONARY ARTERY BYPASS GRAFT  05/28/15    X 3- Dr Patricia CABELLO-LAD<SVG-Diag, SVG-PDA, MV Repair with 30 min CE IMR ring    CYSTOSCOPY Left 11/08/2018    HYSTERECTOMY  1990    KIDNEY STONE SURGERY Left 2010    MITRAL VALVE SURGERY  6/4/15    [place] CYSTO/URETERO W/LITHOTRIPSY &INDWELL STENT INSRT Left 11/8/2018    CYSTOSCOPY URETEROSCOPY performed by Liz Chery MD at [facility] [address] Left 11/8/2018    CYSTOSCOPY STENT INSERTION performed by Liz Chery MD at [facility] STENT INSRT Left 11/15/2018    LEFT CYSTOSCOPY URETEROSCOPY LASER,HLL, LEFT STENT EXCHANGE performed by Betina Escobedo MD at [facility]    Current Outpatient Rx   Medication Sig Dispense Refill    clopidogrel (PLAVIX) 75 MG tablet take 1 tablet by mouth daily 30 tablet 5    insulin glargine (BASAGLAR KWIKPEN) 100 UNIT/ML injection pen inject 40 units subcutaneously every morning and 20 units every evening 30 mL 5    midodrine (PROAMATINE) 5 MG tablet Take 1 tablet by mouth 3 times daily 90 tablet 3    gabapentin (NEURONTIN) 300 MG capsule take 1 capsule by mouth twice a day 60 capsule 2    lisinopril (PRINIVIL;ZESTRIL) 5 MG tablet Take 1 tablet by mouth daily 30 tablet 5    lactobacillus (CULTURELLE) capsule Take 1 capsule by mouth 2 times daily (with meals) 30 capsule 0    levalbuterol (XOPENEX) 1.25 MG/3ML nebulizer solution Take 3 mLs by nebulization every 4 hours as needed for Wheezing 360 mL 2    furosemide (LASIX) 40 MG tablet Take 1 tablet by mouth daily 30 tablet 5    carvedilol (COREG) 3.125 MG tablet take 1 tablet by mouth twice a day with meals 60 tablet 5    atorvastatin (LIPITOR) 80 MG tablet Take 1 tablet by mouth daily 30 tablet 5    sertraline (ZOLOFT) 100 MG tablet take 1 tablet by mouth once daily 30 tablet 5    Cholecalciferol (VITAMIN D3) 5000 units TABS Take 5,000 Units by mouth daily      Multiple Vitamins-Minerals (THERAPEUTIC MULTIVITAMIN-MINERALS) tablet Take 1 tablet by mouth daily (with breakfast) 60 tablet 3    blood glucose monitor strips Use to test daily 100 strip 5    blood glucose monitor kit and supplies Use to test daily 1 kit 0    Lancets MISC 1 each by Does not apply route daily 100 each 3    Insulin Pen Needle 32G X 4 MM MISC 1 each by Does not apply route daily 100 each 3    glucose monitoring kit (FREESTYLE) monitoring kit 1 kit by Does not apply route daily as needed Please dispense meter covered by patients insurance.  1 kit 0    glucose blood VI test strips (EXACTECH TEST) strip 1 each by In Vitro route daily Pt testing BS once daily and prn 100 each 3    Lancets MISC Pt testing BS once daily and prn 100 each 3       ALLERGIES    Allergies   Allergen Reactions    Codeine Nausea And Vomiting    Nsaids Nausea Only     nausea       FAMILY HISTORY    Family History   Problem Relation Age of Onset    Stroke Father 76    Other Father         low BS    Diabetes Mother        SOCIAL HISTORY    Social History     Socioeconomic History    Marital status:      Spouse name: None    Number of children: None    Years of education: None    Highest education level: None   Occupational History    None   Social Needs    Financial resource strain: None    Food insecurity:     Worry: None     Inability: None    Transportation needs:     Medical: None     Non-medical: None   Tobacco Use    Smoking status: Current Every Day Smoker     Packs/day: 0.50     Years: 32.00     Pack years: 16.00     Types: Cigarettes     Last attempt to quit: 2018     Years since quittin.3    Smokeless tobacco: Never Used   Substance and Sexual Activity    Alcohol use: No     Alcohol/week: 0.0 oz    Drug use: No    Sexual activity: None   Lifestyle    Physical activity:     Days per week: None     Minutes per session: None    Stress: None   Relationships    Social connections:     Talks on phone: None     Gets together: None     Attends Mu-ism service: None     Active member of club or organization: None     Attends meetings of clubs or organizations: None     Relationship status: None    Intimate partner violence:     Fear of current or ex partner: None     Emotionally abused: None     Physically abused: None     Forced sexual activity: None   Other Topics Concern    None   Social History Narrative    None       PHYSICAL EXAM    VITAL SIGNS: BP (!) 106/56   Pulse 99   Temp 98.6 °F (37 °C)   Resp 24   Ht 5' 6\" (1.676 m)   Wt 161 lb 1.6 oz (73.1 kg)   SpO2 95%   BMI 26.00 kg/m²   Constitutional:  Well developed, well nourished, 62-year-old female with harsh cough, non-toxic appearance   Eyes: PERRL, conjunctiva normal   HENT:  TM's clear, Nasal congestion, rhinorrhea, oropharynx shows posterior drainage. No exudate or erythema. Teeth in poor condition. Neck is supple and moved well no anterior adenopathy. Respiratory:  Bilateral rhonchi,  Not distressed  Cardiovascular:  Normal rate, normal rhythm, no murmurs, no gallops, no rubs   Musculoskeletal:  No edema or tenderness of the extremities. The patient is controlled and coordinated movements of her extremities. The gait is stable. Integument:  Well hydrated, no generalized. rash     ED COURSE & MEDICAL DECISION MAKING    Pertinent Labs & Imaging studies reviewed. (See chart for details)    Summation      Patient Course: 62-year-old female with cough and difficulty breathing is treated for exacerbation of COPD. She has productive yellow sputum. She is treated with Zithromax and Robitussin-DM. Encouraged to quit smoking. Continue regular medications. Follow-up with PCP. ED Medications administered this visit:  Medications - No data to display    New Prescriptions from this visit:    New Prescriptions    AZITHROMYCIN (ZITHROMAX Z-TANIKA) 250 MG TABLET    Take 2 tablets (500 mg) on Day 1, and then take 1 tablet (250 mg) on days 2 through 5. GUAIFENESIN-DEXTROMETHORPHAN (ROBITUSSIN DM) 100-10 MG/5ML SYRUP    Take 5 mLs by mouth 3 times daily as needed for Cough       Follow-up:  Grace Tipton MD  [address] [place] [address] [id number] [phone]    Schedule an appointment as soon as possible for a visit in 2 weeks  If symptoms worsen        Final Impression:   1.  COPD exacerbation ([facility] Stable              (Please note that portions of this note were completed with a voice recognition program.  Efforts were made to edit the dictations but occasionally words are mis-transcribed.)       Sonal Patterson [facility], [address] [place] [address]  05/02/19 [id number]

## 2019-05-02 ENCOUNTER — CARE COORDINATION (OUTPATIENT)
Dept: CARE COORDINATION | Age: 59
End: 2019-05-02

## 2019-05-02 NOTE — CARE COORDINATION
Patient was called to follow up with most recent ER visit. There was no answer. A message was left on voicemail to have patient call back regarding ER visit. Office number given [phone].

## 2019-05-06 RX ORDER — ATORVASTATIN CALCIUM 80 MG/1
TABLET, FILM COATED ORAL
Qty: 30 TABLET | Refills: 5 | Status: SHIPPED | OUTPATIENT
Start: 2019-05-06 | End: 2019-10-20 | Stop reason: SDUPTHER

## 2019-05-06 RX ORDER — GABAPENTIN 300 MG/1
CAPSULE ORAL
Qty: 60 CAPSULE | Refills: 2 | Status: SHIPPED | OUTPATIENT
Start: 2019-05-06 | End: 2019-06-26

## 2019-05-20 ENCOUNTER — TELEPHONE (OUTPATIENT)
Dept: FAMILY MEDICINE CLINIC | Age: 59
End: 2019-05-20

## 2019-05-20 DIAGNOSIS — R19.5 POSITIVE COLORECTAL CANCER SCREENING USING COLOGUARD TEST: Primary | ICD-10-CM

## 2019-05-20 NOTE — TELEPHONE ENCOUNTER
----- Message from Olivia Arambula MD sent at 5/20/2019 12:51 PM EDT -----  Please tell pt her cologuard was POSITIVE so pt needs to do a colonoscopy

## 2019-05-31 ENCOUNTER — TELEPHONE (OUTPATIENT)
Dept: FAMILY MEDICINE CLINIC | Age: 59
End: 2019-05-31

## 2019-05-31 NOTE — TELEPHONE ENCOUNTER
Meliza Stein said her gabapentin 300 mg's arent working for her. She would like to know if she can get something else to help her? She is aware Dr. Tonya Chávez is off until Monday. Please let patient know. Health Maintenance   Topic Date Due    Hepatitis C screen  1960    HIV screen  09/19/1974    Hepatitis B Vaccine (1 of 3 - Risk 3-dose series) 09/19/1978    DTaP/Tdap/Td vaccine (1 - Tdap) 09/19/1978    Cervical cancer screen  09/19/1980    Shingles Vaccine (1 of 2) 09/19/2009    Diabetic foot exam  09/24/2019    Diabetic microalbuminuria test  09/24/2019    Lipid screen  11/05/2019    Potassium monitoring  01/02/2020    Creatinine monitoring  01/02/2020    Diabetic retinal exam  03/30/2020    A1C test (Diabetic or Prediabetic)  04/03/2020    Colon cancer screen colonoscopy  05/06/2020    Breast cancer screen  10/11/2020    Flu vaccine  Completed    Pneumococcal 0-64 years Vaccine  Completed             (applicable per patient's age: Cancer Screenings, Depression Screening, Fall Risk Screening, Immunizations)    Hemoglobin A1C (%)   Date Value   04/03/2019 6.9   12/28/2018 10.6 (H)   11/04/2018 8.6 (H)     Microalb/Crt.  Ratio (mcg/mg creat)   Date Value   09/22/2017 29 (H)     LDL Cholesterol (mg/dL)   Date Value   11/05/2018          AST (U/L)   Date Value   12/28/2018 5     ALT (U/L)   Date Value   12/28/2018 9     BUN (mg/dL)   Date Value   01/02/2019 32 (H)      (goal A1C is < 7)   (goal LDL is <100) need 30-50% reduction from baseline     BP Readings from Last 3 Encounters:   05/01/19 (!) 106/56   04/03/19 (!) 76/44   02/04/19 113/68    (goal /80)      All Future Testing planned in [facility]:  Lab Frequency Next Occurrence   ALEE DIGITAL SCREEN W CAD BILATERAL Once 04/25/2018   POCT Fecal Immunochemical Test (FIT) Once 09/23/2019   CBC Auto Differential Once 10/29/2019   Comprehensive Metabolic Panel Once 46/07/8542   Vitamin D 25 Hydroxy Once 10/29/2019   Lipid Panel Once 10/29/2019   TSH with Reflex Once 10/29/2019   Magnesium Once 10/29/2019   EKG 12 Lead Once 10/29/2019   XR CHEST STANDARD (2 VW) Once 10/29/2019   Hemoglobin A1C Once 56/10/3696   Basic Metabolic Panel Once 46/86/3881   XR ABDOMEN (KUB) (SINGLE AP VIEW) Once 01/03/2020   POCT Fecal Immunochemical Test (FIT) Once 01/08/2020   MRI ABDOMEN W WO CONTRAST Once 01/08/2020   CT ABDOMEN PELVIS WO CONTRAST Once 02/10/2020   Transfuse RBC         Next Visit Date:  Future Appointments   Date Time Provider Hong Mckeon   7/29/2019 10:30 AM MD Sandee Guo [facility]   10/21/2019 10:30 AM MD Sandee Guo [facility]   12/19/2019  2:30 PM Juanito Morales MD Will [facility]            Patient Active Problem List:     Acute coronary syndrome [facility])     S/P cardiac cath-Patent grafts 6/5/15     Onychomycosis of toenail     CAD (coronary artery disease)     Hx of CABG     ICD (implantable cardioverter-defibrillator) in place     H/O mitral valve repair     Hx of myocardial infarction     Hyperlipidemia     Hypertension     Cardiomyopathy ([address]     DM (diabetes mellitus), type 2 with neurological complications ([address]     Anxiety     CHAD (acute kidney injury) ([address]     Diabetic ketoacidosis without coma associated with type 2 diabetes mellitus (HCC)     Elevated serum immunoglobulin free light chains     Ureteral stone with hydronephrosis     Acute pulmonary edema (HCC)     Dyslipidemia     Pneumonia due to infectious organism     Pneumonia

## 2019-06-12 ENCOUNTER — CARE COORDINATION (OUTPATIENT)
Dept: CARE COORDINATION | Age: 59
End: 2019-06-12

## 2019-06-12 NOTE — LETTER
6/12/2019    [address] [place]      Dear Kalyan Pino,    My name is Helena Swain and I am a registered nurse who partners with Roe Bejarano MD to improve patients' health. Roe Bejarano MD believes you would benefit from working with me. As a member of your health care team, I would work with other providers involved in your care, offer education for your specific health conditions, and connect you with additional resources as needed. I will collaborate with Roe Bejarano MD to support you in following your treatment plan. The additional support I provide is no additional cost to you. My primary focus is to help you achieve specific goals and improve your health. We are committed to walk with you on this journey and look forward to working with you. Please call me to further discuss your healthcare needs. I am available by phone or for appointments at the office. You can reach me at [phone].     In good health,     Helena Swain RN

## 2019-06-12 NOTE — CARE COORDINATION
Ambulatory Care Coordination Note  6/12/2019  CM Risk Score: 14  Cisco Mortality Risk Score:      ACC: Selene Garcia RN    Summary Note:     Attempted to reach out today to patient to assess for Care coordination needs. Patient with history of:   Patient Active Problem List    Diagnosis Date Noted    Pneumonia due to infectious organism 12/28/2018    Pneumonia 12/28/2018    Dyslipidemia     Ureteral stone with hydronephrosis 11/04/2018    Acute pulmonary edema (HCC) 11/04/2018    Elevated serum immunoglobulin free light chains     CHAD (acute kidney injury) ([address] 08/23/2018    Diabetic ketoacidosis without coma associated with type 2 diabetes mellitus ([address] 08/23/2018    DM (diabetes mellitus), type 2 with neurological complications ([address] 31/09/4013    Anxiety 01/26/2016    Hyperlipidemia 12/07/2015    CAD (coronary artery disease)     Hx of CABG     ICD (implantable cardioverter-defibrillator) in place     H/O mitral valve repair     Hx of myocardial infarction     Hypertension     Cardiomyopathy ([address]     Onychomycosis of toenail 11/19/2015    S/P cardiac cath-Patent grafts 6/5/15 06/04/2015    Acute coronary syndrome ([address] 05/26/2015     Patient has had 2 ED visits in the last 6 months and 1 hospitalization:   1)Hospitalization 12/28/2018  2)ED02/04/19 Fracture of Rib  3)ED 05/01/19 COPD exacerbation. Patient is a Diabetic with last A1C of:  Lab Results   Component Value Date    LABA1C 6.9 04/03/2019     Lab Results   Component Value Date     12/28/2018     Patient last seen 04/03/19 with Dr. Richard Paulson:     Assessment/Plan:          1. Essential hypertension  Stable on zestril and coreg      2. Pure hypercholesterolemia  Stable on lipitor     3.  DM (diabetes mellitus), type 2 with neurological complications ([address]  F/U 6mos, and labs in July with Dr Castro Reason Do daily foot checks and yearly eye exams  hgba1c is 6.9 so good     4. Cardiomyopathy, unspecified type ([address]  Stable on zestril and coreg     5. Colon cancer screening  Return test  - CINDY (For external results only)        Recommend pt QUIT smoking    Care Coordination Plan of Care: This nurse Care Coordinator will attempt to reach out to patient again for care coordination needs in 1 week. -Mailed Reach out letter today. Prior to Admission medications    Medication Sig Start Date End Date Taking?  Authorizing Provider   atorvastatin (LIPITOR) 80 MG tablet take 1 tablet by mouth once daily 5/6/19   Aleksandr Martinez MD   gabapentin (NEURONTIN) 300 MG capsule take 1 capsule by mouth twice a day 5/6/19 8/4/19  Aleksandr Martinez MD   clopidogrel (PLAVIX) 75 MG tablet take 1 tablet by mouth daily 4/26/19   Aleksandr Martinez MD   insulin glargine [facility]) 100 UNIT/ML injection pen inject 40 units subcutaneously every morning and 20 units every evening 4/22/19   Guido Conley DO   midodrine (PROAMATINE) 5 MG tablet Take 1 tablet by mouth 3 times daily 4/3/19   Aleksandr Martinez MD   lisinopril (PRINIVIL;ZESTRIL) 5 MG tablet Take 1 tablet by mouth daily 1/3/19   Aleksandr Martinez MD   lactobacillus (CULTURELLE) capsule Take 1 capsule by mouth 2 times daily (with meals) 1/2/19   Denny Sewell MD   levalbuterol (Amy Jakes) 1.25 MG/3ML nebulizer solution Take 3 mLs by nebulization every 4 hours as needed for Wheezing 1/2/19   Gemma Nga Sewell MD   furosemide (LASIX) 40 MG tablet Take 1 tablet by mouth daily 12/11/18   Aleksandr Martinez MD   blood glucose monitor strips Use to test daily 11/7/18   Aleksandr Martinez MD   blood glucose monitor kit and supplies Use to test daily 11/7/18   Aleksandr Martinez MD   Lancets MISC 1 each by Does not apply route daily 11/7/18   Aleksandr Martinez MD   carvedilol (COREG) 3.125 MG tablet take 1 tablet by mouth twice a day with meals 9/24/18   Aleksandr Martinez MD   sertraline (ZOLOFT) 100 MG tablet take 1 tablet by mouth once daily 9/24/18   Aleksandr Martinez MD   Insulin Pen Needle 32G X 4 MM MISC 1 each by Does not apply route daily 4/25/18   Brenda Whitaker MD   Cholecalciferol (VITAMIN D3) 5000 units TABS Take 5,000 Units by mouth daily    Historical Provider, MD   glucose monitoring kit (FREESTYLE) monitoring kit 1 kit by Does not apply route daily as needed Please dispense meter covered by patients insurance.  11/19/15   Brenda Whitaker MD   glucose blood VI test strips (EXACTECH TEST) strip 1 each by In Vitro route daily Pt testing BS once daily and prn 11/19/15   Brenda Whitaker MD   Lancets MISC Pt testing BS once daily and prn 11/19/15   Brenda Whitaker MD   Multiple Vitamins-Minerals (THERAPEUTIC MULTIVITAMIN-MINERALS) tablet Take 1 tablet by mouth daily (with breakfast) 6/12/15   Emil Johnson MD       Future Appointments   Date Time Provider Hong Mckeon   6/26/2019  1:15 PM Brenda Whitaker MD [facility]   7/29/2019 10:30 AM MD Broderick Ruelas [facility]   10/21/2019 10:30 AM MD Broderick Ruelas [facility]   12/19/2019  2:30 PM Rianna Mendosa MD [facility]

## 2019-06-17 RX ORDER — FUROSEMIDE 40 MG/1
40 TABLET ORAL DAILY
Qty: 30 TABLET | Refills: 5 | Status: SHIPPED | OUTPATIENT
Start: 2019-06-17 | End: 2019-12-12 | Stop reason: SDUPTHER

## 2019-06-19 ENCOUNTER — CARE COORDINATION (OUTPATIENT)
Dept: CARE COORDINATION | Age: 59
End: 2019-06-19

## 2019-06-19 NOTE — CARE COORDINATION
Ambulatory Care Coordination Note  6/19/2019  CM Risk Score: 14  Cisco Mortality Risk Score:      ACC: Selene Garcia RN    Summary Note:     Attempted to reach out today to patient to assess for Care coordination needs. Patient with history of:   Patient Active Problem List    Diagnosis Date Noted    Pneumonia due to infectious organism 12/28/2018    Pneumonia 12/28/2018    Dyslipidemia     Ureteral stone with hydronephrosis 11/04/2018    Acute pulmonary edema (HCC) 11/04/2018    Elevated serum immunoglobulin free light chains     CHAD (acute kidney injury) ([address] 08/23/2018    Diabetic ketoacidosis without coma associated with type 2 diabetes mellitus ([address] 08/23/2018    DM (diabetes mellitus), type 2 with neurological complications ([address] 26/84/2098    Anxiety 01/26/2016    Hyperlipidemia 12/07/2015    CAD (coronary artery disease)     Hx of CABG     ICD (implantable cardioverter-defibrillator) in place     H/O mitral valve repair     Hx of myocardial infarction     Hypertension     Cardiomyopathy ([address]     Onychomycosis of toenail 11/19/2015    S/P cardiac cath-Patent grafts 6/5/15 06/04/2015    Acute coronary syndrome ([address] 05/26/2015     Patient has had 2 ED visits in the last 6 months and 1 hospitalization:   1)Hospitalization 12/28/2018  2)ED02/04/19 Fracture of Rib  3)ED 05/01/19 COPD exacerbation. Patient is a Diabetic with last A1C of:  Lab Results   Component Value Date    LABA1C 6.9 04/03/2019     Lab Results   Component Value Date     12/28/2018     Patient last seen 04/03/19 with Dr. Richard Paulson:     Assessment/Plan:          1. Essential hypertension  Stable on zestril and coreg      2. Pure hypercholesterolemia  Stable on lipitor     3.  DM (diabetes mellitus), type 2 with neurological complications ([address]  F/U 6mos, and labs in July with Dr Castro Reason Do daily foot checks and yearly eye exams  hgba1c is 6.9 so good     4. Cardiomyopathy, unspecified type ([address]  Stable on zestril and coreg     5. Colon cancer screening  Return test  - CINDY (For external results only)        Recommend pt QUIT smoking    Care Coordination Plan of Care: This nurse Care Coordinator will attempt to reach out to patient again for care coordination needs in 1 week. -Mailed Reach Follow up Introduction letter today (Second attempt)        Prior to Admission medications    Medication Sig Start Date End Date Taking?  Authorizing Provider   furosemide (LASIX) 40 MG tablet Take 1 tablet by mouth daily 6/17/19   MICKEY Pompa CNP   Insulin Pen Needle 32G X 4 MM MISC 1 each by Does not apply route daily 6/17/19   MICKEY Pompa CNP   atorvastatin (LIPITOR) 80 MG tablet take 1 tablet by mouth once daily 5/6/19   Jeffrey Cabrera MD   gabapentin (NEURONTIN) 300 MG capsule take 1 capsule by mouth twice a day 5/6/19 8/4/19  Jeffrey Cabrera MD   clopidogrel (PLAVIX) 75 MG tablet take 1 tablet by mouth daily 4/26/19   Jeffrey Cabrera MD   insulin glargine [facility]) 100 UNIT/ML injection pen inject 40 units subcutaneously every morning and 20 units every evening 4/22/19   Belen Booth DO   midodrine (PROAMATINE) 5 MG tablet Take 1 tablet by mouth 3 times daily 4/3/19   Jeffrey Cabrera MD   lisinopril (PRINIVIL;ZESTRIL) 5 MG tablet Take 1 tablet by mouth daily 1/3/19   Jeffrey Cabrera MD   lactobacillus (CULTURELLE) capsule Take 1 capsule by mouth 2 times daily (with meals) 1/2/19   Denny Sewell MD   levalbuterol ([facility]ell Pickup) 1.25 MG/3ML nebulizer solution Take 3 mLs by nebulization every 4 hours as needed for Wheezing 1/2/19   Denny Sewell MD   blood glucose monitor strips Use to test daily 11/7/18   Jeffrey Cabrera MD   blood glucose monitor kit and supplies Use to test daily 11/7/18   Jeffrey Cabrera MD   Lancets MISC 1 each by Does not apply route daily 11/7/18   Jeffrey Cabrera MD   carvedilol (COREG) 3.125 MG tablet take 1 tablet by mouth twice a day with meals 9/24/18 Alabro Wilkinson MD   sertraline (ZOLOFT) 100 MG tablet take 1 tablet by mouth once daily 9/24/18   Albaro Wilkinson MD   Cholecalciferol (VITAMIN D3) 5000 units TABS Take 5,000 Units by mouth daily    Historical Provider, MD   glucose monitoring kit (FREESTYLE) monitoring kit 1 kit by Does not apply route daily as needed Please dispense meter covered by patients insurance.  11/19/15   Albaro Wilkinson MD   glucose blood VI test strips (EXACTECH TEST) strip 1 each by In Vitro route daily Pt testing BS once daily and prn 11/19/15   Albaro Wilkinson MD   Lancets MISC Pt testing BS once daily and prn 11/19/15   Albaro Wilkinson MD   Multiple Vitamins-Minerals (THERAPEUTIC MULTIVITAMIN-MINERALS) tablet Take 1 tablet by mouth daily (with breakfast) 6/12/15   Darshana Wade MD       Future Appointments   Date Time Provider Hong Mckeon   6/26/2019  1:15 PM [facility]   7/29/2019 10:30 AM MD Noam Nolan [facility]   10/21/2019 10:30 AM MD Noam Nolan [facility]   12/19/2019  2:30 PM Damion Nino MD Will Urol [facility]

## 2019-06-19 NOTE — LETTER
6/19/2019     [address] [place]    Dear Kb Malone    I recently attempted to contact you to discuss your healthcare needs. My name is Alberto Rojas and I am a registered nurse who partners with Yesi Ovalles MD to improve patients health. As a member of your health care team, I would work with other providers involved in your care, offer education for your specific health conditions, and connect you with additional resources as needed. I will collaborate with Anselmo Nuñez MD to support you in following your treatment plan. The additional support I provide is no additional cost to you. My primary focus is to help you achieve specific goals and improve your health. I look forward to working with you. Please contact me at your earliest convenience at [phone].     In good health,    Alberto Rojas RN

## 2019-06-25 ENCOUNTER — CARE COORDINATION (OUTPATIENT)
Dept: CARE COORDINATION | Age: 59
End: 2019-06-25

## 2019-06-25 NOTE — CARE COORDINATION
pt QUIT smoking    Future Appointments   Date Time Provider Hong Mckeon   6/26/2019  1:15 PM Anselmo Nuñez MD [facility]   7/29/2019 10:30 AM MD Leyla Quintana [facility]   10/21/2019 10:30 AM MD Leyla Quintana [facility]   12/19/2019  2:30 PM Charles Cummings MD [facility]

## 2019-06-26 ENCOUNTER — CARE COORDINATION (OUTPATIENT)
Dept: CARE COORDINATION | Age: 59
End: 2019-06-26

## 2019-06-26 ENCOUNTER — OFFICE VISIT (OUTPATIENT)
Dept: FAMILY MEDICINE CLINIC | Age: 59
End: 2019-06-26
Payer: MEDICARE

## 2019-06-26 VITALS
HEIGHT: 66 IN | SYSTOLIC BLOOD PRESSURE: 120 MMHG | HEART RATE: 90 BPM | WEIGHT: 166 LBS | DIASTOLIC BLOOD PRESSURE: 74 MMHG | OXYGEN SATURATION: 97 % | BODY MASS INDEX: 26.68 KG/M2

## 2019-06-26 DIAGNOSIS — E11.49 DM (DIABETES MELLITUS), TYPE 2 WITH NEUROLOGICAL COMPLICATIONS (HCC): Primary | ICD-10-CM

## 2019-06-26 PROCEDURE — G8598 ASA/ANTIPLAT THER USED: HCPCS | Performed by: FAMILY MEDICINE

## 2019-06-26 PROCEDURE — 3017F COLORECTAL CA SCREEN DOC REV: CPT | Performed by: FAMILY MEDICINE

## 2019-06-26 PROCEDURE — 2022F DILAT RTA XM EVC RTNOPTHY: CPT | Performed by: FAMILY MEDICINE

## 2019-06-26 PROCEDURE — G8427 DOCREV CUR MEDS BY ELIG CLIN: HCPCS | Performed by: FAMILY MEDICINE

## 2019-06-26 PROCEDURE — 3044F HG A1C LEVEL LT 7.0%: CPT | Performed by: FAMILY MEDICINE

## 2019-06-26 PROCEDURE — G8419 CALC BMI OUT NRM PARAM NOF/U: HCPCS | Performed by: FAMILY MEDICINE

## 2019-06-26 PROCEDURE — 4004F PT TOBACCO SCREEN RCVD TLK: CPT | Performed by: FAMILY MEDICINE

## 2019-06-26 PROCEDURE — 99213 OFFICE O/P EST LOW 20 MIN: CPT | Performed by: FAMILY MEDICINE

## 2019-06-26 RX ORDER — GABAPENTIN 400 MG/1
400 CAPSULE ORAL 3 TIMES DAILY PRN
Qty: 90 CAPSULE | Refills: 2 | Status: SHIPPED | OUTPATIENT
Start: 2019-06-26 | End: 2019-08-07 | Stop reason: SDUPTHER

## 2019-06-26 ASSESSMENT — ENCOUNTER SYMPTOMS
COUGH: 0
SHORTNESS OF BREATH: 0
EYE REDNESS: 0
EYE DISCHARGE: 0

## 2019-06-26 NOTE — CARE COORDINATION
ups  -discussed heat/humidity and caution with being in the heat. Patient informs, \"that they do have air conditioning/fans at home, and she is doing okay with the the humidity    Smoking Cessation discussed:   -patient not ready to quit, \"but would like to\". -Discussed mentally preparing for quitting, picking a quit date and sticking to it, setting small goals on the calendar of decreasong cigarette use over  A few weeks, months; whatever goal is appropriate for the patient. Diabetes:   Lab Results   Component Value Date    LABA1C 6.9 04/03/2019     Lab Results   Component Value Date     12/28/2018     Diabetes goals, daily blood sugars, and A1C goal reviewed.  -patient's A1C is at goal at last draw in April  -Patient denies diet questions at this time, will refer patient to [facility] dietician if needed. Assessment completed:   Diabetes Assessment    Medic Alert ID:  No  Meal Planning:  Avoidance of concentrated sweets, Plate Method   How often do you test your blood sugar?:  Meals   Do you have barriers with adherence to non-pharmacologic self-management interventions?  (Nutrition/Exercise/Self-Monitoring):  No   Have you ever had to go to the ED for symptoms of low blood sugar?:  No       No patient-reported symptoms   Do you have hyperglycemia symptoms?:  No   Do you have hypoglycemia symptoms?:  No   Blood Sugar Monitoring Regimen:  Before Meals   Blood Sugar Trends:  No Change      ,   COPD Assessment    Does the patient understand envrionmental exposure?:  Yes  Is the patient able to verbalize Rescue vs. Long Acting medications?:  No  Does the patient have a nebulizer?:  Yes  Does the patient use a space with inhaled medications?:  No     No patient-reported symptoms         Symptoms:      Change in chronic cough?:  No/At Baseline  Change in sputum?:  No/At Baseline  Have you had a recent diagnosis of pneumonia either by PCP or at a hospital?:  No      and   General Assessment    Do you have any

## 2019-06-29 RX ORDER — LISINOPRIL 5 MG/1
TABLET ORAL
Qty: 30 TABLET | Refills: 5 | Status: SHIPPED | OUTPATIENT
Start: 2019-06-29 | End: 2019-12-30

## 2019-07-09 ENCOUNTER — CARE COORDINATION (OUTPATIENT)
Dept: CARE COORDINATION | Age: 59
End: 2019-07-09

## 2019-07-12 ENCOUNTER — TELEPHONE (OUTPATIENT)
Dept: FAMILY MEDICINE CLINIC | Age: 59
End: 2019-07-12

## 2019-07-17 ENCOUNTER — CARE COORDINATION (OUTPATIENT)
Dept: CARE COORDINATION | Age: 59
End: 2019-07-17

## 2019-07-17 NOTE — CARE COORDINATION
units every am, 20 units every evening. Patient correctly able to verbalize proper insulin dosing, importance of changing insulin needle after each injection, importance of rotating insulin injection sites around his abdomen staying at least 2 inches away from the belly button. Also reviewed proper needle disposal of dirty needles. Patient verbalizes with good understanding. COPD:   -States, Cory Uriarte is using Xopenex per Nebulizer as needed\"  -patient has air conditioning, denies any new concerns with her breathing    Assessment completed:   Diabetes Assessment    Medic Alert ID:  No  Meal Planning:  Avoidance of concentrated sweets, Plate Method   How often do you test your blood sugar?:  Meals   Do you have barriers with adherence to non-pharmacologic self-management interventions? (Nutrition/Exercise/Self-Monitoring):  No   Have you ever had to go to the ED for symptoms of low blood sugar?:  No       No patient-reported symptoms   Do you have hyperglycemia symptoms?:  No   Do you have hypoglycemia symptoms?:  No   Blood Sugar Trends:  No Change      ,   COPD Assessment    Does the patient understand envrionmental exposure?:  Yes  Is the patient able to verbalize Rescue vs. Long Acting medications?:  No  Does the patient have a nebulizer?:  Yes  Does the patient use a space with inhaled medications?:  No     No patient-reported symptoms         Symptoms:   None:  Yes      Have you had a recent diagnosis of pneumonia either by PCP or at a hospital?:  No      and   General Assessment    Do you have any symptoms that are causing concern?:  No           Any ED visits or Hospitalizations since last Call? · No    Medications Reviewed with  today:  · patient verbalizes that she has all medications. · Patient denies any questions. · Any cost issues with medications No, .     [employer] tool reviewed today is applicable: Yes, Diabetes    Reviewed social needs/Home Needs if any:     · Does patient have enough MD Sue Lozano MISC 1 each by Does not apply route daily 11/7/18   Margarita Veronica MD   carvedilol (COREG) 3.125 MG tablet take 1 tablet by mouth twice a day with meals 9/24/18   Margarita Veronica MD   sertraline (ZOLOFT) 100 MG tablet take 1 tablet by mouth once daily 9/24/18   Margarita Veronica MD   Cholecalciferol (VITAMIN D3) 5000 units TABS Take 5,000 Units by mouth daily    Historical Provider, MD   glucose monitoring kit (FREESTYLE) monitoring kit 1 kit by Does not apply route daily as needed Please dispense meter covered by patients insurance.  11/19/15   Margarita Veronica MD   glucose blood VI test strips (EXACTECH TEST) strip 1 each by In Vitro route daily Pt testing BS once daily and prn 11/19/15   MD Sue Lozano MISC Pt testing BS once daily and prn 11/19/15   Margarita Veronica MD   Multiple Vitamins-Minerals (THERAPEUTIC MULTIVITAMIN-MINERALS) tablet Take 1 tablet by mouth daily (with breakfast) 6/12/15   Lena Serna MD       Future Appointments   Date Time Provider Hong Mckeon   7/29/2019 10:30 AM MD Remedios Weems [facility] [place]   9/25/2019  1:30 PM [facility]   10/21/2019 10:30 AM MD Remedios Weems [facility] [place]   12/19/2019  2:30 PM Dave Harman MD [facility]

## 2019-08-05 ENCOUNTER — CARE COORDINATION (OUTPATIENT)
Dept: CARE COORDINATION | Age: 59
End: 2019-08-05

## 2019-08-07 DIAGNOSIS — F41.9 ANXIETY: ICD-10-CM

## 2019-08-07 RX ORDER — SERTRALINE HYDROCHLORIDE 100 MG/1
TABLET, FILM COATED ORAL
Qty: 30 TABLET | Refills: 5 | Status: SHIPPED | OUTPATIENT
Start: 2019-08-07 | End: 2020-02-03

## 2019-08-07 RX ORDER — GABAPENTIN 400 MG/1
400 CAPSULE ORAL 3 TIMES DAILY PRN
Qty: 90 CAPSULE | Refills: 2 | Status: SHIPPED | OUTPATIENT
Start: 2019-08-07 | End: 2020-02-20

## 2019-08-07 NOTE — TELEPHONE ENCOUNTER
Last OV: 6/26/2019  Last RX: 6-26-19   Next scheduled apt: 9/25/2019  Gabapentin pending to [facility] and the others to [facility]   Lasix are not pending as she has a current rx at [facility]

## 2019-08-21 ENCOUNTER — CARE COORDINATION (OUTPATIENT)
Dept: CARE COORDINATION | Age: 59
End: 2019-08-21

## 2019-08-22 ENCOUNTER — CARE COORDINATION (OUTPATIENT)
Dept: CARE COORDINATION | Age: 59
End: 2019-08-22

## 2019-08-22 NOTE — CARE COORDINATION
Reason For Call Today:  -follow up phone call attempt again today to patient.   -wanting to follow up regarding breathing, assess for new care coordination needs.   -Attempted to reach patient yesterday for follow up as well.    -Last ED visit 05/2019 for COPD exacerbation.   -Patient was to have Device check 07/29; per Cardiology    -this is third unsuccessful attempt to reach out to patient. Care Coordination Plan of Care: This nurse Care Coordinator will await call back from patient, and if no return call, will attempt to reach out to patient again next week  -If no return call at that time, will mail reach out letter to patient.      Future Appointments   Date Time Provider Hong Mckeon   9/25/2019  1:30 PM MD Rafa Iglesias [facility]   10/21/2019 10:30 AM MD Zora Crain [facility]   12/19/2019  2:30 PM Maxime Gardner [facility]

## 2019-08-26 ENCOUNTER — CARE COORDINATION (OUTPATIENT)
Dept: CARE COORDINATION | Age: 59
End: 2019-08-26

## 2019-09-04 ENCOUNTER — CARE COORDINATION (OUTPATIENT)
Dept: CARE COORDINATION | Age: 59
End: 2019-09-04

## 2019-09-12 DIAGNOSIS — I10 ESSENTIAL HYPERTENSION: ICD-10-CM

## 2019-09-12 RX ORDER — CARVEDILOL 3.12 MG/1
TABLET ORAL
Qty: 60 TABLET | Refills: 5 | Status: SHIPPED | OUTPATIENT
Start: 2019-09-12 | End: 2020-02-19

## 2019-09-16 ENCOUNTER — TELEPHONE (OUTPATIENT)
Dept: FAMILY MEDICINE CLINIC | Age: 59
End: 2019-09-16

## 2019-09-16 ENCOUNTER — CARE COORDINATION (OUTPATIENT)
Dept: CARE COORDINATION | Age: 59
End: 2019-09-16

## 2019-09-16 NOTE — CARE COORDINATION
Ambulatory Care Coordination Note  9/16/2019  CM Risk Score: 14  Charlson 10 Year Mortality Risk Score: 100%     ACC: Josesito Rivas, ALEN    Summary Note:   Reason For Call Today:  -follow up attempt today to reach patient.  -Sixth unsuccessful attempt to reach patient by phone,   Letter also mailed for reach out 08/26/19.    -At this time with unsuccessful attempts to reach patient for follow up will remove patient from care  Coordination panel. Care Coordination Plan of Care: This nurse Care Coordinator will remove patient from care coordination panel.   -Will notify PCP and [address] [place] [address]. Care Coordination Interventions    Program Enrollment:  Complex Care  Referral from Primary Care Provider:  No  Suggested Interventions and Community Resources  Home Care Waiver:  [address]:  Declined  Meals on Wheels:  Declined  Smoking Cessation:  Declined (Comment: discussed with patient, she will let this nurse CC know if she would like assistance in quitti ng)  Transportation Support:  Declined  Zone Management Tools:  Completed (Comment: copd and DM zones given to patient)       Prior to Admission medications    Medication Sig Start Date End Date Taking? Authorizing Provider   carvedilol (COREG) 3.125 MG tablet take 1 tablet by mouth twice a day with meals 9/12/19   Supa Rodríguez MD   gabapentin (NEURONTIN) 400 MG capsule Take 1 capsule by mouth 3 times daily as needed (neuropathy) for up to 90 days.  8/7/19 11/5/19  Supa Rodríguez MD   sertraline (ZOLOFT) 100 MG tablet take 1 tablet by mouth once daily 8/7/19   Supa Rodríguez MD   lisinopril (PRINIVIL;ZESTRIL) 5 MG tablet take 1 tablet by mouth once daily 6/29/19   Supa Rodríguez MD   furosemide (LASIX) 40 MG tablet Take 1 tablet by mouth daily 6/17/19   MICKEY Smallwood CNP   Insulin Pen Needle 32G X 4 MM MISC 1 each by Does not apply route daily 6/17/19   MICKEY Smallwood CNP   atorvastatin (LIPITOR) 80 MG

## 2019-10-04 RX ORDER — MIDODRINE HYDROCHLORIDE 5 MG/1
TABLET ORAL
Qty: 90 TABLET | Refills: 3 | Status: SHIPPED | OUTPATIENT
Start: 2019-10-04 | End: 2020-04-16

## 2019-10-11 RX ORDER — CLOPIDOGREL BISULFATE 75 MG/1
TABLET ORAL
Qty: 30 TABLET | Refills: 5 | Status: SHIPPED | OUTPATIENT
Start: 2019-10-11 | End: 2020-03-19

## 2019-10-17 ENCOUNTER — TELEPHONE (OUTPATIENT)
Dept: FAMILY MEDICINE CLINIC | Age: 59
End: 2019-10-17

## 2019-10-17 DIAGNOSIS — J44.1 COPD EXACERBATION (HCC): Primary | ICD-10-CM

## 2019-10-21 RX ORDER — ATORVASTATIN CALCIUM 80 MG/1
TABLET, FILM COATED ORAL
Qty: 30 TABLET | Refills: 5 | Status: SHIPPED | OUTPATIENT
Start: 2019-10-21 | End: 2020-03-31

## 2019-10-25 ENCOUNTER — TELEPHONE (OUTPATIENT)
Dept: CARDIOLOGY CLINIC | Age: 59
End: 2019-10-25

## 2019-10-25 DIAGNOSIS — Z95.810 ICD (IMPLANTABLE CARDIOVERTER-DEFIBRILLATOR) IN PLACE: ICD-10-CM

## 2019-10-25 DIAGNOSIS — I42.9 CARDIOMYOPATHY, UNSPECIFIED TYPE (HCC): ICD-10-CM

## 2019-10-25 DIAGNOSIS — E78.5 HYPERLIPIDEMIA, UNSPECIFIED HYPERLIPIDEMIA TYPE: ICD-10-CM

## 2019-10-25 DIAGNOSIS — J81.0 ACUTE PULMONARY EDEMA (HCC): ICD-10-CM

## 2019-10-25 DIAGNOSIS — I10 ESSENTIAL HYPERTENSION: Primary | ICD-10-CM

## 2019-10-25 DIAGNOSIS — E55.9 VITAMIN D DEFICIENCY: ICD-10-CM

## 2019-10-28 ENCOUNTER — HOSPITAL ENCOUNTER (OUTPATIENT)
Dept: GENERAL RADIOLOGY | Age: 59
Discharge: HOME | End: 2019-10-30
Payer: MEDICARE

## 2019-10-28 ENCOUNTER — HOSPITAL ENCOUNTER (OUTPATIENT)
Age: 59
Discharge: HOME | End: 2019-10-30
Payer: MEDICARE

## 2019-10-28 ENCOUNTER — OFFICE VISIT (OUTPATIENT)
Dept: CARDIOLOGY CLINIC | Age: 59
End: 2019-10-28
Payer: MEDICARE

## 2019-10-28 ENCOUNTER — HOSPITAL ENCOUNTER (OUTPATIENT)
Age: 59
Discharge: HOME | End: 2019-10-28
Payer: MEDICARE

## 2019-10-28 VITALS
OXYGEN SATURATION: 94 % | SYSTOLIC BLOOD PRESSURE: 100 MMHG | BODY MASS INDEX: 26.79 KG/M2 | WEIGHT: 166 LBS | DIASTOLIC BLOOD PRESSURE: 60 MMHG | HEART RATE: 81 BPM

## 2019-10-28 DIAGNOSIS — Z98.890 H/O MITRAL VALVE REPAIR: Primary | ICD-10-CM

## 2019-10-28 DIAGNOSIS — E78.00 PURE HYPERCHOLESTEROLEMIA: ICD-10-CM

## 2019-10-28 DIAGNOSIS — E78.5 HYPERLIPIDEMIA, UNSPECIFIED HYPERLIPIDEMIA TYPE: ICD-10-CM

## 2019-10-28 DIAGNOSIS — E55.9 VITAMIN D DEFICIENCY: ICD-10-CM

## 2019-10-28 DIAGNOSIS — J81.0 ACUTE PULMONARY EDEMA (HCC): ICD-10-CM

## 2019-10-28 DIAGNOSIS — I10 ESSENTIAL HYPERTENSION: ICD-10-CM

## 2019-10-28 DIAGNOSIS — Z95.810 ICD (IMPLANTABLE CARDIOVERTER-DEFIBRILLATOR) IN PLACE: ICD-10-CM

## 2019-10-28 DIAGNOSIS — I42.9 CARDIOMYOPATHY, UNSPECIFIED TYPE (HCC): ICD-10-CM

## 2019-10-28 DIAGNOSIS — I25.10 CORONARY ARTERY DISEASE INVOLVING NATIVE CORONARY ARTERY OF NATIVE HEART WITHOUT ANGINA PECTORIS: ICD-10-CM

## 2019-10-28 DIAGNOSIS — E11.49 DM (DIABETES MELLITUS), TYPE 2 WITH NEUROLOGICAL COMPLICATIONS (HCC): ICD-10-CM

## 2019-10-28 DIAGNOSIS — E55.9 VITAMIN D DEFICIENCY DISEASE: ICD-10-CM

## 2019-10-28 LAB
ABSOLUTE EOS #: 0.1 K/UL (ref 0–0.4)
ABSOLUTE IMMATURE GRANULOCYTE: ABNORMAL K/UL (ref 0–0.3)
ABSOLUTE LYMPH #: 2.2 K/UL (ref 1–4.8)
ABSOLUTE MONO #: 0.6 K/UL (ref 0–1)
ALBUMIN SERPL-MCNC: 4.5 G/DL (ref 3.5–5.2)
ALBUMIN/GLOBULIN RATIO: ABNORMAL (ref 1–2.5)
ALP BLD-CCNC: 125 U/L (ref 35–104)
ALT SERPL-CCNC: 10 U/L (ref 5–33)
ANION GAP SERPL CALCULATED.3IONS-SCNC: 15 MMOL/L (ref 9–17)
AST SERPL-CCNC: 9 U/L
BASOPHILS # BLD: 0 % (ref 0–2)
BASOPHILS ABSOLUTE: 0 K/UL (ref 0–0.2)
BILIRUB SERPL-MCNC: 0.44 MG/DL (ref 0.3–1.2)
BUN BLDV-MCNC: 36 MG/DL (ref 8–23)
BUN/CREAT BLD: 24 (ref 9–20)
CALCIUM SERPL-MCNC: 9.7 MG/DL (ref 8.6–10.4)
CHLORIDE BLD-SCNC: 102 MMOL/L (ref 98–107)
CHOLESTEROL/HDL RATIO: 9
CHOLESTEROL: 243 MG/DL
CO2: 21 MMOL/L (ref 20–31)
CREAT SERPL-MCNC: 1.47 MG/DL (ref 0.5–0.9)
DIFFERENTIAL TYPE: YES
EOSINOPHILS RELATIVE PERCENT: 1 % (ref 0–5)
GFR AFRICAN AMERICAN: 44 ML/MIN
GFR NON-AFRICAN AMERICAN: 36 ML/MIN
GFR SERPL CREATININE-BSD FRML MDRD: ABNORMAL ML/MIN/{1.73_M2}
GFR SERPL CREATININE-BSD FRML MDRD: ABNORMAL ML/MIN/{1.73_M2}
GLUCOSE BLD-MCNC: 143 MG/DL (ref 70–99)
HCT VFR BLD CALC: 32.9 % (ref 36–46)
HDLC SERPL-MCNC: 27 MG/DL
HEMOGLOBIN: 11.1 G/DL (ref 12–16)
IMMATURE GRANULOCYTES: ABNORMAL %
LDL CHOLESTEROL DIRECT: 54 MG/DL
LDL CHOLESTEROL: ABNORMAL MG/DL (ref 0–130)
LYMPHOCYTES # BLD: 22 % (ref 15–40)
MAGNESIUM: 2.2 MG/DL (ref 1.6–2.6)
MCH RBC QN AUTO: 30 PG (ref 26–34)
MCHC RBC AUTO-ENTMCNC: 33.9 G/DL (ref 31–37)
MCV RBC AUTO: 88.4 FL (ref 80–100)
MONOCYTES # BLD: 6 % (ref 4–8)
NRBC AUTOMATED: ABNORMAL PER 100 WBC
PATIENT FASTING?: YES
PDW BLD-RTO: 15.8 % (ref 12.1–15.2)
PLATELET # BLD: 224 K/UL (ref 140–450)
PLATELET ESTIMATE: ABNORMAL
PMV BLD AUTO: ABNORMAL FL (ref 6–12)
POTASSIUM SERPL-SCNC: 4.5 MMOL/L (ref 3.7–5.3)
RBC # BLD: 3.72 M/UL (ref 4–5.2)
RBC # BLD: ABNORMAL 10*6/UL
SEG NEUTROPHILS: 71 % (ref 47–75)
SEGMENTED NEUTROPHILS ABSOLUTE COUNT: 6.8 K/UL (ref 2.5–7)
SODIUM BLD-SCNC: 138 MMOL/L (ref 135–144)
TOTAL PROTEIN: 9.1 G/DL (ref 6.4–8.3)
TRIGL SERPL-MCNC: 1165 MG/DL
TSH SERPL DL<=0.05 MIU/L-ACNC: 2.09 MIU/L (ref 0.3–5)
VITAMIN D 25-HYDROXY: 9 NG/ML (ref 30–100)
VLDLC SERPL CALC-MCNC: ABNORMAL MG/DL (ref 1–30)
WBC # BLD: 9.7 K/UL (ref 3.5–11)
WBC # BLD: ABNORMAL 10*3/UL

## 2019-10-28 PROCEDURE — 93289 INTERROG DEVICE EVAL HEART: CPT | Performed by: INTERNAL MEDICINE

## 2019-10-28 PROCEDURE — 80053 COMPREHEN METABOLIC PANEL: CPT

## 2019-10-28 PROCEDURE — 4004F PT TOBACCO SCREEN RCVD TLK: CPT | Performed by: INTERNAL MEDICINE

## 2019-10-28 PROCEDURE — G8427 DOCREV CUR MEDS BY ELIG CLIN: HCPCS | Performed by: INTERNAL MEDICINE

## 2019-10-28 PROCEDURE — 99214 OFFICE O/P EST MOD 30 MIN: CPT | Performed by: INTERNAL MEDICINE

## 2019-10-28 PROCEDURE — 93005 ELECTROCARDIOGRAM TRACING: CPT

## 2019-10-28 PROCEDURE — 80061 LIPID PANEL: CPT

## 2019-10-28 PROCEDURE — 85025 COMPLETE CBC W/AUTO DIFF WBC: CPT

## 2019-10-28 PROCEDURE — 71046 X-RAY EXAM CHEST 2 VIEWS: CPT

## 2019-10-28 PROCEDURE — 36415 COLL VENOUS BLD VENIPUNCTURE: CPT

## 2019-10-28 PROCEDURE — G8598 ASA/ANTIPLAT THER USED: HCPCS | Performed by: INTERNAL MEDICINE

## 2019-10-28 PROCEDURE — G8419 CALC BMI OUT NRM PARAM NOF/U: HCPCS | Performed by: INTERNAL MEDICINE

## 2019-10-28 PROCEDURE — 84443 ASSAY THYROID STIM HORMONE: CPT

## 2019-10-28 PROCEDURE — 3017F COLORECTAL CA SCREEN DOC REV: CPT | Performed by: INTERNAL MEDICINE

## 2019-10-28 PROCEDURE — G8484 FLU IMMUNIZE NO ADMIN: HCPCS | Performed by: INTERNAL MEDICINE

## 2019-10-28 PROCEDURE — 83721 ASSAY OF BLOOD LIPOPROTEIN: CPT

## 2019-10-28 PROCEDURE — 82306 VITAMIN D 25 HYDROXY: CPT

## 2019-10-28 PROCEDURE — 83735 ASSAY OF MAGNESIUM: CPT

## 2019-10-28 PROCEDURE — 3044F HG A1C LEVEL LT 7.0%: CPT | Performed by: INTERNAL MEDICINE

## 2019-10-28 PROCEDURE — 2022F DILAT RTA XM EVC RTNOPTHY: CPT | Performed by: INTERNAL MEDICINE

## 2019-10-29 LAB
EKG ATRIAL RATE: 78 BPM
EKG P AXIS: 60 DEGREES
EKG P-R INTERVAL: 160 MS
EKG Q-T INTERVAL: 416 MS
EKG QRS DURATION: 96 MS
EKG QTC CALCULATION (BAZETT): 474 MS
EKG R AXIS: 60 DEGREES
EKG T AXIS: -68 DEGREES
EKG VENTRICULAR RATE: 78 BPM

## 2019-10-29 PROCEDURE — 93010 ELECTROCARDIOGRAM REPORT: CPT | Performed by: INTERNAL MEDICINE

## 2019-11-01 ENCOUNTER — HOSPITAL ENCOUNTER (OUTPATIENT)
Dept: NON INVASIVE DIAGNOSTICS | Age: 59
Discharge: HOME | End: 2019-11-01
Payer: MEDICARE

## 2019-11-01 DIAGNOSIS — J44.1 COPD EXACERBATION (HCC): ICD-10-CM

## 2019-11-01 DIAGNOSIS — Z98.890 H/O MITRAL VALVE REPAIR: ICD-10-CM

## 2019-11-01 LAB
LV EF: 45 %
LVEF MODALITY: NORMAL

## 2019-11-01 PROCEDURE — 93306 TTE W/DOPPLER COMPLETE: CPT

## 2019-11-13 ENCOUNTER — OFFICE VISIT (OUTPATIENT)
Dept: FAMILY MEDICINE CLINIC | Age: 59
End: 2019-11-13
Payer: MEDICARE

## 2019-11-13 VITALS
BODY MASS INDEX: 26.79 KG/M2 | DIASTOLIC BLOOD PRESSURE: 50 MMHG | SYSTOLIC BLOOD PRESSURE: 82 MMHG | OXYGEN SATURATION: 94 % | WEIGHT: 166 LBS | HEART RATE: 96 BPM

## 2019-11-13 DIAGNOSIS — J43.8 OTHER EMPHYSEMA (HCC): Primary | ICD-10-CM

## 2019-11-13 PROCEDURE — G8419 CALC BMI OUT NRM PARAM NOF/U: HCPCS | Performed by: FAMILY MEDICINE

## 2019-11-13 PROCEDURE — G8484 FLU IMMUNIZE NO ADMIN: HCPCS | Performed by: FAMILY MEDICINE

## 2019-11-13 PROCEDURE — 3023F SPIROM DOC REV: CPT | Performed by: FAMILY MEDICINE

## 2019-11-13 PROCEDURE — G8598 ASA/ANTIPLAT THER USED: HCPCS | Performed by: FAMILY MEDICINE

## 2019-11-13 PROCEDURE — 3017F COLORECTAL CA SCREEN DOC REV: CPT | Performed by: FAMILY MEDICINE

## 2019-11-13 PROCEDURE — G8427 DOCREV CUR MEDS BY ELIG CLIN: HCPCS | Performed by: FAMILY MEDICINE

## 2019-11-13 PROCEDURE — G8926 SPIRO NO PERF OR DOC: HCPCS | Performed by: FAMILY MEDICINE

## 2019-11-13 PROCEDURE — 99213 OFFICE O/P EST LOW 20 MIN: CPT | Performed by: FAMILY MEDICINE

## 2019-11-13 PROCEDURE — 4004F PT TOBACCO SCREEN RCVD TLK: CPT | Performed by: FAMILY MEDICINE

## 2019-11-13 RX ORDER — NEBULIZER ACCESSORIES
1 KIT MISCELLANEOUS DAILY PRN
Qty: 1 KIT | Refills: 0 | Status: SHIPPED | OUTPATIENT
Start: 2019-11-13

## 2019-11-13 ASSESSMENT — ENCOUNTER SYMPTOMS
NAUSEA: 0
WHEEZING: 0
SPUTUM PRODUCTION: 1
EYE REDNESS: 0
EYE DISCHARGE: 0
COUGH: 1
DIARRHEA: 0
RHINORRHEA: 0
SORE THROAT: 0
TROUBLE SWALLOWING: 0
HOARSE VOICE: 1
VOMITING: 0
CHEST TIGHTNESS: 0
SHORTNESS OF BREATH: 0

## 2019-11-13 ASSESSMENT — COPD QUESTIONNAIRES: COPD: 1

## 2019-12-12 RX ORDER — FUROSEMIDE 40 MG/1
40 TABLET ORAL DAILY
Qty: 30 TABLET | Refills: 5 | Status: SHIPPED | OUTPATIENT
Start: 2019-12-12 | End: 2020-06-01

## 2019-12-30 RX ORDER — LISINOPRIL 5 MG/1
TABLET ORAL
Qty: 30 TABLET | Refills: 5 | Status: SHIPPED | OUTPATIENT
Start: 2019-12-30 | End: 2020-06-09 | Stop reason: SDUPTHER

## 2020-01-21 ENCOUNTER — TELEPHONE (OUTPATIENT)
Dept: UROLOGY | Age: 60
End: 2020-01-21

## 2020-01-23 ENCOUNTER — TELEPHONE (OUTPATIENT)
Dept: FAMILY MEDICINE CLINIC | Age: 60
End: 2020-01-23

## 2020-01-23 NOTE — TELEPHONE ENCOUNTER
Shelly from scheduling called stating she was unable to contact the patient after several attempts on all numbers. Patient has a pacemaker so a rep from the company that makes her pacemaker needs to be there before they can do the MRI. Since she was unable to contact patient scheduling cancelled the order.

## 2020-01-25 NOTE — TELEPHONE ENCOUNTER
Last OV 11/13/19 for emphysema  Last OV 6/26/19 for DM   Requesting refill on basaglar insulin thru [facility] script  Next OV

## 2020-01-27 ENCOUNTER — NURSE ONLY (OUTPATIENT)
Dept: CARDIOLOGY CLINIC | Age: 60
End: 2020-01-27

## 2020-02-03 RX ORDER — SERTRALINE HYDROCHLORIDE 100 MG/1
TABLET, FILM COATED ORAL
Qty: 30 TABLET | Refills: 5 | Status: SHIPPED | OUTPATIENT
Start: 2020-02-03 | End: 2020-06-09 | Stop reason: SDUPTHER

## 2020-02-19 RX ORDER — CARVEDILOL 3.12 MG/1
TABLET ORAL
Qty: 60 TABLET | Refills: 5 | Status: SHIPPED | OUTPATIENT
Start: 2020-02-19 | End: 2020-03-13

## 2020-02-19 NOTE — TELEPHONE ENCOUNTER
Last OV: 11/13/2019  Last RX: 09/12/19   Next scheduled apt: Visit date not found    Medication pending

## 2020-02-20 RX ORDER — GABAPENTIN 400 MG/1
400 CAPSULE ORAL 3 TIMES DAILY PRN
Qty: 90 CAPSULE | Refills: 2 | Status: SHIPPED | OUTPATIENT
Start: 2020-02-20 | End: 2020-06-24 | Stop reason: SDUPTHER

## 2020-02-20 NOTE — TELEPHONE ENCOUNTER
Last OV: 11/13/2019 for COPD, 6/26/19 for DM with instruction to return in 3 months  Last RX: Gabapentin start date 8/7/19   Next scheduled apt: Visit date not found

## 2020-03-13 RX ORDER — CARVEDILOL 3.12 MG/1
TABLET ORAL
Qty: 60 TABLET | Refills: 5 | Status: SHIPPED | OUTPATIENT
Start: 2020-03-13 | End: 2020-06-09 | Stop reason: SDUPTHER

## 2020-03-13 NOTE — TELEPHONE ENCOUNTER
Last OV: 11/13/2019 with no instruction to return  Last RX: Carvedilol  Next scheduled apt: Visit date not found

## 2020-03-19 RX ORDER — CLOPIDOGREL BISULFATE 75 MG/1
TABLET ORAL
Qty: 30 TABLET | Refills: 5 | Status: SHIPPED | OUTPATIENT
Start: 2020-03-19 | End: 2020-04-11

## 2020-03-31 RX ORDER — ATORVASTATIN CALCIUM 80 MG/1
TABLET, FILM COATED ORAL
Qty: 30 TABLET | Refills: 5 | Status: SHIPPED | OUTPATIENT
Start: 2020-03-31 | End: 2020-04-23

## 2020-04-11 RX ORDER — CLOPIDOGREL BISULFATE 75 MG/1
TABLET ORAL
Qty: 30 TABLET | Refills: 5 | Status: SHIPPED | OUTPATIENT
Start: 2020-04-11 | End: 2020-09-09 | Stop reason: SDUPTHER

## 2020-04-11 NOTE — TELEPHONE ENCOUNTER
Last OV 6/26/19  She is scheduled to be seen by Bhavin Dong 4/27/20  Requesting refill on plavix thru script  No upcoming OV scheduled

## 2020-04-23 RX ORDER — ATORVASTATIN CALCIUM 80 MG/1
TABLET, FILM COATED ORAL
Qty: 30 TABLET | Refills: 2 | Status: SHIPPED | OUTPATIENT
Start: 2020-04-23 | End: 2020-06-09 | Stop reason: SDUPTHER

## 2020-06-01 RX ORDER — FUROSEMIDE 40 MG/1
40 TABLET ORAL DAILY
Qty: 30 TABLET | Refills: 5 | Status: SHIPPED | OUTPATIENT
Start: 2020-06-01 | End: 2020-12-17

## 2020-06-09 ENCOUNTER — HOSPITAL ENCOUNTER (OUTPATIENT)
Dept: GENERAL RADIOLOGY | Age: 60
Discharge: HOME | End: 2020-06-11
Payer: MEDICARE

## 2020-06-09 ENCOUNTER — OFFICE VISIT (OUTPATIENT)
Dept: FAMILY MEDICINE CLINIC | Age: 60
End: 2020-06-09
Payer: MEDICARE

## 2020-06-09 ENCOUNTER — HOSPITAL ENCOUNTER (OUTPATIENT)
Age: 60
Discharge: HOME | End: 2020-06-11
Payer: MEDICARE

## 2020-06-09 VITALS
OXYGEN SATURATION: 96 % | BODY MASS INDEX: 27.76 KG/M2 | HEART RATE: 82 BPM | DIASTOLIC BLOOD PRESSURE: 60 MMHG | WEIGHT: 172 LBS | SYSTOLIC BLOOD PRESSURE: 100 MMHG

## 2020-06-09 PROCEDURE — 4004F PT TOBACCO SCREEN RCVD TLK: CPT | Performed by: FAMILY MEDICINE

## 2020-06-09 PROCEDURE — 99213 OFFICE O/P EST LOW 20 MIN: CPT | Performed by: FAMILY MEDICINE

## 2020-06-09 PROCEDURE — 73502 X-RAY EXAM HIP UNI 2-3 VIEWS: CPT

## 2020-06-09 PROCEDURE — 3017F COLORECTAL CA SCREEN DOC REV: CPT | Performed by: FAMILY MEDICINE

## 2020-06-09 PROCEDURE — G8427 DOCREV CUR MEDS BY ELIG CLIN: HCPCS | Performed by: FAMILY MEDICINE

## 2020-06-09 PROCEDURE — G8419 CALC BMI OUT NRM PARAM NOF/U: HCPCS | Performed by: FAMILY MEDICINE

## 2020-06-09 RX ORDER — GABAPENTIN 400 MG/1
CAPSULE ORAL
COMMUNITY
Start: 2020-05-26 | End: 2020-09-09 | Stop reason: SDUPTHER

## 2020-06-09 RX ORDER — SERTRALINE HYDROCHLORIDE 100 MG/1
TABLET, FILM COATED ORAL
Qty: 90 TABLET | Refills: 0 | Status: SHIPPED | OUTPATIENT
Start: 2020-06-09 | End: 2020-09-09 | Stop reason: SDUPTHER

## 2020-06-09 RX ORDER — LISINOPRIL 5 MG/1
TABLET ORAL
Qty: 90 TABLET | Refills: 0 | Status: SHIPPED | OUTPATIENT
Start: 2020-06-09 | End: 2020-09-09 | Stop reason: SDUPTHER

## 2020-06-09 RX ORDER — ATORVASTATIN CALCIUM 80 MG/1
TABLET, FILM COATED ORAL
Qty: 90 TABLET | Refills: 0 | Status: SHIPPED | OUTPATIENT
Start: 2020-06-09 | End: 2020-09-09 | Stop reason: SDUPTHER

## 2020-06-09 RX ORDER — CARVEDILOL 3.12 MG/1
TABLET ORAL
Qty: 90 TABLET | Refills: 0 | Status: SHIPPED | OUTPATIENT
Start: 2020-06-09 | End: 2020-09-09 | Stop reason: SDUPTHER

## 2020-06-09 ASSESSMENT — PATIENT HEALTH QUESTIONNAIRE - PHQ9
SUM OF ALL RESPONSES TO PHQ QUESTIONS 1-9: 0
SUM OF ALL RESPONSES TO PHQ QUESTIONS 1-9: 0
SUM OF ALL RESPONSES TO PHQ9 QUESTIONS 1 & 2: 0
1. LITTLE INTEREST OR PLEASURE IN DOING THINGS: 0
2. FEELING DOWN, DEPRESSED OR HOPELESS: 0

## 2020-06-09 ASSESSMENT — ENCOUNTER SYMPTOMS
SHORTNESS OF BREATH: 0
VOMITING: 0
COUGH: 0
DIARRHEA: 0
EYE PAIN: 0
EYE DISCHARGE: 0

## 2020-06-09 NOTE — PROGRESS NOTES
HPI Notes    Name: Abimbola Cardona  : 1960        Chief Complaint:     Chief Complaint   Patient presents with    Incontinence     Pt presents today for certificate of medical necssity for pullups, Pt states she uses 3 - 4 pullups per day.  Arthritis     Osteoarthritis - Pt c/o pain in both hips, Pt has been taking Tylenol, Pt states some days it doesn't give any relief. Pt also tried Aleve this does not give any relief. History of Present Illness:     Abimbola Cardona is a 60 y.o.  female who presents with Incontinence (Pt presents today for certificate of medical necssity for pullups, Pt states she uses 3 - 4 pullups per day.) and Arthritis (Osteoarthritis - Pt c/o pain in both hips, Pt has been taking Tylenol, Pt states some days it doesn't give any relief. Pt also tried Aleve this does not give any relief.)    Urinary incontinence - pt has difficulty holding her urine --- gotta go quickly to bathroom otherwise. Pt has tried Kegel exercises over the years. Pt has difficulty holding urine if she sneezes, coughs, laughs etc. Pt uses 3-4 pull ups per day. Hip Pain    There was no injury mechanism (started about 3-4mos ago and lately hurting much more. Pt told years ago that she had osteoarthiritis in hips but she never had pain until recently. No increased activity. ). The pain is present in the right hip and left hip (Rt hip pain is worse then the Lt hip pain. ). The quality of the pain is described as aching. The pain has been worsening since onset. Pertinent negatives include no inability to bear weight or tingling. The symptoms are aggravated by weight bearing and movement. She has tried acetaminophen and NSAIDs for the symptoms. Improvement on treatment: tylenol would help \"take the edge off\".        Past Medical History:     Past Medical History:   Diagnosis Date    CAD (coronary artery disease)     [facility]     COPD [facility]ronic obstructive pulmonary disease) [facility]     Depression     Diabetes mellitus [facility]     H/O mitral valve repair     History of fractured kneecap 2017    right    Hx of CABG     Hx of myocardial infarction     Hyperlipidemia     Hypertension     ICD (implantable cardioverter-defibrillator) in place     Kidney calculi     Osteoarthritis     Pneumonia     Renal calculi       Reviewed all health maintenance requirements and ordered appropriate tests  Health Maintenance Due   Topic Date Due    Hepatitis C screen  1960    HIV screen  09/19/1974    DTaP/Tdap/Td vaccine (1 - Tdap) 09/19/1978    Cervical cancer screen  09/19/1980    Shingles Vaccine (1 of 2) 09/19/2009    Diabetic foot exam  09/24/2019    Diabetic microalbuminuria test  09/24/2019    Diabetic retinal exam  03/30/2020    A1C test (Diabetic or Prediabetic)  04/03/2020    Colon cancer screen colonoscopy  05/06/2020       Past Surgical History:     Past Surgical History:   Procedure Laterality Date    ANKLE SURGERY Right 2009    plate and screws     CARDIAC DEFIBRILLATOR PLACEMENT  06/01/15    CHOLECYSTECTOMY  1990    CORONARY ARTERY BYPASS GRAFT  05/28/15    X 3- Dr Gates Pump CABELLO-LAD<SVG-Diag, SVG-PDA, MV Repair with 30 min CE IMR ring    CYSTOSCOPY Left 11/08/2018    HYSTERECTOMY  1990    KIDNEY STONE SURGERY Left 2010    MITRAL VALVE SURGERY  6/4/15    MT CYSTO/URETERO W/LITHOTRIPSY &INDWELL STENT INSRT Left 11/8/2018    CYSTOSCOPY URETEROSCOPY performed by Arabella Mott MD at [facility] [address] Left 11/8/2018    CYSTOSCOPY STENT INSERTION performed by Arabella Mott MD at [facility] STENT INSRT Left 11/15/2018    LEFT CYSTOSCOPY URETEROSCOPY LASER,HLL, LEFT STENT EXCHANGE performed by Arabella Mott MD at [place] [address]          Medications:       Prior to Admission medications    Medication Sig Start Date End Date Taking?  Authorizing Provider   gabapentin route once for 1 dose 11/4/19 11/4/19  Melita Ugalde MD   Insulin Pen Needle 32G X 4 MM MISC 1 each by Does not apply route daily 6/17/19   Filemon Lomeli APRN - CNP   blood glucose monitor strips Use to test daily 11/7/18   Melita Ugalde MD   blood glucose monitor kit and supplies Use to test daily 11/7/18   Melita Ugalde MD   glucose monitoring kit (FREESTYLE) monitoring kit 1 kit by Does not apply route daily as needed Please dispense meter covered by patients insurance. 11/19/15   Melita Ugalde MD   glucose blood VI test strips (EXACTECH TEST) strip 1 each by In Vitro route daily Pt testing BS once daily and prn 11/19/15   Melita Ugalde MD   Lancets MISC Pt testing BS once daily and prn 11/19/15   Melita Ugalde MD        Allergies:       Codeine and Nsaids    Social History:     Tobacco:    reports that she has been smoking cigarettes. She has a 16.00 pack-year smoking history. She has never used smokeless tobacco.  Alcohol:      reports no history of alcohol use. Drug Use:  reports no history of drug use. Family History:     Family History   Problem Relation Age of Onset    Stroke Father 76    Other Father         low BS    Diabetes Mother        Review of Systems:       Review of Systems   Constitutional: Negative for chills and fever. Eyes: Negative for pain and discharge. Respiratory: Negative for cough and shortness of breath. Cardiovascular: Negative for chest pain and palpitations. Gastrointestinal: Negative for diarrhea and vomiting. Genitourinary: Negative for dysuria and hematuria. Urinary incontinence   Musculoskeletal: Positive for arthralgias. Rt >LT hip pain   Neurological: Negative for dizziness, tingling and facial asymmetry. Physical Exam:     Physical Exam  Vitals signs reviewed. Constitutional:       General: She is not in acute distress. Appearance: Normal appearance. She is not ill-appearing.    HENT:      Head: Normocephalic and atraumatic. Cardiovascular:      Rate and Rhythm: Normal rate. Pulses: Normal pulses. Heart sounds: Normal heart sounds. Pulmonary:      Effort: Pulmonary effort is normal. No respiratory distress. Breath sounds: Normal breath sounds. Musculoskeletal:      Right hip: She exhibits tenderness and bony tenderness. She exhibits normal range of motion, normal strength, no swelling and no crepitus. Left hip: She exhibits tenderness. She exhibits normal range of motion and normal strength. Comments: Rt > Lt tenderness with external and internal rotation,    Neurological:      Mental Status: She is alert. Vitals:  /60   Pulse 82   Wt 172 lb (78 kg)   SpO2 96%   BMI 27.76 kg/m²       Data:     Lab Results   Component Value Date     10/28/2019    K 4.5 10/28/2019     10/28/2019    CO2 21 10/28/2019    BUN 36 10/28/2019    CREATININE 1.47 10/28/2019    GLUCOSE 143 10/28/2019    PROT 9.1 10/28/2019    LABALBU 4.5 10/28/2019    BILITOT 0.44 10/28/2019    ALKPHOS 125 10/28/2019    AST 9 10/28/2019    ALT 10 10/28/2019     Lab Results   Component Value Date    WBC 9.7 10/28/2019    RBC 3.72 10/28/2019    HGB 11.1 10/28/2019    HCT 32.9 10/28/2019    MCV 88.4 10/28/2019    MCH 30.0 10/28/2019    MCHC 33.9 10/28/2019    RDW 15.8 10/28/2019     10/28/2019    MPV NOT REPORTED 10/28/2019     Lab Results   Component Value Date    TSH 2.09 10/28/2019     Lab Results   Component Value Date    CHOL 243 10/28/2019    HDL 27 10/28/2019    LABA1C 6.9 04/03/2019          Assessment/Plan:        1. Mixed stress and urge urinary incontinence  Stable and pt needs order for pull ups as she wears 3-4 per day    2. Pain of both hip joints  Pt to have xray today and based on results either NSAIDS or ortho referral  - XR HIP RIGHT (2-3 VIEWS); Future  - XR HIP LEFT (2-3 VIEWS);  Future  \    Return in about 3 months (around 9/9/2020) for DM, HTN, Hyperlipidemia, COPD.      Electronically signed by Brian Hardin MD on 6/9/2020 at 8:09 PM

## 2020-06-24 RX ORDER — GABAPENTIN 400 MG/1
400 CAPSULE ORAL 3 TIMES DAILY PRN
Qty: 90 CAPSULE | Refills: 2 | Status: SHIPPED | OUTPATIENT
Start: 2020-06-24 | End: 2020-10-29

## 2020-07-21 RX ORDER — MIDODRINE HYDROCHLORIDE 5 MG/1
TABLET ORAL
Qty: 90 TABLET | Refills: 1 | Status: SHIPPED | OUTPATIENT
Start: 2020-07-21 | End: 2020-10-23

## 2020-07-21 NOTE — TELEPHONE ENCOUNTER
Last OV 6/9/20 for incontinence and arthritis  Requesting refill on midodrine thru [facility] script  Next OV 9/9/20 with

## 2020-09-09 ENCOUNTER — OFFICE VISIT (OUTPATIENT)
Dept: FAMILY MEDICINE CLINIC | Age: 60
End: 2020-09-09
Payer: MEDICARE

## 2020-09-09 VITALS
WEIGHT: 175 LBS | HEIGHT: 66 IN | DIASTOLIC BLOOD PRESSURE: 56 MMHG | SYSTOLIC BLOOD PRESSURE: 86 MMHG | HEART RATE: 82 BPM | BODY MASS INDEX: 28.12 KG/M2 | OXYGEN SATURATION: 98 %

## 2020-09-09 LAB
CREATININE URINE POCT: 50
HBA1C MFR BLD: 6.7 %
MICROALBUMIN/CREAT 24H UR: 10 MG/G{CREAT}
MICROALBUMIN/CREAT UR-RTO: <30

## 2020-09-09 PROCEDURE — 3023F SPIROM DOC REV: CPT | Performed by: FAMILY MEDICINE

## 2020-09-09 PROCEDURE — 2022F DILAT RTA XM EVC RTNOPTHY: CPT | Performed by: FAMILY MEDICINE

## 2020-09-09 PROCEDURE — G8427 DOCREV CUR MEDS BY ELIG CLIN: HCPCS | Performed by: FAMILY MEDICINE

## 2020-09-09 PROCEDURE — 4004F PT TOBACCO SCREEN RCVD TLK: CPT | Performed by: FAMILY MEDICINE

## 2020-09-09 PROCEDURE — G8419 CALC BMI OUT NRM PARAM NOF/U: HCPCS | Performed by: FAMILY MEDICINE

## 2020-09-09 PROCEDURE — 3017F COLORECTAL CA SCREEN DOC REV: CPT | Performed by: FAMILY MEDICINE

## 2020-09-09 PROCEDURE — 99215 OFFICE O/P EST HI 40 MIN: CPT | Performed by: FAMILY MEDICINE

## 2020-09-09 PROCEDURE — 3044F HG A1C LEVEL LT 7.0%: CPT | Performed by: FAMILY MEDICINE

## 2020-09-09 PROCEDURE — G8926 SPIRO NO PERF OR DOC: HCPCS | Performed by: FAMILY MEDICINE

## 2020-09-09 PROCEDURE — 83036 HEMOGLOBIN GLYCOSYLATED A1C: CPT | Performed by: FAMILY MEDICINE

## 2020-09-09 PROCEDURE — 82044 UR ALBUMIN SEMIQUANTITATIVE: CPT | Performed by: FAMILY MEDICINE

## 2020-09-09 RX ORDER — LISINOPRIL 5 MG/1
TABLET ORAL
Qty: 90 TABLET | Refills: 1 | Status: SHIPPED | OUTPATIENT
Start: 2020-09-09 | End: 2021-04-15

## 2020-09-09 RX ORDER — LEVALBUTEROL INHALATION SOLUTION 1.25 MG/3ML
1.25 SOLUTION RESPIRATORY (INHALATION) EVERY 4 HOURS PRN
Qty: 360 ML | Refills: 2 | Status: SHIPPED | OUTPATIENT
Start: 2020-09-09 | End: 2020-09-14

## 2020-09-09 RX ORDER — PREDNISONE 20 MG/1
20 TABLET ORAL DAILY
Qty: 5 TABLET | Refills: 0 | Status: SHIPPED | OUTPATIENT
Start: 2020-09-09 | End: 2020-09-14

## 2020-09-09 RX ORDER — ATORVASTATIN CALCIUM 80 MG/1
TABLET, FILM COATED ORAL
Qty: 90 TABLET | Refills: 1 | Status: SHIPPED | OUTPATIENT
Start: 2020-09-09 | End: 2021-05-12

## 2020-09-09 RX ORDER — SERTRALINE HYDROCHLORIDE 100 MG/1
TABLET, FILM COATED ORAL
Qty: 90 TABLET | Refills: 1 | Status: SHIPPED | OUTPATIENT
Start: 2020-09-09 | End: 2021-05-12

## 2020-09-09 RX ORDER — CLOPIDOGREL BISULFATE 75 MG/1
75 TABLET ORAL DAILY
Qty: 90 TABLET | Refills: 1 | Status: SHIPPED | OUTPATIENT
Start: 2020-09-09 | End: 2021-04-15

## 2020-09-09 RX ORDER — CARVEDILOL 3.12 MG/1
TABLET ORAL
Qty: 90 TABLET | Refills: 1 | Status: SHIPPED | OUTPATIENT
Start: 2020-09-09 | End: 2021-01-19

## 2020-09-09 ASSESSMENT — COPD QUESTIONNAIRES: COPD: 1

## 2020-09-09 ASSESSMENT — ENCOUNTER SYMPTOMS
ABDOMINAL PAIN: 0
FACIAL SWELLING: 1
NAUSEA: 0
VOMITING: 0
WHEEZING: 0
BACK PAIN: 1
SHORTNESS OF BREATH: 0
BOWEL INCONTINENCE: 0
CHEST TIGHTNESS: 0
CONSTIPATION: 0
BLOOD IN STOOL: 0
COUGH: 0
EYE DISCHARGE: 0
EYE REDNESS: 0
HOARSE VOICE: 1
DIARRHEA: 0

## 2020-09-09 NOTE — PROGRESS NOTES
include dyslipidemia and post-menopausal state. The current treatment provides significant improvement. Hypertensive end-organ damage includes CAD/MI. Hyperlipidemia   This is a chronic problem. The current episode started more than 1 year ago. The problem is controlled. Recent lipid tests were reviewed and are normal. Exacerbating diseases include nephrotic syndrome. She has no history of diabetes. Pertinent negatives include no chest pain, leg pain or shortness of breath. Current antihyperlipidemic treatment includes statins. The current treatment provides significant improvement of lipids. Risk factors for coronary artery disease include dyslipidemia and hypertension. Coronary Artery Disease   Presents for follow-up visit. Symptoms include leg swelling. Pertinent negatives include no chest pain, chest pressure, chest tightness, dizziness, palpitations or shortness of breath. Risk factors include hyperlipidemia. The symptoms have been stable. Compliance with diet is good. Compliance with exercise is variable. Compliance with medications is variable. COPD   She complains of hoarse voice. There is no cough, shortness of breath or wheezing. This is a chronic problem. The current episode started more than 1 year ago. The problem has been unchanged. Pertinent negatives include no chest pain, fever, headaches or weight loss. Back Pain   This is a new problem. The current episode started in the past 7 days. The problem occurs daily. The problem is unchanged. The pain is present in the lumbar spine. The quality of the pain is described as aching. The symptoms are aggravated by standing, bending and twisting. Pertinent negatives include no abdominal pain, bladder incontinence, bowel incontinence, chest pain, dysuria, fever, headaches, leg pain, numbness, paresis, paresthesias, tingling, weakness or weight loss. Risk factors include menopause. She has tried ice and heat (tylenol) for the symptoms.  The treatment provided mild relief. Mental Health Problem   The primary symptoms do not include dysphoric mood, delusions or bizarre behavior. Primary symptoms comment: doing well and no concerns of anxiety or depression. pt is raising grandkids. Pt takes zoloft daily. . The current episode started more than 1 month ago. This is a chronic problem. Additional symptoms of the illness do not include insomnia, hypersomnia, unexpected weight change, fatigue, headaches or abdominal pain.      Positive cologuard test- pt had agreed for referral to Dr Kelly Lubin for colonoscopy now    Cardiomyopathy - chronic but stable and pt follows with Dr Flor Davila    Past Medical History:     Past Medical History:   Diagnosis Date    CAD (coronary artery disease)     Cardiomy[facility]     COPD (chronic obstructive pulmonary disease) ([facility] [facility]     Depression     Diabetes mellitus ([facility]     H/O mitral valve repair     History of fractured kneecap 2017    right    Hx of CABG     Hx of myocardial infarction     Hyperlipidemia     Hypertension     ICD (implantable cardioverter-defibrillator) in place     Kidney calculi     Osteoarthritis     Pneumonia     Renal calculi       Reviewed all health maintenance requirements and ordered appropriate tests  Health Maintenance Due   Topic Date Due    Hepatitis C screen  1960    HIV screen  09/19/1974    DTaP/Tdap/Td vaccine (1 - Tdap) 09/19/1978    Cervical cancer screen  09/19/1980    Diabetic foot exam  09/24/2019    Diabetic microalbuminuria test  09/24/2019    Diabetic retinal exam  03/30/2020    A1C test (Diabetic or Prediabetic)  04/03/2020    Colon cancer screen colonoscopy  05/06/2020    Flu vaccine (1) 09/01/2020       Past Surgical History:     Past Surgical History:   Procedure Laterality Date    ANKLE SURGERY Right 2009    plate and screws     CARDIAC DEFIBRILLATOR PLACEMENT  06/01/15    CHOLECYSTECTOMY  1990    CORONARY ARTERY BYPASS GRAFT  05/28/15    X 3-  Ashley SANTIAGOA-LAD<SVG-Diag, SVG-PDA, MV Repair with 30 min CE IMR ring    CYSTOSCOPY Left 11/08/2018    HYSTERECTOMY  1990    KIDNEY STONE SURGERY Left 2010    MITRAL VALVE SURGERY  6/4/15    PA CYSTO/URETERO W/LITHOTRIPSY &INDWELL STENT INSRT Left 11/8/2018    CYSTOSCOPY URETEROSCOPY performed by Twin Gagnon MD at [facility] STENT INSRT Left 11/8/2018    CYSTOSCOPY STENT INSERTION performed by Twin Gagnon MD at [facility] STENT INSRT Left 11/15/2018    LEFT CYSTOSCOPY URETEROSCOPY LASER,HLL, LEFT STENT EXCHANGE performed by Twin Gagnon MD at [address]          Medications:       Prior to Admission medications    Medication Sig Start Date End Date Taking? Authorizing Provider   midodrine (PROAMATINE) 5 MG tablet take 1 tablet by mouth three times a day 7/21/20  Yes Constantin Tripathi APRN - CNP   gabapentin (NEURONTIN) 400 MG capsule Take 1 capsule by mouth 3 times daily as needed (neuropathy) for up to 90 days.  6/24/20 9/22/20 Yes Omkar Back MD   atorvastatin (LIPITOR) 80 MG tablet take 1 tablet by mouth once daily 6/9/20  Yes Omkar Back MD   carvedilol (COREG) 3.125 MG tablet take 1 tablet by mouth twice a day with meals 6/9/20  Yes Omkar Back MD   lisinopril (PRINIVIL;ZESTRIL) 5 MG tablet take 1 tablet by mouth once daily 6/9/20  Yes Omkar Back MD   sertraline (ZOLOFT) 100 MG tablet take 1 tablet by mouth once daily 6/9/20  Yes Omkar Back MD   furosemide (LASIX) 40 MG tablet take 1 tablet by mouth daily 6/1/20  Yes Omkar Back MD   clopidogrel (PLAVIX) 75 MG tablet take 1 tablet by mouth once daily 4/11/20  Yes Omkar Back MD   insulin glargine (BASAGLAR KWIKPEN) 100 UNIT/ML injection pen INJECT 40 UNITS SUBCUTANEOUSLY EVERY MORNING AND 20 UNITS EVERY EVENING 1/26/20  Yes Omkar Back MD   lactobacillus (CULTURELLE) capsule Take 1 capsule by mouth 2 times daily (with meals) 1/2/19  Yes Denny Sewell MD   levalbuterol (XOPENEX) 1.25 MG/3ML nebulizer solution Take 3 mLs by nebulization every 4 hours as needed for Wheezing 1/2/19  Yes Denny Sewell MD   Cholecalciferol (VITAMIN D3) 5000 units TABS Take 5,000 Units by mouth daily   Yes Historical Provider, MD   Multiple Vitamins-Minerals (THERAPEUTIC MULTIVITAMIN-MINERALS) tablet Take 1 tablet by mouth daily (with breakfast) 6/12/15  Yes Tristan Davenport MD   Respiratory Therapy Supplies (NEBULIZER/TUBING/MOUTHPIECE) KIT 1 kit by Does not apply route daily as needed (for SOB, cough) 11/13/19   Omkar Back MD   Respiratory Therapy Supplies (NEBULIZER COMPRESSOR) KIT 1 kit by Does not apply route once for 1 dose 11/4/19 11/4/19  Omkar Back MD   Insulin Pen Needle 32G X 4 MM MISC 1 each by Does not apply route daily 6/17/19   MICKEY Yanes - CNP   blood glucose monitor strips Use to test daily 11/7/18   Omkar Back MD   blood glucose monitor kit and supplies Use to test daily 11/7/18   Omkar Back MD   glucose monitoring kit (FREESTYLE) monitoring kit 1 kit by Does not apply route daily as needed Please dispense meter covered by patients insurance. 11/19/15   Omkar Back MD   glucose blood VI test strips (EXACTECH TEST) strip 1 each by In Vitro route daily Pt testing BS once daily and prn 11/19/15   Omkar Back MD   Lancets MISC Pt testing BS once daily and prn 11/19/15   Omkar Back MD        Allergies:       Codeine and Nsaids    Social History:     Tobacco:    reports that she has been smoking cigarettes. She has a 16.00 pack-year smoking history. She has never used smokeless tobacco.  Alcohol:      reports no history of alcohol use. Drug Use:  reports no history of drug use.     Family History:     Family History   Problem Relation Age of Onset    Stroke Father 76    Other Father         low BS    Diabetes Mother        Review of Systems:       Review of Systems Constitutional: Negative for chills, fatigue, fever, unexpected weight change and weight loss. HENT: Positive for facial swelling and hoarse voice. Eyes: Negative for discharge, redness and visual disturbance. Respiratory: Negative for cough, chest tightness, shortness of breath and wheezing. Cardiovascular: Positive for leg swelling. Negative for chest pain and palpitations. Gastrointestinal: Negative for abdominal pain, blood in stool, bowel incontinence, constipation, diarrhea, nausea and vomiting. Genitourinary: Negative for bladder incontinence, dysuria, hematuria, menstrual problem and vaginal discharge. Musculoskeletal: Positive for back pain. Negative for joint swelling and neck pain. Skin: Negative for rash. Neurological: Negative for dizziness, tingling, tremors, weakness, light-headedness, numbness, headaches and paresthesias. Psychiatric/Behavioral: Negative for dysphoric mood and sleep disturbance. The patient does not have insomnia. Physical Exam:     Physical Exam  Vitals signs reviewed. Constitutional:       General: She is not in acute distress. Appearance: Normal appearance. She is well-developed. She is not ill-appearing. HENT:      Head: Normocephalic and atraumatic. Eyes:      General:         Right eye: No discharge. Left eye: No discharge. Conjunctiva/sclera: Conjunctivae normal.      Pupils: Pupils are equal, round, and reactive to light. Neck:      Musculoskeletal: Neck supple. Thyroid: No thyromegaly. Vascular: No carotid bruit. Cardiovascular:      Rate and Rhythm: Normal rate and regular rhythm. Heart sounds: Normal heart sounds. No murmur. Comments: Bilateral LE and feet only  Pulmonary:      Effort: Pulmonary effort is normal.      Breath sounds: Normal breath sounds. Abdominal:      General: Bowel sounds are normal.      Palpations: Abdomen is soft. Tenderness: There is no abdominal tenderness. Musculoskeletal:      Lumbar back: She exhibits tenderness. She exhibits normal range of motion, no bony tenderness, no swelling and no edema. Right lower le+ Edema present. Left lower le+ Edema present. Comments: Tender with bilateral side bending and palpitation of muscle in lumbar region. Lymphadenopathy:      Cervical: No cervical adenopathy. Skin:     Findings: No erythema or rash. Comments: microfilament sensation DECREASED on plantar surface and toes bilateral and no lesions or sores on feet bilateral. +2 DP pulse bilateral.     Neurological:      General: No focal deficit present. Mental Status: She is alert and oriented to person, place, and time. Psychiatric:         Mood and Affect: Mood normal.         Behavior: Behavior normal.         Vitals:  BP (!) 86/56   Pulse 82   Ht 5' 6\" (1.676 m)   Wt 175 lb (79.4 kg)   SpO2 98%   BMI 28.25 kg/m²       Data:     Lab Results   Component Value Date     10/28/2019    K 4.5 10/28/2019     10/28/2019    CO2 21 10/28/2019    BUN 36 10/28/2019    CREATININE 1.47 10/28/2019    GLUCOSE 143 10/28/2019    PROT 9.1 10/28/2019    LABALBU 4.5 10/28/2019    BILITOT 0.44 10/28/2019    ALKPHOS 125 10/28/2019    AST 9 10/28/2019    ALT 10 10/28/2019     Lab Results   Component Value Date    WBC 9.7 10/28/2019    RBC 3.72 10/28/2019    HGB 11.1 10/28/2019    HCT 32.9 10/28/2019    MCV 88.4 10/28/2019    MCH 30.0 10/28/2019    MCHC 33.9 10/28/2019    RDW 15.8 10/28/2019     10/28/2019    MPV NOT REPORTED 10/28/2019     Lab Results   Component Value Date    TSH 2.09 10/28/2019     Lab Results   Component Value Date    CHOL 243 10/28/2019    HDL 27 10/28/2019    LABA1C 6.9 2019          Assessment/Plan:        1. DM (diabetes mellitus), type 2 with neurological complications ([address]  F/U 6mos,  in office, HgbA1C.  Do daily foot checks and yearly eye exams  Stable on basaglar and no changes made  - POCT glycosylated hemoglobin (Hb A1C)  - POCT microalbumin  - HM DIABETES FOOT EXAM    2. Essential hypertension  Stable on coreg and zestril   - carvedilol (COREG) 3.125 MG tablet; take 1 tablet by mouth twice a day with meals  Dispense: 90 tablet; Refill: 1  - lisinopril (PRINIVIL;ZESTRIL) 5 MG tablet; take 1 tablet by mouth once daily  Dispense: 90 tablet; Refill: 1    3. Dyslipidemia  Stable on lipitor  - atorvastatin (LIPITOR) 80 MG tablet; take 1 tablet by mouth once daily  Dispense: 90 tablet; Refill: 1    4. Coronary artery disease   Stable on coreg, lipitor and plavix  - carvedilol (COREG) 3.125 MG tablet; take 1 tablet by mouth twice a day with meals  Dispense: 90 tablet; Refill: 1    5. COPD exacerbation (HCC)  Stable on xopenex  - levalbuterol (XOPENEX) 1.25 MG/3ML nebulizer solution; Take 3 mLs by nebulization every 4 hours as needed for Wheezing  Dispense: 360 mL; Refill: 2    6. Anxiety  Stable on zoloft  - sertraline (ZOLOFT) 100 MG tablet; take 1 tablet by mouth once daily  Dispense: 90 tablet; Refill: 1    7. Positive colorectal cancer screening using Cologuard test  Referral to Dr Barry Padron MD, General Surgery, [facility]    8. Cardiomyopathy, unspecified type ([facility] [address]  Stable per cardiology    9.low back strain  Pt instructed on heat BID and reviewed stretches to do afterwards. Pt to try [id number] for 5d and watch sugars on prednisone. Call for f/u if not better in 1-2wks        Valentina received counseling on the following healthy behaviors: nutrition and exercise  Reviewed prior labs and health maintenance  Continue current medications, diet and exercise. Discussed use, benefit, and side effects of prescribed medications. Barriers to medication compliance addressed. Patient given educational materials - see patient instructions  Was a self-tracking handout given in paper form or via [facility] [employer]?  Yes    Requested Prescriptions     Signed Prescriptions Disp Refills    atorvastatin (LIPITOR) 80 MG tablet 90 tablet 1     Sig: take 1 tablet by mouth once daily    carvedilol (COREG) 3.125 MG tablet 90 tablet 1     Sig: take 1 tablet by mouth twice a day with meals    lisinopril (PRINIVIL;ZESTRIL) 5 MG tablet 90 tablet 1     Sig: take 1 tablet by mouth once daily    sertraline (ZOLOFT) 100 MG tablet 90 tablet 1     Sig: take 1 tablet by mouth once daily    levalbuterol (XOPENEX) 1.25 MG/3ML nebulizer solution 360 mL 2     Sig: Take 3 mLs by nebulization every 4 hours as needed for Wheezing    clopidogrel (PLAVIX) 75 MG tablet 90 tablet 1     Sig: Take 1 tablet by mouth daily    predniSONE (DELTASONE) 20 MG tablet 5 tablet 0     Sig: Take 1 tablet by mouth daily for 5 days       All patient questions answered. Patient voiced understanding. Quality Measures    Body mass index is 28.25 kg/m². Elevated. Weight control planned discussed Healthy diet and regular exercise. BP: (!) 86/56 Blood pressure is low. Treatment plan consists of No treatment change needed. Lab Results   Component Value Date    LDLCHOLESTEROL      10/28/2019    LDLDIRECT 54 10/28/2019    (goal LDL reduction with dx if diabetes is 50% LDL reduction)      PHQ Scores 6/9/2020 4/3/2019 9/24/2018 9/13/2017 4/28/2016   PHQ2 Score 0 0 0 0 0   PHQ9 Score 0 0 0 0 0     Interpretation of Total Score Depression Severity: 1-4 = Minimal depression, 5-9 = Mild depression, 10-14 = Moderate depression, 15-19 = Moderately severe depression, 20-27 = Severe depression      No follow-ups on file.       Electronically signed by Alexis Carranza MD on 9/9/2020 at 3:15 PM

## 2020-09-14 RX ORDER — ALBUTEROL SULFATE 2.5 MG/3ML
2.5 SOLUTION RESPIRATORY (INHALATION) EVERY 6 HOURS PRN
Qty: 120 EACH | Refills: 1 | Status: SHIPPED | OUTPATIENT
Start: 2020-09-14 | End: 2021-07-01 | Stop reason: SDUPTHER

## 2020-09-14 RX ORDER — ALBUTEROL SULFATE 2.5 MG/3ML
2.5 SOLUTION RESPIRATORY (INHALATION) EVERY 6 HOURS PRN
COMMUNITY
End: 2020-09-14 | Stop reason: SDUPTHER

## 2020-10-16 ENCOUNTER — HOSPITAL ENCOUNTER (EMERGENCY)
Age: 60
Discharge: HOME | End: 2020-10-16
Attending: FAMILY MEDICINE
Payer: MEDICARE

## 2020-10-16 VITALS
TEMPERATURE: 98 F | WEIGHT: 173.5 LBS | SYSTOLIC BLOOD PRESSURE: 101 MMHG | RESPIRATION RATE: 18 BRPM | HEIGHT: 66 IN | DIASTOLIC BLOOD PRESSURE: 62 MMHG | BODY MASS INDEX: 27.88 KG/M2 | HEART RATE: 89 BPM | OXYGEN SATURATION: 97 %

## 2020-10-16 PROCEDURE — 99282 EMERGENCY DEPT VISIT SF MDM: CPT

## 2020-10-16 RX ORDER — MAGNESIUM HYDROXIDE 1200 MG/15ML
1000 LIQUID ORAL ONCE
Status: DISCONTINUED | OUTPATIENT
Start: 2020-10-16 | End: 2020-10-16 | Stop reason: HOSPADM

## 2020-10-17 NOTE — ED PROVIDER NOTES
[facility]  eMERGENCY dEPARTMENT eNCOUnter          [facility]       Chief Complaint   Patient presents with    Otalgia     Pt states, \"I swear i have something in my ear. \"       Nurses Notes reviewed and I agree except as noted in the HPI. HISTORY OF PRESENT ILLNESS    Zainab Casas is a 63 y.o. female who presents to the emergency room via private vehicle, patient complaining of left earache, patient states she thinks she may have something in her left ear though she is unsure what. Patient had a \"I swear I have something in my ear\". Denies any drainage from the ear denies any recent illness or fever denies congestion. REVIEW OF SYSTEMS     Review of Systems   HENT: Positive for ear pain. Negative for ear discharge. All other systems reviewed and are negative. PAST MEDICAL HISTORY    has a past medical history of CAD (coronary artery disease), Cardiomyopathy ([address] COPD (chronic obstructive pulmonary disease) ([address] Depression, Diabetes mellitus ([address] H/O mitral valve repair, History of fractured kneecap, Hx of CABG, Hx of myocardial infarction, Hyperlipidemia, Hypertension, ICD (implantable cardioverter-defibrillator) in place, Kidney calculi, Osteoarthritis, Pneumonia, and Renal calculi. SURGICAL HISTORY      has a past surgical history that includes Cholecystectomy (1990); Hysterectomy (1990); Ankle surgery (Right, 2009); Kidney stone surgery (Left, 2010); Tubal ligation; Mitral valve surgery (6/4/15); Cardiac defibrillator placement (06/01/15); Coronary artery bypass graft (05/28/15); Cystocopy (Left, 11/08/2018); pr cysto/uretero w/lithotripsy &indwell stent insrt (Left, 11/8/2018); pr cysto/uretero w/lithotripsy &indwell stent insrt (Left, 11/8/2018); and pr cysto/uretero w/lithotripsy &indwell stent insrt (Left, 11/15/2018).     CURRENT MEDICATIONS       Discharge Medication List as of 10/16/2020  6:15 PM      CONTINUE these medications which have NOT CHANGED    Details   albuterol (PROVENTIL) (2.5 MG/3ML) 0.083% nebulizer solution Take 3 mLs by nebulization every 6 hours as needed for Wheezing (as needed for wheezing), Disp-120 each,R-1Normal      atorvastatin (LIPITOR) 80 MG tablet take 1 tablet by mouth once daily, Disp-90 tablet,R-1Normal      carvedilol (COREG) 3.125 MG tablet take 1 tablet by mouth twice a day with meals, Disp-90 tablet,R-1Normal      lisinopril (PRINIVIL;ZESTRIL) 5 MG tablet take 1 tablet by mouth once daily, Disp-90 tablet,R-1Normal      sertraline (ZOLOFT) 100 MG tablet take 1 tablet by mouth once daily, Disp-90 tablet,R-1Normal      clopidogrel (PLAVIX) 75 MG tablet Take 1 tablet by mouth daily, Disp-90 tablet,R-1HOLD FOR UROLOGY PROCEDURE 11/8/18Normal      midodrine (PROAMATINE) 5 MG tablet take 1 tablet by mouth three times a day, Disp-90 tablet,R-1Normal      gabapentin (NEURONTIN) 400 MG capsule Take 1 capsule by mouth 3 times daily as needed (neuropathy) for up to 90 days. , Disp-90 capsule,R-2Normal      furosemide (LASIX) 40 MG tablet take 1 tablet by mouth daily, Disp-30 tablet, R-5Normal      insulin glargine (BASAGLAR KWIKPEN) 100 UNIT/ML injection pen INJECT 40 UNITS SUBCUTANEOUSLY EVERY MORNING AND 20 UNITS EVERY EVENING, Disp-30 mL, R-5Normal      Respiratory Therapy Supplies (NEBULIZER/TUBING/MOUTHPIECE) KIT DAILY PRN Starting Wed 11/13/2019, Disp-1 kit, R-0, Print      Insulin Pen Needle 32G X 4 MM MISC DAILY Starting Mon 6/17/2019, Disp-100 each, R-3, Normal      lactobacillus (CULTURELLE) capsule Take 1 capsule by mouth 2 times daily (with meals), Disp-30 capsule, R-0Normal      !! blood glucose monitor strips Use to test daily, Disp-100 strip, R-5, Normal      blood glucose monitor kit and supplies Use to test daily, Disp-1 kit, R-0, Normal      Cholecalciferol (VITAMIN D3) 5000 units TABS Take 5,000 Units by mouth dailyHistorical Med      glucose monitoring kit (FREESTYLE) monitoring kit DAILY PRN Starting Thu 11/19/2015, Disp-1 kit, R-0, NormalPlease dispense meter covered by patients insurance. !! glucose blood VI test strips (EXACTECH TEST) strip DAILY Starting Thu 11/19/2015, Disp-100 each, R-3, NormalPt testing BS once daily and prn      Lancets MISC Disp-100 each, R-3, NormalPt testing BS once daily and prn      Multiple Vitamins-Minerals (THERAPEUTIC MULTIVITAMIN-MINERALS) tablet Take 1 tablet by mouth daily (with breakfast), Disp-60 tablet, R-3Normal       !! - Potential duplicate medications found. Please discuss with provider. ALLERGIES     is allergic to codeine and nsaids. FAMILY HISTORY     She indicated that her mother is alive. She indicated that her father is alive. She indicated that her sister is alive. She indicated that her brother is alive. family history includes Diabetes in her mother; Other in her father; Stroke (age of onset: 76) in her father. SOCIAL HISTORY      reports that she has been smoking cigarettes. She has a 16.00 pack-year smoking history. She has never used smokeless tobacco. She reports that she does not drink alcohol or use drugs. PHYSICAL EXAM     INITIAL VITALS:  height is 5' 6\" (1.676 m) and weight is 173 lb 8 oz (78.7 kg). Her oral temperature is 98 °F (36.7 °C). Her blood pressure is 101/62 and her pulse is 89. Her respiration is 18 and oxygen saturation is 97%. Physical Exam   Constitutional: Patient is oriented to person, place, and time. Patient appears well-developed and well-nourished. Patient is active and cooperative. HENT:   Head: Normocephalic and atraumatic. Head is without contusion. Right Ear: Hearing and external ear normal. No drainage. Normal TM  Left Ear: Hearing and external ear normal. No drainage. Normal TM, no gross effusion or erythema, no foreign body.   Does appear to be a small red dot tympanic membrane approximately 2 o'clock position from the center, though it is not moving and difficult to observe any closer due to its small size  Nose: Nose normal. No nasal deformity. No epistaxis. Mouth/Throat: Mucous membranes are not dry. Eyes: EOMI. Conjunctivae, sclera, and lids are normal. Right eye exhibits no discharge. Left eye exhibits no discharge. Neck: Full passive range of motion without pain and phonation normal.   Cardiovascular:  Normal rate, regular rhythm and intact distal pulses. Pulses: Right radial pulse  2+   Pulmonary/Chest: Effort normal. No tachypnea and no bradypnea. Abdominal:  Patient without distension   Musculoskeletal:   Negative acute trauma or deformity,  apparent full range of motion and normal strength all extremities appropriate to age. Neurological: Patient is alert and oriented to person, place, and time. patient displays no tremor. Patient displays no seizure activity. Observed gait. Lymphatic: Negative cervical or auricular lymphadenopathy  Skin: Skin is warm and dry. Patient is not diaphoretic. Psychiatric: Patient has a normal mood and affect. Patient speech is normal and behavior is normal. Cognition and memory are normal.    DIFFERENTIAL DIAGNOSIS:   Infection effusion foreign body    DIAGNOSTIC RESULTS           RADIOLOGY: non-plain film images(s) such as CT, Ultrasound and MRI are read by the radiologist.  No orders to display       LABS:   Labs Reviewed - No data to display    EMERGENCY DEPARTMENT COURSE:   Vitals:    Vitals:    10/16/20 1754   BP: 101/62   Pulse: 89   Resp: 18   Temp: 98 °F (36.7 °C)   TempSrc: Oral   SpO2: 97%   Weight: 173 lb 8 oz (78.7 kg)   Height: 5' 6\" (1.676 m)     Patient history and physical exam taken at bedside, discussed focused exam of patient's left ear, although there is no gross foreign body as patient was concerned, there is is.   Be any effusion or infection, I do appreciate a small red spot on tympanic membrane which had to be a small blood vessel versus a very tiny red bug, will irrigate the ear to see if this would change anything and reevaluate, patient acknowledges    After nursing irrigated patient's ear with sterile water, ear was reevaluated otoscope, there is been no change in the appearance and that small red dot does remain, likely to be blood vessel. Discussed with patient no gross trying behind patient's reported ear pain, I do not appreciate any foreign body or signs of effusion or infection, at this time will discharge patient home with outpatient follow with primary care return to ER symptoms change worse other concerns, acknowledges    FINAL IMPRESSION      1. Otalgia of left ear          DISPOSITION/PLAN   D/c    PATIENT REFERRED TO:  MD Sherley Rodrigues [phone]  [phone]    Call   As needed      DISCHARGE MEDICATIONS:  Discharge Medication List as of 10/16/2020  6:15 PM              Summation      Patient Course:  D/c    ED Medications administered this visit:  Medications - No data to display    New Prescriptions from this visit:    Discharge Medication List as of 10/16/2020  6:15 PM          Follow-up:  MD Sherley Rodrigues [phone]  [phone]    Call   As needed        Final Impression:   1.  Otalgia of left ear               (Please note that portions of this note were completed with a voice recognition program.  Efforts were made to edit the dictations but occasionally words are mis-transcribed.)    MD Nathaly Albert MD  10/16/20 6682

## 2020-10-23 RX ORDER — MIDODRINE HYDROCHLORIDE 5 MG/1
TABLET ORAL
Qty: 90 TABLET | Refills: 1 | Status: SHIPPED | OUTPATIENT
Start: 2020-10-23 | End: 2021-02-05

## 2020-10-29 RX ORDER — GABAPENTIN 400 MG/1
CAPSULE ORAL
Qty: 90 CAPSULE | Refills: 2 | Status: SHIPPED | OUTPATIENT
Start: 2020-10-29 | End: 2021-06-26

## 2020-10-29 NOTE — TELEPHONE ENCOUNTER
Last OV 9/9/20 for DM, HTN, HLD  Requesting refill on gabapentin 400mg thru [facility]  Next OV 3/10/21

## 2020-11-03 PROBLEM — J18.9 PNEUMONIA DUE TO INFECTIOUS ORGANISM: Status: RESOLVED | Noted: 2018-12-28 | Resolved: 2020-11-03

## 2020-11-09 PROCEDURE — 93296 REM INTERROG EVL PM/IDS: CPT | Performed by: INTERNAL MEDICINE

## 2020-11-09 PROCEDURE — 93295 DEV INTERROG REMOTE 1/2/MLT: CPT | Performed by: INTERNAL MEDICINE

## 2020-11-28 ENCOUNTER — HOSPITAL ENCOUNTER (EMERGENCY)
Age: 60
Discharge: HOME | End: 2020-11-28
Attending: FAMILY MEDICINE
Payer: MEDICARE

## 2020-11-28 VITALS
TEMPERATURE: 98 F | OXYGEN SATURATION: 99 % | HEART RATE: 96 BPM | RESPIRATION RATE: 18 BRPM | SYSTOLIC BLOOD PRESSURE: 99 MMHG | BODY MASS INDEX: 28.86 KG/M2 | WEIGHT: 178.8 LBS | DIASTOLIC BLOOD PRESSURE: 53 MMHG

## 2020-11-28 LAB
ABSOLUTE EOS #: 0.1 K/UL (ref 0–0.4)
ABSOLUTE IMMATURE GRANULOCYTE: ABNORMAL K/UL (ref 0–0.3)
ABSOLUTE LYMPH #: 2.1 K/UL (ref 1–4.8)
ABSOLUTE MONO #: 0.6 K/UL (ref 0–1)
ANION GAP SERPL CALCULATED.3IONS-SCNC: 13 MMOL/L (ref 9–17)
BASOPHILS # BLD: 0 % (ref 0–2)
BASOPHILS ABSOLUTE: 0 K/UL (ref 0–0.2)
BUN BLDV-MCNC: 43 MG/DL (ref 8–23)
BUN/CREAT BLD: 22 (ref 9–20)
CALCIUM SERPL-MCNC: 9.8 MG/DL (ref 8.6–10.4)
CHLORIDE BLD-SCNC: 101 MMOL/L (ref 98–107)
CO2: 21 MMOL/L (ref 20–31)
CREAT SERPL-MCNC: 1.93 MG/DL (ref 0.5–0.9)
D-DIMER QUANTITATIVE: 1.34 MG/L FEU (ref 0–0.59)
DIFFERENTIAL TYPE: YES
EOSINOPHILS RELATIVE PERCENT: 1 % (ref 0–5)
GFR AFRICAN AMERICAN: 32 ML/MIN
GFR NON-AFRICAN AMERICAN: 26 ML/MIN
GFR SERPL CREATININE-BSD FRML MDRD: ABNORMAL ML/MIN/{1.73_M2}
GFR SERPL CREATININE-BSD FRML MDRD: ABNORMAL ML/MIN/{1.73_M2}
GLUCOSE BLD-MCNC: 219 MG/DL (ref 70–99)
HCT VFR BLD CALC: 30 % (ref 36–46)
HEMOGLOBIN: 10.3 G/DL (ref 12–16)
IMMATURE GRANULOCYTES: ABNORMAL %
INR BLD: 1.1
LYMPHOCYTES # BLD: 22 % (ref 15–40)
MCH RBC QN AUTO: 29.7 PG (ref 26–34)
MCHC RBC AUTO-ENTMCNC: 34.1 G/DL (ref 31–37)
MCV RBC AUTO: 86.9 FL (ref 80–100)
MONOCYTES # BLD: 6 % (ref 4–8)
NRBC AUTOMATED: ABNORMAL PER 100 WBC
PARTIAL THROMBOPLASTIN TIME: 29.2 SEC (ref 23.9–33.8)
PDW BLD-RTO: 16 % (ref 12.1–15.2)
PLATELET # BLD: 350 K/UL (ref 140–450)
PLATELET ESTIMATE: ABNORMAL
PMV BLD AUTO: ABNORMAL FL (ref 6–12)
POTASSIUM SERPL-SCNC: 4.4 MMOL/L (ref 3.7–5.3)
PROTHROMBIN TIME: 13.3 SEC (ref 11.5–14.2)
RBC # BLD: 3.46 M/UL (ref 4–5.2)
RBC # BLD: ABNORMAL 10*6/UL
SEG NEUTROPHILS: 71 % (ref 47–75)
SEGMENTED NEUTROPHILS ABSOLUTE COUNT: 6.9 K/UL (ref 2.5–7)
SODIUM BLD-SCNC: 135 MMOL/L (ref 135–144)
WBC # BLD: 9.7 K/UL (ref 3.5–11)
WBC # BLD: ABNORMAL 10*3/UL

## 2020-11-28 PROCEDURE — 85025 COMPLETE CBC W/AUTO DIFF WBC: CPT

## 2020-11-28 PROCEDURE — 85610 PROTHROMBIN TIME: CPT

## 2020-11-28 PROCEDURE — 80048 BASIC METABOLIC PNL TOTAL CA: CPT

## 2020-11-28 PROCEDURE — 36415 COLL VENOUS BLD VENIPUNCTURE: CPT

## 2020-11-28 PROCEDURE — 85379 FIBRIN DEGRADATION QUANT: CPT

## 2020-11-28 PROCEDURE — 6370000000 HC RX 637 (ALT 250 FOR IP): Performed by: FAMILY MEDICINE

## 2020-11-28 PROCEDURE — 6360000002 HC RX W HCPCS: Performed by: FAMILY MEDICINE

## 2020-11-28 PROCEDURE — 96372 THER/PROPH/DIAG INJ SC/IM: CPT

## 2020-11-28 PROCEDURE — 85730 THROMBOPLASTIN TIME PARTIAL: CPT

## 2020-11-28 PROCEDURE — 99284 EMERGENCY DEPT VISIT MOD MDM: CPT

## 2020-11-28 RX ORDER — CEPHALEXIN 500 MG/1
1000 CAPSULE ORAL 2 TIMES DAILY
Qty: 28 CAPSULE | Refills: 0 | Status: SHIPPED | OUTPATIENT
Start: 2020-11-28 | End: 2020-12-05

## 2020-11-28 RX ORDER — CEPHALEXIN 500 MG/1
1000 CAPSULE ORAL ONCE
Status: COMPLETED | OUTPATIENT
Start: 2020-11-28 | End: 2020-11-28

## 2020-11-28 RX ADMIN — ENOXAPARIN SODIUM 80 MG: 80 INJECTION SUBCUTANEOUS at 18:32

## 2020-11-28 RX ADMIN — CEPHALEXIN 1000 MG: 500 CAPSULE ORAL at 16:43

## 2020-11-28 ASSESSMENT — PAIN DESCRIPTION - LOCATION: LOCATION: LEG

## 2020-11-28 ASSESSMENT — ENCOUNTER SYMPTOMS
GASTROINTESTINAL NEGATIVE: 1
COLOR CHANGE: 1
RESPIRATORY NEGATIVE: 1

## 2020-11-28 ASSESSMENT — PAIN SCALES - GENERAL: PAINLEVEL_OUTOF10: 3

## 2020-11-28 ASSESSMENT — PAIN DESCRIPTION - DESCRIPTORS: DESCRIPTORS: ACHING

## 2020-11-28 ASSESSMENT — PAIN DESCRIPTION - ORIENTATION: ORIENTATION: LEFT;LOWER

## 2020-11-28 ASSESSMENT — PAIN DESCRIPTION - FREQUENCY: FREQUENCY: INTERMITTENT

## 2020-11-28 ASSESSMENT — PAIN DESCRIPTION - PAIN TYPE: TYPE: ACUTE PAIN

## 2020-11-28 NOTE — ED PROVIDER NOTES
[facility] ED  EMERGENCY DEPARTMENT ENCOUNTER      Pt Name: Lorena Terrell  MRN: [id number]  [place] 1960  Date of evaluation: 11/28/2020  Provider: Malgorzata Arevalo MD    CHIEF COMPLAINT       Chief Complaint   Patient presents with    Leg Swelling     left lower leg with open wound that is draining yellow - open wound on bottom of left foot          HISTORY OF PRESENT ILLNESS   (Location/Symptom, Timing/Onset, Context/Setting, Quality, Duration, Modifying Factors, Severity)  Note limiting factors. Lorena Terrell is a 63 y.o. female who presents to the emergency department redness and warmth to the left lower extremity, she states that she was scratching it and it opened up and was leaking serum. She also has a callus on the bottom of her left foot which does not bother her but she does have significant diabetic neuropathy. She denies any shortness of breath or chest pain. She has noticed symptoms for about 1 week with the redness starting at that point. She has swelling in the area below the knee but none in the popliteal fossa or the thigh    HPI    Nursing Notes were reviewed. REVIEW OF SYSTEMS    (2-9 systems for level 4, 10 or more for level 5)     Review of Systems   Constitutional: Negative for fever. Respiratory: Negative. Gastrointestinal: Negative. Skin: Positive for color change and wound. All other systems reviewed and are negative. Except as noted above the remainder of the review of systems was reviewed and negative.        PAST MEDICAL HISTORY     Past Medical History:   Diagnosis Date    CAD (coronary artery disease)     Cardiomyopathy ([address]     COPD (chronic obstructive pulmonary disease) ([address]     Depression     Diabetes mellitus ([address]     H/O mitral valve repair     History of fractured kneecap 2017    right    Hx of CABG     Hx of myocardial infarction     Hyperlipidemia     Hypertension     ICD (implantable cardioverter-defibrillator) in and prn    GLUCOSE MONITORING KIT (FREESTYLE) MONITORING KIT    1 kit by Does not apply route daily as needed Please dispense meter covered by patients insurance.     INSULIN GLARGINE (BASAGLAR KWIKPEN) 100 UNIT/ML INJECTION PEN    INJECT 40 UNITS SUBCUTANEOUSLY EVERY MORNING AND 20 UNITS EVERY EVENING    INSULIN PEN NEEDLE 32G X 4 MM MISC    1 each by Does not apply route daily    LACTOBACILLUS (CULTURELLE) CAPSULE    Take 1 capsule by mouth 2 times daily (with meals)    LANCETS MISC    Pt testing BS once daily and prn    LISINOPRIL (PRINIVIL;ZESTRIL) 5 MG TABLET    take 1 tablet by mouth once daily    MIDODRINE (PROAMATINE) 5 MG TABLET    take 1 tablet by mouth three times a day    MULTIPLE VITAMINS-MINERALS (THERAPEUTIC MULTIVITAMIN-MINERALS) TABLET    Take 1 tablet by mouth daily (with breakfast)    RESPIRATORY THERAPY SUPPLIES (NEBULIZER COMPRESSOR) KIT    1 kit by Does not apply route once for 1 dose    RESPIRATORY THERAPY SUPPLIES (NEBULIZER/TUBING/MOUTHPIECE) KIT    1 kit by Does not apply route daily as needed (for SOB, cough)    SERTRALINE (ZOLOFT) 100 MG TABLET    take 1 tablet by mouth once daily       ALLERGIES     Codeine and Nsaids    FAMILY HISTORY       Family History   Problem Relation Age of Onset    Stroke Father 76    Other Father         low BS    Diabetes Mother           SOCIAL HISTORY       Social History     Socioeconomic History    Marital status:      Spouse name: None    Number of children: None    Years of education: None    Highest education level: None   Occupational History    None   Social Needs    Financial resource strain: None    Food insecurity     Worry: None     Inability: None    Transportation needs     Medical: None     Non-medical: None   Tobacco Use    Smoking status: Current Every Day Smoker     Packs/day: 0.50     Years: 32.00     Pack years: 16.00     Types: Cigarettes     Last attempt to quit: 2018     Years since quittin.9    Smokeless tobacco: Never Used   Substance and Sexual Activity    Alcohol use: No     Alcohol/week: 0.0 standard drinks    Drug use: No    Sexual activity: None   Lifestyle    Physical activity     Days per week: None     Minutes per session: None    Stress: None   Relationships    Social connections     Talks on phone: None     Gets together: None     Attends Religion service: None     Active member of club or organization: None     Attends meetings of clubs or organizations: None     Relationship status: None    Intimate partner violence     Fear of current or ex partner: None     Emotionally abused: None     Physically abused: None     Forced sexual activity: None   Other Topics Concern    None   Social History Narrative    None       SCREENINGS        [place] Coma Scale  Eye Opening: Spontaneous  Best Verbal Response: Oriented  Best Motor Response: Obeys commands  [place] Coma Scale Score: 15               PHYSICAL EXAM    (up to 7 for level 4, 8 or more for level 5)     ED Triage Vitals [11/28/20 1540]   BP Temp Temp Source Pulse Resp SpO2 Height Weight   (!) 99/53 98 °F (36.7 °C) Oral 96 18 99 % -- 178 lb 12.8 oz (81.1 kg)       Physical Exam  Vitals signs reviewed. Constitutional:       Appearance: She is not ill-appearing. HENT:      Head: Atraumatic. Nose: No congestion. Mouth/Throat:      Mouth: Mucous membranes are moist.      Pharynx: No posterior oropharyngeal erythema. Eyes:      General:         Right eye: No discharge. Left eye: No discharge. Pupils: Pupils are equal, round, and reactive to light. Neck:      Musculoskeletal: Neck supple. No muscular tenderness. Cardiovascular:      Rate and Rhythm: Normal rate and regular rhythm. Heart sounds: Normal heart sounds. No murmur. Pulmonary:      Effort: Pulmonary effort is normal. No respiratory distress. Breath sounds: Normal breath sounds. Abdominal:      General: Abdomen is flat.       Palpations: Abdomen is GFR Non- 26 (*)     GFR  32 (*)     All other components within normal limits   D-DIMER, QUANTITATIVE - Abnormal; Notable for the following components:    D-Dimer, Quant 1.34 (*)     All other components within normal limits   PROTIME-INR   APTT       All other labs were within normal range or not returned as of this dictation. EMERGENCY DEPARTMENT COURSE and DIFFERENTIAL DIAGNOSIS/MDM:   Vitals:    Vitals:    11/28/20 1540   BP: (!) 99/53   Pulse: 96   Resp: 18   Temp: 98 °F (36.7 °C)   TempSrc: Oral   SpO2: 99%   Weight: 178 lb 12.8 oz (81.1 kg)           MDM  Number of Diagnoses or Management Options  Diagnosis management comments: Evidence of cellulitis of left lower extremity. D-dimer elevated but she does have issues with chronic renal failure. Outpatient order for lower extremity venous Doppler on the left leg ordered for Monday but it does remain an open order. I recommended to her that she come back and have it done Monday she is to call radiology department. Cephalexin 1 g 3 times daily for cellulitis. She needs to follow-up with a podiatrist regarding debridement of the large callus on the bottom of her foot        REASSESSMENT          CRITICAL CARE TIME       CONSULTS:  None    PROCEDURES:  Unless otherwise noted below, none     Procedures      FINAL IMPRESSION      1. Cellulitis of left lower extremity Worsening   2. Leg edema, left Worsening   3. Callus of foot Stable         DISPOSITION/PLAN   DISPOSITION Decision To Discharge 11/28/2020 05:38:58 PM      PATIENT REFERRED TO:  No follow-up provider specified. DISCHARGE MEDICATIONS:  New Prescriptions    CEPHALEXIN (KEFLEX) 500 MG CAPSULE    Take 2 capsules by mouth 2 times daily for 7 days     Controlled Substances Monitoring:     No flowsheet data found.     (Please note that portions of this note were completed with a voice recognition program.  Efforts were made to edit the dictations but occasionally words are mis-transcribed.)    Mayra Burroughs MD (electronically signed)  Attending Emergency Physician      Patient has noticed redness and warmth in her anterior shin left lower extremity without pain but does have significant underlying diabetic neuropathy. She noticed an open area from where she has been scratching it which is leaking serous fluid. She denies any swelling above the level of the knee, everything is in the calf right now.   Also seems to be mostly in the anterior shin patient has a large callus on the bottom of her foot, it is not actively draining nor is red      Mayra Burroughs MD  11/28/20 3134

## 2020-11-30 ENCOUNTER — HOSPITAL ENCOUNTER (OUTPATIENT)
Dept: VASCULAR LAB | Age: 60
Discharge: HOME | End: 2020-12-02
Payer: MEDICARE

## 2020-11-30 PROCEDURE — 93971 EXTREMITY STUDY: CPT

## 2020-12-04 ENCOUNTER — PROCEDURE VISIT (OUTPATIENT)
Dept: CARDIOLOGY CLINIC | Age: 60
End: 2020-12-04
Payer: MEDICARE

## 2020-12-10 ENCOUNTER — HOSPITAL ENCOUNTER (OUTPATIENT)
Dept: NON INVASIVE DIAGNOSTICS | Age: 60
Discharge: HOME | End: 2020-12-10
Payer: MEDICARE

## 2020-12-10 ENCOUNTER — HOSPITAL ENCOUNTER (OUTPATIENT)
Age: 60
Discharge: HOME | End: 2020-12-10
Payer: MEDICARE

## 2020-12-10 ENCOUNTER — HOSPITAL ENCOUNTER (OUTPATIENT)
Dept: GENERAL RADIOLOGY | Age: 60
Discharge: HOME | End: 2020-12-12
Payer: MEDICARE

## 2020-12-10 ENCOUNTER — HOSPITAL ENCOUNTER (OUTPATIENT)
Age: 60
Discharge: HOME | End: 2020-12-12
Payer: MEDICARE

## 2020-12-10 LAB
ABSOLUTE EOS #: 0.1 K/UL (ref 0–0.4)
ABSOLUTE IMMATURE GRANULOCYTE: ABNORMAL K/UL (ref 0–0.3)
ABSOLUTE LYMPH #: 2.1 K/UL (ref 1–4.8)
ABSOLUTE MONO #: 0.6 K/UL (ref 0–1)
ALBUMIN SERPL-MCNC: 4.3 G/DL (ref 3.5–5.2)
ALBUMIN/GLOBULIN RATIO: ABNORMAL (ref 1–2.5)
ALP BLD-CCNC: 111 U/L (ref 35–104)
ALT SERPL-CCNC: 11 U/L (ref 5–33)
ANION GAP SERPL CALCULATED.3IONS-SCNC: 11 MMOL/L (ref 9–17)
AST SERPL-CCNC: 9 U/L
BASOPHILS # BLD: 0 % (ref 0–2)
BASOPHILS ABSOLUTE: 0 K/UL (ref 0–0.2)
BILIRUB SERPL-MCNC: 0.34 MG/DL (ref 0.3–1.2)
BUN BLDV-MCNC: 32 MG/DL (ref 8–23)
BUN/CREAT BLD: 23 (ref 9–20)
CALCIUM SERPL-MCNC: 9 MG/DL (ref 8.6–10.4)
CHLORIDE BLD-SCNC: 101 MMOL/L (ref 98–107)
CHOLESTEROL/HDL RATIO: 8.5
CHOLESTEROL: 263 MG/DL
CO2: 20 MMOL/L (ref 20–31)
CREAT SERPL-MCNC: 1.37 MG/DL (ref 0.5–0.9)
DIFFERENTIAL TYPE: YES
EOSINOPHILS RELATIVE PERCENT: 1 % (ref 0–5)
GFR AFRICAN AMERICAN: 48 ML/MIN
GFR NON-AFRICAN AMERICAN: 39 ML/MIN
GFR SERPL CREATININE-BSD FRML MDRD: ABNORMAL ML/MIN/{1.73_M2}
GFR SERPL CREATININE-BSD FRML MDRD: ABNORMAL ML/MIN/{1.73_M2}
GLUCOSE BLD-MCNC: 268 MG/DL (ref 70–99)
HCT VFR BLD CALC: 29.4 % (ref 36–46)
HDLC SERPL-MCNC: 31 MG/DL
HEMOGLOBIN: 10.2 G/DL (ref 12–16)
IMMATURE GRANULOCYTES: ABNORMAL %
LDL CHOLESTEROL DIRECT: 63 MG/DL
LDL CHOLESTEROL: ABNORMAL MG/DL (ref 0–130)
LV EF: 45 %
LVEF MODALITY: NORMAL
LYMPHOCYTES # BLD: 22 % (ref 15–40)
MAGNESIUM: 2.1 MG/DL (ref 1.6–2.6)
MCH RBC QN AUTO: 30 PG (ref 26–34)
MCHC RBC AUTO-ENTMCNC: 34.6 G/DL (ref 31–37)
MCV RBC AUTO: 86.8 FL (ref 80–100)
MONOCYTES # BLD: 6 % (ref 4–8)
NRBC AUTOMATED: ABNORMAL PER 100 WBC
PATIENT FASTING?: YES
PDW BLD-RTO: 15.7 % (ref 12.1–15.2)
PLATELET # BLD: 251 K/UL (ref 140–450)
PLATELET ESTIMATE: ABNORMAL
PMV BLD AUTO: ABNORMAL FL (ref 6–12)
POTASSIUM SERPL-SCNC: 4.2 MMOL/L (ref 3.7–5.3)
RBC # BLD: 3.39 M/UL (ref 4–5.2)
RBC # BLD: ABNORMAL 10*6/UL
SEG NEUTROPHILS: 71 % (ref 47–75)
SEGMENTED NEUTROPHILS ABSOLUTE COUNT: 6.7 K/UL (ref 2.5–7)
SODIUM BLD-SCNC: 132 MMOL/L (ref 135–144)
TOTAL PROTEIN: 8.5 G/DL (ref 6.4–8.3)
TRIGL SERPL-MCNC: 1010 MG/DL
TSH SERPL DL<=0.05 MIU/L-ACNC: 1.2 MIU/L (ref 0.3–5)
VITAMIN D 25-HYDROXY: 9.6 NG/ML (ref 30–100)
VLDLC SERPL CALC-MCNC: ABNORMAL MG/DL (ref 1–30)
WBC # BLD: 9.6 K/UL (ref 3.5–11)
WBC # BLD: ABNORMAL 10*3/UL

## 2020-12-10 PROCEDURE — 83721 ASSAY OF BLOOD LIPOPROTEIN: CPT

## 2020-12-10 PROCEDURE — 84443 ASSAY THYROID STIM HORMONE: CPT

## 2020-12-10 PROCEDURE — 93306 TTE W/DOPPLER COMPLETE: CPT

## 2020-12-10 PROCEDURE — 83735 ASSAY OF MAGNESIUM: CPT

## 2020-12-10 PROCEDURE — 80053 COMPREHEN METABOLIC PANEL: CPT

## 2020-12-10 PROCEDURE — 82306 VITAMIN D 25 HYDROXY: CPT

## 2020-12-10 PROCEDURE — 71046 X-RAY EXAM CHEST 2 VIEWS: CPT

## 2020-12-10 PROCEDURE — 83036 HEMOGLOBIN GLYCOSYLATED A1C: CPT

## 2020-12-10 PROCEDURE — 85025 COMPLETE CBC W/AUTO DIFF WBC: CPT

## 2020-12-10 PROCEDURE — 80061 LIPID PANEL: CPT

## 2020-12-10 PROCEDURE — 93005 ELECTROCARDIOGRAM TRACING: CPT

## 2020-12-10 PROCEDURE — 36415 COLL VENOUS BLD VENIPUNCTURE: CPT

## 2020-12-10 NOTE — TELEPHONE ENCOUNTER
Medication pending Neuro Interventional  Interval History and Physical    Reviewed note from 11/16/2020 by Dr. Jacobs. Since then patient endorses feeling well, she is starting to cook her own meals and go on walks at home. 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 She endorses current HA/neck tension 6/10 pain level. Also has baseline SOB d/t COPD. Denies CP, SOB with exertion, dizziness, nausea, n/t, new focal weakness, cold/flu sx. She has been NPO since 9 PM on 12/9/2020. Had sips of water this morning with her Brilinta, Aspirin, Fosamax and Hepatitis C medication.     Patient presents today for elective re-treatment of ACOM aneurysm with Dr. Jacobs. She obtained PCP clearance prior to today's procedure.     Pre-morbid Modified [place] Score: 2 = slight disability; unable to carry out all previous activities, but able to look after own affairs without assistance    Impression/Plan:  Aneurysmal subarachnoid hemorrhage   Ruptured Anterior Communicating artery aneurysms (x2)  S/p Primary coil embolization (Dr. Davison, 7/20/2020)  Aneurysms now with residual  Hypertension  COPD  HCV/ETOH cirrhosis (follows transplant team)  Pancytopenia  Former tobacco use  • Proceed with procedure as planned. Risk and benefits of the procedure were discussed with the patient.  Included but not limited to bleeding, access site infection/hematoma, contrast allergy/nephropathy, vessel dissection/injury, stroke, and death. Consent was signed at office visit on 11/16/2020, in consent tab of chart.  • Plan for admission to neuro ICU post-procedure. ICU aware.   • Plan for discharge home 12/11/2020 following observation and bedrest period and after demonstrates ability to ambulate and void without concern and has no new focal neurological signs or symptoms.   • COVID-19 screen on 12/8/2020: not detected.   • Pre angiograms labs reviewed:   • PT/INR, PTT normal.  • CMP with slightly decreased kidney  function.  • P2y12: 136 (took brilinta 90mg this morning).  • ARU: 406 (took ASA 81mg this morning).   • CBC with leukopenia (baseline), thrombocytopenia (baseline, stable), Hgb 7.5 (was 9.2 prior).   • Discussed lab findings with Dr. Carrasco. Will obtain Type&Screen in case transfusion needed.   • IVF: 0.9% sodium chloride to infuse at 220 mL/hour x 1 hour.   • Will update POA (Brayden) - [phone] post procedure.   • Contact [facility] with any questions or concerns.     ROS: See above.     Physical Exam:  Visit Vitals  /68 (BP Location: LUE - Left upper extremity, Patient Position: Semi-Urrutia's)   Pulse 74   Resp 20   Ht 5' 4\" (1.626 m)   Wt 74.4 kg   SpO2 99%   BMI 28.15 kg/m²     General:Alert, cooperative, conversive.  Skin: Warm and dry without rash.    Neurological:   Mental status: The patient is alert, attentive, and oriented.   Language: Speech is fluent with good repetition, comprehension, and naming. Slightly dysarthric/slurred speech d/t not having dentures in.  Cranial nerves: Visual fields are full to confrontation. Pupils are 3 mm and briskly reactive to light. At primary gaze, there is no eye deviation. EOMs (extraocular movements) are full, horizontal nystagmus observed. Facial sensation is intact to light touch bilaterally and symmetrically in V1-V3 trigeminal nerve distribution. Face is symmetric with normal eye closure and smile.   Motor: There is no pronator drift of out-stretched arms. Muscle bulk and tone are normal. Strength is 5/5 in RUE, 4+/5 LLE (most noticeable when pushing against resistance), RLE 5/5, LLE 4+/5. No drift.    Sensory: No extinction noted but subjectively decreased sensation in LLE.   Coordination: There is no dysmetria on finger-to-nose. There are no abnormal or extraneous movements.   Eyes: Normal conjunctivae and sclerae.    ENT: Oral and nasal mucous membranes are pink and moist. Mallampati Class: 2  Cardiovascular: Regular rate and rhythm without murmur. No evidence  of UE or LE edema.   Peripheral vascular: Bilateral DP pulses +2 (pulses marked).   Respiratory: Normal respiratory effort. Clear to auscultation, without wheezes, rales, or rhonchi.  Gastrointestinal: Soft and nontender.     Musculoskeletal: No deformity noted.   Psychiatric: Cooperative. Appropriate mood and affect.     Discussed with Dr. Jacobs, patient.     12/10/2020  Jodie Groves NP  Neuro Interventional Radiology  Pager: [phone]  Answering service: [phone]  Please page above NP number weekdays [phone].   After 1700 please contact our answering service to reach the on call [facility] provider.     Neuro Interventional Radiology MD pagers  Dr. Rashid Castaneda [phone]  Dr. Torsten Jacobs [phone]  Dr. Rigoberto Yun [phone]  Dr. J Luis Davison: [phone]    Dr. Jacobs's note: Marii is here for ACOM aneurysms stent assisted coil embolization. She was cleared medically by her PCP. Today her Hb is lower than baseline but she feels okay. We will monitor her lab postoperative as well.

## 2020-12-11 LAB
EKG ATRIAL RATE: 98 BPM
EKG P AXIS: 58 DEGREES
EKG P-R INTERVAL: 164 MS
EKG Q-T INTERVAL: 376 MS
EKG QRS DURATION: 104 MS
EKG QTC CALCULATION (BAZETT): 480 MS
EKG R AXIS: 31 DEGREES
EKG T AXIS: -108 DEGREES
EKG VENTRICULAR RATE: 98 BPM
ESTIMATED AVERAGE GLUCOSE: 240 MG/DL
HBA1C MFR BLD: 10 % (ref 4–6)

## 2020-12-11 PROCEDURE — 93010 ELECTROCARDIOGRAM REPORT: CPT | Performed by: INTERNAL MEDICINE

## 2020-12-17 RX ORDER — FUROSEMIDE 40 MG/1
40 TABLET ORAL DAILY
Qty: 30 TABLET | Refills: 5 | Status: SHIPPED | OUTPATIENT
Start: 2020-12-17 | End: 2021-06-17 | Stop reason: SDUPTHER

## 2020-12-17 NOTE — TELEPHONE ENCOUNTER
Last visit:  9/9/2020  Next Visit Date:    Future Appointments   Date Time Provider Hong Linda   3/10/2021 10:00 AM Heloise Peabody, MD Manuella Man [facility]   7/26/2021  9:30 AM Prentis Pontes, MD Kelleen Runner [facility]     Last Med refill:    Medication List:  Prior to Admission medications    Medication Sig Start Date End Date Taking?  Authorizing Provider   gabapentin (NEURONTIN) 400 MG capsule Take 1 capsule by mouth 3 times daily as needed for neuropathy 10/29/20 1/27/21  Heloise Peabody, MD   midodrine (PROAMATINE) 5 MG tablet take 1 tablet by mouth three times a day 10/23/20   Heloise Peabody, MD   albuterol (PROVENTIL) (2.5 MG/3ML) 0.083% nebulizer solution Take 3 mLs by nebulization every 6 hours as needed for Wheezing (as needed for wheezing) 9/14/20   Heloise Peabody, MD   atorvastatin (LIPITOR) 80 MG tablet take 1 tablet by mouth once daily 9/9/20   Heloise Peabody, MD   carvedilol (COREG) 3.125 MG tablet take 1 tablet by mouth twice a day with meals 9/9/20   Heloise Peabody, MD   lisinopril (PRINIVIL;ZESTRIL) 5 MG tablet take 1 tablet by mouth once daily 9/9/20   Heloise Peabody, MD   sertraline (ZOLOFT) 100 MG tablet take 1 tablet by mouth once daily 9/9/20   Heloise Peabody, MD   clopidogrel (PLAVIX) 75 MG tablet Take 1 tablet by mouth daily 9/9/20   Heloise Peabody, MD   furosemide (LASIX) 40 MG tablet take 1 tablet by mouth daily 6/1/20   Heloise Peabody, MD   insulin glargine (BASAGLAR KWIKPEN) 100 UNIT/ML injection pen INJECT 40 UNITS SUBCUTANEOUSLY EVERY MORNING AND 20 UNITS EVERY EVENING 1/26/20   Heloise Peabody, MD   Respiratory Therapy Supplies (NEBULIZER/TUBING/MOUTHPIECE) KIT 1 kit by Does not apply route daily as needed (for SOB, cough) 11/13/19   Heloise Peabody, MD   Respiratory Therapy Supplies (NEBULIZER COMPRESSOR) KIT 1 kit by Does not apply route once for 1 dose 11/4/19 11/4/19  Heloise Peabody, MD   Insulin Pen Needle 32G X 4 MM MISC 1 each by Does not apply route daily 6/17/19 Alden Johnson APRN - CNP   lactobacillus (CULTURELLE) capsule Take 1 capsule by mouth 2 times daily (with meals) 1/2/19   Denny Sewell MD   blood glucose monitor strips Use to test daily 11/7/18   Deyvi Chen MD   blood glucose monitor kit and supplies Use to test daily 11/7/18   Deyvi Chen MD   Cholecalciferol (VITAMIN D3) 5000 units TABS Take 5,000 Units by mouth daily    Historical Provider, MD   glucose monitoring kit (FREESTYLE) monitoring kit 1 kit by Does not apply route daily as needed Please dispense meter covered by patients insurance. 11/19/15   Deyvi Chen MD   glucose blood VI test strips (EXACTECH TEST) strip 1 each by In Vitro route daily Pt testing BS once daily and prn 11/19/15   Deyvi Chen MD   Lancets MISC Pt testing BS once daily and prn 11/19/15   Deyvi Chen MD   Multiple Vitamins-Minerals (THERAPEUTIC MULTIVITAMIN-MINERALS) tablet Take 1 tablet by mouth daily (with breakfast) 6/12/15   Dima Hernadez MD       Allergies:  Codeine and Nsaids    Hemoglobin A1C (%)   Date Value   12/10/2020 10.0 (H)   09/09/2020 6.7   04/03/2019 6.9             ( goal A1C is < 7)   Microalb/Crt.  Ratio (mcg/mg creat)   Date Value   09/22/2017 29 (H)     LDL Cholesterol (mg/dL)   Date Value   12/10/2020        10/28/2019            (goal LDL is <100)   AST (U/L)   Date Value   12/10/2020 9     ALT (U/L)   Date Value   12/10/2020 11     BUN (mg/dL)   Date Value   12/10/2020 32 (H)     BP Readings from Last 3 Encounters:   11/28/20 (!) 99/53   10/16/20 101/62   09/09/20 (!) 86/56          (goal 120/80)

## 2020-12-23 ENCOUNTER — TELEPHONE (OUTPATIENT)
Dept: FAMILY MEDICINE CLINIC | Age: 60
End: 2020-12-23

## 2020-12-23 NOTE — TELEPHONE ENCOUNTER
Dr. Connors Abt office called in to discuss pts condition. She was referred to them via ER, was seen on 12/11/2020 ( scanned in media) has a pretty serious DM wound, was to follow up on Friday 12/18/2020 but did not show up for her appt. ARMEN

## 2020-12-24 RX ORDER — INSULIN GLARGINE 100 [IU]/ML
INJECTION, SOLUTION SUBCUTANEOUS
Qty: 30 ML | Refills: 5 | Status: SHIPPED | OUTPATIENT
Start: 2020-12-24 | End: 2021-06-14 | Stop reason: SDUPTHER

## 2021-02-01 ENCOUNTER — HOSPITAL ENCOUNTER (OUTPATIENT)
Age: 61
Discharge: HOME | End: 2021-02-01
Payer: MEDICARE

## 2021-02-01 LAB
ABSOLUTE EOS #: 0.2 K/UL (ref 0–0.4)
ABSOLUTE IMMATURE GRANULOCYTE: ABNORMAL K/UL (ref 0–0.3)
ABSOLUTE LYMPH #: 2 K/UL (ref 1–4.8)
ABSOLUTE MONO #: 0.5 K/UL (ref 0–1)
ANION GAP SERPL CALCULATED.3IONS-SCNC: 12 MMOL/L (ref 9–17)
BASOPHILS # BLD: 0 % (ref 0–2)
BASOPHILS ABSOLUTE: 0 K/UL (ref 0–0.2)
BUN BLDV-MCNC: 34 MG/DL (ref 8–23)
BUN/CREAT BLD: 20 (ref 9–20)
CALCIUM SERPL-MCNC: 9.4 MG/DL (ref 8.6–10.4)
CHLORIDE BLD-SCNC: 104 MMOL/L (ref 98–107)
CO2: 18 MMOL/L (ref 20–31)
CREAT SERPL-MCNC: 1.69 MG/DL (ref 0.5–0.9)
DIFFERENTIAL TYPE: YES
EOSINOPHILS RELATIVE PERCENT: 2 % (ref 0–5)
GFR AFRICAN AMERICAN: 37 ML/MIN
GFR NON-AFRICAN AMERICAN: 31 ML/MIN
GFR SERPL CREATININE-BSD FRML MDRD: ABNORMAL ML/MIN/{1.73_M2}
GFR SERPL CREATININE-BSD FRML MDRD: ABNORMAL ML/MIN/{1.73_M2}
GLUCOSE BLD-MCNC: 210 MG/DL (ref 70–99)
HCT VFR BLD CALC: 30.5 % (ref 36–46)
HEMOGLOBIN: 10.6 G/DL (ref 12–16)
IMMATURE GRANULOCYTES: ABNORMAL %
LYMPHOCYTES # BLD: 19 % (ref 15–40)
MCH RBC QN AUTO: 28.8 PG (ref 26–34)
MCHC RBC AUTO-ENTMCNC: 34.5 G/DL (ref 31–37)
MCV RBC AUTO: 83.3 FL (ref 80–100)
MONOCYTES # BLD: 5 % (ref 4–8)
NRBC AUTOMATED: ABNORMAL PER 100 WBC
PDW BLD-RTO: 15.9 % (ref 12.1–15.2)
PLATELET # BLD: 258 K/UL (ref 140–450)
PLATELET ESTIMATE: ABNORMAL
PMV BLD AUTO: ABNORMAL FL (ref 6–12)
POTASSIUM SERPL-SCNC: 4.3 MMOL/L (ref 3.7–5.3)
RBC # BLD: 3.67 M/UL (ref 4–5.2)
RBC # BLD: ABNORMAL 10*6/UL
SEDIMENTATION RATE, ERYTHROCYTE: 52 MM (ref 0–30)
SEG NEUTROPHILS: 74 % (ref 47–75)
SEGMENTED NEUTROPHILS ABSOLUTE COUNT: 8 K/UL (ref 2.5–7)
SODIUM BLD-SCNC: 134 MMOL/L (ref 135–144)
WBC # BLD: 10.8 K/UL (ref 3.5–11)
WBC # BLD: ABNORMAL 10*3/UL

## 2021-02-01 PROCEDURE — 80048 BASIC METABOLIC PNL TOTAL CA: CPT

## 2021-02-01 PROCEDURE — 85025 COMPLETE CBC W/AUTO DIFF WBC: CPT

## 2021-02-01 PROCEDURE — 85652 RBC SED RATE AUTOMATED: CPT

## 2021-02-01 PROCEDURE — 36415 COLL VENOUS BLD VENIPUNCTURE: CPT

## 2021-02-05 RX ORDER — MIDODRINE HYDROCHLORIDE 5 MG/1
TABLET ORAL
Qty: 90 TABLET | Refills: 1 | Status: SHIPPED | OUTPATIENT
Start: 2021-02-05 | End: 2021-05-27

## 2021-02-12 ENCOUNTER — HOSPITAL ENCOUNTER (OUTPATIENT)
Age: 61
Discharge: HOME | End: 2021-02-12
Payer: MEDICARE

## 2021-02-12 LAB
ABSOLUTE EOS #: 0.3 K/UL (ref 0–0.4)
ABSOLUTE IMMATURE GRANULOCYTE: ABNORMAL K/UL (ref 0–0.3)
ABSOLUTE LYMPH #: 2.2 K/UL (ref 1–4.8)
ABSOLUTE MONO #: 0.7 K/UL (ref 0–1)
ANION GAP SERPL CALCULATED.3IONS-SCNC: 10 MMOL/L (ref 9–17)
BASOPHILS # BLD: 0 % (ref 0–2)
BASOPHILS ABSOLUTE: 0 K/UL (ref 0–0.2)
BUN BLDV-MCNC: 24 MG/DL (ref 8–23)
BUN/CREAT BLD: 16 (ref 9–20)
CALCIUM SERPL-MCNC: 9.2 MG/DL (ref 8.6–10.4)
CHLORIDE BLD-SCNC: 103 MMOL/L (ref 98–107)
CO2: 21 MMOL/L (ref 20–31)
CREAT SERPL-MCNC: 1.54 MG/DL (ref 0.5–0.9)
DIFFERENTIAL TYPE: YES
EOSINOPHILS RELATIVE PERCENT: 3 % (ref 0–5)
GFR AFRICAN AMERICAN: 42 ML/MIN
GFR NON-AFRICAN AMERICAN: 34 ML/MIN
GFR SERPL CREATININE-BSD FRML MDRD: ABNORMAL ML/MIN/{1.73_M2}
GFR SERPL CREATININE-BSD FRML MDRD: ABNORMAL ML/MIN/{1.73_M2}
GLUCOSE BLD-MCNC: 285 MG/DL (ref 70–99)
HCT VFR BLD CALC: 31.7 % (ref 36–46)
HEMOGLOBIN: 10.7 G/DL (ref 12–16)
IMMATURE GRANULOCYTES: ABNORMAL %
LYMPHOCYTES # BLD: 22 % (ref 15–40)
MCH RBC QN AUTO: 28.5 PG (ref 26–34)
MCHC RBC AUTO-ENTMCNC: 33.9 G/DL (ref 31–37)
MCV RBC AUTO: 84.1 FL (ref 80–100)
MONOCYTES # BLD: 7 % (ref 4–8)
NRBC AUTOMATED: ABNORMAL PER 100 WBC
PATIENT FASTING?: YES
PDW BLD-RTO: 16.3 % (ref 12.1–15.2)
PLATELET # BLD: 218 K/UL (ref 140–450)
PLATELET ESTIMATE: ABNORMAL
PMV BLD AUTO: ABNORMAL FL (ref 6–12)
POTASSIUM SERPL-SCNC: 4.5 MMOL/L (ref 3.7–5.3)
RBC # BLD: 3.77 M/UL (ref 4–5.2)
RBC # BLD: ABNORMAL 10*6/UL
SEDIMENTATION RATE, ERYTHROCYTE: 47 MM (ref 0–30)
SEG NEUTROPHILS: 68 % (ref 47–75)
SEGMENTED NEUTROPHILS ABSOLUTE COUNT: 6.9 K/UL (ref 2.5–7)
SODIUM BLD-SCNC: 134 MMOL/L (ref 135–144)
WBC # BLD: 10.1 K/UL (ref 3.5–11)
WBC # BLD: ABNORMAL 10*3/UL

## 2021-02-12 PROCEDURE — 36415 COLL VENOUS BLD VENIPUNCTURE: CPT

## 2021-02-12 PROCEDURE — 85652 RBC SED RATE AUTOMATED: CPT

## 2021-02-12 PROCEDURE — 80048 BASIC METABOLIC PNL TOTAL CA: CPT

## 2021-02-12 PROCEDURE — 85025 COMPLETE CBC W/AUTO DIFF WBC: CPT

## 2021-02-17 ENCOUNTER — HOSPITAL ENCOUNTER (OUTPATIENT)
Dept: MRI IMAGING | Age: 61
Discharge: HOME | End: 2021-02-19
Payer: MEDICARE

## 2021-02-17 VITALS
DIASTOLIC BLOOD PRESSURE: 62 MMHG | RESPIRATION RATE: 22 BRPM | HEART RATE: 84 BPM | OXYGEN SATURATION: 95 % | SYSTOLIC BLOOD PRESSURE: 117 MMHG

## 2021-02-17 DIAGNOSIS — L97.424 ULCER OF MIDFOOT, LEFT, WITH NECROSIS OF BONE (HCC): ICD-10-CM

## 2021-02-17 PROCEDURE — A9577 INJ MULTIHANCE: HCPCS | Performed by: PODIATRIST

## 2021-02-17 PROCEDURE — 73720 MRI LWR EXTREMITY W/O&W/DYE: CPT

## 2021-02-17 PROCEDURE — 6360000004 HC RX CONTRAST MEDICATION: Performed by: PODIATRIST

## 2021-02-17 RX ADMIN — GADOBENATE DIMEGLUMINE 8 ML: 529 INJECTION, SOLUTION INTRAVENOUS at 13:07

## 2021-03-06 PROCEDURE — 93296 REM INTERROG EVL PM/IDS: CPT | Performed by: INTERNAL MEDICINE

## 2021-03-06 PROCEDURE — 93295 DEV INTERROG REMOTE 1/2/MLT: CPT | Performed by: INTERNAL MEDICINE

## 2021-03-08 DIAGNOSIS — I25.10 CORONARY ARTERY DISEASE INVOLVING NATIVE CORONARY ARTERY OF NATIVE HEART WITHOUT ANGINA PECTORIS: ICD-10-CM

## 2021-03-08 DIAGNOSIS — I10 ESSENTIAL HYPERTENSION: ICD-10-CM

## 2021-03-08 RX ORDER — CARVEDILOL 3.12 MG/1
TABLET ORAL
Qty: 60 TABLET | Refills: 0 | Status: SHIPPED | OUTPATIENT
Start: 2021-03-08 | End: 2021-03-15

## 2021-03-10 ENCOUNTER — OFFICE VISIT (OUTPATIENT)
Dept: FAMILY MEDICINE CLINIC | Age: 61
End: 2021-03-10
Payer: MEDICARE

## 2021-03-10 VITALS
BODY MASS INDEX: 27.64 KG/M2 | DIASTOLIC BLOOD PRESSURE: 60 MMHG | WEIGHT: 172 LBS | HEART RATE: 84 BPM | HEIGHT: 66 IN | SYSTOLIC BLOOD PRESSURE: 102 MMHG | OXYGEN SATURATION: 98 %

## 2021-03-10 DIAGNOSIS — E78.5 DYSLIPIDEMIA: ICD-10-CM

## 2021-03-10 DIAGNOSIS — I25.10 CORONARY ARTERY DISEASE INVOLVING NATIVE CORONARY ARTERY OF NATIVE HEART WITHOUT ANGINA PECTORIS: ICD-10-CM

## 2021-03-10 DIAGNOSIS — N39.46 MIXED STRESS AND URGE URINARY INCONTINENCE: ICD-10-CM

## 2021-03-10 DIAGNOSIS — F41.9 ANXIETY: ICD-10-CM

## 2021-03-10 DIAGNOSIS — E11.49 DM (DIABETES MELLITUS), TYPE 2 WITH NEUROLOGICAL COMPLICATIONS (HCC): Primary | ICD-10-CM

## 2021-03-10 DIAGNOSIS — J44.1 COPD EXACERBATION (HCC): ICD-10-CM

## 2021-03-10 DIAGNOSIS — N18.9 CHRONIC KIDNEY DISEASE, UNSPECIFIED CKD STAGE: ICD-10-CM

## 2021-03-10 DIAGNOSIS — I10 ESSENTIAL HYPERTENSION: ICD-10-CM

## 2021-03-10 DIAGNOSIS — R19.5 POSITIVE COLORECTAL CANCER SCREENING USING COLOGUARD TEST: ICD-10-CM

## 2021-03-10 DIAGNOSIS — Z12.31 OTHER SCREENING MAMMOGRAM: ICD-10-CM

## 2021-03-10 PROBLEM — E11.10 DIABETIC KETOACIDOSIS WITHOUT COMA ASSOCIATED WITH TYPE 2 DIABETES MELLITUS (HCC): Status: RESOLVED | Noted: 2018-08-23 | Resolved: 2021-03-10

## 2021-03-10 PROCEDURE — 4004F PT TOBACCO SCREEN RCVD TLK: CPT | Performed by: FAMILY MEDICINE

## 2021-03-10 PROCEDURE — 83036 HEMOGLOBIN GLYCOSYLATED A1C: CPT | Performed by: FAMILY MEDICINE

## 2021-03-10 PROCEDURE — 3017F COLORECTAL CA SCREEN DOC REV: CPT | Performed by: FAMILY MEDICINE

## 2021-03-10 PROCEDURE — 3046F HEMOGLOBIN A1C LEVEL >9.0%: CPT | Performed by: FAMILY MEDICINE

## 2021-03-10 PROCEDURE — G8427 DOCREV CUR MEDS BY ELIG CLIN: HCPCS | Performed by: FAMILY MEDICINE

## 2021-03-10 PROCEDURE — 3023F SPIROM DOC REV: CPT | Performed by: FAMILY MEDICINE

## 2021-03-10 PROCEDURE — 2022F DILAT RTA XM EVC RTNOPTHY: CPT | Performed by: FAMILY MEDICINE

## 2021-03-10 PROCEDURE — 99215 OFFICE O/P EST HI 40 MIN: CPT | Performed by: FAMILY MEDICINE

## 2021-03-10 PROCEDURE — G8926 SPIRO NO PERF OR DOC: HCPCS | Performed by: FAMILY MEDICINE

## 2021-03-10 PROCEDURE — G8419 CALC BMI OUT NRM PARAM NOF/U: HCPCS | Performed by: FAMILY MEDICINE

## 2021-03-10 PROCEDURE — G8482 FLU IMMUNIZE ORDER/ADMIN: HCPCS | Performed by: FAMILY MEDICINE

## 2021-03-10 SDOH — ECONOMIC STABILITY: TRANSPORTATION INSECURITY
IN THE PAST 12 MONTHS, HAS LACK OF TRANSPORTATION KEPT YOU FROM MEETINGS, WORK, OR FROM GETTING THINGS NEEDED FOR DAILY LIVING?: NO

## 2021-03-10 SDOH — ECONOMIC STABILITY: TRANSPORTATION INSECURITY
IN THE PAST 12 MONTHS, HAS THE LACK OF TRANSPORTATION KEPT YOU FROM MEDICAL APPOINTMENTS OR FROM GETTING MEDICATIONS?: NO

## 2021-03-10 ASSESSMENT — ENCOUNTER SYMPTOMS
COUGH: 0
DIARRHEA: 0
NAUSEA: 0
ABDOMINAL PAIN: 0
FACIAL SWELLING: 0
EYE REDNESS: 0
CONSTIPATION: 0
VISUAL CHANGE: 0
EYE DISCHARGE: 0
HOARSE VOICE: 1
BLOOD IN STOOL: 0
SHORTNESS OF BREATH: 0
VOMITING: 0

## 2021-03-10 ASSESSMENT — PATIENT HEALTH QUESTIONNAIRE - PHQ9
SUM OF ALL RESPONSES TO PHQ QUESTIONS 1-9: 1

## 2021-03-10 ASSESSMENT — COPD QUESTIONNAIRES: COPD: 1

## 2021-03-10 NOTE — PATIENT INSTRUCTIONS
Survey: You may be receiving a survey from [employer] regarding your visit today. You may get this in the mail, through your MyChart or in your email. Please complete the survey to enable us to provide the highest quality of care to you and your family. Please also, mention our names. If you cannot score us as very good (5 Stars) on any question, please feel free to call the office to discuss how we could have made your experience exceptional.      Thank You! MD Rock Domingo, LPN      [id number] vaccine appointments are not available through our practice. As you're eligible to receive the COVID-19 vaccine, as determined by your state's department of health, you will be able to schedule an appointment. Appointments are required to receive the COVID-19 vaccine. As vaccine supply continues to be limited, we anticipate open appointments to fill up quickly and appreciate your patience as we work through the process of providing vaccines to those in our communities. [address] COVID-19 VACCINE PROVIDERS  Please check with the contacts below for Covid-19 vaccination availability and adminstration    HARRISON HAYNES  [phone]    Lily Torres  [phone]    JONATHAN HAYNES  [email or web address]    [facility].      Or [phone]. [address] [facility]  [phone].  Or [employer]    [facility].    [facility]   visit [email or web address]    for location availability

## 2021-03-10 NOTE — PROGRESS NOTES
HPI Notes    Name: Vito Avila  : 1960        Chief Complaint:     Chief Complaint   Patient presents with    Diabetes     12/10/20 HgbA1c @ 10. BS running 145-155    Hypertension    COPD    Coronary Artery Disease    Mental Health Problem    Hyperlipidemia    Health Maintenance     19 positive cologuard.  Peripheral Neuropathy       History of Present Illness:     Vito Avila is a 63 y.o.  female who presents with Diabetes (12/10/20 HgbA1c @ 10. BS running 145-155), Hypertension, COPD, Coronary Artery Disease, Mental Health Problem, Hyperlipidemia, Health Maintenance (19 positive cologuard.), and Peripheral Neuropathy      Diabetes  She presents for her follow-up diabetic visit. She has type 2 diabetes mellitus. Pertinent negatives for hypoglycemia include no confusion, dizziness, headaches or nervousness/anxiousness. There are no diabetic associated symptoms. Pertinent negatives for diabetes include no chest pain, no fatigue and no visual change. There are no diabetic complications. Risk factors for coronary artery disease include dyslipidemia, diabetes mellitus and hypertension. Current diabetic treatment includes insulin injections. Her weight is stable. She rarely participates in exercise. Her home blood glucose trend is increasing steadily (pt denies diet changes but sugars high past 6mos. Maybe foot ulcer and infection Dr Mi Jara following or pt not following her diet. ). Her breakfast blood glucose range is generally 140-180 mg/dl. Her dinner blood glucose range is generally 140-180 mg/dl. An ACE inhibitor/angiotensin II receptor blocker is being taken. Eye exam is not current. Hypertension  This is a chronic problem. The current episode started more than 1 year ago. The problem is unchanged. The problem is controlled. Pertinent negatives include no chest pain, headaches, palpitations or shortness of breath.  Risk factors for coronary artery disease include diabetes mellitus and dyslipidemia. The current treatment provides significant improvement. Identifiable causes of hypertension include chronic renal disease. COPD  She complains of hoarse voice. There is no cough or shortness of breath. This is a chronic problem. The current episode started more than 1 year ago. The problem has been unchanged. Pertinent negatives include no chest pain, fever or headaches. Her symptoms are aggravated by exposure to smoke (pt continues to smoke). Her symptoms are alleviated by beta-agonist. Risk factors for lung disease include smoking/tobacco exposure. Her past medical history is significant for COPD. Coronary Artery Disease  Presents for follow-up (Pt had labs and Echo but missed cardiology appt. ) visit. Pertinent negatives include no chest pain, dizziness, palpitations or shortness of breath. Risk factors include hyperlipidemia. The symptoms have been stable. Compliance with diet is variable. Compliance with exercise is variable. Compliance with medications is good. Mental Health Problem  The primary symptoms do not include dysphoric mood. Primary symptoms comment: pt still raising grandkids who are all in school now. . The current episode started more than 1 month ago. This is a chronic problem. The degree of incapacity that she is experiencing as a consequence of her illness is mild. Additional symptoms of the illness do not include unexpected weight change, fatigue, visual change, headaches or abdominal pain. Hyperlipidemia  This is a chronic problem. The current episode started more than 1 year ago. The problem is controlled. Exacerbating diseases include chronic renal disease and diabetes. Pertinent negatives include no chest pain or shortness of breath. Current antihyperlipidemic treatment includes statins. The current treatment provides significant improvement of lipids. Risk factors for coronary artery disease include diabetes mellitus.      positive cologuard- Pt has had cologuard in past positive. Pt has not wanted to have a colonoscopy in the past. Pt denies blood in stool or bowel changes. CKD -  Pt has elevated CRE. Pt denies taking any NSAIDS. Pt's sugars have been elevated. Pt has not seen a Kidney specialist in the past.     Urinary incontinence - chronic but stable. Pt uses pull ups for any incontinence.  No new concerns    Pt is due for mammogram - breast cancer screening   Past Medical History:     Past Medical History:   Diagnosis Date    CAD (coronary artery disease)     Cardiomyopathy ([address]     [facility] [facility]     Depression     Diabetes mellitus [facility] [facility]     H/O mitral valve repair     History of fractured kneecap 2017    right    Hx of CABG     Hx of myocardial infarction     Hyperlipidemia     Hypertension     ICD (implantable cardioverter-defibrillator) in place     Kidney calculi     Osteoarthritis     Pneumonia     Renal calculi       Reviewed all health maintenance requirements and ordered appropriate tests  Health Maintenance Due   Topic Date Due    Hepatitis C screen  Never done    HIV screen  Never done    COVID-19 Vaccine (1 of 2) Never done    DTaP/Tdap/Td vaccine (1 - Tdap) Never done    Cervical cancer screen  Never done    Diabetic retinal exam  03/30/2020    Colon cancer screen colonoscopy  05/06/2020    Shingles Vaccine (2 of 2) 09/16/2020    Breast cancer screen  10/11/2020    A1C test (Diabetic or Prediabetic)  03/10/2021       Past Surgical History:     Past Surgical History:   Procedure Laterality Date    ANKLE SURGERY Right 2009    plate and screws     CARDIAC DEFIBRILLATOR PLACEMENT  06/01/15    CHOLECYSTECTOMY  1990    CORONARY ARTERY BYPASS GRAFT  05/28/15    X 3- Dr Amber CABELLO-LAD<SVG-Diag, SVG-PDA, MV Repair with 30 min CE IMR ring    CYSTOSCOPY Left 11/08/2018    HYSTERECTOMY  1990    KIDNEY STONE SURGERY Left 2010    MITRAL VALVE SURGERY  6/4/15    SD CYSTO/URETERO W/LITHOTRIPSY &INDWELL STENT INSRT Left 11/8/2018    CYSTOSCOPY URETEROSCOPY performed by [facility]el MD at [facility] STENT INSRT Left 11/8/2018    CYSTOSCOPY STENT INSERTION performed by Jonna Pimentel MD at [facility] STENT INSRT Left 11/15/2018    LEFT CYSTOSCOPY URETEROSCOPY LASER,HLL, LEFT STENT EXCHANGE performed by Jonna Pimentel MD at [address]          Medications:       Prior to Admission medications    Medication Sig Start Date End Date Taking?  Authorizing Provider   carvedilol (COREG) 3.125 MG tablet take 1 tablet by mouth twice a day with meals 3/8/21  Yes Nikole Lombardi MD   midodrine (PROAMATINE) 5 MG tablet take 1 tablet by mouth three times a day 2/5/21  Yes Nikole Lombardi MD   insulin glargine (LANTUS SOLOSTAR) 100 UNIT/ML injection pen INJECT 40 UNITS SUBCUTANEOUSLY EVERY MORNING AND 20 UNITS EVERY EVENING 12/24/20  Yes Nikole Lombardi MD   furosemide (LASIX) 40 MG tablet take 1 tablet by mouth daily 12/17/20  Yes Nikole Lombardi MD   gabapentin (NEURONTIN) 400 MG capsule Take 1 capsule by mouth 3 times daily as needed for neuropathy 10/29/20 3/10/21 Yes Nikole Lombardi MD   atorvastatin (LIPITOR) 80 MG tablet take 1 tablet by mouth once daily 9/9/20  Yes Nikole Lombardi MD   lisinopril (PRINIVIL;ZESTRIL) 5 MG tablet take 1 tablet by mouth once daily 9/9/20  Yes Nikole Lombardi MD   sertraline (ZOLOFT) 100 MG tablet take 1 tablet by mouth once daily 9/9/20  Yes Nikole Lombardi MD   clopidogrel (PLAVIX) 75 MG tablet Take 1 tablet by mouth daily 9/9/20  Yes Nikole Lombardi MD   lactobacillus (CULTURELLE) capsule Take 1 capsule by mouth 2 times daily (with meals) 1/2/19  Yes Denny Sewell MD   Cholecalciferol (VITAMIN D3) 5000 units TABS Take 5,000 Units by mouth daily   Yes Historical Provider, MD   Multiple Vitamins-Minerals (THERAPEUTIC MULTIVITAMIN-MINERALS) tablet Take 1 tablet by mouth daily (with breakfast) 6/12/15  Yes Jg Mederos MD   Insulin Pen Needle 32G X 4 MM MISC 1 each by Does not apply route daily 3/1/21   Aide Damian MD   albuterol (PROVENTIL) (2.5 MG/3ML) 0.083% nebulizer solution Take 3 mLs by nebulization every 6 hours as needed for Wheezing (as needed for wheezing) 9/14/20   Aide Damian MD   Respiratory Therapy Supplies (NEBULIZER/TUBING/MOUTHPIECE) KIT 1 kit by Does not apply route daily as needed (for SOB, cough) 11/13/19   Aide Damian MD   Respiratory Therapy Supplies (NEBULIZER COMPRESSOR) KIT 1 kit by Does not apply route once for 1 dose 11/4/19 11/4/19  Aide Damian MD   blood glucose monitor strips Use to test daily 11/7/18   Aide Damian MD   blood glucose monitor kit and supplies Use to test daily 11/7/18   Aide Damian MD   glucose monitoring kit (FREESTYLE) monitoring kit 1 kit by Does not apply route daily as needed Please dispense meter covered by patients insurance. 11/19/15   Aide Damian MD   glucose blood VI test strips (EXACTECH TEST) strip 1 each by In Vitro route daily Pt testing BS once daily and prn 11/19/15   Aide Damian MD   Lancets MISC Pt testing BS once daily and prn 11/19/15   Aide Damian MD        Allergies:       Codeine and Nsaids    Social History:     Tobacco:    reports that she has been smoking cigarettes. She has a 16.00 pack-year smoking history. She has never used smokeless tobacco.  Alcohol:      reports no history of alcohol use. Drug Use:  reports no history of drug use. Family History:     Family History   Problem Relation Age of Onset    Stroke Father 76    Other Father         low BS    Diabetes Mother        Review of Systems:       Review of Systems   Constitutional: Negative for chills, fatigue, fever and unexpected weight change. HENT: Positive for hoarse voice. Negative for congestion and facial swelling.     Eyes: Negative for discharge, redness and visual disturbance. Respiratory: Negative for cough and shortness of breath. Cardiovascular: Negative for chest pain and palpitations. Gastrointestinal: Negative for abdominal pain, blood in stool, constipation, diarrhea, nausea and vomiting. Genitourinary: Negative for dysuria and hematuria. Musculoskeletal: Negative for joint swelling and neck stiffness. Skin: Negative for rash. Neurological: Negative for dizziness, facial asymmetry, light-headedness and headaches. Psychiatric/Behavioral: Negative for confusion and dysphoric mood. The patient is not nervous/anxious. Physical Exam:     Physical Exam  Vitals signs reviewed. Constitutional:       General: She is not in acute distress. Appearance: Normal appearance. She is well-developed. She is not ill-appearing. HENT:      Head: Normocephalic and atraumatic. Eyes:      General:         Right eye: No discharge. Left eye: No discharge. Conjunctiva/sclera: Conjunctivae normal.      Pupils: Pupils are equal, round, and reactive to light. Neck:      Musculoskeletal: Neck supple. Thyroid: No thyromegaly. Vascular: No carotid bruit. Cardiovascular:      Rate and Rhythm: Normal rate and regular rhythm. Heart sounds: Normal heart sounds. No murmur. Pulmonary:      Effort: Pulmonary effort is normal.      Breath sounds: Normal breath sounds. Abdominal:      General: Bowel sounds are normal.      Palpations: Abdomen is soft. Tenderness: There is no abdominal tenderness. Musculoskeletal:      Right lower leg: No edema. Left lower leg: No edema. Lymphadenopathy:      Cervical: No cervical adenopathy. Skin:     Findings: No erythema or rash. Neurological:      General: No focal deficit present. Mental Status: She is alert and oriented to person, place, and time.    Psychiatric:         Mood and Affect: Mood normal.         Behavior: Behavior normal.         Vitals:  /60   Pulse 84 Ht 5' 6\" (1.676 m)   Wt 172 lb (78 kg)   SpO2 98%   BMI 27.76 kg/m²       Data:     Lab Results   Component Value Date     02/12/2021    K 4.5 02/12/2021     02/12/2021    CO2 21 02/12/2021    BUN 24 02/12/2021    CREATININE 1.54 02/12/2021    GLUCOSE 285 02/12/2021    PROT 8.5 12/10/2020    LABALBU 4.3 12/10/2020    BILITOT 0.34 12/10/2020    ALKPHOS 111 12/10/2020    AST 9 12/10/2020    ALT 11 12/10/2020     Lab Results   Component Value Date    WBC 10.1 02/12/2021    RBC 3.77 02/12/2021    HGB 10.7 02/12/2021    HCT 31.7 02/12/2021    MCV 84.1 02/12/2021    MCH 28.5 02/12/2021    MCHC 33.9 02/12/2021    RDW 16.3 02/12/2021     02/12/2021    MPV NOT REPORTED 02/12/2021     Lab Results   Component Value Date    TSH 1.20 12/10/2020     Lab Results   Component Value Date    CHOL 263 12/10/2020    HDL 31 12/10/2020    LABA1C 10.0 12/10/2020          Assessment/Plan:        1. DM (diabetes mellitus), type 2 with neurological complications (HCC)  F/U 3mos, in office, HgbA1C. Do daily foot checks and yearly eye exams  D/w pt her sugars being elevated increase insulin to 45 units in am and 20unit pm still. Reviewed DM diet and drink more water. Pt to start walking more  Pt continue tomorrow seeing Dr Mi Jara for foot ulcer but as heals  walk more and sugars should be better. - POCT glycosylated hemoglobin (Hb A1C)    2. Essential hypertension  Stable on coreg, zestril     3. COPD exacerbation ([address]  Recommend she quit smoking     4. Coronary artery disease involving native coronary artery of native heart without angina pectoris  Stable on plavix, zestril and lipitor     5. Anxiety  Stable on zoloft     6. Dyslipidemia  Stable on lipitor     7. Positive colorectal cancer screening using Cologuard test  Referral for colonoscopy   - Edmar Bear MD, General Surgery, [facility]    8.  Chronic kidney disease, unspecified CKD stage  Referral to nephrology   - External Referral To Nephrology 9. mixed stress and urge urinary incontinence  Stable and uses pull ups    10. Other screening mammogram  Pt states she will have mammogram   - ALEE KEEGAN DIGITAL SCREEN BILATERAL; Future        Valentina received counseling on the following healthy behaviors: nutrition and exercise  Reviewed prior labs and health maintenance  Continue current medications, diet and exercise. Discussed use, benefit, and side effects of prescribed medications. Barriers to medication compliance addressed. Patient given educational materials - see patient instructions  Was a self-tracking handout given in paper form or via [employer]? Yes    Requested Prescriptions      No prescriptions requested or ordered in this encounter       All patient questions answered. Patient voiced understanding. Quality Measures    Body mass index is 27.76 kg/m². Elevated. Weight control planned discussed Healthy diet and regular exercise. BP: 102/60 Blood pressure is normal. Treatment plan consists of No treatment change needed. Lab Results   Component Value Date    LDLCHOLESTEROL      12/10/2020    LDLDIRECT 63 12/10/2020    (goal LDL reduction with dx if diabetes is 50% LDL reduction)      PHQ Scores 3/10/2021 6/9/2020 4/3/2019 9/24/2018 9/13/2017 4/28/2016   PHQ2 Score 1 0 0 0 0 0   PHQ9 Score 1 0 0 0 0 0     Interpretation of Total Score Depression Severity: 1-4 = Minimal depression, 5-9 = Mild depression, 10-14 = Moderate depression, 15-19 = Moderately severe depression, 20-27 = Severe depression      Return in about 3 months (around 6/10/2021) for DM, CKD.       Electronically signed by Vandana London MD on 3/10/2021 at 7:45 PM

## 2021-03-11 LAB — HBA1C MFR BLD: 9.5 %

## 2021-03-12 ENCOUNTER — PROCEDURE VISIT (OUTPATIENT)
Dept: CARDIOLOGY CLINIC | Age: 61
End: 2021-03-12
Payer: MEDICARE

## 2021-03-12 DIAGNOSIS — Z95.810 ICD (IMPLANTABLE CARDIOVERTER-DEFIBRILLATOR) IN PLACE: ICD-10-CM

## 2021-03-12 NOTE — PROGRESS NOTES
Sharmin Whitley sent in [address] [facility] [place] pacemaker report. Reviewed by myself and Dr Kevin Fong.

## 2021-03-14 DIAGNOSIS — I10 ESSENTIAL HYPERTENSION: ICD-10-CM

## 2021-03-14 DIAGNOSIS — I25.10 CORONARY ARTERY DISEASE INVOLVING NATIVE CORONARY ARTERY OF NATIVE HEART WITHOUT ANGINA PECTORIS: ICD-10-CM

## 2021-03-15 RX ORDER — CARVEDILOL 3.12 MG/1
TABLET ORAL
Qty: 90 TABLET | Refills: 1 | Status: SHIPPED | OUTPATIENT
Start: 2021-03-15 | End: 2021-06-17 | Stop reason: SDUPTHER

## 2021-03-23 ENCOUNTER — HOSPITAL ENCOUNTER (OUTPATIENT)
Dept: MAMMOGRAPHY | Age: 61
Discharge: HOME | End: 2021-03-25
Payer: MEDICARE

## 2021-03-23 DIAGNOSIS — Z12.31 OTHER SCREENING MAMMOGRAM: ICD-10-CM

## 2021-03-23 PROCEDURE — 77067 SCR MAMMO BI INCL CAD: CPT

## 2021-04-15 DIAGNOSIS — I10 ESSENTIAL HYPERTENSION: ICD-10-CM

## 2021-04-15 RX ORDER — CLOPIDOGREL BISULFATE 75 MG/1
TABLET ORAL
Qty: 90 TABLET | Refills: 1 | Status: SHIPPED | OUTPATIENT
Start: 2021-04-15 | End: 2021-08-09

## 2021-04-15 RX ORDER — LISINOPRIL 5 MG/1
TABLET ORAL
Qty: 90 TABLET | Refills: 1 | Status: SHIPPED | OUTPATIENT
Start: 2021-04-15 | End: 2021-06-14 | Stop reason: SDUPTHER

## 2021-04-15 NOTE — TELEPHONE ENCOUNTER
Last OV: 3/10/2021 DM, mental health, Coronary artery,  Last RX:    Next scheduled apt: 6/9/2021      Sure scripts request  RX pending

## 2021-05-10 ENCOUNTER — TELEPHONE (OUTPATIENT)
Dept: FAMILY MEDICINE CLINIC | Age: 61
End: 2021-05-10

## 2021-05-10 NOTE — TELEPHONE ENCOUNTER
Myrna Musa called to let Dr. Teresita Prader know that Gregg Patricio no showed her appointment this morning with [facility]. She said they did try and reach out to her and someone keeps answering the phone and then hangs out. Health Maintenance   Topic Date Due    Hepatitis C screen  Never done    HIV screen  Never done    COVID-19 Vaccine (1) Never done    DTaP/Tdap/Td vaccine (1 - Tdap) Never done    Cervical cancer screen  Never done    Diabetic retinal exam  03/30/2020    Colon cancer screen colonoscopy  05/06/2020    Shingles Vaccine (2 of 2) 09/16/2020    A1C test (Diabetic or Prediabetic)  06/11/2021    Diabetic foot exam  09/09/2021    Diabetic microalbuminuria test  09/09/2021    Lipid screen  12/10/2021    Potassium monitoring  02/12/2022    Creatinine monitoring  02/12/2022    Breast cancer screen  03/23/2023    Flu vaccine  Completed    Pneumococcal 0-64 years Vaccine  Completed    Hepatitis A vaccine  Aged Out    Hib vaccine  Aged Out    Meningococcal (ACWY) vaccine  Aged Out             (applicable per patient's age: Cancer Screenings, Depression Screening, Fall Risk Screening, Immunizations)    Hemoglobin A1C (%)   Date Value   03/11/2021 9.5   12/10/2020 10.0 (H)   09/09/2020 6.7     Microalb/Crt.  Ratio (mcg/mg creat)   Date Value   09/22/2017 29 (H)     LDL Cholesterol (mg/dL)   Date Value   12/10/2020          AST (U/L)   Date Value   12/10/2020 9     ALT (U/L)   Date Value   12/10/2020 11     BUN (mg/dL)   Date Value   02/12/2021 24 (H)      (goal A1C is < 7)   (goal LDL is <100) need 30-50% reduction from baseline     BP Readings from Last 3 Encounters:   03/10/21 102/60   02/17/21 117/62   11/28/20 (!) 99/53    (goal /80)      All Future Testing planned in [facility]:  Lab Frequency Next Occurrence   Transfuse RBC TRANSFUSE 1 UNIT        Next Visit Date:  Future Appointments   Date Time Provider Hong Mckeon   6/9/2021 10:00 AM Chemo Vera MD [facility]   7/26/2021  9:30 PARI Frye FridayMD [facility]            Patient Active Problem List:     Acute coronary syndrome [facility])     S/P cardiac cath-Patent grafts 6/5/15     Onychomycosis of toenail     CAD (coronary artery disease)     Hx of CABG     ICD (implantable cardioverter-defibrillator) in place     H/O mitral valve repair     Hx of myocardial infarction     Hypertension     DM (diabetes mellitus), type 2 with neurological complications (HCC)     Anxiety     CHAD (acute kidney injury) ([facility] [address]     Elevated serum immunoglobulin free light chains     Ureteral stone with hydronephrosis     Acute pulmonary edema (HCC)     Dyslipidemia     Pneumonia

## 2021-05-12 DIAGNOSIS — F41.9 ANXIETY: ICD-10-CM

## 2021-05-12 DIAGNOSIS — E78.5 DYSLIPIDEMIA: ICD-10-CM

## 2021-05-12 RX ORDER — ATORVASTATIN CALCIUM 80 MG/1
TABLET, FILM COATED ORAL
Qty: 90 TABLET | Refills: 1 | Status: SHIPPED | OUTPATIENT
Start: 2021-05-12 | End: 2021-09-09

## 2021-05-12 RX ORDER — SERTRALINE HYDROCHLORIDE 100 MG/1
TABLET, FILM COATED ORAL
Qty: 90 TABLET | Refills: 1 | Status: SHIPPED | OUTPATIENT
Start: 2021-05-12 | End: 2021-09-09

## 2021-05-12 NOTE — TELEPHONE ENCOUNTER
Last OV: 3/10/2021 DM HTN COPD CAD mental health HLD   Last RX:   Next scheduled apt: 6/9/2021      Sure scripts request    RX pending

## 2021-05-27 RX ORDER — MIDODRINE HYDROCHLORIDE 5 MG/1
TABLET ORAL
Qty: 90 TABLET | Refills: 1 | Status: SHIPPED | OUTPATIENT
Start: 2021-05-27 | End: 2021-06-14 | Stop reason: SDUPTHER

## 2021-05-27 NOTE — TELEPHONE ENCOUNTER
Last OV: 3/10/2021 dm  Last RX:    Next scheduled apt: 6/9/2021        Sure scripts request      RX pending

## 2021-06-14 DIAGNOSIS — I10 ESSENTIAL HYPERTENSION: ICD-10-CM

## 2021-06-14 RX ORDER — MIDODRINE HYDROCHLORIDE 5 MG/1
TABLET ORAL
Qty: 90 TABLET | Refills: 1 | Status: SHIPPED | OUTPATIENT
Start: 2021-06-14 | End: 2021-06-17

## 2021-06-14 RX ORDER — LISINOPRIL 5 MG/1
TABLET ORAL
Qty: 90 TABLET | Refills: 1 | Status: SHIPPED | OUTPATIENT
Start: 2021-06-14 | End: 2021-10-12

## 2021-06-14 RX ORDER — INSULIN GLARGINE 100 [IU]/ML
INJECTION, SOLUTION SUBCUTANEOUS
Qty: 30 ML | Refills: 5 | Status: SHIPPED | OUTPATIENT
Start: 2021-06-14 | End: 2021-06-17

## 2021-06-15 NOTE — PROGRESS NOTES
Pharmacy called they did no understand the Pen needle instructions for the lantus I advised she takes 40 u in the am and 20 u in the evening so the pen needles need to be use BID.  Update our list

## 2021-06-17 DIAGNOSIS — I25.10 CORONARY ARTERY DISEASE INVOLVING NATIVE CORONARY ARTERY OF NATIVE HEART WITHOUT ANGINA PECTORIS: ICD-10-CM

## 2021-06-17 DIAGNOSIS — I10 ESSENTIAL HYPERTENSION: ICD-10-CM

## 2021-06-17 RX ORDER — CARVEDILOL 3.12 MG/1
TABLET ORAL
Qty: 90 TABLET | Refills: 1 | Status: SHIPPED | OUTPATIENT
Start: 2021-06-17 | End: 2021-08-09

## 2021-06-17 RX ORDER — MIDODRINE HYDROCHLORIDE 5 MG/1
TABLET ORAL
Qty: 270 TABLET | Refills: 1 | Status: SHIPPED | OUTPATIENT
Start: 2021-06-17 | End: 2021-12-07

## 2021-06-17 RX ORDER — FUROSEMIDE 40 MG/1
40 TABLET ORAL DAILY
Qty: 30 TABLET | Refills: 5 | Status: SHIPPED | OUTPATIENT
Start: 2021-06-17 | End: 2021-11-08

## 2021-06-17 NOTE — TELEPHONE ENCOUNTER
Last visit:  3/10/2021  Next Visit Date:    Future Appointments   Date Time Provider Hong Cardonai   7/26/2021  9:30 AM MD Vicki Sutherland [facility]         Medication List:  Prior to Admission medications    Medication Sig Start Date End Date Taking?  Authorizing Provider   Insulin Pen Needle 32G X 4 MM MISC Attach Pen needles to Lantus Solostar twice a day 6/15/21   Tiburcio Gowers, MD   insulin glargine (LANTUS SOLOSTAR) 100 UNIT/ML injection pen INJECT 40 UNITS SUBCUTANEOUSLY EVERY MORNING AND 20 UNITS EVERY EVENING 6/14/21   Tiburcio Gowers, MD   lisinopril (PRINIVIL;ZESTRIL) 5 MG tablet take 1 tablet by mouth once daily 6/14/21   Tiburcio Gowers, MD   midodrine (PROAMATINE) 5 MG tablet take 1 tablet by mouth three times a day 6/14/21   Tiburcio Gowers, MD   atorvastatin (LIPITOR) 80 MG tablet take 1 tablet by mouth once daily 5/12/21   Tiburcio Gowers, MD   sertraline (ZOLOFT) 100 MG tablet take 1 tablet by mouth once daily 5/12/21   Tiburcio Gowers, MD   clopidogrel (PLAVIX) 75 MG tablet take 1 tablet by mouth once daily 4/15/21   Tiburcio Gowers, MD   carvedilol (COREG) 3.125 MG tablet take 1 tablet by mouth twice a day with meals 3/15/21   Tiburcio Gowers, MD   furosemide (LASIX) 40 MG tablet take 1 tablet by mouth daily 12/17/20   Tiburcio Gowers, MD   gabapentin (NEURONTIN) 400 MG capsule Take 1 capsule by mouth 3 times daily as needed for neuropathy 10/29/20 3/10/21  Tiburcio Gowers, MD   albuterol (PROVENTIL) (2.5 MG/3ML) 0.083% nebulizer solution Take 3 mLs by nebulization every 6 hours as needed for Wheezing (as needed for wheezing) 9/14/20   Tiburcio Gowers, MD   Respiratory Therapy Supplies (NEBULIZER/TUBING/MOUTHPIECE) KIT 1 kit by Does not apply route daily as needed (for SOB, cough) 11/13/19   Tiburcio Gowers, MD   Respiratory Therapy Supplies (NEBULIZER COMPRESSOR) KIT 1 kit by Does not apply route once for 1 dose 11/4/19 11/4/19  Tiburcio Gowers, MD   lactobacillus (Holli Eng) capsule Take 1 capsule by mouth 2 times daily (with meals) 1/2/19   Denny Sewell MD   blood glucose monitor strips Use to test daily 11/7/18   Kaylin Zuñiga MD   blood glucose monitor kit and supplies Use to test daily 11/7/18   Kaylin Zuñiga MD   Cholecalciferol (VITAMIN D3) 5000 units TABS Take 5,000 Units by mouth daily    Historical Provider, MD   glucose monitoring kit (FREESTYLE) monitoring kit 1 kit by Does not apply route daily as needed Please dispense meter covered by patients insurance.  11/19/15   Kaylin Zuñiga MD   glucose blood VI test strips (EXACTECH TEST) strip 1 each by In Vitro route daily Pt testing BS once daily and prn 11/19/15   Kaylin Zuñiga MD   Lancets MISC Pt testing BS once daily and prn 11/19/15   Kaylin Zuñiga MD   Multiple Vitamins-Minerals (THERAPEUTIC MULTIVITAMIN-MINERALS) tablet Take 1 tablet by mouth daily (with breakfast) 6/12/15   Louis Rodriguez MD

## 2021-06-26 RX ORDER — GABAPENTIN 400 MG/1
CAPSULE ORAL
Qty: 90 CAPSULE | Refills: 0 | Status: SHIPPED | OUTPATIENT
Start: 2021-06-26 | End: 2022-07-13 | Stop reason: SDUPTHER

## 2021-06-28 ENCOUNTER — TELEPHONE (OUTPATIENT)
Dept: FAMILY MEDICINE CLINIC | Age: 61
End: 2021-06-28

## 2021-06-28 NOTE — TELEPHONE ENCOUNTER
----- Message from Cecilia Gutierrez sent at 6/26/2021  9:58 AM EDT -----  Subject: Message to Provider    QUESTIONS  Information for Provider? Pt was trying to find out where she can seek   mental health counselors for her granddaughter. ---------------------------------------------------------------------------  --------------  Carmelina SANTANA  What is the best way for the office to contact you? OK to leave message on   voicemail  Preferred Call Back Phone Number? [phone]  ---------------------------------------------------------------------------  --------------  SCRIPT ANSWERS  Relationship to Patient?  Self

## 2021-07-01 RX ORDER — ALBUTEROL SULFATE 2.5 MG/3ML
2.5 SOLUTION RESPIRATORY (INHALATION) EVERY 6 HOURS PRN
Qty: 120 EACH | Refills: 1 | Status: SHIPPED | OUTPATIENT
Start: 2021-07-01 | End: 2022-04-12 | Stop reason: SDUPTHER

## 2021-07-01 NOTE — TELEPHONE ENCOUNTER
Last OV: 3/10/2021DM COPD HTN mental health  Last RX:   Next scheduled apt: 7/7/2021      Sure scripts request      RX pending

## 2021-07-01 NOTE — TELEPHONE ENCOUNTER
Nebulizer solution 0.083%    Roe    Rescheduled her appointment for 7/7/21 with Dr. Luisa Sun. Health Maintenance   Topic Date Due    Hepatitis C screen  Never done    COVID-19 Vaccine (1) Never done    HIV screen  Never done    DTaP/Tdap/Td vaccine (1 - Tdap) Never done    Cervical cancer screen  Never done    Diabetic retinal exam  03/30/2020    Colon cancer screen colonoscopy  05/06/2020    Shingles Vaccine (2 of 2) 09/16/2020    A1C test (Diabetic or Prediabetic)  06/11/2021    Flu vaccine (1) 09/01/2021    Diabetic foot exam  09/09/2021    Diabetic microalbuminuria test  09/09/2021    Lipid screen  12/10/2021    Potassium monitoring  02/12/2022    Creatinine monitoring  02/12/2022    Breast cancer screen  03/23/2023    Pneumococcal 0-64 years Vaccine (2 of 2 - PPSV23) 09/19/2024    Hepatitis A vaccine  Aged Out    Hib vaccine  Aged Out    Meningococcal (ACWY) vaccine  Aged Out             (applicable per patient's age: Cancer Screenings, Depression Screening, Fall Risk Screening, Immunizations)    Hemoglobin A1C (%)   Date Value   03/11/2021 9.5   12/10/2020 10.0 (H)   09/09/2020 6.7     Microalb/Crt.  Ratio (mcg/mg creat)   Date Value   09/22/2017 29 (H)     LDL Cholesterol (mg/dL)   Date Value   12/10/2020          AST (U/L)   Date Value   12/10/2020 9     ALT (U/L)   Date Value   12/10/2020 11     BUN (mg/dL)   Date Value   02/12/2021 24 (H)      (goal A1C is < 7)   (goal LDL is <100) need 30-50% reduction from baseline     BP Readings from Last 3 Encounters:   03/10/21 102/60   02/17/21 117/62   11/28/20 (!) 99/53    (goal /80)      All Future Testing planned in [facility]:  Lab Frequency Next Occurrence   Transfuse RBC TRANSFUSE 1 UNIT        Next Visit Date:  Future Appointments   Date Time Provider Hong Mckeon   7/7/2021  3:20 PM MD Mily Carbone [facility]   7/26/2021  9:30 AM MD Phil Cancino [facility]            Patient Active Problem List: Acute coronary syndrome (HCC)     S/P cardiac cath-Patent grafts 6/5/15     Onychomycosis of toenail     CAD (coronary artery disease)     Hx of CABG     ICD (implantable cardioverter-defibrillator) in place     H/O mitral valve repair     Hx of myocardial infarction     Hypertension     DM (diabetes mellitus), type 2 with neurological complications (HCC)     Anxiety     CHAD (acute kidney injury) ([address]     Elevated serum immunoglobulin free light chains     Ureteral stone with hydronephrosis     Acute pulmonary edema (HCC)     Dyslipidemia     Pneumonia

## 2021-08-09 DIAGNOSIS — I25.10 CORONARY ARTERY DISEASE INVOLVING NATIVE CORONARY ARTERY OF NATIVE HEART WITHOUT ANGINA PECTORIS: ICD-10-CM

## 2021-08-09 DIAGNOSIS — I10 ESSENTIAL HYPERTENSION: ICD-10-CM

## 2021-08-09 RX ORDER — CLOPIDOGREL BISULFATE 75 MG/1
TABLET ORAL
Qty: 30 TABLET | Refills: 11 | Status: SHIPPED | OUTPATIENT
Start: 2021-08-09 | End: 2022-04-05 | Stop reason: SDUPTHER

## 2021-08-09 RX ORDER — CARVEDILOL 3.12 MG/1
TABLET ORAL
Qty: 60 TABLET | Refills: 11 | Status: SHIPPED | OUTPATIENT
Start: 2021-08-09 | End: 2022-04-05 | Stop reason: SDUPTHER

## 2021-08-09 RX ORDER — PEN NEEDLE, DIABETIC 32GX 5/32"
NEEDLE, DISPOSABLE MISCELLANEOUS
Qty: 100 EACH | Refills: 11 | Status: SHIPPED | OUTPATIENT
Start: 2021-08-09 | End: 2022-04-07

## 2021-08-09 NOTE — TELEPHONE ENCOUNTER
Last OV: 3/10/2021 dm  Last RX:   Next scheduled apt: Visit date not found        Sure scripts request      RX pending

## 2022-01-01 ENCOUNTER — APPOINTMENT (OUTPATIENT)
Dept: GENERAL RADIOLOGY | Age: 62
DRG: 420 | End: 2022-01-01
Payer: MEDICARE

## 2022-01-01 ENCOUNTER — HOSPITAL ENCOUNTER (INPATIENT)
Age: 62
LOS: 17 days | DRG: 420 | End: 2022-12-31
Attending: EMERGENCY MEDICINE | Admitting: INTERNAL MEDICINE
Payer: MEDICARE

## 2022-01-01 ENCOUNTER — CLINICAL DOCUMENTATION (OUTPATIENT)
Dept: RESEARCH | Age: 62
End: 2022-01-01

## 2022-01-01 ENCOUNTER — APPOINTMENT (OUTPATIENT)
Dept: CT IMAGING | Age: 62
DRG: 420 | End: 2022-01-01
Payer: MEDICARE

## 2022-01-01 ENCOUNTER — ANESTHESIA (OUTPATIENT)
Dept: OPERATING ROOM | Age: 62
End: 2022-01-01

## 2022-01-01 ENCOUNTER — ANESTHESIA EVENT (OUTPATIENT)
Dept: OPERATING ROOM | Age: 62
End: 2022-01-01

## 2022-01-01 VITALS
DIASTOLIC BLOOD PRESSURE: 51 MMHG | WEIGHT: 158.29 LBS | HEIGHT: 66 IN | BODY MASS INDEX: 25.44 KG/M2 | OXYGEN SATURATION: 75 % | TEMPERATURE: 97.3 F | HEART RATE: 92 BPM | RESPIRATION RATE: 20 BRPM | SYSTOLIC BLOOD PRESSURE: 75 MMHG

## 2022-01-01 DIAGNOSIS — I50.9 CONGESTIVE HEART FAILURE, UNSPECIFIED HF CHRONICITY, UNSPECIFIED HEART FAILURE TYPE (HCC): ICD-10-CM

## 2022-01-01 DIAGNOSIS — I47.20 VENTRICULAR TACHYARRHYTHMIA: ICD-10-CM

## 2022-01-01 DIAGNOSIS — I46.9 CARDIAC ARREST (HCC): Primary | ICD-10-CM

## 2022-01-01 DIAGNOSIS — R18.8 OTHER ASCITES: ICD-10-CM

## 2022-01-01 LAB
ABO/RH: NORMAL
ABSOLUTE EOS #: 0 K/UL (ref 0–0.4)
ABSOLUTE EOS #: 0 K/UL (ref 0–0.4)
ABSOLUTE EOS #: 0.03 K/UL (ref 0–0.44)
ABSOLUTE EOS #: 0.04 K/UL (ref 0–0.44)
ABSOLUTE EOS #: 0.05 K/UL (ref 0–0.44)
ABSOLUTE EOS #: 0.07 K/UL (ref 0–0.44)
ABSOLUTE EOS #: 0.08 K/UL (ref 0–0.44)
ABSOLUTE EOS #: 0.09 K/UL (ref 0–0.44)
ABSOLUTE EOS #: 0.09 K/UL (ref 0–0.44)
ABSOLUTE EOS #: 0.1 K/UL (ref 0–0.44)
ABSOLUTE EOS #: <0.03 K/UL (ref 0–0.44)
ABSOLUTE IMMATURE GRANULOCYTE: 0 K/UL (ref 0–0.3)
ABSOLUTE IMMATURE GRANULOCYTE: 0.04 K/UL (ref 0–0.3)
ABSOLUTE IMMATURE GRANULOCYTE: 0.04 K/UL (ref 0–0.3)
ABSOLUTE IMMATURE GRANULOCYTE: 0.05 K/UL (ref 0–0.3)
ABSOLUTE IMMATURE GRANULOCYTE: 0.05 K/UL (ref 0–0.3)
ABSOLUTE IMMATURE GRANULOCYTE: 0.06 K/UL (ref 0–0.3)
ABSOLUTE IMMATURE GRANULOCYTE: 0.06 K/UL (ref 0–0.3)
ABSOLUTE IMMATURE GRANULOCYTE: 0.07 K/UL (ref 0–0.3)
ABSOLUTE IMMATURE GRANULOCYTE: 0.08 K/UL (ref 0–0.3)
ABSOLUTE IMMATURE GRANULOCYTE: 0.12 K/UL (ref 0–0.3)
ABSOLUTE IMMATURE GRANULOCYTE: 0.13 K/UL (ref 0–0.3)
ABSOLUTE IMMATURE GRANULOCYTE: 0.14 K/UL (ref 0–0.3)
ABSOLUTE LYMPH #: 0.92 K/UL (ref 1–4.8)
ABSOLUTE LYMPH #: 1.06 K/UL (ref 1.1–3.7)
ABSOLUTE LYMPH #: 1.13 K/UL (ref 1.1–3.7)
ABSOLUTE LYMPH #: 1.16 K/UL (ref 1.1–3.7)
ABSOLUTE LYMPH #: 1.22 K/UL (ref 1.1–3.7)
ABSOLUTE LYMPH #: 1.23 K/UL (ref 1.1–3.7)
ABSOLUTE LYMPH #: 1.25 K/UL (ref 1.1–3.7)
ABSOLUTE LYMPH #: 1.28 K/UL (ref 1.1–3.7)
ABSOLUTE LYMPH #: 1.33 K/UL (ref 1.1–3.7)
ABSOLUTE LYMPH #: 1.34 K/UL (ref 1.1–3.7)
ABSOLUTE LYMPH #: 1.39 K/UL (ref 1.1–3.7)
ABSOLUTE LYMPH #: 1.48 K/UL (ref 1.1–3.7)
ABSOLUTE LYMPH #: 1.51 K/UL (ref 1.1–3.7)
ABSOLUTE LYMPH #: 1.53 K/UL (ref 1.1–3.7)
ABSOLUTE LYMPH #: 1.54 K/UL (ref 1.1–3.7)
ABSOLUTE LYMPH #: 1.59 K/UL (ref 1.1–3.7)
ABSOLUTE LYMPH #: 1.61 K/UL (ref 1–4.8)
ABSOLUTE LYMPH #: 1.81 K/UL (ref 1.1–3.7)
ABSOLUTE MONO #: 0.34 K/UL (ref 0.1–0.8)
ABSOLUTE MONO #: 0.55 K/UL (ref 0.1–1.2)
ABSOLUTE MONO #: 0.59 K/UL (ref 0.1–1.2)
ABSOLUTE MONO #: 0.6 K/UL (ref 0.1–1.2)
ABSOLUTE MONO #: 0.6 K/UL (ref 0.1–1.2)
ABSOLUTE MONO #: 0.69 K/UL (ref 0.1–0.8)
ABSOLUTE MONO #: 0.7 K/UL (ref 0.1–1.2)
ABSOLUTE MONO #: 0.71 K/UL (ref 0.1–1.2)
ABSOLUTE MONO #: 0.75 K/UL (ref 0.1–1.2)
ABSOLUTE MONO #: 0.76 K/UL (ref 0.1–1.2)
ABSOLUTE MONO #: 0.76 K/UL (ref 0.1–1.2)
ABSOLUTE MONO #: 0.81 K/UL (ref 0.1–1.2)
ABSOLUTE MONO #: 0.81 K/UL (ref 0.1–1.2)
ABSOLUTE MONO #: 0.86 K/UL (ref 0.1–1.2)
ABSOLUTE MONO #: 0.87 K/UL (ref 0.1–1.2)
ABSOLUTE MONO #: 0.91 K/UL (ref 0.1–1.2)
ABSOLUTE MONO #: 1.13 K/UL (ref 0.1–1.2)
ABSOLUTE MONO #: 1.28 K/UL (ref 0.1–1.2)
ACTION: NORMAL
ALBUMIN SERPL-MCNC: 2.7 G/DL (ref 3.5–5.2)
ALBUMIN SERPL-MCNC: 2.8 G/DL (ref 3.5–5.2)
ALBUMIN SERPL-MCNC: 2.8 G/DL (ref 3.5–5.2)
ALBUMIN SERPL-MCNC: 2.9 G/DL (ref 3.5–5.2)
ALBUMIN SERPL-MCNC: 3 G/DL (ref 3.5–5.2)
ALBUMIN SERPL-MCNC: 3.1 G/DL (ref 3.5–5.2)
ALBUMIN/GLOBULIN RATIO: 0.7 (ref 1–2.5)
ALBUMIN/GLOBULIN RATIO: 0.7 (ref 1–2.5)
ALBUMIN/GLOBULIN RATIO: 0.8 (ref 1–2.5)
ALBUMIN/GLOBULIN RATIO: 0.9 (ref 1–2.5)
ALLEN TEST: ABNORMAL
ALLEN TEST: NORMAL
ALP BLD-CCNC: 113 U/L (ref 35–104)
ALP BLD-CCNC: 114 U/L (ref 35–104)
ALP BLD-CCNC: 116 U/L (ref 35–104)
ALP BLD-CCNC: 119 U/L (ref 35–104)
ALP BLD-CCNC: 120 U/L (ref 35–104)
ALP BLD-CCNC: 122 U/L (ref 35–104)
ALP BLD-CCNC: 122 U/L (ref 35–104)
ALP BLD-CCNC: 128 U/L (ref 35–104)
ALT SERPL-CCNC: 15 U/L (ref 5–33)
ALT SERPL-CCNC: 16 U/L (ref 5–33)
ALT SERPL-CCNC: 17 U/L (ref 5–33)
ALT SERPL-CCNC: 17 U/L (ref 5–33)
ALT SERPL-CCNC: 86 U/L (ref 5–33)
AMMONIA: 21 UMOL/L (ref 11–51)
AMORPHOUS: ABNORMAL
AMYLASE: 79 U/L (ref 28–100)
ANION GAP SERPL CALCULATED.3IONS-SCNC: 11 MMOL/L (ref 9–17)
ANION GAP SERPL CALCULATED.3IONS-SCNC: 12 MMOL/L (ref 9–17)
ANION GAP SERPL CALCULATED.3IONS-SCNC: 13 MMOL/L (ref 9–17)
ANION GAP SERPL CALCULATED.3IONS-SCNC: 13 MMOL/L (ref 9–17)
ANION GAP SERPL CALCULATED.3IONS-SCNC: 14 MMOL/L (ref 9–17)
ANION GAP SERPL CALCULATED.3IONS-SCNC: 14 MMOL/L (ref 9–17)
ANION GAP SERPL CALCULATED.3IONS-SCNC: 15 MMOL/L (ref 9–17)
ANION GAP SERPL CALCULATED.3IONS-SCNC: 15 MMOL/L (ref 9–17)
ANION GAP SERPL CALCULATED.3IONS-SCNC: 16 MMOL/L (ref 9–17)
ANION GAP SERPL CALCULATED.3IONS-SCNC: 17 MMOL/L (ref 9–17)
ANION GAP SERPL CALCULATED.3IONS-SCNC: 18 MMOL/L (ref 9–17)
ANION GAP SERPL CALCULATED.3IONS-SCNC: 18 MMOL/L (ref 9–17)
ANION GAP SERPL CALCULATED.3IONS-SCNC: 19 MMOL/L (ref 9–17)
ANION GAP SERPL CALCULATED.3IONS-SCNC: 19 MMOL/L (ref 9–17)
ANION GAP SERPL CALCULATED.3IONS-SCNC: 20 MMOL/L (ref 9–17)
ANION GAP SERPL CALCULATED.3IONS-SCNC: 21 MMOL/L (ref 9–17)
ANION GAP SERPL CALCULATED.3IONS-SCNC: 21 MMOL/L (ref 9–17)
ANION GAP SERPL CALCULATED.3IONS-SCNC: 23 MMOL/L (ref 9–17)
ANION GAP SERPL CALCULATED.3IONS-SCNC: 8 MMOL/L (ref 9–17)
ANION GAP SERPL CALCULATED.3IONS-SCNC: 9 MMOL/L (ref 9–17)
ANION GAP: 14 MMOL/L (ref 7–16)
ANTIBODY SCREEN: NEGATIVE
ARM BAND NUMBER: NORMAL
AST SERPL-CCNC: 138 U/L
AST SERPL-CCNC: 22 U/L
AST SERPL-CCNC: 23 U/L
AST SERPL-CCNC: 24 U/L
AST SERPL-CCNC: 25 U/L
BASOPHILS # BLD: 0 % (ref 0–2)
BASOPHILS ABSOLUTE: 0 K/UL (ref 0–0.2)
BASOPHILS ABSOLUTE: 0 K/UL (ref 0–0.2)
BASOPHILS ABSOLUTE: 0.03 K/UL (ref 0–0.2)
BASOPHILS ABSOLUTE: 0.03 K/UL (ref 0–0.2)
BASOPHILS ABSOLUTE: 0.04 K/UL (ref 0–0.2)
BASOPHILS ABSOLUTE: <0.03 K/UL (ref 0–0.2)
BILIRUB SERPL-MCNC: 0.5 MG/DL (ref 0.3–1.2)
BILIRUB SERPL-MCNC: 0.6 MG/DL (ref 0.3–1.2)
BILIRUB SERPL-MCNC: 1.2 MG/DL (ref 0.3–1.2)
BILIRUBIN DIRECT: 0.2 MG/DL
BILIRUBIN DIRECT: 0.2 MG/DL
BILIRUBIN DIRECT: 0.3 MG/DL
BILIRUBIN URINE: NEGATIVE
BLD PROD TYP BPU: NORMAL
BLOOD BANK BLOOD PRODUCT EXPIRATION DATE: NORMAL
BLOOD BANK ISBT PRODUCT BLOOD TYPE: 5100
BLOOD BANK PRODUCT CODE: NORMAL
BLOOD BANK UNIT TYPE AND RH: NORMAL
BPU ID: NORMAL
BUN BLDV-MCNC: 34 MG/DL (ref 8–23)
BUN BLDV-MCNC: 35 MG/DL (ref 8–23)
BUN BLDV-MCNC: 36 MG/DL (ref 8–23)
BUN BLDV-MCNC: 36 MG/DL (ref 8–23)
BUN BLDV-MCNC: 39 MG/DL (ref 8–23)
BUN BLDV-MCNC: 40 MG/DL (ref 8–23)
BUN BLDV-MCNC: 41 MG/DL (ref 8–23)
BUN BLDV-MCNC: 42 MG/DL (ref 8–23)
BUN BLDV-MCNC: 43 MG/DL (ref 8–23)
BUN BLDV-MCNC: 44 MG/DL (ref 8–23)
BUN BLDV-MCNC: 46 MG/DL (ref 8–23)
BUN BLDV-MCNC: 46 MG/DL (ref 8–23)
BUN BLDV-MCNC: 49 MG/DL (ref 8–23)
BUN BLDV-MCNC: 55 MG/DL (ref 8–23)
BUN BLDV-MCNC: 56 MG/DL (ref 8–23)
BUN BLDV-MCNC: 57 MG/DL (ref 8–23)
BUN BLDV-MCNC: 64 MG/DL (ref 8–23)
BUN BLDV-MCNC: 64 MG/DL (ref 8–23)
BUN BLDV-MCNC: 66 MG/DL (ref 8–23)
BUN BLDV-MCNC: 67 MG/DL (ref 8–23)
BUN BLDV-MCNC: 67 MG/DL (ref 8–23)
BUN BLDV-MCNC: 68 MG/DL (ref 8–23)
BUN BLDV-MCNC: 71 MG/DL (ref 8–23)
CALCIUM IONIZED: 1.02 MMOL/L (ref 1.13–1.33)
CALCIUM IONIZED: 1.05 MMOL/L (ref 1.13–1.33)
CALCIUM IONIZED: 1.05 MMOL/L (ref 1.13–1.33)
CALCIUM IONIZED: 1.06 MMOL/L (ref 1.13–1.33)
CALCIUM IONIZED: 1.07 MMOL/L (ref 1.13–1.33)
CALCIUM IONIZED: 1.1 MMOL/L (ref 1.13–1.33)
CALCIUM IONIZED: 1.12 MMOL/L (ref 1.13–1.33)
CALCIUM IONIZED: 1.13 MMOL/L (ref 1.13–1.33)
CALCIUM IONIZED: 1.15 MMOL/L (ref 1.13–1.33)
CALCIUM IONIZED: 1.15 MMOL/L (ref 1.13–1.33)
CALCIUM IONIZED: 1.16 MMOL/L (ref 1.13–1.33)
CALCIUM IONIZED: 1.16 MMOL/L (ref 1.13–1.33)
CALCIUM IONIZED: 1.17 MMOL/L (ref 1.13–1.33)
CALCIUM IONIZED: 1.17 MMOL/L (ref 1.13–1.33)
CALCIUM IONIZED: 1.18 MMOL/L (ref 1.13–1.33)
CALCIUM IONIZED: 1.18 MMOL/L (ref 1.13–1.33)
CALCIUM IONIZED: 1.19 MMOL/L (ref 1.13–1.33)
CALCIUM SERPL-MCNC: 7.5 MG/DL (ref 8.6–10.4)
CALCIUM SERPL-MCNC: 7.6 MG/DL (ref 8.6–10.4)
CALCIUM SERPL-MCNC: 7.7 MG/DL (ref 8.6–10.4)
CALCIUM SERPL-MCNC: 7.8 MG/DL (ref 8.6–10.4)
CALCIUM SERPL-MCNC: 8 MG/DL (ref 8.6–10.4)
CALCIUM SERPL-MCNC: 8.2 MG/DL (ref 8.6–10.4)
CALCIUM SERPL-MCNC: 8.3 MG/DL (ref 8.6–10.4)
CALCIUM SERPL-MCNC: 8.4 MG/DL (ref 8.6–10.4)
CALCIUM SERPL-MCNC: 8.5 MG/DL (ref 8.6–10.4)
CALCIUM SERPL-MCNC: 8.5 MG/DL (ref 8.6–10.4)
CALCIUM SERPL-MCNC: 8.6 MG/DL (ref 8.6–10.4)
CALCIUM SERPL-MCNC: 8.8 MG/DL (ref 8.6–10.4)
CALCIUM SERPL-MCNC: 8.8 MG/DL (ref 8.6–10.4)
CALCIUM SERPL-MCNC: 8.9 MG/DL (ref 8.6–10.4)
CALCIUM SERPL-MCNC: 8.9 MG/DL (ref 8.6–10.4)
CASTS UA: ABNORMAL /LPF (ref 0–2)
CASTS UA: ABNORMAL /LPF (ref 0–2)
CHLORIDE BLD-SCNC: 100 MMOL/L (ref 98–107)
CHLORIDE BLD-SCNC: 101 MMOL/L (ref 98–107)
CHLORIDE BLD-SCNC: 102 MMOL/L (ref 98–107)
CHLORIDE BLD-SCNC: 103 MMOL/L (ref 98–107)
CHLORIDE BLD-SCNC: 103 MMOL/L (ref 98–107)
CHLORIDE BLD-SCNC: 104 MMOL/L (ref 98–107)
CHLORIDE BLD-SCNC: 105 MMOL/L (ref 98–107)
CHLORIDE BLD-SCNC: 106 MMOL/L (ref 98–107)
CHLORIDE BLD-SCNC: 107 MMOL/L (ref 98–107)
CHLORIDE BLD-SCNC: 108 MMOL/L (ref 98–107)
CHLORIDE BLD-SCNC: 108 MMOL/L (ref 98–107)
CHLORIDE BLD-SCNC: 109 MMOL/L (ref 98–107)
CHLORIDE BLD-SCNC: 109 MMOL/L (ref 98–107)
CHLORIDE BLD-SCNC: 111 MMOL/L (ref 98–107)
CHLORIDE BLD-SCNC: 99 MMOL/L (ref 98–107)
CHLORIDE BLD-SCNC: 99 MMOL/L (ref 98–107)
CO2: 14 MMOL/L (ref 20–31)
CO2: 15 MMOL/L (ref 20–31)
CO2: 16 MMOL/L (ref 20–31)
CO2: 17 MMOL/L (ref 20–31)
CO2: 17 MMOL/L (ref 20–31)
CO2: 18 MMOL/L (ref 20–31)
CO2: 18 MMOL/L (ref 20–31)
CO2: 21 MMOL/L (ref 20–31)
CO2: 22 MMOL/L (ref 20–31)
CO2: 24 MMOL/L (ref 20–31)
CO2: 25 MMOL/L (ref 20–31)
CO2: 26 MMOL/L (ref 20–31)
COLOR: YELLOW
CREAT SERPL-MCNC: 1.25 MG/DL (ref 0.5–0.9)
CREAT SERPL-MCNC: 1.26 MG/DL (ref 0.5–0.9)
CREAT SERPL-MCNC: 1.28 MG/DL (ref 0.5–0.9)
CREAT SERPL-MCNC: 1.28 MG/DL (ref 0.5–0.9)
CREAT SERPL-MCNC: 1.29 MG/DL (ref 0.5–0.9)
CREAT SERPL-MCNC: 1.3 MG/DL (ref 0.5–0.9)
CREAT SERPL-MCNC: 1.32 MG/DL (ref 0.5–0.9)
CREAT SERPL-MCNC: 1.38 MG/DL (ref 0.5–0.9)
CREAT SERPL-MCNC: 1.4 MG/DL (ref 0.5–0.9)
CREAT SERPL-MCNC: 1.44 MG/DL (ref 0.5–0.9)
CREAT SERPL-MCNC: 1.46 MG/DL (ref 0.5–0.9)
CREAT SERPL-MCNC: 1.49 MG/DL (ref 0.5–0.9)
CREAT SERPL-MCNC: 1.51 MG/DL (ref 0.5–0.9)
CREAT SERPL-MCNC: 1.56 MG/DL (ref 0.5–0.9)
CREAT SERPL-MCNC: 1.63 MG/DL (ref 0.5–0.9)
CREAT SERPL-MCNC: 1.7 MG/DL (ref 0.5–0.9)
CREAT SERPL-MCNC: 1.7 MG/DL (ref 0.5–0.9)
CREAT SERPL-MCNC: 1.84 MG/DL (ref 0.5–0.9)
CREAT SERPL-MCNC: 1.85 MG/DL (ref 0.5–0.9)
CREAT SERPL-MCNC: 1.94 MG/DL (ref 0.5–0.9)
CREAT SERPL-MCNC: 2.14 MG/DL (ref 0.5–0.9)
CREAT SERPL-MCNC: 2.15 MG/DL (ref 0.5–0.9)
CREAT SERPL-MCNC: 2.19 MG/DL (ref 0.5–0.9)
CREAT SERPL-MCNC: 2.38 MG/DL (ref 0.5–0.9)
CREAT SERPL-MCNC: 2.43 MG/DL (ref 0.5–0.9)
CREAT SERPL-MCNC: 2.48 MG/DL (ref 0.5–0.9)
CREAT SERPL-MCNC: 2.57 MG/DL (ref 0.5–0.9)
CREAT SERPL-MCNC: 2.64 MG/DL (ref 0.5–0.9)
CREAT SERPL-MCNC: 2.66 MG/DL (ref 0.5–0.9)
CREAT SERPL-MCNC: 2.66 MG/DL (ref 0.5–0.9)
CREAT SERPL-MCNC: 2.68 MG/DL (ref 0.5–0.9)
CREATININE URINE: 71.1 MG/DL (ref 28–217)
CROSSMATCH RESULT: NORMAL
CRYSTALS, UA: ABNORMAL /HPF
CRYSTALS, UA: ABNORMAL /HPF
CULTURE: NO GROWTH
CULTURE: NORMAL
DATE AND TIME: NORMAL
DISPENSE STATUS BLOOD BANK: NORMAL
EGFR, POC: 26 ML/MIN/1.73M2
EKG ATRIAL RATE: 97 BPM
EKG P AXIS: 77 DEGREES
EKG P-R INTERVAL: 162 MS
EKG Q-T INTERVAL: 442 MS
EKG QRS DURATION: 138 MS
EKG QTC CALCULATION (BAZETT): 561 MS
EKG R AXIS: -56 DEGREES
EKG T AXIS: 76 DEGREES
EKG VENTRICULAR RATE: 97 BPM
EOSINOPHILS RELATIVE PERCENT: 0 % (ref 1–4)
EOSINOPHILS RELATIVE PERCENT: 1 % (ref 1–4)
EPITHELIAL CELLS UA: ABNORMAL /HPF (ref 0–5)
EPITHELIAL CELLS UA: NORMAL /HPF (ref 0–5)
ESTIMATED AVERAGE GLUCOSE: 197 MG/DL
EXPIRATION DATE: NORMAL
FIO2: 100
FIO2: 30
FIO2: 40
FIO2: 45
FIO2: 50
GFR SERPL CREATININE-BSD FRML MDRD: 19 ML/MIN/1.73M2
GFR SERPL CREATININE-BSD FRML MDRD: 20 ML/MIN/1.73M2
GFR SERPL CREATININE-BSD FRML MDRD: 21 ML/MIN/1.73M2
GFR SERPL CREATININE-BSD FRML MDRD: 22 ML/MIN/1.73M2
GFR SERPL CREATININE-BSD FRML MDRD: 22 ML/MIN/1.73M2
GFR SERPL CREATININE-BSD FRML MDRD: 25 ML/MIN/1.73M2
GFR SERPL CREATININE-BSD FRML MDRD: 29 ML/MIN/1.73M2
GFR SERPL CREATININE-BSD FRML MDRD: 30 ML/MIN/1.73M2
GFR SERPL CREATININE-BSD FRML MDRD: 30 ML/MIN/1.73M2
GFR SERPL CREATININE-BSD FRML MDRD: 33 ML/MIN/1.73M2
GFR SERPL CREATININE-BSD FRML MDRD: 33 ML/MIN/1.73M2
GFR SERPL CREATININE-BSD FRML MDRD: 35 ML/MIN/1.73M2
GFR SERPL CREATININE-BSD FRML MDRD: 37 ML/MIN/1.73M2
GFR SERPL CREATININE-BSD FRML MDRD: 39 ML/MIN/1.73M2
GFR SERPL CREATININE-BSD FRML MDRD: 39 ML/MIN/1.73M2
GFR SERPL CREATININE-BSD FRML MDRD: 40 ML/MIN/1.73M2
GFR SERPL CREATININE-BSD FRML MDRD: 41 ML/MIN/1.73M2
GFR SERPL CREATININE-BSD FRML MDRD: 42 ML/MIN/1.73M2
GFR SERPL CREATININE-BSD FRML MDRD: 43 ML/MIN/1.73M2
GFR SERPL CREATININE-BSD FRML MDRD: 45 ML/MIN/1.73M2
GFR SERPL CREATININE-BSD FRML MDRD: 46 ML/MIN/1.73M2
GFR SERPL CREATININE-BSD FRML MDRD: 47 ML/MIN/1.73M2
GFR SERPL CREATININE-BSD FRML MDRD: 48 ML/MIN/1.73M2
GFR SERPL CREATININE-BSD FRML MDRD: 48 ML/MIN/1.73M2
GGT: 91 U/L (ref 5–36)
GLUCOSE BLD-MCNC: 107 MG/DL (ref 74–100)
GLUCOSE BLD-MCNC: 112 MG/DL (ref 70–99)
GLUCOSE BLD-MCNC: 122 MG/DL (ref 74–100)
GLUCOSE BLD-MCNC: 126 MG/DL (ref 70–99)
GLUCOSE BLD-MCNC: 129 MG/DL (ref 74–100)
GLUCOSE BLD-MCNC: 134 MG/DL (ref 65–105)
GLUCOSE BLD-MCNC: 134 MG/DL (ref 65–105)
GLUCOSE BLD-MCNC: 137 MG/DL (ref 65–105)
GLUCOSE BLD-MCNC: 137 MG/DL (ref 65–105)
GLUCOSE BLD-MCNC: 141 MG/DL (ref 70–99)
GLUCOSE BLD-MCNC: 142 MG/DL (ref 65–105)
GLUCOSE BLD-MCNC: 143 MG/DL (ref 74–100)
GLUCOSE BLD-MCNC: 145 MG/DL (ref 70–99)
GLUCOSE BLD-MCNC: 145 MG/DL (ref 70–99)
GLUCOSE BLD-MCNC: 147 MG/DL (ref 70–99)
GLUCOSE BLD-MCNC: 148 MG/DL (ref 65–105)
GLUCOSE BLD-MCNC: 151 MG/DL (ref 65–105)
GLUCOSE BLD-MCNC: 151 MG/DL (ref 70–99)
GLUCOSE BLD-MCNC: 152 MG/DL (ref 65–105)
GLUCOSE BLD-MCNC: 157 MG/DL (ref 65–105)
GLUCOSE BLD-MCNC: 158 MG/DL (ref 74–100)
GLUCOSE BLD-MCNC: 160 MG/DL (ref 65–105)
GLUCOSE BLD-MCNC: 161 MG/DL (ref 70–99)
GLUCOSE BLD-MCNC: 164 MG/DL (ref 65–105)
GLUCOSE BLD-MCNC: 164 MG/DL (ref 70–99)
GLUCOSE BLD-MCNC: 166 MG/DL (ref 65–105)
GLUCOSE BLD-MCNC: 167 MG/DL (ref 65–105)
GLUCOSE BLD-MCNC: 168 MG/DL (ref 70–99)
GLUCOSE BLD-MCNC: 170 MG/DL (ref 65–105)
GLUCOSE BLD-MCNC: 172 MG/DL (ref 70–99)
GLUCOSE BLD-MCNC: 174 MG/DL (ref 65–105)
GLUCOSE BLD-MCNC: 176 MG/DL (ref 74–100)
GLUCOSE BLD-MCNC: 179 MG/DL (ref 65–105)
GLUCOSE BLD-MCNC: 180 MG/DL (ref 70–99)
GLUCOSE BLD-MCNC: 182 MG/DL (ref 65–105)
GLUCOSE BLD-MCNC: 183 MG/DL (ref 65–105)
GLUCOSE BLD-MCNC: 183 MG/DL (ref 70–99)
GLUCOSE BLD-MCNC: 185 MG/DL (ref 65–105)
GLUCOSE BLD-MCNC: 186 MG/DL (ref 65–105)
GLUCOSE BLD-MCNC: 188 MG/DL (ref 65–105)
GLUCOSE BLD-MCNC: 192 MG/DL (ref 65–105)
GLUCOSE BLD-MCNC: 193 MG/DL (ref 65–105)
GLUCOSE BLD-MCNC: 195 MG/DL (ref 65–105)
GLUCOSE BLD-MCNC: 195 MG/DL (ref 70–99)
GLUCOSE BLD-MCNC: 195 MG/DL (ref 74–100)
GLUCOSE BLD-MCNC: 198 MG/DL (ref 65–105)
GLUCOSE BLD-MCNC: 198 MG/DL (ref 65–105)
GLUCOSE BLD-MCNC: 200 MG/DL (ref 65–105)
GLUCOSE BLD-MCNC: 200 MG/DL (ref 70–99)
GLUCOSE BLD-MCNC: 201 MG/DL (ref 74–100)
GLUCOSE BLD-MCNC: 205 MG/DL (ref 65–105)
GLUCOSE BLD-MCNC: 207 MG/DL (ref 65–105)
GLUCOSE BLD-MCNC: 211 MG/DL (ref 65–105)
GLUCOSE BLD-MCNC: 212 MG/DL (ref 65–105)
GLUCOSE BLD-MCNC: 212 MG/DL (ref 65–105)
GLUCOSE BLD-MCNC: 212 MG/DL (ref 70–99)
GLUCOSE BLD-MCNC: 213 MG/DL (ref 65–105)
GLUCOSE BLD-MCNC: 214 MG/DL (ref 70–99)
GLUCOSE BLD-MCNC: 217 MG/DL (ref 65–105)
GLUCOSE BLD-MCNC: 217 MG/DL (ref 74–100)
GLUCOSE BLD-MCNC: 220 MG/DL (ref 65–105)
GLUCOSE BLD-MCNC: 221 MG/DL (ref 65–105)
GLUCOSE BLD-MCNC: 221 MG/DL (ref 65–105)
GLUCOSE BLD-MCNC: 221 MG/DL (ref 70–99)
GLUCOSE BLD-MCNC: 223 MG/DL (ref 65–105)
GLUCOSE BLD-MCNC: 223 MG/DL (ref 65–105)
GLUCOSE BLD-MCNC: 224 MG/DL (ref 74–100)
GLUCOSE BLD-MCNC: 224 MG/DL (ref 74–100)
GLUCOSE BLD-MCNC: 225 MG/DL (ref 65–105)
GLUCOSE BLD-MCNC: 226 MG/DL (ref 70–99)
GLUCOSE BLD-MCNC: 227 MG/DL (ref 65–105)
GLUCOSE BLD-MCNC: 228 MG/DL (ref 65–105)
GLUCOSE BLD-MCNC: 228 MG/DL (ref 65–105)
GLUCOSE BLD-MCNC: 228 MG/DL (ref 70–99)
GLUCOSE BLD-MCNC: 229 MG/DL (ref 65–105)
GLUCOSE BLD-MCNC: 229 MG/DL (ref 65–105)
GLUCOSE BLD-MCNC: 231 MG/DL (ref 65–105)
GLUCOSE BLD-MCNC: 231 MG/DL (ref 65–105)
GLUCOSE BLD-MCNC: 232 MG/DL (ref 65–105)
GLUCOSE BLD-MCNC: 233 MG/DL (ref 65–105)
GLUCOSE BLD-MCNC: 233 MG/DL (ref 70–99)
GLUCOSE BLD-MCNC: 233 MG/DL (ref 70–99)
GLUCOSE BLD-MCNC: 235 MG/DL (ref 65–105)
GLUCOSE BLD-MCNC: 235 MG/DL (ref 74–100)
GLUCOSE BLD-MCNC: 236 MG/DL (ref 70–99)
GLUCOSE BLD-MCNC: 237 MG/DL (ref 65–105)
GLUCOSE BLD-MCNC: 238 MG/DL (ref 65–105)
GLUCOSE BLD-MCNC: 238 MG/DL (ref 65–105)
GLUCOSE BLD-MCNC: 238 MG/DL (ref 70–99)
GLUCOSE BLD-MCNC: 239 MG/DL (ref 65–105)
GLUCOSE BLD-MCNC: 240 MG/DL (ref 65–105)
GLUCOSE BLD-MCNC: 240 MG/DL (ref 65–105)
GLUCOSE BLD-MCNC: 241 MG/DL (ref 70–99)
GLUCOSE BLD-MCNC: 241 MG/DL (ref 74–100)
GLUCOSE BLD-MCNC: 242 MG/DL (ref 65–105)
GLUCOSE BLD-MCNC: 242 MG/DL (ref 65–105)
GLUCOSE BLD-MCNC: 243 MG/DL (ref 65–105)
GLUCOSE BLD-MCNC: 243 MG/DL (ref 74–100)
GLUCOSE BLD-MCNC: 246 MG/DL (ref 65–105)
GLUCOSE BLD-MCNC: 247 MG/DL (ref 65–105)
GLUCOSE BLD-MCNC: 248 MG/DL (ref 65–105)
GLUCOSE BLD-MCNC: 248 MG/DL (ref 74–100)
GLUCOSE BLD-MCNC: 252 MG/DL (ref 65–105)
GLUCOSE BLD-MCNC: 254 MG/DL (ref 65–105)
GLUCOSE BLD-MCNC: 255 MG/DL (ref 65–105)
GLUCOSE BLD-MCNC: 256 MG/DL (ref 70–99)
GLUCOSE BLD-MCNC: 257 MG/DL (ref 65–105)
GLUCOSE BLD-MCNC: 257 MG/DL (ref 65–105)
GLUCOSE BLD-MCNC: 258 MG/DL (ref 65–105)
GLUCOSE BLD-MCNC: 259 MG/DL (ref 74–100)
GLUCOSE BLD-MCNC: 260 MG/DL (ref 65–105)
GLUCOSE BLD-MCNC: 260 MG/DL (ref 70–99)
GLUCOSE BLD-MCNC: 262 MG/DL (ref 65–105)
GLUCOSE BLD-MCNC: 266 MG/DL (ref 65–105)
GLUCOSE BLD-MCNC: 266 MG/DL (ref 65–105)
GLUCOSE BLD-MCNC: 266 MG/DL (ref 70–99)
GLUCOSE BLD-MCNC: 267 MG/DL (ref 65–105)
GLUCOSE BLD-MCNC: 267 MG/DL (ref 65–105)
GLUCOSE BLD-MCNC: 268 MG/DL (ref 70–99)
GLUCOSE BLD-MCNC: 269 MG/DL (ref 65–105)
GLUCOSE BLD-MCNC: 279 MG/DL (ref 74–100)
GLUCOSE BLD-MCNC: 280 MG/DL (ref 65–105)
GLUCOSE BLD-MCNC: 287 MG/DL (ref 65–105)
GLUCOSE BLD-MCNC: 287 MG/DL (ref 70–99)
GLUCOSE BLD-MCNC: 287 MG/DL (ref 74–100)
GLUCOSE BLD-MCNC: 293 MG/DL (ref 74–100)
GLUCOSE BLD-MCNC: 294 MG/DL (ref 74–100)
GLUCOSE BLD-MCNC: 295 MG/DL (ref 70–99)
GLUCOSE BLD-MCNC: 300 MG/DL (ref 65–105)
GLUCOSE BLD-MCNC: 302 MG/DL (ref 65–105)
GLUCOSE BLD-MCNC: 315 MG/DL (ref 65–105)
GLUCOSE BLD-MCNC: 316 MG/DL (ref 65–105)
GLUCOSE BLD-MCNC: 94 MG/DL (ref 65–105)
GLUCOSE BLD-MCNC: NORMAL MG/DL (ref 74–100)
GLUCOSE URINE: ABNORMAL
GLUCOSE URINE: ABNORMAL
GLUCOSE URINE: NEGATIVE
GLUCOSE URINE: NEGATIVE
HBA1C MFR BLD: 8.5 % (ref 4–6)
HCO3 VENOUS: 17.9 MMOL/L (ref 22–29)
HCT VFR BLD CALC: 21.9 % (ref 36.3–47.1)
HCT VFR BLD CALC: 23.2 % (ref 36.3–47.1)
HCT VFR BLD CALC: 24.4 % (ref 36.3–47.1)
HCT VFR BLD CALC: 25.2 % (ref 36.3–47.1)
HCT VFR BLD CALC: 25.4 % (ref 36.3–47.1)
HCT VFR BLD CALC: 25.9 % (ref 36.3–47.1)
HCT VFR BLD CALC: 26.2 % (ref 36.3–47.1)
HCT VFR BLD CALC: 26.5 % (ref 36.3–47.1)
HCT VFR BLD CALC: 27.7 % (ref 36.3–47.1)
HCT VFR BLD CALC: 27.7 % (ref 36.3–47.1)
HCT VFR BLD CALC: 27.8 % (ref 36.3–47.1)
HCT VFR BLD CALC: 27.8 % (ref 36.3–47.1)
HCT VFR BLD CALC: 28 % (ref 36.3–47.1)
HCT VFR BLD CALC: 28.5 % (ref 36.3–47.1)
HCT VFR BLD CALC: 29.3 % (ref 36.3–47.1)
HCT VFR BLD CALC: 30.3 % (ref 36.3–47.1)
HCT VFR BLD CALC: 32 % (ref 36.3–47.1)
HCT VFR BLD CALC: 32.1 % (ref 36.3–47.1)
HCT VFR BLD CALC: 33.9 % (ref 36.3–47.1)
HCT VFR BLD CALC: 36 % (ref 36.3–47.1)
HEMOGLOBIN: 10.4 G/DL (ref 11.9–15.1)
HEMOGLOBIN: 10.4 G/DL (ref 11.9–15.1)
HEMOGLOBIN: 6.5 G/DL (ref 11.9–15.1)
HEMOGLOBIN: 7 G/DL (ref 11.9–15.1)
HEMOGLOBIN: 7.1 G/DL (ref 11.9–15.1)
HEMOGLOBIN: 7.6 G/DL (ref 11.9–15.1)
HEMOGLOBIN: 7.6 G/DL (ref 11.9–15.1)
HEMOGLOBIN: 7.7 G/DL (ref 11.9–15.1)
HEMOGLOBIN: 8 G/DL (ref 11.9–15.1)
HEMOGLOBIN: 8.1 G/DL (ref 11.9–15.1)
HEMOGLOBIN: 8.2 G/DL (ref 11.9–15.1)
HEMOGLOBIN: 8.4 G/DL (ref 11.9–15.1)
HEMOGLOBIN: 8.4 G/DL (ref 11.9–15.1)
HEMOGLOBIN: 8.5 G/DL (ref 11.9–15.1)
HEMOGLOBIN: 8.5 G/DL (ref 11.9–15.1)
HEMOGLOBIN: 8.7 G/DL (ref 11.9–15.1)
HEMOGLOBIN: 8.7 G/DL (ref 11.9–15.1)
HEMOGLOBIN: 8.9 G/DL (ref 11.9–15.1)
HEMOGLOBIN: 9.4 G/DL (ref 11.9–15.1)
HEMOGLOBIN: 9.8 G/DL (ref 11.9–15.1)
IMMATURE GRANULOCYTES: 0 %
IMMATURE GRANULOCYTES: 1 %
INR BLD: 1
INR BLD: 1.1
INR BLD: 1.3
INR BLD: 1.4
KETONES, URINE: NEGATIVE
LACTATE DEHYDROGENASE: 231 U/L (ref 135–214)
LACTIC ACID, SEPSIS WHOLE BLOOD: 1.6 MMOL/L (ref 0.5–1.9)
LACTIC ACID, SEPSIS WHOLE BLOOD: 5.5 MMOL/L (ref 0.5–1.9)
LACTIC ACID, WHOLE BLOOD: 1 MMOL/L (ref 0.7–2.1)
LACTIC ACID, WHOLE BLOOD: 1.3 MMOL/L (ref 0.7–2.1)
LACTIC ACID, WHOLE BLOOD: 1.4 MMOL/L (ref 0.7–2.1)
LACTIC ACID, WHOLE BLOOD: 1.6 MMOL/L (ref 0.7–2.1)
LACTIC ACID, WHOLE BLOOD: 1.7 MMOL/L (ref 0.7–2.1)
LACTIC ACID, WHOLE BLOOD: 1.7 MMOL/L (ref 0.7–2.1)
LACTIC ACID, WHOLE BLOOD: 2.1 MMOL/L (ref 0.7–2.1)
LACTIC ACID, WHOLE BLOOD: 2.1 MMOL/L (ref 0.7–2.1)
LACTIC ACID, WHOLE BLOOD: 2.2 MMOL/L (ref 0.7–2.1)
LACTIC ACID, WHOLE BLOOD: 2.3 MMOL/L (ref 0.7–2.1)
LACTIC ACID, WHOLE BLOOD: 2.4 MMOL/L (ref 0.7–2.1)
LACTIC ACID, WHOLE BLOOD: 2.7 MMOL/L (ref 0.7–2.1)
LEUKOCYTE ESTERASE, URINE: ABNORMAL
LEUKOCYTE ESTERASE, URINE: ABNORMAL
LEUKOCYTE ESTERASE, URINE: NEGATIVE
LEUKOCYTE ESTERASE, URINE: NEGATIVE
LIPASE: 100 U/L (ref 13–60)
LYMPHOCYTES # BLD: 10 % (ref 24–43)
LYMPHOCYTES # BLD: 11 % (ref 24–44)
LYMPHOCYTES # BLD: 12 % (ref 24–43)
LYMPHOCYTES # BLD: 13 % (ref 24–43)
LYMPHOCYTES # BLD: 14 % (ref 24–43)
LYMPHOCYTES # BLD: 15 % (ref 24–43)
LYMPHOCYTES # BLD: 16 % (ref 24–43)
LYMPHOCYTES # BLD: 17 % (ref 24–43)
LYMPHOCYTES # BLD: 18 % (ref 24–43)
LYMPHOCYTES # BLD: 19 % (ref 24–43)
LYMPHOCYTES # BLD: 22 % (ref 24–43)
LYMPHOCYTES # BLD: 7 % (ref 24–43)
LYMPHOCYTES # BLD: 7 % (ref 24–43)
LYMPHOCYTES # BLD: 7 % (ref 24–44)
Lab: NORMAL
MAGNESIUM: 1.9 MG/DL (ref 1.6–2.6)
MAGNESIUM: 1.9 MG/DL (ref 1.6–2.6)
MAGNESIUM: 2 MG/DL (ref 1.6–2.6)
MAGNESIUM: 2.1 MG/DL (ref 1.6–2.6)
MAGNESIUM: 2.2 MG/DL (ref 1.6–2.6)
MAGNESIUM: 2.3 MG/DL (ref 1.6–2.6)
MCH RBC QN AUTO: 24.7 PG (ref 25.2–33.5)
MCH RBC QN AUTO: 24.9 PG (ref 25.2–33.5)
MCH RBC QN AUTO: 24.9 PG (ref 25.2–33.5)
MCH RBC QN AUTO: 25 PG (ref 25.2–33.5)
MCH RBC QN AUTO: 25.1 PG (ref 25.2–33.5)
MCH RBC QN AUTO: 25.2 PG (ref 25.2–33.5)
MCH RBC QN AUTO: 25.4 PG (ref 25.2–33.5)
MCH RBC QN AUTO: 25.5 PG (ref 25.2–33.5)
MCH RBC QN AUTO: 25.6 PG (ref 25.2–33.5)
MCH RBC QN AUTO: 25.7 PG (ref 25.2–33.5)
MCHC RBC AUTO-ENTMCNC: 28.7 G/DL (ref 28.4–34.8)
MCHC RBC AUTO-ENTMCNC: 28.7 G/DL (ref 28.4–34.8)
MCHC RBC AUTO-ENTMCNC: 28.9 G/DL (ref 28.4–34.8)
MCHC RBC AUTO-ENTMCNC: 29.3 G/DL (ref 28.4–34.8)
MCHC RBC AUTO-ENTMCNC: 29.4 G/DL (ref 28.4–34.8)
MCHC RBC AUTO-ENTMCNC: 29.5 G/DL (ref 28.4–34.8)
MCHC RBC AUTO-ENTMCNC: 29.7 G/DL (ref 28.4–34.8)
MCHC RBC AUTO-ENTMCNC: 29.7 G/DL (ref 28.4–34.8)
MCHC RBC AUTO-ENTMCNC: 29.8 G/DL (ref 28.4–34.8)
MCHC RBC AUTO-ENTMCNC: 30.2 G/DL (ref 28.4–34.8)
MCHC RBC AUTO-ENTMCNC: 30.2 G/DL (ref 28.4–34.8)
MCHC RBC AUTO-ENTMCNC: 30.3 G/DL (ref 28.4–34.8)
MCHC RBC AUTO-ENTMCNC: 30.6 G/DL (ref 28.4–34.8)
MCHC RBC AUTO-ENTMCNC: 30.7 G/DL (ref 28.4–34.8)
MCHC RBC AUTO-ENTMCNC: 30.7 G/DL (ref 28.4–34.8)
MCHC RBC AUTO-ENTMCNC: 30.9 G/DL (ref 28.4–34.8)
MCHC RBC AUTO-ENTMCNC: 31.8 G/DL (ref 28.4–34.8)
MCV RBC AUTO: 79.3 FL (ref 82.6–102.9)
MCV RBC AUTO: 81.4 FL (ref 82.6–102.9)
MCV RBC AUTO: 81.8 FL (ref 82.6–102.9)
MCV RBC AUTO: 82.4 FL (ref 82.6–102.9)
MCV RBC AUTO: 82.7 FL (ref 82.6–102.9)
MCV RBC AUTO: 82.9 FL (ref 82.6–102.9)
MCV RBC AUTO: 83.7 FL (ref 82.6–102.9)
MCV RBC AUTO: 83.7 FL (ref 82.6–102.9)
MCV RBC AUTO: 83.8 FL (ref 82.6–102.9)
MCV RBC AUTO: 83.9 FL (ref 82.6–102.9)
MCV RBC AUTO: 83.9 FL (ref 82.6–102.9)
MCV RBC AUTO: 84 FL (ref 82.6–102.9)
MCV RBC AUTO: 84.2 FL (ref 82.6–102.9)
MCV RBC AUTO: 84.5 FL (ref 82.6–102.9)
MCV RBC AUTO: 85.4 FL (ref 82.6–102.9)
MCV RBC AUTO: 86.1 FL (ref 82.6–102.9)
MCV RBC AUTO: 86.8 FL (ref 82.6–102.9)
MCV RBC AUTO: 87.1 FL (ref 82.6–102.9)
MCV RBC AUTO: 87.4 FL (ref 82.6–102.9)
MODE: ABNORMAL
MONOCYTES # BLD: 10 % (ref 3–12)
MONOCYTES # BLD: 3 % (ref 1–7)
MONOCYTES # BLD: 4 % (ref 1–7)
MONOCYTES # BLD: 5 % (ref 3–12)
MONOCYTES # BLD: 6 % (ref 3–12)
MONOCYTES # BLD: 7 % (ref 3–12)
MONOCYTES # BLD: 8 % (ref 3–12)
MONOCYTES # BLD: 9 % (ref 3–12)
MORPHOLOGY: ABNORMAL
MORPHOLOGY: NORMAL
MRSA, DNA, NASAL: NEGATIVE
NEGATIVE BASE EXCESS, ART: 1 (ref 0–2)
NEGATIVE BASE EXCESS, ART: 10 (ref 0–2)
NEGATIVE BASE EXCESS, ART: 2 (ref 0–2)
NEGATIVE BASE EXCESS, ART: 2 (ref 0–2)
NEGATIVE BASE EXCESS, ART: 6 (ref 0–2)
NEGATIVE BASE EXCESS, ART: 7 (ref 0–2)
NEGATIVE BASE EXCESS, ART: 8 (ref 0–2)
NEGATIVE BASE EXCESS, ART: 9 (ref 0–2)
NEGATIVE BASE EXCESS, VEN: 14 (ref 0–2)
NEURON SPEC ENOLASE: 23.3 NG/ML
NEURON SPEC ENOLASE: 25.9 NG/ML
NEURON SPEC ENOLASE: 30.6 NG/ML
NITRITE, URINE: NEGATIVE
NOTIFY: NORMAL
NRBC AUTOMATED: 0 PER 100 WBC
O2 DEVICE/FLOW/%: ABNORMAL
O2 SAT, VEN: 21 % (ref 60–85)
PARTIAL THROMBOPLASTIN TIME: 23 SEC (ref 20.5–30.5)
PARTIAL THROMBOPLASTIN TIME: 23.1 SEC (ref 20.5–30.5)
PARTIAL THROMBOPLASTIN TIME: 24 SEC (ref 20.5–30.5)
PARTIAL THROMBOPLASTIN TIME: 24.1 SEC (ref 20.5–30.5)
PARTIAL THROMBOPLASTIN TIME: 24.2 SEC (ref 20.5–30.5)
PARTIAL THROMBOPLASTIN TIME: 24.8 SEC (ref 20.5–30.5)
PARTIAL THROMBOPLASTIN TIME: 25 SEC (ref 20.5–30.5)
PARTIAL THROMBOPLASTIN TIME: 25.2 SEC (ref 20.5–30.5)
PARTIAL THROMBOPLASTIN TIME: 26 SEC (ref 20.5–30.5)
PATIENT TEMP: 33.2
PATIENT TEMP: 37.2
PATIENT TEMP: 37.6
PCO2, VEN: 74.6 MM HG (ref 41–51)
PDW BLD-RTO: 17.3 % (ref 11.8–14.4)
PDW BLD-RTO: 17.4 % (ref 11.8–14.4)
PDW BLD-RTO: 17.5 % (ref 11.8–14.4)
PDW BLD-RTO: 18 % (ref 11.8–14.4)
PDW BLD-RTO: 18 % (ref 11.8–14.4)
PDW BLD-RTO: 18.1 % (ref 11.8–14.4)
PDW BLD-RTO: 18.3 % (ref 11.8–14.4)
PDW BLD-RTO: 18.3 % (ref 11.8–14.4)
PDW BLD-RTO: 18.5 % (ref 11.8–14.4)
PDW BLD-RTO: 18.6 % (ref 11.8–14.4)
PDW BLD-RTO: 18.7 % (ref 11.8–14.4)
PDW BLD-RTO: 18.7 % (ref 11.8–14.4)
PDW BLD-RTO: 18.8 % (ref 11.8–14.4)
PDW BLD-RTO: 18.8 % (ref 11.8–14.4)
PDW BLD-RTO: 18.9 % (ref 11.8–14.4)
PDW BLD-RTO: 19.1 % (ref 11.8–14.4)
PH UA: 5 (ref 5–8)
PH UA: 5 (ref 5–8)
PH UA: 5.5 (ref 5–8)
PH UA: 6 (ref 5–8)
PH VENOUS: 6.99 (ref 7.32–7.43)
PHOSPHORUS: 3.1 MG/DL (ref 2.6–4.5)
PHOSPHORUS: 3.4 MG/DL (ref 2.6–4.5)
PHOSPHORUS: 3.4 MG/DL (ref 2.6–4.5)
PHOSPHORUS: 3.6 MG/DL (ref 2.6–4.5)
PHOSPHORUS: 3.7 MG/DL (ref 2.6–4.5)
PHOSPHORUS: 4.1 MG/DL (ref 2.6–4.5)
PHOSPHORUS: 5.3 MG/DL (ref 2.6–4.5)
PHOSPHORUS: 5.6 MG/DL (ref 2.6–4.5)
PHOSPHORUS: 5.8 MG/DL (ref 2.6–4.5)
PHOSPHORUS: 6.5 MG/DL (ref 2.6–4.5)
PHOSPHORUS: 6.7 MG/DL (ref 2.6–4.5)
PHOSPHORUS: 6.8 MG/DL (ref 2.6–4.5)
PHOSPHORUS: 7.3 MG/DL (ref 2.6–4.5)
PHOSPHORUS: 7.7 MG/DL (ref 2.6–4.5)
PHOSPHORUS: 7.8 MG/DL (ref 2.6–4.5)
PHOSPHORUS: 7.8 MG/DL (ref 2.6–4.5)
PLATELET # BLD: 156 K/UL (ref 138–453)
PLATELET # BLD: 158 K/UL (ref 138–453)
PLATELET # BLD: 162 K/UL (ref 138–453)
PLATELET # BLD: 166 K/UL (ref 138–453)
PLATELET # BLD: 174 K/UL (ref 138–453)
PLATELET # BLD: 183 K/UL (ref 138–453)
PLATELET # BLD: 184 K/UL (ref 138–453)
PLATELET # BLD: 217 K/UL (ref 138–453)
PLATELET # BLD: 231 K/UL (ref 138–453)
PLATELET # BLD: 234 K/UL (ref 138–453)
PLATELET # BLD: 235 K/UL (ref 138–453)
PLATELET # BLD: 236 K/UL (ref 138–453)
PLATELET # BLD: 244 K/UL (ref 138–453)
PLATELET # BLD: 249 K/UL (ref 138–453)
PLATELET # BLD: 263 K/UL (ref 138–453)
PLATELET # BLD: 269 K/UL (ref 138–453)
PLATELET # BLD: 273 K/UL (ref 138–453)
PLATELET # BLD: 276 K/UL (ref 138–453)
PLATELET # BLD: 336 K/UL (ref 138–453)
PMV BLD AUTO: 10.6 FL (ref 8.1–13.5)
PMV BLD AUTO: 10.8 FL (ref 8.1–13.5)
PMV BLD AUTO: 11 FL (ref 8.1–13.5)
PMV BLD AUTO: 11 FL (ref 8.1–13.5)
PMV BLD AUTO: 11.1 FL (ref 8.1–13.5)
PMV BLD AUTO: 11.2 FL (ref 8.1–13.5)
PMV BLD AUTO: 11.4 FL (ref 8.1–13.5)
PMV BLD AUTO: 11.7 FL (ref 8.1–13.5)
PMV BLD AUTO: 11.7 FL (ref 8.1–13.5)
PMV BLD AUTO: 11.9 FL (ref 8.1–13.5)
PMV BLD AUTO: 12 FL (ref 8.1–13.5)
PMV BLD AUTO: 12.1 FL (ref 8.1–13.5)
PMV BLD AUTO: 12.2 FL (ref 8.1–13.5)
PMV BLD AUTO: 12.3 FL (ref 8.1–13.5)
PMV BLD AUTO: 12.5 FL (ref 8.1–13.5)
PMV BLD AUTO: 12.6 FL (ref 8.1–13.5)
PMV BLD AUTO: 12.7 FL (ref 8.1–13.5)
PMV BLD AUTO: 12.7 FL (ref 8.1–13.5)
PMV BLD AUTO: 12.9 FL (ref 8.1–13.5)
PO2, VEN: 24.4 MM HG (ref 30–50)
POC BUN: 54 MG/DL (ref 8–26)
POC CHLORIDE: 108 MMOL/L (ref 98–107)
POC CREATININE: 2.12 MG/DL (ref 0.51–1.19)
POC HCO3: 16.5 MMOL/L (ref 21–28)
POC HCO3: 16.7 MMOL/L (ref 21–28)
POC HCO3: 17.2 MMOL/L (ref 21–28)
POC HCO3: 17.4 MMOL/L (ref 21–28)
POC HCO3: 18.1 MMOL/L (ref 21–28)
POC HCO3: 18.5 MMOL/L (ref 21–28)
POC HCO3: 18.6 MMOL/L (ref 21–28)
POC HCO3: 18.9 MMOL/L (ref 21–28)
POC HCO3: 22.3 MMOL/L (ref 21–28)
POC HCO3: 22.8 MMOL/L (ref 21–28)
POC HCO3: 23 MMOL/L (ref 21–28)
POC HCO3: 23 MMOL/L (ref 21–28)
POC HCO3: 23.4 MMOL/L (ref 21–28)
POC HCO3: 23.6 MMOL/L (ref 21–28)
POC HCO3: 23.6 MMOL/L (ref 21–28)
POC HCO3: 23.8 MMOL/L (ref 21–28)
POC HCO3: 24 MMOL/L (ref 21–28)
POC HCO3: 24.4 MMOL/L (ref 21–28)
POC HCO3: 24.6 MMOL/L (ref 21–28)
POC HCO3: 24.9 MMOL/L (ref 21–28)
POC HCO3: 25 MMOL/L (ref 21–28)
POC HCO3: 25.2 MMOL/L (ref 21–28)
POC HCO3: 25.7 MMOL/L (ref 21–28)
POC HCO3: 27.1 MMOL/L (ref 21–28)
POC HEMATOCRIT: 35 % (ref 36–46)
POC HEMOGLOBIN: 12 G/DL (ref 12–16)
POC IONIZED CALCIUM: 1.21 MMOL/L (ref 1.15–1.33)
POC LACTIC ACID: 0.67 MMOL/L (ref 0.56–1.39)
POC LACTIC ACID: 1.88 MMOL/L (ref 0.56–1.39)
POC LACTIC ACID: 2.4 MMOL/L (ref 0.56–1.39)
POC LACTIC ACID: 6.08 MMOL/L (ref 0.56–1.39)
POC O2 SATURATION: 100 % (ref 94–98)
POC O2 SATURATION: 89 % (ref 94–98)
POC O2 SATURATION: 91 % (ref 94–98)
POC O2 SATURATION: 92 % (ref 94–98)
POC O2 SATURATION: 93 % (ref 94–98)
POC O2 SATURATION: 94 % (ref 94–98)
POC O2 SATURATION: 94 % (ref 94–98)
POC O2 SATURATION: 95 % (ref 94–98)
POC O2 SATURATION: 96 % (ref 94–98)
POC O2 SATURATION: 97 % (ref 94–98)
POC O2 SATURATION: 99 % (ref 94–98)
POC PCO2 TEMP: 35 MM HG
POC PCO2 TEMP: 36 MM HG
POC PCO2 TEMP: 37 MM HG
POC PCO2 TEMP: 40 MM HG
POC PCO2: 27.9 MM HG (ref 35–48)
POC PCO2: 30.5 MM HG (ref 35–48)
POC PCO2: 30.9 MM HG (ref 35–48)
POC PCO2: 32.3 MM HG (ref 35–48)
POC PCO2: 32.6 MM HG (ref 35–48)
POC PCO2: 32.8 MM HG (ref 35–48)
POC PCO2: 33.3 MM HG (ref 35–48)
POC PCO2: 33.5 MM HG (ref 35–48)
POC PCO2: 33.5 MM HG (ref 35–48)
POC PCO2: 34.2 MM HG (ref 35–48)
POC PCO2: 34.4 MM HG (ref 35–48)
POC PCO2: 34.8 MM HG (ref 35–48)
POC PCO2: 34.9 MM HG (ref 35–48)
POC PCO2: 35.2 MM HG (ref 35–48)
POC PCO2: 35.4 MM HG (ref 35–48)
POC PCO2: 35.6 MM HG (ref 35–48)
POC PCO2: 36 MM HG (ref 35–48)
POC PCO2: 36.3 MM HG (ref 35–48)
POC PCO2: 38.3 MM HG (ref 35–48)
POC PCO2: 40.9 MM HG (ref 35–48)
POC PCO2: 41 MM HG (ref 35–48)
POC PCO2: 42.6 MM HG (ref 35–48)
POC PCO2: 47.3 MM HG (ref 35–48)
POC PCO2: 48.6 MM HG (ref 35–48)
POC PH TEMP: 7.24
POC PH TEMP: 7.3
POC PH TEMP: 7.32
POC PH TEMP: 7.46
POC PH: 7.19 (ref 7.35–7.45)
POC PH: 7.25 (ref 7.35–7.45)
POC PH: 7.27 (ref 7.35–7.45)
POC PH: 7.3 (ref 7.35–7.45)
POC PH: 7.31 (ref 7.35–7.45)
POC PH: 7.38 (ref 7.35–7.45)
POC PH: 7.39 (ref 7.35–7.45)
POC PH: 7.41 (ref 7.35–7.45)
POC PH: 7.42 (ref 7.35–7.45)
POC PH: 7.43 (ref 7.35–7.45)
POC PH: 7.43 (ref 7.35–7.45)
POC PH: 7.44 (ref 7.35–7.45)
POC PH: 7.44 (ref 7.35–7.45)
POC PH: 7.45 (ref 7.35–7.45)
POC PH: 7.46 (ref 7.35–7.45)
POC PH: 7.47 (ref 7.35–7.45)
POC PH: 7.47 (ref 7.35–7.45)
POC PH: 7.5 (ref 7.35–7.45)
POC PO2 TEMP: 65 MM HG
POC PO2 TEMP: 67 MM HG
POC PO2 TEMP: 68 MM HG
POC PO2 TEMP: 75 MM HG
POC PO2: 123.4 MM HG (ref 83–108)
POC PO2: 224.3 MM HG (ref 83–108)
POC PO2: 352.6 MM HG (ref 83–108)
POC PO2: 445.3 MM HG (ref 83–108)
POC PO2: 478.9 MM HG (ref 83–108)
POC PO2: 50.7 MM HG (ref 83–108)
POC PO2: 60.2 MM HG (ref 83–108)
POC PO2: 62.6 MM HG (ref 83–108)
POC PO2: 62.9 MM HG (ref 83–108)
POC PO2: 64 MM HG (ref 83–108)
POC PO2: 66.4 MM HG (ref 83–108)
POC PO2: 69.7 MM HG (ref 83–108)
POC PO2: 73.2 MM HG (ref 83–108)
POC PO2: 74.4 MM HG (ref 83–108)
POC PO2: 75.5 MM HG (ref 83–108)
POC PO2: 75.7 MM HG (ref 83–108)
POC PO2: 77 MM HG (ref 83–108)
POC PO2: 83.4 MM HG (ref 83–108)
POC PO2: 86.8 MM HG (ref 83–108)
POC PO2: 86.9 MM HG (ref 83–108)
POC PO2: 87.6 MM HG (ref 83–108)
POC PO2: 88.9 MM HG (ref 83–108)
POC PO2: 91.4 MM HG (ref 83–108)
POC PO2: 94.5 MM HG (ref 83–108)
POC POTASSIUM: 4.8 MMOL/L (ref 3.5–4.5)
POC SODIUM: 139 MMOL/L (ref 138–146)
POC TCO2: 18 MMOL/L (ref 22–30)
POSITIVE BASE EXCESS, ART: 0 (ref 0–3)
POSITIVE BASE EXCESS, ART: 1 (ref 0–3)
POSITIVE BASE EXCESS, ART: 2 (ref 0–3)
POTASSIUM SERPL-SCNC: 3.3 MMOL/L (ref 3.7–5.3)
POTASSIUM SERPL-SCNC: 3.5 MMOL/L (ref 3.7–5.3)
POTASSIUM SERPL-SCNC: 3.7 MMOL/L (ref 3.7–5.3)
POTASSIUM SERPL-SCNC: 3.8 MMOL/L (ref 3.7–5.3)
POTASSIUM SERPL-SCNC: 3.9 MMOL/L (ref 3.7–5.3)
POTASSIUM SERPL-SCNC: 4 MMOL/L (ref 3.7–5.3)
POTASSIUM SERPL-SCNC: 4.1 MMOL/L (ref 3.7–5.3)
POTASSIUM SERPL-SCNC: 4.1 MMOL/L (ref 3.7–5.3)
POTASSIUM SERPL-SCNC: 4.2 MMOL/L (ref 3.7–5.3)
POTASSIUM SERPL-SCNC: 4.3 MMOL/L (ref 3.7–5.3)
POTASSIUM SERPL-SCNC: 4.4 MMOL/L (ref 3.7–5.3)
POTASSIUM SERPL-SCNC: 4.5 MMOL/L (ref 3.7–5.3)
PROCALCITONIN: 0.13 NG/ML
PROCALCITONIN: 0.23 NG/ML
PROTEIN UA: ABNORMAL
PROTHROMBIN TIME: 10.7 SEC (ref 9.1–12.3)
PROTHROMBIN TIME: 10.8 SEC (ref 9.1–12.3)
PROTHROMBIN TIME: 10.8 SEC (ref 9.1–12.3)
PROTHROMBIN TIME: 10.9 SEC (ref 9.1–12.3)
PROTHROMBIN TIME: 10.9 SEC (ref 9.1–12.3)
PROTHROMBIN TIME: 11 SEC (ref 9.1–12.3)
PROTHROMBIN TIME: 11.2 SEC (ref 9.1–12.3)
PROTHROMBIN TIME: 13.7 SEC (ref 9.1–12.3)
PROTHROMBIN TIME: 14.1 SEC (ref 9.1–12.3)
RBC # BLD: 2.61 M/UL (ref 3.95–5.11)
RBC # BLD: 2.8 M/UL (ref 3.95–5.11)
RBC # BLD: 2.8 M/UL (ref 3.95–5.11)
RBC # BLD: 3.01 M/UL (ref 3.95–5.11)
RBC # BLD: 3.02 M/UL (ref 3.95–5.11)
RBC # BLD: 3.16 M/UL (ref 3.95–5.11)
RBC # BLD: 3.18 M/UL (ref 3.95–5.11)
RBC # BLD: 3.29 M/UL (ref 3.95–5.11)
RBC # BLD: 3.31 M/UL (ref 3.95–5.11)
RBC # BLD: 3.31 M/UL (ref 3.95–5.11)
RBC # BLD: 3.36 M/UL (ref 3.95–5.11)
RBC # BLD: 3.4 M/UL (ref 3.95–5.11)
RBC # BLD: 3.48 M/UL (ref 3.95–5.11)
RBC # BLD: 3.52 M/UL (ref 3.95–5.11)
RBC # BLD: 3.53 M/UL (ref 3.95–5.11)
RBC # BLD: 3.76 M/UL (ref 3.95–5.11)
RBC # BLD: 3.91 M/UL (ref 3.95–5.11)
RBC # BLD: 4.1 M/UL (ref 3.95–5.11)
RBC # BLD: 4.12 M/UL (ref 3.95–5.11)
RBC # BLD: ABNORMAL 10*6/UL
RBC UA: ABNORMAL /HPF (ref 0–2)
RBC UA: NORMAL /HPF (ref 0–2)
READ BACK: YES
SAMPLE SITE: ABNORMAL
SAMPLE SITE: NORMAL
SARS-COV-2, RAPID: NOT DETECTED
SEG NEUTROPHILS: 66 % (ref 36–65)
SEG NEUTROPHILS: 71 % (ref 36–65)
SEG NEUTROPHILS: 73 % (ref 36–65)
SEG NEUTROPHILS: 76 % (ref 36–65)
SEG NEUTROPHILS: 77 % (ref 36–65)
SEG NEUTROPHILS: 78 % (ref 36–65)
SEG NEUTROPHILS: 78 % (ref 36–65)
SEG NEUTROPHILS: 79 % (ref 36–65)
SEG NEUTROPHILS: 79 % (ref 36–65)
SEG NEUTROPHILS: 80 % (ref 36–65)
SEG NEUTROPHILS: 80 % (ref 36–65)
SEG NEUTROPHILS: 82 % (ref 36–65)
SEG NEUTROPHILS: 84 % (ref 36–66)
SEG NEUTROPHILS: 85 % (ref 36–65)
SEG NEUTROPHILS: 87 % (ref 36–65)
SEG NEUTROPHILS: 90 % (ref 36–66)
SEGMENTED NEUTROPHILS ABSOLUTE COUNT: 10.02 K/UL (ref 1.5–8.1)
SEGMENTED NEUTROPHILS ABSOLUTE COUNT: 13.25 K/UL (ref 1.5–8.1)
SEGMENTED NEUTROPHILS ABSOLUTE COUNT: 14.04 K/UL (ref 1.5–8.1)
SEGMENTED NEUTROPHILS ABSOLUTE COUNT: 15.58 K/UL (ref 1.5–8.1)
SEGMENTED NEUTROPHILS ABSOLUTE COUNT: 20.7 K/UL (ref 1.8–7.7)
SEGMENTED NEUTROPHILS ABSOLUTE COUNT: 4.78 K/UL (ref 1.5–8.1)
SEGMENTED NEUTROPHILS ABSOLUTE COUNT: 5.7 K/UL (ref 1.5–8.1)
SEGMENTED NEUTROPHILS ABSOLUTE COUNT: 5.78 K/UL (ref 1.5–8.1)
SEGMENTED NEUTROPHILS ABSOLUTE COUNT: 6.15 K/UL (ref 1.5–8.1)
SEGMENTED NEUTROPHILS ABSOLUTE COUNT: 6.88 K/UL (ref 1.5–8.1)
SEGMENTED NEUTROPHILS ABSOLUTE COUNT: 6.96 K/UL (ref 1.5–8.1)
SEGMENTED NEUTROPHILS ABSOLUTE COUNT: 7.01 K/UL (ref 1.5–8.1)
SEGMENTED NEUTROPHILS ABSOLUTE COUNT: 7.06 K/UL (ref 1.8–7.7)
SEGMENTED NEUTROPHILS ABSOLUTE COUNT: 7.4 K/UL (ref 1.5–8.1)
SEGMENTED NEUTROPHILS ABSOLUTE COUNT: 7.65 K/UL (ref 1.5–8.1)
SEGMENTED NEUTROPHILS ABSOLUTE COUNT: 7.76 K/UL (ref 1.5–8.1)
SEGMENTED NEUTROPHILS ABSOLUTE COUNT: 7.86 K/UL (ref 1.5–8.1)
SEGMENTED NEUTROPHILS ABSOLUTE COUNT: 9.27 K/UL (ref 1.5–8.1)
SODIUM BLD-SCNC: 133 MMOL/L (ref 135–144)
SODIUM BLD-SCNC: 134 MMOL/L (ref 135–144)
SODIUM BLD-SCNC: 134 MMOL/L (ref 135–144)
SODIUM BLD-SCNC: 135 MMOL/L (ref 135–144)
SODIUM BLD-SCNC: 136 MMOL/L (ref 135–144)
SODIUM BLD-SCNC: 137 MMOL/L (ref 135–144)
SODIUM BLD-SCNC: 137 MMOL/L (ref 135–144)
SODIUM BLD-SCNC: 138 MMOL/L (ref 135–144)
SODIUM BLD-SCNC: 139 MMOL/L (ref 135–144)
SODIUM BLD-SCNC: 140 MMOL/L (ref 135–144)
SODIUM BLD-SCNC: 141 MMOL/L (ref 135–144)
SODIUM BLD-SCNC: 141 MMOL/L (ref 135–144)
SODIUM BLD-SCNC: 142 MMOL/L (ref 135–144)
SODIUM BLD-SCNC: 142 MMOL/L (ref 135–144)
SODIUM BLD-SCNC: 143 MMOL/L (ref 135–144)
SODIUM BLD-SCNC: 143 MMOL/L (ref 135–144)
SODIUM BLD-SCNC: 144 MMOL/L (ref 135–144)
SODIUM BLD-SCNC: 145 MMOL/L (ref 135–144)
SODIUM BLD-SCNC: 146 MMOL/L (ref 135–144)
SODIUM BLD-SCNC: 148 MMOL/L (ref 135–144)
SODIUM,UR: 20 MMOL/L
SPECIFIC GRAVITY UA: 1.02 (ref 1–1.03)
SPECIMEN DESCRIPTION: NORMAL
TOTAL CK: 39 U/L (ref 26–192)
TOTAL PROTEIN, URINE: 60 MG/DL
TOTAL PROTEIN: 6.3 G/DL (ref 6.4–8.3)
TOTAL PROTEIN: 6.4 G/DL (ref 6.4–8.3)
TOTAL PROTEIN: 6.5 G/DL (ref 6.4–8.3)
TOTAL PROTEIN: 6.6 G/DL (ref 6.4–8.3)
TOTAL PROTEIN: 6.7 G/DL (ref 6.4–8.3)
TOTAL PROTEIN: 6.7 G/DL (ref 6.4–8.3)
TRANSFUSION STATUS: NORMAL
TRIGL SERPL-MCNC: 69 MG/DL
TROPONIN, HIGH SENSITIVITY: 107 NG/L (ref 0–14)
TROPONIN, HIGH SENSITIVITY: 146 NG/L (ref 0–14)
TROPONIN, HIGH SENSITIVITY: 190 NG/L (ref 0–14)
TROPONIN, HIGH SENSITIVITY: 191 NG/L (ref 0–14)
TROPONIN, HIGH SENSITIVITY: 191 NG/L (ref 0–14)
TROPONIN, HIGH SENSITIVITY: 97 NG/L (ref 0–14)
TURBIDITY: ABNORMAL
UNIT DIVISION: 0
UNIT ISSUE DATE/TIME: NORMAL
URINE HGB: ABNORMAL
UROBILINOGEN, URINE: NORMAL
WBC # BLD: 10 K/UL (ref 3.5–11.3)
WBC # BLD: 10.1 K/UL (ref 3.5–11.3)
WBC # BLD: 11.6 K/UL (ref 3.5–11.3)
WBC # BLD: 12.5 K/UL (ref 3.5–11.3)
WBC # BLD: 12.5 K/UL (ref 3.5–11.3)
WBC # BLD: 16.1 K/UL (ref 3.5–11.3)
WBC # BLD: 16.2 K/UL (ref 3.5–11.3)
WBC # BLD: 18.3 K/UL (ref 3.5–11.3)
WBC # BLD: 23 K/UL (ref 3.5–11.3)
WBC # BLD: 7.2 K/UL (ref 3.5–11.3)
WBC # BLD: 7.9 K/UL (ref 3.5–11.3)
WBC # BLD: 8.1 K/UL (ref 3.5–11.3)
WBC # BLD: 8.4 K/UL (ref 3.5–11.3)
WBC # BLD: 8.4 K/UL (ref 3.5–11.3)
WBC # BLD: 8.9 K/UL (ref 3.5–11.3)
WBC # BLD: 9.1 K/UL (ref 3.5–11.3)
WBC # BLD: 9.3 K/UL (ref 3.5–11.3)
WBC # BLD: 9.6 K/UL (ref 3.5–11.3)
WBC # BLD: 9.8 K/UL (ref 3.5–11.3)
WBC UA: ABNORMAL /HPF (ref 0–5)
WBC UA: NORMAL /HPF (ref 0–5)
YEAST: ABNORMAL

## 2022-01-01 PROCEDURE — 6360000002 HC RX W HCPCS

## 2022-01-01 PROCEDURE — 6370000000 HC RX 637 (ALT 250 FOR IP): Performed by: STUDENT IN AN ORGANIZED HEALTH CARE EDUCATION/TRAINING PROGRAM

## 2022-01-01 PROCEDURE — 80048 BASIC METABOLIC PNL TOTAL CA: CPT

## 2022-01-01 PROCEDURE — 36415 COLL VENOUS BLD VENIPUNCTURE: CPT

## 2022-01-01 PROCEDURE — 37799 UNLISTED PX VASCULAR SURGERY: CPT

## 2022-01-01 PROCEDURE — 94003 VENT MGMT INPAT SUBQ DAY: CPT

## 2022-01-01 PROCEDURE — 99291 CRITICAL CARE FIRST HOUR: CPT | Performed by: INTERNAL MEDICINE

## 2022-01-01 PROCEDURE — 36556 INSERT NON-TUNNEL CV CATH: CPT

## 2022-01-01 PROCEDURE — 6360000002 HC RX W HCPCS: Performed by: HEALTH CARE PROVIDER

## 2022-01-01 PROCEDURE — 2580000003 HC RX 258: Performed by: HEALTH CARE PROVIDER

## 2022-01-01 PROCEDURE — 2580000003 HC RX 258: Performed by: STUDENT IN AN ORGANIZED HEALTH CARE EDUCATION/TRAINING PROGRAM

## 2022-01-01 PROCEDURE — 99285 EMERGENCY DEPT VISIT HI MDM: CPT

## 2022-01-01 PROCEDURE — 82803 BLOOD GASES ANY COMBINATION: CPT

## 2022-01-01 PROCEDURE — 2580000003 HC RX 258: Performed by: EMERGENCY MEDICINE

## 2022-01-01 PROCEDURE — 2500000003 HC RX 250 WO HCPCS: Performed by: HEALTH CARE PROVIDER

## 2022-01-01 PROCEDURE — 84484 ASSAY OF TROPONIN QUANT: CPT

## 2022-01-01 PROCEDURE — 85025 COMPLETE CBC W/AUTO DIFF WBC: CPT

## 2022-01-01 PROCEDURE — 6360000002 HC RX W HCPCS: Performed by: STUDENT IN AN ORGANIZED HEALTH CARE EDUCATION/TRAINING PROGRAM

## 2022-01-01 PROCEDURE — 6360000002 HC RX W HCPCS: Performed by: EMERGENCY MEDICINE

## 2022-01-01 PROCEDURE — 94761 N-INVAS EAR/PLS OXIMETRY MLT: CPT

## 2022-01-01 PROCEDURE — 2700000000 HC OXYGEN THERAPY PER DAY

## 2022-01-01 PROCEDURE — 82947 ASSAY GLUCOSE BLOOD QUANT: CPT

## 2022-01-01 PROCEDURE — 95700 EEG CONT REC W/VID EEG TECH: CPT

## 2022-01-01 PROCEDURE — A4216 STERILE WATER/SALINE, 10 ML: HCPCS | Performed by: HEALTH CARE PROVIDER

## 2022-01-01 PROCEDURE — 95711 VEEG 2-12 HR UNMONITORED: CPT

## 2022-01-01 PROCEDURE — 71045 X-RAY EXAM CHEST 1 VIEW: CPT

## 2022-01-01 PROCEDURE — 82248 BILIRUBIN DIRECT: CPT

## 2022-01-01 PROCEDURE — 6370000000 HC RX 637 (ALT 250 FOR IP)

## 2022-01-01 PROCEDURE — 84132 ASSAY OF SERUM POTASSIUM: CPT

## 2022-01-01 PROCEDURE — 82330 ASSAY OF CALCIUM: CPT

## 2022-01-01 PROCEDURE — 87040 BLOOD CULTURE FOR BACTERIA: CPT

## 2022-01-01 PROCEDURE — 83735 ASSAY OF MAGNESIUM: CPT

## 2022-01-01 PROCEDURE — 80053 COMPREHEN METABOLIC PANEL: CPT

## 2022-01-01 PROCEDURE — 70450 CT HEAD/BRAIN W/O DYE: CPT

## 2022-01-01 PROCEDURE — 84100 ASSAY OF PHOSPHORUS: CPT

## 2022-01-01 PROCEDURE — 2000000000 HC ICU R&B

## 2022-01-01 PROCEDURE — 94640 AIRWAY INHALATION TREATMENT: CPT

## 2022-01-01 PROCEDURE — 6370000000 HC RX 637 (ALT 250 FOR IP): Performed by: NURSE PRACTITIONER

## 2022-01-01 PROCEDURE — 2580000003 HC RX 258

## 2022-01-01 PROCEDURE — 51798 US URINE CAPACITY MEASURE: CPT

## 2022-01-01 PROCEDURE — 95720 EEG PHY/QHP EA INCR W/VEEG: CPT | Performed by: PSYCHIATRY & NEUROLOGY

## 2022-01-01 PROCEDURE — 84145 PROCALCITONIN (PCT): CPT

## 2022-01-01 PROCEDURE — 84520 ASSAY OF UREA NITROGEN: CPT

## 2022-01-01 PROCEDURE — 6360000004 HC RX CONTRAST MEDICATION: Performed by: EMERGENCY MEDICINE

## 2022-01-01 PROCEDURE — 36600 WITHDRAWAL OF ARTERIAL BLOOD: CPT

## 2022-01-01 PROCEDURE — 85014 HEMATOCRIT: CPT

## 2022-01-01 PROCEDURE — 85610 PROTHROMBIN TIME: CPT

## 2022-01-01 PROCEDURE — 71250 CT THORAX DX C-: CPT

## 2022-01-01 PROCEDURE — 83605 ASSAY OF LACTIC ACID: CPT

## 2022-01-01 PROCEDURE — 99232 SBSQ HOSP IP/OBS MODERATE 35: CPT | Performed by: INTERNAL MEDICINE

## 2022-01-01 PROCEDURE — 6360000002 HC RX W HCPCS: Performed by: INTERNAL MEDICINE

## 2022-01-01 PROCEDURE — 85018 HEMOGLOBIN: CPT

## 2022-01-01 PROCEDURE — 02HV33Z INSERTION OF INFUSION DEVICE INTO SUPERIOR VENA CAVA, PERCUTANEOUS APPROACH: ICD-10-PCS | Performed by: STUDENT IN AN ORGANIZED HEALTH CARE EDUCATION/TRAINING PROGRAM

## 2022-01-01 PROCEDURE — 93010 ELECTROCARDIOGRAM REPORT: CPT | Performed by: INTERNAL MEDICINE

## 2022-01-01 PROCEDURE — 85730 THROMBOPLASTIN TIME PARTIAL: CPT

## 2022-01-01 PROCEDURE — 2500000003 HC RX 250 WO HCPCS

## 2022-01-01 PROCEDURE — 36620 INSERTION CATHETER ARTERY: CPT

## 2022-01-01 PROCEDURE — 83615 LACTATE (LD) (LDH) ENZYME: CPT

## 2022-01-01 PROCEDURE — 36430 TRANSFUSION BLD/BLD COMPNT: CPT

## 2022-01-01 PROCEDURE — 83690 ASSAY OF LIPASE: CPT

## 2022-01-01 PROCEDURE — 99221 1ST HOSP IP/OBS SF/LOW 40: CPT | Performed by: INTERNAL MEDICINE

## 2022-01-01 PROCEDURE — 84478 ASSAY OF TRIGLYCERIDES: CPT

## 2022-01-01 PROCEDURE — 94669 MECHANICAL CHEST WALL OSCILL: CPT

## 2022-01-01 PROCEDURE — 82977 ASSAY OF GGT: CPT

## 2022-01-01 PROCEDURE — 87635 SARS-COV-2 COVID-19 AMP PRB: CPT

## 2022-01-01 PROCEDURE — 5A1955Z RESPIRATORY VENTILATION, GREATER THAN 96 CONSECUTIVE HOURS: ICD-10-PCS | Performed by: INTERNAL MEDICINE

## 2022-01-01 PROCEDURE — 87205 SMEAR GRAM STAIN: CPT

## 2022-01-01 PROCEDURE — 86920 COMPATIBILITY TEST SPIN: CPT

## 2022-01-01 PROCEDURE — 95714 VEEG EA 12-26 HR UNMNTR: CPT

## 2022-01-01 PROCEDURE — 74018 RADEX ABDOMEN 1 VIEW: CPT

## 2022-01-01 PROCEDURE — 71260 CT THORAX DX C+: CPT | Performed by: EMERGENCY MEDICINE

## 2022-01-01 PROCEDURE — P9016 RBC LEUKOCYTES REDUCED: HCPCS

## 2022-01-01 PROCEDURE — 84300 ASSAY OF URINE SODIUM: CPT

## 2022-01-01 PROCEDURE — 87186 SC STD MICRODIL/AGAR DIL: CPT

## 2022-01-01 PROCEDURE — 87641 MR-STAPH DNA AMP PROBE: CPT

## 2022-01-01 PROCEDURE — 86901 BLOOD TYPING SEROLOGIC RH(D): CPT

## 2022-01-01 PROCEDURE — 86316 IMMUNOASSAY TUMOR OTHER: CPT

## 2022-01-01 PROCEDURE — 81001 URINALYSIS AUTO W/SCOPE: CPT

## 2022-01-01 PROCEDURE — 85027 COMPLETE CBC AUTOMATED: CPT

## 2022-01-01 PROCEDURE — 2500000003 HC RX 250 WO HCPCS: Performed by: EMERGENCY MEDICINE

## 2022-01-01 PROCEDURE — 82140 ASSAY OF AMMONIA: CPT

## 2022-01-01 PROCEDURE — 86403 PARTICLE AGGLUT ANTBDY SCRN: CPT

## 2022-01-01 PROCEDURE — 93306 TTE W/DOPPLER COMPLETE: CPT

## 2022-01-01 PROCEDURE — 82570 ASSAY OF URINE CREATININE: CPT

## 2022-01-01 PROCEDURE — 96365 THER/PROPH/DIAG IV INF INIT: CPT

## 2022-01-01 PROCEDURE — 2500000003 HC RX 250 WO HCPCS: Performed by: STUDENT IN AN ORGANIZED HEALTH CARE EDUCATION/TRAINING PROGRAM

## 2022-01-01 PROCEDURE — 87086 URINE CULTURE/COLONY COUNT: CPT

## 2022-01-01 PROCEDURE — 93005 ELECTROCARDIOGRAM TRACING: CPT | Performed by: EMERGENCY MEDICINE

## 2022-01-01 PROCEDURE — 87077 CULTURE AEROBIC IDENTIFY: CPT

## 2022-01-01 PROCEDURE — 84156 ASSAY OF PROTEIN URINE: CPT

## 2022-01-01 PROCEDURE — 82565 ASSAY OF CREATININE: CPT

## 2022-01-01 PROCEDURE — 82150 ASSAY OF AMYLASE: CPT

## 2022-01-01 PROCEDURE — 96375 TX/PRO/DX INJ NEW DRUG ADDON: CPT

## 2022-01-01 PROCEDURE — 82550 ASSAY OF CK (CPK): CPT

## 2022-01-01 PROCEDURE — 80051 ELECTROLYTE PANEL: CPT

## 2022-01-01 PROCEDURE — 83036 HEMOGLOBIN GLYCOSYLATED A1C: CPT

## 2022-01-01 PROCEDURE — 51702 INSERT TEMP BLADDER CATH: CPT

## 2022-01-01 PROCEDURE — 86850 RBC ANTIBODY SCREEN: CPT

## 2022-01-01 PROCEDURE — 87070 CULTURE OTHR SPECIMN AEROBIC: CPT

## 2022-01-01 PROCEDURE — 31720 CLEARANCE OF AIRWAYS: CPT

## 2022-01-01 PROCEDURE — 86900 BLOOD TYPING SEROLOGIC ABO: CPT

## 2022-01-01 PROCEDURE — 1200000000 HC SEMI PRIVATE

## 2022-01-01 PROCEDURE — 94002 VENT MGMT INPAT INIT DAY: CPT

## 2022-01-01 PROCEDURE — 06HY33Z INSERTION OF INFUSION DEVICE INTO LOWER VEIN, PERCUTANEOUS APPROACH: ICD-10-PCS | Performed by: EMERGENCY MEDICINE

## 2022-01-01 PROCEDURE — 4A10X4Z MONITORING OF CENTRAL NERVOUS ELECTRICAL ACTIVITY, EXTERNAL APPROACH: ICD-10-PCS | Performed by: PSYCHIATRY & NEUROLOGY

## 2022-01-01 PROCEDURE — 99222 1ST HOSP IP/OBS MODERATE 55: CPT | Performed by: NURSE PRACTITIONER

## 2022-01-01 RX ORDER — INSULIN LISPRO 100 [IU]/ML
0-16 INJECTION, SOLUTION INTRAVENOUS; SUBCUTANEOUS EVERY 4 HOURS
Status: DISCONTINUED | OUTPATIENT
Start: 2022-01-01 | End: 2022-01-01

## 2022-01-01 RX ORDER — SODIUM CHLORIDE 0.9 % (FLUSH) 0.9 %
5-40 SYRINGE (ML) INJECTION EVERY 12 HOURS SCHEDULED
Status: DISCONTINUED | OUTPATIENT
Start: 2022-01-01 | End: 2022-01-01

## 2022-01-01 RX ORDER — ACETAMINOPHEN 325 MG/1
650 TABLET ORAL EVERY 6 HOURS PRN
Status: DISCONTINUED | OUTPATIENT
Start: 2022-01-01 | End: 2022-01-01

## 2022-01-01 RX ORDER — INSULIN GLARGINE 100 [IU]/ML
30 INJECTION, SOLUTION SUBCUTANEOUS 2 TIMES DAILY
Status: DISCONTINUED | OUTPATIENT
Start: 2022-01-01 | End: 2022-01-01

## 2022-01-01 RX ORDER — FAMOTIDINE 20 MG/1
20 TABLET, FILM COATED ORAL DAILY
Status: DISCONTINUED | OUTPATIENT
Start: 2022-01-01 | End: 2022-01-01

## 2022-01-01 RX ORDER — GLYCOPYRROLATE 0.2 MG/ML
0.2 INJECTION INTRAMUSCULAR; INTRAVENOUS EVERY 4 HOURS PRN
Status: DISCONTINUED | OUTPATIENT
Start: 2022-01-01 | End: 2022-12-31 | Stop reason: HOSPADM

## 2022-01-01 RX ORDER — ASPIRIN 81 MG/1
81 TABLET, CHEWABLE ORAL DAILY
Status: DISCONTINUED | OUTPATIENT
Start: 2022-01-01 | End: 2022-01-01

## 2022-01-01 RX ORDER — FUROSEMIDE 10 MG/ML
40 INJECTION INTRAMUSCULAR; INTRAVENOUS 2 TIMES DAILY
Status: DISCONTINUED | OUTPATIENT
Start: 2022-01-01 | End: 2022-01-01 | Stop reason: ALTCHOICE

## 2022-01-01 RX ORDER — INSULIN LISPRO 100 [IU]/ML
0-16 INJECTION, SOLUTION INTRAVENOUS; SUBCUTANEOUS EVERY 6 HOURS
Status: DISCONTINUED | OUTPATIENT
Start: 2022-01-01 | End: 2022-01-01

## 2022-01-01 RX ORDER — HEPARIN SODIUM 10000 [USP'U]/ML
30000 INJECTION, SOLUTION INTRAVENOUS; SUBCUTANEOUS ONCE
Status: COMPLETED | OUTPATIENT
Start: 2022-01-01 | End: 2022-01-01

## 2022-01-01 RX ORDER — ONDANSETRON 4 MG/1
4 TABLET, ORALLY DISINTEGRATING ORAL EVERY 8 HOURS PRN
Status: DISCONTINUED | OUTPATIENT
Start: 2022-01-01 | End: 2022-01-01

## 2022-01-01 RX ORDER — PROPOFOL 10 MG/ML
5-50 INJECTION, EMULSION INTRAVENOUS CONTINUOUS
Status: DISCONTINUED | OUTPATIENT
Start: 2022-01-01 | End: 2022-01-01

## 2022-01-01 RX ORDER — LORAZEPAM 2 MG/ML
1 INJECTION INTRAMUSCULAR EVERY 30 MIN PRN
Status: DISCONTINUED | OUTPATIENT
Start: 2022-01-01 | End: 2022-12-31 | Stop reason: HOSPADM

## 2022-01-01 RX ORDER — PROPOFOL 10 MG/ML
INJECTION, EMULSION INTRAVENOUS
Status: COMPLETED
Start: 2022-01-01 | End: 2022-01-01

## 2022-01-01 RX ORDER — SODIUM CHLORIDE FOR INHALATION 3 %
3 VIAL, NEBULIZER (ML) INHALATION EVERY 8 HOURS
Status: DISCONTINUED | OUTPATIENT
Start: 2022-01-01 | End: 2022-01-01

## 2022-01-01 RX ORDER — POTASSIUM CHLORIDE 29.8 MG/ML
20 INJECTION INTRAVENOUS PRN
Status: COMPLETED | OUTPATIENT
Start: 2022-01-01 | End: 2022-01-01

## 2022-01-01 RX ORDER — VECURONIUM BROMIDE 1 MG/ML
INJECTION, POWDER, LYOPHILIZED, FOR SOLUTION INTRAVENOUS
Status: COMPLETED
Start: 2022-01-01 | End: 2022-01-01

## 2022-01-01 RX ORDER — NOREPINEPHRINE BIT/0.9 % NACL 16MG/250ML
INFUSION BOTTLE (ML) INTRAVENOUS
Status: DISPENSED
Start: 2022-01-01 | End: 2022-01-01

## 2022-01-01 RX ORDER — CLOPIDOGREL BISULFATE 75 MG/1
75 TABLET ORAL DAILY
Status: DISCONTINUED | OUTPATIENT
Start: 2022-01-01 | End: 2022-01-01

## 2022-01-01 RX ORDER — INSULIN GLARGINE 100 [IU]/ML
10 INJECTION, SOLUTION SUBCUTANEOUS NIGHTLY
Status: DISCONTINUED | OUTPATIENT
Start: 2022-01-01 | End: 2022-01-01

## 2022-01-01 RX ORDER — FUROSEMIDE 10 MG/ML
40 INJECTION INTRAMUSCULAR; INTRAVENOUS 3 TIMES DAILY
Status: DISCONTINUED | OUTPATIENT
Start: 2022-01-01 | End: 2022-01-01

## 2022-01-01 RX ORDER — POLYETHYLENE GLYCOL 3350 17 G/17G
17 POWDER, FOR SOLUTION ORAL DAILY PRN
Status: DISCONTINUED | OUTPATIENT
Start: 2022-01-01 | End: 2022-01-01

## 2022-01-01 RX ORDER — ONDANSETRON 2 MG/ML
4 INJECTION INTRAMUSCULAR; INTRAVENOUS EVERY 6 HOURS PRN
Status: DISCONTINUED | OUTPATIENT
Start: 2022-01-01 | End: 2022-12-31 | Stop reason: HOSPADM

## 2022-01-01 RX ORDER — DEXMEDETOMIDINE HYDROCHLORIDE 4 UG/ML
.1-1.5 INJECTION, SOLUTION INTRAVENOUS CONTINUOUS
Status: DISCONTINUED | OUTPATIENT
Start: 2022-01-01 | End: 2022-01-01

## 2022-01-01 RX ORDER — SODIUM CHLORIDE 9 MG/ML
INJECTION, SOLUTION INTRAVENOUS PRN
Status: DISCONTINUED | OUTPATIENT
Start: 2022-01-01 | End: 2022-01-01

## 2022-01-01 RX ORDER — CALCIUM GLUCONATE 20 MG/ML
1000 INJECTION, SOLUTION INTRAVENOUS ONCE
Status: COMPLETED | OUTPATIENT
Start: 2022-01-01 | End: 2022-01-01

## 2022-01-01 RX ORDER — MIDODRINE HYDROCHLORIDE 5 MG/1
10 TABLET ORAL
Status: DISCONTINUED | OUTPATIENT
Start: 2022-01-01 | End: 2022-01-01

## 2022-01-01 RX ORDER — INSULIN GLARGINE 100 [IU]/ML
25 INJECTION, SOLUTION SUBCUTANEOUS 2 TIMES DAILY
Status: DISCONTINUED | OUTPATIENT
Start: 2022-01-01 | End: 2022-01-01

## 2022-01-01 RX ORDER — CARVEDILOL 3.12 MG/1
3.12 TABLET ORAL 2 TIMES DAILY WITH MEALS
Status: DISCONTINUED | OUTPATIENT
Start: 2022-01-01 | End: 2022-01-01

## 2022-01-01 RX ORDER — HEPARIN SODIUM 5000 [USP'U]/ML
5000 INJECTION, SOLUTION INTRAVENOUS; SUBCUTANEOUS EVERY 8 HOURS SCHEDULED
Status: DISCONTINUED | OUTPATIENT
Start: 2022-01-01 | End: 2022-01-01

## 2022-01-01 RX ORDER — SODIUM CHLORIDE 9 MG/ML
INJECTION, SOLUTION INTRAVENOUS CONTINUOUS
Status: DISCONTINUED | OUTPATIENT
Start: 2022-01-01 | End: 2022-01-01

## 2022-01-01 RX ORDER — FENTANYL CITRATE 50 UG/ML
100 INJECTION, SOLUTION INTRAMUSCULAR; INTRAVENOUS ONCE
Status: COMPLETED | OUTPATIENT
Start: 2022-01-01 | End: 2022-01-01

## 2022-01-01 RX ORDER — ACETAMINOPHEN 650 MG/1
650 SUPPOSITORY RECTAL EVERY 6 HOURS PRN
Status: DISCONTINUED | OUTPATIENT
Start: 2022-01-01 | End: 2022-01-01

## 2022-01-01 RX ORDER — 0.9 % SODIUM CHLORIDE 0.9 %
500 INTRAVENOUS SOLUTION INTRAVENOUS ONCE
Status: COMPLETED | OUTPATIENT
Start: 2022-01-01 | End: 2022-01-01

## 2022-01-01 RX ORDER — ONDANSETRON 4 MG/1
4 TABLET, ORALLY DISINTEGRATING ORAL EVERY 8 HOURS PRN
Status: DISCONTINUED | OUTPATIENT
Start: 2022-01-01 | End: 2022-12-31 | Stop reason: HOSPADM

## 2022-01-01 RX ORDER — ACETYLCYSTEINE 200 MG/ML
600 SOLUTION ORAL; RESPIRATORY (INHALATION)
Status: DISCONTINUED | OUTPATIENT
Start: 2022-01-01 | End: 2022-01-01

## 2022-01-01 RX ORDER — INSULIN GLARGINE 100 [IU]/ML
15 INJECTION, SOLUTION SUBCUTANEOUS NIGHTLY
Status: DISCONTINUED | OUTPATIENT
Start: 2022-01-01 | End: 2022-01-01

## 2022-01-01 RX ORDER — MORPHINE SULFATE 2 MG/ML
2 INJECTION, SOLUTION INTRAMUSCULAR; INTRAVENOUS
Status: DISCONTINUED | OUTPATIENT
Start: 2022-01-01 | End: 2022-12-31 | Stop reason: HOSPADM

## 2022-01-01 RX ORDER — INSULIN LISPRO 100 [IU]/ML
0-4 INJECTION, SOLUTION INTRAVENOUS; SUBCUTANEOUS NIGHTLY
Status: DISCONTINUED | OUTPATIENT
Start: 2022-01-01 | End: 2022-01-01

## 2022-01-01 RX ORDER — ATORVASTATIN CALCIUM 80 MG/1
80 TABLET, FILM COATED ORAL NIGHTLY
Status: DISCONTINUED | OUTPATIENT
Start: 2022-01-01 | End: 2022-01-01

## 2022-01-01 RX ORDER — CHLORHEXIDINE GLUCONATE 0.12 MG/ML
15 RINSE ORAL 2 TIMES DAILY
Status: DISCONTINUED | OUTPATIENT
Start: 2022-01-01 | End: 2022-01-01

## 2022-01-01 RX ORDER — INSULIN LISPRO 100 [IU]/ML
0-8 INJECTION, SOLUTION INTRAVENOUS; SUBCUTANEOUS EVERY 4 HOURS
Status: DISCONTINUED | OUTPATIENT
Start: 2022-01-01 | End: 2022-01-01

## 2022-01-01 RX ORDER — MIDODRINE HYDROCHLORIDE 5 MG/1
5 TABLET ORAL
Status: DISCONTINUED | OUTPATIENT
Start: 2022-01-01 | End: 2022-01-01

## 2022-01-01 RX ORDER — FENTANYL CITRATE 50 UG/ML
INJECTION, SOLUTION INTRAMUSCULAR; INTRAVENOUS
Status: COMPLETED
Start: 2022-01-01 | End: 2022-01-01

## 2022-01-01 RX ORDER — MORPHINE SULFATE 4 MG/ML
4 INJECTION, SOLUTION INTRAMUSCULAR; INTRAVENOUS
Status: DISCONTINUED | OUTPATIENT
Start: 2022-01-01 | End: 2022-12-31 | Stop reason: HOSPADM

## 2022-01-01 RX ORDER — FUROSEMIDE 10 MG/ML
20 INJECTION INTRAMUSCULAR; INTRAVENOUS 2 TIMES DAILY
Status: DISCONTINUED | OUTPATIENT
Start: 2022-01-01 | End: 2022-01-01

## 2022-01-01 RX ORDER — DEXTROSE MONOHYDRATE 100 MG/ML
INJECTION, SOLUTION INTRAVENOUS CONTINUOUS PRN
Status: DISCONTINUED | OUTPATIENT
Start: 2022-01-01 | End: 2022-01-01

## 2022-01-01 RX ORDER — INSULIN GLARGINE 100 [IU]/ML
15 INJECTION, SOLUTION SUBCUTANEOUS 2 TIMES DAILY
Status: DISCONTINUED | OUTPATIENT
Start: 2022-01-01 | End: 2022-01-01

## 2022-01-01 RX ORDER — VECURONIUM BROMIDE 1 MG/ML
10 INJECTION, POWDER, LYOPHILIZED, FOR SOLUTION INTRAVENOUS ONCE
Status: COMPLETED | OUTPATIENT
Start: 2022-01-01 | End: 2022-01-01

## 2022-01-01 RX ORDER — SODIUM CHLORIDE 0.9 % (FLUSH) 0.9 %
5-40 SYRINGE (ML) INJECTION PRN
Status: DISCONTINUED | OUTPATIENT
Start: 2022-01-01 | End: 2022-01-01

## 2022-01-01 RX ORDER — 0.9 % SODIUM CHLORIDE 0.9 %
500 INTRAVENOUS SOLUTION INTRAVENOUS ONCE
Status: DISCONTINUED | OUTPATIENT
Start: 2022-01-01 | End: 2022-01-01

## 2022-01-01 RX ORDER — SODIUM CHLORIDE, SODIUM LACTATE, POTASSIUM CHLORIDE, AND CALCIUM CHLORIDE .6; .31; .03; .02 G/100ML; G/100ML; G/100ML; G/100ML
500 INJECTION, SOLUTION INTRAVENOUS ONCE
Status: COMPLETED | OUTPATIENT
Start: 2022-01-01 | End: 2022-01-01

## 2022-01-01 RX ORDER — CALCIUM GLUCONATE 20 MG/ML
2000 INJECTION, SOLUTION INTRAVENOUS ONCE
Status: COMPLETED | OUTPATIENT
Start: 2022-01-01 | End: 2022-01-01

## 2022-01-01 RX ORDER — FENTANYL CITRATE 50 UG/ML
25 INJECTION, SOLUTION INTRAMUSCULAR; INTRAVENOUS
Status: DISCONTINUED | OUTPATIENT
Start: 2022-01-01 | End: 2022-12-31 | Stop reason: HOSPADM

## 2022-01-01 RX ORDER — ONDANSETRON 2 MG/ML
4 INJECTION INTRAMUSCULAR; INTRAVENOUS EVERY 6 HOURS PRN
Status: DISCONTINUED | OUTPATIENT
Start: 2022-01-01 | End: 2022-01-01

## 2022-01-01 RX ORDER — WATER 1000 ML/1000ML
INJECTION, SOLUTION INTRAVENOUS
Status: COMPLETED
Start: 2022-01-01 | End: 2022-01-01

## 2022-01-01 RX ORDER — INSULIN GLARGINE 100 [IU]/ML
20 INJECTION, SOLUTION SUBCUTANEOUS NIGHTLY
Status: DISCONTINUED | OUTPATIENT
Start: 2022-01-01 | End: 2022-01-01

## 2022-01-01 RX ORDER — ALBUTEROL SULFATE 2.5 MG/3ML
2.5 SOLUTION RESPIRATORY (INHALATION)
Status: DISCONTINUED | OUTPATIENT
Start: 2022-01-01 | End: 2022-01-01

## 2022-01-01 RX ORDER — NOREPINEPHRINE BIT/0.9 % NACL 16MG/250ML
1-100 INFUSION BOTTLE (ML) INTRAVENOUS CONTINUOUS
Status: DISCONTINUED | OUTPATIENT
Start: 2022-01-01 | End: 2022-01-01

## 2022-01-01 RX ORDER — CALCIUM GLUCONATE 94 MG/ML
1000 INJECTION, SOLUTION INTRAVENOUS ONCE
Status: DISCONTINUED | OUTPATIENT
Start: 2022-01-01 | End: 2022-01-01

## 2022-01-01 RX ORDER — INSULIN LISPRO 100 [IU]/ML
0-8 INJECTION, SOLUTION INTRAVENOUS; SUBCUTANEOUS EVERY 6 HOURS
Status: DISCONTINUED | OUTPATIENT
Start: 2022-01-01 | End: 2022-01-01

## 2022-01-01 RX ADMIN — CHLORHEXIDINE GLUCONATE 15 ML: 1.2 RINSE ORAL at 19:51

## 2022-01-01 RX ADMIN — PROPOFOL 10 MCG/KG/MIN: 10 INJECTION, EMULSION INTRAVENOUS at 20:01

## 2022-01-01 RX ADMIN — SODIUM CHLORIDE, PRESERVATIVE FREE 20 MG: 5 INJECTION INTRAVENOUS at 07:51

## 2022-01-01 RX ADMIN — HEPARIN SODIUM 5000 UNITS: 5000 INJECTION INTRAVENOUS; SUBCUTANEOUS at 06:06

## 2022-01-01 RX ADMIN — SODIUM CHLORIDE, PRESERVATIVE FREE 10 ML: 5 INJECTION INTRAVENOUS at 08:00

## 2022-01-01 RX ADMIN — SODIUM CHLORIDE: 9 INJECTION, SOLUTION INTRAVENOUS at 02:00

## 2022-01-01 RX ADMIN — DEXMEDETOMIDINE HYDROCHLORIDE 0.4 MCG/KG/HR: 4 INJECTION, SOLUTION INTRAVENOUS at 01:58

## 2022-01-01 RX ADMIN — PIPERACILLIN AND TAZOBACTAM 3375 MG: 3; .375 INJECTION, POWDER, FOR SOLUTION INTRAVENOUS at 23:59

## 2022-01-01 RX ADMIN — MIDODRINE HYDROCHLORIDE 10 MG: 5 TABLET ORAL at 17:48

## 2022-01-01 RX ADMIN — SODIUM CHLORIDE, PRESERVATIVE FREE 20 MG: 5 INJECTION INTRAVENOUS at 08:24

## 2022-01-01 RX ADMIN — FUROSEMIDE 20 MG: 10 INJECTION, SOLUTION INTRAMUSCULAR; INTRAVENOUS at 10:58

## 2022-01-01 RX ADMIN — INSULIN GLARGINE 30 UNITS: 100 INJECTION, SOLUTION SUBCUTANEOUS at 20:17

## 2022-01-01 RX ADMIN — MIDODRINE HYDROCHLORIDE 10 MG: 5 TABLET ORAL at 07:25

## 2022-01-01 RX ADMIN — INSULIN LISPRO 4 UNITS: 100 INJECTION, SOLUTION INTRAVENOUS; SUBCUTANEOUS at 16:54

## 2022-01-01 RX ADMIN — SODIUM CHLORIDE, PRESERVATIVE FREE 20 MG: 5 INJECTION INTRAVENOUS at 09:36

## 2022-01-01 RX ADMIN — MIDODRINE HYDROCHLORIDE 5 MG: 5 TABLET ORAL at 12:39

## 2022-01-01 RX ADMIN — HEPARIN SODIUM 5000 UNITS: 5000 INJECTION INTRAVENOUS; SUBCUTANEOUS at 13:16

## 2022-01-01 RX ADMIN — MIDODRINE HYDROCHLORIDE 10 MG: 5 TABLET ORAL at 12:02

## 2022-01-01 RX ADMIN — HEPARIN SODIUM 5000 UNITS: 5000 INJECTION INTRAVENOUS; SUBCUTANEOUS at 14:23

## 2022-01-01 RX ADMIN — HEPARIN SODIUM 5000 UNITS: 5000 INJECTION INTRAVENOUS; SUBCUTANEOUS at 05:29

## 2022-01-01 RX ADMIN — SODIUM CHLORIDE, PRESERVATIVE FREE 10 ML: 5 INJECTION INTRAVENOUS at 20:26

## 2022-01-01 RX ADMIN — HEPARIN SODIUM 5000 UNITS: 5000 INJECTION INTRAVENOUS; SUBCUTANEOUS at 05:40

## 2022-01-01 RX ADMIN — HEPARIN SODIUM 5000 UNITS: 5000 INJECTION INTRAVENOUS; SUBCUTANEOUS at 21:43

## 2022-01-01 RX ADMIN — ATORVASTATIN CALCIUM 80 MG: 80 TABLET, FILM COATED ORAL at 21:33

## 2022-01-01 RX ADMIN — FENTANYL CITRATE 100 MCG: 50 INJECTION, SOLUTION INTRAMUSCULAR; INTRAVENOUS at 10:24

## 2022-01-01 RX ADMIN — ACETAMINOPHEN 650 MG: 325 TABLET ORAL at 12:08

## 2022-01-01 RX ADMIN — PIPERACILLIN AND TAZOBACTAM 3375 MG: 3; .375 INJECTION, POWDER, FOR SOLUTION INTRAVENOUS at 16:29

## 2022-01-01 RX ADMIN — PROPOFOL 10 MCG/KG/MIN: 10 INJECTION, EMULSION INTRAVENOUS at 00:19

## 2022-01-01 RX ADMIN — CLOPIDOGREL 75 MG: 75 TABLET, FILM COATED ORAL at 07:59

## 2022-01-01 RX ADMIN — PROPOFOL 10 MCG/KG/MIN: 10 INJECTION, EMULSION INTRAVENOUS at 06:40

## 2022-01-01 RX ADMIN — PIPERACILLIN AND TAZOBACTAM 3375 MG: 3; .375 INJECTION, POWDER, FOR SOLUTION INTRAVENOUS at 00:23

## 2022-01-01 RX ADMIN — HEPARIN SODIUM 30000 UNITS: 10000 INJECTION INTRAVENOUS; SUBCUTANEOUS at 15:44

## 2022-01-01 RX ADMIN — ATORVASTATIN CALCIUM 80 MG: 80 TABLET, FILM COATED ORAL at 21:22

## 2022-01-01 RX ADMIN — SODIUM CHLORIDE, PRESERVATIVE FREE 10 ML: 5 INJECTION INTRAVENOUS at 07:53

## 2022-01-01 RX ADMIN — CLOPIDOGREL 75 MG: 75 TABLET, FILM COATED ORAL at 08:35

## 2022-01-01 RX ADMIN — SODIUM CHLORIDE, PRESERVATIVE FREE 10 ML: 5 INJECTION INTRAVENOUS at 20:21

## 2022-01-01 RX ADMIN — HEPARIN SODIUM 5000 UNITS: 5000 INJECTION INTRAVENOUS; SUBCUTANEOUS at 06:44

## 2022-01-01 RX ADMIN — LORAZEPAM 1 MG: 2 INJECTION INTRAMUSCULAR; INTRAVENOUS at 15:49

## 2022-01-01 RX ADMIN — PIPERACILLIN AND TAZOBACTAM 4500 MG: 4; .5 INJECTION, POWDER, FOR SOLUTION INTRAVENOUS at 04:00

## 2022-01-01 RX ADMIN — PIPERACILLIN AND TAZOBACTAM 3375 MG: 3; .375 INJECTION, POWDER, FOR SOLUTION INTRAVENOUS at 16:06

## 2022-01-01 RX ADMIN — HEPARIN SODIUM 5000 UNITS: 5000 INJECTION INTRAVENOUS; SUBCUTANEOUS at 21:51

## 2022-01-01 RX ADMIN — MIDODRINE HYDROCHLORIDE 10 MG: 5 TABLET ORAL at 11:34

## 2022-01-01 RX ADMIN — CLOPIDOGREL 75 MG: 75 TABLET, FILM COATED ORAL at 08:02

## 2022-01-01 RX ADMIN — INSULIN GLARGINE 15 UNITS: 100 INJECTION, SOLUTION SUBCUTANEOUS at 20:30

## 2022-01-01 RX ADMIN — ATORVASTATIN CALCIUM 80 MG: 80 TABLET, FILM COATED ORAL at 20:17

## 2022-01-01 RX ADMIN — INSULIN LISPRO 2 UNITS: 100 INJECTION, SOLUTION INTRAVENOUS; SUBCUTANEOUS at 00:23

## 2022-01-01 RX ADMIN — PIPERACILLIN AND TAZOBACTAM 3375 MG: 3; .375 INJECTION, POWDER, FOR SOLUTION INTRAVENOUS at 08:30

## 2022-01-01 RX ADMIN — INSULIN LISPRO 8 UNITS: 100 INJECTION, SOLUTION INTRAVENOUS; SUBCUTANEOUS at 23:54

## 2022-01-01 RX ADMIN — MIDODRINE HYDROCHLORIDE 5 MG: 5 TABLET ORAL at 16:59

## 2022-01-01 RX ADMIN — INSULIN LISPRO 4 UNITS: 100 INJECTION, SOLUTION INTRAVENOUS; SUBCUTANEOUS at 20:50

## 2022-01-01 RX ADMIN — INSULIN LISPRO 4 UNITS: 100 INJECTION, SOLUTION INTRAVENOUS; SUBCUTANEOUS at 05:07

## 2022-01-01 RX ADMIN — ATORVASTATIN CALCIUM 80 MG: 80 TABLET, FILM COATED ORAL at 22:36

## 2022-01-01 RX ADMIN — FUROSEMIDE 40 MG: 10 INJECTION, SOLUTION INTRAMUSCULAR; INTRAVENOUS at 07:55

## 2022-01-01 RX ADMIN — SODIUM CHLORIDE, PRESERVATIVE FREE 10 ML: 5 INJECTION INTRAVENOUS at 22:14

## 2022-01-01 RX ADMIN — HEPARIN SODIUM 5000 UNITS: 5000 INJECTION INTRAVENOUS; SUBCUTANEOUS at 22:14

## 2022-01-01 RX ADMIN — SODIUM CHLORIDE, PRESERVATIVE FREE 10 ML: 5 INJECTION INTRAVENOUS at 08:24

## 2022-01-01 RX ADMIN — INSULIN LISPRO 6 UNITS: 100 INJECTION, SOLUTION INTRAVENOUS; SUBCUTANEOUS at 12:04

## 2022-01-01 RX ADMIN — MIDODRINE HYDROCHLORIDE 10 MG: 5 TABLET ORAL at 08:05

## 2022-01-01 RX ADMIN — HEPARIN SODIUM 5000 UNITS: 5000 INJECTION INTRAVENOUS; SUBCUTANEOUS at 17:12

## 2022-01-01 RX ADMIN — CHLORHEXIDINE GLUCONATE 15 ML: 1.2 RINSE ORAL at 09:11

## 2022-01-01 RX ADMIN — MORPHINE SULFATE 4 MG: 4 INJECTION INTRAVENOUS at 16:07

## 2022-01-01 RX ADMIN — AMIODARONE HYDROCHLORIDE 0.5 MG/MIN: 50 INJECTION, SOLUTION INTRAVENOUS at 03:02

## 2022-01-01 RX ADMIN — HEPARIN SODIUM 5000 UNITS: 5000 INJECTION INTRAVENOUS; SUBCUTANEOUS at 13:47

## 2022-01-01 RX ADMIN — INSULIN LISPRO 4 UNITS: 100 INJECTION, SOLUTION INTRAVENOUS; SUBCUTANEOUS at 01:01

## 2022-01-01 RX ADMIN — ASPIRIN 81 MG: 81 TABLET, CHEWABLE ORAL at 09:12

## 2022-01-01 RX ADMIN — MIDODRINE HYDROCHLORIDE 10 MG: 5 TABLET ORAL at 11:48

## 2022-01-01 RX ADMIN — CHLORHEXIDINE GLUCONATE 15 ML: 1.2 RINSE ORAL at 20:36

## 2022-01-01 RX ADMIN — FENTANYL CITRATE 25 MCG: 50 INJECTION, SOLUTION INTRAMUSCULAR; INTRAVENOUS at 19:05

## 2022-01-01 RX ADMIN — SODIUM CHLORIDE, PRESERVATIVE FREE 10 ML: 5 INJECTION INTRAVENOUS at 07:55

## 2022-01-01 RX ADMIN — PROPOFOL 5 MCG/KG/MIN: 10 INJECTION, EMULSION INTRAVENOUS at 14:48

## 2022-01-01 RX ADMIN — PROPOFOL 10 MCG/KG/MIN: 10 INJECTION, EMULSION INTRAVENOUS at 18:02

## 2022-01-01 RX ADMIN — SODIUM CHLORIDE, PRESERVATIVE FREE 10 ML: 5 INJECTION INTRAVENOUS at 22:22

## 2022-01-01 RX ADMIN — ASPIRIN 81 MG: 81 TABLET, CHEWABLE ORAL at 10:39

## 2022-01-01 RX ADMIN — FUROSEMIDE 40 MG: 10 INJECTION, SOLUTION INTRAMUSCULAR; INTRAVENOUS at 20:26

## 2022-01-01 RX ADMIN — FENTANYL CITRATE 25 MCG: 50 INJECTION, SOLUTION INTRAMUSCULAR; INTRAVENOUS at 08:43

## 2022-01-01 RX ADMIN — SODIUM CHLORIDE 30 MG/ML INHALATION SOLUTION 3 ML: 30 SOLUTION INHALANT at 23:52

## 2022-01-01 RX ADMIN — SODIUM CHLORIDE, PRESERVATIVE FREE 20 MG: 5 INJECTION INTRAVENOUS at 08:32

## 2022-01-01 RX ADMIN — SODIUM CHLORIDE: 9 INJECTION, SOLUTION INTRAVENOUS at 09:11

## 2022-01-01 RX ADMIN — HEPARIN SODIUM 5000 UNITS: 5000 INJECTION INTRAVENOUS; SUBCUTANEOUS at 21:31

## 2022-01-01 RX ADMIN — HEPARIN SODIUM 5000 UNITS: 5000 INJECTION INTRAVENOUS; SUBCUTANEOUS at 22:30

## 2022-01-01 RX ADMIN — POTASSIUM CHLORIDE 20 MEQ: 29.8 INJECTION, SOLUTION INTRAVENOUS at 09:29

## 2022-01-01 RX ADMIN — POTASSIUM CHLORIDE 20 MEQ: 29.8 INJECTION, SOLUTION INTRAVENOUS at 07:30

## 2022-01-01 RX ADMIN — HEPARIN SODIUM 5000 UNITS: 5000 INJECTION INTRAVENOUS; SUBCUTANEOUS at 05:59

## 2022-01-01 RX ADMIN — MIDODRINE HYDROCHLORIDE 10 MG: 5 TABLET ORAL at 08:32

## 2022-01-01 RX ADMIN — Medication 6 MCG/MIN: at 06:20

## 2022-01-01 RX ADMIN — CHLORHEXIDINE GLUCONATE 15 ML: 1.2 RINSE ORAL at 09:30

## 2022-01-01 RX ADMIN — CHLORHEXIDINE GLUCONATE 15 ML: 1.2 RINSE ORAL at 21:05

## 2022-01-01 RX ADMIN — CHLORHEXIDINE GLUCONATE 15 ML: 1.2 RINSE ORAL at 08:15

## 2022-01-01 RX ADMIN — ASPIRIN 81 MG: 81 TABLET, CHEWABLE ORAL at 07:52

## 2022-01-01 RX ADMIN — ALBUTEROL SULFATE 2.5 MG: 2.5 SOLUTION RESPIRATORY (INHALATION) at 07:53

## 2022-01-01 RX ADMIN — CHLORHEXIDINE GLUCONATE 15 ML: 1.2 RINSE ORAL at 19:56

## 2022-01-01 RX ADMIN — INSULIN LISPRO 4 UNITS: 100 INJECTION, SOLUTION INTRAVENOUS; SUBCUTANEOUS at 05:48

## 2022-01-01 RX ADMIN — HEPARIN SODIUM 5000 UNITS: 5000 INJECTION INTRAVENOUS; SUBCUTANEOUS at 05:37

## 2022-01-01 RX ADMIN — MIDODRINE HYDROCHLORIDE 10 MG: 5 TABLET ORAL at 18:23

## 2022-01-01 RX ADMIN — CLOPIDOGREL 75 MG: 75 TABLET, FILM COATED ORAL at 08:33

## 2022-01-01 RX ADMIN — PIPERACILLIN AND TAZOBACTAM 4500 MG: 4; .5 INJECTION, POWDER, FOR SOLUTION INTRAVENOUS at 21:51

## 2022-01-01 RX ADMIN — SODIUM CHLORIDE, PRESERVATIVE FREE 10 ML: 5 INJECTION INTRAVENOUS at 20:54

## 2022-01-01 RX ADMIN — CHLORHEXIDINE GLUCONATE 15 ML: 1.2 RINSE ORAL at 22:22

## 2022-01-01 RX ADMIN — ASPIRIN 81 MG: 81 TABLET, CHEWABLE ORAL at 09:50

## 2022-01-01 RX ADMIN — SODIUM CHLORIDE, PRESERVATIVE FREE 10 ML: 5 INJECTION INTRAVENOUS at 19:51

## 2022-01-01 RX ADMIN — HEPARIN SODIUM 5000 UNITS: 5000 INJECTION INTRAVENOUS; SUBCUTANEOUS at 14:25

## 2022-01-01 RX ADMIN — SODIUM CHLORIDE: 9 INJECTION, SOLUTION INTRAVENOUS at 00:21

## 2022-01-01 RX ADMIN — ATORVASTATIN CALCIUM 80 MG: 80 TABLET, FILM COATED ORAL at 21:05

## 2022-01-01 RX ADMIN — CHLORHEXIDINE GLUCONATE 15 ML: 1.2 RINSE ORAL at 21:30

## 2022-01-01 RX ADMIN — INSULIN LISPRO 4 UNITS: 100 INJECTION, SOLUTION INTRAVENOUS; SUBCUTANEOUS at 20:17

## 2022-01-01 RX ADMIN — PIPERACILLIN AND TAZOBACTAM 3375 MG: 3; .375 INJECTION, POWDER, FOR SOLUTION INTRAVENOUS at 08:14

## 2022-01-01 RX ADMIN — MIDODRINE HYDROCHLORIDE 10 MG: 5 TABLET ORAL at 09:12

## 2022-01-01 RX ADMIN — INSULIN LISPRO 4 UNITS: 100 INJECTION, SOLUTION INTRAVENOUS; SUBCUTANEOUS at 16:37

## 2022-01-01 RX ADMIN — CHLORHEXIDINE GLUCONATE 15 ML: 1.2 RINSE ORAL at 20:32

## 2022-01-01 RX ADMIN — INSULIN LISPRO 2 UNITS: 100 INJECTION, SOLUTION INTRAVENOUS; SUBCUTANEOUS at 23:59

## 2022-01-01 RX ADMIN — SODIUM CHLORIDE, POTASSIUM CHLORIDE, SODIUM LACTATE AND CALCIUM CHLORIDE 500 ML: 600; 310; 30; 20 INJECTION, SOLUTION INTRAVENOUS at 16:00

## 2022-01-01 RX ADMIN — SODIUM CHLORIDE, PRESERVATIVE FREE 20 ML: 5 INJECTION INTRAVENOUS at 09:11

## 2022-01-01 RX ADMIN — SODIUM CHLORIDE, PRESERVATIVE FREE 20 MG: 5 INJECTION INTRAVENOUS at 08:20

## 2022-01-01 RX ADMIN — MIDODRINE HYDROCHLORIDE 10 MG: 5 TABLET ORAL at 08:15

## 2022-01-01 RX ADMIN — MIDODRINE HYDROCHLORIDE 10 MG: 5 TABLET ORAL at 08:36

## 2022-01-01 RX ADMIN — SODIUM CHLORIDE, PRESERVATIVE FREE 10 ML: 5 INJECTION INTRAVENOUS at 08:32

## 2022-01-01 RX ADMIN — AMIODARONE HYDROCHLORIDE 150 MG: 50 INJECTION, SOLUTION INTRAVENOUS at 11:52

## 2022-01-01 RX ADMIN — PROPOFOL 10 MCG/KG/MIN: 10 INJECTION, EMULSION INTRAVENOUS at 15:34

## 2022-01-01 RX ADMIN — ATORVASTATIN CALCIUM 80 MG: 80 TABLET, FILM COATED ORAL at 20:13

## 2022-01-01 RX ADMIN — INSULIN GLARGINE 15 UNITS: 100 INJECTION, SOLUTION SUBCUTANEOUS at 09:21

## 2022-01-01 RX ADMIN — CHLORHEXIDINE GLUCONATE 15 ML: 1.2 RINSE ORAL at 09:32

## 2022-01-01 RX ADMIN — GLYCOPYRROLATE 0.2 MG: 0.2 INJECTION INTRAMUSCULAR; INTRAVENOUS at 19:32

## 2022-01-01 RX ADMIN — MIDODRINE HYDROCHLORIDE 10 MG: 5 TABLET ORAL at 17:26

## 2022-01-01 RX ADMIN — INSULIN LISPRO 8 UNITS: 100 INJECTION, SOLUTION INTRAVENOUS; SUBCUTANEOUS at 11:57

## 2022-01-01 RX ADMIN — Medication 5 MCG/MIN: at 18:05

## 2022-01-01 RX ADMIN — ACETAMINOPHEN 650 MG: 325 TABLET ORAL at 12:02

## 2022-01-01 RX ADMIN — HEPARIN SODIUM 5000 UNITS: 5000 INJECTION INTRAVENOUS; SUBCUTANEOUS at 06:34

## 2022-01-01 RX ADMIN — CHLORHEXIDINE GLUCONATE 15 ML: 1.2 RINSE ORAL at 20:50

## 2022-01-01 RX ADMIN — MIDODRINE HYDROCHLORIDE 10 MG: 5 TABLET ORAL at 12:09

## 2022-01-01 RX ADMIN — SODIUM CHLORIDE, PRESERVATIVE FREE 20 MG: 5 INJECTION INTRAVENOUS at 08:33

## 2022-01-01 RX ADMIN — FENTANYL CITRATE 25 MCG: 50 INJECTION, SOLUTION INTRAMUSCULAR; INTRAVENOUS at 20:14

## 2022-01-01 RX ADMIN — INSULIN GLARGINE 30 UNITS: 100 INJECTION, SOLUTION SUBCUTANEOUS at 09:28

## 2022-01-01 RX ADMIN — CHLORHEXIDINE GLUCONATE 15 ML: 1.2 RINSE ORAL at 08:52

## 2022-01-01 RX ADMIN — ACETYLCYSTEINE 200 MG: 200 INHALANT RESPIRATORY (INHALATION) at 19:43

## 2022-01-01 RX ADMIN — PROPOFOL 5 MCG/KG/MIN: 10 INJECTION, EMULSION INTRAVENOUS at 11:03

## 2022-01-01 RX ADMIN — ACETAMINOPHEN 650 MG: 325 TABLET ORAL at 18:09

## 2022-01-01 RX ADMIN — PIPERACILLIN AND TAZOBACTAM 3375 MG: 3; .375 INJECTION, POWDER, FOR SOLUTION INTRAVENOUS at 15:56

## 2022-01-01 RX ADMIN — HEPARIN SODIUM 5000 UNITS: 5000 INJECTION INTRAVENOUS; SUBCUTANEOUS at 14:29

## 2022-01-01 RX ADMIN — SODIUM CHLORIDE, PRESERVATIVE FREE 10 ML: 5 INJECTION INTRAVENOUS at 08:02

## 2022-01-01 RX ADMIN — SODIUM CHLORIDE, PRESERVATIVE FREE 20 MG: 5 INJECTION INTRAVENOUS at 07:58

## 2022-01-01 RX ADMIN — MIDODRINE HYDROCHLORIDE 10 MG: 5 TABLET ORAL at 16:09

## 2022-01-01 RX ADMIN — INSULIN LISPRO 12 UNITS: 100 INJECTION, SOLUTION INTRAVENOUS; SUBCUTANEOUS at 14:31

## 2022-01-01 RX ADMIN — PIPERACILLIN AND TAZOBACTAM 3375 MG: 3; .375 INJECTION, POWDER, FOR SOLUTION INTRAVENOUS at 07:49

## 2022-01-01 RX ADMIN — ATORVASTATIN CALCIUM 80 MG: 80 TABLET, FILM COATED ORAL at 20:50

## 2022-01-01 RX ADMIN — ALBUTEROL SULFATE 2.5 MG: 2.5 SOLUTION RESPIRATORY (INHALATION) at 19:43

## 2022-01-01 RX ADMIN — INSULIN GLARGINE 15 UNITS: 100 INJECTION, SOLUTION SUBCUTANEOUS at 08:59

## 2022-01-01 RX ADMIN — CHLORHEXIDINE GLUCONATE 15 ML: 1.2 RINSE ORAL at 21:33

## 2022-01-01 RX ADMIN — PIPERACILLIN AND TAZOBACTAM 3375 MG: 3; .375 INJECTION, POWDER, FOR SOLUTION INTRAVENOUS at 16:04

## 2022-01-01 RX ADMIN — CLOPIDOGREL 75 MG: 75 TABLET, FILM COATED ORAL at 09:12

## 2022-01-01 RX ADMIN — HEPARIN SODIUM 5000 UNITS: 5000 INJECTION INTRAVENOUS; SUBCUTANEOUS at 21:22

## 2022-01-01 RX ADMIN — PROPOFOL 10 MCG/KG/MIN: 10 INJECTION, EMULSION INTRAVENOUS at 20:04

## 2022-01-01 RX ADMIN — ASPIRIN 81 MG: 81 TABLET, CHEWABLE ORAL at 08:02

## 2022-01-01 RX ADMIN — SODIUM CHLORIDE, PRESERVATIVE FREE 10 ML: 5 INJECTION INTRAVENOUS at 20:50

## 2022-01-01 RX ADMIN — CHLORHEXIDINE GLUCONATE 15 ML: 1.2 RINSE ORAL at 07:54

## 2022-01-01 RX ADMIN — PIPERACILLIN AND TAZOBACTAM 3375 MG: 3; .375 INJECTION, POWDER, FOR SOLUTION INTRAVENOUS at 16:33

## 2022-01-01 RX ADMIN — HEPARIN SODIUM 5000 UNITS: 5000 INJECTION INTRAVENOUS; SUBCUTANEOUS at 06:11

## 2022-01-01 RX ADMIN — FUROSEMIDE 40 MG: 10 INJECTION, SOLUTION INTRAMUSCULAR; INTRAVENOUS at 19:56

## 2022-01-01 RX ADMIN — CLOPIDOGREL 75 MG: 75 TABLET, FILM COATED ORAL at 08:52

## 2022-01-01 RX ADMIN — HEPARIN SODIUM 5000 UNITS: 5000 INJECTION INTRAVENOUS; SUBCUTANEOUS at 21:47

## 2022-01-01 RX ADMIN — ATORVASTATIN CALCIUM 80 MG: 80 TABLET, FILM COATED ORAL at 20:01

## 2022-01-01 RX ADMIN — PIPERACILLIN AND TAZOBACTAM 3375 MG: 3; .375 INJECTION, POWDER, FOR SOLUTION INTRAVENOUS at 00:09

## 2022-01-01 RX ADMIN — PROPOFOL 20 MCG/KG/MIN: 10 INJECTION, EMULSION INTRAVENOUS at 23:03

## 2022-01-01 RX ADMIN — ACETAMINOPHEN 650 MG: 325 TABLET ORAL at 06:28

## 2022-01-01 RX ADMIN — AMIODARONE HYDROCHLORIDE 0.5 MG/MIN: 50 INJECTION, SOLUTION INTRAVENOUS at 10:04

## 2022-01-01 RX ADMIN — AMIODARONE HYDROCHLORIDE 1 MG/MIN: 50 INJECTION, SOLUTION INTRAVENOUS at 12:07

## 2022-01-01 RX ADMIN — ATORVASTATIN CALCIUM 80 MG: 80 TABLET, FILM COATED ORAL at 20:26

## 2022-01-01 RX ADMIN — INSULIN LISPRO 4 UNITS: 100 INJECTION, SOLUTION INTRAVENOUS; SUBCUTANEOUS at 14:16

## 2022-01-01 RX ADMIN — ATORVASTATIN CALCIUM 80 MG: 80 TABLET, FILM COATED ORAL at 20:48

## 2022-01-01 RX ADMIN — INSULIN LISPRO 2 UNITS: 100 INJECTION, SOLUTION INTRAVENOUS; SUBCUTANEOUS at 19:53

## 2022-01-01 RX ADMIN — INSULIN LISPRO 2 UNITS: 100 INJECTION, SOLUTION INTRAVENOUS; SUBCUTANEOUS at 20:41

## 2022-01-01 RX ADMIN — CLOPIDOGREL 75 MG: 75 TABLET, FILM COATED ORAL at 09:50

## 2022-01-01 RX ADMIN — POTASSIUM CHLORIDE 20 MEQ: 29.8 INJECTION, SOLUTION INTRAVENOUS at 11:41

## 2022-01-01 RX ADMIN — INSULIN LISPRO 4 UNITS: 100 INJECTION, SOLUTION INTRAVENOUS; SUBCUTANEOUS at 08:29

## 2022-01-01 RX ADMIN — PIPERACILLIN AND TAZOBACTAM 3375 MG: 3; .375 INJECTION, POWDER, FOR SOLUTION INTRAVENOUS at 08:53

## 2022-01-01 RX ADMIN — HEPARIN SODIUM 5000 UNITS: 5000 INJECTION INTRAVENOUS; SUBCUTANEOUS at 14:00

## 2022-01-01 RX ADMIN — INSULIN LISPRO 4 UNITS: 100 INJECTION, SOLUTION INTRAVENOUS; SUBCUTANEOUS at 23:45

## 2022-01-01 RX ADMIN — SODIUM CHLORIDE, PRESERVATIVE FREE 10 ML: 5 INJECTION INTRAVENOUS at 19:56

## 2022-01-01 RX ADMIN — ASPIRIN 81 MG: 81 TABLET, CHEWABLE ORAL at 08:16

## 2022-01-01 RX ADMIN — INSULIN LISPRO 2 UNITS: 100 INJECTION, SOLUTION INTRAVENOUS; SUBCUTANEOUS at 16:17

## 2022-01-01 RX ADMIN — INSULIN GLARGINE 15 UNITS: 100 INJECTION, SOLUTION SUBCUTANEOUS at 20:18

## 2022-01-01 RX ADMIN — SODIUM CHLORIDE, PRESERVATIVE FREE 10 ML: 5 INJECTION INTRAVENOUS at 09:11

## 2022-01-01 RX ADMIN — POTASSIUM CHLORIDE 20 MEQ: 29.8 INJECTION, SOLUTION INTRAVENOUS at 09:26

## 2022-01-01 RX ADMIN — MIDODRINE HYDROCHLORIDE 10 MG: 5 TABLET ORAL at 16:32

## 2022-01-01 RX ADMIN — CHLORHEXIDINE GLUCONATE 15 ML: 1.2 RINSE ORAL at 22:13

## 2022-01-01 RX ADMIN — CHLORHEXIDINE GLUCONATE 15 ML: 1.2 RINSE ORAL at 20:22

## 2022-01-01 RX ADMIN — HEPARIN SODIUM 5000 UNITS: 5000 INJECTION INTRAVENOUS; SUBCUTANEOUS at 13:40

## 2022-01-01 RX ADMIN — CHLORHEXIDINE GLUCONATE 15 ML: 1.2 RINSE ORAL at 09:00

## 2022-01-01 RX ADMIN — HEPARIN SODIUM 5000 UNITS: 5000 INJECTION INTRAVENOUS; SUBCUTANEOUS at 14:31

## 2022-01-01 RX ADMIN — HEPARIN SODIUM 5000 UNITS: 5000 INJECTION INTRAVENOUS; SUBCUTANEOUS at 13:43

## 2022-01-01 RX ADMIN — ASPIRIN 81 MG: 81 TABLET, CHEWABLE ORAL at 08:34

## 2022-01-01 RX ADMIN — FUROSEMIDE 20 MG: 10 INJECTION, SOLUTION INTRAMUSCULAR; INTRAVENOUS at 08:29

## 2022-01-01 RX ADMIN — SODIUM CHLORIDE, PRESERVATIVE FREE 20 MG: 5 INJECTION INTRAVENOUS at 08:15

## 2022-01-01 RX ADMIN — INSULIN LISPRO 4 UNITS: 100 INJECTION, SOLUTION INTRAVENOUS; SUBCUTANEOUS at 00:29

## 2022-01-01 RX ADMIN — IOPAMIDOL 75 ML: 755 INJECTION, SOLUTION INTRAVENOUS at 10:45

## 2022-01-01 RX ADMIN — PIPERACILLIN AND TAZOBACTAM 3375 MG: 3; .375 INJECTION, POWDER, FOR SOLUTION INTRAVENOUS at 00:15

## 2022-01-01 RX ADMIN — INSULIN LISPRO 4 UNITS: 100 INJECTION, SOLUTION INTRAVENOUS; SUBCUTANEOUS at 11:24

## 2022-01-01 RX ADMIN — CHLORHEXIDINE GLUCONATE 15 ML: 1.2 RINSE ORAL at 15:16

## 2022-01-01 RX ADMIN — CLOPIDOGREL 75 MG: 75 TABLET, FILM COATED ORAL at 07:56

## 2022-01-01 RX ADMIN — ATORVASTATIN CALCIUM 80 MG: 80 TABLET, FILM COATED ORAL at 20:21

## 2022-01-01 RX ADMIN — FAMOTIDINE 20 MG: 20 TABLET, FILM COATED ORAL at 08:35

## 2022-01-01 RX ADMIN — VECURONIUM BROMIDE 10 MG: 1 INJECTION, POWDER, LYOPHILIZED, FOR SOLUTION INTRAVENOUS at 10:47

## 2022-01-01 RX ADMIN — ASPIRIN 81 MG: 81 TABLET, CHEWABLE ORAL at 10:03

## 2022-01-01 RX ADMIN — SODIUM CHLORIDE, PRESERVATIVE FREE 10 ML: 5 INJECTION INTRAVENOUS at 08:07

## 2022-01-01 RX ADMIN — INSULIN LISPRO 2 UNITS: 100 INJECTION, SOLUTION INTRAVENOUS; SUBCUTANEOUS at 20:17

## 2022-01-01 RX ADMIN — ATORVASTATIN CALCIUM 80 MG: 80 TABLET, FILM COATED ORAL at 20:54

## 2022-01-01 RX ADMIN — PIPERACILLIN AND TAZOBACTAM 3375 MG: 3; .375 INJECTION, POWDER, FOR SOLUTION INTRAVENOUS at 17:08

## 2022-01-01 RX ADMIN — CHLORHEXIDINE GLUCONATE 15 ML: 1.2 RINSE ORAL at 07:58

## 2022-01-01 RX ADMIN — CHLORHEXIDINE GLUCONATE 15 ML: 1.2 RINSE ORAL at 20:13

## 2022-01-01 RX ADMIN — PIPERACILLIN AND TAZOBACTAM 4500 MG: 4; .5 INJECTION, POWDER, FOR SOLUTION INTRAVENOUS at 04:23

## 2022-01-01 RX ADMIN — ASPIRIN 81 MG: 81 TABLET, CHEWABLE ORAL at 08:33

## 2022-01-01 RX ADMIN — PROPOFOL 10 MCG/KG/MIN: 10 INJECTION, EMULSION INTRAVENOUS at 04:28

## 2022-01-01 RX ADMIN — INSULIN LISPRO 2 UNITS: 100 INJECTION, SOLUTION INTRAVENOUS; SUBCUTANEOUS at 12:08

## 2022-01-01 RX ADMIN — CLOPIDOGREL 75 MG: 75 TABLET, FILM COATED ORAL at 09:43

## 2022-01-01 RX ADMIN — PIPERACILLIN AND TAZOBACTAM 3375 MG: 3; .375 INJECTION, POWDER, FOR SOLUTION INTRAVENOUS at 23:27

## 2022-01-01 RX ADMIN — INSULIN LISPRO 4 UNITS: 100 INJECTION, SOLUTION INTRAVENOUS; SUBCUTANEOUS at 02:21

## 2022-01-01 RX ADMIN — INSULIN LISPRO 2 UNITS: 100 INJECTION, SOLUTION INTRAVENOUS; SUBCUTANEOUS at 17:26

## 2022-01-01 RX ADMIN — SODIUM CHLORIDE, PRESERVATIVE FREE 10 ML: 5 INJECTION INTRAVENOUS at 20:49

## 2022-01-01 RX ADMIN — AMIODARONE HYDROCHLORIDE 0.5 MG/MIN: 50 INJECTION, SOLUTION INTRAVENOUS at 18:19

## 2022-01-01 RX ADMIN — DEXMEDETOMIDINE HYDROCHLORIDE 0.4 MCG/KG/HR: 4 INJECTION, SOLUTION INTRAVENOUS at 13:16

## 2022-01-01 RX ADMIN — PIPERACILLIN AND TAZOBACTAM 3375 MG: 3; .375 INJECTION, POWDER, FOR SOLUTION INTRAVENOUS at 15:44

## 2022-01-01 RX ADMIN — WATER: 1 INJECTION INTRAMUSCULAR; INTRAVENOUS; SUBCUTANEOUS at 10:45

## 2022-01-01 RX ADMIN — POTASSIUM CHLORIDE 20 MEQ: 29.8 INJECTION, SOLUTION INTRAVENOUS at 17:46

## 2022-01-01 RX ADMIN — MORPHINE SULFATE 4 MG: 4 INJECTION INTRAVENOUS at 19:32

## 2022-01-01 RX ADMIN — INSULIN GLARGINE 30 UNITS: 100 INJECTION, SOLUTION SUBCUTANEOUS at 08:39

## 2022-01-01 RX ADMIN — SODIUM CHLORIDE, PRESERVATIVE FREE 10 ML: 5 INJECTION INTRAVENOUS at 08:46

## 2022-01-01 RX ADMIN — INSULIN LISPRO 2 UNITS: 100 INJECTION, SOLUTION INTRAVENOUS; SUBCUTANEOUS at 15:12

## 2022-01-01 RX ADMIN — MIDODRINE HYDROCHLORIDE 10 MG: 5 TABLET ORAL at 16:11

## 2022-01-01 RX ADMIN — INSULIN GLARGINE 20 UNITS: 100 INJECTION, SOLUTION SUBCUTANEOUS at 20:38

## 2022-01-01 RX ADMIN — INSULIN LISPRO 4 UNITS: 100 INJECTION, SOLUTION INTRAVENOUS; SUBCUTANEOUS at 09:00

## 2022-01-01 RX ADMIN — MIDODRINE HYDROCHLORIDE 10 MG: 5 TABLET ORAL at 12:00

## 2022-01-01 RX ADMIN — LORAZEPAM 1 MG: 2 INJECTION INTRAMUSCULAR; INTRAVENOUS at 16:55

## 2022-01-01 RX ADMIN — PIPERACILLIN AND TAZOBACTAM 3375 MG: 3; .375 INJECTION, POWDER, FOR SOLUTION INTRAVENOUS at 00:20

## 2022-01-01 RX ADMIN — MIDODRINE HYDROCHLORIDE 10 MG: 5 TABLET ORAL at 17:30

## 2022-01-01 RX ADMIN — MIDODRINE HYDROCHLORIDE 10 MG: 5 TABLET ORAL at 16:33

## 2022-01-01 RX ADMIN — CLOPIDOGREL 75 MG: 75 TABLET, FILM COATED ORAL at 08:20

## 2022-01-01 RX ADMIN — INSULIN LISPRO 8 UNITS: 100 INJECTION, SOLUTION INTRAVENOUS; SUBCUTANEOUS at 20:38

## 2022-01-01 RX ADMIN — HEPARIN SODIUM 5000 UNITS: 5000 INJECTION INTRAVENOUS; SUBCUTANEOUS at 13:56

## 2022-01-01 RX ADMIN — FUROSEMIDE 20 MG: 10 INJECTION, SOLUTION INTRAMUSCULAR; INTRAVENOUS at 16:28

## 2022-01-01 RX ADMIN — CALCIUM GLUCONATE 2000 MG: 20 INJECTION, SOLUTION INTRAVENOUS at 01:12

## 2022-01-01 RX ADMIN — ASPIRIN 81 MG: 81 TABLET, CHEWABLE ORAL at 08:35

## 2022-01-01 RX ADMIN — INSULIN LISPRO 2 UNITS: 100 INJECTION, SOLUTION INTRAVENOUS; SUBCUTANEOUS at 16:34

## 2022-01-01 RX ADMIN — HEPARIN SODIUM 5000 UNITS: 5000 INJECTION INTRAVENOUS; SUBCUTANEOUS at 22:10

## 2022-01-01 RX ADMIN — INSULIN GLARGINE 10 UNITS: 100 INJECTION, SOLUTION SUBCUTANEOUS at 21:32

## 2022-01-01 RX ADMIN — PROPOFOL 10 MCG/KG/MIN: 10 INJECTION, EMULSION INTRAVENOUS at 19:21

## 2022-01-01 RX ADMIN — SODIUM CHLORIDE: 9 INJECTION, SOLUTION INTRAVENOUS at 01:25

## 2022-01-01 RX ADMIN — CHLORHEXIDINE GLUCONATE 15 ML: 1.2 RINSE ORAL at 09:58

## 2022-01-01 RX ADMIN — INSULIN LISPRO 8 UNITS: 100 INJECTION, SOLUTION INTRAVENOUS; SUBCUTANEOUS at 08:37

## 2022-01-01 RX ADMIN — CLOPIDOGREL 75 MG: 75 TABLET, FILM COATED ORAL at 08:05

## 2022-01-01 RX ADMIN — ALBUTEROL SULFATE 2.5 MG: 2.5 SOLUTION RESPIRATORY (INHALATION) at 15:42

## 2022-01-01 RX ADMIN — SODIUM CHLORIDE: 9 INJECTION, SOLUTION INTRAVENOUS at 06:55

## 2022-01-01 RX ADMIN — INSULIN LISPRO 4 UNITS: 100 INJECTION, SOLUTION INTRAVENOUS; SUBCUTANEOUS at 16:07

## 2022-01-01 RX ADMIN — MIDODRINE HYDROCHLORIDE 10 MG: 5 TABLET ORAL at 11:18

## 2022-01-01 RX ADMIN — PROPOFOL 5 MCG/KG/MIN: 10 INJECTION, EMULSION INTRAVENOUS at 05:43

## 2022-01-01 RX ADMIN — FAMOTIDINE 20 MG: 20 TABLET, FILM COATED ORAL at 07:52

## 2022-01-01 RX ADMIN — PROPOFOL 5 MCG/KG/MIN: 10 INJECTION, EMULSION INTRAVENOUS at 17:29

## 2022-01-01 RX ADMIN — CHLORHEXIDINE GLUCONATE 15 ML: 1.2 RINSE ORAL at 20:01

## 2022-01-01 RX ADMIN — INSULIN LISPRO 4 UNITS: 100 INJECTION, SOLUTION INTRAVENOUS; SUBCUTANEOUS at 19:22

## 2022-01-01 RX ADMIN — HEPARIN SODIUM 5000 UNITS: 5000 INJECTION INTRAVENOUS; SUBCUTANEOUS at 05:36

## 2022-01-01 RX ADMIN — SODIUM CHLORIDE, PRESERVATIVE FREE 10 ML: 5 INJECTION INTRAVENOUS at 08:19

## 2022-01-01 RX ADMIN — HEPARIN SODIUM 5000 UNITS: 5000 INJECTION INTRAVENOUS; SUBCUTANEOUS at 05:30

## 2022-01-01 RX ADMIN — MIDODRINE HYDROCHLORIDE 10 MG: 5 TABLET ORAL at 08:24

## 2022-01-01 RX ADMIN — FAMOTIDINE 20 MG: 20 TABLET, FILM COATED ORAL at 16:09

## 2022-01-01 RX ADMIN — SODIUM CHLORIDE: 9 INJECTION, SOLUTION INTRAVENOUS at 06:18

## 2022-01-01 RX ADMIN — FAMOTIDINE 20 MG: 20 TABLET, FILM COATED ORAL at 09:12

## 2022-01-01 RX ADMIN — ATORVASTATIN CALCIUM 80 MG: 80 TABLET, FILM COATED ORAL at 19:51

## 2022-01-01 RX ADMIN — SODIUM CHLORIDE, PRESERVATIVE FREE 20 MG: 5 INJECTION INTRAVENOUS at 09:50

## 2022-01-01 RX ADMIN — GLYCOPYRROLATE 0.2 MG: 0.2 INJECTION INTRAMUSCULAR; INTRAVENOUS at 15:46

## 2022-01-01 RX ADMIN — CHLORHEXIDINE GLUCONATE 15 ML: 1.2 RINSE ORAL at 08:02

## 2022-01-01 RX ADMIN — SODIUM CHLORIDE, PRESERVATIVE FREE 10 ML: 5 INJECTION INTRAVENOUS at 08:04

## 2022-01-01 RX ADMIN — HEPARIN SODIUM 5000 UNITS: 5000 INJECTION INTRAVENOUS; SUBCUTANEOUS at 22:22

## 2022-01-01 RX ADMIN — SODIUM CHLORIDE, PRESERVATIVE FREE 20 MG: 5 INJECTION INTRAVENOUS at 08:52

## 2022-01-01 RX ADMIN — FUROSEMIDE 40 MG: 10 INJECTION, SOLUTION INTRAMUSCULAR; INTRAVENOUS at 14:12

## 2022-01-01 RX ADMIN — MIDODRINE HYDROCHLORIDE 10 MG: 5 TABLET ORAL at 12:08

## 2022-01-01 RX ADMIN — FUROSEMIDE 40 MG: 10 INJECTION, SOLUTION INTRAMUSCULAR; INTRAVENOUS at 13:32

## 2022-01-01 RX ADMIN — PIPERACILLIN AND TAZOBACTAM 4500 MG: 4; .5 INJECTION, POWDER, FOR SOLUTION INTRAVENOUS at 12:03

## 2022-01-01 RX ADMIN — INSULIN LISPRO 4 UNITS: 100 INJECTION, SOLUTION INTRAVENOUS; SUBCUTANEOUS at 07:54

## 2022-01-01 RX ADMIN — INSULIN LISPRO 4 UNITS: 100 INJECTION, SOLUTION INTRAVENOUS; SUBCUTANEOUS at 03:16

## 2022-01-01 RX ADMIN — INSULIN LISPRO 2 UNITS: 100 INJECTION, SOLUTION INTRAVENOUS; SUBCUTANEOUS at 23:55

## 2022-01-01 RX ADMIN — CALCIUM GLUCONATE 1000 MG: 20 INJECTION, SOLUTION INTRAVENOUS at 11:09

## 2022-01-01 RX ADMIN — SODIUM CHLORIDE, PRESERVATIVE FREE 20 MG: 5 INJECTION INTRAVENOUS at 09:43

## 2022-01-01 RX ADMIN — MIDODRINE HYDROCHLORIDE 10 MG: 5 TABLET ORAL at 16:48

## 2022-01-01 RX ADMIN — HEPARIN SODIUM 5000 UNITS: 5000 INJECTION INTRAVENOUS; SUBCUTANEOUS at 13:44

## 2022-01-01 RX ADMIN — INSULIN GLARGINE 30 UNITS: 100 INJECTION, SOLUTION SUBCUTANEOUS at 20:13

## 2022-01-01 RX ADMIN — ALBUTEROL SULFATE 2.5 MG: 2.5 SOLUTION RESPIRATORY (INHALATION) at 15:58

## 2022-01-01 RX ADMIN — HEPARIN SODIUM 5000 UNITS: 5000 INJECTION INTRAVENOUS; SUBCUTANEOUS at 14:19

## 2022-01-01 RX ADMIN — PIPERACILLIN AND TAZOBACTAM 4500 MG: 4; .5 INJECTION, POWDER, FOR SOLUTION INTRAVENOUS at 13:44

## 2022-01-01 RX ADMIN — HEPARIN SODIUM 5000 UNITS: 5000 INJECTION INTRAVENOUS; SUBCUTANEOUS at 21:45

## 2022-01-01 RX ADMIN — PROPOFOL 10 MCG/KG/MIN: 10 INJECTION, EMULSION INTRAVENOUS at 00:16

## 2022-01-01 RX ADMIN — FENTANYL CITRATE 100 MCG: 50 INJECTION, SOLUTION INTRAMUSCULAR; INTRAVENOUS at 10:45

## 2022-01-01 RX ADMIN — ATORVASTATIN CALCIUM 80 MG: 80 TABLET, FILM COATED ORAL at 19:56

## 2022-01-01 RX ADMIN — AMIODARONE HYDROCHLORIDE 0.5 MG/MIN: 50 INJECTION, SOLUTION INTRAVENOUS at 19:49

## 2022-01-01 RX ADMIN — PROPOFOL 15 MCG/KG/MIN: 10 INJECTION, EMULSION INTRAVENOUS at 05:20

## 2022-01-01 RX ADMIN — INSULIN LISPRO 4 UNITS: 100 INJECTION, SOLUTION INTRAVENOUS; SUBCUTANEOUS at 03:21

## 2022-01-01 RX ADMIN — ASPIRIN 81 MG: 81 TABLET, CHEWABLE ORAL at 08:27

## 2022-01-01 RX ADMIN — PIPERACILLIN AND TAZOBACTAM 4500 MG: 4; .5 INJECTION, POWDER, FOR SOLUTION INTRAVENOUS at 20:01

## 2022-01-01 RX ADMIN — ALBUTEROL SULFATE 2.5 MG: 2.5 SOLUTION RESPIRATORY (INHALATION) at 17:09

## 2022-01-01 RX ADMIN — INSULIN LISPRO 2 UNITS: 100 INJECTION, SOLUTION INTRAVENOUS; SUBCUTANEOUS at 00:11

## 2022-01-01 RX ADMIN — INSULIN LISPRO 4 UNITS: 100 INJECTION, SOLUTION INTRAVENOUS; SUBCUTANEOUS at 12:29

## 2022-01-01 RX ADMIN — PIPERACILLIN AND TAZOBACTAM 3375 MG: 3; .375 INJECTION, POWDER, FOR SOLUTION INTRAVENOUS at 12:22

## 2022-01-01 RX ADMIN — SODIUM CHLORIDE, PRESERVATIVE FREE 10 ML: 5 INJECTION INTRAVENOUS at 20:17

## 2022-01-01 RX ADMIN — ACETAMINOPHEN 650 MG: 325 TABLET ORAL at 20:54

## 2022-01-01 RX ADMIN — INSULIN LISPRO 4 UNITS: 100 INJECTION, SOLUTION INTRAVENOUS; SUBCUTANEOUS at 15:30

## 2022-01-01 RX ADMIN — ASPIRIN 81 MG: 81 TABLET, CHEWABLE ORAL at 08:24

## 2022-01-01 RX ADMIN — ACETAMINOPHEN 650 MG: 325 TABLET ORAL at 14:31

## 2022-01-01 RX ADMIN — ASPIRIN 81 MG: 81 TABLET, CHEWABLE ORAL at 08:05

## 2022-01-01 RX ADMIN — SODIUM CHLORIDE 500 ML: 9 INJECTION, SOLUTION INTRAVENOUS at 14:00

## 2022-01-01 RX ADMIN — SODIUM CHLORIDE 30 MG/ML INHALATION SOLUTION 3 ML: 30 SOLUTION INHALANT at 03:49

## 2022-01-01 RX ADMIN — HEPARIN SODIUM 5000 UNITS: 5000 INJECTION INTRAVENOUS; SUBCUTANEOUS at 22:01

## 2022-01-01 RX ADMIN — INSULIN LISPRO 4 UNITS: 100 INJECTION, SOLUTION INTRAVENOUS; SUBCUTANEOUS at 04:20

## 2022-01-01 RX ADMIN — INSULIN LISPRO 4 UNITS: 100 INJECTION, SOLUTION INTRAVENOUS; SUBCUTANEOUS at 17:30

## 2022-01-01 RX ADMIN — INSULIN LISPRO 8 UNITS: 100 INJECTION, SOLUTION INTRAVENOUS; SUBCUTANEOUS at 05:51

## 2022-01-01 RX ADMIN — INSULIN LISPRO 4 UNITS: 100 INJECTION, SOLUTION INTRAVENOUS; SUBCUTANEOUS at 08:15

## 2022-01-01 RX ADMIN — PIPERACILLIN AND TAZOBACTAM 3375 MG: 3; .375 INJECTION, POWDER, FOR SOLUTION INTRAVENOUS at 08:15

## 2022-01-01 RX ADMIN — HEPARIN SODIUM 5000 UNITS: 5000 INJECTION INTRAVENOUS; SUBCUTANEOUS at 14:51

## 2022-01-01 RX ADMIN — SODIUM CHLORIDE: 9 INJECTION, SOLUTION INTRAVENOUS at 21:48

## 2022-01-01 RX ADMIN — PIPERACILLIN AND TAZOBACTAM 3375 MG: 3; .375 INJECTION, POWDER, FOR SOLUTION INTRAVENOUS at 07:57

## 2022-01-01 RX ADMIN — INSULIN LISPRO 2 UNITS: 100 INJECTION, SOLUTION INTRAVENOUS; SUBCUTANEOUS at 03:49

## 2022-01-01 RX ADMIN — CLOPIDOGREL 75 MG: 75 TABLET, FILM COATED ORAL at 08:30

## 2022-01-01 RX ADMIN — SODIUM CHLORIDE: 9 INJECTION, SOLUTION INTRAVENOUS at 08:44

## 2022-01-01 RX ADMIN — VANCOMYCIN HYDROCHLORIDE 1500 MG: 10 INJECTION, POWDER, LYOPHILIZED, FOR SOLUTION INTRAVENOUS at 21:44

## 2022-01-01 RX ADMIN — INSULIN GLARGINE 30 UNITS: 100 INJECTION, SOLUTION SUBCUTANEOUS at 20:01

## 2022-01-01 RX ADMIN — MORPHINE SULFATE 4 MG: 2 INJECTION, SOLUTION INTRAMUSCULAR; INTRAVENOUS at 16:25

## 2022-01-01 RX ADMIN — MORPHINE SULFATE 4 MG: 2 INJECTION, SOLUTION INTRAMUSCULAR; INTRAVENOUS at 17:10

## 2022-01-01 RX ADMIN — INSULIN LISPRO 2 UNITS: 100 INJECTION, SOLUTION INTRAVENOUS; SUBCUTANEOUS at 08:15

## 2022-01-01 RX ADMIN — ATORVASTATIN CALCIUM 80 MG: 80 TABLET, FILM COATED ORAL at 22:07

## 2022-01-01 RX ADMIN — LORAZEPAM 1 MG: 2 INJECTION INTRAMUSCULAR; INTRAVENOUS at 17:09

## 2022-01-01 RX ADMIN — INSULIN LISPRO 4 UNITS: 100 INJECTION, SOLUTION INTRAVENOUS; SUBCUTANEOUS at 14:19

## 2022-01-01 RX ADMIN — INSULIN LISPRO 4 UNITS: 100 INJECTION, SOLUTION INTRAVENOUS; SUBCUTANEOUS at 09:01

## 2022-01-01 RX ADMIN — POTASSIUM CHLORIDE 20 MEQ: 29.8 INJECTION, SOLUTION INTRAVENOUS at 18:57

## 2022-01-01 RX ADMIN — SODIUM CHLORIDE, PRESERVATIVE FREE 10 ML: 5 INJECTION INTRAVENOUS at 20:02

## 2022-01-01 RX ADMIN — SODIUM CHLORIDE, PRESERVATIVE FREE 10 ML: 5 INJECTION INTRAVENOUS at 08:51

## 2022-01-01 RX ADMIN — INSULIN LISPRO 12 UNITS: 100 INJECTION, SOLUTION INTRAVENOUS; SUBCUTANEOUS at 20:30

## 2022-01-01 RX ADMIN — MORPHINE SULFATE 4 MG: 4 INJECTION INTRAVENOUS at 17:48

## 2022-01-01 RX ADMIN — INSULIN LISPRO 4 UNITS: 100 INJECTION, SOLUTION INTRAVENOUS; SUBCUTANEOUS at 19:53

## 2022-01-01 RX ADMIN — SODIUM CHLORIDE, PRESERVATIVE FREE 20 MG: 5 INJECTION INTRAVENOUS at 09:18

## 2022-01-01 RX ADMIN — PIPERACILLIN AND TAZOBACTAM 3375 MG: 3; .375 INJECTION, POWDER, FOR SOLUTION INTRAVENOUS at 23:58

## 2022-01-01 RX ADMIN — HEPARIN SODIUM 5000 UNITS: 5000 INJECTION INTRAVENOUS; SUBCUTANEOUS at 22:00

## 2022-01-01 RX ADMIN — SODIUM CHLORIDE, PRESERVATIVE FREE 10 ML: 5 INJECTION INTRAVENOUS at 21:34

## 2022-01-01 RX ADMIN — INSULIN LISPRO 2 UNITS: 100 INJECTION, SOLUTION INTRAVENOUS; SUBCUTANEOUS at 04:33

## 2022-01-01 RX ADMIN — MIDODRINE HYDROCHLORIDE 10 MG: 5 TABLET ORAL at 07:52

## 2022-01-01 RX ADMIN — INSULIN LISPRO 12 UNITS: 100 INJECTION, SOLUTION INTRAVENOUS; SUBCUTANEOUS at 09:00

## 2022-01-01 RX ADMIN — MIDODRINE HYDROCHLORIDE 10 MG: 5 TABLET ORAL at 12:05

## 2022-01-01 RX ADMIN — HEPARIN SODIUM 5000 UNITS: 5000 INJECTION INTRAVENOUS; SUBCUTANEOUS at 05:55

## 2022-01-01 RX ADMIN — ASPIRIN 81 MG: 81 TABLET, CHEWABLE ORAL at 07:59

## 2022-01-01 RX ADMIN — DEXMEDETOMIDINE HYDROCHLORIDE 0.2 MCG/KG/HR: 4 INJECTION, SOLUTION INTRAVENOUS at 08:52

## 2022-01-01 RX ADMIN — INSULIN LISPRO 4 UNITS: 100 INJECTION, SOLUTION INTRAVENOUS; SUBCUTANEOUS at 10:44

## 2022-01-01 RX ADMIN — HEPARIN SODIUM 5000 UNITS: 5000 INJECTION INTRAVENOUS; SUBCUTANEOUS at 20:19

## 2022-01-01 RX ADMIN — CHLORHEXIDINE GLUCONATE 15 ML: 1.2 RINSE ORAL at 21:15

## 2022-01-01 RX ADMIN — SODIUM CHLORIDE, PRESERVATIVE FREE 10 ML: 5 INJECTION INTRAVENOUS at 07:51

## 2022-01-01 RX ADMIN — MIDODRINE HYDROCHLORIDE 10 MG: 5 TABLET ORAL at 08:52

## 2022-01-01 RX ADMIN — HEPARIN SODIUM 5000 UNITS: 5000 INJECTION INTRAVENOUS; SUBCUTANEOUS at 05:46

## 2022-01-01 RX ADMIN — SODIUM CHLORIDE, PRESERVATIVE FREE 10 ML: 5 INJECTION INTRAVENOUS at 20:14

## 2022-01-01 RX ADMIN — LORAZEPAM 1 MG: 2 INJECTION INTRAMUSCULAR; INTRAVENOUS at 16:06

## 2022-01-01 RX ADMIN — INSULIN GLARGINE 25 UNITS: 100 INJECTION, SOLUTION SUBCUTANEOUS at 21:06

## 2022-01-01 RX ADMIN — INSULIN LISPRO 4 UNITS: 100 INJECTION, SOLUTION INTRAVENOUS; SUBCUTANEOUS at 12:15

## 2022-01-01 RX ADMIN — MIDODRINE HYDROCHLORIDE 5 MG: 5 TABLET ORAL at 08:20

## 2022-01-01 RX ADMIN — CLOPIDOGREL 75 MG: 75 TABLET, FILM COATED ORAL at 07:53

## 2022-01-01 RX ADMIN — SODIUM CHLORIDE: 9 INJECTION, SOLUTION INTRAVENOUS at 06:07

## 2022-01-01 RX ADMIN — PROPOFOL 10 MCG/KG/MIN: 10 INJECTION, EMULSION INTRAVENOUS at 06:59

## 2022-01-01 RX ADMIN — SODIUM CHLORIDE, PRESERVATIVE FREE 10 ML: 5 INJECTION INTRAVENOUS at 21:45

## 2022-01-01 RX ADMIN — CLOPIDOGREL 75 MG: 75 TABLET, FILM COATED ORAL at 08:28

## 2022-01-01 RX ADMIN — SODIUM CHLORIDE, PRESERVATIVE FREE 10 ML: 5 INJECTION INTRAVENOUS at 08:22

## 2022-01-01 RX ADMIN — PROPOFOL 10 MCG/KG/MIN: 10 INJECTION, EMULSION INTRAVENOUS at 18:38

## 2022-01-01 RX ADMIN — INSULIN LISPRO 4 UNITS: 100 INJECTION, SOLUTION INTRAVENOUS; SUBCUTANEOUS at 04:45

## 2022-01-01 RX ADMIN — ATORVASTATIN CALCIUM 80 MG: 80 TABLET, FILM COATED ORAL at 20:22

## 2022-01-01 RX ADMIN — CHLORHEXIDINE GLUCONATE 15 ML: 1.2 RINSE ORAL at 20:54

## 2022-01-01 RX ADMIN — ALBUTEROL SULFATE 2.5 MG: 2.5 SOLUTION RESPIRATORY (INHALATION) at 02:05

## 2022-01-01 RX ADMIN — INSULIN LISPRO 4 UNITS: 100 INJECTION, SOLUTION INTRAVENOUS; SUBCUTANEOUS at 04:49

## 2022-01-01 RX ADMIN — PIPERACILLIN AND TAZOBACTAM 3375 MG: 3; .375 INJECTION, POWDER, FOR SOLUTION INTRAVENOUS at 08:22

## 2022-01-01 RX ADMIN — HEPARIN SODIUM 5000 UNITS: 5000 INJECTION INTRAVENOUS; SUBCUTANEOUS at 21:05

## 2022-01-01 RX ADMIN — HEPARIN SODIUM 5000 UNITS: 5000 INJECTION INTRAVENOUS; SUBCUTANEOUS at 23:23

## 2022-01-01 RX ADMIN — CLOPIDOGREL 75 MG: 75 TABLET, FILM COATED ORAL at 08:16

## 2022-01-01 RX ADMIN — SODIUM CHLORIDE, PRESERVATIVE FREE 10 ML: 5 INJECTION INTRAVENOUS at 20:22

## 2022-01-01 RX ADMIN — INSULIN GLARGINE 25 UNITS: 100 INJECTION, SOLUTION SUBCUTANEOUS at 09:11

## 2022-01-01 ASSESSMENT — PULMONARY FUNCTION TESTS
PIF_VALUE: 27
PIF_VALUE: 31
PIF_VALUE: 28
PIF_VALUE: 25
PIF_VALUE: 23
PIF_VALUE: 20
PIF_VALUE: 29
PIF_VALUE: 27
PIF_VALUE: 31
PIF_VALUE: 35
PIF_VALUE: 30
PIF_VALUE: 30
PIF_VALUE: 29
PIF_VALUE: 28
PIF_VALUE: 29
PIF_VALUE: 28
PIF_VALUE: 20
PIF_VALUE: 24
PIF_VALUE: 30
PIF_VALUE: 27
PIF_VALUE: 37
PIF_VALUE: 23
PIF_VALUE: 32
PIF_VALUE: 22
PIF_VALUE: 20
PIF_VALUE: 26
PIF_VALUE: 20
PIF_VALUE: 30
PIF_VALUE: 29
PIF_VALUE: 23
PIF_VALUE: 25
PIF_VALUE: 25
PIF_VALUE: 24
PIF_VALUE: 26
PIF_VALUE: 28
PIF_VALUE: 24
PIF_VALUE: 40
PIF_VALUE: 17
PIF_VALUE: 23
PIF_VALUE: 14
PIF_VALUE: 31
PIF_VALUE: 31
PIF_VALUE: 27
PIF_VALUE: 20
PIF_VALUE: 27
PIF_VALUE: 33
PIF_VALUE: 26
PIF_VALUE: 22
PIF_VALUE: 30
PIF_VALUE: 29
PIF_VALUE: 29
PIF_VALUE: 22
PIF_VALUE: 25
PIF_VALUE: 27
PIF_VALUE: 23
PIF_VALUE: 27
PIF_VALUE: 34
PIF_VALUE: 34
PIF_VALUE: 28
PIF_VALUE: 48
PIF_VALUE: 26
PIF_VALUE: 35
PIF_VALUE: 33
PIF_VALUE: 27
PIF_VALUE: 30
PIF_VALUE: 29
PIF_VALUE: 25
PIF_VALUE: 23
PIF_VALUE: 30
PIF_VALUE: 31
PIF_VALUE: 33
PIF_VALUE: 30
PIF_VALUE: 31
PIF_VALUE: 29
PIF_VALUE: 30
PIF_VALUE: 36
PIF_VALUE: 28
PIF_VALUE: 27
PIF_VALUE: 29
PIF_VALUE: 29
PIF_VALUE: 23
PIF_VALUE: 28
PIF_VALUE: 53
PIF_VALUE: 28
PIF_VALUE: 26
PIF_VALUE: 21
PIF_VALUE: 28
PIF_VALUE: 26
PIF_VALUE: 31
PIF_VALUE: 25
PIF_VALUE: 30
PIF_VALUE: 34
PIF_VALUE: 25
PIF_VALUE: 27
PIF_VALUE: 32
PIF_VALUE: 34
PIF_VALUE: 22
PIF_VALUE: 30
PIF_VALUE: 30
PIF_VALUE: 13
PIF_VALUE: 24
PIF_VALUE: 19
PIF_VALUE: 26
PIF_VALUE: 24
PIF_VALUE: 32
PIF_VALUE: 38
PIF_VALUE: 29
PIF_VALUE: 27
PIF_VALUE: 26
PIF_VALUE: 24
PIF_VALUE: 14
PIF_VALUE: 32
PIF_VALUE: 26
PIF_VALUE: 20
PIF_VALUE: 29
PIF_VALUE: 31
PIF_VALUE: 27
PIF_VALUE: 21
PIF_VALUE: 30
PIF_VALUE: 23
PIF_VALUE: 27
PIF_VALUE: 29
PIF_VALUE: 25
PIF_VALUE: 26
PIF_VALUE: 34
PIF_VALUE: 23
PIF_VALUE: 22
PIF_VALUE: 23
PIF_VALUE: 22
PIF_VALUE: 26
PIF_VALUE: 23
PIF_VALUE: 23
PIF_VALUE: 21
PIF_VALUE: 34
PIF_VALUE: 25
PIF_VALUE: 35
PIF_VALUE: 30
PIF_VALUE: 34
PIF_VALUE: 28
PIF_VALUE: 24
PIF_VALUE: 35
PIF_VALUE: 31
PIF_VALUE: 30
PIF_VALUE: 23
PIF_VALUE: 28
PIF_VALUE: 26
PIF_VALUE: 23
PIF_VALUE: 25
PIF_VALUE: 29
PIF_VALUE: 29
PIF_VALUE: 33
PIF_VALUE: 32
PIF_VALUE: 30
PIF_VALUE: 25
PIF_VALUE: 28
PIF_VALUE: 13
PIF_VALUE: 22
PIF_VALUE: 31
PIF_VALUE: 22
PIF_VALUE: 23
PIF_VALUE: 25
PIF_VALUE: 13
PIF_VALUE: 28
PIF_VALUE: 25
PIF_VALUE: 23
PIF_VALUE: 24
PIF_VALUE: 29
PIF_VALUE: 17
PIF_VALUE: 30
PIF_VALUE: 26
PIF_VALUE: 25
PIF_VALUE: 29
PIF_VALUE: 27
PIF_VALUE: 14
PIF_VALUE: 21
PIF_VALUE: 27
PIF_VALUE: 32
PIF_VALUE: 33
PIF_VALUE: 22
PIF_VALUE: 27
PIF_VALUE: 30
PIF_VALUE: 22
PIF_VALUE: 22
PIF_VALUE: 31
PIF_VALUE: 32
PIF_VALUE: 23
PIF_VALUE: 27
PIF_VALUE: 28
PIF_VALUE: 30
PIF_VALUE: 31
PIF_VALUE: 30
PIF_VALUE: 28
PIF_VALUE: 28
PIF_VALUE: 31
PIF_VALUE: 25
PIF_VALUE: 26
PIF_VALUE: 23
PIF_VALUE: 32
PIF_VALUE: 25
PIF_VALUE: 28
PIF_VALUE: 31
PIF_VALUE: 18
PIF_VALUE: 23
PIF_VALUE: 29
PIF_VALUE: 23
PIF_VALUE: 48
PIF_VALUE: 23
PIF_VALUE: 32
PIF_VALUE: 27
PIF_VALUE: 24
PIF_VALUE: 19
PIF_VALUE: 45
PIF_VALUE: 23
PIF_VALUE: 25
PIF_VALUE: 23
PIF_VALUE: 28
PIF_VALUE: 26
PIF_VALUE: 28
PIF_VALUE: 29
PIF_VALUE: 23
PIF_VALUE: 30
PIF_VALUE: 25
PIF_VALUE: 22
PIF_VALUE: 28
PIF_VALUE: 24
PIF_VALUE: 22
PIF_VALUE: 33
PIF_VALUE: 24
PIF_VALUE: 27
PIF_VALUE: 22
PIF_VALUE: 24
PIF_VALUE: 30
PIF_VALUE: 26
PIF_VALUE: 28
PIF_VALUE: 23
PIF_VALUE: 22
PIF_VALUE: 29
PIF_VALUE: 31
PIF_VALUE: 27
PIF_VALUE: 28
PIF_VALUE: 29
PIF_VALUE: 44
PIF_VALUE: 22
PIF_VALUE: 28
PIF_VALUE: 30
PIF_VALUE: 32
PIF_VALUE: 27
PIF_VALUE: 25
PIF_VALUE: 29
PIF_VALUE: 25
PIF_VALUE: 28
PIF_VALUE: 26
PIF_VALUE: 30
PIF_VALUE: 30
PIF_VALUE: 25
PIF_VALUE: 29
PIF_VALUE: 24
PIF_VALUE: 27
PIF_VALUE: 25
PIF_VALUE: 29
PIF_VALUE: 27
PIF_VALUE: 21
PIF_VALUE: 27
PIF_VALUE: 22
PIF_VALUE: 20
PIF_VALUE: 26
PIF_VALUE: 30
PIF_VALUE: 28
PIF_VALUE: 28
PIF_VALUE: 27
PIF_VALUE: 23
PIF_VALUE: 31
PIF_VALUE: 26
PIF_VALUE: 27
PIF_VALUE: 26
PIF_VALUE: 23
PIF_VALUE: 26
PIF_VALUE: 24
PIF_VALUE: 28
PIF_VALUE: 23
PIF_VALUE: 33
PIF_VALUE: 29
PIF_VALUE: 27
PIF_VALUE: 29
PIF_VALUE: 27
PIF_VALUE: 30
PIF_VALUE: 26
PIF_VALUE: 26
PIF_VALUE: 38
PIF_VALUE: 23
PIF_VALUE: 32
PIF_VALUE: 24
PIF_VALUE: 26
PIF_VALUE: 29
PIF_VALUE: 46
PIF_VALUE: 28
PIF_VALUE: 30
PIF_VALUE: 29
PIF_VALUE: 30
PIF_VALUE: 29
PIF_VALUE: 25
PIF_VALUE: 22
PIF_VALUE: 25
PIF_VALUE: 53
PIF_VALUE: 28
PIF_VALUE: 28
PIF_VALUE: 24
PIF_VALUE: 22
PIF_VALUE: 35
PIF_VALUE: 25

## 2022-01-01 ASSESSMENT — PAIN SCALES - GENERAL
PAINLEVEL_OUTOF10: 0
PAINLEVEL_OUTOF10: 3
PAINLEVEL_OUTOF10: 0

## 2022-01-01 ASSESSMENT — LIFESTYLE VARIABLES: SMOKING_STATUS: 1

## 2022-01-06 DIAGNOSIS — E78.5 DYSLIPIDEMIA: ICD-10-CM

## 2022-01-06 RX ORDER — ATORVASTATIN CALCIUM 80 MG/1
TABLET, FILM COATED ORAL
Qty: 30 TABLET | Refills: 10 | Status: SHIPPED | OUTPATIENT
Start: 2022-01-06 | End: 2022-04-05 | Stop reason: SDUPTHER

## 2022-02-24 ENCOUNTER — HOSPITAL ENCOUNTER (OUTPATIENT)
Dept: PREADMISSION TESTING | Age: 62
Discharge: HOME | End: 2022-02-24
Payer: MEDICARE

## 2022-02-24 ENCOUNTER — OFFICE VISIT (OUTPATIENT)
Dept: PRIMARY CARE CLINIC | Age: 62
End: 2022-02-24
Payer: MEDICARE

## 2022-02-24 VITALS
BODY MASS INDEX: 26.15 KG/M2 | DIASTOLIC BLOOD PRESSURE: 77 MMHG | SYSTOLIC BLOOD PRESSURE: 121 MMHG | HEART RATE: 108 BPM | HEIGHT: 66 IN | WEIGHT: 162.7 LBS | OXYGEN SATURATION: 97 % | TEMPERATURE: 97.8 F | RESPIRATION RATE: 20 BRPM

## 2022-02-24 DIAGNOSIS — J40 BRONCHITIS: Primary | ICD-10-CM

## 2022-02-24 DIAGNOSIS — R05.9 COUGH: ICD-10-CM

## 2022-02-24 DIAGNOSIS — R06.02 SOB (SHORTNESS OF BREATH): ICD-10-CM

## 2022-02-24 DIAGNOSIS — R53.83 FATIGUE, UNSPECIFIED TYPE: ICD-10-CM

## 2022-02-24 LAB
DIRECT EXAM: NORMAL
DIRECT EXAM: NORMAL
SARS-COV-2, RAPID: NOT DETECTED
SPECIMEN DESCRIPTION: NORMAL
SPECIMEN DESCRIPTION: NORMAL

## 2022-02-24 PROCEDURE — 99213 OFFICE O/P EST LOW 20 MIN: CPT | Performed by: NURSE PRACTITIONER

## 2022-02-24 PROCEDURE — G8419 CALC BMI OUT NRM PARAM NOF/U: HCPCS | Performed by: NURSE PRACTITIONER

## 2022-02-24 PROCEDURE — C9803 HOPD COVID-19 SPEC COLLECT: HCPCS

## 2022-02-24 PROCEDURE — G8484 FLU IMMUNIZE NO ADMIN: HCPCS | Performed by: NURSE PRACTITIONER

## 2022-02-24 PROCEDURE — 87804 INFLUENZA ASSAY W/OPTIC: CPT

## 2022-02-24 PROCEDURE — G8427 DOCREV CUR MEDS BY ELIG CLIN: HCPCS | Performed by: NURSE PRACTITIONER

## 2022-02-24 PROCEDURE — 87635 SARS-COV-2 COVID-19 AMP PRB: CPT

## 2022-02-24 PROCEDURE — 3017F COLORECTAL CA SCREEN DOC REV: CPT | Performed by: NURSE PRACTITIONER

## 2022-02-24 PROCEDURE — 4004F PT TOBACCO SCREEN RCVD TLK: CPT | Performed by: NURSE PRACTITIONER

## 2022-02-24 RX ORDER — DOXYCYCLINE 100 MG/1
100 CAPSULE ORAL 2 TIMES DAILY
Qty: 20 CAPSULE | Refills: 0 | Status: SHIPPED | OUTPATIENT
Start: 2022-02-24 | End: 2022-03-06

## 2022-02-24 NOTE — PROGRESS NOTES
Chief Complaint   Cough (cough, SOB, fatigue. x3days)      History of Present Illness   Source of history provided by: patient. Yusuf Aleman is a 57 y.o. old female who has a past medical history of:   Past Medical History:   Diagnosis Date    CAD (coronary artery disease)     [facility] [address]     COPD [facility]chronic obstructive pulmonary disease) [facility] [address]     Depression     Diabetes mellitus ([facility] [address]     H/O mitral valve repair     History of fractured kneecap 2017    right    Hx of CABG     Hx of myocardial infarction     Hyperlipidemia     Hypertension     ICD (implantable cardioverter-defibrillator) in place     Kidney calculi     Osteoarthritis     Pneumonia     Renal calculi     Presents to the clinic for evaluation of 3 days of worsening shortness of breath, congested and productive cough and fatigue. According to Eren Grissom, she has a nebulizer machine at home and use it once a day, this morning. She reports good results after use but fell asleep and awakened with worsening shortness of breath. Denies CP, dyspnea, LE edema, abdominal pain, vomiting, rash, or lethargy. ROS   Pertinent positives and negatives are stated within HPI, all other systems reviewed and are negative. Surgical History:  has a past surgical history that includes Cholecystectomy (1990); Hysterectomy (1990); Ankle surgery (Right, 2009); Kidney stone surgery (Left, 2010); Tubal ligation; Mitral valve surgery (6/4/15); Cardiac defibrillator placement (06/01/15); Coronary artery bypass graft (05/28/15); Cystocopy (Left, 11/08/2018); pr cysto/uretero w/lithotripsy &indwell stent insrt (Left, 11/8/2018); pr cysto/uretero w/lithotripsy &indwell stent insrt (Left, 11/8/2018); and pr cysto/uretero w/lithotripsy &indwell stent insrt (Left, 11/15/2018). Social History:  reports that she has been smoking cigarettes. She has a 16.00 pack-year smoking history.  She has never used smokeless tobacco. She reports that she does not drink alcohol and does not use drugs. Family History: family history includes Diabetes in her mother; Other in her father; Stroke (age of onset: 76) in her father. Allergies: Codeine and Nsaids    Physical Exam    VS:  /77 (Site: Left Upper Arm, Position: Sitting, Cuff Size: Medium Adult)   Pulse 108   Temp 97.8 °F (36.6 °C) (Oral)   Resp 20   Ht 5' 6\" (1.676 m)   Wt 162 lb 11.2 oz (73.8 kg)   SpO2 97%   BMI 26.26 kg/m²      Constitutional:  Alert, development consistent with age. NAD. Head:  NC/NT. Airway patent. Mouth: Posterior pharynx with mild erythema and clear postnasal drip. No tonsillar hypertrophy or exudate. Neck:  Normal ROM. Supple. No anterior cervical adenopathy noted. Lungs: Faint rales over posterior right fields without wheezes or rhonchi. CV:  Regular rate and rhythm, normal heart sounds, without  ectopy, gallops, or rubs. No lower extremity peripheral edema. Skin:  Normal turgor. Warm, dry, without visible rash. Lymphatic: No lymphangitis or adenopathy noted. Neurological:  Oriented. Motor functions intact. Lab / Imaging Results   (All laboratory and radiology results have been personally reviewed by myself)  Labs:  No results found for this visit on 02/24/22. Imaging: All Radiology results interpreted by Radiologist unless otherwise noted. No results found. Medical Decision Making   Pt non-toxic, in no apparent distress and stable at time of discharge. Assessment/Plan   Jillian López was seen today for cough. Diagnoses and all orders for this visit:    Bronchitis  -     doxycycline monohydrate (MONODOX) 100 MG capsule; Take 1 capsule by mouth 2 times daily for 10 days    SOB (shortness of breath)  -     Cancel: COVID-19; Future  -     Rapid Influenza A/B Antigens; Future    Cough  -     Cancel: COVID-19; Future  -     Rapid Influenza A/B Antigens;  Future    Fatigue, unspecified type  -     Cancel: COVID-19; Future  -     Rapid Influenza A/B Antigens; Future          57y.o. year old female presenting with cough and shortness of breath. Yusuf Aleman appears well, hydrated and  without distress. Due to health history will recommend COVID-19 testing today. COVID-19 outpatient order given with instructions to have done at [facility], will call with results once available. Symptomatic relief discussed including:   Zinc Sulfate 50mg Daily, Vitamin C 1000mg Daily, Vitamin D3 2000IU Daily, Warm tea with 1tbsp honey (soothes the throat), Increase water intake, Rest.  If Covid and influenza negative well treat for concern for pneumonia. Schedule virtual f/u with PCP in 7-10 days if symptoms persist. ED sooner if symptoms worsen or change. Addendum: Both [id number] and influenza testing returned negative. Doxycycline sent to her pharmacy for concern for pneumonia with history of COPD. Skinny Ford, APRN - CNP    This visit was provided as a focused evaluation during the [place] -19 pandemic/national emergency. A comprehensive review of all previous patient history and testing was not conducted. Pertinent findings were elicited during the visit.

## 2022-02-24 NOTE — PATIENT INSTRUCTIONS
Patient Education        Learning About Coronavirus [phone]  What is coronavirus (COVID-19)? COVID-19 is a disease caused by a type of coronavirus. This illness was first found in December 2019. It has since spread worldwide. Coronaviruses are a large group of viruses. They cause the common cold. They also cause more serious illnesses like [place] respiratory syndrome (MERS) and severe acute respiratory syndrome (SARS). COVID-19 is caused by a novel coronavirus. That means it's a new type that has not been seen in people before. What are the symptoms? COVID-19 symptoms may include:  · Fever. · Cough. · Trouble breathing. · Chills or repeated shaking with chills. · Muscle and body aches. · Headache. · Sore throat. · New loss of taste or smell. · Vomiting. · Diarrhea. In severe cases, COVID-19 can cause pneumonia and make it hard to breathe without help from a machine. It can cause death. How is it diagnosed? COVID-19 is diagnosed with a viral test. This may also be called a PCR test or antigen test. It looks for evidence of the virus in your breathing passages or lungs (respiratory system). The test is most often done on a sample from the nose, throat, or lungs. It's sometimes done on a sample of saliva. One way a sample is collected is by putting a long swab into the back of your nose. How is it treated? Mild cases of COVID-19 can be treated at home. Serious cases need treatment in the hospital. Treatment may include medicines to reduce symptoms, plus breathing support such as oxygen therapy or a ventilator. Some people may be placed on their belly to help their oxygen levels. Treatments that may help people who have COVID-19 include:  Antiviral medicines. These medicines treat viral infections. Remdesivir is an example. Immune-based therapy. These medicines help the immune system fight COVID-19. Examples include monoclonal antibodies. Blood thinners.   These medicines help prevent blood clots. People with severe illness are at risk for blood clots. How can you protect yourself and others? · Get vaccinated. · Avoid sick people. · Cover your mouth with a tissue when you cough or sneeze. · Wash your hands often, especially after you cough or sneeze. Use soap and water, and scrub for at least 20 seconds. If soap and water aren't available, use an alcohol-based hand . · Avoid touching your mouth, nose, and eyes. Be sure to follow all instructions from the [facility] and your local health authorities. Here are some examples of specific precautions you may need to take. · If you are not fully vaccinated:  ? Wear a mask if you have to go to public areas. ? Avoid crowds and try to stay at least 6 feet away from other people. · If you have been exposed to the virus and are not fully vaccinated:  ? Stay home. Don't go to school, work, or public areas. And don't use public transportation, ride-shares, or taxis unless you have no choice. ? Wear a mask if you have to go to public areas, like the pharmacy. · Even if you're fully vaccinated, there's still a small chance you can get and spread COVID-19. If you live in an area where COVID-19 is spreading quickly, wear a mask if you have to go to indoor public areas. You might also want to wear a mask in crowded outdoor areas if you:  ? Have certain health conditions. ? Live with someone who has a compromised immune system. ? Live with someone who is not fully vaccinated. · If you have been exposed and you are fully vaccinated:  ? Talk to your doctor. You may need a COVID-19 test.  ? Wear a mask in public indoor spaces for 14 days or until you test negative for COVID-19. If you're sick:  · Leave your home only if you need to get medical care. But call the doctor's office first so they know you're coming. And wear a mask. · Wear a mask whenever you're around other people. · Limit contact with pets and people in your home.  If possible, stay in a separate bedroom and use a separate bathroom. · Clean and disinfect your home every day. Use household  and disinfectant wipes or sprays. Take special care to clean things that you touch with your hands. How can you self-isolate when you have COVID-19? If you have COVID-19, there are things you can do to help avoid spreading the virus to others. · Limit contact with people in your home. If possible, stay in a separate bedroom and use a separate bathroom. · Wear a mask when you are around other people. · If you have to leave home, avoid crowds and try to stay at least 6 feet away from other people. · Avoid contact with pets and other animals. · Cover your mouth and nose with a tissue when you cough or sneeze. Then throw it in the trash right away. · Wash your hands often, especially after you cough or sneeze. Use soap and water, and scrub for at least 20 seconds. If soap and water aren't available, use an alcohol-based hand . · Don't share personal household items. These include bedding, towels, cups and glasses, and eating utensils. · [address] in the warmest water allowed for the fabric type, and dry it completely. It's okay to wash other people's laundry with yours. · Clean and disinfect your home. Use household  and disinfectant wipes or sprays. When should you call for help? Call 911 anytime you think you may need emergency care. For example, call if you have life-threatening symptoms, such as:    · You have severe trouble breathing. (You can't talk at all.)     · You have constant chest pain or pressure.     · You are severely dizzy or lightheaded.     · You are confused or can't think clearly.     · You have pale, gray, or blue-colored skin or lips.     · You pass out (lose consciousness) or are very hard to wake up. Call your doctor now or seek immediate medical care if:    · You have moderate trouble breathing.  (You can't speak a full sentence.)     · You are coughing up blood (more than about 1 teaspoon).     · You have signs of low blood pressure. These include feeling lightheaded; being too weak to stand; and having cold, pale, clammy skin. Watch closely for changes in your health, and be sure to contact your doctor if:    · Your symptoms get worse.     · You are not getting better as expected.     · You have new or worse symptoms of anxiety, depression, nightmares, or flashbacks. Call before you go to the doctor's office. Follow their instructions. And wear a mask. Current as of: July 1, 2021               Content Version: 13.1  © 2006-2021 [employer]. Care instructions adapted under license by [facility] ([facility]). If you have questions about a medical condition or this instruction, always ask your healthcare professional. [address] any warranty or liability for your use of this information.

## 2022-03-07 DIAGNOSIS — F41.9 ANXIETY: ICD-10-CM

## 2022-03-07 RX ORDER — SERTRALINE HYDROCHLORIDE 100 MG/1
TABLET, FILM COATED ORAL
Qty: 30 TABLET | Refills: 0 | Status: SHIPPED | OUTPATIENT
Start: 2022-03-07 | End: 2022-04-05 | Stop reason: SDUPTHER

## 2022-03-07 RX ORDER — FUROSEMIDE 40 MG/1
TABLET ORAL
Qty: 30 TABLET | Refills: 0 | Status: SHIPPED | OUTPATIENT
Start: 2022-03-07 | End: 2022-04-05 | Stop reason: SDUPTHER

## 2022-03-07 NOTE — TELEPHONE ENCOUNTER
Last OV: 3/10/2021  Last RX:    Next scheduled apt: Visit date not found, Pt needs to schedule appt.

## 2022-03-08 RX ORDER — MIDODRINE HYDROCHLORIDE 5 MG/1
TABLET ORAL
Qty: 90 TABLET | Refills: 0 | Status: SHIPPED | OUTPATIENT
Start: 2022-03-08 | End: 2022-04-05 | Stop reason: SDUPTHER

## 2022-03-08 NOTE — TELEPHONE ENCOUNTER
Last OV: 3/10/2021 chroni  Last RX:    Next scheduled apt:  No future appointments          surescript is requesting a refill

## 2022-04-05 ENCOUNTER — TELEPHONE (OUTPATIENT)
Dept: FAMILY MEDICINE CLINIC | Age: 62
End: 2022-04-05

## 2022-04-05 DIAGNOSIS — F41.9 ANXIETY: ICD-10-CM

## 2022-04-05 DIAGNOSIS — I25.10 CORONARY ARTERY DISEASE INVOLVING NATIVE CORONARY ARTERY OF NATIVE HEART WITHOUT ANGINA PECTORIS: ICD-10-CM

## 2022-04-05 DIAGNOSIS — I10 ESSENTIAL HYPERTENSION: ICD-10-CM

## 2022-04-05 DIAGNOSIS — E78.5 DYSLIPIDEMIA: ICD-10-CM

## 2022-04-05 RX ORDER — ATORVASTATIN CALCIUM 80 MG/1
TABLET, FILM COATED ORAL
Qty: 30 TABLET | Refills: 0 | Status: SHIPPED | OUTPATIENT
Start: 2022-04-05 | End: 2022-05-02

## 2022-04-05 RX ORDER — CARVEDILOL 3.12 MG/1
TABLET ORAL
Qty: 60 TABLET | Refills: 0 | Status: SHIPPED | OUTPATIENT
Start: 2022-04-05 | End: 2022-05-02

## 2022-04-05 RX ORDER — FUROSEMIDE 40 MG/1
TABLET ORAL
Qty: 30 TABLET | Refills: 0 | Status: ON HOLD | OUTPATIENT
Start: 2022-04-05 | End: 2022-10-17 | Stop reason: HOSPADM

## 2022-04-05 RX ORDER — MIDODRINE HYDROCHLORIDE 5 MG/1
TABLET ORAL
Qty: 90 TABLET | Refills: 0 | Status: SHIPPED | OUTPATIENT
Start: 2022-04-05 | End: 2022-04-22

## 2022-04-05 RX ORDER — CLOPIDOGREL BISULFATE 75 MG/1
TABLET ORAL
Qty: 30 TABLET | Refills: 0 | Status: SHIPPED | OUTPATIENT
Start: 2022-04-05 | End: 2022-05-02

## 2022-04-05 RX ORDER — INSULIN GLARGINE 100 [IU]/ML
INJECTION, SOLUTION SUBCUTANEOUS
Qty: 30 ML | Refills: 0 | Status: SHIPPED | OUTPATIENT
Start: 2022-04-05 | End: 2022-04-22

## 2022-04-05 RX ORDER — SERTRALINE HYDROCHLORIDE 100 MG/1
TABLET, FILM COATED ORAL
Qty: 30 TABLET | Refills: 0 | Status: SHIPPED | OUTPATIENT
Start: 2022-04-05 | End: 2022-05-02

## 2022-04-05 RX ORDER — LISINOPRIL 5 MG/1
TABLET ORAL
Qty: 30 TABLET | Refills: 0 | Status: SHIPPED | OUTPATIENT
Start: 2022-04-05 | End: 2022-04-22

## 2022-04-05 NOTE — TELEPHONE ENCOUNTER
----- Message from Rosemary Eugene MA sent at 4/5/2022 12:19 PM EDT -----  Subject: Message to Provider    QUESTIONS  Information for Provider? Patient returning the missed call  ---------------------------------------------------------------------------  --------------  CALL BACK INFO  What is the best way for the office to contact you? OK to leave message on   voicemail  Preferred Call Back Phone Number? [phone]  ---------------------------------------------------------------------------  --------------  SCRIPT ANSWERS  Relationship to Patient?  Self

## 2022-04-05 NOTE — TELEPHONE ENCOUNTER
Last OV: 3/10/2021 chroni  Last RX:    Next scheduled apt: Visit date not found          Pt requesting a refill
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
## 2022-04-06 DIAGNOSIS — I10 ESSENTIAL HYPERTENSION: ICD-10-CM

## 2022-04-06 RX ORDER — LISINOPRIL 5 MG/1
TABLET ORAL
Qty: 30 TABLET | Refills: 10 | OUTPATIENT
Start: 2022-04-06

## 2022-04-06 RX ORDER — MIDODRINE HYDROCHLORIDE 5 MG/1
TABLET ORAL
Qty: 90 TABLET | Refills: 10 | OUTPATIENT
Start: 2022-04-06

## 2022-04-07 RX ORDER — PEN NEEDLE, DIABETIC 32GX 5/32"
NEEDLE, DISPOSABLE MISCELLANEOUS
Qty: 100 EACH | Refills: 0 | Status: SHIPPED | OUTPATIENT
Start: 2022-04-07 | End: 2022-04-12 | Stop reason: SDUPTHER

## 2022-04-07 NOTE — TELEPHONE ENCOUNTER
Last OV: 3/10/2021 DM   Last RX:    Next scheduled apt: 4/12/2022 chronic          surescript requesting a refill

## 2022-04-11 ENCOUNTER — TELEPHONE (OUTPATIENT)
Dept: FAMILY MEDICINE CLINIC | Age: 62
End: 2022-04-11
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
## 2022-04-12 ENCOUNTER — OFFICE VISIT (OUTPATIENT)
Dept: FAMILY MEDICINE CLINIC | Age: 62
End: 2022-04-12
Payer: MEDICARE

## 2022-04-12 VITALS
SYSTOLIC BLOOD PRESSURE: 108 MMHG | DIASTOLIC BLOOD PRESSURE: 60 MMHG | WEIGHT: 168 LBS | OXYGEN SATURATION: 98 % | HEART RATE: 92 BPM | BODY MASS INDEX: 27.12 KG/M2

## 2022-04-12 DIAGNOSIS — J44.1 COPD EXACERBATION (HCC): ICD-10-CM

## 2022-04-12 DIAGNOSIS — E11.628 TYPE 2 DIABETES MELLITUS WITH PRESSURE CALLUS (HCC): ICD-10-CM

## 2022-04-12 DIAGNOSIS — G62.89 OTHER POLYNEUROPATHY: ICD-10-CM

## 2022-04-12 DIAGNOSIS — N39.46 MIXED STRESS AND URGE URINARY INCONTINENCE: ICD-10-CM

## 2022-04-12 DIAGNOSIS — L84 TYPE 2 DIABETES MELLITUS WITH PRESSURE CALLUS (HCC): ICD-10-CM

## 2022-04-12 DIAGNOSIS — F41.9 ANXIETY: ICD-10-CM

## 2022-04-12 DIAGNOSIS — E11.49 DM (DIABETES MELLITUS), TYPE 2 WITH NEUROLOGICAL COMPLICATIONS (HCC): Primary | ICD-10-CM

## 2022-04-12 DIAGNOSIS — I10 ESSENTIAL HYPERTENSION: ICD-10-CM

## 2022-04-12 DIAGNOSIS — N18.9 CHRONIC KIDNEY DISEASE, UNSPECIFIED CKD STAGE: ICD-10-CM

## 2022-04-12 DIAGNOSIS — E78.5 DYSLIPIDEMIA: ICD-10-CM

## 2022-04-12 PROBLEM — J81.0 ACUTE PULMONARY EDEMA (HCC): Status: RESOLVED | Noted: 2018-11-04 | Resolved: 2022-01-01

## 2022-04-12 LAB — HBA1C MFR BLD: 9.3 %

## 2022-04-12 PROCEDURE — 99215 OFFICE O/P EST HI 40 MIN: CPT | Performed by: FAMILY MEDICINE

## 2022-04-12 PROCEDURE — 2022F DILAT RTA XM EVC RTNOPTHY: CPT | Performed by: FAMILY MEDICINE

## 2022-04-12 PROCEDURE — 3046F HEMOGLOBIN A1C LEVEL >9.0%: CPT | Performed by: FAMILY MEDICINE

## 2022-04-12 PROCEDURE — 3017F COLORECTAL CA SCREEN DOC REV: CPT | Performed by: FAMILY MEDICINE

## 2022-04-12 PROCEDURE — 4004F PT TOBACCO SCREEN RCVD TLK: CPT | Performed by: FAMILY MEDICINE

## 2022-04-12 PROCEDURE — G8427 DOCREV CUR MEDS BY ELIG CLIN: HCPCS | Performed by: FAMILY MEDICINE

## 2022-04-12 PROCEDURE — 3023F SPIROM DOC REV: CPT | Performed by: FAMILY MEDICINE

## 2022-04-12 PROCEDURE — G8419 CALC BMI OUT NRM PARAM NOF/U: HCPCS | Performed by: FAMILY MEDICINE

## 2022-04-12 PROCEDURE — 83036 HEMOGLOBIN GLYCOSYLATED A1C: CPT | Performed by: FAMILY MEDICINE

## 2022-04-12 RX ORDER — BUSPIRONE HYDROCHLORIDE 5 MG/1
5 TABLET ORAL 3 TIMES DAILY PRN
Qty: 90 TABLET | Refills: 0 | Status: SHIPPED | OUTPATIENT
Start: 2022-04-12 | End: 2022-05-12

## 2022-04-12 RX ORDER — BLOOD-GLUCOSE METER
KIT MISCELLANEOUS
Qty: 1 KIT | Refills: 0 | Status: SHIPPED | OUTPATIENT
Start: 2022-04-12

## 2022-04-12 RX ORDER — LANCETS 30 GAUGE
EACH MISCELLANEOUS
Qty: 100 EACH | Refills: 3 | Status: SHIPPED | OUTPATIENT
Start: 2022-04-12 | End: 2022-10-24 | Stop reason: SDUPTHER

## 2022-04-12 RX ORDER — PEN NEEDLE, DIABETIC 32GX 5/32"
NEEDLE, DISPOSABLE MISCELLANEOUS
Qty: 100 EACH | Refills: 0 | Status: SHIPPED | OUTPATIENT
Start: 2022-04-12 | End: 2022-10-24 | Stop reason: SDUPTHER

## 2022-04-12 RX ORDER — GLUCOSAMINE HCL/CHONDROITIN SU 500-400 MG
CAPSULE ORAL
Qty: 100 STRIP | Refills: 3 | Status: SHIPPED | OUTPATIENT
Start: 2022-04-12

## 2022-04-12 RX ORDER — ALBUTEROL SULFATE 2.5 MG/3ML
2.5 SOLUTION RESPIRATORY (INHALATION) EVERY 6 HOURS PRN
Qty: 120 EACH | Refills: 1 | Status: SHIPPED | OUTPATIENT
Start: 2022-04-12

## 2022-04-12 SDOH — ECONOMIC STABILITY: FOOD INSECURITY: WITHIN THE PAST 12 MONTHS, YOU WORRIED THAT YOUR FOOD WOULD RUN OUT BEFORE YOU GOT MONEY TO BUY MORE.: NEVER TRUE

## 2022-04-12 SDOH — ECONOMIC STABILITY: FOOD INSECURITY: WITHIN THE PAST 12 MONTHS, THE FOOD YOU BOUGHT JUST DIDN'T LAST AND YOU DIDN'T HAVE MONEY TO GET MORE.: NEVER TRUE

## 2022-04-12 ASSESSMENT — ENCOUNTER SYMPTOMS
VOMITING: 0
NAUSEA: 0
SHORTNESS OF BREATH: 0
RHINORRHEA: 0
ABDOMINAL PAIN: 0
DIARRHEA: 0
COUGH: 0
EYE REDNESS: 0
BLOOD IN STOOL: 0
TROUBLE SWALLOWING: 0
CONSTIPATION: 0
EYE DISCHARGE: 0
SORE THROAT: 0

## 2022-04-12 ASSESSMENT — COPD QUESTIONNAIRES: COPD: 1

## 2022-04-12 ASSESSMENT — SOCIAL DETERMINANTS OF HEALTH (SDOH): HOW HARD IS IT FOR YOU TO PAY FOR THE VERY BASICS LIKE FOOD, HOUSING, MEDICAL CARE, AND HEATING?: NOT HARD AT ALL

## 2022-04-12 ASSESSMENT — PATIENT HEALTH QUESTIONNAIRE - PHQ9
SUM OF ALL RESPONSES TO PHQ QUESTIONS 1-9: 2
1. LITTLE INTEREST OR PLEASURE IN DOING THINGS: 1
SUM OF ALL RESPONSES TO PHQ9 QUESTIONS 1 & 2: 2
2. FEELING DOWN, DEPRESSED OR HOPELESS: 1
SUM OF ALL RESPONSES TO PHQ QUESTIONS 1-9: 2
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
## 2022-04-22 DIAGNOSIS — I10 ESSENTIAL HYPERTENSION: ICD-10-CM

## 2022-04-22 RX ORDER — INSULIN GLARGINE 100 [IU]/ML
INJECTION, SOLUTION SUBCUTANEOUS
Qty: 30 ML | Refills: 5 | Status: SHIPPED | OUTPATIENT
Start: 2022-04-22 | End: 2022-10-24 | Stop reason: SDUPTHER

## 2022-04-22 RX ORDER — MIDODRINE HYDROCHLORIDE 5 MG/1
TABLET ORAL
Qty: 90 TABLET | Refills: 1 | Status: SHIPPED | OUTPATIENT
Start: 2022-04-22 | End: 2022-05-05

## 2022-04-22 RX ORDER — LISINOPRIL 5 MG/1
TABLET ORAL
Qty: 30 TABLET | Refills: 5 | Status: SHIPPED | OUTPATIENT
Start: 2022-04-22 | End: 2022-05-05

## 2022-04-22 NOTE — TELEPHONE ENCOUNTER
Last OV: 4/12/2022 DM  Last RX:    Next scheduled apt: 7/13/2022 3 month DM        surescript requesting a refill

## 2022-04-22 NOTE — TELEPHONE ENCOUNTER
Last visit:  4/12/2022  Next Visit Date:    Future Appointments   Date Time Provider Hong Mckeon   7/13/2022  1:00 PM Anyi Kan, MD Surendra Rodriguez [facility]         Medication List:  Prior to Admission medications    Medication Sig Start Date End Date Taking? Authorizing Provider   glucose monitoring (FREESTYLE) kit Please dispense meter covered by patients insurance. 4/12/22   Anyi Kan MD   Insulin Pen Needle (BD PEN NEEDLE PAIGE 2ND GEN) 32G X 4 MM MISC use 1 PEN NEEDLE to inject MEDICATION subcutaneously daily 4/12/22   Anyi Kan, MD Rogers MISC Pt testing BS once daily and prn 4/12/22   Anyi Kan MD   blood glucose monitor strips Testing BS once daily and as needed.  Dispense strips to go with new glucometer 4/12/22   Anyi Kan, MD   busPIRone (BUSPAR) 5 MG tablet Take 1 tablet by mouth 3 times daily as needed (anxiety) 4/12/22 5/12/22  Anyi Kan MD   albuterol (PROVENTIL) (2.5 MG/3ML) 0.083% nebulizer solution Take 3 mLs by nebulization every 6 hours as needed for Wheezing (as needed for wheezing) 4/12/22   Anyi Kan, MD   sertraline (ZOLOFT) 100 MG tablet TAKE 1 TABLET BY MOUTH ONCE DAILY 4/5/22   Anyi Lucius, MD   midodrine (PROAMATINE) 5 MG tablet TAKE 1 TABLET BY MOUTH THREE TIMES DAILY 4/5/22   Anyi Kan MD   insulin glargine (LANTUS SOLOSTAR) 100 UNIT/ML injection pen INJECT 40 UNITS SUBCUTANEOUSLY EVERY MORNING AND 20 UNITS EVERY EVENING  Patient taking differently: INJECT 45 UNITS SUBCUTANEOUSLY EVERY MORNING AND 25 UNITS EVERY EVENING 4/5/22   Anyi Kan, MD   furosemide (LASIX) 40 MG tablet TAKE 1 TABLET BY MOUTH ONCE DAILY 4/5/22   Anyi Bucklin, MD   clopidogrel (PLAVIX) 75 MG tablet TAKE 1 TABLET BY MOUTH ONCE DAILY 4/5/22   Anyi Bucklin, MD   lisinopril (PRINIVIL;ZESTRIL) 5 MG tablet TAKE 1 TABLET BY MOUTH ONCE DAILY 4/5/22   Anyi Lucius, MD   atorvastatin (LIPITOR) 80 MG tablet TAKE 1 TABLET BY MOUTH ONCE DAILY 4/5/22 Claudia Echols MD   carvedilol (COREG) 3.125 MG tablet TAKE 1 TABLET BY MOUTH TWICE DAILY WITH MEALS 4/5/22   Claudia Echols MD   gabapentin (NEURONTIN) 400 MG capsule TAKE 1 CAPSULE BY MOUTH THREE TIMES DAILY AS NEEDED for neuropathy 6/26/21 7/26/21  Claudia Echols MD   Respiratory Therapy Supplies (NEBULIZER/TUBING/MOUTHPIECE) KIT 1 kit by Does not apply route daily as needed (for SOB, cough)  Patient not taking: Reported on 4/12/2022 11/13/19   Claudia Echols MD   Respiratory Therapy Supplies (NEBULIZER COMPRESSOR) KIT 1 kit by Does not apply route once for 1 dose 11/4/19 11/4/19  Claudia Echols MD   lactobacillus (CULTURELLE) capsule Take 1 capsule by mouth 2 times daily (with meals) 1/2/19   Denny Sewell MD   blood glucose monitor strips Use to test daily 11/7/18   Claudia Echols MD   blood glucose monitor kit and supplies Use to test daily 11/7/18   Claudia Echols MD   Cholecalciferol (VITAMIN D3) 5000 units TABS Take 5,000 Units by mouth daily    Historical Provider, MD   glucose blood VI test strips (EXACTECH TEST) strip 1 each by In Vitro route daily Pt testing BS once daily and prn 11/19/15   Claudia Echols MD   Multiple Vitamins-Minerals (THERAPEUTIC MULTIVITAMIN-MINERALS) tablet Take 1 tablet by mouth daily (with breakfast) 6/12/15   Domenico Boss MD

## 2022-04-30 DIAGNOSIS — E78.5 DYSLIPIDEMIA: ICD-10-CM

## 2022-04-30 DIAGNOSIS — I25.10 CORONARY ARTERY DISEASE INVOLVING NATIVE CORONARY ARTERY OF NATIVE HEART WITHOUT ANGINA PECTORIS: ICD-10-CM

## 2022-04-30 DIAGNOSIS — F41.9 ANXIETY: ICD-10-CM

## 2022-04-30 DIAGNOSIS — I10 ESSENTIAL HYPERTENSION: ICD-10-CM

## 2022-05-02 RX ORDER — SERTRALINE HYDROCHLORIDE 100 MG/1
TABLET, FILM COATED ORAL
Qty: 30 TABLET | Refills: 1 | Status: SHIPPED | OUTPATIENT
Start: 2022-05-02 | End: 2022-10-24 | Stop reason: ALTCHOICE

## 2022-05-02 RX ORDER — CARVEDILOL 3.12 MG/1
TABLET ORAL
Qty: 60 TABLET | Refills: 1 | Status: SHIPPED | OUTPATIENT
Start: 2022-05-02 | End: 2022-10-24 | Stop reason: SDUPTHER

## 2022-05-02 RX ORDER — CLOPIDOGREL BISULFATE 75 MG/1
TABLET ORAL
Qty: 30 TABLET | Refills: 1 | Status: SHIPPED | OUTPATIENT
Start: 2022-05-02 | End: 2022-10-24 | Stop reason: SDUPTHER

## 2022-05-02 RX ORDER — ATORVASTATIN CALCIUM 80 MG/1
TABLET, FILM COATED ORAL
Qty: 30 TABLET | Refills: 1 | Status: SHIPPED | OUTPATIENT
Start: 2022-05-02 | End: 2022-10-24 | Stop reason: SDUPTHER

## 2022-05-05 DIAGNOSIS — I10 ESSENTIAL HYPERTENSION: ICD-10-CM

## 2022-05-05 RX ORDER — MIDODRINE HYDROCHLORIDE 5 MG/1
TABLET ORAL
Qty: 90 TABLET | Refills: 1 | Status: SHIPPED | OUTPATIENT
Start: 2022-05-05 | End: 2022-10-24 | Stop reason: SDUPTHER

## 2022-05-05 RX ORDER — LISINOPRIL 5 MG/1
TABLET ORAL
Qty: 30 TABLET | Refills: 5 | Status: ON HOLD | OUTPATIENT
Start: 2022-05-05 | End: 2022-10-17 | Stop reason: HOSPADM

## 2022-05-05 NOTE — TELEPHONE ENCOUNTER
Last OV: 4/12/2022 dm  Last RX:   Next scheduled apt: 7/13/2022      Sure scripts request      RX pending PT WITH NO NEEDS AT THIS TIME, PLACED FOR RECHECK

## 2022-07-13 ENCOUNTER — OFFICE VISIT (OUTPATIENT)
Dept: FAMILY MEDICINE CLINIC | Age: 62
End: 2022-07-13
Payer: MEDICARE

## 2022-07-13 VITALS
OXYGEN SATURATION: 95 % | BODY MASS INDEX: 25.39 KG/M2 | HEART RATE: 96 BPM | HEIGHT: 66 IN | WEIGHT: 158 LBS | DIASTOLIC BLOOD PRESSURE: 56 MMHG | SYSTOLIC BLOOD PRESSURE: 96 MMHG

## 2022-07-13 DIAGNOSIS — E11.49 DM (DIABETES MELLITUS), TYPE 2 WITH NEUROLOGICAL COMPLICATIONS (HCC): ICD-10-CM

## 2022-07-13 DIAGNOSIS — E55.9 VITAMIN D DEFICIENCY: ICD-10-CM

## 2022-07-13 DIAGNOSIS — I10 PRIMARY HYPERTENSION: ICD-10-CM

## 2022-07-13 DIAGNOSIS — I25.10 CORONARY ARTERY DISEASE INVOLVING NATIVE CORONARY ARTERY OF NATIVE HEART WITHOUT ANGINA PECTORIS: Primary | ICD-10-CM

## 2022-07-13 DIAGNOSIS — E78.5 DYSLIPIDEMIA: ICD-10-CM

## 2022-07-13 DIAGNOSIS — I25.10 CORONARY ARTERY DISEASE INVOLVING NATIVE CORONARY ARTERY OF NATIVE HEART WITHOUT ANGINA PECTORIS: ICD-10-CM

## 2022-07-13 DIAGNOSIS — I10 ESSENTIAL HYPERTENSION: ICD-10-CM

## 2022-07-13 DIAGNOSIS — G47.09 OTHER INSOMNIA: ICD-10-CM

## 2022-07-13 DIAGNOSIS — F41.9 ANXIETY: ICD-10-CM

## 2022-07-13 DIAGNOSIS — E11.49 DM (DIABETES MELLITUS), TYPE 2 WITH NEUROLOGICAL COMPLICATIONS (HCC): Primary | ICD-10-CM

## 2022-07-13 DIAGNOSIS — Z98.890 H/O MITRAL VALVE REPAIR: ICD-10-CM

## 2022-07-13 DIAGNOSIS — J43.8 OTHER EMPHYSEMA (HCC): ICD-10-CM

## 2022-07-13 LAB — HBA1C MFR BLD: 7.6 %

## 2022-07-13 PROCEDURE — G8427 DOCREV CUR MEDS BY ELIG CLIN: HCPCS | Performed by: FAMILY MEDICINE

## 2022-07-13 PROCEDURE — 2022F DILAT RTA XM EVC RTNOPTHY: CPT | Performed by: FAMILY MEDICINE

## 2022-07-13 PROCEDURE — G8419 CALC BMI OUT NRM PARAM NOF/U: HCPCS | Performed by: FAMILY MEDICINE

## 2022-07-13 PROCEDURE — 99214 OFFICE O/P EST MOD 30 MIN: CPT | Performed by: FAMILY MEDICINE

## 2022-07-13 PROCEDURE — 4004F PT TOBACCO SCREEN RCVD TLK: CPT | Performed by: FAMILY MEDICINE

## 2022-07-13 PROCEDURE — 83036 HEMOGLOBIN GLYCOSYLATED A1C: CPT | Performed by: FAMILY MEDICINE

## 2022-07-13 PROCEDURE — 3051F HG A1C>EQUAL 7.0%<8.0%: CPT | Performed by: FAMILY MEDICINE

## 2022-07-13 PROCEDURE — 3023F SPIROM DOC REV: CPT | Performed by: FAMILY MEDICINE

## 2022-07-13 PROCEDURE — 3017F COLORECTAL CA SCREEN DOC REV: CPT | Performed by: FAMILY MEDICINE

## 2022-07-13 RX ORDER — GABAPENTIN 400 MG/1
400 CAPSULE ORAL 3 TIMES DAILY
Qty: 270 CAPSULE | Refills: 0 | Status: SHIPPED | OUTPATIENT
Start: 2022-07-13 | End: 2022-07-15 | Stop reason: SDUPTHER

## 2022-07-13 ASSESSMENT — ENCOUNTER SYMPTOMS
EYE DISCHARGE: 0
COUGH: 0
EYE REDNESS: 0
CONSTIPATION: 0
VOMITING: 0
SHORTNESS OF BREATH: 0
RHINORRHEA: 0
BLOOD IN STOOL: 0
DIFFICULTY BREATHING: 0
TROUBLE SWALLOWING: 1
ABDOMINAL PAIN: 0
DIARRHEA: 0
CHEST TIGHTNESS: 0
NAUSEA: 0
FREQUENT THROAT CLEARING: 0

## 2022-07-13 ASSESSMENT — COPD QUESTIONNAIRES: COPD: 1

## 2022-07-13 NOTE — PATIENT INSTRUCTIONS
Survey: You may be receiving a survey from [employer] regarding your visit today. You may get this in the mail, through your MyChart or in your email. Please complete the survey to enable us to provide the highest quality of care to you and your family. Please also, mention our names. If you cannot score us as very good (5 Stars) on any question, please feel free to call the office to discuss how we could have made your experience exceptional.      Thank You!         MD Azam Talley LPN
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
## 2022-07-15 ENCOUNTER — HOSPITAL ENCOUNTER (OUTPATIENT)
Age: 62
Discharge: HOME | End: 2022-07-15
Payer: MEDICARE

## 2022-07-15 DIAGNOSIS — E11.49 DM (DIABETES MELLITUS), TYPE 2 WITH NEUROLOGICAL COMPLICATIONS (HCC): ICD-10-CM

## 2022-07-15 LAB
CHOLESTEROL/HDL RATIO: 8
CHOLESTEROL: 199 MG/DL
CREATININE URINE: 140.9 MG/DL (ref 28–217)
HDLC SERPL-MCNC: 25 MG/DL
LDL CHOLESTEROL DIRECT: 70 MG/DL
LDL CHOLESTEROL: ABNORMAL MG/DL (ref 0–130)
MICROALBUMIN/CREAT 24H UR: 81 MG/L
MICROALBUMIN/CREAT UR-RTO: 57 MCG/MG CREAT
PATIENT FASTING?: YES
TRIGL SERPL-MCNC: 724 MG/DL

## 2022-07-15 PROCEDURE — 82570 ASSAY OF URINE CREATININE: CPT

## 2022-07-15 PROCEDURE — 83721 ASSAY OF BLOOD LIPOPROTEIN: CPT

## 2022-07-15 PROCEDURE — 36415 COLL VENOUS BLD VENIPUNCTURE: CPT

## 2022-07-15 PROCEDURE — 82043 UR ALBUMIN QUANTITATIVE: CPT

## 2022-07-15 PROCEDURE — 80061 LIPID PANEL: CPT

## 2022-07-15 RX ORDER — GABAPENTIN 400 MG/1
400 CAPSULE ORAL 3 TIMES DAILY
Qty: 270 CAPSULE | Refills: 0 | Status: SHIPPED | OUTPATIENT
Start: 2022-07-15 | End: 2022-10-24 | Stop reason: SDUPTHER

## 2022-07-15 NOTE — TELEPHONE ENCOUNTER
Last OV: 7/13/2022  Last RX:    Next scheduled apt: 10/18/2022      Pt states Neurontin needs filled for [facility] please,  Initial RX did not go through. Thank you!

## 2022-10-11 ENCOUNTER — HOSPITAL ENCOUNTER (INPATIENT)
Age: 62
LOS: 2 days | Discharge: ANOTHER HOSPITAL | DRG: 194 | End: 2022-10-14
Attending: FAMILY MEDICINE | Admitting: INTERNAL MEDICINE
Payer: MEDICARE

## 2022-10-11 ENCOUNTER — APPOINTMENT (OUTPATIENT)
Dept: GENERAL RADIOLOGY | Age: 62
DRG: 194 | End: 2022-10-11
Payer: MEDICARE

## 2022-10-11 DIAGNOSIS — N30.01 ACUTE CYSTITIS WITH HEMATURIA: ICD-10-CM

## 2022-10-11 DIAGNOSIS — Z20.822 LAB TEST NEGATIVE FOR COVID-19 VIRUS: ICD-10-CM

## 2022-10-11 DIAGNOSIS — I50.21 ACUTE SYSTOLIC HEART FAILURE (HCC): Primary | ICD-10-CM

## 2022-10-11 LAB
-: ABNORMAL
ABSOLUTE EOS #: 0 K/UL (ref 0–0.4)
ABSOLUTE LYMPH #: 1.9 K/UL (ref 1–4.8)
ABSOLUTE MONO #: 0.6 K/UL (ref 0–1)
ALBUMIN SERPL-MCNC: 3.7 G/DL (ref 3.5–5.2)
ALP BLD-CCNC: 88 U/L (ref 35–104)
ALT SERPL-CCNC: 21 U/L (ref 5–33)
ANION GAP SERPL CALCULATED.3IONS-SCNC: 15 MMOL/L (ref 8–16)
AST SERPL-CCNC: 19 U/L
BACTERIA: ABNORMAL
BASOPHILS # BLD: 1 % (ref 0–2)
BASOPHILS ABSOLUTE: 0 K/UL (ref 0–0.2)
BILIRUB SERPL-MCNC: 0.9 MG/DL (ref 0.3–1.2)
BILIRUBIN URINE: NEGATIVE
BUN BLDV-MCNC: 24 MG/DL (ref 8–23)
BUN/CREAT BLD: 20 (ref 9–20)
CALCIUM SERPL-MCNC: 8.4 MG/DL (ref 8.6–10.4)
CHLORIDE BLD-SCNC: 109 MMOL/L (ref 98–107)
CO2: 17 MMOL/L (ref 20–31)
COLOR: YELLOW
COMMENT UA: ABNORMAL
CREAT SERPL-MCNC: 1.19 MG/DL (ref 0.5–0.9)
DIFFERENTIAL TYPE: YES
EOSINOPHILS RELATIVE PERCENT: 1 % (ref 0–5)
GFR SERPL CREATININE-BSD FRML MDRD: 51 ML/MIN/1.73M2
GLUCOSE BLD-MCNC: 251 MG/DL (ref 70–99)
GLUCOSE URINE: ABNORMAL
HCT VFR BLD CALC: 30.7 % (ref 36–46)
HEMOGLOBIN: 10 G/DL (ref 12–16)
KETONES, URINE: NEGATIVE
LEUKOCYTE ESTERASE, URINE: NEGATIVE
LYMPHOCYTES # BLD: 24 % (ref 15–40)
MCH RBC QN AUTO: 27.7 PG (ref 26–34)
MCHC RBC AUTO-ENTMCNC: 32.7 G/DL (ref 31–37)
MCV RBC AUTO: 84.8 FL (ref 80–100)
MONOCYTES # BLD: 8 % (ref 4–8)
NITRITE, URINE: POSITIVE
PDW BLD-RTO: 18.5 % (ref 12.1–15.2)
PH UA: 5 (ref 5–8)
PLATELET # BLD: 260 K/UL (ref 140–450)
POTASSIUM SERPL-SCNC: 4.4 MMOL/L (ref 3.7–5.3)
PRO-BNP: ABNORMAL PG/ML
PROTEIN UA: ABNORMAL
RBC # BLD: 3.63 M/UL (ref 4–5.2)
RBC UA: ABNORMAL /HPF (ref 0–2)
SARS-COV-2, RAPID: NOT DETECTED
SEG NEUTROPHILS: 66 % (ref 47–75)
SEGMENTED NEUTROPHILS ABSOLUTE COUNT: 5.4 K/UL (ref 2.5–7)
SODIUM BLD-SCNC: 141 MMOL/L (ref 135–144)
SPECIFIC GRAVITY UA: 1.02 (ref 1–1.03)
SPECIMEN DESCRIPTION: NORMAL
TOTAL PROTEIN: 7.1 G/DL (ref 6.4–8.3)
TROPONIN, HIGH SENSITIVITY: 28 NG/L (ref 0–14)
TROPONIN, HIGH SENSITIVITY: 28 NG/L (ref 0–14)
TURBIDITY: CLEAR
URINE HGB: ABNORMAL
UROBILINOGEN, URINE: NORMAL
WBC # BLD: 8 K/UL (ref 3.5–11)
WBC UA: ABNORMAL /HPF

## 2022-10-11 PROCEDURE — 87635 SARS-COV-2 COVID-19 AMP PRB: CPT

## 2022-10-11 PROCEDURE — 36415 COLL VENOUS BLD VENIPUNCTURE: CPT

## 2022-10-11 PROCEDURE — 96374 THER/PROPH/DIAG INJ IV PUSH: CPT

## 2022-10-11 PROCEDURE — 83036 HEMOGLOBIN GLYCOSYLATED A1C: CPT

## 2022-10-11 PROCEDURE — 99285 EMERGENCY DEPT VISIT HI MDM: CPT

## 2022-10-11 PROCEDURE — 84484 ASSAY OF TROPONIN QUANT: CPT

## 2022-10-11 PROCEDURE — 80053 COMPREHEN METABOLIC PANEL: CPT

## 2022-10-11 PROCEDURE — 71045 X-RAY EXAM CHEST 1 VIEW: CPT

## 2022-10-11 PROCEDURE — 85025 COMPLETE CBC W/AUTO DIFF WBC: CPT

## 2022-10-11 PROCEDURE — 83880 ASSAY OF NATRIURETIC PEPTIDE: CPT

## 2022-10-11 PROCEDURE — 6360000002 HC RX W HCPCS: Performed by: FAMILY MEDICINE

## 2022-10-11 PROCEDURE — 93005 ELECTROCARDIOGRAM TRACING: CPT | Performed by: FAMILY MEDICINE

## 2022-10-11 PROCEDURE — 81001 URINALYSIS AUTO W/SCOPE: CPT

## 2022-10-11 PROCEDURE — C9803 HOPD COVID-19 SPEC COLLECT: HCPCS

## 2022-10-11 RX ORDER — FUROSEMIDE 10 MG/ML
20 INJECTION INTRAMUSCULAR; INTRAVENOUS ONCE
Status: COMPLETED | OUTPATIENT
Start: 2022-10-11 | End: 2022-10-11

## 2022-10-11 RX ADMIN — FUROSEMIDE 20 MG: 10 INJECTION, SOLUTION INTRAMUSCULAR; INTRAVENOUS at 23:34

## 2022-10-11 ASSESSMENT — PAIN - FUNCTIONAL ASSESSMENT: PAIN_FUNCTIONAL_ASSESSMENT: NONE - DENIES PAIN

## 2022-10-12 ENCOUNTER — APPOINTMENT (OUTPATIENT)
Dept: CT IMAGING | Age: 62
DRG: 194 | End: 2022-10-12
Payer: MEDICARE

## 2022-10-12 PROBLEM — I50.21 ACUTE SYSTOLIC CHF (CONGESTIVE HEART FAILURE) (HCC): Status: ACTIVE | Noted: 2022-10-12

## 2022-10-12 LAB
D-DIMER QUANTITATIVE: 3.2 MG/L FEU (ref 0–0.59)
EKG ATRIAL RATE: 108 BPM
EKG P AXIS: 44 DEGREES
EKG P-R INTERVAL: 144 MS
EKG Q-T INTERVAL: 360 MS
EKG QRS DURATION: 102 MS
EKG QTC CALCULATION (BAZETT): 482 MS
EKG R AXIS: -47 DEGREES
EKG T AXIS: 73 DEGREES
EKG VENTRICULAR RATE: 108 BPM
ESTIMATED AVERAGE GLUCOSE: 186 MG/DL
GLUCOSE BLD-MCNC: 169 MG/DL (ref 65–99)
GLUCOSE BLD-MCNC: 205 MG/DL (ref 65–99)
GLUCOSE BLD-MCNC: 214 MG/DL (ref 65–99)
GLUCOSE BLD-MCNC: 216 MG/DL (ref 65–99)
HBA1C MFR BLD: 8.1 % (ref 4–6)
LV EF: 25 %
LVEF MODALITY: NORMAL
TROPONIN, HIGH SENSITIVITY: 31 NG/L (ref 0–14)
TROPONIN, HIGH SENSITIVITY: 36 NG/L (ref 0–14)

## 2022-10-12 PROCEDURE — 93306 TTE W/DOPPLER COMPLETE: CPT

## 2022-10-12 PROCEDURE — 36415 COLL VENOUS BLD VENIPUNCTURE: CPT

## 2022-10-12 PROCEDURE — 99254 IP/OBS CNSLTJ NEW/EST MOD 60: CPT | Performed by: INTERNAL MEDICINE

## 2022-10-12 PROCEDURE — 82947 ASSAY GLUCOSE BLOOD QUANT: CPT

## 2022-10-12 PROCEDURE — 6360000004 HC RX CONTRAST MEDICATION: Performed by: INTERNAL MEDICINE

## 2022-10-12 PROCEDURE — 6370000000 HC RX 637 (ALT 250 FOR IP): Performed by: INTERNAL MEDICINE

## 2022-10-12 PROCEDURE — 94640 AIRWAY INHALATION TREATMENT: CPT

## 2022-10-12 PROCEDURE — 2580000003 HC RX 258: Performed by: INTERNAL MEDICINE

## 2022-10-12 PROCEDURE — 2580000003 HC RX 258: Performed by: FAMILY MEDICINE

## 2022-10-12 PROCEDURE — 6360000002 HC RX W HCPCS: Performed by: FAMILY MEDICINE

## 2022-10-12 PROCEDURE — 94761 N-INVAS EAR/PLS OXIMETRY MLT: CPT

## 2022-10-12 PROCEDURE — 94664 DEMO&/EVAL PT USE INHALER: CPT

## 2022-10-12 PROCEDURE — 71275 CT ANGIOGRAPHY CHEST: CPT

## 2022-10-12 PROCEDURE — 85379 FIBRIN DEGRADATION QUANT: CPT

## 2022-10-12 PROCEDURE — 6360000002 HC RX W HCPCS: Performed by: INTERNAL MEDICINE

## 2022-10-12 PROCEDURE — 84484 ASSAY OF TROPONIN QUANT: CPT

## 2022-10-12 PROCEDURE — 51798 US URINE CAPACITY MEASURE: CPT

## 2022-10-12 PROCEDURE — 2700000000 HC OXYGEN THERAPY PER DAY

## 2022-10-12 PROCEDURE — 74177 CT ABD & PELVIS W/CONTRAST: CPT

## 2022-10-12 PROCEDURE — 93010 ELECTROCARDIOGRAM REPORT: CPT | Performed by: INTERNAL MEDICINE

## 2022-10-12 PROCEDURE — 1200000000 HC SEMI PRIVATE

## 2022-10-12 RX ORDER — SODIUM CHLORIDE 0.9 % (FLUSH) 0.9 %
5-40 SYRINGE (ML) INJECTION PRN
Status: DISCONTINUED | OUTPATIENT
Start: 2022-10-12 | End: 2022-10-14 | Stop reason: HOSPADM

## 2022-10-12 RX ORDER — LEVALBUTEROL INHALATION SOLUTION 1.25 MG/3ML
1.25 SOLUTION RESPIRATORY (INHALATION)
Status: DISCONTINUED | OUTPATIENT
Start: 2022-10-12 | End: 2022-10-14 | Stop reason: HOSPADM

## 2022-10-12 RX ORDER — LACTOBACILLUS RHAMNOSUS GG 10B CELL
1 CAPSULE ORAL 2 TIMES DAILY WITH MEALS
Status: DISCONTINUED | OUTPATIENT
Start: 2022-10-12 | End: 2022-10-14 | Stop reason: HOSPADM

## 2022-10-12 RX ORDER — INSULIN GLARGINE 100 [IU]/ML
30 INJECTION, SOLUTION SUBCUTANEOUS 2 TIMES DAILY
Status: DISCONTINUED | OUTPATIENT
Start: 2022-10-12 | End: 2022-10-13

## 2022-10-12 RX ORDER — ONDANSETRON 2 MG/ML
4 INJECTION INTRAMUSCULAR; INTRAVENOUS EVERY 6 HOURS PRN
Status: DISCONTINUED | OUTPATIENT
Start: 2022-10-12 | End: 2022-10-14 | Stop reason: HOSPADM

## 2022-10-12 RX ORDER — ACETAMINOPHEN 650 MG/1
650 SUPPOSITORY RECTAL EVERY 6 HOURS PRN
Status: DISCONTINUED | OUTPATIENT
Start: 2022-10-12 | End: 2022-10-14 | Stop reason: HOSPADM

## 2022-10-12 RX ORDER — INSULIN LISPRO 100 [IU]/ML
0-4 INJECTION, SOLUTION INTRAVENOUS; SUBCUTANEOUS
Status: DISCONTINUED | OUTPATIENT
Start: 2022-10-12 | End: 2022-10-14 | Stop reason: HOSPADM

## 2022-10-12 RX ORDER — SODIUM CHLORIDE 9 MG/ML
INJECTION, SOLUTION INTRAVENOUS PRN
Status: DISCONTINUED | OUTPATIENT
Start: 2022-10-12 | End: 2022-10-14 | Stop reason: HOSPADM

## 2022-10-12 RX ORDER — DEXTROSE MONOHYDRATE 100 MG/ML
INJECTION, SOLUTION INTRAVENOUS CONTINUOUS PRN
Status: DISCONTINUED | OUTPATIENT
Start: 2022-10-12 | End: 2022-10-14 | Stop reason: HOSPADM

## 2022-10-12 RX ORDER — MIDODRINE HYDROCHLORIDE 5 MG/1
5 TABLET ORAL
Status: DISCONTINUED | OUTPATIENT
Start: 2022-10-12 | End: 2022-10-12

## 2022-10-12 RX ORDER — CARVEDILOL 3.12 MG/1
3.12 TABLET ORAL 2 TIMES DAILY
Status: DISCONTINUED | OUTPATIENT
Start: 2022-10-12 | End: 2022-10-14 | Stop reason: HOSPADM

## 2022-10-12 RX ORDER — ATORVASTATIN CALCIUM 80 MG/1
80 TABLET, FILM COATED ORAL DAILY
Status: DISCONTINUED | OUTPATIENT
Start: 2022-10-12 | End: 2022-10-14 | Stop reason: HOSPADM

## 2022-10-12 RX ORDER — ENOXAPARIN SODIUM 100 MG/ML
40 INJECTION SUBCUTANEOUS DAILY
Status: DISCONTINUED | OUTPATIENT
Start: 2022-10-12 | End: 2022-10-14 | Stop reason: HOSPADM

## 2022-10-12 RX ORDER — CLOPIDOGREL BISULFATE 75 MG/1
75 TABLET ORAL DAILY
Status: DISCONTINUED | OUTPATIENT
Start: 2022-10-12 | End: 2022-10-14 | Stop reason: HOSPADM

## 2022-10-12 RX ORDER — VITAMIN B COMPLEX
5000 TABLET ORAL DAILY
Status: DISCONTINUED | OUTPATIENT
Start: 2022-10-12 | End: 2022-10-14 | Stop reason: HOSPADM

## 2022-10-12 RX ORDER — ACETAMINOPHEN 325 MG/1
650 TABLET ORAL EVERY 6 HOURS PRN
Status: DISCONTINUED | OUTPATIENT
Start: 2022-10-12 | End: 2022-10-14 | Stop reason: HOSPADM

## 2022-10-12 RX ORDER — SODIUM CHLORIDE 0.9 % (FLUSH) 0.9 %
5-40 SYRINGE (ML) INJECTION EVERY 12 HOURS SCHEDULED
Status: DISCONTINUED | OUTPATIENT
Start: 2022-10-12 | End: 2022-10-14 | Stop reason: HOSPADM

## 2022-10-12 RX ORDER — INSULIN LISPRO 100 [IU]/ML
0-4 INJECTION, SOLUTION INTRAVENOUS; SUBCUTANEOUS NIGHTLY
Status: DISCONTINUED | OUTPATIENT
Start: 2022-10-12 | End: 2022-10-14 | Stop reason: HOSPADM

## 2022-10-12 RX ORDER — POLYETHYLENE GLYCOL 3350 17 G/17G
17 POWDER, FOR SOLUTION ORAL DAILY PRN
Status: DISCONTINUED | OUTPATIENT
Start: 2022-10-12 | End: 2022-10-14 | Stop reason: HOSPADM

## 2022-10-12 RX ORDER — LISINOPRIL 5 MG/1
5 TABLET ORAL DAILY
Status: DISCONTINUED | OUTPATIENT
Start: 2022-10-12 | End: 2022-10-13

## 2022-10-12 RX ORDER — INSULIN GLARGINE 100 [IU]/ML
30 INJECTION, SOLUTION SUBCUTANEOUS 2 TIMES DAILY
Status: DISCONTINUED | OUTPATIENT
Start: 2022-10-12 | End: 2022-10-12

## 2022-10-12 RX ORDER — SPIRONOLACTONE 25 MG/1
25 TABLET ORAL 2 TIMES DAILY
Status: DISCONTINUED | OUTPATIENT
Start: 2022-10-12 | End: 2022-10-14 | Stop reason: HOSPADM

## 2022-10-12 RX ORDER — ZOLPIDEM TARTRATE 5 MG/1
5 TABLET ORAL NIGHTLY PRN
Status: DISCONTINUED | OUTPATIENT
Start: 2022-10-12 | End: 2022-10-14 | Stop reason: HOSPADM

## 2022-10-12 RX ORDER — M-VIT,TX,IRON,MINS/CALC/FOLIC 27MG-0.4MG
1 TABLET ORAL
Status: DISCONTINUED | OUTPATIENT
Start: 2022-10-12 | End: 2022-10-14 | Stop reason: HOSPADM

## 2022-10-12 RX ORDER — ONDANSETRON 4 MG/1
4 TABLET, ORALLY DISINTEGRATING ORAL EVERY 8 HOURS PRN
Status: DISCONTINUED | OUTPATIENT
Start: 2022-10-12 | End: 2022-10-14 | Stop reason: HOSPADM

## 2022-10-12 RX ORDER — FUROSEMIDE 10 MG/ML
20 INJECTION INTRAMUSCULAR; INTRAVENOUS 2 TIMES DAILY
Status: DISCONTINUED | OUTPATIENT
Start: 2022-10-12 | End: 2022-10-13

## 2022-10-12 RX ORDER — ALBUTEROL SULFATE 2.5 MG/3ML
2.5 SOLUTION RESPIRATORY (INHALATION) EVERY 4 HOURS PRN
Status: DISCONTINUED | OUTPATIENT
Start: 2022-10-12 | End: 2022-10-14 | Stop reason: HOSPADM

## 2022-10-12 RX ADMIN — CHOLECALCIFEROL TAB 25 MCG (1000 UNIT) 5000 UNITS: 25 TAB at 07:47

## 2022-10-12 RX ADMIN — AZITHROMYCIN MONOHYDRATE 500 MG: 500 INJECTION, POWDER, LYOPHILIZED, FOR SOLUTION INTRAVENOUS at 11:51

## 2022-10-12 RX ADMIN — Medication 1 CAPSULE: at 07:47

## 2022-10-12 RX ADMIN — INSULIN LISPRO 1 UNITS: 100 INJECTION, SOLUTION INTRAVENOUS; SUBCUTANEOUS at 07:50

## 2022-10-12 RX ADMIN — Medication 1 CAPSULE: at 17:46

## 2022-10-12 RX ADMIN — SERTRALINE HYDROCHLORIDE 100 MG: 50 TABLET ORAL at 07:53

## 2022-10-12 RX ADMIN — LISINOPRIL 5 MG: 5 TABLET ORAL at 07:48

## 2022-10-12 RX ADMIN — CARVEDILOL 3.12 MG: 3.12 TABLET, FILM COATED ORAL at 18:16

## 2022-10-12 RX ADMIN — INSULIN GLARGINE 30 UNITS: 100 INJECTION, SOLUTION SUBCUTANEOUS at 18:17

## 2022-10-12 RX ADMIN — GABAPENTIN 400 MG: 300 CAPSULE ORAL at 07:48

## 2022-10-12 RX ADMIN — SODIUM CHLORIDE, PRESERVATIVE FREE 10 ML: 5 INJECTION INTRAVENOUS at 20:44

## 2022-10-12 RX ADMIN — LEVALBUTEROL HYDROCHLORIDE 1.25 MG: 1.25 SOLUTION RESPIRATORY (INHALATION) at 20:46

## 2022-10-12 RX ADMIN — FUROSEMIDE 20 MG: 10 INJECTION, SOLUTION INTRAMUSCULAR; INTRAVENOUS at 17:46

## 2022-10-12 RX ADMIN — GABAPENTIN 400 MG: 300 CAPSULE ORAL at 20:44

## 2022-10-12 RX ADMIN — CLOPIDOGREL BISULFATE 75 MG: 75 TABLET ORAL at 07:48

## 2022-10-12 RX ADMIN — CEFTRIAXONE SODIUM 1000 MG: 1 INJECTION, POWDER, FOR SOLUTION INTRAMUSCULAR; INTRAVENOUS at 20:44

## 2022-10-12 RX ADMIN — CEFTRIAXONE SODIUM 1000 MG: 1 INJECTION, POWDER, FOR SOLUTION INTRAMUSCULAR; INTRAVENOUS at 00:49

## 2022-10-12 RX ADMIN — LEVALBUTEROL HYDROCHLORIDE 1.25 MG: 1.25 SOLUTION RESPIRATORY (INHALATION) at 11:58

## 2022-10-12 RX ADMIN — LEVALBUTEROL HYDROCHLORIDE 1.25 MG: 1.25 SOLUTION RESPIRATORY (INHALATION) at 16:23

## 2022-10-12 RX ADMIN — INSULIN LISPRO 1 UNITS: 100 INJECTION, SOLUTION INTRAVENOUS; SUBCUTANEOUS at 17:02

## 2022-10-12 RX ADMIN — MULTIPLE VITAMINS W/ MINERALS TAB 1 TABLET: TAB at 07:48

## 2022-10-12 RX ADMIN — INSULIN GLARGINE 30 UNITS: 100 INJECTION, SOLUTION SUBCUTANEOUS at 07:51

## 2022-10-12 RX ADMIN — ATORVASTATIN CALCIUM 80 MG: 40 TABLET, FILM COATED ORAL at 07:48

## 2022-10-12 RX ADMIN — INSULIN LISPRO 1 UNITS: 100 INJECTION, SOLUTION INTRAVENOUS; SUBCUTANEOUS at 11:44

## 2022-10-12 RX ADMIN — ONDANSETRON 4 MG: 2 INJECTION INTRAMUSCULAR; INTRAVENOUS at 12:35

## 2022-10-12 RX ADMIN — ENOXAPARIN SODIUM 40 MG: 100 INJECTION SUBCUTANEOUS at 08:42

## 2022-10-12 RX ADMIN — SPIRONOLACTONE 25 MG: 25 TABLET, FILM COATED ORAL at 07:48

## 2022-10-12 RX ADMIN — SPIRONOLACTONE 25 MG: 25 TABLET, FILM COATED ORAL at 17:46

## 2022-10-12 RX ADMIN — IOPAMIDOL 100 ML: 755 INJECTION, SOLUTION INTRAVENOUS at 10:04

## 2022-10-12 RX ADMIN — CARVEDILOL 3.12 MG: 3.12 TABLET, FILM COATED ORAL at 07:56

## 2022-10-12 RX ADMIN — ALBUTEROL SULFATE 2.5 MG: 2.5 SOLUTION RESPIRATORY (INHALATION) at 02:01

## 2022-10-12 RX ADMIN — SODIUM CHLORIDE, PRESERVATIVE FREE 10 ML: 5 INJECTION INTRAVENOUS at 07:54

## 2022-10-12 RX ADMIN — ALBUTEROL SULFATE 2.5 MG: 2.5 SOLUTION RESPIRATORY (INHALATION) at 05:21

## 2022-10-12 RX ADMIN — GABAPENTIN 400 MG: 300 CAPSULE ORAL at 14:01

## 2022-10-12 RX ADMIN — FUROSEMIDE 20 MG: 10 INJECTION, SOLUTION INTRAMUSCULAR; INTRAVENOUS at 07:52

## 2022-10-12 ASSESSMENT — PAIN SCALES - GENERAL: PAINLEVEL_OUTOF10: 0

## 2022-10-12 NOTE — PROGRESS NOTES
Comprehensive Nutrition Assessment    Type and Reason for Visit:  Initial, Positive Nutrition Screen (MST 2)    Nutrition Recommendations/Plan:   Modify diet to include 2 gm sodium. Start 4 oz chocolate Glucerna TID with meals. Malnutrition Assessment:  Malnutrition Status: At risk for malnutrition (Comment) (10/12/22 3902)    Context:  Chronic Illness     Findings of the 6 clinical characteristics of malnutrition:  Energy Intake:  Mild decrease in energy intake (Comment) (couple of days)  Weight Loss:  No significant weight loss     Body Fat Loss:  No significant body fat loss     Muscle Mass Loss:  No significant muscle mass loss    Fluid Accumulation:  Severe Extremities (+ 3 pitting BLE, CHF)   Strength:  Not Performed    Nutrition Assessment:    Altered nutrition related labs r/t cardiac dysfunction aeb BNP 20,277, weight gain 22# x 3 months. A1c improved to 7.6, glucose 251. Elevated renal indices. Vitamin D level 9.6, Pt is on vitamin D supplementation. Add 2 gm sodium to diet, admitted with CHF. Decreased PO noted before admission. Pt noted to be SOB, states eating has been difficult. Ate some peaches at breakfast, otherwise tray untouched. Pt agrees to chocolate supplement with meals. Staes she knows to limit sodium to < 2,000 mg per day and how to CC. Declined offer of heart healthy CC diet instruction materials. Nutrition Related Findings:    + 3 pitting BLE Wound Type: None       Current Nutrition Intake & Therapies:    Average Meal Intake: Unable to assess  Average Supplements Intake: None Ordered  ADULT DIET; Regular; 4 carb choices (60 gm/meal)    Anthropometric Measures:  Height: 5' 6\" (167.6 cm)  Ideal Body Weight (IBW): 130 lbs (59 kg)    Admission Body Weight: 181 lb (82.1 kg)  Current Body Weight: 180 lb (81.6 kg), 138.5 % IBW.  Weight Source: Bed Scale  Current BMI (kg/m2): 29.1  Usual Body Weight: 158 lb (71.7 kg)  % Weight Change (Calculated): 13.9  Weight Adjustment For: No Adjustment                 BMI Categories: Overweight (BMI 25.0-29. 9)    Estimated Daily Nutrient Needs:  Energy Requirements Based On: Kcal/kg  Weight Used for Energy Requirements: Current  Energy (kcal/day): 1050-5497 (20-23/kg)  Weight Used for Protein Requirements: Ideal  Protein (g/day): 77-89g (1.3-1.5g/kg)  Method Used for Fluid Requirements: 1 ml/kcal  Fluid (ml/day): 1877 ml (23/kg)    Nutrition Diagnosis:   Altered nutrition-related lab values related to cardiac dysfunction as evidenced by lab values, other (comment) (22# weight gain x 3 months)    Nutrition Interventions:   Food and/or Nutrient Delivery: Modify Current Diet, Start Oral Nutrition Supplement  Nutrition Education/Counseling: Education needed  Coordination of Nutrition Care: Continue to monitor while inpatient  Plan of Care discussed with: Patient    Goals:     Goals: PO intake 75% or greater     Recent Labs     10/11/22  2130      K 4.4   *   CO2 17*   BUN 24*   CREATININE 1.19*   GLUCOSE 251*   ALT 21   ALKPHOS 88      Lab Results   Component Value Date/Time    LABALBU 3.7 10/11/2022 09:30 PM       Lab Results   Component Value Date/Time    LABA1C 7.6 07/13/2022 01:12 PM      Lab Results   Component Value Date/Time    TRIG 724 07/15/2022 09:50 AM    HDL 25 07/15/2022 09:50 AM    LDLDIRECT 70 07/15/2022 09:50 AM      Recent Labs     10/12/22  0744   POCGLU 216*      Nutrition Monitoring and Evaluation:   Behavioral-Environmental Outcomes: None Identified  Food/Nutrient Intake Outcomes: Food and Nutrient Intake, Supplement Intake  Physical Signs/Symptoms Outcomes: Biochemical Data, Weight, Fluid Status or Edema    Discharge Planning:     Too soon to determine, Continue Oral Nutrition Supplement     David Polanco RD, LD  Contact: [phone]

## 2022-10-12 NOTE — PROGRESS NOTES
The nurse from the previous shift stated the pt had urinated only urinated 300ml along with some dribbling for the shift. The previous nurse bladder scanned the pt as the pt stated she did not have to urinate and the residual was 315ml. Dr. Shaneka Alfonso was notified of this and an order to straight cath every 4 hours as needed for a residual >200 was obtained by this nurse. This nurse approached the pt with the new orders and asked if she had to urinate and she stated she thought she could. The pt ambulated to the BR wit SBA and voided 250ml of barbara colored urine. This nurse also informed this pt that she would be asking her frequently to get up to the BR to void. The pt verbalized understanding. The pt was assisted back to bed, repositioned for comfort with the call light within reach.

## 2022-10-12 NOTE — PROGRESS NOTES
JAN and RN case manager met with pt to complete assessment. Pt is a bit drowsy but oriented and answers appropriately. Pt is a 60year old  female admitted for acute systolic CHF. Pt lives with her spouse and 4 grandchildren in their home in [place]. Pt has a nebulizer at home and no other DME. Pt is not using any community services at this time. Pt reports that her spouse will take her to appointments and recently she started to schedule with the [facility] as well. Pt is a full code and follows with Dr Alan Lacey as PCP. Pt does not have advance directives and states that her spouse would be her decision maker if needed. ACP note completed with pt. Pt states that her medications are affordable with her insurance and her issue was with her [employer] order company placing her meds on hold and she couldn't get them to get it straightened out. Pt reports that she is able to go to a local pharmacy to have filled what she needs to be taking. Pt plans to return home at discharge with family. Pt identifies no discharge needs or concerns at this time. SW will remain available as needed.  Callie DUFFY 10/12/2022

## 2022-10-12 NOTE — CONSULTS
[address]                             Juanita Zee                                  CONSULTATION    PATIENT NAME: Temi Baumann                  :        1960  MED REC NO:   [id number]                              ROOM:       0184  ACCOUNT NO:   [de-identified]                           ADMIT DATE: 10/11/2022  PROVIDER:     Shila Spears. Rehana Churchill MD    CONSULT DATE:  10/12/2022    CHIEF COMPLAINT:  Shortness of breath. HISTORY OF PRESENT ILLNESS:  The patient is a 58-year-old female who has  an extensive cardiac history. She had an acute chest pain on  2015, and was found to have a non-ST elevation myocardial  infarction and transferred to [facility] [place]. A catheterization on 2015, showed occluded very large right  coronary artery, with LAD had 90% disease, diagonal 90% disease,  circumflex small and nondominant, with an EF of 20%. She had bypass  surgery on 2015, with a LIMA to the LAD, a vein graft to the  diagonal, and vein graft to the right coronary artery, with a mitral  valve replacement using a 30 mm Andi-Pérez bioprosthetic mitral  valve. Her EF has improved initially to 30% to 35%, although her last  echo showed 45%. Three days later, she had a VF arrest and another catheterization on  2015, showed widely patent bypass grafts with an EF of 30% to 35%. She had an ICD placed by Dr. Allan Morocho on 2015, which is a [address] [facility] number 1491 single ventricular lead ICD. On 2018, she had hypotension and sepsis and acute renal injury and  was dialyzed for two months and made a good recovery,    I have not seen her since 10/28/2019. Her last echocardiogram was on  12/10/2020, at which time her EF appeared to be in the 45% range with a  normal-functioning mitral valve replacement. She has not been taking medicines for the last two months. She states  that she sent the prescriptions in but has not received the medications. She has had progressive shortness of breath over the last several  months. She noticed marked increase in her abdominal swelling and  developed marked edema in her lower extremities. She began sitting in a  chair. She denies any chest pain. She has had no fevers, sweats or  chills, and was not coughing any discolored sputum. Because of her shortness of breath, she came to the emergency room last  night. She was fairly short of breath and was admitted for CHF. Her  portable chest x-ray showed small right pleural effusion with right  basilar atelectasis or consolidation. She was placed on IV Lasix at 20 mg b.i.d. When I see her this morning, she is still quite dyspneic, sitting in a  chair. She denies any syncope or near syncope. Denies any  palpitations. Her blood work shows a troponin at 28, which has not  changed. Her proBNP is 20,000. She does have 3+ bacteria in urine. Her EKG showed sinus tachycardia at 98 beats per minute with nonspecific  ST changes. CARDIAC RISK FACTORS:  Known CAD:  Positive. Bypass Surgery:  Positive. Hypertension:  Positive. Hyperlipidemia:  Positive. Diabetes:  Positive. Other Family Members:  Positive. Smoking:  Positive. MEDICATIONS AT HOME:  She has been not taking medications for the last 3  months. Her medications that had been ordered for her are Neurontin 400  mg t.i.d., lisinopril 5 mg daily, midodrine 5 mg t.i.d., Zoloft 100 mg  daily, Plavix 75 mg daily, Lipitor 80 mg daily, Coreg 3.125 mg b.i.d.,  glargine 30 units daily, Proventil inhaler p.r.n., Lasix 40 mg daily,  vitamin D 5000 units daily. PAST MEDICAL AND SURGICAL HISTORY:  1. Depression. 2.  Hypertension although had been hypotensive and had been on  midodrine. 3.  Cholecystectomy in 200.  4.  Hysterectomy in 1990.  5.  Right ankle surgery in 2009 with plate and screws.   6.  Lithotripsy on the left side in 2010.  7.  Bypass surgery by Dr. Cole Quiroz on 05/28/2015, with mitral valve repair  with a 30 mm Andi-Pérez annuloplasty ring. 8.  [employer] defibrillator placed on 06/09/2015, by Dr. Grace Alonso.  9.  Insulin-dependent diabetes. 10.  Chronic renal insufficiency although fairly good at this time. FAMILY HISTORY:  Mother alive at 68. Father had a stroke at 61. One  brother and one sister alive and well. SOCIAL HISTORY:  She is 60years old, , 5 children and 5  grandchildren. She and her  have custody of 5 grandchildren, now  living with them, 16, 15, 5, and 8. She smokes one pack of cigarettes a  day. Does not drink alcohol. She is fairly inactive. REVIEW OF SYSTEMS:  Cardiac as above. Other systems reviewed including  constitutional, eyes, ears, nose and throat, cardiovascular,  respiratory, GI, , musculoskeletal, integumentary, neurologic,  endocrine, hematologic and allergic/immunologic, are negative except for  what is described above. No weight loss or weight gain. No change in  bowel habits, no blood in stools. No fevers, sweats or chills. PHYSICAL EXAMINATION:  VITAL SIGNS:  Her blood pressure was 180/80 with a heart rate of 104 and  regular. Respiratory rate 18. O2 saturation 94% on room air. Weight  180 pounds. GENERAL:  She is a pleasant 79-year-old female, very short of breath,  sitting up in chair. She was oriented to person, place and time. Answered questions appropriately. SKIN:  No unusual skin changes. HEENT:  The pupils are equally round and intact. Mucous membranes were  dry. NECK:  She did have JVD. Good carotid pulses. No carotid bruits. No  lymphadenopathy or thyromegaly. CARDIOVASCULAR EXAM:  S1 and S2 were normal.  She was tachycardic. Soft  had a grade 3/6 systolic blowing type murmur at the apex. No diastolic  murmur. LUNGS:  Had diffuse crackles bilaterally.   ABDOMEN:  Very protuberant, but soft and nontender. EXTREMITIES:  She had 3 to 4+ edema in both lower extremities although  they were wrapped below the knees. NEUROLOGIC EXAM:  Unremarkable. PSYCHIATRIC EXAM:  Unremarkable. LABORATORY DATA:  Her sodium was 141, potassium 4.4, BUN 24, creatinine  1.19, GFR was 51. Calcium 8.4. BNP was 20,277. Troponin was 28, was  unchanged with repeat troponin. ALT was 21, AST was 19. White count  8.2, hemoglobin 10.0 with a platelet count of 690,417. Urinalysis had 3+ bacteria. EKG showed sinus rhythm with septal infarct, which was new from the last  EKG, although cannot rule out lead placement. Chest x-ray showed small right pleural effusion and right lung base  atelectasis or consolidation. IMPRESSION:  1.  Marked shortness of breath secondary to CHF, which appears to be  mainly right-sided CHF. 2.  No medications for two months. 3.  Status post cardiac catheterization on 05/26/2015 after a  subendocardial myocardial infarction, that showed occluded LAD in the  midportion, with 90% disease in the first diagonal, large right coronary  artery which was occluded, with an EF of 20%. 4.  Status post bypass surgery by Dr. Cristina Brown on 05/28/2015, with a LIMA  to the LAD, a vein graft to the diagonal, and a vein graft to the right  coronary artery, and mitral valve repair with a 30 mm Andi-Pérez  annuloplasty ring. 5.  VF arrest with repeat catheterization on 06/04/2015, showed widely  patent bypass grafts with severe LV function. 6.  [address] on 06/09/2015.  7.  EF improving to 45%. 8.  Insulin-dependent diabetes. 9.  Chronic renal insufficiency about at baseline with creatinine at  1.19.  10.  Hyperlipidemia. 11.  Continued smoking abuse. PLAN:  1. We will obtain D-dimer. 2.  If this is elevated, we will do CT of chest, abdomen and pelvis with  a PE protocol.   If there is no D-dimer elevation, we will do a CT scan  without contrast of chest, abdomen and pelvis. 3.  Diurese with IV Lasix as Dr. Aminata Conway is doing and add Aldactone 25 mg  b.i.d.  4.  We will hold midodrine at this time to see where her blood pressure  is without midodrine. 5.  Echocardiogram pending. 6.  We will check [address]. 7.  We will almost certainly need a Lexiscan stress test before  discharge. DISCUSSION:  The patient has developed marked shortness of breath. She  has been out of her medications for two months and developed what  appears to be marked ascites as well as 3 to 4+ edema. This would  indicate right-sided CHF. Her lungs did not look like she had florid  pulmonary edema. There was a possible consolidation or atelectasis and  a CT scan should help clarify. We will gradually diurese watching her BUN and creatinine carefully. We  will add Aldactone for her diuresis. Again, the patient will need  ischemic workup with a Lexiscan stress test prior to discharge. Depending on her LV function, she may eventually need a repeat cardiac  catheterization to define her anatomy in view of her noncompliance with  medications and with her continued smoking abuse. Thank you very much for allowing me the privilege of seeing the patient. If you have any questions on my thoughts, please do not hesitate to  contact me.         Stanford Leong MD    D: 10/12/2022 7:56:34       T: 10/12/2022 10:09:43     [email or web address]  Job#: [id number]     Doc#: [id number]    CC:  Ary Lesch

## 2022-10-12 NOTE — ACP (ADVANCE CARE PLANNING)
Advance Care Planning     Advance Care Planning Activator (Inpatient)  Conversation Note      Date of ACP Conversation: 10/12/2022     Conversation Conducted with: Patient with Decision Making Capacity    ACP Activator: Marissa Deleon, [address] [place] [address] Decision Maker:     Current Designated Health Care Decision Maker:     Primary Decision Maker: Yo Ion - [phone]  Click here to complete Healthcare Decision Makers including section of the Healthcare Decision Maker Relationship (ie \"Primary\")  Today we documented Decision Maker(s) consistent with Legal Next of Kin hierarchy. Care Preferences    Ventilation: \"If you were in your present state of health and suddenly became very ill and were unable to breathe on your own, what would your preference be about the use of a ventilator (breathing machine) if it were available to you? \"      Would the patient desire the use of ventilator (breathing machine)?: yes    \"If your health worsens and it becomes clear that your chance of recovery is unlikely, what would your preference be about the use of a ventilator (breathing machine) if it were available to you? \"     Would the patient desire the use of ventilator (breathing machine)?: No      Resuscitation  \"CPR works best to restart the heart when there is a sudden event, like a heart attack, in someone who is otherwise healthy. Unfortunately, CPR does not typically restart the heart for people who have serious health conditions or who are very sick. \"    \"In the event your heart stopped as a result of an underlying serious health condition, would you want attempts to be made to restart your heart (answer \"yes\" for attempt to resuscitate) or would you prefer a natural death (answer \"no\" for do not attempt to resuscitate)? \" yes       [] Yes   [x] No   Educated Patient / Darreld Bosworth regarding differences between Advance Directives and portable DNR orders.     Length of ACP Conversation in minutes:  5    Conversation Outcomes:  [x] ACP discussion completed  [] Existing advance directive reviewed with patient; no changes to patient's previously recorded wishes  [] New Advance Directive completed  [] Portable Do Not Rescitate prepared for Provider review and signature  [] POLST/POST/MOLST/MOST prepared for Provider review and signature      Follow-up plan:    [] Schedule follow-up conversation to continue planning  [] Referred individual to Provider for additional questions/concerns   [] Advised patient/agent/surrogate to review completed ACP document and update if needed with changes in condition, patient preferences or care setting    [x] This note routed to one or more involved healthcare providers

## 2022-10-12 NOTE — PROGRESS NOTES
Cardiology    Marked sob secondary to CHF  No medications for 2 months  O[facility] 5- with L to LAD SVG to Dg and SVG to RCA and MV repair. VF arrest with repeat cath showing patent bypass grafts  Single chamber ICD on 6-9-2015  EF 45% with echo on 10-  Elevated d-dimer at 3.20    She has not been seen in clinic since 10-, in spite of being scheduled. Has not been taking medication for 2 months. Has had gradual increasing sob with abdominal distension consistent with ascites and pedal edema. Appears very sob this morning, sitting in chair. Will do echo today to relook at LV function and MV repair. Will check ICD this morning. Diurese with Lasix and add aldactone 25 mg bid. Will observe off Midodrine to monitor BP. CT chest, abd and pelvis with contrast because of elevated d-dimer and ascites.     Gerri Chavez MD

## 2022-10-12 NOTE — PROGRESS NOTES
Quality flow rounds held on 10/12/22     Janel Lezama is admitted for  Acute systolic CHF (congestive heart failure) ([facility] [address] Length of stay 0. Education:    Needed Education: CHF, diet, follow up, meds      Do you have any questions regarding your plan of care while at the hospital? denies    Planned Disposition:               [x]  Home when able                [] Swing Bed                [] ECF/SNF               [] Other/TBD    Barriers to Discharge:    Can you afford your medications? yes   Do you have transportation to follow up appointments?  drives or uses [facility]   Do you need any new equipment at home? denies   Current equipment includes   nebulizer    Do you have a living will or durable power of  for healthcare? denies               If yes do we have a copy on file? N/a    Do you or your family have any questions or concerns we haven't already discussed? Denies    Lives with  and 4 grandchildren. Plans to return home at discharge, denies needs. PCP is Dr. Yohana Murphy.

## 2022-10-12 NOTE — H&P
Hospital Medicine  History and Physical    Patient:  Shila Thomas  MRN: [id number]    CHIEF COMPLAINT:  Shortness of breath (SOB)    History Obtained From:  patient  PCP: Benito Lennox, MD    HISTORY OF PRESENT ILLNESS:   The patient is a 60 y.o. female who presents to the ER with increasing SOB. According to Mellissa Gamez, she has been out of all her medication other than Gabapentin for the past 2 months. She states her mail in company never sent them to her. For the past week she has noticed an occasional cough with a small amount of production. She states then she developed swelling in both her legs about 3 days ago and started to feel short of breath. Mellissa Gamez states she had cut back on smoking and tried using her aerosol machine with no improvement. During this time she denies fever or chills. Appetite has not been very good and her abdomen has felt more distended. No chest pain. She has a history of CAD with a reduced LV function. She does have an AICD in place. Mellissa Gamez states that yesterday her breathing became a lot worse so she decided to come to the ER. In the ER her CMP was normal other than a Chloride of 109, BUN 24, and Creatinine of 1.19.  her glucose was 251. BNP was 20,277 with a troponin at 28. CBC showed a WBC at 8, Hgb 10, and Plt ct 260. COVID was negative. UA showed positive nitrites with 3 + bacteria but no WBC's. CXR showed small right effusion with right lung base atelectasis / consolidation. Mellissa Gamez is admitted for IV diuretics and to start back on her previous home medication. A consult will be placed to her Cardiologist, Dr. Glen Roche, for further evaluation and treatment.         Past Medical History:        Diagnosis Date    CAD (coronary artery disease)     Cardiomyopathy ([facility] [address]     COPD (chronic obstructive pulmonary disease) ([facility] [address]     Depression     Diabetes mellitus ([facility] [address]     H/O mitral valve repair     History of fractured kneecap 2017    right    Hx of CABG     Hx of myocardial infarction     Hyperlipidemia     Hypertension     ICD (implantable cardioverter-defibrillator) in place     Kidney calculi     Osteoarthritis     Pneumonia     Renal calculi        Past Surgical History:        Procedure Laterality Date    ANKLE SURGERY Right 2009    plate and screws     CARDIAC DEFIBRILLATOR PLACEMENT  06/01/15    CHOLECYSTECTOMY  1990    CORONARY ARTERY BYPASS GRAFT  05/28/15    X 3- Dr Vitaly Cheng CABELLO-LAD<SVG-Diag, SVG-PDA, MV Repair with 30 min CE IMR ring    CYSTOSCOPY Left 11/08/2018    HYSTERECTOMY (CERVIX STATUS UNKNOWN)  1990    KIDNEY STONE SURGERY Left 2010    MITRAL VALVE SURGERY  6/4/15    WI CYSTO/URETERO W/LITHOTRIPSY &[place] STENT INSRT Left 11/8/2018    CYSTOSCOPY URETEROSCOPY performed by Berna Mccain MD at [address] &[place] [address] Left 11/8/2018    CYSTOSCOPY STENT INSERTION performed by Berna Mccain MD at [address] &[place] [address] Left 11/15/2018    LEFT CYSTOSCOPY URETEROSCOPY LASER,HLL, LEFT STENT EXCHANGE performed by Berna Mccain MD at [facility]         Medications Prior to Admission:  I obtained, documented, reviewed, and updated the patient's current medications. Prior to Admission medications    Medication Sig Start Date End Date Taking? Authorizing Provider   gabapentin (NEURONTIN) 400 MG capsule Take 1 capsule by mouth in the morning and 1 capsule at noon and 1 capsule before bedtime. Do all this for 90 days.  7/15/22 10/13/22  Amelia Castellon MD   lisinopril (PRINIVIL;ZESTRIL) 5 MG tablet take 1 tablet by mouth once daily 5/5/22   Wilman Dale DO   midodrine (PROAMATINE) 5 MG tablet take 1 tablet by mouth three times a day 5/5/22   Wilman Dale DO   sertraline (ZOLOFT) 100 MG tablet take 1 tablet by mouth once daily 5/2/22   Amelia Castellon MD   clopidogrel (PLAVIX) 75 MG tablet take 1 tablet by mouth once daily 5/2/22   Amelia Castellon MD atorvastatin (LIPITOR) 80 MG tablet take 1 tablet by mouth once daily 5/2/22   Hansel Taylor MD   carvedilol (COREG) 3.125 MG tablet take 1 tablet by mouth twice a day with meals 5/2/22   Hansel Taylor MD   insulin glargine (LANTUS SOLOSTAR) 100 UNIT/ML injection pen INJECT 45 UNITS SUBCUTANEOUSLY EVERY MORNING AND 25 UNITS EVERY EVENING 4/22/22   Hansel Taylor MD   glucose monitoring (FREESTYLE) kit Please dispense meter covered by patients insurance. 4/12/22   Hansel Taylor MD   Insulin Pen Needle (BD PEN NEEDLE PAIGE 2ND GEN) 32G X 4 MM MISC use 1 PEN NEEDLE to inject MEDICATION subcutaneously daily 4/12/22   Hansel Taylor MD   Lancets MISC Pt testing BS once daily and prn 4/12/22   Hansel Taylor MD   blood glucose monitor strips Testing BS once daily and as needed.  Dispense strips to go with new glucometer 4/12/22   Hansel Taylor MD   albuterol (PROVENTIL) (2.5 MG/3ML) 0.083% nebulizer solution Take 3 mLs by nebulization every 6 hours as needed for Wheezing (as needed for wheezing) 4/12/22   Hansel Taylor MD   furosemide (LASIX) 40 MG tablet TAKE 1 TABLET BY MOUTH ONCE DAILY 4/5/22   Hansel Taylor MD   Respiratory Therapy Supplies (NEBULIZER/TUBING/MOUTHPIECE) KIT 1 kit by Does not apply route daily as needed (for SOB, cough)  Patient not taking: Reported on 4/12/2022 11/13/19   Hansel Taylor MD   Respiratory Therapy Supplies (NEBULIZER COMPRESSOR) KIT 1 kit by Does not apply route once for 1 dose 11/4/19 11/4/19  Hansel Taylor MD   lactobacillus (CULTURELLE) capsule Take 1 capsule by mouth 2 times daily (with meals) 1/2/19   Denny Sewell MD   blood glucose monitor strips Use to test daily 11/7/18   Hansel Taylor MD   blood glucose monitor kit and supplies Use to test daily 11/7/18   Hansel Taylor MD   Cholecalciferol (VITAMIN D3) 5000 units TABS Take 5,000 Units by mouth daily    Historical Provider, MD   glucose blood VI test strips (EXACTECH TEST) strip 1 each by In Vitro route daily Pt testing BS once daily and prn 11/19/15   Krish James MD   Multiple Vitamins-Minerals (THERAPEUTIC MULTIVITAMIN-MINERALS) tablet Take 1 tablet by mouth daily (with breakfast) 6/12/15   Marilin Almaguer MD       Allergies:  Codeine and Nsaids    Social History:   TOBACCO:   reports that she has been smoking cigarettes. She has a 16.00 pack-year smoking history. She has never used smokeless tobacco.  ETOH:   reports no history of alcohol use. OCCUPATION:      Family History:       Problem Relation Age of Onset    Stroke Father 76    Other Father         low BS    Diabetes Mother        REVIEW OF SYSTEMS:  Constitutional:  negative for  fevers and chills  HEENT:  negative for  ear drainage, earaches, nasal congestion, and sore throat  Neck:  No lumps or masses  Cardiac:  positive for  dyspnea, fatigue, early saiety, edema  negative for  chest pain  Respiratory:  positive for  cough with sputum and dyspnea  negative for  hemoptysis and chest pain  Gastrointestinal:  negative for nausea, vomiting, abdominal pain, and positive for abdominal distention. :  negative for frequency, dysuria, and hesitancy  Musculoskeletal:  positive for  muscle weakness  Neuro:  negative      Physical Exam:    Vitals: /83   Pulse (!) 103   Temp 97.8 °F (36.6 °C) (Oral)   Resp 22   Ht 5' 6\" (1.676 m)   Wt 180 lb (81.6 kg)   SpO2 97%   BMI 29.05 kg/m²   General appearance: alert, appears stated age and cooperative. Sitting up in chair. Skin: Skin color, texture, turgor normal. No rashes or lesions  HEENT: Head: Normocephalic, no lesions, without obvious abnormality. Eye: Normal external eye, conjunctiva, lids cornea, MINDY. Ears: Normal TM's bilaterally. Normal auditory canals and external ears. Non-tender. Nose: Normal external nose, mucus membranes and septum. Pharynx: No dentures, oral mucosa is moist, no oral lesions, posterior pharynx without erythema.   Neck: no adenopathy, no carotid bruit, and supple, symmetrical, trachea midline  Lungs:  Rales in the bases posteriorly. Lungs clear anteriorly. Heart: regular rate and rhythm, S1, S2 normal, and II/VI systolic murmur  Abdomen:  +BS, soft, non tender, distended. Extremities:  Lower extremity edema bilateral with ACE wraps on. No calf tenderness. Negative Steven's sign. Neurologic: Mental status: Alert, oriented, thought content appropriate    CBC:   Recent Labs     10/11/22  2130   WBC 8.0   HGB 10.0*        BMP:    Recent Labs     10/11/22  2130      K 4.4   *   CO2 17*   BUN 24*   CREATININE 1.19*   GLUCOSE 251*     Hepatic:   Recent Labs     10/11/22  2130   AST 19   ALT 21   BILITOT 0.9   ALKPHOS 88     Troponin: No results for input(s): TROPONINI in the last 72 hours. BNP: No results for input(s): BNP in the last 72 hours. Lipids: No results for input(s): CHOL, HDL in the last 72 hours. Invalid input(s): LDLCALCU  ABGs: No results found for: PHART, PO2ART, [id number]  INR: No results for input(s): INR in the last 72 hours. -----------------------------------------------------------------      Assessment and Plan   Acute systolic CHF - started on IV Lasix 20 mg BID. Placed back on Coreg and Lisinopril. Dr. Verónica Cole consulted. Last EF 45% with a repeat ECHO ordered for today. Patient has an AICD. CAD - no chest pain. Troponin 28 x 2 in the ER. Started back on Plavix and Coreg. CKD stage III B - appears to be at baseline. DM II - placed back on Lantus with a Humalog sliding scale. Hyperlipidemia - started back on Lipitor. H/O Depression / anxiety - started back on Zoloft. Vitamin D deficiency - on Vitamin D replacement. H/O Mitral valve repair. COPD - uses aerosol treatment at home PRN. Placed on Xopenex treatments every 4 hours W/A. Smoker - refuses nicotine patch at this time. Full code    Medical Necessity:   In patient is appropriate for this patient secondary to the need for IV Diuretics and close monitoring of condition. Estimated length of stay 3 days. The beneficiary may reasonably be expected to be discharged or transferred to a hospital within 96 hours after admission. Patient Active Problem List   Diagnosis Code    Acute coronary syndrome (HCC) I24.9    S/P cardiac cath-Patent grafts 6/5/15 Z98.890    Onychomycosis of toenail B35.1    CAD (coronary artery disease) I25.10    Hx of CABG Z95.1    ICD (implantable cardioverter-defibrillator) in place Z95.810    H/O mitral valve repair Z98.890    Hx of myocardial infarction I25.2    Hypertension I10    DM (diabetes mellitus), type 2 with neurological complications ([facility] [address] S21.82    Anxiety F41.9    CHAD (acute kidney injury) ([facility] [address] N17.9    Elevated serum immunoglobulin free light chains R76.8    Ureteral stone with hydronephrosis N13.2    Dyslipidemia E78.5    Pneumonia O25.9    Acute systolic CHF (congestive heart failure) ([facility]) I50.21       DVT prophylaxis:   [x] Lovenox   [] SCDs   [] SQ Heparin   [] Encourage ambulation, low risk for DVT, no chemical or mechanical    prophylaxis necessary      [] Already on Anticoagulation      Documentation of the Current Medications in the Medical Record    (x)  I have utilized all available immediate resources to obtain, update, or review the patient's current medications. (Satisfies MIPS Performance)  If Yes, Stop Here  ( )  The patient is not eligible for medication reconciliation; the patient is in an emergent medical situation where delaying treatment would jeopardize the patient's health. (MIPS Performance exception / exclusion)  ( )  I did not confirm, update or review the patient's current list of medications today. (Does not satisfy MIPS Performance)          Heart Failure     (x)  The patient has current or prior documentation of left ventricular ejection fraction (LVEF) less than 40%, or moderate or severely depressed left ventricular systolic function.   Answer both:   (x) The patient was prescribed or already taking an Angiotensin -Converting Enzyme (ACE) Inhibitor, or Angiotensin Receptor Blocker (ARB). (x) The patient was prescribed or already taking a beta - blocker. If Yes to both, stop here. ( )  Patient not prescribed / taking:   ( ) ACE or ARB for medical/patient/system reason(s) including ( ) (ex. Allergy, intolerance, contraindication)   ( ) Beta - blocker for medical/patient/system reason(s) include ( ) (ex. Allergy, intolerance, contraindication)  ( )  Patient not prescribed / taking:   ( ) ACE or ARB, no reason given (does not satisfy MIPS performance)   ( ) Beta - blocker, no reason given (does not satisfy MIPS performance)    Advanced Care Plan    (x)  I confirmed that the patient's [address] is present, code status documented, or surrogate decision maker is listed in the patient's medical record. ( )  The patient's advanced care plan is not present because:  (select)   ( ) I confirmed today that the patient does not wish or was not able to name a surrogate decision maker or provide an [address]. ( ) Hospice care is currently being provided or has been provided this calender year. ( )  I did not confirm today the presence of an [address] or surrogate decision maker documented within the patient's medical record. (Does not satisfy MIPS performance).             Loco Davis MD, MD  Admitting Hospitalist

## 2022-10-12 NOTE — PROGRESS NOTES
Patient arrives to unit via wheelchair. Pt ambulates independently to bed. Report received from [address] . Oriented to room, call light system, dry erase board. Vital signs obtained. Ice water provided. Telemetry monitor applied. Orders reviewed with patient. Nursing assessment complete. Admission questions answered with patient as source. Patient currently resting in bed with call light and bedside table within reach.

## 2022-10-12 NOTE — ED PROVIDER NOTES
[facility]  eMERGENCY dEPARTMENT eNCOUnter          [address] [facility]       Chief Complaint   Patient presents with    Shortness of Breath     Increase in swelling to abd and legs and sob for a couple of days. Has been without meds for 2 months. Nurses Notes reviewed and I agree except as noted in the HPI. HISTORY OF PRESENT ILLNESS    Hector Moreno is a 60 y.o. female who presents to the emergency room via private vehicle with complaints of shortness of breath, bloating and leg swelling, he states onset over the past 2 months that she is been out of her medications of the swelling is increased over the last couple of days, patient denies any chest pain, denies any discharge of her defibrillator, denies any known sick contacts. REVIEW OF SYSTEMS     Review of Systems   All other systems reviewed and are negative. PAST MEDICAL HISTORY    has a past medical history of CAD (coronary artery disease), Cardiomyopathy ([address] COPD (chronic obstructive pulmonary disease) ([address] Depression, Diabetes mellitus ([address] H/O mitral valve repair, History of fractured kneecap, Hx of CABG, Hx of myocardial infarction, Hyperlipidemia, Hypertension, ICD (implantable cardioverter-defibrillator) in place, Kidney calculi, Osteoarthritis, Pneumonia, and Renal calculi. SURGICAL HISTORY      has a past surgical history that includes Cholecystectomy (1990); Hysterectomy (1990); Ankle surgery (Right, 2009); Kidney stone surgery (Left, 2010); Tubal ligation; Mitral valve surgery (6/4/15); Cardiac defibrillator placement (06/01/15); Coronary artery bypass graft (05/28/15); Cystocopy (Left, 11/08/2018); pr cysto/uretero w/lithotripsy &indwell stent insrt (Left, 11/8/2018); pr cysto/uretero w/lithotripsy &indwell stent insrt (Left, 11/8/2018); and pr cysto/uretero w/lithotripsy &indwell stent insrt (Left, 11/15/2018).     CURRENT MEDICATIONS       Current Discharge Medication List        CONTINUE these medications which have NOT CHANGED    Details   gabapentin (NEURONTIN) 400 MG capsule Take 1 capsule by mouth in the morning and 1 capsule at noon and 1 capsule before bedtime. Do all this for 90 days. Qty: 270 capsule, Refills: 0      lisinopril (PRINIVIL;ZESTRIL) 5 MG tablet take 1 tablet by mouth once daily  Qty: 30 tablet, Refills: 5    Associated Diagnoses: Essential hypertension      midodrine (PROAMATINE) 5 MG tablet take 1 tablet by mouth three times a day  Qty: 90 tablet, Refills: 1      sertraline (ZOLOFT) 100 MG tablet take 1 tablet by mouth once daily  Qty: 30 tablet, Refills: 1    Associated Diagnoses: Anxiety      clopidogrel (PLAVIX) 75 MG tablet take 1 tablet by mouth once daily  Qty: 30 tablet, Refills: 1      atorvastatin (LIPITOR) 80 MG tablet take 1 tablet by mouth once daily  Qty: 30 tablet, Refills: 1    Associated Diagnoses: Dyslipidemia      carvedilol (COREG) 3.125 MG tablet take 1 tablet by mouth twice a day with meals  Qty: 60 tablet, Refills: 1    Associated Diagnoses: Essential hypertension; Coronary artery disease involving native coronary artery of native heart without angina pectoris      insulin glargine (LANTUS SOLOSTAR) 100 UNIT/ML injection pen INJECT 45 UNITS SUBCUTANEOUSLY EVERY MORNING AND 25 UNITS EVERY EVENING  Qty: 30 mL, Refills: 5      glucose monitoring (FREESTYLE) kit Please dispense meter covered by patients insurance. Qty: 1 kit, Refills: 0    Associated Diagnoses: DM (diabetes mellitus), type 2 with neurological complications (HCC)      Insulin Pen Needle (BD PEN NEEDLE PAIGE 2ND GEN) 32G X 4 MM MISC use 1 PEN NEEDLE to inject MEDICATION subcutaneously daily  Qty: 100 each, Refills: 0    Associated Diagnoses: DM (diabetes mellitus), type 2 with neurological complications (HCC)      Lancets MISC Pt testing BS once daily and prn  Qty: 100 each, Refills: 3    Associated Diagnoses: DM (diabetes mellitus), type 2 with neurological complications ([address]      ! ! blood glucose monitor strips Testing BS once daily and as needed. Dispense strips to go with new glucometer  Qty: 100 strip, Refills: 3    Comments: Brand per patient preference. May round up to next available package size. Associated Diagnoses: DM (diabetes mellitus), type 2 with neurological complications (HCC)      albuterol (PROVENTIL) (2.5 MG/3ML) 0.083% nebulizer solution Take 3 mLs by nebulization every 6 hours as needed for Wheezing (as needed for wheezing)  Qty: 120 each, Refills: 1      furosemide (LASIX) 40 MG tablet TAKE 1 TABLET BY MOUTH ONCE DAILY  Qty: 30 tablet, Refills: 0      Respiratory Therapy Supplies (NEBULIZER/TUBING/MOUTHPIECE) KIT 1 kit by Does not apply route daily as needed (for SOB, cough)  Qty: 1 kit, Refills: 0    Associated Diagnoses: Other emphysema (HCC)      lactobacillus (CULTURELLE) capsule Take 1 capsule by mouth 2 times daily (with meals)  Qty: 30 capsule, Refills: 0      !! blood glucose monitor strips Use to test daily  Qty: 100 strip, Refills: 5    Associated Diagnoses: DM (diabetes mellitus), type 2 with neurological complications (HCC)      blood glucose monitor kit and supplies Use to test daily  Qty: 1 kit, Refills: 0    Associated Diagnoses: DM (diabetes mellitus), type 2 with neurological complications (HCC)      Cholecalciferol (VITAMIN D3) 5000 units TABS Take 5,000 Units by mouth daily      !! glucose blood VI test strips (EXACTECH TEST) strip 1 each by In Vitro route daily Pt testing BS once daily and prn  Qty: 100 each, Refills: 3      Multiple Vitamins-Minerals (THERAPEUTIC MULTIVITAMIN-MINERALS) tablet Take 1 tablet by mouth daily (with breakfast)  Qty: 60 tablet, Refills: 3       !! - Potential duplicate medications found. Please discuss with provider. ALLERGIES     is allergic to codeine and nsaids. FAMILY HISTORY     She indicated that her mother is alive. She indicated that her father is alive. She indicated that her sister is alive.  She indicated that her brother is alive. family history includes Diabetes in her mother; Other in her father; Stroke (age of onset: 76) in her father. SOCIAL HISTORY      reports that she has been smoking cigarettes. She has a 16.00 pack-year smoking history. She has never used smokeless tobacco. She reports that she does not drink alcohol and does not use drugs. PHYSICAL EXAM     INITIAL VITALS:  height is 5' 6\" (1.676 m) and weight is 180 lb (81.6 kg). Her oral temperature is 97.8 °F (36.6 °C). Her blood pressure is 121/83 and her pulse is 103 (abnormal). Her respiration is 22 and oxygen saturation is 97%. Physical Exam   Constitutional: Patient is oriented to person, place, and time. Patient appears well-developed and well-nourished. Patient is active and cooperative. HENT:   Head: Normocephalic and atraumatic. Head is without contusion. Right Ear: Hearing and external ear normal. No drainage. Left Ear: Hearing and external ear normal. No drainage. Nose: Nose normal. No nasal deformity. No epistaxis. Mouth/Throat: Mucous membranes are not dry. Eyes: EOMI. Conjunctivae, sclera, and lids are normal. Right eye exhibits no discharge. Left eye exhibits no discharge. Neck: Full passive range of motion without pain and phonation normal.  Negative JVD, negative tracheal deviation  Cardiovascular:  Normal rate, regular rhythm and intact distal pulses. Noted bilateral lower extremity edema with pitting 2+  Pulses: Right radial pulse  2+   Pulmonary/Chest: Effort normal. No tachypnea and no bradypnea. Scattered fine coarseness throughout lung fields  Abdominal: Soft. Patient with mild distension without tenderness  Musculoskeletal:   Negative acute trauma or deformity,  apparent full range of motion and normal strength all extremities appropriate to age. Neurological: Patient is alert and oriented to person, place, and time. patient displays no tremor. Patient displays no seizure activity.  .  Skin: Skin is warm and dry. Patient is not diaphoretic. Psychiatric: Patient has a normal mood and affect. Patient speech is normal and behavior is normal. Cognition and memory are normal.     DIFFERENTIAL DIAGNOSIS:   CHF exacerbation, pneumonia bronchitis URI, dysrhythmia    DIAGNOSTIC RESULTS     EKG: All EKG's are interpreted by the Emergency Department Physician who either signs or Co-signs this chart in the absence of a cardiologist.  EKG    The patient had an EKG which is interpreted by me in the absence of a Cardiologist.   [] Without comparison to previous. [x] With comparison to a previous EKG Dated 12/10/2020    EKG @ 2105 hrs -sinus tachycardia, rate 108, left axis, NV QRS normal, QTC 42 ms, as compared to prior KG noted increased rate 10 bpm, change in axis      RADIOLOGY: non-plain film images(s) such as CT, Ultrasound and MRI are read by the radiologist.  XR CHEST PORTABLE   Final Result      . Small right pleural effusion and/or right lung base atelectasis/consolidation    is seen. Small left pleural effusion cannot be excluded.               LABS:   Labs Reviewed   CBC WITH AUTO DIFFERENTIAL - Abnormal; Notable for the following components:       Result Value    RBC 3.63 (*)     Hemoglobin 10.0 (*)     Hematocrit 30.7 (*)     RDW 18.5 (*)     All other components within normal limits   COMPREHENSIVE METABOLIC PANEL - Abnormal; Notable for the following components:    Glucose 251 (*)     BUN 24 (*)     Creatinine 1.19 (*)     Est, Glom Filt Rate 51 (*)     Calcium 8.4 (*)     Chloride 109 (*)     CO2 17 (*)     All other components within normal limits   BRAIN NATRIURETIC PEPTIDE - Abnormal; Notable for the following components:    Pro-BNP 20,277 (*)     All other components within normal limits   TROPONIN - Abnormal; Notable for the following components:    Troponin, High Sensitivity 28 (*)     All other components within normal limits   URINALYSIS - Abnormal; Notable for the following components:    Glucose, Ur 50 mg/dL (*)     Urine Hgb 1+ (*)     Protein, UA 1+ (*)     Nitrite, Urine POSITIVE (*)     All other components within normal limits   TROPONIN - Abnormal; Notable for the following components:    Troponin, High Sensitivity 28 (*)     All other components within normal limits   MICROSCOPIC URINALYSIS - Abnormal; Notable for the following components:    Bacteria, UA 3+ (*)     All other components within normal limits   COVID-19, RAPID   TROPONIN   TROPONIN   HEMOGLOBIN A1C   POCT GLUCOSE   POCT GLUCOSE   POCT GLUCOSE       EMERGENCY DEPARTMENT COURSE:   Vitals:    Vitals:    10/12/22 0145 10/12/22 0201 10/12/22 0236 10/12/22 0521   BP: 121/83      Pulse: (!) 103      Resp: 22      Temp: 97.8 °F (36.6 °C)      TempSrc: Oral      SpO2: 96% 96%  97%   Weight:   180 lb (81.6 kg)    Height:   5' 6\" (1.676 m)      Patient history and physical exam taken at bedside, discussed patient symptoms and exam findings, discussed initial work-up to good EKG chest x-ray blood work, urinalysis, IV access. Patient placed on cardiac monitor, pulse ox, sitting high semi-Simon's in bed. EKG and chest x-ray as above    Initial labs reviewed, WBC 8.0 with 60% PMNs and no bands, H&H 10.0/30.7, SCR 1.19, BUN 24, , K4.4, normal liver enzymes, pBNP 18330, hsTnT 28    Lasix 20 mg IV ordered    Patient was able to give urine sample, urine be nitrite positive    Allergies reviewed, ceftriaxone 1000 mg IV x1 ordered    Discussed with patient her initial work-up, discussed my concerns for lab suggestive of systolic heart failure as well as underlying UTI, discussed need for admission, acknowledged    Case was discussed with Dr. Etta Traylor, hospitalist, regarding patient's presentation and current work-up, excepted for admission    Critical Care Time: 30 minutes, occluding direct patient interaction, review of EMR, discussion with hospitalist    FINAL IMPRESSION      1. Acute systolic heart failure ([address]    2. Acute cystitis with hematuria    3. acetaminophen (TYLENOL) suppository 650 mg (has no administration in time range)   furosemide (LASIX) injection 20 mg (has no administration in time range)   glucose chewable tablet 16 g (has no administration in time range)   dextrose bolus 10% 125 mL (has no administration in time range)     Or   dextrose bolus 10% 250 mL (has no administration in time range)   glucagon (rDNA) injection 1 mg (has no administration in time range)   dextrose 10 % infusion (has no administration in time range)   insulin lispro (HUMALOG) injection vial 0-4 Units (has no administration in time range)   insulin lispro (HUMALOG) injection vial 0-4 Units (has no administration in time range)   cefTRIAXone (ROCEPHIN) 1,000 mg in dextrose 5 % 50 mL IVPB mini-bag (has no administration in time range)   furosemide (LASIX) injection 20 mg (20 mg IntraVENous Given 10/11/22 2334)   cefTRIAXone (ROCEPHIN) 1,000 mg in dextrose 5 % 50 mL IVPB mini-bag (0 mg IntraVENous Stopped 10/12/22 0124)       New Prescriptions from this visit:    Current Discharge Medication List          Follow-up:  No follow-up provider specified. Final Impression:   1. Acute systolic heart failure ([address]    2. Acute cystitis with hematuria    3.  Lab test negative for COVID-19 virus               (Please note that portions of this note were completed with a voice recognition program.  Efforts were made to edit the dictations but occasionally words are mis-transcribed.)    MD Leny Raya MD  10/12/22 2416

## 2022-10-13 ENCOUNTER — APPOINTMENT (OUTPATIENT)
Dept: GENERAL RADIOLOGY | Age: 62
DRG: 194 | End: 2022-10-13
Payer: MEDICARE

## 2022-10-13 PROBLEM — I50.9 CONGESTIVE HEART FAILURE DUE TO CARDIOMYOPATHY (HCC): Status: ACTIVE | Noted: 2022-10-13

## 2022-10-13 PROBLEM — I42.9 CONGESTIVE HEART FAILURE DUE TO CARDIOMYOPATHY (HCC): Status: ACTIVE | Noted: 2022-01-01

## 2022-10-13 LAB
ABSOLUTE EOS #: 0 K/UL (ref 0–0.4)
ABSOLUTE EOS #: 0 K/UL (ref 0–0.4)
ABSOLUTE LYMPH #: 1.6 K/UL (ref 1–4.8)
ABSOLUTE LYMPH #: 2.4 K/UL (ref 1–4.8)
ABSOLUTE MONO #: 0.7 K/UL (ref 0–1)
ABSOLUTE MONO #: 0.9 K/UL (ref 0–1)
ANION GAP SERPL CALCULATED.3IONS-SCNC: 12 MMOL/L (ref 9–17)
BASOPHILS # BLD: 0 % (ref 0–2)
BASOPHILS # BLD: 0 % (ref 0–2)
BASOPHILS ABSOLUTE: 0 K/UL (ref 0–0.2)
BASOPHILS ABSOLUTE: 0 K/UL (ref 0–0.2)
BUN BLDV-MCNC: 29 MG/DL (ref 8–23)
BUN/CREAT BLD: 16 (ref 9–20)
C DIFF AG + TOXIN: NEGATIVE
CALCIUM SERPL-MCNC: 8.6 MG/DL (ref 8.6–10.4)
CHLORIDE BLD-SCNC: 106 MMOL/L (ref 98–107)
CO2: 22 MMOL/L (ref 20–31)
CREAT SERPL-MCNC: 1.79 MG/DL (ref 0.5–0.9)
DIFFERENTIAL TYPE: YES
EOSINOPHILS RELATIVE PERCENT: 0 % (ref 0–5)
EOSINOPHILS RELATIVE PERCENT: 0 % (ref 0–5)
FIO2: 50
GFR SERPL CREATININE-BSD FRML MDRD: 31 ML/MIN/1.73M2
GLUCOSE BLD-MCNC: 129 MG/DL (ref 65–99)
GLUCOSE BLD-MCNC: 155 MG/DL (ref 65–99)
GLUCOSE BLD-MCNC: 194 MG/DL (ref 65–99)
GLUCOSE BLD-MCNC: 235 MG/DL (ref 65–99)
GLUCOSE BLD-MCNC: 40 MG/DL (ref 70–99)
GLUCOSE BLD-MCNC: 98 MG/DL (ref 65–99)
HCT VFR BLD CALC: 32.1 % (ref 36–46)
HCT VFR BLD CALC: 33.4 % (ref 36–46)
HEMOGLOBIN: 10.1 G/DL (ref 12–16)
HEMOGLOBIN: 10.5 G/DL (ref 12–16)
LACTIC ACID: 1.4 MMOL/L (ref 0.5–2.2)
LYMPHOCYTES # BLD: 16 % (ref 15–40)
LYMPHOCYTES # BLD: 21 % (ref 15–40)
MCH RBC QN AUTO: 26.9 PG (ref 26–34)
MCH RBC QN AUTO: 27.1 PG (ref 26–34)
MCHC RBC AUTO-ENTMCNC: 31.3 G/DL (ref 31–37)
MCHC RBC AUTO-ENTMCNC: 31.5 G/DL (ref 31–37)
MCV RBC AUTO: 85.8 FL (ref 80–100)
MCV RBC AUTO: 86 FL (ref 80–100)
MONOCYTES # BLD: 6 % (ref 4–8)
MONOCYTES # BLD: 8 % (ref 4–8)
MORPHOLOGY: ABNORMAL
NEGATIVE BASE EXCESS, ART: 5 (ref 0–2)
O2 DEVICE/FLOW/%: ABNORMAL
PDW BLD-RTO: 18.8 % (ref 12.1–15.2)
PDW BLD-RTO: 19 % (ref 12.1–15.2)
PLATELET # BLD: 266 K/UL (ref 140–450)
PLATELET # BLD: 336 K/UL (ref 140–450)
POC HCO3: 19.9 MMOL/L (ref 21–28)
POC O2 SATURATION: 91 % (ref 94–98)
POC PCO2: 37.4 MM HG (ref 35–48)
POC PH: 7.33 (ref 7.35–7.45)
POC PO2: 65.7 MM HG (ref 83–108)
POTASSIUM SERPL-SCNC: 5 MMOL/L (ref 3.7–5.3)
RBC # BLD: 3.74 M/UL (ref 4–5.2)
RBC # BLD: 3.88 M/UL (ref 4–5.2)
SEG NEUTROPHILS: 71 % (ref 47–75)
SEG NEUTROPHILS: 78 % (ref 47–75)
SEGMENTED NEUTROPHILS ABSOLUTE COUNT: 7.7 K/UL (ref 2.5–7)
SEGMENTED NEUTROPHILS ABSOLUTE COUNT: 7.9 K/UL (ref 2.5–7)
SODIUM BLD-SCNC: 140 MMOL/L (ref 135–144)
SPECIMEN DESCRIPTION: NORMAL
WBC # BLD: 10.2 K/UL (ref 3.5–11)
WBC # BLD: 11.1 K/UL (ref 3.5–11)

## 2022-10-13 PROCEDURE — 2000000000 HC ICU R&B

## 2022-10-13 PROCEDURE — 94669 MECHANICAL CHEST WALL OSCILL: CPT

## 2022-10-13 PROCEDURE — 36600 WITHDRAWAL OF ARTERIAL BLOOD: CPT

## 2022-10-13 PROCEDURE — 94761 N-INVAS EAR/PLS OXIMETRY MLT: CPT

## 2022-10-13 PROCEDURE — 82803 BLOOD GASES ANY COMBINATION: CPT

## 2022-10-13 PROCEDURE — 82947 ASSAY GLUCOSE BLOOD QUANT: CPT

## 2022-10-13 PROCEDURE — 6370000000 HC RX 637 (ALT 250 FOR IP): Performed by: INTERNAL MEDICINE

## 2022-10-13 PROCEDURE — 87324 CLOSTRIDIUM AG IA: CPT

## 2022-10-13 PROCEDURE — 71045 X-RAY EXAM CHEST 1 VIEW: CPT

## 2022-10-13 PROCEDURE — 36415 COLL VENOUS BLD VENIPUNCTURE: CPT

## 2022-10-13 PROCEDURE — 83605 ASSAY OF LACTIC ACID: CPT

## 2022-10-13 PROCEDURE — 87040 BLOOD CULTURE FOR BACTERIA: CPT

## 2022-10-13 PROCEDURE — 94660 CPAP INITIATION&MGMT: CPT

## 2022-10-13 PROCEDURE — 2700000000 HC OXYGEN THERAPY PER DAY

## 2022-10-13 PROCEDURE — 93005 ELECTROCARDIOGRAM TRACING: CPT | Performed by: INTERNAL MEDICINE

## 2022-10-13 PROCEDURE — 51702 INSERT TEMP BLADDER CATH: CPT

## 2022-10-13 PROCEDURE — 85025 COMPLETE CBC W/AUTO DIFF WBC: CPT

## 2022-10-13 PROCEDURE — 80048 BASIC METABOLIC PNL TOTAL CA: CPT

## 2022-10-13 PROCEDURE — 6360000002 HC RX W HCPCS: Performed by: INTERNAL MEDICINE

## 2022-10-13 PROCEDURE — 94640 AIRWAY INHALATION TREATMENT: CPT

## 2022-10-13 PROCEDURE — 2580000003 HC RX 258: Performed by: INTERNAL MEDICINE

## 2022-10-13 PROCEDURE — 87449 NOS EACH ORGANISM AG IA: CPT

## 2022-10-13 PROCEDURE — 6360000002 HC RX W HCPCS

## 2022-10-13 RX ORDER — BUDESONIDE 0.5 MG/2ML
0.5 INHALANT ORAL 2 TIMES DAILY
Status: DISCONTINUED | OUTPATIENT
Start: 2022-10-13 | End: 2022-10-14 | Stop reason: HOSPADM

## 2022-10-13 RX ORDER — DOPAMINE HYDROCHLORIDE 160 MG/100ML
1.5 INJECTION, SOLUTION INTRAVENOUS CONTINUOUS
Status: DISCONTINUED | OUTPATIENT
Start: 2022-10-13 | End: 2022-10-14 | Stop reason: HOSPADM

## 2022-10-13 RX ORDER — SODIUM CHLORIDE 9 MG/ML
INJECTION, SOLUTION INTRAVENOUS ONCE
Status: COMPLETED | OUTPATIENT
Start: 2022-10-13 | End: 2022-10-13

## 2022-10-13 RX ORDER — FUROSEMIDE 20 MG/1
20 TABLET ORAL DAILY
Status: DISCONTINUED | OUTPATIENT
Start: 2022-10-13 | End: 2022-10-14 | Stop reason: HOSPADM

## 2022-10-13 RX ORDER — 0.9 % SODIUM CHLORIDE 0.9 %
250 INTRAVENOUS SOLUTION INTRAVENOUS ONCE
Status: DISCONTINUED | OUTPATIENT
Start: 2022-10-13 | End: 2022-10-13

## 2022-10-13 RX ORDER — GUAIFENESIN 600 MG/1
600 TABLET, EXTENDED RELEASE ORAL 2 TIMES DAILY
Status: DISCONTINUED | OUTPATIENT
Start: 2022-10-13 | End: 2022-10-14 | Stop reason: HOSPADM

## 2022-10-13 RX ORDER — INSULIN GLARGINE 100 [IU]/ML
15 INJECTION, SOLUTION SUBCUTANEOUS 2 TIMES DAILY
Status: DISCONTINUED | OUTPATIENT
Start: 2022-10-13 | End: 2022-10-14 | Stop reason: HOSPADM

## 2022-10-13 RX ORDER — DEXTROSE MONOHYDRATE 25 G/50ML
12.5 INJECTION, SOLUTION INTRAVENOUS ONCE
Status: DISCONTINUED | OUTPATIENT
Start: 2022-10-13 | End: 2022-10-13

## 2022-10-13 RX ADMIN — GUAIFENESIN 600 MG: 600 TABLET ORAL at 21:20

## 2022-10-13 RX ADMIN — ALBUTEROL SULFATE 2.5 MG: 2.5 SOLUTION RESPIRATORY (INHALATION) at 00:44

## 2022-10-13 RX ADMIN — MULTIPLE VITAMINS W/ MINERALS TAB 1 TABLET: TAB at 07:49

## 2022-10-13 RX ADMIN — IPRATROPIUM BROMIDE 0.5 MG: 0.5 SOLUTION RESPIRATORY (INHALATION) at 16:32

## 2022-10-13 RX ADMIN — DOPAMINE HYDROCHLORIDE IN DEXTROSE 5 MCG/KG/MIN: 1.6 INJECTION, SOLUTION INTRAVENOUS at 15:27

## 2022-10-13 RX ADMIN — LEVALBUTEROL HYDROCHLORIDE 1.25 MG: 1.25 SOLUTION RESPIRATORY (INHALATION) at 13:09

## 2022-10-13 RX ADMIN — IPRATROPIUM BROMIDE 0.5 MG: 0.5 SOLUTION RESPIRATORY (INHALATION) at 05:25

## 2022-10-13 RX ADMIN — ATORVASTATIN CALCIUM 80 MG: 40 TABLET, FILM COATED ORAL at 07:48

## 2022-10-13 RX ADMIN — LEVALBUTEROL HYDROCHLORIDE 1.25 MG: 1.25 SOLUTION RESPIRATORY (INHALATION) at 20:59

## 2022-10-13 RX ADMIN — BUDESONIDE 500 MCG: 0.5 SUSPENSION RESPIRATORY (INHALATION) at 09:39

## 2022-10-13 RX ADMIN — CHOLECALCIFEROL TAB 25 MCG (1000 UNIT) 5000 UNITS: 25 TAB at 07:48

## 2022-10-13 RX ADMIN — CLOPIDOGREL BISULFATE 75 MG: 75 TABLET ORAL at 07:48

## 2022-10-13 RX ADMIN — DEXTROSE MONOHYDRATE 125 ML: 100 INJECTION, SOLUTION INTRAVENOUS at 06:41

## 2022-10-13 RX ADMIN — SERTRALINE HYDROCHLORIDE 100 MG: 50 TABLET ORAL at 07:50

## 2022-10-13 RX ADMIN — INSULIN LISPRO 1 UNITS: 100 INJECTION, SOLUTION INTRAVENOUS; SUBCUTANEOUS at 17:03

## 2022-10-13 RX ADMIN — AZITHROMYCIN MONOHYDRATE 500 MG: 500 INJECTION, POWDER, LYOPHILIZED, FOR SOLUTION INTRAVENOUS at 12:21

## 2022-10-13 RX ADMIN — IPRATROPIUM BROMIDE 0.5 MG: 0.5 SOLUTION RESPIRATORY (INHALATION) at 13:09

## 2022-10-13 RX ADMIN — BUDESONIDE 500 MCG: 0.5 SUSPENSION RESPIRATORY (INHALATION) at 20:59

## 2022-10-13 RX ADMIN — LEVALBUTEROL HYDROCHLORIDE 1.25 MG: 1.25 SOLUTION RESPIRATORY (INHALATION) at 09:39

## 2022-10-13 RX ADMIN — LEVALBUTEROL HYDROCHLORIDE 1.25 MG: 1.25 SOLUTION RESPIRATORY (INHALATION) at 16:32

## 2022-10-13 RX ADMIN — INSULIN GLARGINE 15 UNITS: 100 INJECTION, SOLUTION SUBCUTANEOUS at 17:49

## 2022-10-13 RX ADMIN — GUAIFENESIN 600 MG: 600 TABLET ORAL at 07:49

## 2022-10-13 RX ADMIN — ONDANSETRON 4 MG: 2 INJECTION INTRAMUSCULAR; INTRAVENOUS at 13:45

## 2022-10-13 RX ADMIN — ENOXAPARIN SODIUM 40 MG: 100 INJECTION SUBCUTANEOUS at 12:09

## 2022-10-13 RX ADMIN — IPRATROPIUM BROMIDE 0.5 MG: 0.5 SOLUTION RESPIRATORY (INHALATION) at 09:39

## 2022-10-13 RX ADMIN — Medication 1 CAPSULE: at 07:48

## 2022-10-13 RX ADMIN — ALBUTEROL SULFATE 2.5 MG: 2.5 SOLUTION RESPIRATORY (INHALATION) at 05:26

## 2022-10-13 RX ADMIN — SODIUM CHLORIDE, PRESERVATIVE FREE 10 ML: 5 INJECTION INTRAVENOUS at 21:20

## 2022-10-13 RX ADMIN — SODIUM CHLORIDE: 9 INJECTION, SOLUTION INTRAVENOUS at 07:46

## 2022-10-13 RX ADMIN — GABAPENTIN 400 MG: 300 CAPSULE ORAL at 07:48

## 2022-10-13 RX ADMIN — CEFTRIAXONE SODIUM 1000 MG: 1 INJECTION, POWDER, FOR SOLUTION INTRAMUSCULAR; INTRAVENOUS at 21:27

## 2022-10-13 RX ADMIN — SODIUM CHLORIDE, PRESERVATIVE FREE 10 ML: 5 INJECTION INTRAVENOUS at 07:50

## 2022-10-13 RX ADMIN — INSULIN GLARGINE 15 UNITS: 100 INJECTION, SOLUTION SUBCUTANEOUS at 08:57

## 2022-10-13 RX ADMIN — GABAPENTIN 400 MG: 300 CAPSULE ORAL at 21:19

## 2022-10-13 ASSESSMENT — PAIN SCALES - GENERAL
PAINLEVEL_OUTOF10: 0
PAINLEVEL_OUTOF10: 0

## 2022-10-13 NOTE — PROGRESS NOTES
Pt is resting quietly in bed with eyes closed snoring. Resp are even and nonlabored with s/s of distress or discomfort noted. Call light is within reach and bed alarm remains activated for safety.

## 2022-10-13 NOTE — PROGRESS NOTES
Pt was assisted to the BR x1 SBA to void. The pt was a little unsteady at first although was able to maintain balance to the toilet. It was noted that the pt had a large amount of urine in her brief and was unable to void at this time. Dr. Maryanne Garcia was made aware of this. Pt was assisted back to bed, repositioned for comfort with call light in reach. The bed alarm is maintained for safety.

## 2022-10-13 NOTE — PROGRESS NOTES
I attempt to see patient for palliative care conversation. Pt is having difficulty staying awake through our conversation and repeatedly falls asleep. Nurse made aware of situation.   [address] Cardozo Nurse Coordinator  10/13/2022 11:01 AM

## 2022-10-13 NOTE — PROGRESS NOTES
Bolus was completed at this time. The pt appeared to be more alert at this time and was able to stay awake and talk with the cardiologist.  She was also able to stay awake to eat crackers without any prompting. BS will be rechecked at 0705 and protocol will be followed as written.

## 2022-10-13 NOTE — PROGRESS NOTES
Cardiology    She appears clinically better with less sob. Denies cp. Edema significantly improved. Renal function has worsened, and suspect she is intravascularly depleted. Will change IV lasix to po lasix. If creatinine worsens, will stop lisinopril and place on hydralazine. She has developed severe LV function again with EF in 25% range. In addition, it appears her ICD has gone to TIMBO. Plan as I see it, is to treat medically at this point with her pneumonia and CHF. When better, I will follow up as OP and plan on cardiac cath. After anatomy is defined and treated, will send for ICD generator change.     Thanks, Moriah Muse MD

## 2022-10-13 NOTE — PROGRESS NOTES
Dr. Juanito Llamas called regarding low BP. Post bolus /70. Orders collected to hold lasix, coreg and spironolactone until further notice, awaiting lab results.   C-diff culture collected as patient is incontinent of stool this am.

## 2022-10-13 NOTE — PROGRESS NOTES
Blood sugar was rechecked with a result of 98. Will continue to monitor and treat as needed. BP was checked prior to administration of Coreg with a result of 80/60. Dr. Idalmis Quijano was called and notified with new orders received. Coreg was held. Dr. Whitney Martinez will be notified of BP and administration of IVF. Oncoming nurse notified of current situation with pt.

## 2022-10-13 NOTE — PROGRESS NOTES
Critical Glucose of 40 was reported to this nurse who then reported the result to [facility]. Attempted to have the pt eat and drink with minimal success. Dr. Etta Traylor notified of this and new orders were received.

## 2022-10-13 NOTE — PLAN OF CARE
Problem: Discharge Planning  Goal: Discharge to home or other facility with appropriate resources  Outcome: Progressing  Flowsheets (Taken 10/12/2022 1945)  Discharge to home or other facility with appropriate resources:   Identify barriers to discharge with patient and caregiver   Identify discharge learning needs (meds, wound care, etc)   Arrange for needed discharge resources and transportation as appropriate   Refer to discharge planning if patient needs post-hospital services based on physician order or complex needs related to functional status, cognitive ability or social support system     Problem: Safety - Adult  Goal: Free from fall injury  Outcome: Progressing  Flowsheets (Taken 10/12/2022 1945)  Free From Fall Injury: Instruct family/caregiver on patient safety     Problem: Chronic Conditions and Co-morbidities  Goal: Patient's chronic conditions and co-morbidity symptoms are monitored and maintained or improved  Outcome: Progressing  Flowsheets (Taken 10/12/2022 1945)  Care Plan - Patient's Chronic Conditions and Co-Morbidity Symptoms are Monitored and Maintained or Improved:   Monitor and assess patient's chronic conditions and comorbid symptoms for stability, deterioration, or improvement   Collaborate with multidisciplinary team to address chronic and comorbid conditions and prevent exacerbation or deterioration   Update acute care plan with appropriate goals if chronic or comorbid symptoms are exacerbated and prevent overall improvement and discharge     Problem: Nutrition Deficit:  Goal: Optimize nutritional status  10/12/2022 2026 by Doyle Anderson RN  Outcome: Progressing  10/12/2022 0938 by Bita Mendez RD, LD  Flowsheets (Taken 10/12/2022 1312)  Nutrient intake appropriate for improving, restoring, or maintaining nutritional needs:   Monitor oral intake, labs, and treatment plans   Recommend appropriate diets, oral nutritional supplements, and vitamin/mineral supplements  Note: Nutrition Problem #1: Altered nutrition-related lab values  Intervention: Food and/or Nutrient Delivery: Modify Current Diet, Start Oral Nutrition Supplement       Problem: ABCDS Injury Assessment  Goal: Absence of physical injury  Outcome: Progressing

## 2022-10-13 NOTE — PROGRESS NOTES
Dr. Antonio Lazar called-pulse ox decrease and heart rate increase. Patient placed on bi-pap, repeat CXR ordered. ABG in 1 hour post bi-pap. Dr. Abdoulaye Leonard called with update, orders received to decrease dopamine to 2.5 mcg/kg/hr.

## 2022-10-13 NOTE — PROGRESS NOTES
RT in with pt to give breathing treatments ordered by doctor. It was noted that the pt was having inspiratory and expiratory wheezing while at rest.  Pt would arouse when spoken to but had difficulty staying awake during conversation. When asked if she would be able to try to urinate after her tx's the pt nodded her head up and down, indicating \"yes\".

## 2022-10-13 NOTE — PROGRESS NOTES
Dr. Nika Correa calls to check on patient. /61, . Order received to decrease dopamine to 1.5 mcg/kg/hr.

## 2022-10-13 NOTE — PROGRESS NOTES
Dr. Mercedez Javier called regarding low BP 83/55. Order received to start bolus and await return call from Dr. Catina Gracia. Dr. Catina Gracia returns call, order received to cancel bolus and order dopamine at 5 mcg/kg/hr per hour.

## 2022-10-13 NOTE — PROGRESS NOTES
Hospitalist Progress Note  10/13/2022 5:16 AM  Subjective:   Admit Date: 10/11/2022  PCP: Liza Baird MD    Interval History: Kathia Ramos states she is feeling better. Breathing is better and she was able to rest last night. No chest pain. She states she continues to have a cough but not much production. Appetite was not very good yesterday with occasional nausea. Bowels moved yesterday. Diet: ADULT ORAL NUTRITION SUPPLEMENT; Breakfast, Lunch, Dinner; Standard High Calorie/High Protein Oral Supplement  ADULT DIET; Regular; 4 carb choices (60 gm/meal); Low Sodium (2 gm)  Medications:   Scheduled Meds:   atorvastatin  80 mg Oral Daily    carvedilol  3.125 mg Oral BID    Vitamin D  5,000 Units Oral Daily    clopidogrel  75 mg Oral Daily    gabapentin  400 mg Oral TID    lactobacillus  1 capsule Oral BID WC    lisinopril  5 mg Oral Daily    therapeutic multivitamin-minerals  1 tablet Oral Daily with breakfast    sertraline  100 mg Oral Daily    sodium chloride flush  5-40 mL IntraVENous 2 times per day    enoxaparin  40 mg SubCUTAneous Daily    furosemide  20 mg IntraVENous BID    insulin lispro  0-4 Units SubCUTAneous TID WC    insulin lispro  0-4 Units SubCUTAneous Nightly    cefTRIAXone (ROCEPHIN) IV  1,000 mg IntraVENous Q24H    levalbuterol  1.25 mg Nebulization Q4H WA    spironolactone  25 mg Oral BID    insulin glargine  30 Units SubCUTAneous BID    azithromycin  500 mg IntraVENous Q24H     Continuous Infusions:   sodium chloride      dextrose         Patient's current medications documented, reviewed, and updated. CBC:   Recent Labs     10/11/22  2130   WBC 8.0   HGB 10.0*        BMP:    Recent Labs     10/11/22  2130      K 4.4   *   CO2 17*   BUN 24*   CREATININE 1.19*   GLUCOSE 251*     Hepatic:   Recent Labs     10/11/22  2130   AST 19   ALT 21   BILITOT 0.9   ALKPHOS 88     Troponin: No results for input(s): TROPONINI in the last 72 hours.   BNP: No results for input(s): BNP in the last 72 hours. Lipids: No results for input(s): CHOL, HDL in the last 72 hours. Invalid input(s): LDLCALCU  INR: No results for input(s): INR in the last 72 hours. Objective:   Vitals: BP 94/63   Pulse 81   Temp 97.4 °F (36.3 °C) (Oral)   Resp 20   Ht 5' 6\" (1.676 m)   Wt 180 lb (81.6 kg)   SpO2 90%   BMI 29.05 kg/m²   General appearance: alert and cooperative with exam  HEENT: Head: Normocephalic, no lesions, without obvious abnormality. Eye: Normal external eye, conjunctiva, lids cornea, MINDY. Nose: Normal external nose, mucus membranes and septum. Nasal oxygen on. Neck: no adenopathy and supple, symmetrical, trachea midline  Lungs:  Bilateral expiratory wheezing. Heart: regular rate and rhythm, S1, S2 normal, and II/VI systolic murmur  Abdomen:  +BS, soft, distended. Non tender to palpation. Extremities:  ACE wraps on lower legs. No calf tenderness. Neurologic: Mental status: Alert, oriented, thought content appropriate    Assessment and Plan:   Acute systolic CHF - started on IV Lasix 20 mg BID. Placed back on Coreg and Lisinopril. Dr. Moura Aw consulted. ECHO from 10/12:  Enlarged LV with an EF of 25%, mild MR, modetate to moderate - severe pulmonary HTN, moderate - severe TR. Patient has an AICD. Acute hypoxic respiratory failure - requiring nasal oxygen at 4 l/m this am.  CTA negative for PE with bilateral pleural effusions. RLL Pneumonia - started on IV Rocephin and Zithromax. Placed on Acapella and  mucinex to help with sputum clearance. On EZPAP to help with atelectasis. CAD - no chest pain. Troponin 28 x 2 in the ER and increased to 36 at it highest.  Started back on Plavix and Coreg. Moderate ascites on CT of the abdomen. CKD stage III B - appears to be at baseline. DM II - placed back on Lantus with a Humalog sliding scale. Hyperlipidemia - started back on Lipitor. H/O Depression / anxiety - started back on Zoloft.     Vitamin D deficiency - on Vitamin D replacement. H/O Mitral valve repair. COPD exacerbation - uses aerosol treatment at home PRN. Placed on Xopenex treatments every 4 hours W/A. Atrovent and Pulmicort added 10/13. Smoker - refuses nicotine patch at this time. Plan:  Add Atrovent / Pulmicort to aerosols. Add Acapella and Mucinex to help with sputum clearance. Follow up on labs when available. Repeat labs in am.    Patient continues to require in patient admission secondary to the need for IV antibiotics, IV diuretic, and frequent aerosols.       DVT prophylaxis:   [x] Lovenox   [] SCDs   [] SQ Heparin   [] Encourage ambulation, low risk for DVT, no chemical or mechanical    prophylaxis necessary      [] Already on Anticoagulation    Patient Active Problem List:     Acute coronary syndrome (HCC)     S/P cardiac cath-Patent grafts 6/5/15     Onychomycosis of toenail     CAD (coronary artery disease)     Hx of CABG     ICD (implantable cardioverter-defibrillator) in place     H/O mitral valve repair     Hx of myocardial infarction     Hypertension     DM (diabetes mellitus), type 2 with neurological complications (HCC)     Anxiety     CHAD (acute kidney injury) ([address]     Elevated serum immunoglobulin free light chains     Ureteral stone with hydronephrosis     Dyslipidemia     Pneumonia     Acute systolic CHF (congestive heart failure) ([address]        Mark Nair MD, MD  Rounding Hospitalist

## 2022-10-14 ENCOUNTER — APPOINTMENT (OUTPATIENT)
Dept: GENERAL RADIOLOGY | Age: 62
DRG: 194 | End: 2022-10-14
Attending: INTERNAL MEDICINE
Payer: MEDICARE

## 2022-10-14 ENCOUNTER — HOSPITAL ENCOUNTER (INPATIENT)
Age: 62
LOS: 4 days | Discharge: HOME | DRG: 194 | End: 2022-10-18
Attending: INTERNAL MEDICINE | Admitting: INTERNAL MEDICINE
Payer: MEDICARE

## 2022-10-14 VITALS
WEIGHT: 183.4 LBS | TEMPERATURE: 97.8 F | HEART RATE: 94 BPM | SYSTOLIC BLOOD PRESSURE: 122 MMHG | HEIGHT: 66 IN | OXYGEN SATURATION: 95 % | BODY MASS INDEX: 29.47 KG/M2 | DIASTOLIC BLOOD PRESSURE: 81 MMHG | RESPIRATION RATE: 25 BRPM

## 2022-10-14 PROBLEM — I50.43 CHF (CONGESTIVE HEART FAILURE), NYHA CLASS I, ACUTE ON CHRONIC, COMBINED (HCC): Status: ACTIVE | Noted: 2022-10-14

## 2022-10-14 PROBLEM — I50.23 ACUTE ON CHRONIC SYSTOLIC CHF (CONGESTIVE HEART FAILURE) (HCC): Status: ACTIVE | Noted: 2022-01-01

## 2022-10-14 LAB
ANION GAP SERPL CALCULATED.3IONS-SCNC: 12 MMOL/L (ref 9–17)
ANION GAP SERPL CALCULATED.3IONS-SCNC: 17 MMOL/L (ref 9–17)
BUN BLDV-MCNC: 33 MG/DL (ref 8–23)
BUN BLDV-MCNC: 34 MG/DL (ref 8–23)
CALCIUM SERPL-MCNC: 8 MG/DL (ref 8.6–10.4)
CALCIUM SERPL-MCNC: 8.3 MG/DL (ref 8.6–10.4)
CHLORIDE BLD-SCNC: 103 MMOL/L (ref 98–107)
CHLORIDE BLD-SCNC: 107 MMOL/L (ref 98–107)
CO2: 15 MMOL/L (ref 20–31)
CO2: 20 MMOL/L (ref 20–31)
CREAT SERPL-MCNC: 1.76 MG/DL (ref 0.5–0.9)
CREAT SERPL-MCNC: 1.89 MG/DL (ref 0.5–0.9)
ESTIMATED AVERAGE GLUCOSE: 186 MG/DL
GFR SERPL CREATININE-BSD FRML MDRD: 29 ML/MIN/1.73M2
GFR SERPL CREATININE-BSD FRML MDRD: 32 ML/MIN/1.73M2
GLUCOSE BLD-MCNC: 100 MG/DL (ref 65–105)
GLUCOSE BLD-MCNC: 108 MG/DL (ref 65–105)
GLUCOSE BLD-MCNC: 108 MG/DL (ref 70–99)
GLUCOSE BLD-MCNC: 137 MG/DL (ref 65–99)
GLUCOSE BLD-MCNC: 72 MG/DL (ref 65–105)
GLUCOSE BLD-MCNC: 83 MG/DL (ref 65–105)
GLUCOSE BLD-MCNC: 86 MG/DL (ref 70–99)
HBA1C MFR BLD: 8.1 % (ref 4–6)
LACTIC ACID, WHOLE BLOOD: 1.1 MMOL/L (ref 0.7–2.1)
LACTIC ACID, WHOLE BLOOD: 1.6 MMOL/L (ref 0.7–2.1)
MAGNESIUM: 2.1 MG/DL (ref 1.6–2.6)
MAGNESIUM: 2.3 MG/DL (ref 1.6–2.6)
POTASSIUM SERPL-SCNC: 4.6 MMOL/L (ref 3.7–5.3)
POTASSIUM SERPL-SCNC: 5.1 MMOL/L (ref 3.7–5.3)
PRO-BNP: ABNORMAL PG/ML
PROCALCITONIN: 0.19 NG/ML
SODIUM BLD-SCNC: 135 MMOL/L (ref 135–144)
SODIUM BLD-SCNC: 139 MMOL/L (ref 135–144)
TROPONIN, HIGH SENSITIVITY: 34 NG/L (ref 0–14)
TSH SERPL DL<=0.05 MIU/L-ACNC: 1.66 UIU/ML (ref 0.3–5)

## 2022-10-14 PROCEDURE — 94660 CPAP INITIATION&MGMT: CPT

## 2022-10-14 PROCEDURE — 84145 PROCALCITONIN (PCT): CPT

## 2022-10-14 PROCEDURE — 2700000000 HC OXYGEN THERAPY PER DAY

## 2022-10-14 PROCEDURE — 6360000002 HC RX W HCPCS: Performed by: STUDENT IN AN ORGANIZED HEALTH CARE EDUCATION/TRAINING PROGRAM

## 2022-10-14 PROCEDURE — 2580000003 HC RX 258: Performed by: INTERNAL MEDICINE

## 2022-10-14 PROCEDURE — 99291 CRITICAL CARE FIRST HOUR: CPT | Performed by: INTERNAL MEDICINE

## 2022-10-14 PROCEDURE — 2580000003 HC RX 258: Performed by: STUDENT IN AN ORGANIZED HEALTH CARE EDUCATION/TRAINING PROGRAM

## 2022-10-14 PROCEDURE — 2500000003 HC RX 250 WO HCPCS: Performed by: STUDENT IN AN ORGANIZED HEALTH CARE EDUCATION/TRAINING PROGRAM

## 2022-10-14 PROCEDURE — 51702 INSERT TEMP BLADDER CATH: CPT

## 2022-10-14 PROCEDURE — 2000000000 HC ICU R&B

## 2022-10-14 PROCEDURE — 73630 X-RAY EXAM OF FOOT: CPT

## 2022-10-14 PROCEDURE — 93005 ELECTROCARDIOGRAM TRACING: CPT | Performed by: STUDENT IN AN ORGANIZED HEALTH CARE EDUCATION/TRAINING PROGRAM

## 2022-10-14 PROCEDURE — 99222 1ST HOSP IP/OBS MODERATE 55: CPT | Performed by: INTERNAL MEDICINE

## 2022-10-14 PROCEDURE — 84484 ASSAY OF TROPONIN QUANT: CPT

## 2022-10-14 PROCEDURE — 94761 N-INVAS EAR/PLS OXIMETRY MLT: CPT

## 2022-10-14 PROCEDURE — 6370000000 HC RX 637 (ALT 250 FOR IP): Performed by: STUDENT IN AN ORGANIZED HEALTH CARE EDUCATION/TRAINING PROGRAM

## 2022-10-14 PROCEDURE — 84443 ASSAY THYROID STIM HORMONE: CPT

## 2022-10-14 PROCEDURE — 94640 AIRWAY INHALATION TREATMENT: CPT

## 2022-10-14 PROCEDURE — 83036 HEMOGLOBIN GLYCOSYLATED A1C: CPT

## 2022-10-14 PROCEDURE — 83735 ASSAY OF MAGNESIUM: CPT

## 2022-10-14 PROCEDURE — 6360000002 HC RX W HCPCS: Performed by: INTERNAL MEDICINE

## 2022-10-14 PROCEDURE — 80048 BASIC METABOLIC PNL TOTAL CA: CPT

## 2022-10-14 PROCEDURE — 83605 ASSAY OF LACTIC ACID: CPT

## 2022-10-14 PROCEDURE — 82947 ASSAY GLUCOSE BLOOD QUANT: CPT

## 2022-10-14 PROCEDURE — 6370000000 HC RX 637 (ALT 250 FOR IP)

## 2022-10-14 PROCEDURE — 83880 ASSAY OF NATRIURETIC PEPTIDE: CPT

## 2022-10-14 PROCEDURE — 36415 COLL VENOUS BLD VENIPUNCTURE: CPT

## 2022-10-14 PROCEDURE — 71045 X-RAY EXAM CHEST 1 VIEW: CPT

## 2022-10-14 RX ORDER — LEVALBUTEROL INHALATION SOLUTION 0.63 MG/3ML
0.63 SOLUTION RESPIRATORY (INHALATION) EVERY 6 HOURS PRN
Status: DISCONTINUED | OUTPATIENT
Start: 2022-10-14 | End: 2022-10-18 | Stop reason: HOSPADM

## 2022-10-14 RX ORDER — DEXTROSE MONOHYDRATE 100 MG/ML
INJECTION, SOLUTION INTRAVENOUS CONTINUOUS PRN
Status: DISCONTINUED | OUTPATIENT
Start: 2022-10-14 | End: 2022-10-14 | Stop reason: SDUPTHER

## 2022-10-14 RX ORDER — INSULIN LISPRO 100 [IU]/ML
0-8 INJECTION, SOLUTION INTRAVENOUS; SUBCUTANEOUS EVERY 4 HOURS
Status: DISCONTINUED | OUTPATIENT
Start: 2022-10-14 | End: 2022-10-18 | Stop reason: HOSPADM

## 2022-10-14 RX ORDER — HEPARIN SODIUM 5000 [USP'U]/ML
5000 INJECTION, SOLUTION INTRAVENOUS; SUBCUTANEOUS EVERY 8 HOURS SCHEDULED
Status: DISCONTINUED | OUTPATIENT
Start: 2022-10-14 | End: 2022-10-18 | Stop reason: HOSPADM

## 2022-10-14 RX ORDER — ONDANSETRON 2 MG/ML
4 INJECTION INTRAMUSCULAR; INTRAVENOUS EVERY 6 HOURS PRN
Status: DISCONTINUED | OUTPATIENT
Start: 2022-10-14 | End: 2022-10-18 | Stop reason: HOSPADM

## 2022-10-14 RX ORDER — SODIUM CHLORIDE 0.9 % (FLUSH) 0.9 %
5-40 SYRINGE (ML) INJECTION EVERY 12 HOURS SCHEDULED
Status: DISCONTINUED | OUTPATIENT
Start: 2022-10-14 | End: 2022-10-18 | Stop reason: HOSPADM

## 2022-10-14 RX ORDER — ASPIRIN 81 MG/1
81 TABLET, CHEWABLE ORAL DAILY
Status: DISCONTINUED | OUTPATIENT
Start: 2022-10-14 | End: 2022-10-18 | Stop reason: HOSPADM

## 2022-10-14 RX ORDER — ONDANSETRON 4 MG/1
4 TABLET, ORALLY DISINTEGRATING ORAL EVERY 8 HOURS PRN
Status: DISCONTINUED | OUTPATIENT
Start: 2022-10-14 | End: 2022-10-18 | Stop reason: HOSPADM

## 2022-10-14 RX ORDER — LEVALBUTEROL TARTRATE 45 UG/1
1 AEROSOL, METERED ORAL EVERY 6 HOURS PRN
Status: DISCONTINUED | OUTPATIENT
Start: 2022-10-14 | End: 2022-10-14

## 2022-10-14 RX ORDER — POLYETHYLENE GLYCOL 3350 17 G/17G
17 POWDER, FOR SOLUTION ORAL DAILY PRN
Status: DISCONTINUED | OUTPATIENT
Start: 2022-10-14 | End: 2022-10-18 | Stop reason: HOSPADM

## 2022-10-14 RX ORDER — ACETAMINOPHEN 650 MG/1
650 SUPPOSITORY RECTAL EVERY 6 HOURS PRN
Status: DISCONTINUED | OUTPATIENT
Start: 2022-10-14 | End: 2022-10-18 | Stop reason: HOSPADM

## 2022-10-14 RX ORDER — MIDODRINE HYDROCHLORIDE 5 MG/1
5 TABLET ORAL 2 TIMES DAILY WITH MEALS
Status: DISCONTINUED | OUTPATIENT
Start: 2022-10-14 | End: 2022-10-18 | Stop reason: HOSPADM

## 2022-10-14 RX ORDER — SODIUM CHLORIDE 0.9 % (FLUSH) 0.9 %
5-40 SYRINGE (ML) INJECTION PRN
Status: DISCONTINUED | OUTPATIENT
Start: 2022-10-14 | End: 2022-10-18 | Stop reason: HOSPADM

## 2022-10-14 RX ORDER — SODIUM CHLORIDE 9 MG/ML
INJECTION, SOLUTION INTRAVENOUS PRN
Status: DISCONTINUED | OUTPATIENT
Start: 2022-10-14 | End: 2022-10-18 | Stop reason: HOSPADM

## 2022-10-14 RX ORDER — FUROSEMIDE 10 MG/ML
40 INJECTION INTRAMUSCULAR; INTRAVENOUS 2 TIMES DAILY
Status: DISCONTINUED | OUTPATIENT
Start: 2022-10-14 | End: 2022-10-14

## 2022-10-14 RX ORDER — BUDESONIDE 0.25 MG/2ML
250 INHALANT ORAL 2 TIMES DAILY
Status: DISCONTINUED | OUTPATIENT
Start: 2022-10-14 | End: 2022-10-14

## 2022-10-14 RX ORDER — ACETAMINOPHEN 325 MG/1
650 TABLET ORAL EVERY 6 HOURS PRN
Status: DISCONTINUED | OUTPATIENT
Start: 2022-10-14 | End: 2022-10-18 | Stop reason: HOSPADM

## 2022-10-14 RX ORDER — DEXTROSE MONOHYDRATE 100 MG/ML
INJECTION, SOLUTION INTRAVENOUS CONTINUOUS PRN
Status: DISCONTINUED | OUTPATIENT
Start: 2022-10-14 | End: 2022-10-18 | Stop reason: HOSPADM

## 2022-10-14 RX ADMIN — AZITHROMYCIN MONOHYDRATE 250 MG: 500 INJECTION, POWDER, LYOPHILIZED, FOR SOLUTION INTRAVENOUS at 11:48

## 2022-10-14 RX ADMIN — MIDODRINE HYDROCHLORIDE 5 MG: 5 TABLET ORAL at 17:18

## 2022-10-14 RX ADMIN — HEPARIN SODIUM 5000 UNITS: 5000 INJECTION INTRAVENOUS; SUBCUTANEOUS at 14:42

## 2022-10-14 RX ADMIN — SODIUM CHLORIDE, PRESERVATIVE FREE 10 ML: 5 INJECTION INTRAVENOUS at 19:37

## 2022-10-14 RX ADMIN — SODIUM CHLORIDE, PRESERVATIVE FREE 5 ML: 5 INJECTION INTRAVENOUS at 10:30

## 2022-10-14 RX ADMIN — HEPARIN SODIUM 5000 UNITS: 5000 INJECTION INTRAVENOUS; SUBCUTANEOUS at 06:54

## 2022-10-14 RX ADMIN — CEFTRIAXONE SODIUM 1000 MG: 10 INJECTION, POWDER, FOR SOLUTION INTRAVENOUS at 13:03

## 2022-10-14 RX ADMIN — FUROSEMIDE 10 MG/HR: 10 INJECTION INTRAMUSCULAR; INTRAVENOUS at 19:37

## 2022-10-14 RX ADMIN — IPRATROPIUM BROMIDE 0.5 MG: 0.5 SOLUTION RESPIRATORY (INHALATION) at 15:28

## 2022-10-14 RX ADMIN — ASPIRIN 81 MG: 81 TABLET, CHEWABLE ORAL at 11:45

## 2022-10-14 RX ADMIN — IPRATROPIUM BROMIDE 0.5 MG: 0.5 SOLUTION RESPIRATORY (INHALATION) at 11:53

## 2022-10-14 RX ADMIN — FUROSEMIDE 10 MG/HR: 10 INJECTION INTRAMUSCULAR; INTRAVENOUS at 11:06

## 2022-10-14 RX ADMIN — HEPARIN SODIUM 5000 UNITS: 5000 INJECTION INTRAVENOUS; SUBCUTANEOUS at 23:08

## 2022-10-14 RX ADMIN — IPRATROPIUM BROMIDE 0.5 MG: 0.5 SOLUTION RESPIRATORY (INHALATION) at 09:49

## 2022-10-14 RX ADMIN — MIDODRINE HYDROCHLORIDE 5 MG: 5 TABLET ORAL at 11:51

## 2022-10-14 RX ADMIN — IPRATROPIUM BROMIDE 0.5 MG: 0.5 SOLUTION RESPIRATORY (INHALATION) at 20:58

## 2022-10-14 NOTE — CARE COORDINATION
10/14/22 0759   Readmission Assessment   Number of Days since last admission?   (not a RAC, transfer from [address] [place] [address])

## 2022-10-14 NOTE — PROGRESS NOTES
Critical Care Team - Daily Progress Note      Date and time: 10/14/2022 10:44 AM  Patient's name:  Lashawn Cartwright  Medical Record Number: [id number]  Patient's account/billing number: [de-identified]  Patient's YOB: 1960  Age: 60 y.o. Date of Admission: 10/14/2022  4:20 AM  Length of stay during current admission: 0      Primary Care Physician: Star Sen MD  ICU Attending Physician: Dr. Melany Cheung Status: Full Code    Reason for ICU admission: No chief complaint on file. SUBJECTIVE:   HPI:    Patient presented to [facility] ED with chief complaint of shortness of breath, cough, sputum production, and swelling in the legs. The patient has history of CAD with LV systolic dysfunction, AICD placement, and COPD. According to the patient she did not take her medications since 2 months as she was not able to refill them. In [facility] ED, the patient's labs showed chloride of 109, BUN 24, creatinine 1.19, glucose 251, BNP 42071, troponin 28, urinalysis showed positive nitrates. Chest x-ray showed small right effusion with right lung base atelectasis/consolidation. Dr. Clarissa Gilliland was consulted for cardiology recommendations and he placed patient on Lasix 20 mg twice daily IV and Aldactone 25 mg twice daily. Echo was ordered as well. It was noted that the patient had increased D-dimer with accelerated heart rate. CT chest with contrast was ordered for possible pulmonary embolism which came out to be negative. Echo on 10/12/2022 showed large LV with EF 25% and severe pulmonary hypertension. The patient required 4 L nasal cannula oxygen and was started with IV Rocephin and Zithromax. EZPap was used for atelectasis. The patient also has history of CKD stage IIIb. Yesterday, the patient was started with Plavix and Coreg with breathing treatment of Atrovent and Pulmicort for possible COPD exacerbation. The patient became altered and lethargic.   It was noted that her glucose was 40 and she was given D10 125 mL bolus. Her blood pressure was 80/60 MMHG. Coreg was held and dopamine 5 mg/kg/min was started. ABG was taken which showed pH of 7.335, PCO2 37.4, PaO2 65, HCO3 19.9. Blood pressure stabilized on dopamine. Repeat chest x-ray showed worsening of pulmonary edema. Patient's creatinine started increasing and her respiratory condition worsened. The patient was then shifted to [address] [place] [address] ICU for further management    Interval history:  -Patient examined at bedside. Patient alert, oriented, and in no acute distress.  -Patient BiPAP and reports some shortness of breath.  -Hemodynamically stable with blood pressure 104/71 mmHg and MAP of 52. Was on dopamine in [facility]. No pressor support during examination.  -Moderate ascites of abdomen noted during examination. Palpation was nontender. Abdomen was soft. No scleral icterus noted. -The patient denies headache, fever, chills, chest pain, diarrhea, blood in vomiting, blood in sputum, blood in stools. OVERNIGHT EVENTS:       No acute overnight events.     AWAKE & FOLLOWING COMMANDS:  No   x yes    CURRENT VENTILATION STATUS:    Ventilator  x BIPAP  nasal Cannula room Air        SECRETIONS Amount:  X small moderate  large  None  Color:     X White Colored  bloody    SEDATION:  RAAS Score:  Propofol gtt  Versed gtt  Ativan gtt   x no Sedation    PARALYZED:  X no    yes    DIARRHEA:                X no                yes  (C. Difficile status: Positive                                                                                                                       Negative                                                                                                                     Pending)    VASOPRESSORS:  X no    yes    If yes -   Levophed       dopamine     vasopressin       dobutamine  Phenylephrine         epinephrine    CENTRAL LINES:     X no   yes   (Date of Insertion:   )           If yes -     right IJ     left IJ right Femoral left Femoral                   Right Subclavian left Subclavian       DYER'S CATHETER:   No   x yes  (Date of Insertion: 10/13/2022)     URINE OUTPUT:            Good   x low              anuric      OBJECTIVE:     VITAL SIGNS:  /79   Pulse 86   Temp 97.7 °F (36.5 °C) (Oral)   Resp 13   Ht 5' 6\" (1.676 m)   Wt 185 lb 13.6 oz (84.3 kg)   SpO2 100%   BMI 30.00 kg/m²   Tmax over 24 hours:  Temp (24hrs), Av.4 °F (36.3 °C), Min:96.1 °F (35.6 °C), Max:97.9 °F (36.6 °C)      Patient Vitals for the past 8 hrs:   BP Temp Temp src Pulse Resp SpO2 Height Weight   10/14/22 1031 -- -- -- 86 13 100 % -- --   10/14/22 0954 -- -- -- -- 16 -- -- --   10/14/22 0953 -- -- -- 87 16 100 % -- --   10/14/22 0700 102/79 -- -- 86 15 97 % -- --   10/14/22 0600 92/68 -- -- 87 16 92 % -- --   10/14/22 0500 99/65 -- -- 95 19 94 % -- --   10/14/22 0430 102/74 97.7 °F (36.5 °C) Oral 94 17 93 % 5' 6\" (1.676 m) 185 lb 13.6 oz (84.3 kg)   10/14/22 0422 -- -- -- -- 30 -- -- --         Intake/Output Summary (Last 24 hours) at 10/14/2022 1044  Last data filed at 10/14/2022 1035  Gross per 24 hour   Intake 120 ml   Output 125 ml   Net -5 ml     Date 10/14/22 0000 - 10/14/22 2359   Shift 3819-0258 5932-3453 9879-5259 24 Hour Total   INTAKE   P.O.(mL/kg/hr)  120  120   Shift Total(mL/kg)  120(1.4)  120(1.4)   OUTPUT   Urine(mL/kg/hr) 125(0.2)   125   Shift Total(mL/kg) 125(1.5)   125(1.5)   Weight (kg) 84.3 84.3 84.3 84.3     Wt Readings from Last 3 Encounters:   10/14/22 185 lb 13.6 oz (84.3 kg)   10/14/22 183 lb 6.4 oz (83.2 kg)   22 158 lb (71.7 kg)     Body mass index is 30 kg/m². PHYSICAL EXAM:  Constitutional: Mild respiratory distress. EENT: PERRLA, EOMI, sclera clear, anicteric. Neck: Supple, symmetrical, trachea midline. Respiratory: Breathing and crackles noted bilaterally patient on BiPAP. Reported having some shortness of breath.   Cardiovascular: regular rate and rhythm, normal S1, S2, no murmur noted and 2+ pulses throughout  Abdomen: Patient having ascites. Abdomen soft and nontender. NEUROLOGIC: Awake, alert, oriented to name, place and time. Extremities: Pedal edema not present bilaterally.   SKIN: normal coloration and turgor        MEDICATIONS:  Scheduled Meds:   sodium chloride flush  5-40 mL IntraVENous 2 times per day    heparin (porcine)  5,000 Units SubCUTAneous 3 times per day    ipratropium  0.5 mg Nebulization 4x daily    insulin lispro  0-8 Units SubCUTAneous Q4H    cefTRIAXone (ROCEPHIN) IV  1,000 mg IntraVENous Q24H    azithromycin  250 mg IntraVENous Q24H    midodrine  5 mg Oral BID WC    aspirin  81 mg Oral Daily     Continuous Infusions:   sodium chloride      dextrose      furosemide (LASIX) 1mg/ml infusion       PRN Meds:   sodium chloride flush, 5-40 mL, PRN  sodium chloride, , PRN  ondansetron, 4 mg, Q8H PRN   Or  ondansetron, 4 mg, Q6H PRN  polyethylene glycol, 17 g, Daily PRN  acetaminophen, 650 mg, Q6H PRN   Or  acetaminophen, 650 mg, Q6H PRN  levalbuterol, 0.63 mg, Q6H PRN  glucose, 4 tablet, PRN  dextrose bolus, 125 mL, PRN   Or  dextrose bolus, 250 mL, PRN  glucagon (rDNA), 1 mg, PRN  dextrose, , Continuous PRN        SUPPORT DEVICES: Ventilator BIPAP  nasal Cannula room Air    Additional Respiratory Assessments  Heart Rate: 86  Resp: 13  SpO2: 100 %  Humidification Source: Heated wire  Humidification Temp: 34    ABGs:   Arterial Blood Gas result:  pH 7.335; pCO2 37.4; pO2 65.7; HCO3 19.9  Lab Results   Component Value Date/Time    TZY8NJT 17 08/24/2018 05:02 AM    FIO2 50.0 10/13/2022 06:00 PM     Lactic Acid:   Lab Results   Component Value Date/Time    LACTA 1.4 10/13/2022 12:22 PM    LACTA 1.7 12/29/2018 11:57 AM    LACTA 1.5 12/29/2018 05:30 AM         DATA:  Complete Blood Count:   Recent Labs     10/11/22  2130 10/13/22  0432 10/13/22  1222   WBC 8.0 11.1* 10.2   HGB 10.0* 10.1* 10.5*   MCV 84.8 85.8 86.0    336 266   RBC 3.63* 3.74* 3.88*   HCT 30.7* 32.1* 33.4*   MCH 27.7 26.9 27.1   MCHC 32.7 31.3 31.5   RDW 18.5* 19.0* 18.8*        PT/INR:    Lab Results   Component Value Date/Time    PROTIME 13.3 11/28/2020 04:59 PM    INR 1.1 11/28/2020 04:59 PM     PTT:    Lab Results   Component Value Date/Time    APTT 29.2 11/28/2020 04:59 PM       Basal Metabolic Profile:   Recent Labs     10/11/22  2130 10/13/22  0432 10/14/22  0700    140 139   K 4.4 5.0 5.1   BUN 24* 29* 34*   CREATININE 1.19* 1.79* 1.89*   * 106 107   CO2 17* 22 15*      Magnesium:   Lab Results   Component Value Date/Time    MG 2.3 10/14/2022 07:00 AM    MG 2.1 12/10/2020 02:47 PM    MG 2.2 10/28/2019 11:09 AM     Phosphorus:   Lab Results   Component Value Date/Time    PHOS 6.3 09/04/2018 04:47 AM    PHOS 7.7 09/03/2018 04:58 AM    PHOS 7.0 09/02/2018 04:37 AM     S. Calcium:  Recent Labs     10/14/22  0700   CALCIUM 8.3*     S. Ionized Calcium:No results for input(s): IONCA in the last 72 hours. Urinalysis:   Lab Results   Component Value Date/Time    NITRU POSITIVE 10/11/2022 11:39 PM    COLORU Yellow 10/11/2022 11:39 PM    PHUR 5.0 10/11/2022 11:39 PM    WBCUA NONE SEEN 10/11/2022 11:39 PM    RBCUA 2 TO 5 10/11/2022 11:39 PM    MUCUS NOT REPORTED 11/21/2018 12:12 PM    TRICHOMONAS NOT REPORTED 11/21/2018 12:12 PM    YEAST NOT REPORTED 11/21/2018 12:12 PM    BACTERIA 3+ 10/11/2022 11:39 PM    SPECGRAV 1.025 10/11/2022 11:39 PM    LEUKOCYTESUR NEGATIVE 10/11/2022 11:39 PM    UROBILINOGEN Normal 10/11/2022 11:39 PM    BILIRUBINUR NEGATIVE 10/11/2022 11:39 PM    GLUCOSEU 50 mg/dL 10/11/2022 11:39 PM    KETUA NEGATIVE 10/11/2022 11:39 PM    AMORPHOUS TRACE 11/21/2018 12:12 PM       CARDIAC ENZYMES: No results for input(s): CKMB, CKMBINDEX, TROPONINI in the last 72 hours. Invalid input(s): CKTOTAL;3  BNP: No results for input(s): BNP in the last 72 hours.     LFTS  Recent Labs     10/11/22  2130   ALKPHOS 88   ALT 21   AST 19   BILITOT 0.9   LABALBU 3.7 395 [Urine:395]  UOP: 1.5 cc/kg/hr  Monitor electrolytes, replace PRN   Repeat BMP, magnesium, and lactic acid at 18:00 hours  ID  WBC:   Lab Results   Component Value Date    WBC 10.2 10/13/2022     Tmax: Temp (24hrs), Av.5 °F (36.4 °C), Min:97 °F (36.1 °C), Max:97.9 °F (36.6 °C)    Antimicrobials: Zithromax and ceftriaxone  Hematology:  Recent Labs     10/11/22  2130 10/13/22  0432 10/13/22  1222   HGB 10.0* 10.1* 10.5*    trending up  Endocrine:   glucose controlled - most recent BGL is   Recent Labs     10/11/22  2130 10/13/22  0432 10/14/22  0700   GLUCOSE 251* 40* 86   On medium dose sliding scale insulin. X-ray left foot to be done due to possible infection secondary to diabetic neuropathy. TSH level ordered. DVT Prophylaxis  Heparin  Discharge Needs:  PT, OT, ST, SW, and Case Management      CODE STATUS: Full Code             Cristi Pacheco MD, M.D. Department of Internal Medicine/ Critical care  [facility])             10/14/2022, 10:44 AM   Attending Physician Statement  I have discussed the care of Mely Cleaning, including pertinent history and exam findings,  with the resident. I have seen and examined the patient and the key elements of all parts of the encounter have been performed by me. I agree with the assessment, plan and orders as documented by the resident with additions . Acute on chronic systolic chf with cardiogenic pulmonary edema . CHAD   Right lower lobe atelectasis / pneumonia   B/l pleural effusion r >l   Plan   Lasix gtt   Midodrine   Will add Dopamine gtt for keeping sbp >90     Total critical care time caring for this patient with life threatening, unstable organ failure, including direct patient contact, management of life support systems, review of data including imaging and labs, discussions with other team members and physicians at least 28   Min so far today, excluding procedures.      Electronically signed by Cari Zelaya MD RAINA on   10/14/22 at 9:10 PM EDT    Please note that this chart was generated using voice recognition Dragon dictation software. Although every effort was made to ensure the accuracy of this automated transcription, some errors in transcription may have occurred.

## 2022-10-14 NOTE — PROGRESS NOTES
Patient had klaudia care complete and her villa cleansed. Patient had a bowel movement that caused stool to be in her klaudia area. Patient was cleaned up wipes and soap and water. Her diaper was changed and catheter care was complete with soap and water. Patient tolerated fairly well. Patient is still on BiPAP 50% FiO2, tolerating well. Urine output is still minimal, dopamine drip is still running at 1.5 mcg/kg/hr to help stimulate kidneys. Bed low, call light in reach.

## 2022-10-14 NOTE — PROGRESS NOTES
was called with an update that the patient would be leaving in a MICU around 0200 and heading to [facility].   asks to be notified upon his wife's arrival.

## 2022-10-14 NOTE — CONSULTS
Attestation signed by      Attending Physician Statement:    I have discussed the care of  Hank Luna , including pertinent history and exam findings, with the Cardiology fellow/resident. I have seen and examined the patient and the key elements of all parts of the encounter have been performed by me. I agree with the assessment, plan and orders as documented by the fellow/resident, after I modified exam findings and plan of treatments, and the final version is my approved version of the assessment. Additional Comments: transferred from OSH where treated for PNA and CHF. Was seeing Dr. Veronica Zuluaga. Has ICM. H/o CABG/MV REPAIR 5/28/15: LIMA-LAD, SVG-PDA, SVG-D. MV repair. Done by Dr. Alex Estrada. CATH 6/4/15: Patent grafts. LIMA-LAD, SVG-PDA, SVG-D. EF 30%. LVEF 25%. CHAD now. Nephrology consult. Optimize meds. Has ICD    Katheryn Byrd MD       [facility] Cardiology    Consult / H&P               Today's Date: 10/14/2022  Patient Name: Hank Luna  Date of admission: 10/14/2022  4:20 AM  Patient's age: 60 y.o., 1960  Admission Dx: CHF (congestive heart failure), NYHA class I, acute on chronic, combined ([address] [I50.43]  Acute on chronic systolic CHF (congestive heart failure) ([facility] [address] [I50.23]    Reason for Consult:  Cardiac evaluation    Requesting Physician: Willam Torres MD    CHIEF COMPLAINT:  sob    History Obtained From:  electronic medical record    HISTORY OF PRESENT ILLNESS:      The patient is a 60 y.o. F pmhx chf ef 25% 10/11 tte w/ AICD, CAD, mitral valve repair, CKD stage 3b, type 2 dm, hld, depression, copd, active smoking presents from outside hospital where she initially came in w sob. Has not been taking medications for 2 months-ran out. Developed, cough, sob, leg swelling. Bnp 20k, trop 28-started Iv diuretics. Seen by her home cardiologist who started lasix 20 bid iv and aldactone 25 mg bid.  Also started on iv abx for copd exacerbation/pna-also apparently AICD not functioning,. Also developed jolie cr 1.79 w/ low uop and hypotension and started on dopamine-gtt by her cardiologist. Her respiratory failure despite treatment began to worsen and required bipap-repeat cxr showing worsening pulmonary edema-decision made to transfer here to ICU for further management. Cardiology consulted for acute on chronic chf.    Current evaluation:  -on bipap-appears to be sleeping  -off all gtt currently  -map 79-not on pressors  -sats>95%  -labs reviewed: cr 1.79>1.89, k 5.1, mg 2.3, troponin 28>31>36>34  -cxr pending this am    Past Medical History:   has a past medical history of CAD (coronary artery disease), Cardiomyopathy ([facility] [address] COPD (chronic obstructive pulmonary disease) ([facility] [address] Depression, Diabetes mellitus ([facility] [address] H/O mitral valve repair, History of fractured kneecap, Hx of CABG, Hx of myocardial infarction, Hyperlipidemia, Hypertension, ICD (implantable cardioverter-defibrillator) in place, Kidney calculi, Osteoarthritis, Pneumonia, and Renal calculi. Past Surgical History:   has a past surgical history that includes Cholecystectomy (1990); Hysterectomy (1990); Ankle surgery (Right, 2009); Kidney stone surgery (Left, 2010); Tubal ligation; Mitral valve surgery (6/4/15); Cardiac defibrillator placement (06/01/15); Coronary artery bypass graft (05/28/15); Cystocopy (Left, 11/08/2018); pr cysto/uretero w/lithotripsy &indwell stent insrt (Left, 11/8/2018); pr cysto/uretero w/lithotripsy &indwell stent insrt (Left, 11/8/2018); and pr cysto/uretero w/lithotripsy &indwell stent insrt (Left, 11/15/2018). Home Medications:    Prior to Admission medications    Medication Sig Start Date End Date Taking? Authorizing Provider   gabapentin (NEURONTIN) 400 MG capsule Take 1 capsule by mouth in the morning and 1 capsule at noon and 1 capsule before bedtime. Do all this for 90 days.  7/15/22 10/13/22  Margarita Fabian MD   lisinopril (PRINIVIL;ZESTRIL) 5 MG tablet take 1 tablet by mouth once daily 5/5/22   John Dale DO   midodrine (PROAMATINE) 5 MG tablet take 1 tablet by mouth three times a day 5/5/22   John Dale DO   sertraline (ZOLOFT) 100 MG tablet take 1 tablet by mouth once daily 5/2/22   Estella Glez MD   clopidogrel (PLAVIX) 75 MG tablet take 1 tablet by mouth once daily 5/2/22   Estella Glez MD   atorvastatin (LIPITOR) 80 MG tablet take 1 tablet by mouth once daily 5/2/22   Estella Glez MD   carvedilol (COREG) 3.125 MG tablet take 1 tablet by mouth twice a day with meals 5/2/22   Estella Glez MD   insulin glargine (LANTUS SOLOSTAR) 100 UNIT/ML injection pen INJECT 45 UNITS SUBCUTANEOUSLY EVERY MORNING AND 25 UNITS EVERY EVENING 4/22/22   Estella Glez MD   glucose monitoring (FREESTYLE) kit Please dispense meter covered by patients insurance. 4/12/22   Estella Glez MD   Insulin Pen Needle (BD PEN NEEDLE PAIGE 2ND GEN) 32G X 4 MM MISC use 1 PEN NEEDLE to inject MEDICATION subcutaneously daily 4/12/22   Estella Glez MD   Lancets MISC Pt testing BS once daily and prn 4/12/22   Estella Glez MD   blood glucose monitor strips Testing BS once daily and as needed.  Dispense strips to go with new glucometer 4/12/22   Estella Glez MD   albuterol (PROVENTIL) (2.5 MG/3ML) 0.083% nebulizer solution Take 3 mLs by nebulization every 6 hours as needed for Wheezing (as needed for wheezing) 4/12/22   Estella Glez MD   furosemide (LASIX) 40 MG tablet TAKE 1 TABLET BY MOUTH ONCE DAILY 4/5/22   Estella Glez MD   Respiratory Therapy Supplies (NEBULIZER/TUBING/MOUTHPIECE) KIT 1 kit by Does not apply route daily as needed (for SOB, cough)  Patient not taking: Reported on 4/12/2022 11/13/19   Estella Glez MD   Respiratory Therapy Supplies (NEBULIZER COMPRESSOR) KIT 1 kit by Does not apply route once for 1 dose 11/4/19 11/4/19  Estella Glez MD   lactobacillus (CULTURELLE) capsule Take 1 capsule by mouth 2 times daily (with meals) 1/2/19   Checo Donald MD blood glucose monitor strips Use to test daily 11/7/18   Amelia Castellon MD   blood glucose monitor kit and supplies Use to test daily 11/7/18   Amelia Castellon MD   Cholecalciferol (VITAMIN D3) 5000 units TABS Take 5,000 Units by mouth daily    Historical Provider, MD   glucose blood VI test strips (EXACTECH TEST) strip 1 each by In Vitro route daily Pt testing BS once daily and prn 11/19/15   Amelia Castellon MD   Multiple Vitamins-Minerals (THERAPEUTIC MULTIVITAMIN-MINERALS) tablet Take 1 tablet by mouth daily (with breakfast) 6/12/15   Kiana Euceda MD      Current Facility-Administered Medications: sodium chloride flush 0.9 % injection 5-40 mL, 5-40 mL, IntraVENous, 2 times per day  sodium chloride flush 0.9 % injection 5-40 mL, 5-40 mL, IntraVENous, PRN  0.9 % sodium chloride infusion, , IntraVENous, PRN  ondansetron (ZOFRAN-ODT) disintegrating tablet 4 mg, 4 mg, Oral, Q8H PRN **OR** ondansetron (ZOFRAN) injection 4 mg, 4 mg, IntraVENous, Q6H PRN  polyethylene glycol (GLYCOLAX) packet 17 g, 17 g, Oral, Daily PRN  acetaminophen (TYLENOL) tablet 650 mg, 650 mg, Oral, Q6H PRN **OR** acetaminophen (TYLENOL) suppository 650 mg, 650 mg, Rectal, Q6H PRN  heparin (porcine) injection 5,000 Units, 5,000 Units, SubCUTAneous, 3 times per day  ipratropium (ATROVENT) 0.02 % nebulizer solution 0.5 mg, 0.5 mg, Nebulization, 4x daily  budesonide (PULMICORT) nebulizer suspension 250 mcg, 250 mcg, Nebulization, BID  levalbuterol (XOPENEX) nebulization 0.63 mg, 0.63 mg, Nebulization, Q6H PRN  [Held by provider] furosemide (LASIX) injection 40 mg, 40 mg, IntraVENous, BID  glucose chewable tablet 16 g, 4 tablet, Oral, PRN  dextrose bolus 10% 125 mL, 125 mL, IntraVENous, PRN **OR** dextrose bolus 10% 250 mL, 250 mL, IntraVENous, PRN  glucagon (rDNA) injection 1 mg, 1 mg, SubCUTAneous, PRN  dextrose 10 % infusion, , IntraVENous, Continuous PRN  insulin lispro (HUMALOG) injection vial 0-8 Units, 0-8 Units, SubCUTAneous, Q4H    Allergies:  Codeine and Nsaids    Social History:   reports that she has been smoking cigarettes. She has a 16.00 pack-year smoking history. She has never used smokeless tobacco. She reports that she does not drink alcohol and does not use drugs. Family History: family history includes Diabetes in her mother; Other in her father; Stroke (age of onset: 76) in her father. No h/o sudden cardiac death. unknown for premature CAD    REVIEW OF SYSTEMS:    Constitutional: there has been no unanticipated weight loss. There's been No change in energy level, No change in activity level. Eyes: No visual changes or diplopia. No scleral icterus. ENT: No Headaches  Cardiovascular: per above-leg swelling, sob  Respiratory: No previous pulmonary problems, +cough/+sob  Gastrointestinal: No abdominal pain. No change in bowel or bladder habits. Genitourinary: No dysuria, trouble voiding, or hematuria. Musculoskeletal:  No gait disturbance, No weakness or joint complaints. Integumentary: No rash or pruritis. Neurological: No headache, diplopia, change in muscle strength, numbness or tingling. No change in gait, balance, coordination, mood, affect, memory, mentation, behavior. Psychiatric: No anxiety, or depression. Endocrine: No temperature intolerance. No excessive thirst, fluid intake, or urination. No tremor. Hematologic/Lymphatic: No abnormal bruising or bleeding, blood clots or swollen lymph nodes. Allergic/Immunologic: No nasal congestion or hives. PHYSICAL EXAM:      /79   Pulse 86   Temp 97.7 °F (36.5 °C) (Oral)   Resp 15   Ht 5' 6\" (1.676 m)   Wt 185 lb 13.6 oz (84.3 kg)   SpO2 97%   BMI 30.00 kg/m²    Constitutional and General Appearance: ill-appearing on bipap; sleeping-appears NAD  HEENT: PERRL, no cervical lymphadenopathy. No masses palpable. Normal oral mucosa  Respiratory: On bipap-decreased air entry b/l anterior lung fields  Cardiovascular:   The apical impulse is not displaced  Heart tones are crisp and normal. regular S1 and S2.  Jugular venous pulsation Normal  The carotid upstroke is normal in amplitude and contour without delay or bruit  Peripheral pulses are symmetrical and full   Abdomen:   No masses or tenderness  Bowel sounds present  Extremities:   No Cyanosis or Clubbing   Lower extremity edema: +   Skin: Warm and dry  Neurological:  Sleeping, not assessed    DATA:    Diagnostics:    EKG: not available  ECHO: not obtained. -apparently 25% per hx-nothing in chart including \"care everywhere\"  Stress Test: not obtained. Cardiac Angiography: not obtained. Labs:     CBC:   Recent Labs     10/13/22  0432 10/13/22  1222   WBC 11.1* 10.2   HGB 10.1* 10.5*   HCT 32.1* 33.4*    266     BMP:   Recent Labs     10/11/22  2130 10/13/22  0432    140   K 4.4 5.0   CO2 17* 22   BUN 24* 29*   CREATININE 1.19* 1.79*   LABGLOM 51* 31*   GLUCOSE 251* 40*     BNP: No results for input(s): BNP in the last 72 hours. PT/INR: No results for input(s): PROTIME, INR in the last 72 hours. APTT:No results for input(s): APTT in the last 72 hours. CARDIAC ENZYMES:No results for input(s): CKTOTAL, CKMB, CKMBINDEX, TROPONINI in the last 72 hours.   FASTING LIPID PANEL:  Lab Results   Component Value Date/Time    HDL 25 07/15/2022 09:50 AM    LDLDIRECT 70 07/15/2022 09:50 AM    TRIG 724 07/15/2022 09:50 AM     LIVER PROFILE:  Recent Labs     10/11/22  2130   AST 19   ALT 21   LABALBU 3.7       IMPRESSION:    Patient Active Problem List   Diagnosis    Acute coronary syndrome (HCC)    S/P cardiac cath-Patent grafts 6/5/15    Onychomycosis of toenail    CAD (coronary artery disease)    Hx of CABG    ICD (implantable cardioverter-defibrillator) in place    H/O mitral valve repair    Hx of myocardial infarction    Hypertension    DM (diabetes mellitus), type 2 with neurological complications (HCC)    Anxiety    CHAD (acute kidney injury) ([facility] [address]    Elevated serum immunoglobulin free light chains    Ureteral stone with hydronephrosis    Dyslipidemia    Pneumonia    Acute systolic CHF (congestive heart failure) (HCC)    Congestive heart failure due to cardiomyopathy (HCC)    CHF (congestive heart failure), NYHA class I, acute on chronic, combined (HCC)    Acute on chronic systolic CHF (congestive heart failure) (HCC)   -acute on chronic chf exacebation-ef 25% per chart review  -h/o AICD-possibly nonfunctional?  -jolie on ckd?-unknown baseline cr  -copd exacerbation +/- PNA  -oliguria    RECOMMENDATIONS:  -diuresis for acute on chronic chf  -tx for PNA/copd exacerbation  -appears patient's primary cardiologist Dr. Magdalena Foss will f/u as OP for cath and ICD generator change after acute issues resolve-see his consult note 10/13/22  -nephrology consultation for possible jolie on ckd-unknown baseline cr, and also reported oliguria  -continue bipap-wean as tolerated  -f/u cbc  -restart home meds as tolerated-bb, statin, plavix; hold acei given jolie(?-again, unknown baseline cr)    Above plan/further recommendations pending d/w attending on rounds.     Suraj Dugan MD  PGY-3, Department of Internal Medicine  [address], [place]

## 2022-10-14 NOTE — PROGRESS NOTES
Patients spouse was called and updated about the patients status.  asked about transfer, told  he would be updated when she would be leaving.  gave consent for transfer to [facility]

## 2022-10-14 NOTE — FLOWSHEET NOTE
10/14/22 0332   Belongings   Dental Appliances None   Vision - Corrective Lenses Eyeglasses   Hearing Aid None   Clothing Jacket/Coat;Undergarments;Pants; Footwear   Jewelry None   Body Piercings Removed N/A   Electronic Devices Cell Phone   Weapons (Notify Protective Services/Security) None   Other Valuables Wallet;Purse   Home Medications None   Valuables Given To Patient   Provide Name(s) of Who Valuable(s) Were Given To patient   All personal belongings sent with patient

## 2022-10-14 NOTE — PROGRESS NOTES
Dr Sewell called for an update on the patient.  He was informed that the family consented to transfer and stated he would begin working on the transfer process

## 2022-10-14 NOTE — DISCHARGE SUMMARY
Hospitalist Discharge Summary    Citlaly Bruno  :  1960  MRN:  [id number]    Admit date:  10/11/2022  Discharge date:  10/13/22    Admitting Physician:  Aidan Carter MD    Discharge Diagnoses:   Acute on chronic systolic CHF      Acute hypoxic respiratory failure      Acute Oliguric renal failure      Hypotension      Non functioning AICD      RLL infiltrate      COPD exacerbation      Moderate ascites       DM II      Smoker      Admission Condition:  poor      Discharged Condition:  critical    Hospital Course: The patient is a 60 y.o. female who presents to the ER with increasing SOB. According to Kathia Ramos, she has been out of all her medication other than Gabapentin for the past 2 months. She states her mail in company never sent them to her. For the past week she has noticed an occasional cough with a small amount of production. She states then she developed swelling in both her legs about 3 days ago and started to feel short of breath. Kathia Ramos states she had cut back on smoking and tried using her aerosol machine with no improvement. During this time she denies fever or chills. Appetite has not been very good and her abdomen has felt more distended. No chest pain. She has a history of CAD with a reduced LV function. She does have an AICD in place. jasmin Ramos states that yesterday her breathing became a lot worse so she decided to come to the ER. In the ER her CMP was normal other than a Chloride of 109, BUN 24, and Creatinine of 1.19.  her glucose was 251. BNP was 20,277 with a troponin at 28. CBC showed a WBC at 8, Hgb 10, and Plt ct 260. COVID was negative. UA showed positive nitrites with 3 + bacteria but no WBC's. CXR showed small right effusion with right lung base atelectasis / consolidation. Kathia Ramos is admitted for IV diuretics and to start back on her previous home medication. A consult will be placed to her Cardiologist, Dr. Nash Sotomayor, for further evaluation and treatment.       Kathia Ramos was admitted on nasal oxygen to keep SPO2 > 90%. She was placed on IV Lasix 20 mg Q12 hours. Dr. Kenyatta Kim, in Cardiology, was consulted. He ordered a CTA of the chest and CT scan of the abdomen and pelvis. The CTA was negative for PE but showed RLL consolidation with bilateral pleural effusions and mild pulmonary edema. She was also found to have moderate ascites and anasarca. Dr. Guillermo Durán added Aldactone 25 mg BID. She was also placed on IV Rocephin / Zithromax with Acapella and Mucinex to help with sputum clearance. EZPAP was ordered to decrease atelectasis. She was placed on Aerosol treatment with Xopenex, Atrovent, and Pulmicort. She was started back on Coreg and Lisinopril upon admission. An ECHO showed  enlarged LV with an EF of 25%, mild MR, modetate to moderate - severe pulmonary HTN, moderate - severe TR. Dr. Kenyatta Kim evaluated her AICD and found it to not be functional.  Aurea Card was found to have an increase in creatinine to 1.79. Lisinopril was held at that time. During this time her SBP dropped into the 80's with a significant drop in her urine out put. Dr. Kenyatta Kim started Aurea Card on IV dopamine to help with BP control and in hopes of improving urine output. Despite treatment, Valentina's respiratory condition worsened requiring BIPAP. ABG on BIPAP showed a PH of 7.335, PCO2 37.4, PaO2 65, and HCO3 19.9. Despite stabilization of her BP her urine output continued to be minimal.  Repeat CXR showed worsening pulmonary edema. Her worsening condition was discussed with Aurea Card and her . The recommendations for transfer for specialty care was discussed and they agreed to transfer to [address] [place] [address]. The patient was discussed with Dr. Keri Lewis who was willing to accept Aurea Card in transfer. The access center arranged for transfer via mobile ICU. Aurea Card was in serious but stable condition at time of transfer.      Heart Failure     (x)  The patient has current or prior documentation of left ventricular ejection fraction (LVEF) less than 40%, or moderate or severely depressed left ventricular systolic function. Answer both:   (x) The patient was prescribed or already taking an Angiotensin -Converting Enzyme (ACE) Inhibitor, or Angiotensin Receptor Blocker (ARB). This was discontinued during her stay secondary to acute on chronic renal failure. (x) The patient was prescrbed or alrady taking a beta - blocker. If Yes to both, stop here. ( )  Patient not prescribed / taking:   ( ) ACE or ARB for medical/patient/system reason(s) including ( ) (ex. Allergy, intolerance, contraindication)   ( ) Beta - blocker for medical/patient/system reason(s) include ( ) (ex. Allergy, intolerance, contraindication)  ( )  Patient not prescribed / taking:   ( ) ACE or ARB, no reason given (does not stisfy MIPS performance)   ( ) Beta - blocker, no reason given (does not stisfy MIPS performance)    Discharge Exam:    Vitals: BP 94/63   Pulse 81   Temp 97.4 °F (36.3 °C) (Oral)   Resp 20   Ht 5' 6\" (1.676 m)   Wt 180 lb (81.6 kg)   SpO2 90%   BMI 29.05 kg/m²   General appearance: alert and cooperative with exam  HEENT: Head: Normocephalic, no lesions, without obvious abnormality. Eye: Normal external eye, conjunctiva, lids cornea, MINDY. Nose: Normal external nose, mucus membranes and septum. Nasal oxygen on. Neck: no adenopathy and supple, symmetrical, trachea midline  Lungs:  Bilateral expiratory wheezing. Heart: regular rate and rhythm, S1, S2 normal, and II/VI systolic murmur  Abdomen:  +BS, soft, distended. Non tender to palpation. Extremities:  ACE wraps on lower legs. No calf tenderness.   Neurologic: Mental status: Alert, oriented, thought content appropriate    Discharge Medications:         Medication List        CONTINUE taking these medications      BD Pen Needle Elizabeth 2nd Gen 32G X 4 MM Misc  Generic drug: Insulin Pen Needle  use 1 PEN NEEDLE to inject MEDICATION subcutaneously daily     * blood glucose monitor kit and supplies  Use to test daily     * glucose monitoring kit  Please dispense meter covered by patients insurance. Lancets Misc  Pt testing BS once daily and prn     * Nebulizer Compressor Kit  1 kit by Does not apply route once for 1 dose     * Nebulizer/Tubing/Mouthpiece Kit  1 kit by Does not apply route daily as needed (for SOB, cough)           * This list has 4 medication(s) that are the same as other medications prescribed for you. Read the directions carefully, and ask your doctor or other care provider to review them with you. ASK your doctor about these medications      albuterol (2.5 MG/3ML) 0.083% nebulizer solution  Commonly known as: PROVENTIL  Take 3 mLs by nebulization every 6 hours as needed for Wheezing (as needed for wheezing)     atorvastatin 80 MG tablet  Commonly known as: LIPITOR  take 1 tablet by mouth once daily     * blood glucose test strips strip  Commonly known as: [employer] Test  1 each by In Vitro route daily Pt testing BS once daily and prn     * blood glucose test strips  Use to test daily     * blood glucose test strips  Testing BS once daily and as needed. Dispense strips to go with new glucometer     carvedilol 3.125 MG tablet  Commonly known as: COREG  take 1 tablet by mouth twice a day with meals     clopidogrel 75 MG tablet  Commonly known as: PLAVIX  take 1 tablet by mouth once daily     furosemide 40 MG tablet  Commonly known as: LASIX  TAKE 1 TABLET BY MOUTH ONCE DAILY     gabapentin 400 MG capsule  Commonly known as: NEURONTIN  Take 1 capsule by mouth in the morning and 1 capsule at noon and 1 capsule before bedtime. Do all this for 90 days.      lactobacillus capsule  Take 1 capsule by mouth 2 times daily (with meals)     Lantus SoloStar 100 UNIT/ML injection pen  Generic drug: insulin glargine  INJECT 45 UNITS SUBCUTANEOUSLY EVERY MORNING AND 25 UNITS EVERY EVENING     lisinopril 5 MG tablet  Commonly known as: PRINIVIL;ZESTRIL  take 1 tablet by mouth once daily     midodrine 5 MG tablet  Commonly known as: PROAMATINE  take 1 tablet by mouth three times a day     sertraline 100 MG tablet  Commonly known as: ZOLOFT  take 1 tablet by mouth once daily     therapeutic multivitamin-minerals tablet  Take 1 tablet by mouth daily (with breakfast)     Vitamin D3 125 MCG (5000 UT) Tabs           * This list has 3 medication(s) that are the same as other medications prescribed for you. Read the directions carefully, and ask your doctor or other care provider to review them with you. Consults:  Dr. Noe Maharaj (Cardiology)    Significant Diagnostic Studies:  Labs, CXR, ECG, UA, CTA chest, CT abdomen / pelvis, ECHO, AICD interrogation     Treatments:   Nasal oxygen, BIPAP, IV Lasix, Aldactone, Coreg, Lisinopril, IV Dopamine. Disposition:   [place]    Follow up with Salbador Williamson MD  after discharge. Discharge time =  45 minutes including time for discharge.       Signed:  Marco Neely MD  10/13/2022, 8:32 PM

## 2022-10-14 NOTE — PROGRESS NOTES
Access transfer center calls with update. Dr. Rodriguez Chaparro accepting [facility] ICU Rm 1941. MICU transport calls and reports approximately 0200  time. Patient updated.

## 2022-10-14 NOTE — PLAN OF CARE
Problem: Discharge Planning  Goal: Discharge to home or other facility with appropriate resources  Outcome: Progressing     Problem: Safety - Adult  Goal: Free from fall injury  10/14/2022 1039 by Sonal Alaniz RN  Outcome: Progressing  10/14/2022 0618 by Jesse Forrest RN  Outcome: Progressing     Problem: Chronic Conditions and Co-morbidities  Goal: Patient's chronic conditions and co-morbidity symptoms are monitored and maintained or improved  Outcome: Progressing

## 2022-10-14 NOTE — H&P
Critical Care - History and Physical Examination    Patient's name:  Dali Romeo  Medical Record Number: [id number]  Patient's account/billing number: [de-identified]  Patient's YOB: 1960  Age: 60 y.o. Date of Admission: 10/14/2022  4:20 AM  Date of History and Physical Examination: 10/14/2022    Primary Care Physician: Anyi Rowland MD  Attending Physician: Dr. Ankur Hemphill Status: Full Code    Chief complaint: No chief complaint on file. HISTORY OF PRESENT ILLNESS:      History was obtained from chart review. Dali Romeo is a 60 y.o. with PMH of systolic CHF with EF of 00% from echo on October 11 with AICD in place, CAD, history of mitral valve repair, CKD stage IIIb, type 2 diabetes mellitus, hyperlipidemia, depression, COPD, and cigarette smoker presented to ED on 12 October due to increasing shortness of breath. Per the patient she had been out of all her medications besides gabapentin for the past 2 months as she said the mail company never sent it to her. For the past week she had noticed an increasing cough with small amount of sputum production as well as increasing swelling in her legs for the past 3 days as well as starting to feel short of breath. She had to cut back on her cigarette smoking and was using her aerosol machine but it provided no improvement. Her breathing started to become so difficult that she came to the ER for further evaluation.   She was admitted to [facility] and treated with IV diuretics and was seen by her cardiologist.    PAST MEDICAL HISTORY:         Diagnosis Date    CAD (coronary artery disease)     Cardiomyopathy ([address]     COPD (chronic obstructive pulmonary disease) ([address]     Depression     Diabetes mellitus ([address]     H/O mitral valve repair     History of fractured kneecap 2017    right    Hx of CABG     Hx of myocardial infarction     Hyperlipidemia     Hypertension     ICD (implantable cardioverter-defibrillator) in place Kidney calculi     Osteoarthritis     Pneumonia     Renal calculi      PAST SURGICAL HISTORY:         Procedure Laterality Date    ANKLE SURGERY Right 2009    plate and screws     CARDIAC DEFIBRILLATOR PLACEMENT  06/01/15    CHOLECYSTECTOMY  1990    CORONARY ARTERY BYPASS GRAFT  05/28/15    X 3- Dr Aracelis CABELLO-LAD<SVG-Diag, SVG-PDA, MV Repair with 30 min CE IMR ring    CYSTOSCOPY Left 11/08/2018    HYSTERECTOMY (CERVIX STATUS UNKNOWN)  1990    KIDNEY STONE SURGERY Left 2010    MITRAL VALVE SURGERY  6/4/15    OK CYSTO/URETERO W/LITHOTRIPSY &INDWELL STENT INSRT Left 11/8/2018    CYSTOSCOPY URETEROSCOPY performed by Meenu Cochran MD at [address] &INDWELL STENT INSRT Left 11/8/2018    CYSTOSCOPY STENT INSERTION performed by Meenu Cochran MD at [address] &INDWELL STENT INSRT Left 11/15/2018    LEFT CYSTOSCOPY URETEROSCOPY LASER,HLL, LEFT STENT EXCHANGE performed by Meenu Cochran MD at [facility]:      Allergies   Allergen Reactions    Codeine Nausea And Vomiting    Nsaids Nausea Only     nausea       HOME MEDS: :      Prior to Admission medications    Medication Sig Start Date End Date Taking? Authorizing Provider   gabapentin (NEURONTIN) 400 MG capsule Take 1 capsule by mouth in the morning and 1 capsule at noon and 1 capsule before bedtime. Do all this for 90 days.  7/15/22 10/13/22  Krish James MD   lisinopril (PRINIVIL;ZESTRIL) 5 MG tablet take 1 tablet by mouth once daily 5/5/22   Link Dale DO   midodrine (PROAMATINE) 5 MG tablet take 1 tablet by mouth three times a day 5/5/22   Link Dale DO   sertraline (ZOLOFT) 100 MG tablet take 1 tablet by mouth once daily 5/2/22   Krish James MD   clopidogrel (PLAVIX) 75 MG tablet take 1 tablet by mouth once daily 5/2/22   Krish James MD   atorvastatin (LIPITOR) 80 MG tablet take 1 tablet by mouth once daily 5/2/22   Krish James MD carvedilol (COREG) 3.125 MG tablet take 1 tablet by mouth twice a day with meals 5/2/22   Brandon Gordillo MD   insulin glargine (LANTUS SOLOSTAR) 100 UNIT/ML injection pen INJECT 45 UNITS SUBCUTANEOUSLY EVERY MORNING AND 25 UNITS EVERY EVENING 4/22/22   Brandon Gordillo MD   glucose monitoring (FREESTYLE) kit Please dispense meter covered by patients insurance. 4/12/22   Brandon Gordillo MD   Insulin Pen Needle (BD PEN NEEDLE PAIGE 2ND GEN) 32G X 4 MM MISC use 1 PEN NEEDLE to inject MEDICATION subcutaneously daily 4/12/22   Brandon Gordillo MD   Lancets MISC Pt testing BS once daily and prn 4/12/22   Brandon Gordillo MD   blood glucose monitor strips Testing BS once daily and as needed.  Dispense strips to go with new glucometer 4/12/22   Brandon Gordillo MD   albuterol (PROVENTIL) (2.5 MG/3ML) 0.083% nebulizer solution Take 3 mLs by nebulization every 6 hours as needed for Wheezing (as needed for wheezing) 4/12/22   Brandon Gordillo MD   furosemide (LASIX) 40 MG tablet TAKE 1 TABLET BY MOUTH ONCE DAILY 4/5/22   Brandon Gordillo MD   Respiratory Therapy Supplies (NEBULIZER/TUBING/MOUTHPIECE) KIT 1 kit by Does not apply route daily as needed (for SOB, cough)  Patient not taking: Reported on 4/12/2022 11/13/19   Brandon Gordillo MD   Respiratory Therapy Supplies (NEBULIZER COMPRESSOR) KIT 1 kit by Does not apply route once for 1 dose 11/4/19 11/4/19  Brandon Gordillo MD   lactobacillus (CULTURELLE) capsule Take 1 capsule by mouth 2 times daily (with meals) 1/2/19   Denny Sewell MD   blood glucose monitor strips Use to test daily 11/7/18   Brandon Gordillo MD   blood glucose monitor kit and supplies Use to test daily 11/7/18   Brandon Gordillo MD   Cholecalciferol (VITAMIN D3) 5000 units TABS Take 5,000 Units by mouth daily    Historical Provider, MD   glucose blood VI test strips (EXACTECH TEST) strip 1 each by In Vitro route daily Pt testing BS once daily and prn 11/19/15   Brandon Gordillo MD   Multiple Vitamins-Minerals (THERAPEUTIC MULTIVITAMIN-MINERALS) tablet Take 1 tablet by mouth daily (with breakfast) 6/12/15   Nori Jensen MD     SOCIAL HISTORY:       TOBACCO:   reports that she has been smoking cigarettes. She has a 16.00 pack-year smoking history. She has never used smokeless tobacco.  ETOH:   reports no history of alcohol use. DRUGS:  reports no history of drug use. FAMILY HISTORY:          Problem Relation Age of Onset    Stroke Father 76    Other Father         low BS    Diabetes Mother      REVIEW OF SYSTEMS (ROS):     Constitutional: negative for  fevers and chills  HEENT: negative for  ear drainage, earaches, nasal congestion, and sore throat  Neck:  No lumps or masses  Cardiac: positive for  dyspnea, fatigue, early saiety, edema  negative for chest pain  Respiratory: positive for  cough with sputum and dyspnea  negative for  hemoptysis and chest pain  Gastrointestinal: negative for nausea, vomiting, abdominal pain, and positive for abdominal distention.   : negative for frequency, dysuria, and hesitancy  Musculoskeletal: positive for  muscle weakness  Neuro: negative    OBJECTIVE:     VITAL SIGNS:  /74   Pulse 94   Temp 97.7 °F (36.5 °C) (Oral)   Resp 17   Ht 5' 6\" (1.676 m)   Wt 185 lb 13.6 oz (84.3 kg)   SpO2 93%   BMI 30.00 kg/m²   Tmax over 24 hours:  Temp (24hrs), Av.4 °F (36.3 °C), Min:96.1 °F (35.6 °C), Max:97.9 °F (36.6 °C)    Patient Vitals for the past 8 hrs:   BP Temp Temp src Pulse Resp SpO2 Height Weight   10/14/22 0430 102/74 97.7 °F (36.5 °C) Oral 94 17 93 % 5' 6\" (1.676 m) 185 lb 13.6 oz (84.3 kg)   10/14/22 0422 -- -- -- -- 30 -- -- --       Intake/Output Summary (Last 24 hours) at 10/14/2022 0620  Last data filed at 10/14/2022 0525  Gross per 24 hour   Intake --   Output 125 ml   Net -125 ml     Date 10/14/22 0000 - 10/14/22 235   Shift 7332-2181 1453-9260 7234-2837 24 Hour Total   INTAKE   Shift Total(mL/kg)       OUTPUT   Urine(mL/kg/hr) 125   125 Shift Total(mL/kg) 125(1.5)   125(1.5)   Weight (kg) 84.3 84.3 84.3 84.3     Wt Readings from Last 3 Encounters:   10/14/22 185 lb 13.6 oz (84.3 kg)   10/14/22 183 lb 6.4 oz (83.2 kg)   07/13/22 158 lb (71.7 kg)     Body mass index is 30 kg/m². PHYSICAL EXAM:  General appearance: Alert, appears stated age and cooperative. Using BiPAP. In acute distress. Skin: Skin color, texture, turgor normal. No rashes or lesions  HEENT: Head: Normocephalic, no lesions, without obvious abnormality. Eye: Normal external eye, conjunctiva, lids cornea, MINDY. Ears: Normal TM's bilaterally. Normal auditory canals and external ears. Non-tender. Nose: Normal external nose, mucus membranes and septum. Pharynx: No dentures, oral mucosa is moist, no oral lesions, posterior pharynx without erythema. Neck: no adenopathy, no carotid bruit, and supple, symmetrical, trachea midline  Lungs:  Rales in the bases posteriorly. Lungs clear anteriorly. Heart: regular rate and rhythm, S1, S2 normal, and II/VI systolic murmur  Abdomen:  +BS, soft, non tender, distended. Extremities:  Lower extremity edema bilateral with ACE wraps on. No calf tenderness. Negative Steven's sign.   Neurologic: Mental status: Alert, oriented, thought content appropriate    MEDICATIONS:  Scheduled Meds:   sodium chloride flush  5-40 mL IntraVENous 2 times per day    heparin (porcine)  5,000 Units SubCUTAneous 3 times per day     Continuous Infusions:   sodium chloride       PRN Meds:   sodium chloride flush, 5-40 mL, PRN  sodium chloride, , PRN  ondansetron, 4 mg, Q8H PRN   Or  ondansetron, 4 mg, Q6H PRN  polyethylene glycol, 17 g, Daily PRN  acetaminophen, 650 mg, Q6H PRN   Or  acetaminophen, 650 mg, Q6H PRN      ABGs:   Lab Results   Component Value Date/Time    OJS5DZZ 17 08/24/2018 05:02 AM    FIO2 50.0 10/13/2022 06:00 PM     DATA:  Complete Blood Count:   Recent Labs     10/11/22  2130 10/13/22  0432 10/13/22  1222   WBC 8.0 11.1* 10.2   RBC 3.63* 3.74* 3.88*   HGB 10.0* 10.1* 10.5*   HCT 30.7* 32.1* 33.4*   MCV 84.8 85.8 86.0   MCH 27.7 26.9 27.1   MCHC 32.7 31.3 31.5   RDW 18.5* 19.0* 18.8*    336 266      Last 3 Blood Glucose:   Recent Labs     10/11/22  2130 10/13/22  0432   GLUCOSE 251* 40*      PT/INR:    Lab Results   Component Value Date/Time    PROTIME 13.3 11/28/2020 04:59 PM    INR 1.1 11/28/2020 04:59 PM     PTT:    Lab Results   Component Value Date/Time    APTT 29.2 11/28/2020 04:59 PM     Comprehensive Metabolic Profile:   Recent Labs     10/11/22  2130 10/13/22  0432    140   K 4.4 5.0   * 106   CO2 17* 22   BUN 24* 29*   CREATININE 1.19* 1.79*   GLUCOSE 251* 40*   CALCIUM 8.4* 8.6   PROT 7.1  --    LABALBU 3.7  --    BILITOT 0.9  --    ALKPHOS 88  --    AST 19  --    ALT 21  --       Magnesium:   Lab Results   Component Value Date/Time    MG 2.1 12/10/2020 02:47 PM    MG 2.2 10/28/2019 11:09 AM    MG 2.0 12/29/2018 05:30 AM     Phosphorus:   Lab Results   Component Value Date/Time    PHOS 6.3 09/04/2018 04:47 AM    PHOS 7.7 09/03/2018 04:58 AM    PHOS 7.0 09/02/2018 04:37 AM     Ionized Calcium:   Lab Results   Component Value Date/Time    CAION 1.06 09/04/2018 04:47 AM    CAION 1.15 09/03/2018 04:58 AM    CAION 1.11 09/02/2018 04:37 AM      Urinalysis:   Lab Results   Component Value Date/Time    NITRU POSITIVE 10/11/2022 11:39 PM    COLORU Yellow 10/11/2022 11:39 PM    PHUR 5.0 10/11/2022 11:39 PM    WBCUA NONE SEEN 10/11/2022 11:39 PM    RBCUA 2 TO 5 10/11/2022 11:39 PM    MUCUS NOT REPORTED 11/21/2018 12:12 PM    TRICHOMONAS NOT REPORTED 11/21/2018 12:12 PM    YEAST NOT REPORTED 11/21/2018 12:12 PM    BACTERIA 3+ 10/11/2022 11:39 PM    SPECGRAV 1.025 10/11/2022 11:39 PM    LEUKOCYTESUR NEGATIVE 10/11/2022 11:39 PM    UROBILINOGEN Normal 10/11/2022 11:39 PM    BILIRUBINUR NEGATIVE 10/11/2022 11:39 PM    GLUCOSEU 50 mg/dL 10/11/2022 11:39 PM    KETUA NEGATIVE 10/11/2022 11:39 PM    AMORPHOUS TRACE 11/21/2018 12:12 PM HgBA1c:    Lab Results   Component Value Date/Time    LABA1C 8.1 10/11/2022 09:30 PM     TSH:    Lab Results   Component Value Date/Time    TSH 1.20 12/10/2020 02:47 PM     Lactic Acid:   Lab Results   Component Value Date/Time    LACTA 1.4 10/13/2022 12:22 PM    LACTA 1.7 12/29/2018 11:57 AM    LACTA 1.5 12/29/2018 05:30 AM      Troponin: No results for input(s): TROPONINI in the last 72 hours. Radiological imaging  CT ABDOMEN PELVIS W IV CONTRAST    Result Date: 10/12/2022  EXAMINATION: CTA CHEST W CONTRAST, CT ABDOMEN PELVIS W IV CONTRAST HISTORY: Reason for exam:->PE protocol and consolidation in right lung base. 44-year-old female. Ascites, weakness. COMPARISON: Portable chest 10/11/2022, CT abdomen and pelvis 11/4/2018, CT chest 12/29/2018. TECHNIQUE: CT angiography of the pulmonary arteries following the administration of intravenous contrast. Coronal and sagittal MIP (maximum intensity projection) images were performed. Dose reduction techniques were achieved by using automated exposure control and/or adjustment of mA and/or kV according to patient size and/or use of iterative reconstruction technique. FINDINGS: PERTINENT POSITIVES: Bilateral pleural effusions, moderate on the right. Small on the left. Consolidation with air bronchograms mainly right lower lobe and a small amount in the right middle lobe. Interstitial pulmonary edema. Cardiomegaly. Moderate ascites in the abdomen and pelvis. Anasarca in the soft tissues. PERTINENT NEGATIVES: Negative for pulmonary embolus. No unexpected mass. COINCIDENTAL FINDINGS: Scattered reactive nodes in the mediastinum. Previous sternotomy. Mitral valve prosthesis. Pacemaker. Coronary artery calcifications. Stable small right adrenal adenoma. Hysterectomy. Plaque aorta, without aneurysm. Atrophic left kidney, new. ROUTINE EXAMINATION: No pericardial effusion. Normal liver, spleen, pancreas, left adrenal. No bowel obstruction or free air. Bladder is empty. Degenerative changes in the spine. 1. Negative for pulmonary embolus. 2. Mainly right lower lobe consolidation which could represent atelectasis or pneumonia. 3. Bilateral pleural effusions, right greater than left. Mild pulmonary edema. 4. Moderate ascites. 5. Anasarca. XR CHEST PORTABLE    Result Date: 10/13/2022  EXAM: XR CHEST PORTABLE HISTORY: Reason for exam:->increased SOB, decreased pulse ox COMPARISON: October 11, 2022-chest plain film; October 12, 2022-CT chest, abdomen and pelvis. TECHNIQUE: A single AP semiupright view of the chest FINDINGS: There is increased perihilar airspace disease and prominence of the pulmonary vascular markings, accentuated by lower lung volumes. There is medial consolidation within the right infrahilar region, with elevation of the right hemidiaphragm. The heart is enlarged, with postoperative changes from a median sternotomy and with a left chest wall AICD in place. Calcification noted within the region of the right carotid artery. The osseous structures are stable. 1. Cardiomegaly with evidence of worsening pulmonary edema. 2. Medial consolidation within the right infrahilar region, atelectasis versus pneumonia. 3. Residual pleural fluid is likely obscured by the hemidiaphragms, for which that on the right is elevated. XR CHEST PORTABLE    Result Date: 10/11/2022  EXAMINATION: XR CHEST PORTABLE, , 10/11/2022 9:48 PM EDT INDICATION: Reason for exam:->SOB HISTORY: Ordering Provider Reason for Exam: Technologist Note: Additional: COMPARISON: Chest x-ray dated 11/4/2018. TECHNIQUE: Chest x-ray: One view. FINDINGS: Stable enlarged cardiac silhouette is seen. Cardiac pacemaker is seen in place. Patient is postmedian sternotomy. Small right pleural effusion and/or right lung base atelectasis/consolidation is seen. Small left pleural effusion cannot be excluded. No obvious pneumothorax is seen.      . Small right pleural effusion and/or right lung base atelectasis/consolidation is seen. Small left pleural effusion cannot be excluded. CTA CHEST W CONTRAST    Result Date: 10/12/2022  EXAMINATION: CTA CHEST W CONTRAST, CT ABDOMEN PELVIS W IV CONTRAST HISTORY: Reason for exam:->PE protocol and consolidation in right lung base. 50-year-old female. Ascites, weakness. COMPARISON: Portable chest 10/11/2022, CT abdomen and pelvis 11/4/2018, CT chest 12/29/2018. TECHNIQUE: CT angiography of the pulmonary arteries following the administration of intravenous contrast. Coronal and sagittal MIP (maximum intensity projection) images were performed. Dose reduction techniques were achieved by using automated exposure control and/or adjustment of mA and/or kV according to patient size and/or use of iterative reconstruction technique. FINDINGS: PERTINENT POSITIVES: Bilateral pleural effusions, moderate on the right. Small on the left. Consolidation with air bronchograms mainly right lower lobe and a small amount in the right middle lobe. Interstitial pulmonary edema. Cardiomegaly. Moderate ascites in the abdomen and pelvis. Anasarca in the soft tissues. PERTINENT NEGATIVES: Negative for pulmonary embolus. No unexpected mass. COINCIDENTAL FINDINGS: Scattered reactive nodes in the mediastinum. Previous sternotomy. Mitral valve prosthesis. Pacemaker. Coronary artery calcifications. Stable small right adrenal adenoma. Hysterectomy. Plaque aorta, without aneurysm. Atrophic left kidney, new. ROUTINE EXAMINATION: No pericardial effusion. Normal liver, spleen, pancreas, left adrenal. No bowel obstruction or free air. Bladder is empty. Degenerative changes in the spine. 1. Negative for pulmonary embolus. 2. Mainly right lower lobe consolidation which could represent atelectasis or pneumonia. 3. Bilateral pleural effusions, right greater than left. Mild pulmonary edema. 4. Moderate ascites. 5. Anasarca.      ASSESSMENT:     Principal Problem:    CHF (congestive heart failure), NYHA class I, acute on chronic, combined ([facility] [address]  Active Problems:    Acute on chronic systolic CHF (congestive heart failure) ([facility] [address]  Resolved Problems:    * No resolved hospital problems. *    PLAN:     Neurologic:  Alert and Oriented  Neuro checks per Protocol    Respiratory:  Acute Hypoxic Respiratory Failure  On Bipap  Hx of COPD and CHF  CT PE negative at [facility]  ABG ordered and pending  May be secondary to pneumonia  Pro-calcitonin ordered and pending  Received Rocephin and Azithromycin at [facility]  Respiratory evaluate and treat  Continue Budesonide, Atrovent, and Xopenex    Cardiology:  Acute on Chronic CHF  Pneumonia may have been trigger  EF of 25% from echo on 10/11/2022  AICD in place with interrogation ordered  [employer] ICD on 06/09/2015. Hx of bypass surgery by Dr. Matt Metz on 05/28/2015, with a LIMA  to the LAD, a vein graft to the diagonal, and a vein graft to the right  coronary artery, and mitral valve repair with a 30 mm Andi-Pérez  annuloplasty ring. Hx of VF arrest with repeat catheterization on 06/04/2015, showed widely  patent bypass grafts with severe LV function. Endocrine:  Type 2 DM   HbA1c 8.1 on 10/11/2022  Patient is NPO currently as she is on BiPAP  Medium Dose Sliding Scale  Q4 Glucose checks  Hypoglycemia protocol    Renal:  CHAD on CKD Stage 3B with baseline creatinine of 1.2  Creatinine elevated at [facility] facility with repeat labs ordered and pending  Patient did receive contrast at [facility] for CT PE  Lasix held due to low blood pressure  [facility]  Strict I's and O's  Monitor urine output  Avoid Nephrotoxic medications    DVT ppx: Heparin     Disposition: Ongoing    Desmond Romeo MD  Critical Care  PGY-3, Internal Medicine Resident  [facility]  10/14/2022 8:12 AM

## 2022-10-14 NOTE — CONSULTS
Nephrology Consult Note    Reason for Consult: Elevated creatinine and decompensated CHF  Requesting Physician: Aravind Mclean   Chief Complaint: Increasing shortness of breath  History Obtained From:  patient and chart review    History of Present Illness: This is a 60 y.o. female who has a past medical history significant for   coronary artery disease with a history of coronary artery bypass surgery as well as history of cardiomyopathy status post AICD. Patient also has a history of type 2 diabetes and chronic kidney disease stage III B with a baseline creatinine of 1.5 mg/dL. She was seen by our group when she was admitted at [facility] in 2018. Her serum protein electrophoresis was negative. She had negative ANCA titers anti-GBM as well as complement levels. Her rheumatoid factor was positive. Her renal ultrasound shows  Parenchymal thinning of both kidneys can relate to atrophy/medical renal   disease. No hydronephrosis of either kidney. Patient never follow-up with nephrology. According to patient she was at home and taking all her medication as prescribed. She ran out of her diuretic at least more than 2 months ago. She used to take 40 mg of Lasix twice a day. Patient states that she started gaining weight and as per her estimation in last 1 months she gained close to 30 pounds. She presented to hospital because of increasing shortness of breath. Her shortness of breath was gradually increases over the period of time and at the time of presentation to hospital she was short of breath at rest.  Her chest x-ray was suggestive of pulmonary congestion. Patient was admitted for further management. It was also noted her creatinine was 1.9 while her baseline is 1.5 mg/dL and nephrology was consulted for the management of chronic kidney disease.   Patient denies any consumption of over-the-counter herbal or natural medication  Patient denies any nausea vomiting or diarrhea  Patient denies any chest pain  Does have a history of kidney stones. Denies any recent changes in her diet.     Past Medical History:        Diagnosis Date    CAD (coronary artery disease)     Cardiomyopathy ([facility]     COPD (chronic obstructive pulmonary disease) ([facility]     Depression     Diabetes mellitus ([facility]     H/O mitral valve repair     History of fractured kneecap 2017    right    Hx of CABG     Hx of myocardial infarction     Hyperlipidemia     Hypertension     ICD (implantable cardioverter-defibrillator) in place     Kidney calculi     Osteoarthritis     Pneumonia     Renal calculi        Past Surgical History:        Procedure Laterality Date    ANKLE SURGERY Right 2009    plate and screws     CARDIAC DEFIBRILLATOR PLACEMENT  06/01/15    CHOLECYSTECTOMY  1990    CORONARY ARTERY BYPASS GRAFT  05/28/15    X 3- Dr Matt Metz LIMA-LAD<SVG-Diag, SVG-PDA, MV Repair with 30 min CE IMR ring    CYSTOSCOPY Left 11/08/2018    HYSTERECTOMY (CERVIX STATUS UNKNOWN)  1990    KIDNEY STONE SURGERY Left 2010    MITRAL VALVE SURGERY  6/4/15    VT CYSTO/URETERO W/LITHOTRIPSY &[place] STENT INSRT Left 11/8/2018    CYSTOSCOPY URETEROSCOPY performed by Griselda Jetty, MD at [address] &[place] [address] Left 11/8/2018    CYSTOSCOPY STENT INSERTION performed by Griselda Jetty, MD at [address] &[place] [address] [place] Left 11/15/2018    LEFT CYSTOSCOPY URETEROSCOPY LASER,HLL, LEFT STENT EXCHANGE performed by Griselda Jetty, MD at [address] [facility]         Current Medications:    sodium chloride flush 0.9 % injection 5-40 mL, 2 times per day  sodium chloride flush 0.9 % injection 5-40 mL, PRN  0.9 % sodium chloride infusion, PRN  ondansetron (ZOFRAN-ODT) disintegrating tablet 4 mg, Q8H PRN   Or  ondansetron (ZOFRAN) injection 4 mg, Q6H PRN  polyethylene glycol (GLYCOLAX) packet 17 g, Daily PRN  acetaminophen (TYLENOL) tablet 650 mg, Q6H PRN Or  acetaminophen (TYLENOL) suppository 650 mg, Q6H PRN  heparin (porcine) injection 5,000 Units, 3 times per day  ipratropium (ATROVENT) 0.02 % nebulizer solution 0.5 mg, 4x daily  levalbuterol (XOPENEX) nebulization 0.63 mg, Q6H PRN  insulin lispro (HUMALOG) injection vial 0-8 Units, Q4H  glucose chewable tablet 16 g, PRN  dextrose bolus 10% 125 mL, PRN   Or  dextrose bolus 10% 250 mL, PRN  glucagon (rDNA) injection 1 mg, PRN  dextrose 10 % infusion, Continuous PRN  cefTRIAXone (ROCEPHIN) 1,000 mg in sterile water 10 mL IV syringe, Q24H  azithromycin (ZITHROMAX) 250 mg in dextrose 5 % 250 mL IVPB, Q24H  furosemide (LASIX) 100 mg in dextrose 5 % 100 mL infusion, Continuous  midodrine (PROAMATINE) tablet 5 mg, BID WC  aspirin chewable tablet 81 mg, Daily        Allergies:  Codeine and Nsaids    Social History:   Social History     Socioeconomic History    Marital status:      Spouse name: Not on file    Number of children: Not on file    Years of education: Not on file    Highest education level: Not on file   Occupational History    Not on file   Tobacco Use    Smoking status: Every Day     Packs/day: 0.50     Years: 32.00     Pack years: 16.00     Types: Cigarettes     Last attempt to quit: 12/25/2018     Years since quitting: 3.8    Smokeless tobacco: Never   Vaping Use    Vaping Use: Never used   Substance and Sexual Activity    Alcohol use: No     Alcohol/week: 0.0 standard drinks    Drug use: No    Sexual activity: Not on file   Other Topics Concern    Not on file   Social History Narrative    Not on file     Social Determinants of Health     Financial Resource Strain: Low Risk     Difficulty of Paying Living Expenses: Not hard at all   Food Insecurity: No Food Insecurity    Worried About Running Out of Food in the Last Year: Never true    Ran Out of Food in the Last Year: Never true   Transportation Needs: Not on file   Physical Activity: Not on file   Stress: Not on file   Social Connections: Not on file   Intimate Partner Violence: Not on file   Housing Stability: Not on file       Family History:   Family History   Problem Relation Age of Onset    Stroke Father 76    Other Father         low BS    Diabetes Mother        Review of Systems:    Constitutional: No fever or chills   HEENT:  Headache or sore throat. Cardiac:  No chest pain   chest:   Feeling of shortness of breath and orthopnea even at rest   abdomen:  No abdominal pain, nausea or vomiting. Skin:   No rashes, no itching. :   No hematuria, no pyuria, no dysuria, no flank pain. Extremities:  Increasing leg swelling    Objective:  CURRENT TEMPERATURE:  Temp: 97.7 °F (36.5 °C)  MAXIMUM TEMPERATURE OVER 24HRS:  Temp (24hrs), Av.5 °F (36.4 °C), Min:97 °F (36.1 °C), Max:97.9 °F (36.6 °C)    CURRENT RESPIRATORY RATE:  Resp: 16  CURRENT PULSE:  Heart Rate: 91  CURRENT BLOOD PRESSURE:  BP: 107/81  24HR BLOOD PRESSURE RANGE:  Systolic (26EQK), WRE:44 , Min:83 , LY   ; Diastolic (19PXX), YUS:66, Min:62, Max:86    24HR INTAKE/OUTPUT:    Intake/Output Summary (Last 24 hours) at 10/14/2022 1437  Last data filed at 10/14/2022 1430  Gross per 24 hour   Intake 270 ml   Output 395 ml   Net -125 ml     Patient Vitals for the past 96 hrs (Last 3 readings):   Weight   10/14/22 0430 185 lb 13.6 oz (84.3 kg)     Physical Exam:  General appearance: Patient was awake and alert. She was short of breath while talking.   Skin: Neck was supple and no JVD   eyes: conjunctivae normal and sclera anicteric  ENT: :no thrush no pharyngeal congestion    Neck: No carotid bruit or thyromegaly   pulmonary: Rales at the bases   cardiovascular: S1 and S2 audible no S3  Abdomen: soft nontender, bowel sounds present, no organomegaly,  no ascites  Extremities: 1+ edema  Labs:   CBC:  Recent Labs     10/11/22  2130 10/13/22  0432 10/13/22  1222   WBC 8.0 11.1* 10.2   RBC 3.63* 3.74* 3.88*   HGB 10.0* 10.1* 10.5*   HCT 30.7* 32.1* 33.4*   MCV 84.8 85.8 86.0   MCH 27.7 26.9 27.1 MCHC 32.7 31.3 31.5   RDW 18.5* 19.0* 18.8*    336 266      BMP:   Recent Labs     10/11/22  2130 10/13/22  0432 10/14/22  0700    140 139   K 4.4 5.0 5.1   * 106 107   CO2 17* 22 15*   BUN 24* 29* 34*   CREATININE 1.19* 1.79* 1.89*   GLUCOSE 251* 40* 86   CALCIUM 8.4* 8.6 8.3*      Recent Labs     10/14/22  0700   MG 2.3     Albumin:   Recent Labs     10/11/22  2130   LABALBU 3.7       IRON:    Lab Results   Component Value Date/Time    IRON 62 08/30/2018 04:39 AM     Iron Saturation:  No components found for: PERCENTFE  TIBC:    Lab Results   Component Value Date/Time    TIBC 210 08/30/2018 04:39 AM     FERRITIN:    Lab Results   Component Value Date/Time    FERRITIN 862 08/30/2018 04:39 AM     SPEP:   Lab Results   Component Value Date/Time    PROT 7.1 10/11/2022 09:30 PM    ALBCAL 4.1 08/23/2018 04:33 AM    ALBPCT 62 08/23/2018 04:33 AM    LABALPH 0.2 08/23/2018 04:33 AM    LABALPH 0.7 08/23/2018 04:33 AM    A1PCT 3 08/23/2018 04:33 AM    A2PCT 10 08/23/2018 04:33 AM    LABBETA 0.9 08/23/2018 04:33 AM    BETAPCT 13 08/23/2018 04:33 AM    GAMGLOB 0.8 08/23/2018 04:33 AM    GGPCT 12 08/23/2018 04:33 AM    PATH ELECTRONICALLY SIGNED.  Ruben Epstein M.D. 08/27/2018 03:15 PM     UPEP:   Lab Results   Component Value Date/Time    TPU 85 08/29/2018 04:32 PM        C3:   Lab Results   Component Value Date    C3 111 08/27/2018     C4:   Lab Results   Component Value Date    C4 35 08/27/2018     MPO ANCA:    Lab Results   Component Value Date/Time    MPO 11 08/26/2018 10:16 AM    .  PR3 ANCA:    Lab Results   Component Value Date/Time    PR3 15 08/26/2018 10:16 AM   Urine Creatinine:    Lab Results   Component Value Date/Time    LABCREA 140.9 07/15/2022 09:49 AM   Urine Protein:    Lab Results   Component Value Date/Time    TPU 85 08/29/2018 04:32 PM     Urinalysis:  U/A:   Lab Results   Component Value Date/Time    NITRU POSITIVE 10/11/2022 11:39 PM    COLORU Yellow 10/11/2022 11:39 PM    PHUR 5.0 10/11/2022 11:39 PM    WBCUA NONE SEEN 10/11/2022 11:39 PM    RBCUA 2 TO 5 10/11/2022 11:39 PM    MUCUS NOT REPORTED 11/21/2018 12:12 PM    TRICHOMONAS NOT REPORTED 11/21/2018 12:12 PM    YEAST NOT REPORTED 11/21/2018 12:12 PM    BACTERIA 3+ 10/11/2022 11:39 PM    SPECGRAV 1.025 10/11/2022 11:39 PM    LEUKOCYTESUR NEGATIVE 10/11/2022 11:39 PM    UROBILINOGEN Normal 10/11/2022 11:39 PM    BILIRUBINUR NEGATIVE 10/11/2022 11:39 PM    GLUCOSEU 50 mg/dL 10/11/2022 11:39 PM    KETUA NEGATIVE 10/11/2022 11:39 PM    AMORPHOUS TRACE 11/21/2018 12:12 PM         Radiology:  Reviewed as available. Assessment:  1. Acute kidney injury most likely due to decompensated CHF other possibility includes progression of underlying kidney disease. 2.  Stage IIIb chronic kidney disease with baseline creatinine of 1.5 mg/dL and an estimated GFR of 40 mill per minute. 3.  Low serum bicarb due to metabolic acidosis versus respiratory alkalosis  4. Longstanding type 2 diabetes  4. Cardiomyopathy status post AICD placement  5. Decompensated congestive cardiac failure most likely due to not taking diuretics and gained 30 pounds in last 2 weeks. 6.  Anemia of chronic disease      Plan:  1. Will Check Renal Ultrasound to r/o element of obstruction and to assess the kidney size/echotexture. 2.  We will also check serum for protein electrophoresis, free light chain as well as a spot urine for protein and creatinine  3. Agree with furosemide infusion. Keep urine output in between 100 225 mL/h for the next 24 hours  4. Monitor serum electrolytes closely  5. We will follow with    Thank you for the consultation. Please do not hesitate to call with questions.     Electronically signed by Dilcia Ann MD on 10/14/2022 at 2:37 PM

## 2022-10-14 NOTE — CARE COORDINATION
10/14/22 1201   Service Assessment   Patient Orientation Alert and Oriented   Cognition Alert   History Provided By Patient   Primary Caregiver Self   Support Systems Spouse/Significant Other;Family Members   Patient's Healthcare Decision Maker is: Legal Next of Ahmet Finley   PCP Verified by CM Yes  Charis Abebe MD)   Last Visit to PCP Within last 3 months   Prior Functional Level Independent in ADLs/IADLs   Current Functional Level Independent in ADLs/IADLs   Can patient return to prior living arrangement Yes   Ability to make needs known: Good   Financial Resources Medicaid  ([place] [place] [employer])   Social/Functional History   Lives With Spouse  (4 grandchildren)   Type of Home House  (bedroom and bathroom on main level)   Home Layout Two level   [facility] [address] to enter with rails   Entrance Stairs - Number of Steps 3   Entrance Stairs - Rails Both   Bathroom Shower/Tub Tub/Shower unit   [address]   Homemaking Assistance Independent   Homemaking Responsibilities Yes   Transfer Assistance Independent   Active  No   Patient's  Info    Mode of Transportation Car   Occupation Unemployed   Type of Occupation [address]   Discharge Planning   Type of [facility] Spouse/Significant Other;Family Members   Current Services Prior To Admission [facility]   Current DME Prior to [address] [facility] Medications No   Patient expects to be discharged to: House   History of falls? 0   Services At/After Discharge   [employer] Information Provided? No   Mode of Transport at Discharge Other (see comment)  ( will transport)   Condition of Participation: Discharge Planning   The Plan for Transition of Care is related to the following treatment goals: Heated Hi-flow FiO2 50%, 25L, needs PM interrogation.  Home independently,  will transport, has established PCP   The Patient and/or Patient Representative was provided with a Choice of Provider? Patient   The Patient and/Or Patient Representative agree with the Discharge Plan? Yes   Freedom of Choice list was provided with basic dialogue that supports the patient's individualized plan of care/goals, treatment preferences, and shares the quality data associated with the providers? (Pt declines HC at this time.  will provide list if needed)   Pt verified demographics

## 2022-10-15 ENCOUNTER — APPOINTMENT (OUTPATIENT)
Dept: ULTRASOUND IMAGING | Age: 62
DRG: 194 | End: 2022-10-15
Attending: INTERNAL MEDICINE
Payer: MEDICARE

## 2022-10-15 LAB
ABSOLUTE EOS #: 0.04 K/UL (ref 0–0.44)
ABSOLUTE IMMATURE GRANULOCYTE: 0.04 K/UL (ref 0–0.3)
ABSOLUTE LYMPH #: 2.18 K/UL (ref 1.1–3.7)
ABSOLUTE MONO #: 0.8 K/UL (ref 0.1–1.2)
ANION GAP SERPL CALCULATED.3IONS-SCNC: 13 MMOL/L (ref 9–17)
BASOPHILS # BLD: 0 % (ref 0–2)
BASOPHILS ABSOLUTE: 0.03 K/UL (ref 0–0.2)
BUN BLDV-MCNC: 33 MG/DL (ref 8–23)
CALCIUM SERPL-MCNC: 8.1 MG/DL (ref 8.6–10.4)
CHLORIDE BLD-SCNC: 101 MMOL/L (ref 98–107)
CO2: 20 MMOL/L (ref 20–31)
CREAT SERPL-MCNC: 1.71 MG/DL (ref 0.5–0.9)
EKG ATRIAL RATE: 88 BPM
EKG P AXIS: 51 DEGREES
EKG P-R INTERVAL: 150 MS
EKG Q-T INTERVAL: 392 MS
EKG QRS DURATION: 102 MS
EKG QTC CALCULATION (BAZETT): 474 MS
EKG R AXIS: -98 DEGREES
EKG T AXIS: 78 DEGREES
EKG VENTRICULAR RATE: 88 BPM
EOSINOPHILS RELATIVE PERCENT: 0 % (ref 1–4)
GFR SERPL CREATININE-BSD FRML MDRD: 33 ML/MIN/1.73M2
GLUCOSE BLD-MCNC: 106 MG/DL (ref 65–105)
GLUCOSE BLD-MCNC: 159 MG/DL (ref 65–105)
GLUCOSE BLD-MCNC: 162 MG/DL (ref 65–105)
GLUCOSE BLD-MCNC: 185 MG/DL (ref 65–105)
GLUCOSE BLD-MCNC: 197 MG/DL (ref 65–105)
GLUCOSE BLD-MCNC: 72 MG/DL (ref 65–105)
GLUCOSE BLD-MCNC: 74 MG/DL (ref 70–99)
HCT VFR BLD CALC: 31.8 % (ref 36.3–47.1)
HEMOGLOBIN: 9.4 G/DL (ref 11.9–15.1)
IMMATURE GRANULOCYTES: 0 %
LYMPHOCYTES # BLD: 23 % (ref 24–43)
MCH RBC QN AUTO: 26.9 PG (ref 25.2–33.5)
MCHC RBC AUTO-ENTMCNC: 29.6 G/DL (ref 28.4–34.8)
MCV RBC AUTO: 90.9 FL (ref 82.6–102.9)
MONOCYTES # BLD: 9 % (ref 3–12)
NRBC AUTOMATED: 0 PER 100 WBC
PDW BLD-RTO: 17.4 % (ref 11.8–14.4)
PLATELET # BLD: 263 K/UL (ref 138–453)
PMV BLD AUTO: 11.2 FL (ref 8.1–13.5)
POTASSIUM SERPL-SCNC: 4.4 MMOL/L (ref 3.7–5.3)
RBC # BLD: 3.5 M/UL (ref 3.95–5.11)
RBC # BLD: ABNORMAL 10*6/UL
SEG NEUTROPHILS: 68 % (ref 36–65)
SEGMENTED NEUTROPHILS ABSOLUTE COUNT: 6.24 K/UL (ref 1.5–8.1)
SODIUM BLD-SCNC: 134 MMOL/L (ref 135–144)
WBC # BLD: 9.3 K/UL (ref 3.5–11.3)

## 2022-10-15 PROCEDURE — 80048 BASIC METABOLIC PNL TOTAL CA: CPT

## 2022-10-15 PROCEDURE — 2580000003 HC RX 258: Performed by: INTERNAL MEDICINE

## 2022-10-15 PROCEDURE — 99232 SBSQ HOSP IP/OBS MODERATE 35: CPT | Performed by: INTERNAL MEDICINE

## 2022-10-15 PROCEDURE — 85025 COMPLETE CBC W/AUTO DIFF WBC: CPT

## 2022-10-15 PROCEDURE — 99291 CRITICAL CARE FIRST HOUR: CPT | Performed by: INTERNAL MEDICINE

## 2022-10-15 PROCEDURE — 6370000000 HC RX 637 (ALT 250 FOR IP)

## 2022-10-15 PROCEDURE — 2580000003 HC RX 258: Performed by: STUDENT IN AN ORGANIZED HEALTH CARE EDUCATION/TRAINING PROGRAM

## 2022-10-15 PROCEDURE — 6360000002 HC RX W HCPCS: Performed by: INTERNAL MEDICINE

## 2022-10-15 PROCEDURE — 93010 ELECTROCARDIOGRAM REPORT: CPT | Performed by: INTERNAL MEDICINE

## 2022-10-15 PROCEDURE — 6360000002 HC RX W HCPCS: Performed by: STUDENT IN AN ORGANIZED HEALTH CARE EDUCATION/TRAINING PROGRAM

## 2022-10-15 PROCEDURE — 2000000000 HC ICU R&B

## 2022-10-15 PROCEDURE — 2500000003 HC RX 250 WO HCPCS: Performed by: STUDENT IN AN ORGANIZED HEALTH CARE EDUCATION/TRAINING PROGRAM

## 2022-10-15 PROCEDURE — 36415 COLL VENOUS BLD VENIPUNCTURE: CPT

## 2022-10-15 PROCEDURE — 76770 US EXAM ABDO BACK WALL COMP: CPT

## 2022-10-15 PROCEDURE — 94640 AIRWAY INHALATION TREATMENT: CPT

## 2022-10-15 PROCEDURE — 94761 N-INVAS EAR/PLS OXIMETRY MLT: CPT

## 2022-10-15 PROCEDURE — 2700000000 HC OXYGEN THERAPY PER DAY

## 2022-10-15 PROCEDURE — 6370000000 HC RX 637 (ALT 250 FOR IP): Performed by: STUDENT IN AN ORGANIZED HEALTH CARE EDUCATION/TRAINING PROGRAM

## 2022-10-15 PROCEDURE — 82947 ASSAY GLUCOSE BLOOD QUANT: CPT

## 2022-10-15 RX ORDER — ALPRAZOLAM 0.25 MG/1
0.25 TABLET ORAL EVERY 8 HOURS PRN
Status: DISCONTINUED | OUTPATIENT
Start: 2022-10-15 | End: 2022-10-15

## 2022-10-15 RX ORDER — FUROSEMIDE 10 MG/ML
40 INJECTION INTRAMUSCULAR; INTRAVENOUS 3 TIMES DAILY
Status: DISCONTINUED | OUTPATIENT
Start: 2022-10-16 | End: 2022-10-17

## 2022-10-15 RX ADMIN — IPRATROPIUM BROMIDE 0.5 MG: 0.5 SOLUTION RESPIRATORY (INHALATION) at 16:18

## 2022-10-15 RX ADMIN — IPRATROPIUM BROMIDE 0.5 MG: 0.5 SOLUTION RESPIRATORY (INHALATION) at 20:36

## 2022-10-15 RX ADMIN — HEPARIN SODIUM 5000 UNITS: 5000 INJECTION INTRAVENOUS; SUBCUTANEOUS at 13:56

## 2022-10-15 RX ADMIN — AZITHROMYCIN MONOHYDRATE 250 MG: 500 INJECTION, POWDER, LYOPHILIZED, FOR SOLUTION INTRAVENOUS at 10:49

## 2022-10-15 RX ADMIN — SODIUM CHLORIDE, PRESERVATIVE FREE 10 ML: 5 INJECTION INTRAVENOUS at 09:04

## 2022-10-15 RX ADMIN — ASPIRIN 81 MG: 81 TABLET, CHEWABLE ORAL at 09:04

## 2022-10-15 RX ADMIN — IPRATROPIUM BROMIDE 0.5 MG: 0.5 SOLUTION RESPIRATORY (INHALATION) at 12:00

## 2022-10-15 RX ADMIN — MIDODRINE HYDROCHLORIDE 5 MG: 5 TABLET ORAL at 15:59

## 2022-10-15 RX ADMIN — HEPARIN SODIUM 5000 UNITS: 5000 INJECTION INTRAVENOUS; SUBCUTANEOUS at 21:00

## 2022-10-15 RX ADMIN — HEPARIN SODIUM 5000 UNITS: 5000 INJECTION INTRAVENOUS; SUBCUTANEOUS at 06:33

## 2022-10-15 RX ADMIN — MIDODRINE HYDROCHLORIDE 5 MG: 5 TABLET ORAL at 09:04

## 2022-10-15 RX ADMIN — CEFTRIAXONE SODIUM 1000 MG: 10 INJECTION, POWDER, FOR SOLUTION INTRAVENOUS at 10:50

## 2022-10-15 RX ADMIN — SODIUM CHLORIDE, PRESERVATIVE FREE 10 ML: 5 INJECTION INTRAVENOUS at 19:56

## 2022-10-15 RX ADMIN — IPRATROPIUM BROMIDE 0.5 MG: 0.5 SOLUTION RESPIRATORY (INHALATION) at 08:44

## 2022-10-15 RX ADMIN — FUROSEMIDE 8 MG/HR: 10 INJECTION INTRAMUSCULAR; INTRAVENOUS at 10:53

## 2022-10-15 NOTE — PLAN OF CARE
Problem: Discharge Planning  Goal: Discharge to home or other facility with appropriate resources  10/14/2022 2016 by Bhupendra Griffin RN  Outcome: Progressing  10/14/2022 1039 by Cora Gomez RN  Outcome: Progressing     Problem: Safety - Adult  Goal: Free from fall injury  10/14/2022 2016 by Bhupendra Griffin RN  Outcome: Progressing  10/14/2022 1039 by Cora Gomez RN  Outcome: Progressing  10/14/2022 0618 by Bhupendra Griffin RN  Outcome: Progressing     Problem: Chronic Conditions and Co-morbidities  Goal: Patient's chronic conditions and co-morbidity symptoms are monitored and maintained or improved  10/14/2022 2016 by Bhupendra Griffin RN  Outcome: Progressing  10/14/2022 1039 by Cora Gomez RN  Outcome: Progressing

## 2022-10-15 NOTE — PLAN OF CARE
Problem: Respiratory - Adult  Goal: Achieves optimal ventilation and oxygenation  Outcome: Progressing  Flowsheets (Taken 10/14/2022 0409)  Achieves optimal ventilation and oxygenation:   Assess for changes in respiratory status   Respiratory therapy support as indicated   Assess for changes in mentation and behavior   Oxygen supplementation based on oxygen saturation or arterial blood gases   Encourage broncho-pulmonary hygiene including cough, deep breathe, incentive spirometry   Assess and instruct to report shortness of breath or any respiratory difficulty

## 2022-10-15 NOTE — PROGRESS NOTES
Attestation signed by      Attending Physician Statement:    I have discussed the care of  Libby Bush , including pertinent history and exam findings, with the Cardiology fellow/resident. I have seen and examined the patient and the key elements of all parts of the encounter have been performed by me. I agree with the assessment, plan and orders as documented by the fellow/resident, after I modified exam findings and plan of treatments, and the final version is my approved version of the assessment. Additional Comments: Patient was seen and examined agree with the below evaluation and plan. Severe ischemic cardiomyopathy with history of coronary artery disease s/p CABG. Presented with shortness of breath. Currently on IV diuresis. Close follow-up on intake output and chart and basic metabolic profile. Being also followed by nephrology. Ejection fraction is 25% and Dr. Dung Allen was reviewed. [facility]   Progress Note                 Patient's name:  Libby Bush  Medical Record Number: [id number]  Patient's account/billing number: [de-identified]  Patient's YOB: 1960  Age: 60 y.o. Date of Admission: 10/14/2022  4:20 AM  Date of History and Physical Examination: 10/15/2022  Primary Care Physician: Brandon Gordillo MD    Code Status: Full Code    CHIEF COMPLAINT:    No chief complaint on file. ADMISSION DIAGNOSIS:   CHF (congestive heart failure), NYHA class I, acute on chronic, combined ([facility] [address]    REASON FOR CONSULT :   Acute on chronic chf    SUBJECTIVE:      Pt seen and Chart reviewed.    No issue overnight.   -denies cp, sitting up in bed  -on lasix-gtt  -hfnc 40% fio2-sats 95%  -vss off pressors  -uop about 3 l/24h  -no new labs this am  Temp (24hrs), Av.9 °F (36.6 °C), Min:97.7 °F (36.5 °C), Max:98 °F (96.0 °C)    Systolic (38JAY), URQ:24 , Min:86 , HJU:331     Diastolic (96ZEC), MAN:75, Min:62, Max:78        Review of Systems -  General ROS: negative for - chills, fatigue, fever or weight loss  ENT ROS: negative for - headaches, oral lesions or sore throat  Cardiovascular ROS: no chest pain or dyspnea on exertion  Gastrointestinal ROS: no abdominal pain, nausea, change in bowel habits, or black or bloody stools  Skin - no rash   msk - no jt tenderness or swelling       MIDICATION:    Scheduled Meds:   sodium chloride flush  5-40 mL IntraVENous 2 times per day    heparin (porcine)  5,000 Units SubCUTAneous 3 times per day    ipratropium  0.5 mg Nebulization 4x daily    insulin lispro  0-8 Units SubCUTAneous Q4H    cefTRIAXone (ROCEPHIN) IV  1,000 mg IntraVENous Q24H    azithromycin  250 mg IntraVENous Q24H    midodrine  5 mg Oral BID WC    aspirin  81 mg Oral Daily     Continuous Infusions:   sodium chloride      dextrose      furosemide (LASIX) 1mg/ml infusion 8 mg/hr (10/15/22 0620)       PHYSICAL EXAM:    Physical Examination:    Temperature:  Temp: 98 °F (36.7 °C)  Respirations:  Resp: 13  Pulse:   Heart Rate: 86  BP:    BP: 109/71    Constitutional and General Appearance: on hfnc  HEENT: PERRL, no cervical lymphadenopathy. No masses palpable. Normal oral mucosa  Respiratory: On hfnc-decreased air entry b/l anterior lung fields  Cardiovascular: The apical impulse is not displaced  Heart auscultation: Heart tones are crisp and normal. regular S1 and S2.  Jugular venous pulsation Normal  The carotid upstroke is normal in amplitude and contour without delay or bruit  Peripheral pulses are symmetrical and full   Abdomen:  No masses or tenderness  Bowel sounds present  Extremities:   No Cyanosis or Clubbing   Lower extremity edema: +   Skin: Warm and dry  Neurological:  Alert and oriented.   Moves all extremities well  No abnormalities of mood, affect, memory, mentation, or behavior are noted    DATA:    Labs:   CBC:   Recent Labs     10/13/22  0432 10/13/22  1222   WBC 11.1* 10.2   HGB 10.1* 10.5*   HCT 32.1* 33.4*    266     BMP: Recent Labs     10/14/22  0700 10/14/22  1829    135   K 5.1 4.6   CO2 15* 20   BUN 34* 33*   CREATININE 1.89* 1.76*   LABGLOM 29* 32*   GLUCOSE 86 108*     BNP: No results for input(s): BNP in the last 72 hours. PT/INR: No results for input(s): PROTIME, INR in the last 72 hours. APTT:No results for input(s): APTT in the last 72 hours. CARDIAC ENZYMES:No results for input(s): CKTOTAL, CKMB, CKMBINDEX, TROPONINI in the last 72 hours. FASTING LIPID PANEL:  Lab Results   Component Value Date/Time    HDL 25 07/15/2022 09:50 AM    LDLDIRECT 70 07/15/2022 09:50 AM    TRIG 724 07/15/2022 09:50 AM     LIVER PROFILE:No results for input(s): AST, ALT, LABALBU in the last 72 hours. Imaging  XR CHEST (SINGLE VIEW FRONTAL)    Result Date: 10/14/2022  EXAMINATION: ONE XRAY VIEW OF THE CHEST 10/14/2022 7:37 am COMPARISON: Chest x-ray 13 October 2022 HISTORY: ORDERING SYSTEM PROVIDED HISTORY: Pulmonary Edema TECHNOLOGIST PROVIDED HISTORY: Pulmonary Edema Reason for Exam: pulmonary edema port upr at 640am FINDINGS: 2 chamber cardiac device overlies the chest wall. Valve replacement and median sternotomy are present. The lungs are hypoinflated. There is central venous congestion with markedly worst right base opacities. Left base is stable. No pneumothorax. Stable left lung. Markedly worse right basilar opacifications likely representing effusion with consolidation and or atelectasis. XR FOOT LEFT (MIN 3 VIEWS)    Result Date: 10/14/2022  EXAMINATION: THREE XRAY VIEWS OF THE LEFT FOOT 10/14/2022 10:34 am COMPARISON: None. HISTORY: ORDERING SYSTEM PROVIDED HISTORY: R/o osteo TECHNOLOGIST PROVIDED HISTORY: R/o osteo Reason for Exam: R/o osteo FINDINGS: No fracture, dislocation or bone destruction. Joint spaces maintained. No soft tissue swelling. Metallic clip in soft tissues medial to distal tibia. Large plantar calcaneal spur. Dorsal calcaneal enthesophyte.      No radiographic evidence of osteomyelitis. CT ABDOMEN PELVIS W IV CONTRAST    Result Date: 10/12/2022  EXAMINATION: CTA CHEST W CONTRAST, CT ABDOMEN PELVIS W IV CONTRAST HISTORY: Reason for exam:->PE protocol and consolidation in right lung base. 76-year-old female. Ascites, weakness. COMPARISON: Portable chest 10/11/2022, CT abdomen and pelvis 11/4/2018, CT chest 12/29/2018. TECHNIQUE: CT angiography of the pulmonary arteries following the administration of intravenous contrast. Coronal and sagittal MIP (maximum intensity projection) images were performed. Dose reduction techniques were achieved by using automated exposure control and/or adjustment of mA and/or kV according to patient size and/or use of iterative reconstruction technique. FINDINGS: PERTINENT POSITIVES: Bilateral pleural effusions, moderate on the right. Small on the left. Consolidation with air bronchograms mainly right lower lobe and a small amount in the right middle lobe. Interstitial pulmonary edema. Cardiomegaly. Moderate ascites in the abdomen and pelvis. Anasarca in the soft tissues. PERTINENT NEGATIVES: Negative for pulmonary embolus. No unexpected mass. COINCIDENTAL FINDINGS: Scattered reactive nodes in the mediastinum. Previous sternotomy. Mitral valve prosthesis. Pacemaker. Coronary artery calcifications. Stable small right adrenal adenoma. Hysterectomy. Plaque aorta, without aneurysm. Atrophic left kidney, new. ROUTINE EXAMINATION: No pericardial effusion. Normal liver, spleen, pancreas, left adrenal. No bowel obstruction or free air. Bladder is empty. Degenerative changes in the spine. 1. Negative for pulmonary embolus. 2. Mainly right lower lobe consolidation which could represent atelectasis or pneumonia. 3. Bilateral pleural effusions, right greater than left. Mild pulmonary edema. 4. Moderate ascites. 5. Anasarca.      XR CHEST PORTABLE    Result Date: 10/13/2022  EXAM: XR CHEST PORTABLE HISTORY: Reason for exam:->increased SOB, decreased pulse ox COMPARISON: October 11, 2022-chest plain film; October 12, 2022-CT chest, abdomen and pelvis. TECHNIQUE: A single AP semiupright view of the chest FINDINGS: There is increased perihilar airspace disease and prominence of the pulmonary vascular markings, accentuated by lower lung volumes. There is medial consolidation within the right infrahilar region, with elevation of the right hemidiaphragm. The heart is enlarged, with postoperative changes from a median sternotomy and with a left chest wall AICD in place. Calcification noted within the region of the right carotid artery. The osseous structures are stable. 1. Cardiomegaly with evidence of worsening pulmonary edema. 2. Medial consolidation within the right infrahilar region, atelectasis versus pneumonia. 3. Residual pleural fluid is likely obscured by the hemidiaphragms, for which that on the right is elevated. XR CHEST PORTABLE    Result Date: 10/11/2022  EXAMINATION: XR CHEST PORTABLE, , 10/11/2022 9:48 PM EDT INDICATION: Reason for exam:->SOB HISTORY: Ordering Provider Reason for Exam: Technologist Note: Additional: COMPARISON: Chest x-ray dated 11/4/2018. TECHNIQUE: Chest x-ray: One view. FINDINGS: Stable enlarged cardiac silhouette is seen. Cardiac pacemaker is seen in place. Patient is postmedian sternotomy. Small right pleural effusion and/or right lung base atelectasis/consolidation is seen. Small left pleural effusion cannot be excluded. No obvious pneumothorax is seen. . Small right pleural effusion and/or right lung base atelectasis/consolidation is seen. Small left pleural effusion cannot be excluded. CTA CHEST W CONTRAST    Result Date: 10/12/2022  EXAMINATION: CTA CHEST W CONTRAST, CT ABDOMEN PELVIS W IV CONTRAST HISTORY: Reason for exam:->PE protocol and consolidation in right lung base. 50-year-old female. Ascites, weakness. COMPARISON: Portable chest 10/11/2022, CT abdomen and pelvis 11/4/2018, CT chest 12/29/2018.  TECHNIQUE: CT angiography of the pulmonary arteries following the administration of intravenous contrast. Coronal and sagittal MIP (maximum intensity projection) images were performed. Dose reduction techniques were achieved by using automated exposure control and/or adjustment of mA and/or kV according to patient size and/or use of iterative reconstruction technique. FINDINGS: PERTINENT POSITIVES: Bilateral pleural effusions, moderate on the right. Small on the left. Consolidation with air bronchograms mainly right lower lobe and a small amount in the right middle lobe. Interstitial pulmonary edema. Cardiomegaly. Moderate ascites in the abdomen and pelvis. Anasarca in the soft tissues. PERTINENT NEGATIVES: Negative for pulmonary embolus. No unexpected mass. COINCIDENTAL FINDINGS: Scattered reactive nodes in the mediastinum. Previous sternotomy. Mitral valve prosthesis. Pacemaker. Coronary artery calcifications. Stable small right adrenal adenoma. Hysterectomy. Plaque aorta, without aneurysm. Atrophic left kidney, new. ROUTINE EXAMINATION: No pericardial effusion. Normal liver, spleen, pancreas, left adrenal. No bowel obstruction or free air. Bladder is empty. Degenerative changes in the spine. 1. Negative for pulmonary embolus. 2. Mainly right lower lobe consolidation which could represent atelectasis or pneumonia. 3. Bilateral pleural effusions, right greater than left. Mild pulmonary edema. 4. Moderate ascites. 5. Anasarca. Diagnostics:    EKG: not available  ECHO: not obtained. -apparently 25% per hx-nothing in chart including \"care everywhere\"  Stress Test: not obtained. Cardiac Angiography: Has ICM. H/o CABG/MV REPAIR 5/28/15: LIMA-LAD, SVG-PDA, SVG-D. MV repair. Done by Dr. Vitaly Cheng. CATH 6/4/15: Patent grafts. LIMA-LAD, SVG-PDA, SVG-D. EF 30%. LVEF 25%.     IMPRESSION:    Principal Problem:    CHF (congestive heart failure), NYHA class I, acute on chronic, combined (HCC)  Active Problems:    Acute on chronic systolic CHF (congestive heart failure) ([facility] [address]  Resolved Problems:    * No resolved hospital problems. *    -acute on chronic chf exacebation-ef 25% per chart review  -h/o AICD-nonfunctional  -jolie on ckd?-unknown baseline cr-nephrology on-board  -copd exacerbation +/- PNA  -oliguria on lasix-gtt  -Has ICM. H/o CABG/MV REPAIR 5/28/15: LIMA-LAD, SVG-PDA, SVG-D. MV repair. Done by Dr. Vitaly Cheng. CATH 6/4/15: Patent grafts. LIMA-LAD, SVG-PDA, SVG-D. EF 30%. LVEF 25%. RECOMMENDATIONS:  Continue diuresis per nephrology-currently on lasix-gtt  K>4, mg>2  Continue home meds as tolerated-bb, statin, plavix. Holding acei/arb given jolie  -appears patient's primary cardiologist Dr. Ed Chaparro will f/u as OP for cath and ICD generator change after acute issues resolve-see his consult note 10/13/22  Management of other issues e.g. PNA per primary & other consultant teams    Final plan and recommendation will follow discussion with attending Cardiologist on rounds.      Lizet Davis MD  PGY-3, Department of Internal Medicine  [facility], [place], [place]

## 2022-10-15 NOTE — PROGRESS NOTES
Nephrology Progress Note    Subjective:    Patient was a new consult yesterday afternoon for elevated Cr and decompensated CHF. Patient seen and examined this morning. No acute overnight issues. She is sitting in bed head elevated, she denies any CP, or SOB but requiring HiFlo O2 40%  She is not on pressors. Urine output 3125 past 24 hours. She is on a Lasix gtt. With orders to titrate urine output between 100-225 cc/hr till this am. Current rate 8mg /hr. U/o now around 150-190/hr. Cardiology input noted. Feels less SOB    Na 134 K 4.4 Cr. 1.71 (about the same as yesterday) CO2 20      Objective:    CURRENT TEMPERATURE:  Temp: 98.2 °F (36.8 °C)  MAXIMUM TEMPERATURE OVER 24HRS:  Temp (24hrs), Av °F (36.7 °C), Min:97.7 °F (36.5 °C), Max:98.2 °F (36.8 °C)    CURRENT RESPIRATORY RATE:  Resp: 20  CURRENT PULSE:  Heart Rate: 90  CURRENT BLOOD PRESSURE:  BP: 103/73  24HR BLOOD PRESSURE RANGE:  Systolic (74NEM), XFS:348 , Min:86 , THIAGO:669   ; Diastolic (85DLX), GBR:28, Min:62, Max:81    24HR INTAKE/OUTPUT:    Intake/Output Summary (Last 24 hours) at 10/15/2022 1013  Last data filed at 10/15/2022 0905  Gross per 24 hour   Intake 1240.68 ml   Output 3475 ml   Net -2234.32 ml     Patient Vitals for the past 96 hrs (Last 3 readings):   Weight   10/15/22 0600 179 lb 14.3 oz (81.6 kg)   10/14/22 0430 185 lb 13.6 oz (84.3 kg)     Physical Exam:  General appearance: Patient was awake and alert. Sitting up in bed does not appear SOB while on HiFlo  Skin: no JVD   eyes: conjunctivae normal and sclera anicteric  ENT: :no thrush no pharyngeal congestion    Neck: No carotid bruit or thyromegaly   pulmonary: Rales at the bases.  +wheezing  cardiovascular: S1 and S2 audible no S3  Abdomen: soft nontender, +bs, no fluid wave  Extremities: 1+ edema  Labs:   CBC:  Recent Labs     10/13/22  0432 10/13/22  1222 10/15/22  0656   WBC 11.1* 10.2 9.3   RBC 3.74* 3.88* 3.50*   HGB 10.1* 10.5* 9.4*   HCT 32.1* 33.4* 31.8*   MCV 85.8 86.0 90.9   MCH 26.9 27.1 26.9   MCHC 31.3 31.5 29.6   RDW 19.0* 18.8* 17.4*    266 263   MPV  --   --  11.2      BMP:   Recent Labs     10/14/22  0700 10/14/22  1829 10/15/22  0656    135 134*   K 5.1 4.6 4.4    103 101   CO2 15* 20 20   BUN 34* 33* 33*   CREATININE 1.89* 1.76* 1.71*   GLUCOSE 86 108* 74   CALCIUM 8.3* 8.0* 8.1*      Recent Labs     10/14/22  0700 10/14/22  1829   MG 2.3 2.1     Albumin:   No results for input(s): LABALBU in the last 72 hours. IRON:    Lab Results   Component Value Date/Time    IRON 62 08/30/2018 04:39 AM     Iron Saturation:  No components found for: PERCENTFE  TIBC:    Lab Results   Component Value Date/Time    TIBC 210 08/30/2018 04:39 AM     FERRITIN:    Lab Results   Component Value Date/Time    FERRITIN 862 08/30/2018 04:39 AM     SPEP:   Lab Results   Component Value Date/Time    PROT 7.1 10/11/2022 09:30 PM    ALBCAL 4.1 08/23/2018 04:33 AM    ALBPCT 62 08/23/2018 04:33 AM    LABALPH 0.2 08/23/2018 04:33 AM    LABALPH 0.7 08/23/2018 04:33 AM    A1PCT 3 08/23/2018 04:33 AM    A2PCT 10 08/23/2018 04:33 AM    LABBETA 0.9 08/23/2018 04:33 AM    BETAPCT 13 08/23/2018 04:33 AM    GAMGLOB 0.8 08/23/2018 04:33 AM    GGPCT 12 08/23/2018 04:33 AM    PATH ELECTRONICALLY SIGNED.  Peyton Collins M.D. 08/27/2018 03:15 PM     UPEP:   Lab Results   Component Value Date/Time    TPU 85 08/29/2018 04:32 PM        C3:   Lab Results   Component Value Date    C3 111 08/27/2018     C4:   Lab Results   Component Value Date    C4 35 08/27/2018     MPO ANCA:    Lab Results   Component Value Date/Time    MPO 11 08/26/2018 10:16 AM    .  PR3 ANCA:    Lab Results   Component Value Date/Time    PR3 15 08/26/2018 10:16 AM   Urine Creatinine:    Lab Results   Component Value Date/Time    LABCREA 140.9 07/15/2022 09:49 AM   Urine Protein:    Lab Results   Component Value Date/Time    TPU 85 08/29/2018 04:32 PM     Urinalysis:  U/A:   Lab Results   Component Value Date/Time NITRU POSITIVE 10/11/2022 11:39 PM    COLORU Yellow 10/11/2022 11:39 PM    PHUR 5.0 10/11/2022 11:39 PM    WBCUA NONE SEEN 10/11/2022 11:39 PM    RBCUA 2 TO 5 10/11/2022 11:39 PM    MUCUS NOT REPORTED 11/21/2018 12:12 PM    TRICHOMONAS NOT REPORTED 11/21/2018 12:12 PM    YEAST NOT REPORTED 11/21/2018 12:12 PM    BACTERIA 3+ 10/11/2022 11:39 PM    SPECGRAV 1.025 10/11/2022 11:39 PM    LEUKOCYTESUR NEGATIVE 10/11/2022 11:39 PM    UROBILINOGEN Normal 10/11/2022 11:39 PM    BILIRUBINUR NEGATIVE 10/11/2022 11:39 PM    GLUCOSEU 50 mg/dL 10/11/2022 11:39 PM    KETUA NEGATIVE 10/11/2022 11:39 PM    AMORPHOUS TRACE 11/21/2018 12:12 PM         Radiology:  Reviewed as available. Assessment:  1. Acute kidney injury most likely due to decompensated CHF other possibility includes progression of underlying kidney disease. 2.  Stage IIIb chronic kidney disease with baseline creatinine of 1.5 mg/dL and an estimated GFR of 40 mill per minute. 3.  Low serum bicarb due to metabolic acidosis versus respiratory alkalosis  4. Longstanding type 2 diabetes  4. Cardiomyopathy status post AICD placement  5. Decompensated congestive cardiac failure most likely due to not taking diuretics and gained 30 pounds in last 2 weeks. 6.  Anemia of chronic disease      Plan:  1. Renal US today. 2.  F/u labs as ordered. 3.  Take Lasix gtt rate down to 5mg/hr, until 1800, update Dr. Britt Thomas with U/o at that point. 4.  Monitor serum electrolytes every 12 hours. 5.  Following. Will discuss with Dr. Britt Thomas      Please do not hesitate to call with questions. Electronically signed by PERCY Ruff on 10/15/2022 at 10:13 AM   [facility]  Attending Physician Statement  I have discussed the care of Hector Moreno, including pertinent history and exam findings with the NP I have reviewed the key elements of all parts of the encounter with the np.   Note was updated and recorded changes were made I have seen and examined the patient with the np. I agree with the assessment and plan and status of the problem list as documented. Patient is feeling better and oxygen requirement is decreasing. Patient is currently on Lasix drip at 8 mg/h. Her urine output is close to 150 mL/h. She is net negative in last 24 hours. Addiitionally I recommend we will adjust diuretics and change her to intermittent by tomorrow morning  BMP and CBC in the a.m. We will follow with you.   Gerri Mann MD

## 2022-10-15 NOTE — PLAN OF CARE
Pt.on HF nasal canula 40/25l,lungs sound rhonchi,encourage to use IS.VSS,afebrile,regular diet. Continues on lasix gtt with good result. AxOx4,move all extremities ,pulses present,warm to touch. Patient denies pain.   Problem: Discharge Planning  Goal: Discharge to home or other facility with appropriate resources  Outcome: Progressing

## 2022-10-15 NOTE — PROGRESS NOTES
Critical Care Team - Daily Progress Note      Date and time: 10/15/2022 1:52 PM  Patient's name:  Dali Romeo  Medical Record Number: [id number]  Patient's account/billing number: [de-identified]  Patient's YOB: 1960  Age: 60 y.o. Date of Admission: 10/14/2022  4:20 AM  Length of stay during current admission: 1      Primary Care Physician: Anyi Rowland MD  ICU Attending Physician: Dr. Arambula Setting Status: Full Code    Reason for ICU admission: No chief complaint on file. SUBJECTIVE:   HPI:     Patient presented to [facility] ED with chief complaint of shortness of breath, cough, sputum production, and swelling in the legs. The patient has history of CAD with LV systolic dysfunction, AICD placement, and COPD. According to the patient she did not take her medications since 2 months as she was not able to refill them. In [facility] ED, the patient's labs showed chloride of 109, BUN 24, creatinine 1.19, glucose 251, BNP 06463, troponin 28, urinalysis showed positive nitrates. Chest x-ray showed small right effusion with right lung base atelectasis/consolidation. Dr. Maria Del Carmen Hyatt was consulted for cardiology recommendations and he placed patient on Lasix 20 mg twice daily IV and Aldactone 25 mg twice daily. Echo was ordered as well. It was noted that the patient had increased D-dimer with accelerated heart rate. CT chest with contrast was ordered for possible pulmonary embolism which came out to be negative. Echo on 10/12/2022 showed large LV with EF 25% and severe pulmonary hypertension. The patient required 4 L nasal cannula oxygen and was started with IV Rocephin and Zithromax. EZPap was used for atelectasis. The patient also has history of CKD stage IIIb. Yesterday, the patient was started with Plavix and Coreg with breathing treatment of Atrovent and Pulmicort for possible COPD exacerbation. The patient became altered and lethargic.   It was noted that her glucose was 40 and she was given D10 125 mL bolus. Her blood pressure was 80/60 MMHG. Coreg was held and dopamine 5 mg/kg/min was started. ABG was taken which showed pH of 7.335, PCO2 37.4, PaO2 65, HCO3 19.9. Blood pressure stabilized on dopamine. Repeat chest x-ray showed worsening of pulmonary edema. Patient's creatinine started increasing and her respiratory condition worsened. The patient was then shifted to [address] [place] [address] ICU for further management. 10/14: Patient started on BiPAP. Patient started on Lasix drip and midodrine. Interval history:  -Patient evaluated at the bedside. Patient alert, oriented, under no acute distress.  -Patient breathing on high flow nasal cannula oxygen. Patient breathing better than yesterday  -Respiratory examination reveals bilateral wheezing in the chest.  Breath sounds better than yesterday. Since last night but no blood in sputum.  -Patient blood pressure 100/65 mmHg with MAP of 89. No pressors required. -Denies any headache, chest pain, abdominal pain, constipation, diarrhea, lower leg pain. -Bilateral lower extremity edema improving. OVERNIGHT EVENTS: No acute overnight events.          AWAKE & FOLLOWING COMMANDS:  [] No   [x] Yes    CURRENT VENTILATION STATUS:     [] Ventilator  [x] BIPAP  [x] Nasal Cannula [] Room Air        SECRETIONS Amount:  [x] Small [] Moderate  [] Large  [] None  Color:     [x] White [] Colored  [] Bloody    SEDATION:  RAAS Score:  [] Propofol gtt  [] Versed gtt  [] Ativan gtt   [x] No Sedation    PARALYZED:  [x] No    [] Yes    DIARRHEA:                [x] No                [] Yes  (C. Difficile status: [] positive                                                                                                                       [] negative                                                                                                                     [] pending)    VASOPRESSORS:  [x] No    [] Yes    If yes -   [] Levophed       [] Dopamine     [] Vasopressin       [] Dobutamine  [] Phenylephrine         [] Epinephrine    CENTRAL LINES:     [x] No   [] Yes   (Date of Insertion:   )           If yes -     [] Right IJ     [] Left IJ [] Right Femoral [] Left Femoral                   [] Right Subclavian [] Left Subclavian       DYER'S CATHETER:   [] No   [x] Yes  (Date of Insertion: 10/14/2022)     URINE OUTPUT:            [x] Good   [] Low              [] Anuric      OBJECTIVE:     VITAL SIGNS:  /81   Pulse 91   Temp 98.2 °F (36.8 °C) (Oral)   Resp 20   Ht 5' 6\" (1.676 m)   Wt 179 lb 14.3 oz (81.6 kg)   SpO2 96%   BMI 29.04 kg/m²   Tmax over 24 hours:  Temp (24hrs), Av °F (36.7 °C), Min:97.7 °F (36.5 °C), Max:98.2 °F (36.8 °C)      Patient Vitals for the past 8 hrs:   BP Temp Temp src Pulse Resp SpO2 Weight   10/15/22 1201 -- -- -- 91 20 96 % --   10/15/22 1000 113/81 -- -- 93 15 98 % --   10/15/22 0900 103/73 -- -- 90 20 -- --   10/15/22 0845 -- -- -- 93 18 100 % --   10/15/22 0800 99/66 98.2 °F (36.8 °C) Oral 87 16 91 % --   10/15/22 0600 100/65 -- -- 85 15 (!) 88 % 179 lb 14.3 oz (81.6 kg)         Intake/Output Summary (Last 24 hours) at 10/15/2022 1352  Last data filed at 10/15/2022 1000  Gross per 24 hour   Intake 970.68 ml   Output 3445 ml   Net -2474.32 ml     Date 10/15/22 0000 - 10/15/22 2359   Shift 3905-4043 7194-8800 7385-9015 24 Hour Total   INTAKE   P.O.(mL/kg/hr) 200(0.3)   200   I. V.(mL/kg) 99.7(1.2) 22(0.3)  121.7(1.5)   Shift Total(mL/kg) 299.7(3.7) 22(0.3)  321.7(3.9)   OUTPUT   Urine(mL/kg/hr) 1425(2.2) 510  1935   Shift Total(mL/kg) 1425(17.5) 510(6.3)  1935(23.7)   Weight (kg) 81.6 81.6 81.6 81.6     Wt Readings from Last 3 Encounters:   10/15/22 179 lb 14.3 oz (81.6 kg)   10/14/22 183 lb 6.4 oz (83.2 kg)   22 158 lb (71.7 kg)     Body mass index is 29.04 kg/m². PHYSICAL EXAM:  Constitutional: Appears well, no distress  EENT: PERRLA, EOMI, sclera clear.   Neck: Supple, symmetrical, trachea midline, no adenopathy, thyroid symmetric, no jvd skin normal  Respiratory: Bilateral wheezing noted. Better than yesterday. Cardiovascular: regular rate and rhythm, normal S1, S2, no murmur noted and 2+ pulses throughout  Abdomen: Abdomen distended and soft Secondary to ascites. NEUROLOGIC: Awake, alert, oriented to name, place and time. Extremities:  peripheral pulses normal, bilateral pedal edema resolving.     SKIN: normal coloration and turgor    MEDICATIONS:  Scheduled Meds:   sodium chloride flush  5-40 mL IntraVENous 2 times per day    heparin (porcine)  5,000 Units SubCUTAneous 3 times per day    ipratropium  0.5 mg Nebulization 4x daily    insulin lispro  0-8 Units SubCUTAneous Q4H    cefTRIAXone (ROCEPHIN) IV  1,000 mg IntraVENous Q24H    azithromycin  250 mg IntraVENous Q24H    midodrine  5 mg Oral BID WC    aspirin  81 mg Oral Daily     Continuous Infusions:   sodium chloride      dextrose      furosemide (LASIX) 1mg/ml infusion 8 mg/hr (10/15/22 1053)     PRN Meds:   ALPRAZolam, 0.25 mg, Q8H PRN  sodium chloride flush, 5-40 mL, PRN  sodium chloride, , PRN  ondansetron, 4 mg, Q8H PRN   Or  ondansetron, 4 mg, Q6H PRN  polyethylene glycol, 17 g, Daily PRN  acetaminophen, 650 mg, Q6H PRN   Or  acetaminophen, 650 mg, Q6H PRN  levalbuterol, 0.63 mg, Q6H PRN  glucose, 4 tablet, PRN  dextrose bolus, 125 mL, PRN   Or  dextrose bolus, 250 mL, PRN  glucagon (rDNA), 1 mg, PRN  dextrose, , Continuous PRN        SUPPORT DEVICES: [] Ventilator [x] BIPAP  [x] Nasal Cannula [] Room Air     Additional Respiratory Assessments  Heart Rate: 91  Resp: 20  SpO2: 96 %  Humidification Source: Heated wire  Humidification Temp: 33      Lab Results   Component Value Date/Time    TIJ5XSI 17 08/24/2018 05:02 AM    FIO2 50.0 10/13/2022 06:00 PM     Lactic Acid:   Lab Results   Component Value Date/Time    LACTA 1.4 10/13/2022 12:22 PM    LACTA 1.7 12/29/2018 11:57 AM    LACTA 1.5 12/29/2018 05:30 AM         DATA:  Complete Blood Count:   Recent Labs     10/13/22  0432 10/13/22  1222 10/15/22  0656   WBC 11.1* 10.2 9.3   HGB 10.1* 10.5* 9.4*   MCV 85.8 86.0 90.9    266 263   RBC 3.74* 3.88* 3.50*   HCT 32.1* 33.4* 31.8*   MCH 26.9 27.1 26.9   MCHC 31.3 31.5 29.6   RDW 19.0* 18.8* 17.4*   MPV  --   --  11.2        PT/INR:    Lab Results   Component Value Date/Time    PROTIME 13.3 11/28/2020 04:59 PM    INR 1.1 11/28/2020 04:59 PM     PTT:    Lab Results   Component Value Date/Time    APTT 29.2 11/28/2020 04:59 PM       Basal Metabolic Profile:   Recent Labs     10/14/22  0700 10/14/22  1829 10/15/22  0656    135 134*   K 5.1 4.6 4.4   BUN 34* 33* 33*   CREATININE 1.89* 1.76* 1.71*    103 101   CO2 15* 20 20      Magnesium:   Lab Results   Component Value Date/Time    MG 2.1 10/14/2022 06:29 PM    MG 2.3 10/14/2022 07:00 AM    MG 2.1 12/10/2020 02:47 PM     Phosphorus:   Lab Results   Component Value Date/Time    PHOS 6.3 09/04/2018 04:47 AM    PHOS 7.7 09/03/2018 04:58 AM    PHOS 7.0 09/02/2018 04:37 AM     S. Calcium:  Recent Labs     10/15/22  0656   CALCIUM 8.1*     S. Ionized Calcium:No results for input(s): IONCA in the last 72 hours.       Urinalysis:   Lab Results   Component Value Date/Time    NITRU POSITIVE 10/11/2022 11:39 PM    COLORU Yellow 10/11/2022 11:39 PM    PHUR 5.0 10/11/2022 11:39 PM    WBCUA NONE SEEN 10/11/2022 11:39 PM    RBCUA 2 TO 5 10/11/2022 11:39 PM    MUCUS NOT REPORTED 11/21/2018 12:12 PM    TRICHOMONAS NOT REPORTED 11/21/2018 12:12 PM    YEAST NOT REPORTED 11/21/2018 12:12 PM    BACTERIA 3+ 10/11/2022 11:39 PM    SPECGRAV 1.025 10/11/2022 11:39 PM    LEUKOCYTESUR NEGATIVE 10/11/2022 11:39 PM    UROBILINOGEN Normal 10/11/2022 11:39 PM    BILIRUBINUR NEGATIVE 10/11/2022 11:39 PM    GLUCOSEU 50 mg/dL 10/11/2022 11:39 PM    KETUA NEGATIVE 10/11/2022 11:39 PM    AMORPHOUS TRACE 11/21/2018 12:12 PM       CARDIAC ENZYMES: No results for input(s): CKMB, CKMBINDEX, TROPONINI in the last 72 hours.    Invalid input(s): CKTOTAL;3  BNP: No results for input(s): BNP in the last 72 hours. LFTS  No results for input(s): ALKPHOS, ALT, AST, BILITOT, BILIDIR, LABALBU in the last 72 hours. AMYLASE/LIPASE/AMMONIA  No results for input(s): AMYLASE, LIPASE, AMMONIA in the last 72 hours. Last 3 Blood Glucose:   Recent Labs     10/13/22  0432 10/14/22  0700 10/14/22  1829 10/15/22  0656   GLUCOSE 40* 86 108* 74      HgBA1c:    Lab Results   Component Value Date/Time    LABA1C 8.1 10/14/2022 10:23 AM         TSH:    Lab Results   Component Value Date/Time    TSH 1.66 10/14/2022 10:23 AM     ANEMIA STUDIES  No results for input(s): LABIRON, TIBC, FERRITIN, ZIVTMLEK31, FOLATE, OCCULTBLD in the last 72 hours. Cultures during this admission:     Blood cultures:                 [x] None drawn      [] Negative             []  Positive (Details:  )  Urine Culture:                   [x] None drawn      [] Negative             []  Positive (Details:  )  Sputum Culture:               [x] None drawn       [] Negative             []  Positive (Details:  )   Endotracheal aspirate:     [x] None drawn       [] Negative             []  Positive (Details:  )         ASSESSMENT:     Principal Problem:    CHF (congestive heart failure), NYHA class I, acute on chronic, combined ([facility] [address] [facility]  Active Problems:    Acute on chronic systolic [facility]cong[facility]  Resolved Problems:    * No resolved hospital problems. *     PLAN:     PLAN/MEDICAL DECISION MAKING:  Neurologic:   Neuro intact. AAO X4.  no sedation    Cardiovascular:  Hemodynamically stable  MAP goal >65  Patient having CHF exacerbation and RHF secondary to systolic function. Started with 10 mg/h Lasix drip and titrate to urine output of 100 to 225 mL/hr according to nephrology. Low-sodium diet with fluid restriction at 1500 mL to be given. Midodrine tablet 5 mg oral 2 times daily. Continue with current plan. Cardiology on board.   Recommended to continue diuresis and wean BiPAP and tolerated. Also recommend to restarting home meds including beta-blockers and statins as tolerated. Dr. Clarissa Gilliland will follow-up as OP for cath/ICD change. Pulmonary:  Maintain oxygen sats >92%  Pulmonary toilet   Maintain oxygen sats >92%  Pulmonary toilet  Currently on high flow nasal cannula. 1 day of Zithromax to 50 mg IV. Ceftriaxone 2000 mg IV every 24 hours for total of 7 days. Continue with current treatment. GI/Nutrition  GlycoLax 17 g  Ulcer Prophylaxis: not indicated  Diet:ADULT DIET; Regular; 3 carb choices (45 gm/meal); Low Sodium (2 gm); 1500 ml  Renal/Fluid/Electrolyte  IV Fluids:  Lasix 100 mg and dextrose 5% 100 mL infusion at 10 mg/h rate IV. I/O: In: 1240.7 [P.O.:800; I.V.:192.7]  Out: 4265 [Urine:4265]  UOP: 1.5 cc/kg/hr  Monitor electrolytes, replace PRN   Nephrology following. Nephrology agreeable with furosemide infusion. Recommended to keep urine output between 100 to 225 mL/h for next 24 hours. Renal ultrasound ordered for possible obstructive etiology. ID  WBC:   Lab Results   Component Value Date    WBC 9.3 10/15/2022     Tmax: Temp (24hrs), Av °F (36.7 °C), Min:97.7 °F (36.5 °C), Max:98.4 °F (36.9 °C)    Antimicrobials: ceftriaxone and azithromycin   Hematology:  Recent Labs     10/13/22  0432 10/13/22  1222 10/15/22  0656   HGB 10.1* 10.5* 9.4*    trending down  Endocrine:   glucose controlled - most recent BGL is   Recent Labs     10/14/22  0700 10/14/22  1829 10/15/22  0656   GLUCOSE 86 108* 74     DVT Prophylaxis  Heparin  Discharge Needs:  PT, OT, ST, SW, and Case Management      CODE STATUS: Full Code             Zuhair Doe MD, M.D. Department of Internal Medicine/ Critical care  [facility])             10/15/2022, 1:52 PM   Attending Physician Statement  I have discussed the care of Lashawn Cartwright, including pertinent history and exam findings,  with the resident.  I have seen and examined the patient and the key elements of all parts of the encounter have been performed by me. I agree with the assessment, plan and orders as documented by the resident with additions . Cont diuresis   Wean high flow     Total critical care time caring for this patient with life threatening, unstable organ failure, including direct patient contact, management of life support systems, review of data including imaging and labs, discussions with other team members and physicians at least 28   Min so far today, excluding procedures. Electronically signed by Charity Arroyo MD on   10/15/22 at 6:13 PM EDT    Please note that this chart was generated using voice recognition Dragon dictation software. Although every effort was made to ensure the accuracy of this automated transcription, some errors in transcription may have occurred.

## 2022-10-16 ENCOUNTER — APPOINTMENT (OUTPATIENT)
Dept: GENERAL RADIOLOGY | Age: 62
DRG: 194 | End: 2022-10-16
Attending: INTERNAL MEDICINE
Payer: MEDICARE

## 2022-10-16 LAB
ABSOLUTE EOS #: 0.06 K/UL (ref 0–0.44)
ABSOLUTE IMMATURE GRANULOCYTE: <0.03 K/UL (ref 0–0.3)
ABSOLUTE LYMPH #: 1.86 K/UL (ref 1.1–3.7)
ABSOLUTE MONO #: 0.68 K/UL (ref 0.1–1.2)
ANION GAP SERPL CALCULATED.3IONS-SCNC: 12 MMOL/L (ref 9–17)
BASOPHILS # BLD: 0 % (ref 0–2)
BASOPHILS ABSOLUTE: <0.03 K/UL (ref 0–0.2)
BUN BLDV-MCNC: 31 MG/DL (ref 8–23)
CALCIUM SERPL-MCNC: 8.2 MG/DL (ref 8.6–10.4)
CHLORIDE BLD-SCNC: 101 MMOL/L (ref 98–107)
CO2: 25 MMOL/L (ref 20–31)
CREAT SERPL-MCNC: 1.65 MG/DL (ref 0.5–0.9)
EKG ATRIAL RATE: 96 BPM
EKG P AXIS: 74 DEGREES
EKG P-R INTERVAL: 148 MS
EKG Q-T INTERVAL: 368 MS
EKG QRS DURATION: 104 MS
EKG QTC CALCULATION (BAZETT): 464 MS
EKG R AXIS: -34 DEGREES
EKG T AXIS: 45 DEGREES
EKG VENTRICULAR RATE: 96 BPM
EOSINOPHILS RELATIVE PERCENT: 1 % (ref 1–4)
GFR SERPL CREATININE-BSD FRML MDRD: 35 ML/MIN/1.73M2
GLUCOSE BLD-MCNC: 139 MG/DL (ref 65–105)
GLUCOSE BLD-MCNC: 141 MG/DL (ref 70–99)
GLUCOSE BLD-MCNC: 159 MG/DL (ref 65–105)
GLUCOSE BLD-MCNC: 173 MG/DL (ref 65–105)
GLUCOSE BLD-MCNC: 177 MG/DL (ref 65–105)
GLUCOSE BLD-MCNC: 201 MG/DL (ref 65–105)
GLUCOSE BLD-MCNC: 214 MG/DL (ref 65–105)
HCT VFR BLD CALC: 30 % (ref 36.3–47.1)
HEMOGLOBIN: 9.1 G/DL (ref 11.9–15.1)
IMMATURE GRANULOCYTES: 0 %
LYMPHOCYTES # BLD: 25 % (ref 24–43)
MCH RBC QN AUTO: 26.9 PG (ref 25.2–33.5)
MCHC RBC AUTO-ENTMCNC: 30.3 G/DL (ref 28.4–34.8)
MCV RBC AUTO: 88.8 FL (ref 82.6–102.9)
MONOCYTES # BLD: 9 % (ref 3–12)
NRBC AUTOMATED: 0 PER 100 WBC
PDW BLD-RTO: 17.2 % (ref 11.8–14.4)
PLATELET # BLD: 216 K/UL (ref 138–453)
PMV BLD AUTO: 11.1 FL (ref 8.1–13.5)
POTASSIUM SERPL-SCNC: 4.2 MMOL/L (ref 3.7–5.3)
RBC # BLD: 3.38 M/UL (ref 3.95–5.11)
RBC # BLD: ABNORMAL 10*6/UL
SEG NEUTROPHILS: 65 % (ref 36–65)
SEGMENTED NEUTROPHILS ABSOLUTE COUNT: 4.95 K/UL (ref 1.5–8.1)
SODIUM BLD-SCNC: 138 MMOL/L (ref 135–144)
WBC # BLD: 7.6 K/UL (ref 3.5–11.3)

## 2022-10-16 PROCEDURE — 99223 1ST HOSP IP/OBS HIGH 75: CPT | Performed by: INTERNAL MEDICINE

## 2022-10-16 PROCEDURE — 6360000002 HC RX W HCPCS: Performed by: STUDENT IN AN ORGANIZED HEALTH CARE EDUCATION/TRAINING PROGRAM

## 2022-10-16 PROCEDURE — 93010 ELECTROCARDIOGRAM REPORT: CPT | Performed by: INTERNAL MEDICINE

## 2022-10-16 PROCEDURE — 94640 AIRWAY INHALATION TREATMENT: CPT

## 2022-10-16 PROCEDURE — 82947 ASSAY GLUCOSE BLOOD QUANT: CPT

## 2022-10-16 PROCEDURE — 6360000002 HC RX W HCPCS: Performed by: INTERNAL MEDICINE

## 2022-10-16 PROCEDURE — 2700000000 HC OXYGEN THERAPY PER DAY

## 2022-10-16 PROCEDURE — 6370000000 HC RX 637 (ALT 250 FOR IP): Performed by: STUDENT IN AN ORGANIZED HEALTH CARE EDUCATION/TRAINING PROGRAM

## 2022-10-16 PROCEDURE — 2580000003 HC RX 258: Performed by: STUDENT IN AN ORGANIZED HEALTH CARE EDUCATION/TRAINING PROGRAM

## 2022-10-16 PROCEDURE — 80048 BASIC METABOLIC PNL TOTAL CA: CPT

## 2022-10-16 PROCEDURE — 71045 X-RAY EXAM CHEST 1 VIEW: CPT

## 2022-10-16 PROCEDURE — 2500000003 HC RX 250 WO HCPCS: Performed by: STUDENT IN AN ORGANIZED HEALTH CARE EDUCATION/TRAINING PROGRAM

## 2022-10-16 PROCEDURE — 85025 COMPLETE CBC W/AUTO DIFF WBC: CPT

## 2022-10-16 PROCEDURE — 94761 N-INVAS EAR/PLS OXIMETRY MLT: CPT

## 2022-10-16 PROCEDURE — 99232 SBSQ HOSP IP/OBS MODERATE 35: CPT | Performed by: INTERNAL MEDICINE

## 2022-10-16 PROCEDURE — 6370000000 HC RX 637 (ALT 250 FOR IP)

## 2022-10-16 PROCEDURE — 99291 CRITICAL CARE FIRST HOUR: CPT | Performed by: INTERNAL MEDICINE

## 2022-10-16 PROCEDURE — 36415 COLL VENOUS BLD VENIPUNCTURE: CPT

## 2022-10-16 PROCEDURE — 2060000000 HC ICU INTERMEDIATE R&B

## 2022-10-16 RX ADMIN — MIDODRINE HYDROCHLORIDE 5 MG: 5 TABLET ORAL at 17:57

## 2022-10-16 RX ADMIN — SODIUM CHLORIDE, PRESERVATIVE FREE 10 ML: 5 INJECTION INTRAVENOUS at 20:33

## 2022-10-16 RX ADMIN — INSULIN LISPRO 2 UNITS: 100 INJECTION, SOLUTION INTRAVENOUS; SUBCUTANEOUS at 23:21

## 2022-10-16 RX ADMIN — METOPROLOL TARTRATE 12.5 MG: 25 TABLET ORAL at 20:26

## 2022-10-16 RX ADMIN — IPRATROPIUM BROMIDE 0.5 MG: 0.5 SOLUTION RESPIRATORY (INHALATION) at 12:19

## 2022-10-16 RX ADMIN — HEPARIN SODIUM 5000 UNITS: 5000 INJECTION INTRAVENOUS; SUBCUTANEOUS at 14:10

## 2022-10-16 RX ADMIN — AZITHROMYCIN MONOHYDRATE 250 MG: 500 INJECTION, POWDER, LYOPHILIZED, FOR SOLUTION INTRAVENOUS at 10:45

## 2022-10-16 RX ADMIN — INSULIN LISPRO 2 UNITS: 100 INJECTION, SOLUTION INTRAVENOUS; SUBCUTANEOUS at 15:20

## 2022-10-16 RX ADMIN — IPRATROPIUM BROMIDE 0.5 MG: 0.5 SOLUTION RESPIRATORY (INHALATION) at 09:49

## 2022-10-16 RX ADMIN — IPRATROPIUM BROMIDE 0.5 MG: 0.5 SOLUTION RESPIRATORY (INHALATION) at 20:08

## 2022-10-16 RX ADMIN — HEPARIN SODIUM 5000 UNITS: 5000 INJECTION INTRAVENOUS; SUBCUTANEOUS at 06:00

## 2022-10-16 RX ADMIN — HEPARIN SODIUM 5000 UNITS: 5000 INJECTION INTRAVENOUS; SUBCUTANEOUS at 20:26

## 2022-10-16 RX ADMIN — FUROSEMIDE 40 MG: 10 INJECTION, SOLUTION INTRAMUSCULAR; INTRAVENOUS at 09:16

## 2022-10-16 RX ADMIN — FUROSEMIDE 40 MG: 10 INJECTION, SOLUTION INTRAMUSCULAR; INTRAVENOUS at 23:19

## 2022-10-16 RX ADMIN — FUROSEMIDE 40 MG: 10 INJECTION, SOLUTION INTRAMUSCULAR; INTRAVENOUS at 14:10

## 2022-10-16 RX ADMIN — CEFTRIAXONE SODIUM 1000 MG: 10 INJECTION, POWDER, FOR SOLUTION INTRAVENOUS at 10:30

## 2022-10-16 RX ADMIN — SODIUM CHLORIDE, PRESERVATIVE FREE 10 ML: 5 INJECTION INTRAVENOUS at 09:23

## 2022-10-16 RX ADMIN — MIDODRINE HYDROCHLORIDE 5 MG: 5 TABLET ORAL at 09:16

## 2022-10-16 RX ADMIN — ASPIRIN 81 MG: 81 TABLET, CHEWABLE ORAL at 09:16

## 2022-10-16 RX ADMIN — POLYETHYLENE GLYCOL 3350 17 G: 17 POWDER, FOR SOLUTION ORAL at 20:41

## 2022-10-16 RX ADMIN — IPRATROPIUM BROMIDE 0.5 MG: 0.5 SOLUTION RESPIRATORY (INHALATION) at 16:02

## 2022-10-16 ASSESSMENT — PAIN SCALES - GENERAL
PAINLEVEL_OUTOF10: 0

## 2022-10-16 NOTE — PROGRESS NOTES
Renal Progress Note    Patient :  Gregg Lorenz; 60 y.o. MRN# [id number]  Location:  1251/7231-72  Attending:  Jhonathan Keyes MD  Admit Date:  10/14/2022   Hospital Day: 2      Subjective: Following for CHAD/CKD III with baseline creatinine 1.5 who was admitted for acute systolic heart failure after running out of diuretics over 2 months ago. Creatinine on admission was 1.89. This morning down to 1.65. She was taken off Lasix drip early morning and started on intermittent Lasix 40mg Q8 hours. Urine output 3360 last 24 hours. Negative 5 liters. Down 3 kg. Blood pressures stable  Remains on HighFlow  FIO2 35% 25 liters. CXR with improvement in pulmonary congestion but still persist  Patient feels her breathing has improved. Outpatient Medications:     Medications Prior to Admission: gabapentin (NEURONTIN) 400 MG capsule, Take 1 capsule by mouth in the morning and 1 capsule at noon and 1 capsule before bedtime. Do all this for 90 days. lisinopril (PRINIVIL;ZESTRIL) 5 MG tablet, take 1 tablet by mouth once daily  midodrine (PROAMATINE) 5 MG tablet, take 1 tablet by mouth three times a day  sertraline (ZOLOFT) 100 MG tablet, take 1 tablet by mouth once daily  clopidogrel (PLAVIX) 75 MG tablet, take 1 tablet by mouth once daily  atorvastatin (LIPITOR) 80 MG tablet, take 1 tablet by mouth once daily  carvedilol (COREG) 3.125 MG tablet, take 1 tablet by mouth twice a day with meals  insulin glargine (LANTUS SOLOSTAR) 100 UNIT/ML injection pen, INJECT 45 UNITS SUBCUTANEOUSLY EVERY MORNING AND 25 UNITS EVERY EVENING  glucose monitoring (FREESTYLE) kit, Please dispense meter covered by patients insurance. Insulin Pen Needle (BD PEN NEEDLE PAIGE 2ND GEN) 32G X 4 MM MISC, use 1 PEN NEEDLE to inject MEDICATION subcutaneously daily  Lancets MISC, Pt testing BS once daily and prn  blood glucose monitor strips, Testing BS once daily and as needed.  Dispense strips to go with new glucometer  albuterol (PROVENTIL) (2.5 MG/3ML) 0.083% nebulizer solution, Take 3 mLs by nebulization every 6 hours as needed for Wheezing (as needed for wheezing)  furosemide (LASIX) 40 MG tablet, TAKE 1 TABLET BY MOUTH ONCE DAILY  Respiratory Therapy Supplies (NEBULIZER/TUBING/MOUTHPIECE) KIT, 1 kit by Does not apply route daily as needed (for SOB, cough) (Patient not taking: Reported on 4/12/2022)  Respiratory Therapy Supplies (NEBULIZER COMPRESSOR) KIT, 1 kit by Does not apply route once for 1 dose  lactobacillus (CULTURELLE) capsule, Take 1 capsule by mouth 2 times daily (with meals)  blood glucose monitor strips, Use to test daily  blood glucose monitor kit and supplies, Use to test daily  Cholecalciferol (VITAMIN D3) 5000 units TABS, Take 5,000 Units by mouth daily  glucose blood VI test strips (EXACTECH TEST) strip, 1 each by In Vitro route daily Pt testing BS once daily and prn  Multiple Vitamins-Minerals (THERAPEUTIC MULTIVITAMIN-MINERALS) tablet, Take 1 tablet by mouth daily (with breakfast)    Current Medications:     Scheduled Meds:    furosemide  40 mg IntraVENous TID    sodium chloride flush  5-40 mL IntraVENous 2 times per day    heparin (porcine)  5,000 Units SubCUTAneous 3 times per day    ipratropium  0.5 mg Nebulization 4x daily    insulin lispro  0-8 Units SubCUTAneous Q4H    cefTRIAXone (ROCEPHIN) IV  1,000 mg IntraVENous Q24H    azithromycin  250 mg IntraVENous Q24H    midodrine  5 mg Oral BID WC    aspirin  81 mg Oral Daily     Continuous Infusions:    sodium chloride      dextrose       PRN Meds:  sodium chloride flush, sodium chloride, ondansetron **OR** ondansetron, polyethylene glycol, acetaminophen **OR** acetaminophen, levalbuterol, glucose, dextrose bolus **OR** dextrose bolus, glucagon (rDNA), dextrose    Input/Output:       I/O last 3 completed shifts: In: 657.6 [P.O.:200; I.V.:207.6; IV Piggyback:250]  Out: 5510 [Urine:5510].     Patient Vitals for the past 96 hrs (Last 3 readings):   Weight   10/16/22 0600 179 lb 7.3 oz (81.4 kg)   10/15/22 0600 179 lb 14.3 oz (81.6 kg)   10/14/22 0430 185 lb 13.6 oz (84.3 kg)       Vital Signs:   Temperature:  Temp: 98.4 °F (36.9 °C)  TMax:   Temp (24hrs), Av.5 °F (36.9 °C), Min:98.4 °F (36.9 °C), Max:98.6 °F (37 °C)    Respirations:  Resp: 16  Pulse:   Heart Rate: 92  BP:    BP: 122/78  BP Range: Systolic (07NRO), ZCL:985 , Min:97 , KCK:745       Diastolic (13IJS), DPH:66, Min:63, Max:85      Physical Examination:     General:  AAO x 3, speaking in full sentences, no accessory muscle use. HEENT: Atraumatic, normocephalic, no throat congestion, moist mucosa. Eyes:   Pupils equal, round and reactive to light, EOMI. Neck:   No JVD, no thyromegaly, no lymphadenopathy. Chest:              Coarse rhonchi, rales in bases  Cardiac:  S1 S2 RR, no murmurs, gallops or rubs, JVP not raised. Abdomen: Soft, non-tender, no masses or organomegaly, BS audible. :   No suprapubic or flank tenderness. Neuro:              AAO x 3, No FND. SKIN:  Trace edema, palpable peripheral pulses, no calf tenderness.     Labs:       Recent Labs     10/13/22  1222 10/15/22  0656 10/16/22  0348   WBC 10.2 9.3 7.6   RBC 3.88* 3.50* 3.38*   HGB 10.5* 9.4* 9.1*   HCT 33.4* 31.8* 30.0*   MCV 86.0 90.9 88.8   MCH 27.1 26.9 26.9   MCHC 31.5 29.6 30.3   RDW 18.8* 17.4* 17.2*    263 216   MPV  --  11.2 11.1      BMP:   Recent Labs     10/14/22  1829 10/15/22  0656 10/16/22  0348    134* 138   K 4.6 4.4 4.2    101 101   CO2 20 20 25   BUN 33* 33* 31*   CREATININE 1.76* 1.71* 1.65*   GLUCOSE 108* 74 141*   CALCIUM 8.0* 8.1* 8.2*      Magnesium:    Recent Labs     10/14/22  0700 10/14/22  1829   MG 2.3 2.1     Urinalysis/Chemistries:      Lab Results   Component Value Date/Time    NITRU POSITIVE 10/11/2022 11:39 PM    COLORU Yellow 10/11/2022 11:39 PM    PHUR 5.0 10/11/2022 11:39 PM    WBCUA NONE SEEN 10/11/2022 11:39 PM    RBCUA 2 TO 5 10/11/2022 11:39 PM    MUCUS NOT REPORTED 2018 12:12 PM TRICHOMONAS NOT REPORTED 11/21/2018 12:12 PM    YEAST NOT REPORTED 11/21/2018 12:12 PM    BACTERIA 3+ 10/11/2022 11:39 PM    SPECGRAV 1.025 10/11/2022 11:39 PM    LEUKOCYTESUR NEGATIVE 10/11/2022 11:39 PM    UROBILINOGEN Normal 10/11/2022 11:39 PM    BILIRUBINUR NEGATIVE 10/11/2022 11:39 PM    GLUCOSEU 50 mg/dL 10/11/2022 11:39 PM    KETUA NEGATIVE 10/11/2022 11:39 PM    AMORPHOUS TRACE 11/21/2018 12:12 PM     Urine Creatinine:     Lab Results   Component Value Date/Time    LABCREA 140.9 07/15/2022 09:49 AM       Radiology:     CXR:   1. Slight improvement in aeration of the lung bases. Persistent lower zone   predominant airspace disease, right greater than left with small bilateral   pleural effusions. Mild pulmonary vascular congestion. 2. Stable cardiomegaly. Assessment:     CHAD: ATN from decompensated heart failure and likely progression of chronic kidney disease. CKD III: Ischemic nephrosclerosis with Baseline 1.5-to 1.6 mg/dL creatinine is very close to her baseline  ICMP with EF 25%. Has non functioning ICD per cardiology. Diabetes  Acute on chronic systolic heart failure. Was off diuretics for 2 months  Anemia of chronic disease  CAD s/p CABG and MVR  Plan:   1. Continue Lasix 40mg IV TID  2. BMP daily  3. Fluid restrictions  4. Continue Midodrine    Nutrition   Please ensure that patient is on a renal diet/TF. Avoid nephrotoxic drugs/contrast exposure. We will continue to follow along with you. Chato Gil CNP     Attending Physician Statement  I have discussed the care of Og Long, including pertinent history and exam findings with the NP I have reviewed the key elements of all parts of the encounter with the np. Note was updated and recorded changes were made I have seen and examined the patient with the np. I agree with the assessment and plan and status of the problem list as documented.    Sniffing and improvement in her symptoms of shortness of breath noted as well as decreasing FiO2 requirement patient is still on 30% of high flow. Addiitionally I recommend continue IV Lasix for now  Keep I's and O's negative  Follow renal function closely.   Erica Wolf MD

## 2022-10-16 NOTE — PROGRESS NOTES
Critical Care Team - Daily Progress Note      Date and time: 10/16/2022 12:00 PM  Patient's name:  Hank Luna  Medical Record Number: [id number]  Patient's account/billing number: [de-identified]  Patient's YOB: 1960  Age: 60 y.o. Date of Admission: 10/14/2022  4:20 AM  Length of stay during current admission: 2      Primary Care Physician: Demetrio Rodriguez MD  ICU Attending Physician: Dr. Garry Cerda Status: Full Code    Reason for ICU admission: No chief complaint on file. SUBJECTIVE:   HPI:     Patient presented to [facility] ED with chief complaint of shortness of breath, cough, sputum production, and swelling in the legs. The patient has history of CAD with LV systolic dysfunction, AICD placement, and COPD. According to the patient she did not take her medications since 2 months as she was not able to refill them. In [facility] ED, the patient's labs showed chloride of 109, BUN 24, creatinine 1.19, glucose 251, BNP 78259, troponin 28, urinalysis showed positive nitrates. Chest x-ray showed small right effusion with right lung base atelectasis/consolidation. Dr. Quijano Artist was consulted for cardiology recommendations and he placed patient on Lasix 20 mg twice daily IV and Aldactone 25 mg twice daily. Echo was ordered as well. It was noted that the patient had increased D-dimer with accelerated heart rate. CT chest with contrast was ordered for possible pulmonary embolism which came out to be negative. Echo on 10/12/2022 showed large LV with EF 25% and severe pulmonary hypertension. The patient required 4 L nasal cannula oxygen and was started with IV Rocephin and Zithromax. EZPap was used for atelectasis. The patient also has history of CKD stage IIIb. Yesterday, the patient was started with Plavix and Coreg with breathing treatment of Atrovent and Pulmicort for possible COPD exacerbation. The patient became altered and lethargic.   It was noted that her glucose was 40 and she was given D10 125 mL bolus. Her blood pressure was 80/60 MMHG. Coreg was held and dopamine 5 mg/kg/min was started. ABG was taken which showed pH of 7.335, PCO2 37.4, PaO2 65, HCO3 19.9. Blood pressure stabilized on dopamine. Repeat chest x-ray showed worsening of pulmonary edema. Patient's creatinine started increasing and her respiratory condition worsened. The patient was then shifted to [address] [place] [address] ICU for further management. 10/14: Patient started on BiPAP. Patient started on Lasix drip and midodrine. 10/15: Continued with Lasix drip and midodrine. Cardiology and nephrology consulted. Patient started with low-sodium diet with fluid restriction at 1500 mL. Nephrology recommended to keep urine output 100 to 225 mL/h for 24 hours. Interval history:  -Patient examined at bedside. Patient alert, oriented, under no acute distress.  -Patient reports significant improvement in breathing. Was on high flow nasal cannula oxygen.  -Wheezing decreased on chest auscultation as compared to yesterday.  -The patient denies any chest pain. She reports having cough with white sputum production but has been better than yesterday.  -The patient denies any headache, fever, chills, abdominal pain, diarrhea, constipation.  -Significant improvement in lower extremity swelling. OVERNIGHT EVENTS:       No acute overnight events.     AWAKE & FOLLOWING COMMANDS:  [] No   [x] Yes    CURRENT VENTILATION STATUS:     [] Ventilator  [] BIPAP  [x] high flow nasal Cannula [] Room Air        SECRETIONS Amount:  [x] Small [] Moderate  [] Large  [] None  Color:     [x] White [] Colored  [] Bloody    SEDATION:  RAAS Score:  [] Propofol gtt  [] Versed gtt  [] Ativan gtt   [x] No Sedation    PARALYZED:  [x] No    [] Yes    DIARRHEA:                [x] No                [] Yes  (C. Difficile status: [] positive [] negative                                                                                                                     [] pending)    VASOPRESSORS:  [x] No    [] Yes    If yes -   [] Levophed       [] Dopamine     [] Vasopressin       [] Dobutamine  [] Phenylephrine         [] Epinephrine    CENTRAL LINES:     [x] No   [] Yes   (Date of Insertion:   )           If yes -     [] Right IJ     [] Left IJ [] Right Femoral [] Left Femoral                   [] Right Subclavian [] Left Subclavian       DYER'S CATHETER:   [] No   [x] Yes  (Date of Insertion: 10/13/2022)     URINE OUTPUT:            [x] Good   [] Low              [] Anuric      OBJECTIVE:     VITAL SIGNS:  /79   Pulse 91   Temp 98.4 °F (36.9 °C) (Oral)   Resp 12   Ht 5' 6\" (1.676 m)   Wt 179 lb 7.3 oz (81.4 kg)   SpO2 97%   BMI 28.96 kg/m²   Tmax over 24 hours:  Temp (24hrs), Av.5 °F (36.9 °C), Min:98.4 °F (36.9 °C), Max:98.6 °F (37 °C)      Patient Vitals for the past 8 hrs:   BP Temp Temp src Pulse Resp SpO2 Weight   10/16/22 1000 123/79 -- -- 91 12 97 % --   10/16/22 0900 122/78 -- -- 92 16 97 % --   10/16/22 0800 125/85 98.4 °F (36.9 °C) Oral 94 14 100 % --   10/16/22 0700 109/70 -- -- 87 15 92 % --   10/16/22 0600 109/68 98.6 °F (37 °C) -- 90 15 98 % 179 lb 7.3 oz (81.4 kg)         Intake/Output Summary (Last 24 hours) at 10/16/2022 1200  Last data filed at 10/16/2022 1000  Gross per 24 hour   Intake 335.94 ml   Output 2810 ml   Net -2474.06 ml     Date 10/16/22 0000 - 10/16/22 2359   Shift 0406-2582 5352-2545 3221-5994 24 Hour Total   INTAKE   Shift Total(mL/kg)       OUTPUT   Urine(mL/kg/hr) 850(1.3) 280  1130   Shift Total(mL/kg) 850(10.4) 280(3.4)  1130(13.9)   Weight (kg) 81.4 81.4 81.4 81.4     Wt Readings from Last 3 Encounters:   10/16/22 179 lb 7.3 oz (81.4 kg)   10/14/22 183 lb 6.4 oz (83.2 kg)   22 158 lb (71.7 kg)     Body mass index is 28.96 kg/m².         PHYSICAL EXAM:  Constitutional: Appears well, no distress  EENT: PERRLA, EOMI, sclera clear. Neck: Supple, symmetrical, trachea midline, no adenopathy, thyroid symmetric, no jvd skin normal  Respiratory: Wheezing heard bilaterally. Better than yesterday. Cardiovascular: regular rate and rhythm, normal S1, S2, no murmur noted and 2+ pulses throughout  Abdomen: Abdominal distention decreased than yesterday. Nontender during palpation. NEUROLOGIC: Awake, alert, oriented to name, place and time. Extremities:  peripheral pulses normal, decreased pedal edema bilaterally.     SKIN: normal coloration and turgor    Any additional physical findings:      MEDICATIONS:  Scheduled Meds:   furosemide  40 mg IntraVENous TID    sodium chloride flush  5-40 mL IntraVENous 2 times per day    heparin (porcine)  5,000 Units SubCUTAneous 3 times per day    ipratropium  0.5 mg Nebulization 4x daily    insulin lispro  0-8 Units SubCUTAneous Q4H    cefTRIAXone (ROCEPHIN) IV  1,000 mg IntraVENous Q24H    midodrine  5 mg Oral BID WC    aspirin  81 mg Oral Daily     Continuous Infusions:   sodium chloride      dextrose       PRN Meds:   sodium chloride flush, 5-40 mL, PRN  sodium chloride, , PRN  ondansetron, 4 mg, Q8H PRN   Or  ondansetron, 4 mg, Q6H PRN  polyethylene glycol, 17 g, Daily PRN  acetaminophen, 650 mg, Q6H PRN   Or  acetaminophen, 650 mg, Q6H PRN  levalbuterol, 0.63 mg, Q6H PRN  glucose, 4 tablet, PRN  dextrose bolus, 125 mL, PRN   Or  dextrose bolus, 250 mL, PRN  glucagon (rDNA), 1 mg, PRN  dextrose, , Continuous PRN        SUPPORT DEVICES: [] Ventilator [] BIPAP  [x] Nasal Cannula [] Room Air     Additional Respiratory Assessments  Heart Rate: 91  Resp: 12  SpO2: 97 %  Humidification Source: Heated wire  Humidification Temp: 33      Lab Results   Component Value Date/Time    CXF6KQQ 17 08/24/2018 05:02 AM    FIO2 50.0 10/13/2022 06:00 PM     Lactic Acid:   Lab Results   Component Value Date/Time    LACTA 1.4 10/13/2022 12:22 PM    LACTA 1.7 12/29/2018 11:57 AM    LACTA 1.5 12/29/2018 05:30 AM         DATA:  Complete Blood Count:   Recent Labs     10/13/22  1222 10/15/22  0656 10/16/22  0348   WBC 10.2 9.3 7.6   HGB 10.5* 9.4* 9.1*   MCV 86.0 90.9 88.8    263 216   RBC 3.88* 3.50* 3.38*   HCT 33.4* 31.8* 30.0*   MCH 27.1 26.9 26.9   MCHC 31.5 29.6 30.3   RDW 18.8* 17.4* 17.2*   MPV  --  11.2 11.1        PT/INR:    Lab Results   Component Value Date/Time    PROTIME 13.3 11/28/2020 04:59 PM    INR 1.1 11/28/2020 04:59 PM     PTT:    Lab Results   Component Value Date/Time    APTT 29.2 11/28/2020 04:59 PM       Basal Metabolic Profile:   Recent Labs     10/14/22  1829 10/15/22  0656 10/16/22  0348    134* 138   K 4.6 4.4 4.2   BUN 33* 33* 31*   CREATININE 1.76* 1.71* 1.65*    101 101   CO2 20 20 25      Magnesium:   Lab Results   Component Value Date/Time    MG 2.1 10/14/2022 06:29 PM    MG 2.3 10/14/2022 07:00 AM    MG 2.1 12/10/2020 02:47 PM     Phosphorus:   Lab Results   Component Value Date/Time    PHOS 6.3 09/04/2018 04:47 AM    PHOS 7.7 09/03/2018 04:58 AM    PHOS 7.0 09/02/2018 04:37 AM     S. Calcium:  Recent Labs     10/16/22  0348   CALCIUM 8.2*     S. Ionized Calcium:No results for input(s): IONCA in the last 72 hours.       Urinalysis:   Lab Results   Component Value Date/Time    NITRU POSITIVE 10/11/2022 11:39 PM    COLORU Yellow 10/11/2022 11:39 PM    PHUR 5.0 10/11/2022 11:39 PM    WBCUA NONE SEEN 10/11/2022 11:39 PM    RBCUA 2 TO 5 10/11/2022 11:39 PM    MUCUS NOT REPORTED 11/21/2018 12:12 PM    TRICHOMONAS NOT REPORTED 11/21/2018 12:12 PM    YEAST NOT REPORTED 11/21/2018 12:12 PM    BACTERIA 3+ 10/11/2022 11:39 PM    SPECGRAV 1.025 10/11/2022 11:39 PM    LEUKOCYTESUR NEGATIVE 10/11/2022 11:39 PM    UROBILINOGEN Normal 10/11/2022 11:39 PM    BILIRUBINUR NEGATIVE 10/11/2022 11:39 PM    GLUCOSEU 50 mg/dL 10/11/2022 11:39 PM    KETUA NEGATIVE 10/11/2022 11:39 PM    AMORPHOUS TRACE 11/21/2018 12:12 PM       CARDIAC ENZYMES: No results for input(s): CKMB, CKMBINDEX, TROPONINI in the last 72 hours. Invalid input(s): CKTOTAL;3  BNP: No results for input(s): BNP in the last 72 hours. LFTS  No results for input(s): ALKPHOS, ALT, AST, BILITOT, BILIDIR, LABALBU in the last 72 hours. AMYLASE/LIPASE/AMMONIA  No results for input(s): AMYLASE, LIPASE, AMMONIA in the last 72 hours. Last 3 Blood Glucose:   Recent Labs     10/14/22  0700 10/14/22  1829 10/15/22  0656 10/16/22  0348   GLUCOSE 86 108* 74 141*      HgBA1c:    Lab Results   Component Value Date/Time    LABA1C 8.1 10/14/2022 10:23 AM         TSH:    Lab Results   Component Value Date/Time    TSH 1.66 10/14/2022 10:23 AM     ANEMIA STUDIES  No results for input(s): LABIRON, TIBC, FERRITIN, [id number], FOLATE, OCCULTBLD in the last 72 hours. Cultures during this admission:     Blood cultures:                 [x] None drawn      [] Negative             []  Positive (Details:  )  Urine Culture:                   [x] None drawn      [] Negative             []  Positive (Details:  )  Sputum Culture:               [x] None drawn       [] Negative             []  Positive (Details:  )   Endotracheal aspirate:     [x] None drawn       [] Negative             []  Positive (Details:  )             Chest Xray (10/16/2022): Impression   1. Slight improvement in aeration of the lung bases. Persistent lower zone   predominant airspace disease, right greater than left with small bilateral   pleural effusions. Mild pulmonary vascular congestion. 2. Stable cardiomegaly. ASSESSMENT:     Principal Problem:    CHF (congestive heart failure), NYHA class I, acute on chronic, combined (HCC)  Active Problems:    Acute on chronic systolic CHF (congestive heart failure) ([facility] [address]  Resolved Problems:    * No resolved hospital problems. *       PLAN:     PLAN/MEDICAL DECISION MAKING:  Neurologic:   Neuro intact.   AAO X4.  no sedation    Cardiovascular:  Hemodynamically stable  MAP goal >65; no pressor support. Patient with CHF exacerbation and RHF secondary to systolic function. Was on 10 mg/h Lasix drip. Started on Lasix 40 mg IV 3 times daily injection. Can be started on Lopressor 12.5 mg.  Cardiology on board. Agree with Lasix 40 mg 3 times daily plan. Also recommend to restarting home meds including beta-blockers and statins as tolerated. Dr. Kymberly Blanco will follow-up as OP for cath/ICD change. On midodrine 5 mg oral 2 times daily with meals. Pulmonary:  Maintain oxygen sats >92%  Pulmonary toilet  Continue on high flow nasal cannula. Zithromax dose to be completed today. Ceftriaxone 2000 milligrams IV every 24 hours for total of 7 days to be given. To be weaned off high flow nasal cannula  GI/Nutrition  GlycoLax 17 g  Ulcer Prophylaxis:     not indicated  Diet:ADULT DIET; Regular; 3 carb choices (45 gm/meal); Low Sodium (2 gm); 1500 ml  Renal/Fluid/Electrolyte  IV Fluids: Normal saline as needed. I/O: In: 357.9 [I.V.:107.9]  Out: 3850 [Urine:3850]  UOP: 1.7 cc/kg/hr  Monitor electrolytes, replace PRN   Lasix drip discontinued. Changed to Lasix 40 mg IV 3 times daily. Nephrology on board. Recommended BMP daily, strict I's and O's, fluid restriction, and continuation of midodrine. ID  WBC:   Lab Results   Component Value Date    WBC 7.6 10/16/2022     Tmax: Temp (24hrs), Av.5 °F (36.9 °C), Min:98.4 °F (36.9 °C), Max:98.6 °F (37 °C)    Antimicrobials: Azithromycin and ceftriaxone  Hematology:  Recent Labs     10/13/22  1222 10/15/22  0656 10/16/22  0348   HGB 10.5* 9.4* 9.1*    trending down  Endocrine:   glucose controlled - most recent BGL is   Recent Labs     10/14/22  1829 10/15/22  0656 10/16/22  0348   GLUCOSE 108* 74 141*     DVT Prophylaxis  Heparin    -Can be transferred to floors if blood pressure is maintained.   Discharge Needs:  PT, OT, ST, SW, and Case Management      CODE STATUS: Full Code             Dwight Joseph MD           Department of Internal Medicine/ Critical care  [address] [facility] ([place]             10/16/2022, 12:00 PM   Attending Physician Statement  I have discussed the care of Hank Luna, including pertinent history and exam findings,  with the resident. I have seen and examined the patient and the key elements of all parts of the encounter have been performed by me. I agree with the assessment, plan and orders as documented by the resident with additions . Add lopressor 12.5 bid          Total critical care time caring for this patient with life threatening, unstable organ failure, including direct patient contact, management of life support systems, review of data including imaging and labs, discussions with other team members and physicians at least 27   Min so far today, excluding procedures. Electronically signed by Porsha Rogers MD on   10/16/22 at 6:35 PM EDT    Please note that this chart was generated using voice recognition Dragon dictation software. Although every effort was made to ensure the accuracy of this automated transcription, some errors in transcription may have occurred.

## 2022-10-16 NOTE — PROGRESS NOTES
Attestation signed by      Attending Physician Statement:    I have discussed the care of  Rachael Castelan , including pertinent history and exam findings, with the Cardiology fellow/resident. I have seen and examined the patient and the key elements of all parts of the encounter have been performed by me. I agree with the assessment, plan and orders as documented by the fellow/resident, after I modified exam findings and plan of treatments, and the final version is my approved version of the assessment. Additional Comments: Patient was seen and examined agree with the below evaluation and plan. Severe ischemic cardiomyopathy with history of coronary artery disease s/p CABG. Presented with shortness of breath. Feels better now. Continue IV diuresis. Followed by nephrology due to CKD. Close follow-up on intake output and chart and basic metabolic profile. Ejection fraction is 25% and Dr. Alexandro Rebolledo was reviewed. [facility]   Progress Note                 Patient's name:  Rachael Castelan  Medical Record Number: [id number]  Patient's account/billing number: [de-identified]  Patient's YOB: 1960  Age: 60 y.o. Date of Admission: 10/14/2022  4:20 AM  Date of History and Physical Examination: 10/16/2022  Primary Care Physician: Adilene Yi MD    Code Status: Full Code    CHIEF COMPLAINT:    No chief complaint on file. Shortness of breath    ADMISSION DIAGNOSIS:   CHF (congestive heart failure), NYHA class I, acute on chronic, combined ([facility] [address]    REASON FOR CONSULT :   Acute on chronic chf    SUBJECTIVE:      Pt seen and Chart reviewed.    No issue overnight.   -on lasix IV tid  -hfnc 40% fio2, 25 L HF -sats 95%  -vss off pressors  -uop about 3 l/24h  -no new labs this am  Temp (24hrs), Av.5 °F (36.9 °C), Min:98.4 °F (36.9 °C), Max:98.6 °F (37 °C)    Systolic (56TOO), ECR:089 , Min:97 , LYF:488     Diastolic (76LBR), MONIKA:14, Min:63, Max:85        Review of Systems -  General ROS: negative for - chills, fatigue, fever or weight loss  ENT ROS: negative for - headaches, oral lesions or sore throat  Cardiovascular ROS: no chest pain or dyspnea on exertion  Gastrointestinal ROS: no abdominal pain, nausea, change in bowel habits, or black or bloody stools  Skin - no rash   msk - no jt tenderness or swelling       MIDICATION:    Scheduled Meds:   furosemide  40 mg IntraVENous TID    sodium chloride flush  5-40 mL IntraVENous 2 times per day    heparin (porcine)  5,000 Units SubCUTAneous 3 times per day    ipratropium  0.5 mg Nebulization 4x daily    insulin lispro  0-8 Units SubCUTAneous Q4H    cefTRIAXone (ROCEPHIN) IV  1,000 mg IntraVENous Q24H    azithromycin  250 mg IntraVENous Q24H    midodrine  5 mg Oral BID WC    aspirin  81 mg Oral Daily     Continuous Infusions:   sodium chloride      dextrose         PHYSICAL EXAM:    Physical Examination:    Temperature:  Temp: 98.4 °F (36.9 °C)  Respirations:  Resp: 16  Pulse:   Heart Rate: 92  BP:    BP: 122/78    Constitutional and General Appearance: on hfnc  HEENT: PERRL, no cervical lymphadenopathy. No masses palpable. Normal oral mucosa  Respiratory: On hfnc-decreased air entry b/l anterior lung fields  Cardiovascular: The apical impulse is not displaced  Heart auscultation: Mild inspiratory crackles bilaterally. regular S1 and S2.  Jugular venous pulsation Normal  The carotid upstroke is normal in amplitude and contour without delay or bruit  Peripheral pulses are symmetrical and full   Abdomen:  No masses or tenderness  Bowel sounds present  Extremities:   No Cyanosis or Clubbing   Lower extremity edema: +1   Skin: Warm and dry  Neurological:  Alert and oriented.   Moves all extremities well  No abnormalities of mood, affect, memory, mentation, or behavior are noted    DATA:    Labs:   CBC:   Recent Labs     10/15/22  0656 10/16/22  0348   WBC 9.3 7.6   HGB 9.4* 9.1*   HCT 31.8* 30.0*    216       BMP:   Recent Labs     10/15/22  0656 10/16/22  0348   * 138   K 4.4 4.2   CO2 20 25   BUN 33* 31*   CREATININE 1.71* 1.65*   LABGLOM 33* 35*   GLUCOSE 74 141*       BNP: No results for input(s): BNP in the last 72 hours. PT/INR: No results for input(s): PROTIME, INR in the last 72 hours. APTT:No results for input(s): APTT in the last 72 hours. CARDIAC ENZYMES:No results for input(s): CKTOTAL, CKMB, CKMBINDEX, TROPONINI in the last 72 hours. FASTING LIPID PANEL:  Lab Results   Component Value Date/Time    HDL 25 07/15/2022 09:50 AM    LDLDIRECT 70 07/15/2022 09:50 AM    TRIG 724 07/15/2022 09:50 AM     LIVER PROFILE:No results for input(s): AST, ALT, LABALBU in the last 72 hours. Imaging  XR CHEST (SINGLE VIEW FRONTAL)    Result Date: 10/14/2022  EXAMINATION: ONE XRAY VIEW OF THE CHEST 10/14/2022 7:37 am COMPARISON: Chest x-ray 13 October 2022 HISTORY: ORDERING SYSTEM PROVIDED HISTORY: Pulmonary Edema TECHNOLOGIST PROVIDED HISTORY: Pulmonary Edema Reason for Exam: pulmonary edema port upr at 640am FINDINGS: 2 chamber cardiac device overlies the chest wall. Valve replacement and median sternotomy are present. The lungs are hypoinflated. There is central venous congestion with markedly worst right base opacities. Left base is stable. No pneumothorax. Stable left lung. Markedly worse right basilar opacifications likely representing effusion with consolidation and or atelectasis. XR FOOT LEFT (MIN 3 VIEWS)    Result Date: 10/14/2022  EXAMINATION: THREE XRAY VIEWS OF THE LEFT FOOT 10/14/2022 10:34 am COMPARISON: None. HISTORY: ORDERING SYSTEM PROVIDED HISTORY: R/o osteo TECHNOLOGIST PROVIDED HISTORY: R/o osteo Reason for Exam: R/o osteo FINDINGS: No fracture, dislocation or bone destruction. Joint spaces maintained. No soft tissue swelling. Metallic clip in soft tissues medial to distal tibia. Large plantar calcaneal spur. Dorsal calcaneal enthesophyte. No radiographic evidence of osteomyelitis. CT ABDOMEN PELVIS W IV CONTRAST    Result Date: 10/12/2022  EXAMINATION: CTA CHEST W CONTRAST, CT ABDOMEN PELVIS W IV CONTRAST HISTORY: Reason for exam:->PE protocol and consolidation in right lung base. 53-year-old female. Ascites, weakness. COMPARISON: Portable chest 10/11/2022, CT abdomen and pelvis 11/4/2018, CT chest 12/29/2018. TECHNIQUE: CT angiography of the pulmonary arteries following the administration of intravenous contrast. Coronal and sagittal MIP (maximum intensity projection) images were performed. Dose reduction techniques were achieved by using automated exposure control and/or adjustment of mA and/or kV according to patient size and/or use of iterative reconstruction technique. FINDINGS: PERTINENT POSITIVES: Bilateral pleural effusions, moderate on the right. Small on the left. Consolidation with air bronchograms mainly right lower lobe and a small amount in the right middle lobe. Interstitial pulmonary edema. Cardiomegaly. Moderate ascites in the abdomen and pelvis. Anasarca in the soft tissues. PERTINENT NEGATIVES: Negative for pulmonary embolus. No unexpected mass. COINCIDENTAL FINDINGS: Scattered reactive nodes in the mediastinum. Previous sternotomy. Mitral valve prosthesis. Pacemaker. Coronary artery calcifications. Stable small right adrenal adenoma. Hysterectomy. Plaque aorta, without aneurysm. Atrophic left kidney, new. ROUTINE EXAMINATION: No pericardial effusion. Normal liver, spleen, pancreas, left adrenal. No bowel obstruction or free air. Bladder is empty. Degenerative changes in the spine. 1. Negative for pulmonary embolus. 2. Mainly right lower lobe consolidation which could represent atelectasis or pneumonia. 3. Bilateral pleural effusions, right greater than left. Mild pulmonary edema. 4. Moderate ascites. 5. Anasarca.      XR CHEST PORTABLE    Result Date: 10/13/2022  EXAM: XR CHEST PORTABLE HISTORY: Reason for exam:->increased SOB, decreased pulse ox COMPARISON: October 11, 2022-chest plain film; October 12, 2022-CT chest, abdomen and pelvis. TECHNIQUE: A single AP semiupright view of the chest FINDINGS: There is increased perihilar airspace disease and prominence of the pulmonary vascular markings, accentuated by lower lung volumes. There is medial consolidation within the right infrahilar region, with elevation of the right hemidiaphragm. The heart is enlarged, with postoperative changes from a median sternotomy and with a left chest wall AICD in place. Calcification noted within the region of the right carotid artery. The osseous structures are stable. 1. Cardiomegaly with evidence of worsening pulmonary edema. 2. Medial consolidation within the right infrahilar region, atelectasis versus pneumonia. 3. Residual pleural fluid is likely obscured by the hemidiaphragms, for which that on the right is elevated. XR CHEST PORTABLE    Result Date: 10/11/2022  EXAMINATION: XR CHEST PORTABLE, , 10/11/2022 9:48 PM EDT INDICATION: Reason for exam:->SOB HISTORY: Ordering Provider Reason for Exam: Technologist Note: Additional: COMPARISON: Chest x-ray dated 11/4/2018. TECHNIQUE: Chest x-ray: One view. FINDINGS: Stable enlarged cardiac silhouette is seen. Cardiac pacemaker is seen in place. Patient is postmedian sternotomy. Small right pleural effusion and/or right lung base atelectasis/consolidation is seen. Small left pleural effusion cannot be excluded. No obvious pneumothorax is seen. . Small right pleural effusion and/or right lung base atelectasis/consolidation is seen. Small left pleural effusion cannot be excluded. CTA CHEST W CONTRAST    Result Date: 10/12/2022  EXAMINATION: CTA CHEST W CONTRAST, CT ABDOMEN PELVIS W IV CONTRAST HISTORY: Reason for exam:->PE protocol and consolidation in right lung base. 59-year-old female. Ascites, weakness. COMPARISON: Portable chest 10/11/2022, CT abdomen and pelvis 11/4/2018, CT chest 12/29/2018.  TECHNIQUE: CT angiography of the pulmonary arteries following the administration of intravenous contrast. Coronal and sagittal MIP (maximum intensity projection) images were performed. Dose reduction techniques were achieved by using automated exposure control and/or adjustment of mA and/or kV according to patient size and/or use of iterative reconstruction technique. FINDINGS: PERTINENT POSITIVES: Bilateral pleural effusions, moderate on the right. Small on the left. Consolidation with air bronchograms mainly right lower lobe and a small amount in the right middle lobe. Interstitial pulmonary edema. Cardiomegaly. Moderate ascites in the abdomen and pelvis. Anasarca in the soft tissues. PERTINENT NEGATIVES: Negative for pulmonary embolus. No unexpected mass. COINCIDENTAL FINDINGS: Scattered reactive nodes in the mediastinum. Previous sternotomy. Mitral valve prosthesis. Pacemaker. Coronary artery calcifications. Stable small right adrenal adenoma. Hysterectomy. Plaque aorta, without aneurysm. Atrophic left kidney, new. ROUTINE EXAMINATION: No pericardial effusion. Normal liver, spleen, pancreas, left adrenal. No bowel obstruction or free air. Bladder is empty. Degenerative changes in the spine. 1. Negative for pulmonary embolus. 2. Mainly right lower lobe consolidation which could represent atelectasis or pneumonia. 3. Bilateral pleural effusions, right greater than left. Mild pulmonary edema. 4. Moderate ascites. 5. Anasarca. Diagnostics:    EKG: not available  ECHO: not obtained. -apparently 25% per hx-nothing in chart including \"care everywhere\"  Stress Test: not obtained. Cardiac Angiography: Has ICM. H/o CABG/MV REPAIR 5/28/15: LIMA-LAD, SVG-PDA, SVG-D. MV repair. Done by Dr. Matt Metz. CATH 6/4/15: Patent grafts. LIMA-LAD, SVG-PDA, SVG-D. EF 30%. LVEF 25%.     IMPRESSION:    Principal Problem:    CHF (congestive heart failure), NYHA class I, acute on chronic, combined (HCC)  Active Problems:    Acute on chronic systolic CHF (congestive heart failure) ([facility] [address]  Resolved Problems:    * No resolved hospital problems. *    -acute on chronic chf exacebation-ef 25%   -h/o AICD-nonfunctional  -jolie on ckd, urine output improving  -copd exacerbation +/- PNA  -Has ICM. H/o CABG/MV REPAIR 5/28/15: LIMA-LAD, SVG-PDA, SVG-D. MV repair. Done by Dr. Ailyn Miranda. CATH 6/4/15: Patent grafts. LIMA-LAD, SVG-PDA, SVG-D. EF 30%. LVEF 25%. RECOMMENDATIONS:  Continue diuresis per nephrology-currently on lasix 40 IV TID  Monitor I/O's  K>4, mg>2  Continue home meds as tolerated-bb, statin, plavix. Holding acei/arb given jolie  Patient's primary cardiologist is Dr. Whitney Martinez will f/u as OP for cath and ICD generator change after acute issues resolve-see his consult note 10/13/22  Management of other issues e.g. PNA per primary & other consultant teams    Final plan and recommendation will follow discussion with attending Cardiologist on rounds.      Gabby Pleitez MD  PGY-3, Department of Internal Medicine  [address], [facility], [address]

## 2022-10-16 NOTE — PROGRESS NOTES
Critical care team - Resident sign-out to medicine service      Date and time: 10/16/2022 1:55 PM  Patient's name:  Amanda Byrd Record Number: [id number]  Patient's account/billing number: [de-identified]  Patient's YOB: 1960  Age: 60 y.o. Date of Admission: 10/14/2022  4:20 AM  Length of stay during current admission: 2    Primary Care Physician: Brandon Gordillo MD    Code Status: Full Code    Mode of physician to physician communication:        [x] Via telephone   [] In person     Date and time of sign-out: 10/16/2022 1:55 PM    Accepting Internal Medicine resident: Dr. Jluis Avalos    Accepting Medicine team: IM Team - Med 3    Accepting team's attending: Dr. Bernardo Vogt    Patient's current ICU Bed:  3007     Patient's assigned bed on floor:  405        [] Med-Surg Monitored [x] Step-down       [] Psychiatry ICU       [] Psych floor     Reason for ICU admission:     CHF exacerbation with hypotension    ICU course summary:     Patient presented to [facility] ED with shortness of breath, cough, sputum, and swelling in legs. Patient has history of CAD with LV systolic dysfunction, AICD, COPD. The patient was not compliant with her medications since 2 months. Bradycardia, the patient's BUN, creatinine, glucose, BNP, troponins were increased and chest x-ray showed effusion in right lung base with consolidation. The patient was placed on Lasix and Aldactone with Rocephin and Zithromax for possible pneumonia. She was then started with Plavix and Coreg with breathing treatment of Atrovent and Pulmicort for possible COPD exacerbation but she became altered and lethargic but was found that her glucose was 40 and blood pressure was 80/60 mmHg. She was started on dopamine drip and Coreg was held and was transferred to [facility]'s ICU. In ICU, the patient was started with BiPAP, furosemide drip, and midodrine. She was also continued with Zithromax and ceftriaxone for remaining dose.   Nephrology and cardiology was consulted. The patient was also started on diabetic low-sodium fluid restricted diet as she also has history of diabetes mellitus with CHF. She was started with medium dose sliding scale insulin. Cardiology recommended continuation of diuresis with Lasix drip and home meds only when tolerated and holding ACEs and ARB's given CHAD. The patient did not become hypotensive since the first day in ICU and did not need dopamine drip. Nephrology ordered renal ultrasound to assess for obstructive etiology. Recommended that urine output should be kept between 100 to 220 mL/h for 24 hours and titrate Lasix accordingly. The Lasix was then decreased to 5 mg/h next day with monitoring of serum electrolytes every 12 hours. Renal ultrasound showed no hydronephrosis. BiPAP was weaned as tolerated and patient was on high flow after 1 day. Today, the patient is doing better than yesterday. Patient is breathing well and breath sounds are better than yesterday during auscultation with decreased wheezing. Bilateral pedal edema has resolved. Patient has completed her azithromycin dose. Last dose of ceftriaxone on 10/18/2022. Currently on high flow nasal cannula oxygen which is planned to be weaned. Lasix drip was stopped and the patient is currently on Lasix 40 mg IV 3 times daily injection. The patient is planned to be started on Lopressor 12.5 mg. The patient is on midodrine 5 mg oral 2 times daily with meals.   Procedures during patient's ICU stay:     None    Current Vitals:     BP (!) 139/90   Pulse 100   Temp 98.4 °F (36.9 °C) (Oral)   Resp 21   Ht 5' 6\" (1.676 m)   Wt 179 lb 7.3 oz (81.4 kg)   SpO2 95%   BMI 28.96 kg/m²       Cultures:     Blood cultures:                 [] None drawn      [] Negative             []  Positive (Details:  )  Urine Culture:                   [] None drawn      [] Negative             []  Positive (Details:  )  Sputum Culture:               [] None drawn [] Negative             []  Positive (Details:  )   Endotracheal aspirate:     [] None drawn       [] Negative             []  Positive (Details:  )       Consults:     1. Cardiology  2. Nephrology    Assessment:     Patient Active Problem List    Diagnosis Date Noted    CHF (congestive heart failure), NYHA class I, acute on chronic, combined ([facility] [address] 10/14/2022    Acute on chronic systolic CHF (congestive heart failure) ([facility] [address] 10/14/2022    Congestive heart failure due to cardiomyopathy ([facility] [address] 93/46/6656    Acute systolic CHF (congestive heart failure) ([facility] [address] 10/12/2022    Pneumonia 12/28/2018    Dyslipidemia     Ureteral stone with hydronephrosis 11/04/2018    Elevated serum immunoglobulin free light chains     CHAD (acute kidney injury) ([address] 08/23/2018    DM (diabetes mellitus), type 2 with neurological complications ([facility] [address] 56/60/9401    Anxiety 01/26/2016    CAD (coronary artery disease)     Hx of CABG     ICD (implantable cardioverter-defibrillator) in place     H/O mitral valve repair     Hx of myocardial infarction     Hypertension     Onychomycosis of toenail 11/19/2015    S/P cardiac cath-Patent grafts 6/5/15 06/04/2015    Acute coronary syndrome ([facility] [address] 05/26/2015       Additional assessment:    CHF exacerbation  Pneumonia    Recommended Follow-up:     Continue diuresis. On lasix,   Nephro and cardiology following. Follow further recs. Complete 7 days of Abx  Midodrine for BP support      Above mentioned assessment and plan was discussed by me with the admitting medicine resident. The medicine team assigned to the patient by medicine admitting resident will be following up the patient from now onwards on the floor. Di Valente MD  Internal Medicine Resident, PGY-3  [facility];  [place], [place]  10/16/2022,8:16 PM

## 2022-10-16 NOTE — PLAN OF CARE
Problem: Discharge Planning  Goal: Discharge to home or other facility with appropriate resources  10/16/2022 0006 by Malgorzata Taylor  Outcome: Progressing  70/71/1911 0401 by Olena Miranda RN  Outcome: Progressing     Problem: Safety - Adult  Goal: Free from fall injury  10/16/2022 0006 by Malgorzata Taylor  Outcome: Progressing  Flowsheets (Taken 10/15/2022 2046)  Free From Fall Injury:   Instruct family/caregiver on patient safety   Based on caregiver fall risk screen, instruct family/caregiver to ask for assistance with transferring infant if caregiver noted to have fall risk factors  [id number]/79/8164 [id number] by Olena Miranda RN  Outcome: Progressing     Problem: Chronic Conditions and Co-morbidities  Goal: Patient's chronic conditions and co-morbidity symptoms are monitored and maintained or improved  10/16/2022 0006 by Malgorzata Taylor  Outcome: Progressing  Flowsheets (Taken 10/15/2022 2000)  Care Plan - Patient's Chronic Conditions and Co-Morbidity Symptoms are Monitored and Maintained or Improved:   Monitor and assess patient's chronic conditions and comorbid symptoms for stability, deterioration, or improvement   Update acute care plan with appropriate goals if chronic or comorbid symptoms are exacerbated and prevent overall improvement and discharge  94/27/8468 [id number] by Olena Miranda RN  Outcome: Progressing     Problem: Respiratory - Adult  Goal: Achieves optimal ventilation and oxygenation  10/16/2022 0006 by Malgorzata Taylor  Outcome: Progressing  12/25/6262 [id number] by Olena Miranda RN  Outcome: Progressing     Problem: ABCDS Injury Assessment  Goal: Absence of physical injury  Outcome: Progressing  Flowsheets (Taken 10/15/2022 2046)  Absence of Physical Injury: Implement safety measures based on patient assessment    Pt aaox4, no distress, pt maintaining adequate oxygen saturation on high flow nasal cannula. Safety maintained.

## 2022-10-17 PROBLEM — N18.32 STAGE 3B CHRONIC KIDNEY DISEASE (HCC): Status: ACTIVE | Noted: 2022-10-17

## 2022-10-17 PROBLEM — J96.01 ACUTE HYPOXEMIC RESPIRATORY FAILURE (HCC): Status: ACTIVE | Noted: 2022-01-01

## 2022-10-17 PROBLEM — I25.5 ISCHEMIC CARDIOMYOPATHY: Status: ACTIVE | Noted: 2022-01-01

## 2022-10-17 LAB
ABSOLUTE EOS #: 0.07 K/UL (ref 0–0.44)
ABSOLUTE IMMATURE GRANULOCYTE: 0.03 K/UL (ref 0–0.3)
ABSOLUTE LYMPH #: 1.33 K/UL (ref 1.1–3.7)
ABSOLUTE MONO #: 0.7 K/UL (ref 0.1–1.2)
ANION GAP SERPL CALCULATED.3IONS-SCNC: 12 MMOL/L (ref 9–17)
BASOPHILS # BLD: 0 % (ref 0–2)
BASOPHILS ABSOLUTE: <0.03 K/UL (ref 0–0.2)
BUN BLDV-MCNC: 30 MG/DL (ref 8–23)
CALCIUM SERPL-MCNC: 8.4 MG/DL (ref 8.6–10.4)
CHLORIDE BLD-SCNC: 95 MMOL/L (ref 98–107)
CO2: 29 MMOL/L (ref 20–31)
CREAT SERPL-MCNC: 1.39 MG/DL (ref 0.5–0.9)
EOSINOPHILS RELATIVE PERCENT: 1 % (ref 1–4)
GFR SERPL CREATININE-BSD FRML MDRD: 43 ML/MIN/1.73M2
GLUCOSE BLD-MCNC: 176 MG/DL (ref 65–105)
GLUCOSE BLD-MCNC: 182 MG/DL (ref 70–99)
GLUCOSE BLD-MCNC: 204 MG/DL (ref 65–105)
GLUCOSE BLD-MCNC: 223 MG/DL (ref 65–105)
GLUCOSE BLD-MCNC: 227 MG/DL (ref 65–105)
HCT VFR BLD CALC: 31.1 % (ref 36.3–47.1)
HEMOGLOBIN: 9.3 G/DL (ref 11.9–15.1)
IMMATURE GRANULOCYTES: 0 %
LYMPHOCYTES # BLD: 18 % (ref 24–43)
MCH RBC QN AUTO: 26.6 PG (ref 25.2–33.5)
MCHC RBC AUTO-ENTMCNC: 29.9 G/DL (ref 28.4–34.8)
MCV RBC AUTO: 89.1 FL (ref 82.6–102.9)
MONOCYTES # BLD: 9 % (ref 3–12)
NRBC AUTOMATED: 0 PER 100 WBC
PDW BLD-RTO: 16.9 % (ref 11.8–14.4)
PLATELET # BLD: 188 K/UL (ref 138–453)
PMV BLD AUTO: 11.8 FL (ref 8.1–13.5)
POTASSIUM SERPL-SCNC: 3.9 MMOL/L (ref 3.7–5.3)
RBC # BLD: 3.49 M/UL (ref 3.95–5.11)
RBC # BLD: ABNORMAL 10*6/UL
SEG NEUTROPHILS: 72 % (ref 36–65)
SEGMENTED NEUTROPHILS ABSOLUTE COUNT: 5.28 K/UL (ref 1.5–8.1)
SODIUM BLD-SCNC: 136 MMOL/L (ref 135–144)
WBC # BLD: 7.4 K/UL (ref 3.5–11.3)

## 2022-10-17 PROCEDURE — 94664 DEMO&/EVAL PT USE INHALER: CPT

## 2022-10-17 PROCEDURE — 6360000002 HC RX W HCPCS: Performed by: STUDENT IN AN ORGANIZED HEALTH CARE EDUCATION/TRAINING PROGRAM

## 2022-10-17 PROCEDURE — 99233 SBSQ HOSP IP/OBS HIGH 50: CPT | Performed by: INTERNAL MEDICINE

## 2022-10-17 PROCEDURE — 80048 BASIC METABOLIC PNL TOTAL CA: CPT

## 2022-10-17 PROCEDURE — 6370000000 HC RX 637 (ALT 250 FOR IP): Performed by: STUDENT IN AN ORGANIZED HEALTH CARE EDUCATION/TRAINING PROGRAM

## 2022-10-17 PROCEDURE — 2580000003 HC RX 258: Performed by: STUDENT IN AN ORGANIZED HEALTH CARE EDUCATION/TRAINING PROGRAM

## 2022-10-17 PROCEDURE — 97162 PT EVAL MOD COMPLEX 30 MIN: CPT

## 2022-10-17 PROCEDURE — 99232 SBSQ HOSP IP/OBS MODERATE 35: CPT | Performed by: INTERNAL MEDICINE

## 2022-10-17 PROCEDURE — 82947 ASSAY GLUCOSE BLOOD QUANT: CPT

## 2022-10-17 PROCEDURE — 2500000003 HC RX 250 WO HCPCS: Performed by: STUDENT IN AN ORGANIZED HEALTH CARE EDUCATION/TRAINING PROGRAM

## 2022-10-17 PROCEDURE — 94761 N-INVAS EAR/PLS OXIMETRY MLT: CPT

## 2022-10-17 PROCEDURE — 6370000000 HC RX 637 (ALT 250 FOR IP): Performed by: INTERNAL MEDICINE

## 2022-10-17 PROCEDURE — 94618 PULMONARY STRESS TESTING: CPT

## 2022-10-17 PROCEDURE — 36415 COLL VENOUS BLD VENIPUNCTURE: CPT

## 2022-10-17 PROCEDURE — 85025 COMPLETE CBC W/AUTO DIFF WBC: CPT

## 2022-10-17 PROCEDURE — 2060000000 HC ICU INTERMEDIATE R&B

## 2022-10-17 PROCEDURE — 6360000002 HC RX W HCPCS: Performed by: INTERNAL MEDICINE

## 2022-10-17 PROCEDURE — 97535 SELF CARE MNGMENT TRAINING: CPT

## 2022-10-17 PROCEDURE — 94640 AIRWAY INHALATION TREATMENT: CPT

## 2022-10-17 PROCEDURE — 97530 THERAPEUTIC ACTIVITIES: CPT

## 2022-10-17 PROCEDURE — 6370000000 HC RX 637 (ALT 250 FOR IP)

## 2022-10-17 PROCEDURE — 2700000000 HC OXYGEN THERAPY PER DAY

## 2022-10-17 PROCEDURE — 97165 OT EVAL LOW COMPLEX 30 MIN: CPT

## 2022-10-17 RX ORDER — FUROSEMIDE 40 MG/1
40 TABLET ORAL 2 TIMES DAILY
Qty: 60 TABLET | Refills: 3 | Status: SHIPPED | OUTPATIENT
Start: 2022-10-18

## 2022-10-17 RX ORDER — FUROSEMIDE 40 MG/1
40 TABLET ORAL 2 TIMES DAILY
Status: DISCONTINUED | OUTPATIENT
Start: 2022-10-17 | End: 2022-10-18 | Stop reason: HOSPADM

## 2022-10-17 RX ADMIN — IPRATROPIUM BROMIDE 0.5 MG: 0.5 SOLUTION RESPIRATORY (INHALATION) at 12:31

## 2022-10-17 RX ADMIN — HEPARIN SODIUM 5000 UNITS: 5000 INJECTION INTRAVENOUS; SUBCUTANEOUS at 22:09

## 2022-10-17 RX ADMIN — MIDODRINE HYDROCHLORIDE 5 MG: 5 TABLET ORAL at 08:41

## 2022-10-17 RX ADMIN — METOPROLOL TARTRATE 12.5 MG: 25 TABLET ORAL at 08:41

## 2022-10-17 RX ADMIN — FUROSEMIDE 40 MG: 40 TABLET ORAL at 16:38

## 2022-10-17 RX ADMIN — FUROSEMIDE 40 MG: 10 INJECTION, SOLUTION INTRAMUSCULAR; INTRAVENOUS at 08:41

## 2022-10-17 RX ADMIN — INSULIN LISPRO 2 UNITS: 100 INJECTION, SOLUTION INTRAVENOUS; SUBCUTANEOUS at 16:00

## 2022-10-17 RX ADMIN — ASPIRIN 81 MG: 81 TABLET, CHEWABLE ORAL at 08:41

## 2022-10-17 RX ADMIN — INSULIN LISPRO 2 UNITS: 100 INJECTION, SOLUTION INTRAVENOUS; SUBCUTANEOUS at 06:42

## 2022-10-17 RX ADMIN — SODIUM CHLORIDE, PRESERVATIVE FREE 10 ML: 5 INJECTION INTRAVENOUS at 08:41

## 2022-10-17 RX ADMIN — CEFTRIAXONE SODIUM 1000 MG: 10 INJECTION, POWDER, FOR SOLUTION INTRAVENOUS at 09:03

## 2022-10-17 RX ADMIN — SODIUM CHLORIDE, PRESERVATIVE FREE 10 ML: 5 INJECTION INTRAVENOUS at 22:17

## 2022-10-17 RX ADMIN — MIDODRINE HYDROCHLORIDE 5 MG: 5 TABLET ORAL at 16:38

## 2022-10-17 RX ADMIN — HEPARIN SODIUM 5000 UNITS: 5000 INJECTION INTRAVENOUS; SUBCUTANEOUS at 14:02

## 2022-10-17 RX ADMIN — IPRATROPIUM BROMIDE 0.5 MG: 0.5 SOLUTION RESPIRATORY (INHALATION) at 19:59

## 2022-10-17 RX ADMIN — HEPARIN SODIUM 5000 UNITS: 5000 INJECTION INTRAVENOUS; SUBCUTANEOUS at 06:42

## 2022-10-17 RX ADMIN — SODIUM CHLORIDE, PRESERVATIVE FREE 10 ML: 5 INJECTION INTRAVENOUS at 09:03

## 2022-10-17 RX ADMIN — METOPROLOL TARTRATE 12.5 MG: 25 TABLET ORAL at 22:09

## 2022-10-17 RX ADMIN — IPRATROPIUM BROMIDE 0.5 MG: 0.5 SOLUTION RESPIRATORY (INHALATION) at 08:03

## 2022-10-17 RX ADMIN — INSULIN LISPRO 2 UNITS: 100 INJECTION, SOLUTION INTRAVENOUS; SUBCUTANEOUS at 03:30

## 2022-10-17 RX ADMIN — IPRATROPIUM BROMIDE 0.5 MG: 0.5 SOLUTION RESPIRATORY (INHALATION) at 15:33

## 2022-10-17 ASSESSMENT — PULMONARY FUNCTION TESTS: PEFR_L/MIN: 100

## 2022-10-17 ASSESSMENT — PAIN SCALES - GENERAL: PAINLEVEL_OUTOF10: 0

## 2022-10-17 ASSESSMENT — ENCOUNTER SYMPTOMS
SHORTNESS OF BREATH: 1
RHINORRHEA: 0
COUGH: 0

## 2022-10-17 NOTE — PROGRESS NOTES
Renal Progress Note    Patient :  Libby Bush; 60 y.o. MRN# [id number]  Location:  [facility]/1261-65  Attending:  Hira Gomez MD  Admit Date:  10/14/2022   Hospital Day: 3      Subjective:     Patient examined at bedside  Patient feeling better today significant improvement in her breathing. No acute distress. No complaints  /77, pulse 92, afebrile, respiratory rate 21, on 3 L nasal cannula. 2.7 L urine output in last 24 hours  Pending morning BMP, currently on IV Lasix 40 mg 3 times daily. Brief history  69-year-old female presented with shortness of breath following for CHAD/CKD III with baseline creatinine 1.5 who was admitted for acute systolic heart failure after running out of diuretics over 2 months ago. Creatinine on admission was 1.89. This morning down to 1.65. She was taken off Lasix drip early morning and started on intermittent Lasix 40mg Q8 hours. CXR with improvement in pulmonary congestion but still persist      Outpatient Medications:     Medications Prior to Admission: gabapentin (NEURONTIN) 400 MG capsule, Take 1 capsule by mouth in the morning and 1 capsule at noon and 1 capsule before bedtime. Do all this for 90 days. lisinopril (PRINIVIL;ZESTRIL) 5 MG tablet, take 1 tablet by mouth once daily  midodrine (PROAMATINE) 5 MG tablet, take 1 tablet by mouth three times a day  sertraline (ZOLOFT) 100 MG tablet, take 1 tablet by mouth once daily  clopidogrel (PLAVIX) 75 MG tablet, take 1 tablet by mouth once daily  atorvastatin (LIPITOR) 80 MG tablet, take 1 tablet by mouth once daily  carvedilol (COREG) 3.125 MG tablet, take 1 tablet by mouth twice a day with meals  insulin glargine (LANTUS SOLOSTAR) 100 UNIT/ML injection pen, INJECT 45 UNITS SUBCUTANEOUSLY EVERY MORNING AND 25 UNITS EVERY EVENING  glucose monitoring (FREESTYLE) kit, Please dispense meter covered by patients insurance.   Insulin Pen Needle (BD PEN NEEDLE PAIGE 2ND GEN) 32G X 4 MM MISC, use 1 PEN NEEDLE to inject MEDICATION subcutaneously daily  Lancets MISC, Pt testing BS once daily and prn  blood glucose monitor strips, Testing BS once daily and as needed.  Dispense strips to go with new glucometer  albuterol (PROVENTIL) (2.5 MG/3ML) 0.083% nebulizer solution, Take 3 mLs by nebulization every 6 hours as needed for Wheezing (as needed for wheezing)  furosemide (LASIX) 40 MG tablet, TAKE 1 TABLET BY MOUTH ONCE DAILY  Respiratory Therapy Supplies (NEBULIZER/TUBING/MOUTHPIECE) KIT, 1 kit by Does not apply route daily as needed (for SOB, cough) (Patient not taking: Reported on 4/12/2022)  Respiratory Therapy Supplies (NEBULIZER COMPRESSOR) KIT, 1 kit by Does not apply route once for 1 dose  lactobacillus (CULTURELLE) capsule, Take 1 capsule by mouth 2 times daily (with meals)  blood glucose monitor strips, Use to test daily  blood glucose monitor kit and supplies, Use to test daily  Cholecalciferol (VITAMIN D3) 5000 units TABS, Take 5,000 Units by mouth daily  glucose blood VI test strips (EXACTECH TEST) strip, 1 each by In Vitro route daily Pt testing BS once daily and prn  Multiple Vitamins-Minerals (THERAPEUTIC MULTIVITAMIN-MINERALS) tablet, Take 1 tablet by mouth daily (with breakfast)    Current Medications:     Scheduled Meds:    metoprolol tartrate  12.5 mg Oral BID    furosemide  40 mg IntraVENous TID    sodium chloride flush  5-40 mL IntraVENous 2 times per day    heparin (porcine)  5,000 Units SubCUTAneous 3 times per day    ipratropium  0.5 mg Nebulization 4x daily    insulin lispro  0-8 Units SubCUTAneous Q4H    cefTRIAXone (ROCEPHIN) IV  1,000 mg IntraVENous Q24H    midodrine  5 mg Oral BID WC    aspirin  81 mg Oral Daily     Continuous Infusions:    sodium chloride      dextrose       PRN Meds:  sodium chloride flush, sodium chloride, ondansetron **OR** ondansetron, polyethylene glycol, acetaminophen **OR** acetaminophen, levalbuterol, glucose, dextrose bolus **OR** dextrose bolus, glucagon (rDNA), dextrose    Input/Output:       I/O last 3 completed shifts: In: 24.3 [I.V.:24.3]  Out: 4390 [WHFOT:[id number]]. Patient Vitals for the past 96 hrs (Last 3 readings):   Weight   10/16/22 0600 179 lb 7.3 oz (81.4 kg)   10/15/22 0600 179 lb 14.3 oz (81.6 kg)   10/14/22 0430 185 lb 13.6 oz (84.3 kg)       Vital Signs:   Temperature:  Temp: 98.2 °F (36.8 °C)  TMax:   Temp (24hrs), Av.3 °F (36.8 °C), Min:97.7 °F (36.5 °C), Max:98.5 °F (36.9 °C)    Respirations:  Resp: 21  Pulse:   Heart Rate: 92  BP:    BP: 107/77  BP Range: Systolic (97HXS), WBV:593 , Min:102 , DTB:732       Diastolic (16DYU), RWB:52, Min:65, Max:90      Physical Examination:     General:  AAO x 3, speaking in full sentences, no accessory muscle use. HEENT: Atraumatic, normocephalic, no throat congestion, moist mucosa. Eyes:   Pupils equal, round and reactive to light, EOMI. Neck:   No JVD, no thyromegaly, no lymphadenopathy. Chest:            Scattered crackles around lung   Cardiac:  S1 S2 RR, no murmurs, gallops or rubs, JVP not raised. Abdomen: Soft, non-tender, no masses or organomegaly, BS audible. :   No suprapubic or flank tenderness. Neuro:              AAO x 3, No FND. SKIN:  Trace edema, palpable peripheral pulses, no calf tenderness.     Labs:       Recent Labs     10/15/22  0656 10/16/22  0348 10/17/22  0642   WBC 9.3 7.6 7.4   RBC 3.50* 3.38* 3.49*   HGB 9.4* 9.1* 9.3*   HCT 31.8* 30.0* 31.1*   MCV 90.9 88.8 89.1   MCH 26.9 26.9 26.6   MCHC 29.6 30.3 29.9   RDW 17.4* 17.2* 16.9*    216 188   MPV 11.2 11.1 11.8      BMP:   Recent Labs     10/14/22  1829 10/15/22  0656 10/16/22  0348    134* 138   K 4.6 4.4 4.2    101 101   CO2 20 20 25   BUN 33* 33* 31*   CREATININE 1.76* 1.71* 1.65*   GLUCOSE 108* 74 141*   CALCIUM 8.0* 8.1* 8.2*      Magnesium:    Recent Labs     10/14/22  1829   MG 2.1     Urinalysis/Chemistries:      Lab Results   Component Value Date/Time    NITRU POSITIVE 10/11/2022 11:39 PM    COLORU Yellow 10/11/2022 11:39 PM    PHUR 5.0 10/11/2022 11:39 PM    WBCUA NONE SEEN 10/11/2022 11:39 PM    RBCUA 2 TO 5 10/11/2022 11:39 PM    MUCUS NOT REPORTED 11/21/2018 12:12 PM    TRICHOMONAS NOT REPORTED 11/21/2018 12:12 PM    YEAST NOT REPORTED 11/21/2018 12:12 PM    BACTERIA 3+ 10/11/2022 11:39 PM    SPECGRAV 1.025 10/11/2022 11:39 PM    LEUKOCYTESUR NEGATIVE 10/11/2022 11:39 PM    UROBILINOGEN Normal 10/11/2022 11:39 PM    BILIRUBINUR NEGATIVE 10/11/2022 11:39 PM    GLUCOSEU 50 mg/dL 10/11/2022 11:39 PM    KETUA NEGATIVE 10/11/2022 11:39 PM    AMORPHOUS TRACE 11/21/2018 12:12 PM     Urine Creatinine:     Lab Results   Component Value Date/Time    LABCREA 140.9 07/15/2022 09:49 AM       Radiology:     CXR:   1. Slight improvement in aeration of the lung bases. Persistent lower zone   predominant airspace disease, right greater than left with small bilateral   pleural effusions. Mild pulmonary vascular congestion. 2. Stable cardiomegaly. Assessment:     CHAD: ATN from decompensated heart failure and likely progression of chronic kidney disease. CKD III: Ischemic nephrosclerosis with Baseline 1.5-to 1.6 mg/dL creatinine is very close to her baseline  ICMP with EF 25%. Has non functioning ICD per cardiology. Diabetes  Acute on chronic systolic heart failure. Was off diuretics for 2 months  Anemia of chronic disease  CAD s/p CABG and MVR  Plan: 1. Decrese Lasix to  40mg bid po  2. BMP daily  3. Fluid restrictions  4. Continue Midodrine  5. Strict input and output  6. OK for discharge from nephro standpoint    Nutrition   Please ensure that patient is on a renal diet/TF. Avoid nephrotoxic drugs/contrast exposure. We will continue to follow along with you. Angela Andrews MD  Internal Medicine Resident, PGY- [facility];  [place], [place]  10/17/2022, 8:53 AM  Attending Physician Statement  I have discussed the care of Danie Heath, including pertinent history and exam findings with the resident/fellow. I have reviewed the key elements of all parts of the encounter with the resident/fellow. I have seen and examined the patient with the resident/fellow. I agree with the assessment and plan and status of the problem list as documented. Patient tells me that she is feeling a lot better since she was started on IV Lasix. Her leg edema and her shortness of breath are both significantly improved. Her baseline serum creatinine seems to be somewhere around 1.5-1.6. Creatinine yesterday was down to 1.6. We have transitioned her IV Lasix to oral.  From my side she is okay for discharge anytime.   She can follow-up with us in [place] [facility] office next month  I am going to sign off and please call me back if any questions arise    Phylicia Robertson MD , MD

## 2022-10-17 NOTE — PROGRESS NOTES
Occupational Therapy  [facility]  Occupational Therapy Initial Assessment    Name: Citlaly Bruno  : 1960  MRN: [id number]  Date of Service: 10/17/2022    Discharge Recommendations:   No occupational therapy recommended at discharge   OT Equipment Recommendations  Equipment Needed: Yes  Mobility Devices: ADL Assistive Devices  ADL Assistive Devices: Shower Chair with back       Patient Diagnosis(es): There were no encounter diagnoses. Past Medical History:  has a past medical history of CAD (coronary artery disease), Cardiomyopathy ([address] COPD (chronic obstructive pulmonary disease) ([facility] [address] Depression, Diabetes mellitus ([facility] [address] H/O mitral valve repair, History of fractured kneecap, Hx of CABG, Hx of myocardial infarction, Hyperlipidemia, Hypertension, ICD (implantable cardioverter-defibrillator) in place, Kidney calculi, Osteoarthritis, Pneumonia, and Renal calculi. Past Surgical History:  has a past surgical history that includes Cholecystectomy (); Hysterectomy (); Ankle surgery (Right, ); Kidney stone surgery (Left, ); Tubal ligation; Mitral valve surgery (6/4/15); Cardiac defibrillator placement (06/01/15); Coronary artery bypass graft (05/28/15); Cystocopy (Left, 2018); pr cysto/uretero w/lithotripsy &indwell stent insrt (Left, 2018); pr cysto/uretero w/lithotripsy &indwell stent insrt (Left, 2018); and pr cysto/uretero w/lithotripsy &indwell stent insrt (Left, 11/15/2018). Treatment Diagnosis: CHF      Assessment   Performance deficits / Impairments: Decreased ADL status; Decreased functional mobility ; Decreased high-level IADLs;Decreased endurance  Treatment Diagnosis: CHF  Prognosis: Good  Decision Making: Low Complexity  REQUIRES OT FOLLOW-UP: Yes  Activity Tolerance  Activity Tolerance: Patient Tolerated treatment well        Plan   Occupational Therapy Plan  Times Per Week: 1-3 visits     Restrictions  Restrictions/Precautions  Required Braces or Orthoses?: No  Position Activity Restriction  Other position/activity restrictions: up with assist, 2L O2    Subjective   General  Patient assessed for rehabilitation services?: Yes  Family / Caregiver Present: No  Diagnosis: CHF  General Comment  Comments: pt in bathroom upon arrival and agreeable to session. pt denied pain throughout session     Social/Functional History  Social/Functional History  Lives With: Spouse, Family (4 grandchildren (ages 16, 15, 5, 6))  Type of Home: House  Home Layout: Two level, Able to Live on Main level with bedroom/bathroom  Home Access: Stairs to enter with rails  Entrance Stairs - Number of Steps: 3  Entrance Stairs - Rails: Left  Bathroom Shower/Tub: Tub/Shower unit  Bathroom Toilet: Standard  Home Equipment:  (pt reported no use of DME at baseline)  ADL Assistance: [address]: Independent  Homemaking Responsibilities: Yes (pt reported splitting cooking and  cleaning with )  Meal Prep Responsibility: Primary  Laundry Responsibility: No ( completes)  Cleaning Responsibility: Primary  Ambulation Assistance: Independent  Transfer Assistance: Independent  Active : No  Patient's  Info:   Mode of Transportation: Car  Occupation: Unemployed  Type of Occupation: [employer]  Additional Comments: pt reported  is able to provide 24/7       Objective   O2 Device: Nasal cannula (2L)             Safety Devices  Type of Devices: Call light within reach; Left in bed  Restraints  Restraints Initially in Place: No  Bed Mobility Training  Bed Mobility Training: Yes  Overall Level of Assistance: Stand-by assistance  Supine to Sit:  (pt in restroom upon arrival)  Sit to Supine: Stand-by assistance  Scooting: Stand-by assistance  Balance  Sitting: Intact (~2 minutes on eOB)  Standing: Intact (~5 minutes. pt completed standing in restroom and functional mobility back to bed with CGA for safety. pt O2 93% once returned to bed.  pt reported some fatigue upon completion)  Transfer Training  Transfer Training: Yes  Overall Level of Assistance: Stand-by assistance  Sit to Stand: Stand-by assistance  Stand to Sit: Stand-by assistance  Gait  Overall Level of Assistance: Contact-guard assistance      AROM: Within functional limits  Strength:  Within functional limits  Coordination: Within functional limits  Tone: Normal  Sensation: Intact    ADL  Feeding: Independent  Grooming: Independent  UE Bathing: Supervision  LE Bathing: Contact guard assistance  UE Dressing: Supervision  LE Dressing: Contact guard assistance  Toileting: Contact guard assistance                Vision  Vision: Impaired  Vision Exceptions: Wears glasses for reading  Hearing  Hearing: Within functional limits    Cognition  Overall Cognitive Status: WFL  Orientation  Overall Orientation Status: Within Functional Limits                    Education Given To: Patient  Education Provided: Role of Therapy;Plan of Care;Energy Conservation  Education Method: Verbal  Barriers to Learning: None  Education Outcome: Verbalized understanding    LUE AROM (degrees)  LUE AROM : WFL  Left Hand AROM (degrees)  Left Hand AROM: WFL  RUE AROM (degrees)  RUE AROM : WFL  Right Hand AROM (degrees)  Right Hand AROM: WFL        Hand Dominance  Hand Dominance: Right                                                          AM-PAC Score        AM-PAC Inpatient Daily Activity Raw Score: 20 (10/17/22 1037)  AM-PAC Inpatient ADL T-Scale Score : 42.03 (10/17/22 1037)  ADL Inpatient CMS 0-100% Score: 38.32 (10/17/22 1037)  ADL Inpatient CMS G-Code Modifier : CJ (10/17/22 1037)           Goals  Short Term Goals  Time Frame for Short Term Goals: pt will, by discharge  Short Term Goal 1: complete LB ADLs and toileting tasks with supervision  Short Term Goal 2: complete UB ADLs with mod I  Short Term Goal 3: dem ~8 minutes dynamic standing tolerance with supervision in order to complete functional tasks  Short Term Goal 4: dem supervision during functional transfers/functional mobility  Short Term Goal 5: dem understanding and ind initiate use of 2-3 energy conservation tech  Short Term Goal 6: increase activity tolerance to 25+ minutes in order to participate in daily tasks       Therapy Time   Individual Concurrent Group Co-treatment   Time In [address]         Time Out 0848         Minutes [address] [facility], OTR/L

## 2022-10-17 NOTE — PROGRESS NOTES
Congestive Heart Failure Education completed and charted. CHF booklet given. Patient was receptive to education. Discussed the  importance of medication compliance. Discussed the importance of a heart healthy diet. Discussed 2000 mg sodium-restricted daily diet. Patient instructed to limit fluid intake to  1.5 to 2 liters per day. Patient instructed to weigh self at the same time of each day each morning, reinforced teaching to monitor for 3-5 lb weight increase over 1-2 days notify physician if change noted. Signs and symptoms of CHF discussed with patient, such as feeling more tired than normal, feeling short of breath, coughing that increases when lying down, sudden weight gain, swelling of the feet, legs or belly. Patient verbalized understanding to notify physician office if these symptoms occur.   EF 25%  Pt resides in the [facility]

## 2022-10-17 NOTE — CARE COORDINATION
Transitional Planning:  Pt off high flow. On O2@ 3L/nc. Will need home O2 eval if not weaned. Plan is to switch to PO ABX at discharge.

## 2022-10-17 NOTE — PROGRESS NOTES
[facility]  Internal Medicine Teaching Residency Program  Inpatient Daily Progress Note  ______________________________________________________________________________    Patient: Abdi Deluna  YOB: 1960   HIU:[id number]    Acct: [de-identified]     Room: [address] [place] [address]  Admit date: 10/14/2022  Today's date: 10/17/22  Number of days in the hospital: 3    SUBJECTIVE   Admitting Diagnosis: CHF (congestive heart failure), NYHA class I, acute on chronic, combined ([facility] [address]  CC: Shortness of breath. Pt examined at bedside. Chart & results reviewed. No acute events reported overnight. Patient is hemodynamically stable. In no acute distress. Lying comfortably on the bed. Patient is currently on nasal [employer]. She is A&O x4, denies any other new complaints. Patient patient denies any shortness of breath, chest pain, nausea, vomiting. Patient mentions that she has not had a bowel movement since she has been in the hospital.    ROS:  Constitutional:  negative for chills, fevers, sweats  Respiratory:  negative for cough, dyspnea on exertion, hemoptysis, shortness of breath, wheezing  Cardiovascular:  negative for chest pain, chest pressure/discomfort, lower extremity edema, palpitations  Gastrointestinal:  negative for abdominal pain, constipation, diarrhea, nausea, vomiting  Neurological:  negative for dizziness, headache  BRIEF HISTORY     Patient has past medical history of CAD with left 20 respiratory dysfunction with ejection fraction of 25%, COPD, AICD. Patient has a history of diabetes mellitus. Patient reports good compliance with medication x2 months     The patient is a 60 y.o. female with past medical history significant for  who was initially admitted on 10/14/2022 with   The patient initially presented to [facility] ED with shortness of breath, cough, sputum and swelling in the legs.   On initial presentation the patient was bradycardic, BUN, Abdomen: Slightly rounded and soft without organomegaly. No rebound, rigidity or guarding was appreciated. Lymphatic: No lymphadenopathy. Musculoskeletal: Normal curvature of the spine. No gross muscle weakness. Extremities:  No lower extremity edema, ulcerations, tenderness, varicosities or erythema. Muscle size, tone and strength are normal.  No involuntary movements are noted. Skin:  Warm and dry. Good color, turgor and pigmentation. No lesions or scars.   No cyanosis or clubbing  Neurological/Psychiatric: The patient's general behavior, level of consciousness, thought content and emotional status is normal.        Medications:  Scheduled Medications:    metoprolol tartrate  12.5 mg Oral BID    furosemide  40 mg IntraVENous TID    sodium chloride flush  5-40 mL IntraVENous 2 times per day    heparin (porcine)  5,000 Units SubCUTAneous 3 times per day    ipratropium  0.5 mg Nebulization 4x daily    insulin lispro  0-8 Units SubCUTAneous Q4H    cefTRIAXone (ROCEPHIN) IV  1,000 mg IntraVENous Q24H    midodrine  5 mg Oral BID WC    aspirin  81 mg Oral Daily     Continuous Infusions:    sodium chloride      dextrose       PRN Medicationssodium chloride flush, 5-40 mL, PRN  sodium chloride, , PRN  ondansetron, 4 mg, Q8H PRN   Or  ondansetron, 4 mg, Q6H PRN  polyethylene glycol, 17 g, Daily PRN  acetaminophen, 650 mg, Q6H PRN   Or  acetaminophen, 650 mg, Q6H PRN  levalbuterol, 0.63 mg, Q6H PRN  glucose, 4 tablet, PRN  dextrose bolus, 125 mL, PRN   Or  dextrose bolus, 250 mL, PRN  glucagon (rDNA), 1 mg, PRN  dextrose, , Continuous PRN        Diagnostic Labs:  CBC:   Recent Labs     10/15/22  0656 10/16/22  0348   WBC 9.3 7.6   RBC 3.50* 3.38*   HGB 9.4* 9.1*   HCT 31.8* 30.0*   MCV 90.9 88.8   RDW 17.4* 17.2*    216     BMP:   Recent Labs     10/14/22  1829 10/15/22  0656 10/16/22  0348    134* 138   K 4.6 4.4 4.2    101 101   CO2 20 20 25   BUN 33* 33* 31*   CREATININE 1.76* 1.71* 1.65* BNP: No results for input(s): BNP in the last 72 hours. PT/INR: No results for input(s): PROTIME, INR in the last 72 hours. APTT: No results for input(s): APTT in the last 72 hours. CARDIAC ENZYMES: No results for input(s): CKMB, CKMBINDEX, TROPONINI in the last 72 hours. Invalid input(s): CKTOTAL;3  FASTING LIPID PANEL:  Lab Results   Component Value Date    CHOL 199 07/15/2022    HDL 25 (L) 07/15/2022    TRIG 724 (H) 07/15/2022     LIVER PROFILE: No results for input(s): AST, ALT, ALB, BILIDIR, BILITOT, ALKPHOS in the last 72 hours. MICROBIOLOGY:   Lab Results   Component Value Date/Time    CULTURE NO GROWTH 3 DAYS 10/13/2022 12:22 PM       Imaging:    XR CHEST (SINGLE VIEW FRONTAL)    Result Date: 10/14/2022  Stable left lung. Markedly worse right basilar opacifications likely representing effusion with consolidation and or atelectasis. XR FOOT LEFT (MIN 3 VIEWS)    Result Date: 10/14/2022  No radiographic evidence of osteomyelitis. US RENAL COMPLETE    Result Date: 10/15/2022  1. Abdominal ascites. 2. Garcia catheter present within a collapsed urinary bladder. 3. Thinning of the left renal cortex. No hydronephrosis. CT ABDOMEN PELVIS W IV CONTRAST    Result Date: 10/12/2022  1. Negative for pulmonary embolus. 2. Mainly right lower lobe consolidation which could represent atelectasis or pneumonia. 3. Bilateral pleural effusions, right greater than left. Mild pulmonary edema. 4. Moderate ascites. 5. Anasarca. XR CHEST PORTABLE    Result Date: 10/16/2022  1. Slight improvement in aeration of the lung bases. Persistent lower zone predominant airspace disease, right greater than left with small bilateral pleural effusions. Mild pulmonary vascular congestion. 2. Stable cardiomegaly. XR CHEST PORTABLE    Result Date: 10/13/2022  1. Cardiomegaly with evidence of worsening pulmonary edema. 2. Medial consolidation within the right infrahilar region, atelectasis versus pneumonia.  3. Residual pleural fluid is likely obscured by the hemidiaphragms, for which that on the right is elevated. XR CHEST PORTABLE    Result Date: 10/11/2022  . Small right pleural effusion and/or right lung base atelectasis/consolidation is seen. Small left pleural effusion cannot be excluded. CTA CHEST W CONTRAST    Result Date: 10/12/2022  1. Negative for pulmonary embolus. 2. Mainly right lower lobe consolidation which could represent atelectasis or pneumonia. 3. Bilateral pleural effusions, right greater than left. Mild pulmonary edema. 4. Moderate ascites. 5. Anasarca. ASSESSMENT & PLAN     ASSESSMENT / PLAN:     1. Acute on Chronic CHF:  -Systolic heart failure with ejection fraction of 25%. -Status post mitral valve repair. -AICD in Place.  -Furosemide 40mg TID  -Midodrine 5 BID. -Lopressor 12.5 BD     2. Acute Hypoxic Respiratory Failure:  -Secondary to Acute Exacerbation of COPD , Acute CHF. -We will continue Rocephin. Finished dose of Zithromax.  -We will continue breathing treatment. 3.Chronic Kidney Disease Stage 3b:  -Baseline Cr   -Monitor urine output.  -Avoid Nephrotoxic medications. 4.Type 2 Diabetes Mellitus:  -Medium Dose corrective Sliding Scale Insulin. 5.CAD s/p AICD placement:  -Continue Aspirin. 6.chronic Smoker:  -We will encourage smoking cessation.  -Nicotine patch as needed     7. Hyperlipidemia   - started back on Lipitor. 8.H/O Depression / anxiety   - started back on Zoloft. DVT ppx: Heparin SC  GI ppx:      PT/OT/SW:Consulted  Discharge Planning: In Progress    Dawson House MD  Internal Medicine Resident, PGY-1  [facility]; [place], [place]  10/17/2022, 6:16 AM    Attestation and add on       I have discussed the care of Teresa Ledbetter , including pertinent history and exam findings,      10/17/22    with the resident.   I have seen and examined the patient and the key elements of all parts of the encounter have been performed by me .   I agree with the assessment, plan and orders as documented by the resident. MD RESHMA [address], [address] [place] [address].    Phone [phone]   Fax: [phone]  Answering Service: [phone]

## 2022-10-17 NOTE — PROGRESS NOTES
Home Oxygen Evaluation completed. Patient is on 2 liters per minute via Nasal Cannula. Resting SpO2 = 94%  Resting SpO2 on room air = 90%    SpO2 on room air with exercise = 87%  SpO2 on oxygen as above with exercise = 92%    Nocturnal Oximetry with patient on room air is recommended is SpO2 is between 89% and 95% (requires additional order).     Renata Tidwell, LORRAINE  3:07 PM

## 2022-10-17 NOTE — PROGRESS NOTES
Physical Therapy  Facility/Department: [facility]  Physical Therapy Initial Assessment    Name: Javi Brito  : 1960  MRN: [id number]  Date of Service: 10/17/2022  PMH: 76-year-old female presented with shortness of breath following for CHAD/CKD III with baseline creatinine 1.5 who was admitted for acute systolic heart failure after running out of diuretics over 2 months ago. Discharge Recommendations:    Further therapy recommended at discharge. Patient Diagnosis(es): There were no encounter diagnoses. Past Medical History:  has a past medical history of CAD (coronary artery disease), Cardiomyopathy ([facility] [address] COPD (chronic obstructive pulmonary disease) ([facility] [address] Depression, Diabetes mellitus ([facility] [address] H/O mitral valve repair, History of fractured kneecap, Hx of CABG, Hx of myocardial infarction, Hyperlipidemia, Hypertension, ICD (implantable cardioverter-defibrillator) in place, Kidney calculi, Osteoarthritis, Pneumonia, and Renal calculi. Past Surgical History:  has a past surgical history that includes Cholecystectomy (); Hysterectomy (); Ankle surgery (Right, ); Kidney stone surgery (Left, ); Tubal ligation; Mitral valve surgery (6/4/15); Cardiac defibrillator placement (06/01/15); Coronary artery bypass graft (05/28/15); Cystocopy (Left, 2018); pr cysto/uretero w/lithotripsy &indwell stent insrt (Left, 2018); pr cysto/uretero w/lithotripsy &indwell stent insrt (Left, 2018); and pr cysto/uretero w/lithotripsy &indwell stent insrt (Left, 11/15/2018). Assessment   Body Structures, Functions, Activity Limitations Requiring Skilled Therapeutic Intervention: Decreased functional mobility ; Decreased ADL status; Decreased tolerance to work activity; Decreased strength;Decreased endurance;Decreased balance  Assessment: Pt amb 150 ft RW CGA, mild gait deficits noted, no c/o adverse s/s with ambulation.  Pt would benefit from continued acute PT to address deficits  Therapy Prognosis: Good  Decision Making: Medium Complexity  Requires PT Follow-Up: Yes  Activity Tolerance  Activity Tolerance: Patient tolerated treatment well;Patient limited by fatigue;Patient limited by endurance     Plan   Physcial Therapy Plan  General Plan:  (5-6x/wk)  Current Treatment Recommendations: Strengthening, ROM, Balance training, Transfer training, Functional mobility training, ADL/Self-care training, IADL training, Endurance training, Gait training, Stair training, Return to work related activity, Home exercise program, Safety education & training, Patient/Caregiver education & training, Therapeutic activities, Equipment evaluation, education, & procurement  Safety Devices  Type of Devices: All fall risk precautions in place, Call light within reach, Gait belt, Nurse notified, Left in bed, Patient at risk for falls  Restraints  Restraints Initially in Place: No     Restrictions  Restrictions/Precautions  Restrictions/Precautions: General Precautions, Fall Risk  Required Braces or Orthoses?: No  Position Activity Restriction  Other position/activity restrictions: up with assist, 2L O2     Subjective   General  Chart Reviewed: Yes  Patient assessed for rehabilitation services?: Yes  Response To Previous Treatment: Not applicable  Family / Caregiver Present: No  Follows Commands: Within Functional Limits  General Comment  Comments: Pt and RN agreeable to PT.  Pt asleep and supine in bed upon arrival for PT eval.  Subjective  Subjective: Pt denies c/o pain or numbness/tingling         Social/Functional History  Social/Functional History  Lives With: Spouse, Family  Type of Home: House  Home Layout: Two level, Able to Live on Main level with bedroom/bathroom  Home Access: Stairs to enter with rails  Entrance Stairs - Number of Steps: 3  Entrance Stairs - Rails: Left  Bathroom Shower/Tub: Tub/Shower unit  Bathroom Toilet: Standard  Home Equipment:  (pt reported no use of DME at baseline)  Has the patient had two or more falls in the past year or any fall with injury in the past year?: No  Receives Help From: Family, Friend(s)  ADL Assistance: Independent  Homemaking Assistance: Independent  Homemaking Responsibilities: Yes  Meal Prep Responsibility: Primary  Laundry Responsibility: No ( completes)  Cleaning Responsibility: Primary  Ambulation Assistance: Independent  Transfer Assistance: Independent  Active : No  Patient's  Info:   Mode of Transportation: Car  Occupation: Unemployed  Type of Occupation: [address] coloring, puzzles, phone games, play with grandkids  Additional Comments: pt reported  is able to provide 24/7  Vision/Hearing  Vision  Vision: Impaired  Vision Exceptions: Wears glasses for reading  Hearing  Hearing: Within functional limits    Cognition   Orientation  Overall Orientation Status: Within Functional Limits  Orientation Level: Oriented X4;Oriented to place;Oriented to time;Oriented to situation;Oriented to person  Cognition  Overall Cognitive Status: WFL     Objective                 AROM RLE (degrees)  RLE AROM: WFL  AROM LLE (degrees)  LLE AROM : WFL  AROM RUE (degrees)  RUE AROM : WFL  AROM LUE (degrees)  LUE AROM : WFL  Strength RLE  Strength RLE: WFL  Comment: 4+/5 grossly  Strength LLE  Strength LLE: WFL  Comment: 4+/5 grossly  Strength RUE  Strength RUE: WFL  Comment: 4+/5 grossly  Strength LUE  Strength LUE: WFL  Comment: 4+/5 grossly          Bed mobility  Supine to Sit: Stand by assistance  Sit to Supine: Stand by assistance  Transfers  Sit to Stand: Contact guard assistance  Stand to Sit: Contact guard assistance  Comment: VC given for appropriate hand placement with good return demonstration  Ambulation  Surface: Level tile  Device: Rolling Walker  Other Apparatus: O2 (2L)  Assistance: Contact guard assistance  Quality of Gait: Pt displayed decreased gait speed with decreased step length/height.   Gait Deviations: Slow Elina;Decreased step length;Decreased step height  Distance: 150 ft  More Ambulation?: No  Stairs/Curb  Stairs?: No     Balance  Posture: Good  Sitting - Static: Good  Sitting - Dynamic: Good  Standing - Static: Fair;+  Standing - Dynamic: Fair;+  Comments: Pt required use of RW for support to increase balance with standing activities  Exercise Treatment: Pt amb 150 ft RW CGA, mild gait deficits noted, no c/o adverse s/s with ambulation.                               AM-PAC Score  AM-PAC Inpatient Mobility Raw Score : 18 (10/17/22 1340)  AM-PAC Inpatient T-Scale Score : 43.63 (10/17/22 1340)  Mobility Inpatient CMS 0-100% Score: 46.58 (10/17/22 1340)  Mobility Inpatient CMS G-Code Modifier : CK (10/17/22 1340)            Goals  Short Term Goals  Time Frame for Short Term Goals: 14 visits  Short Term Goal 1: Pt will be Rahel when ambulating 250 ft with least restrictive AD  Short Term Goal 2: Pt will be Rahel with 5 steps L rail  Short Term Goal 3: Pt will be Rahel with bed mobility  Short Term Goal 4: Pt will be Rahel with transfers  Additional Goals?: No       Education  Patient Education  Education Given To: Patient  Education Provided: Role of Therapy;Plan of Care  Education Method: Demonstration  Barriers to Learning: None  Education Outcome: Verbalized understanding;Demonstrated understanding      Therapy Time   Individual Concurrent Group Co-treatment   Time In 1055         Time Out 1114         Minutes 19         Timed Code Treatment Minutes: [address], [address]

## 2022-10-17 NOTE — PROGRESS NOTES
Patient was evaluated today for the diagnosis of COPD, CHF. I entered a DME order for home oxygen because the diagnosis and testing requires the patient to have supplemental oxygen. Condition will improve or be benefited by oxygen use. The patient is not able to perform good mobility in a home setting and therefore does require the use of a portable oxygen system. The need for this equipment was discussed with the patient and she understands and is in agreement.        Elda Billingsley  PGY-2  Internal Medicine  [address] [facility]   6:15 [place] 10/17/2022

## 2022-10-17 NOTE — PLAN OF CARE
SENIOR NOTE    Briefly patient comes to [facility] ED with SOB, productive cough,and swelling in legs. Pmh of CAD, systolic CHF s/p AICD placement, COPD,  apparently patient was not compliant with meidcations for 2 months. Cxr showed patient had effusion in right lung base. Was placed on lasix and aldactone. Abx was started for pneumonia. Patient was also started on atrovent and pulmicort for COPD exacerbation. Patient then became altered and was found to be bradycardic and hypotensive. She was thus transferred to [facility] ICU. In the ICU she was started on bipap, lasix gtt and midodrine. Nephrology and cardiology was consulted. Patient started with low-sodium diet with fluid restriction at 1500 mL. Nephrology recommended to keep urine output 100 to 225 mL/h for 24 hours. A/P    Acute on chronic CHF  - EF of 25%  - AICD placed, aparently is not working, will need f/u with Dr. Katherine Chi. -continue furosemide IV 40 TID.  Will discuss switching to oral.     Acute exacerbation of COPD  - continue abx  - continue inhalers    CKD 3a  - f/u nephro reccs  -baseline Cr is 1.5    T2DM  - will start patient on medium dose sliding scale            Oziel Zamora MD  Internal medicine, PGY-2  [address] [facility], [address] [place] [address] Drive   [unfilled] 8:32 PM

## 2022-10-17 NOTE — PLAN OF CARE
Problem: Respiratory - Adult  Goal: Achieves optimal ventilation and oxygenation  10/17/2022 0909 by Chica Carcamo RCP  Outcome: Progressing  10/17/2022 0419 by Darren Dowell RN  Outcome: Progressing

## 2022-10-17 NOTE — PROGRESS NOTES
[facility]   Progress Note                 Patient's name:  Twin Walker  Medical Record Number: [id number]  Patient's account/billing number: [de-identified]  Patient's YOB: 1960  Age: 60 y.o. Date of Admission: 10/14/2022  4:20 AM  Date of History and Physical Examination: 10/17/2022  Primary Care Physician: Naheed Ortiz MD    Code Status: Full Code    CHIEF COMPLAINT:    No chief complaint on file. Shortness of breath    ADMISSION DIAGNOSIS:   CHF (congestive heart failure), NYHA class I, acute on chronic, combined ([facility] [address]    REASON FOR CONSULT :   Acute on chronic chf    SUBJECTIVE:      Pt seen and examined in the room. P deneis any CP. SOB improved. -7.7 L since admission       Review of Systems -  General ROS: negative for - chills, fatigue, fever or weight loss  ENT ROS: negative for - headaches, oral lesions or sore throat  Cardiovascular ROS: no chest pain or dyspnea on exertion  Gastrointestinal ROS: no abdominal pain, nausea, change in bowel habits, or black or bloody stools  Skin - no rash   msk - no jt tenderness or swelling       MIDICATION:    Scheduled Meds:   metoprolol tartrate  12.5 mg Oral BID    furosemide  40 mg IntraVENous TID    sodium chloride flush  5-40 mL IntraVENous 2 times per day    heparin (porcine)  5,000 Units SubCUTAneous 3 times per day    ipratropium  0.5 mg Nebulization 4x daily    insulin lispro  0-8 Units SubCUTAneous Q4H    cefTRIAXone (ROCEPHIN) IV  1,000 mg IntraVENous Q24H    midodrine  5 mg Oral BID WC    aspirin  81 mg Oral Daily     Continuous Infusions:   sodium chloride      dextrose         PHYSICAL EXAM:    Physical Examination:    Temperature:  Temp: 98.2 °F (36.8 °C)  Respirations:  Resp: 21  Pulse:   Heart Rate: 92  BP:    BP: 107/77    Constitutional and General Appearance: on hfnc  HEENT: PERRL, no cervical lymphadenopathy. No masses palpable. Normal oral mucosa  Respiratory:   On hfnc-decreased air entry b/l Medication refill:    Medication Name: atorvastatin (LIPITOR) 80 MG     Medication last refilled: 5/12/2020    Provider: Darryn Farfan MD    Pharmacy: [facility] - [address]    Last office visit: 11/11/2020    Follow up: return in 6 months    Last lab related to medication: 5/4/2020    Upcoming appointments: 5/12/2021    Refill outcome: Filled per protocol       anterior lung fields  Cardiovascular: The apical impulse is not displaced  Heart auscultation: Mild inspiratory crackles bilaterally. regular S1 and S2.  Jugular venous pulsation Normal  The carotid upstroke is normal in amplitude and contour without delay or bruit  Peripheral pulses are symmetrical and full   Abdomen:  No masses or tenderness  Bowel sounds present  Extremities:   No Cyanosis or Clubbing   Lower extremity edema: +1   Skin: Warm and dry  Neurological:  Alert and oriented. Moves all extremities well  No abnormalities of mood, affect, memory, mentation, or behavior are noted    DATA:    Labs:   CBC:   Recent Labs     10/16/22  0348 10/17/22  0642   WBC 7.6 7.4   HGB 9.1* 9.3*   HCT 30.0* 31.1*    188       BMP:   Recent Labs     10/15/22  0656 10/16/22  0348   * 138   K 4.4 4.2   CO2 20 25   BUN 33* 31*   CREATININE 1.71* 1.65*   LABGLOM 33* 35*   GLUCOSE 74 141*       BNP: No results for input(s): BNP in the last 72 hours. PT/INR: No results for input(s): PROTIME, INR in the last 72 hours. APTT:No results for input(s): APTT in the last 72 hours. CARDIAC ENZYMES:No results for input(s): CKTOTAL, CKMB, CKMBINDEX, TROPONINI in the last 72 hours. FASTING LIPID PANEL:  Lab Results   Component Value Date/Time    HDL 25 07/15/2022 09:50 AM    LDLDIRECT 70 07/15/2022 09:50 AM    TRIG 724 07/15/2022 09:50 AM     LIVER PROFILE:No results for input(s): AST, ALT, LABALBU in the last 72 hours. Imaging  XR CHEST (SINGLE VIEW FRONTAL)    Result Date: 10/14/2022  EXAMINATION: ONE XRAY VIEW OF THE CHEST 10/14/2022 7:37 am COMPARISON: Chest x-ray 13 October 2022 HISTORY: ORDERING SYSTEM PROVIDED HISTORY: Pulmonary Edema TECHNOLOGIST PROVIDED HISTORY: Pulmonary Edema Reason for Exam: pulmonary edema port upr at 640am FINDINGS: 2 chamber cardiac device overlies the chest wall. Valve replacement and median sternotomy are present. The lungs are hypoinflated.   There is central venous congestion with markedly worst right base opacities. Left base is stable. No pneumothorax. Stable left lung. Markedly worse right basilar opacifications likely representing effusion with consolidation and or atelectasis. XR FOOT LEFT (MIN 3 VIEWS)    Result Date: 10/14/2022  EXAMINATION: THREE XRAY VIEWS OF THE LEFT FOOT 10/14/2022 10:34 am COMPARISON: None. HISTORY: ORDERING SYSTEM PROVIDED HISTORY: R/o osteo TECHNOLOGIST PROVIDED HISTORY: R/o osteo Reason for Exam: R/o osteo FINDINGS: No fracture, dislocation or bone destruction. Joint spaces maintained. No soft tissue swelling. Metallic clip in soft tissues medial to distal tibia. Large plantar calcaneal spur. Dorsal calcaneal enthesophyte. No radiographic evidence of osteomyelitis. CT ABDOMEN PELVIS W IV CONTRAST    Result Date: 10/12/2022  EXAMINATION: CTA CHEST W CONTRAST, CT ABDOMEN PELVIS W IV CONTRAST HISTORY: Reason for exam:->PE protocol and consolidation in right lung base. 59-year-old female. Ascites, weakness. COMPARISON: Portable chest 10/11/2022, CT abdomen and pelvis 11/4/2018, CT chest 12/29/2018. TECHNIQUE: CT angiography of the pulmonary arteries following the administration of intravenous contrast. Coronal and sagittal MIP (maximum intensity projection) images were performed. Dose reduction techniques were achieved by using automated exposure control and/or adjustment of mA and/or kV according to patient size and/or use of iterative reconstruction technique. FINDINGS: PERTINENT POSITIVES: Bilateral pleural effusions, moderate on the right. Small on the left. Consolidation with air bronchograms mainly right lower lobe and a small amount in the right middle lobe. Interstitial pulmonary edema. Cardiomegaly. Moderate ascites in the abdomen and pelvis. Anasarca in the soft tissues. PERTINENT NEGATIVES: Negative for pulmonary embolus. No unexpected mass. COINCIDENTAL FINDINGS: Scattered reactive nodes in the mediastinum. Previous sternotomy. Mitral valve prosthesis. Pacemaker. Coronary artery calcifications. Stable small right adrenal adenoma. Hysterectomy. Plaque aorta, without aneurysm. Atrophic left kidney, new. ROUTINE EXAMINATION: No pericardial effusion. Normal liver, spleen, pancreas, left adrenal. No bowel obstruction or free air. Bladder is empty. Degenerative changes in the spine. 1. Negative for pulmonary embolus. 2. Mainly right lower lobe consolidation which could represent atelectasis or pneumonia. 3. Bilateral pleural effusions, right greater than left. Mild pulmonary edema. 4. Moderate ascites. 5. Anasarca. XR CHEST PORTABLE    Result Date: 10/13/2022  EXAM: XR CHEST PORTABLE HISTORY: Reason for exam:->increased SOB, decreased pulse ox COMPARISON: October 11, 2022-chest plain film; October 12, 2022-CT chest, abdomen and pelvis. TECHNIQUE: A single AP semiupright view of the chest FINDINGS: There is increased perihilar airspace disease and prominence of the pulmonary vascular markings, accentuated by lower lung volumes. There is medial consolidation within the right infrahilar region, with elevation of the right hemidiaphragm. The heart is enlarged, with postoperative changes from a median sternotomy and with a left chest wall AICD in place. Calcification noted within the region of the right carotid artery. The osseous structures are stable. 1. Cardiomegaly with evidence of worsening pulmonary edema. 2. Medial consolidation within the right infrahilar region, atelectasis versus pneumonia. 3. Residual pleural fluid is likely obscured by the hemidiaphragms, for which that on the right is elevated. XR CHEST PORTABLE    Result Date: 10/11/2022  EXAMINATION: XR CHEST PORTABLE, , 10/11/2022 9:48 PM EDT INDICATION: Reason for exam:->SOB HISTORY: Ordering Provider Reason for Exam: Technologist Note: Additional: COMPARISON: Chest x-ray dated 11/4/2018. TECHNIQUE: Chest x-ray: One view.  FINDINGS: Stable enlarged cardiac silhouette is seen. Cardiac pacemaker is seen in place. Patient is postmedian sternotomy. Small right pleural effusion and/or right lung base atelectasis/consolidation is seen. Small left pleural effusion cannot be excluded. No obvious pneumothorax is seen. . Small right pleural effusion and/or right lung base atelectasis/consolidation is seen. Small left pleural effusion cannot be excluded. CTA CHEST W CONTRAST    Result Date: 10/12/2022  EXAMINATION: CTA CHEST W CONTRAST, CT ABDOMEN PELVIS W IV CONTRAST HISTORY: Reason for exam:->PE protocol and consolidation in right lung base. 27-year-old female. Ascites, weakness. COMPARISON: Portable chest 10/11/2022, CT abdomen and pelvis 11/4/2018, CT chest 12/29/2018. TECHNIQUE: CT angiography of the pulmonary arteries following the administration of intravenous contrast. Coronal and sagittal MIP (maximum intensity projection) images were performed. Dose reduction techniques were achieved by using automated exposure control and/or adjustment of mA and/or kV according to patient size and/or use of iterative reconstruction technique. FINDINGS: PERTINENT POSITIVES: Bilateral pleural effusions, moderate on the right. Small on the left. Consolidation with air bronchograms mainly right lower lobe and a small amount in the right middle lobe. Interstitial pulmonary edema. Cardiomegaly. Moderate ascites in the abdomen and pelvis. Anasarca in the soft tissues. PERTINENT NEGATIVES: Negative for pulmonary embolus. No unexpected mass. COINCIDENTAL FINDINGS: Scattered reactive nodes in the mediastinum. Previous sternotomy. Mitral valve prosthesis. Pacemaker. Coronary artery calcifications. Stable small right adrenal adenoma. Hysterectomy. Plaque aorta, without aneurysm. Atrophic left kidney, new. ROUTINE EXAMINATION: No pericardial effusion. Normal liver, spleen, pancreas, left adrenal. No bowel obstruction or free air. Bladder is empty. Degenerative changes in the spine.      1. Negative for pulmonary embolus. 2. Mainly right lower lobe consolidation which could represent atelectasis or pneumonia. 3. Bilateral pleural effusions, right greater than left. Mild pulmonary edema. 4. Moderate ascites. 5. Anasarca. Diagnostics:    EKG: not available  ECHO: not obtained. -apparently 25% per hx-nothing in chart including \"care everywhere\"  Stress Test: not obtained. Cardiac Angiography: Has ICM. H/o CABG/MV REPAIR 5/28/15: LIMA-LAD, SVG-PDA, SVG-D. MV repair. Done by Dr. Carl Beauchamp. CATH 6/4/15: Patent grafts. LIMA-LAD, SVG-PDA, SVG-D. EF 30%. LVEF 25%. IMPRESSION:    Principal Problem:    CHF (congestive heart failure), NYHA class I, acute on chronic, combined (HCC)  Active Problems:    Acute on chronic systolic CHF (congestive heart failure) ([address]  Resolved Problems:    * No resolved hospital problems. *    -acute on chronic chf exacebation-ef 25%   -h/o AICD-nonfunctional  -jolie on ckd, urine output improving  -copd exacerbation +/- PNA  -Has ICM. H/o CABG/MV REPAIR 5/28/15: LIMA-LAD, SVG-PDA, SVG-D. MV repair. Done by Dr. Carl Beauchamp. CATH 6/4/15: Patent grafts. LIMA-LAD, SVG-PDA, SVG-D. EF 30%. LVEF 25%. RECOMMENDATIONS:  Continue diuresis per nephrology-Appreciate assistance. Monitor I/O's  K>4, mg>2  Continue home meds as tolerated-bb, statin, plavix.  Holding acei/arb given jolie  Patient's primary cardiologist is Dr. Argenis Garcia will f/u as OP for cath and ICD generator change after acute issues resolve-see his consult note 10/13/22    Nicolette Lacey, [facility]   [phone]

## 2022-10-17 NOTE — PROGRESS NOTES
[address] [place] [address]     Department of Internal Medicine - Staff Internal Medicine Service   ICU PATIENT TRANSFER NOTE        Patient:  Gordy Diamond  YOB: 1960  MRN: [id number]     Acct: [de-identified]     Admit date: 10/14/2022    Code Status:-  Full code     Reason for ICU Admission:-       SUPPORT DEVICES: [] Ventilator [] BIPAP  [x] High Flow Nasal Cannula [] Room Air    Consultations:- [x] Cardiology [x] Nephrology  [] Hemo onco  [] GI                               [] ID [] ENT  [] Rheum [] Endo   []Physiotherapy                                 Others:-     NUTRITION:  [] NPO [] Tube Feeding (Specify: ) [] TPN  [x] PO    Central Lines:- [x] No   [] Yes           If yes - Days/Date of Insertion. Pt seen,examined and Chart reviewed. ICU COURSE:    Patient has past medical history of CAD with left 20 respiratory dysfunction with ejection fraction of 25%, COPD, AICD. Patient has a history of diabetes mellitus. Patient reports good compliance with medication x2 months    The patient is a 60 y.o. female with past medical history significant for  who was initially admitted on 10/14/2022 with   The patient initially presented to [facility] ED with shortness of breath, cough, sputum and swelling in the legs. On initial presentation the patient was bradycardic, BUN, creatinine, glucose, troponins were increasing chest x-ray showed effusions and right lung base consolidation. The patient was initially placed on Lasix and Aldactone and Rocephin and Zithromax for possible pneumonia. She was then started on Plavix and Coreg with possible breathing treatments of Atrovent and Pulmicort for pulm possible COPD exacerbation. The patient later became lethargic, blood glucose was 40, blood pressure was 8060, started on dopamine drip and patient was transferred to [facility] ICU. The patient and family in the ICU was placed on BiPAP furosemide drip and midodrine.   Nephrology and cardiology was consulted. The patient was continued on diuresis as per cardiology. Renal ultrasound showed no hydronephrosis. The patient was gradually weaned off BiPAP and was shifted to high flow. The patient finished doses of ceftriaxone and Zithromax . 10/14: Patient started on BiPAP. Patient started on Lasix drip and midodrine. 10/15: Continued with Lasix drip and midodrine. Cardiology and nephrology consulted. Patient started with low-sodium diet with fluid restriction at 1500 mL. Nephrology recommended to keep urine output 100 to 225 mL/h for 24 hours. Physical Exam:   Vitals: /79   Pulse 91   Temp 98.5 °F (36.9 °C) (Oral)   Resp 19   Ht 5' 6\" (1.676 m)   Wt 179 lb 7.3 oz (81.4 kg)   SpO2 94%   BMI 28.96 kg/m²   24 hour intake/output:  Intake/Output Summary (Last 24 hours) at 10/16/2022 2044  Last data filed at 10/16/2022 1800  Gross per 24 hour   Intake 24.33 ml   Output 2390 ml   Net -2365.67 ml     Last 3 weights:   Wt Readings from Last 3 Encounters:   10/16/22 179 lb 7.3 oz (81.4 kg)   10/14/22 183 lb 6.4 oz (83.2 kg)   07/13/22 158 lb (71.7 kg)       General appearance - alert, in no distress  Mental status - alert, oriented to person, place, and time  Eyes - pupils equal and reactive, extraocular eye movements intact  Mouth - mucous membranes moist, pharynx normal without lesions  Neck - supple, no significant adenopathy  Chest - clear to auscultation, no wheezes  Heart - normal rate, regular rhythm, normal S1, S2  Abdomen - soft, nontender, nondistended  Neurological - alert, oriented, normal speech, no focal deficits   Extremities - peripheral pulses normal, no pedal edema  Skin - normal coloration and turgor, no rashes      Medications:Current Inpatient  Scheduled Meds:   metoprolol tartrate  12.5 mg Oral BID    furosemide  40 mg IntraVENous TID    sodium chloride flush  5-40 mL IntraVENous 2 times per day    heparin (porcine)  5,000 Units SubCUTAneous 3 times per day ipratropium  0.5 mg Nebulization 4x daily    insulin lispro  0-8 Units SubCUTAneous Q4H    cefTRIAXone (ROCEPHIN) IV  1,000 mg IntraVENous Q24H    midodrine  5 mg Oral BID WC    aspirin  81 mg Oral Daily     Continuous Infusions:   sodium chloride      dextrose       PRN Meds:sodium chloride flush, sodium chloride, ondansetron **OR** ondansetron, polyethylene glycol, acetaminophen **OR** acetaminophen, levalbuterol, glucose, dextrose bolus **OR** dextrose bolus, glucagon (rDNA), dextrose    Objective:    CBC:   Recent Labs     10/15/22  0656 10/16/22  0348   WBC 9.3 7.6   HGB 9.4* 9.1*    216     BMP:    Recent Labs     10/14/22  1829 10/15/22  0656 10/16/22  0348    134* 138   K 4.6 4.4 4.2    101 101   CO2 20 20 25   BUN 33* 33* 31*   CREATININE 1.76* 1.71* 1.65*   GLUCOSE 108* 74 141*     Calcium:  Recent Labs     10/16/22  0348   CALCIUM 8.2*     Ionized Calcium:No results for input(s): IONCA in the last 72 hours. Magnesium:  Recent Labs     10/14/22  1829   MG 2.1     Phosphorus:No results for input(s): PHOS in the last 72 hours. BNP:No results for input(s): BNP in the last 72 hours. Glucose:  Recent Labs     10/16/22  1149 10/16/22  1519 10/16/22  2032   POCGLU 173* 201* 177*     HgbA1C:   Recent Labs     10/14/22  1023   LABA1C 8.1*     INR: No results for input(s): INR in the last 72 hours. Hepatic: No results for input(s): ALKPHOS, ALT, AST, PROT, BILITOT, BILIDIR, LABALBU in the last 72 hours. Amylase and Lipase:No results for input(s): LACTA, AMYLASE in the last 72 hours. Lactic Acid: No results for input(s): LACTA in the last 72 hours. CARDIAC ENZYMES:No results for input(s): CKTOTAL, CKMB, CKMBINDEX, TROPONINI in the last 72 hours. BNP: No results for input(s): BNP in the last 72 hours. Lipids: No results for input(s): CHOL, TRIG, HDL, LDL, LDLCALC in the last 72 hours.   ABGs: No results found for: PH, PCO2, PO2, HCO3, O2SAT  Thyroid:   Lab Results   Component Value Date/Time    TSH 1.66 10/14/2022 10:23 AM        Urinalysis: No results for input(s): BACTERIA, BLOODU, CLARITYU, COLORU, PHUR, PROTEINU, RBCUA, SPECGRAV, BILIRUBINUR, NITRU, WBCUA, LEUKOCYTESUR, GLUCOSEU in the last 72 hours. Assessment:  Principal Problem:    CHF (congestive heart failure), NYHA class I, acute on chronic, combined (HCC)  Active Problems:    Acute on chronic systolic CHF (congestive heart failure) ([facility])  Resolved Problems:    * No resolved hospital problems. *      1. Acute on Chronic CHF:  -Systolic heart failure with ejection fraction of 25%. -Status post mitral valve repair. -AICD in Place.  -Furosemide 40mg TID  -Midodrine 5 BID. -Lopressor 12.5 BD    2. Acute Hypoxic Respiratory Failure:  -Secondary to Acute Exacerbation of COPD , Acute CHF. -We will continue Rocephin. Finished dose of Zithromax.  -We will continue breathing treatment. 3.Chronic Kidney Disease Stage 3b:  -Baseline Cr   -Monitor urine output.  -Avoid Nephrotoxic medications. 4.Type 2 Diabetes Mellitus:  -Medium Dose corrective Sliding Scale Insulin. 5.CAD s/p AICD placement:  -Continue Aspirin. 6.chronic Smoker:  -We will encourage smoking cessation.  -Nicotine patch as needed    7. Hyperlipidemia   - started back on Lipitor. 8.H/O Depression / anxiety   - started back on Zoloft. DVT ppx: Heparin SC  GI ppx:      PT/OT/SW:Consulted  Discharge Planning: In Progress      Jennifer Moise MD             Department of Internal Medicine  [facility]           10/16/2022, 8:44 PM    Attestation and add on       I have discussed the care of Citlaly Bruno , including pertinent history and exam findings,    10/16/22    with the resident. I have seen and examined the patient and the key elements of all parts of the encounter have been performed by me .    I agree with the assessment, plan and orders as documented by the resident.     ---- ;     Evelina Downs MD      [facility]EL[facility] [address], [address] [place] [address].    Phone [phone]   Fax: [phone]  Answering Service: [phone]

## 2022-10-17 NOTE — PLAN OF CARE
Problem: Discharge Planning  Goal: Discharge to home or other facility with appropriate resources  10/17/2022 0419 by Susan Anderson RN  Outcome: Progressing  32/50/4813 0305 by Dea Vizcaino RN  Outcome: Progressing     Problem: Safety - Adult  Goal: Free from fall injury  10/17/2022 0419 by Susan Anderson RN  Outcome: Progressing  86/67/5832 [id number] by Dea Vizcaino RN  Outcome: Progressing     Problem: Chronic Conditions and Co-morbidities  Goal: Patient's chronic conditions and co-morbidity symptoms are monitored and maintained or improved  10/17/2022 0419 by Susan Anderson RN  Outcome: Progressing  53/06/8336 [id number] by Dea Vizcaino RN  Outcome: Progressing     Problem: Respiratory - Adult  Goal: Achieves optimal ventilation and oxygenation  10/17/2022 0419 by Susan Anderson RN  Outcome: Progressing  47/83/5116 5934 by Dea Vizcaino RN  Outcome: Progressing     Problem: ABCDS Injury Assessment  Goal: Absence of physical injury  10/17/2022 0419 by Susan Anderson RN  Outcome: Progressing  10/51/9756 5277 by Dea Vizcaino RN  Outcome: Progressing     Problem: Pain  Goal: Verbalizes/displays adequate comfort level or baseline comfort level  Outcome: Progressing

## 2022-10-17 NOTE — DISCHARGE INSTRUCTIONS
You were admitted with CHF and COPD exacerbation  Treated with IV diuresis    Please start taking lasix 40 mg BID  Stop taking lisinopril  Taker all other home medications as you were taking before    Please follow up with PCP  Please follow up with your cardiologist  Please follow up with nephrology for CHAD

## 2022-10-18 VITALS
TEMPERATURE: 98.7 F | HEART RATE: 87 BPM | OXYGEN SATURATION: 95 % | SYSTOLIC BLOOD PRESSURE: 114 MMHG | HEIGHT: 66 IN | BODY MASS INDEX: 28.84 KG/M2 | WEIGHT: 179.45 LBS | RESPIRATION RATE: 21 BRPM | DIASTOLIC BLOOD PRESSURE: 76 MMHG

## 2022-10-18 LAB
ABSOLUTE EOS #: 0.09 K/UL (ref 0–0.44)
ABSOLUTE IMMATURE GRANULOCYTE: <0.03 K/UL (ref 0–0.3)
ABSOLUTE LYMPH #: 1.58 K/UL (ref 1.1–3.7)
ABSOLUTE MONO #: 0.52 K/UL (ref 0.1–1.2)
ANION GAP SERPL CALCULATED.3IONS-SCNC: 13 MMOL/L (ref 9–17)
BASOPHILS # BLD: 0 % (ref 0–2)
BASOPHILS ABSOLUTE: <0.03 K/UL (ref 0–0.2)
BUN BLDV-MCNC: 33 MG/DL (ref 8–23)
CALCIUM SERPL-MCNC: 8.5 MG/DL (ref 8.6–10.4)
CHLORIDE BLD-SCNC: 96 MMOL/L (ref 98–107)
CO2: 30 MMOL/L (ref 20–31)
CREAT SERPL-MCNC: 1.42 MG/DL (ref 0.5–0.9)
EOSINOPHILS RELATIVE PERCENT: 1 % (ref 1–4)
GFR SERPL CREATININE-BSD FRML MDRD: 42 ML/MIN/1.73M2
GLUCOSE BLD-MCNC: 164 MG/DL (ref 70–99)
GLUCOSE BLD-MCNC: 184 MG/DL (ref 65–105)
GLUCOSE BLD-MCNC: 200 MG/DL (ref 65–105)
GLUCOSE BLD-MCNC: 216 MG/DL (ref 65–105)
GLUCOSE BLD-MCNC: 243 MG/DL (ref 65–105)
HCT VFR BLD CALC: 30 % (ref 36.3–47.1)
HEMOGLOBIN: 9 G/DL (ref 11.9–15.1)
IMMATURE GRANULOCYTES: 0 %
LYMPHOCYTES # BLD: 23 % (ref 24–43)
MCH RBC QN AUTO: 26.5 PG (ref 25.2–33.5)
MCHC RBC AUTO-ENTMCNC: 30 G/DL (ref 28.4–34.8)
MCV RBC AUTO: 88.2 FL (ref 82.6–102.9)
MONOCYTES # BLD: 8 % (ref 3–12)
NRBC AUTOMATED: 0 PER 100 WBC
PDW BLD-RTO: 16.7 % (ref 11.8–14.4)
PLATELET # BLD: 155 K/UL (ref 138–453)
PMV BLD AUTO: 11.4 FL (ref 8.1–13.5)
POTASSIUM SERPL-SCNC: 3.8 MMOL/L (ref 3.7–5.3)
RBC # BLD: 3.4 M/UL (ref 3.95–5.11)
RBC # BLD: ABNORMAL 10*6/UL
SEG NEUTROPHILS: 68 % (ref 36–65)
SEGMENTED NEUTROPHILS ABSOLUTE COUNT: 4.66 K/UL (ref 1.5–8.1)
SODIUM BLD-SCNC: 139 MMOL/L (ref 135–144)
WBC # BLD: 6.9 K/UL (ref 3.5–11.3)

## 2022-10-18 PROCEDURE — 80048 BASIC METABOLIC PNL TOTAL CA: CPT

## 2022-10-18 PROCEDURE — 36415 COLL VENOUS BLD VENIPUNCTURE: CPT

## 2022-10-18 PROCEDURE — 2580000003 HC RX 258: Performed by: STUDENT IN AN ORGANIZED HEALTH CARE EDUCATION/TRAINING PROGRAM

## 2022-10-18 PROCEDURE — 6360000002 HC RX W HCPCS: Performed by: STUDENT IN AN ORGANIZED HEALTH CARE EDUCATION/TRAINING PROGRAM

## 2022-10-18 PROCEDURE — 94640 AIRWAY INHALATION TREATMENT: CPT

## 2022-10-18 PROCEDURE — 2700000000 HC OXYGEN THERAPY PER DAY

## 2022-10-18 PROCEDURE — 99233 SBSQ HOSP IP/OBS HIGH 50: CPT | Performed by: INTERNAL MEDICINE

## 2022-10-18 PROCEDURE — 85025 COMPLETE CBC W/AUTO DIFF WBC: CPT

## 2022-10-18 PROCEDURE — 6370000000 HC RX 637 (ALT 250 FOR IP): Performed by: STUDENT IN AN ORGANIZED HEALTH CARE EDUCATION/TRAINING PROGRAM

## 2022-10-18 PROCEDURE — 82947 ASSAY GLUCOSE BLOOD QUANT: CPT

## 2022-10-18 PROCEDURE — 6370000000 HC RX 637 (ALT 250 FOR IP)

## 2022-10-18 PROCEDURE — 2500000003 HC RX 250 WO HCPCS: Performed by: STUDENT IN AN ORGANIZED HEALTH CARE EDUCATION/TRAINING PROGRAM

## 2022-10-18 PROCEDURE — 6370000000 HC RX 637 (ALT 250 FOR IP): Performed by: INTERNAL MEDICINE

## 2022-10-18 PROCEDURE — 94761 N-INVAS EAR/PLS OXIMETRY MLT: CPT

## 2022-10-18 PROCEDURE — 99239 HOSP IP/OBS DSCHRG MGMT >30: CPT | Performed by: INTERNAL MEDICINE

## 2022-10-18 RX ADMIN — IPRATROPIUM BROMIDE 0.5 MG: 0.5 SOLUTION RESPIRATORY (INHALATION) at 08:16

## 2022-10-18 RX ADMIN — METOPROLOL TARTRATE 12.5 MG: 25 TABLET ORAL at 08:29

## 2022-10-18 RX ADMIN — HEPARIN SODIUM 5000 UNITS: 5000 INJECTION INTRAVENOUS; SUBCUTANEOUS at 15:00

## 2022-10-18 RX ADMIN — CEFTRIAXONE SODIUM 1000 MG: 10 INJECTION, POWDER, FOR SOLUTION INTRAVENOUS at 10:00

## 2022-10-18 RX ADMIN — ASPIRIN 81 MG: 81 TABLET, CHEWABLE ORAL at 08:29

## 2022-10-18 RX ADMIN — FUROSEMIDE 40 MG: 40 TABLET ORAL at 08:29

## 2022-10-18 RX ADMIN — INSULIN LISPRO 2 UNITS: 100 INJECTION, SOLUTION INTRAVENOUS; SUBCUTANEOUS at 11:35

## 2022-10-18 RX ADMIN — INSULIN LISPRO 2 UNITS: 100 INJECTION, SOLUTION INTRAVENOUS; SUBCUTANEOUS at 08:32

## 2022-10-18 RX ADMIN — FUROSEMIDE 40 MG: 40 TABLET ORAL at 16:33

## 2022-10-18 RX ADMIN — INSULIN LISPRO 2 UNITS: 100 INJECTION, SOLUTION INTRAVENOUS; SUBCUTANEOUS at 16:33

## 2022-10-18 RX ADMIN — HEPARIN SODIUM 5000 UNITS: 5000 INJECTION INTRAVENOUS; SUBCUTANEOUS at 08:29

## 2022-10-18 RX ADMIN — IPRATROPIUM BROMIDE 0.5 MG: 0.5 SOLUTION RESPIRATORY (INHALATION) at 15:27

## 2022-10-18 RX ADMIN — SODIUM CHLORIDE, PRESERVATIVE FREE 10 ML: 5 INJECTION INTRAVENOUS at 08:00

## 2022-10-18 RX ADMIN — IPRATROPIUM BROMIDE 0.5 MG: 0.5 SOLUTION RESPIRATORY (INHALATION) at 11:25

## 2022-10-18 ASSESSMENT — PAIN SCALES - GENERAL
PAINLEVEL_OUTOF10: 0
PAINLEVEL_OUTOF10: 0

## 2022-10-18 ASSESSMENT — ENCOUNTER SYMPTOMS
RHINORRHEA: 0
COUGH: 0
SHORTNESS OF BREATH: 1

## 2022-10-18 NOTE — PLAN OF CARE
Problem: Respiratory - Adult  Goal: Achieves optimal ventilation and oxygenation  10/18/2022 1802 by Renata Tidwell RCP  Outcome: Progressing  10/18/2022 1802 by Renata Tidwell RCP  Reactivated  10/18/2022 1553 by Aidee Bob RN  Outcome: Completed

## 2022-10-18 NOTE — PROGRESS NOTES
[facility]  Internal Medicine Teaching Residency Program  Inpatient Daily Progress Note  ______________________________________________________________________________    Patient: Kendal Westbrook  YOB: 1960   GZJ:[id number]    Acct: [de-identified]     Room: [address] [place] [address]  Admit date: 10/14/2022  Today's date: 10/18/22  Number of days in the hospital: 4    SUBJECTIVE   Admitting Diagnosis: CHF (congestive heart failure), NYHA class I, acute on chronic, combined ([facility]  CC: Shortness of breath. Pt examined at bedside. Chart & results reviewed. No acute events reported overnight. Patient is hemodynamically stable. In no acute distress. Lying comfortably on the bed. Patient is currently on nasal [email or web address]. She is A&O x4, denies any other new complaints. Patient patient denies any shortness of breath, chest pain, nausea, vomiting. ROS:  Constitutional:  negative for chills, fevers, sweats  Respiratory:  negative for cough, dyspnea on exertion, hemoptysis, shortness of breath, wheezing  Cardiovascular:  negative for chest pain, chest pressure/discomfort, lower extremity edema, palpitations  Gastrointestinal:  negative for abdominal pain, constipation, diarrhea, nausea, vomiting  Neurological:  negative for dizziness, headache  BRIEF HISTORY     Patient has past medical history of CAD with left 20 respiratory dysfunction with ejection fraction of 25%, COPD, AICD. Patient has a history of diabetes mellitus. Patient reports good compliance with medication x2 months     The patient is a 60 y.o. female with past medical history significant for  who was initially admitted on 10/14/2022 with   The patient initially presented to [facility] ED with shortness of breath, cough, sputum and swelling in the legs.   On initial presentation the patient was bradycardic, BUN, creatinine, glucose, troponins were increasing chest x-ray showed effusions and right lung base consolidation. The patient was initially placed on Lasix and Aldactone and Rocephin and Zithromax for possible pneumonia. She was then started on Plavix and Coreg with possible breathing treatments of Atrovent and Pulmicort for pulm possible COPD exacerbation. The patient later became lethargic, blood glucose was 40, blood pressure was 8060, started on dopamine drip and patient was transferred to [facility] ICU. The patient and family in the ICU was placed on BiPAP furosemide drip and midodrine. Nephrology and cardiology was consulted. The patient was continued on diuresis as per cardiology. Renal ultrasound showed no hydronephrosis. The patient was gradually weaned off BiPAP and was shifted to high flow. The patient finished doses of ceftriaxone and Zithromax . OBJECTIVE     Vital Signs:  /88   Pulse 96   Temp 98.7 °F (37.1 °C)   Resp 22   Ht 5' 6\" (1.676 m)   Wt 179 lb 7.3 oz (81.4 kg)   SpO2 94%   BMI 28.96 kg/m²     Temp (24hrs), Av.5 °F (36.9 °C), Min:98.2 °F (36.8 °C), Max:98.8 °F (37.1 °C)    In: 960   Out: 2375 [Urine:2375]    Physical Exam:  Constitutional: This is a well developed, well nourished, 25-29.9 - Overweight 60y.o. year old female who is alert, oriented, cooperative and in no apparent distress. Head:normocephalic and atraumatic. EENT:  PERRLA. No conjunctival injections. Septum was midline, mucosa was without erythema, exudates or cobblestoning. No thrush was noted. Neck: Supple without thyromegaly. No elevated JVP. Trachea was midline. Respiratory: Chest was symmetrical without dullness to percussion. Breath sounds bilaterally were clear to auscultation. There were no wheezes, rhonchi or rales. There is no intercostal retraction or use of accessory muscles. No egophony noted. Cardiovascular: Regular without murmur, clicks, gallops or rubs. Abdomen: Slightly rounded and soft without organomegaly. No rebound, rigidity or guarding was appreciated. Lymphatic: No lymphadenopathy. Musculoskeletal: Normal curvature of the spine. No gross muscle weakness. Extremities:  No lower extremity edema, ulcerations, tenderness, varicosities or erythema. Muscle size, tone and strength are normal.  No involuntary movements are noted. Skin:  Warm and dry. Good color, turgor and pigmentation. No lesions or scars. No cyanosis or clubbing  Neurological/Psychiatric: The patient's general behavior, level of consciousness, thought content and emotional status is normal.        Medications:  Scheduled Medications:    furosemide  40 mg Oral BID    metoprolol tartrate  12.5 mg Oral BID    sodium chloride flush  5-40 mL IntraVENous 2 times per day    heparin (porcine)  5,000 Units SubCUTAneous 3 times per day    ipratropium  0.5 mg Nebulization 4x daily    insulin lispro  0-8 Units SubCUTAneous Q4H    midodrine  5 mg Oral BID WC    aspirin  81 mg Oral Daily     Continuous Infusions:    sodium chloride      dextrose       PRN Medicationssodium chloride flush, 5-40 mL, PRN  sodium chloride, , PRN  ondansetron, 4 mg, Q8H PRN   Or  ondansetron, 4 mg, Q6H PRN  polyethylene glycol, 17 g, Daily PRN  acetaminophen, 650 mg, Q6H PRN   Or  acetaminophen, 650 mg, Q6H PRN  levalbuterol, 0.63 mg, Q6H PRN  glucose, 4 tablet, PRN  dextrose bolus, 125 mL, PRN   Or  dextrose bolus, 250 mL, PRN  glucagon (rDNA), 1 mg, PRN  dextrose, , Continuous PRN        Diagnostic Labs:  CBC:   Recent Labs     10/16/22  0348 10/17/22  0642 10/18/22  0617   WBC 7.6 7.4 6.9   RBC 3.38* 3.49* 3.40*   HGB 9.1* 9.3* 9.0*   HCT 30.0* 31.1* 30.0*   MCV 88.8 89.1 88.2   RDW 17.2* 16.9* 16.7*    188 155     BMP:   Recent Labs     10/16/22  0348 10/17/22  1827 10/18/22  0617    136 139   K 4.2 3.9 3.8    95* 96*   CO2 25 29 30   BUN 31* 30* 33*   CREATININE 1.65* 1.39* 1.42*     BNP: No results for input(s): BNP in the last 72 hours.   PT/INR: No results for input(s): PROTIME, INR in the last 72 hours.  APTT: No results for input(s): APTT in the last 72 hours. CARDIAC ENZYMES: No results for input(s): CKMB, CKMBINDEX, TROPONINI in the last 72 hours. Invalid input(s): CKTOTAL;3  FASTING LIPID PANEL:  Lab Results   Component Value Date    CHOL 199 07/15/2022    HDL 25 (L) 07/15/2022    TRIG 724 (H) 07/15/2022     LIVER PROFILE: No results for input(s): AST, ALT, ALB, BILIDIR, BILITOT, ALKPHOS in the last 72 hours. MICROBIOLOGY:   Lab Results   Component Value Date/Time    CULTURE NO GROWTH 5 DAYS 10/13/2022 12:22 PM       Imaging:    XR CHEST (SINGLE VIEW FRONTAL)    Result Date: 10/14/2022  Stable left lung. Markedly worse right basilar opacifications likely representing effusion with consolidation and or atelectasis. XR FOOT LEFT (MIN 3 VIEWS)    Result Date: 10/14/2022  No radiographic evidence of osteomyelitis. US RENAL COMPLETE    Result Date: 10/15/2022  1. Abdominal ascites. 2. Garcia catheter present within a collapsed urinary bladder. 3. Thinning of the left renal cortex. No hydronephrosis. CT ABDOMEN PELVIS W IV CONTRAST    Result Date: 10/12/2022  1. Negative for pulmonary embolus. 2. Mainly right lower lobe consolidation which could represent atelectasis or pneumonia. 3. Bilateral pleural effusions, right greater than left. Mild pulmonary edema. 4. Moderate ascites. 5. Anasarca. XR CHEST PORTABLE    Result Date: 10/16/2022  1. Slight improvement in aeration of the lung bases. Persistent lower zone predominant airspace disease, right greater than left with small bilateral pleural effusions. Mild pulmonary vascular congestion. 2. Stable cardiomegaly. XR CHEST PORTABLE    Result Date: 10/13/2022  1. Cardiomegaly with evidence of worsening pulmonary edema. 2. Medial consolidation within the right infrahilar region, atelectasis versus pneumonia. 3. Residual pleural fluid is likely obscured by the hemidiaphragms, for which that on the right is elevated.      XR CHEST PORTABLE    Result Date: 10/11/2022  . Small right pleural effusion and/or right lung base atelectasis/consolidation is seen. Small left pleural effusion cannot be excluded. CTA CHEST W CONTRAST    Result Date: 10/12/2022  1. Negative for pulmonary embolus. 2. Mainly right lower lobe consolidation which could represent atelectasis or pneumonia. 3. Bilateral pleural effusions, right greater than left. Mild pulmonary edema. 4. Moderate ascites. 5. Anasarca. ASSESSMENT & PLAN     ASSESSMENT / PLAN:     -Midodrine 5 BID. -Lopressor 12.5 BD     2. Acute Hypoxic Respiratory Failure:  -Secondary to Acute Exacerbation of COPD , Acute CHF. -We will continue Rocephin. Finished dose of Zithromax.  -We will continue breathing treatment. 3.Chronic Kidney Disease Stage 3b:  -Baseline Cr   -Monitor urine output.  -Avoid Nephrotoxic medications. 4.Type 2 Diabetes Mellitus:  -Medium Dose corrective Sliding Scale Insulin. 5.CAD s/p AICD placement:  -Continue Aspirin. 6.chronic Smoker:  -We will encourage smoking cessation.  -Nicotine patch as needed     7. Hyperlipidemia   - started back on Lipitor. 8.H/O Depression / anxiety   - started back on Zoloft. DVT ppx: Heparin SC  GI ppx:      PT/OT/SW:Consulted  Discharge Planning: In Progress    Jermain Brian MD  Internal Medicine Resident, PGY-1  [facility]; [address]  10/18/2022, 1:49 PM    I have discussed the care of Kari Jensen , including pertinent history and exam findings,    today with the resident. I have seen and examined the patient and the key elements of all parts of the encounter have been performed by me . I agree with the assessment, plan and orders as documented by the resident.      Principal Problem:    CHF (congestive heart failure), NYHA class I, acute on chronic, combined (HCC)  Active Problems:    Acute on chronic systolic CHF (congestive heart failure) (HCC)    Stage 3b chronic kidney disease ([facility]    Ischemic cardiomyopathy    Acute hypoxemic respiratory failure ([facility]  Resolved Problems:    * No resolved hospital problems. *        Overall  course ;                                   are improving over time.         Patient much improving  Discharge planning          Electronically signed by Glo Roberson MD

## 2022-10-18 NOTE — PROGRESS NOTES
Writer reviewed discharge instructions and medications. Patient d/c home with self care and home oxygen and all person al belongings via w/c into private cab.

## 2022-10-18 NOTE — PROGRESS NOTES
CLINICAL PHARMACY NOTE: MEDS TO BEDS    Total # of Prescriptions Filled: 1   The following medications were delivered to the patient:  Furosemide 40mg    Additional Documentation:

## 2022-10-18 NOTE — PROGRESS NOTES
PULMONARY & CRITICAL CARE MEDICINE PROGRESS NOTE     Patient:  Niall Clemons  MRN: [id number]  Admit date: 10/14/2022  Primary Care Physician: Latonya King MD  Consulting Physician: Nika Logan MD  CODE Status: Full Code  LOS: 4     SUBJECTIVE     Chief Complaint/ Reason for consult:  SOB, leg swelling, cough    Hospital Course: The patient is a 60 y.o. female with PMH of CHF, DM, CAD s/p CABG presented as a trasnfer to MICU from outlying facility for SOB, cough, leg swelling. She was found to be hypotensive, with CXR showing consolidative changes suggestive of pneumonia. She was also hypoglycemic with BG in 45s and developed CHAD for which nephrology was consulted. Patient was initially treated with IV lasix gtt for CHF exacerbation causing cardiopulmonary syndrome; was evaluated for obstructive uropathy as cause for CHAD which was negative on renal US. She was treated with IV rocephin, azithromycin for pneumonia and initially kept on BIPAP then transitioned to high flow NC and was weaned down to NC oxygen and transferred to floor. Interval History:  10/18/22  Pt was seen and examined at bedside. Resting comfortably in the bed on 2 L NC  Saturating appropriately at 96%  Vitals stable. Labs reviewed. Creatinine improving from time of admission, is 1.42 today  No acute events overnight. Review Of Systems:  Review of Systems   Constitutional:  Negative for activity change, appetite change and fatigue. HENT:  Negative for postnasal drip and rhinorrhea. Respiratory:  Positive for shortness of breath. Negative for cough. Cardiovascular:  Positive for leg swelling. Negative for chest pain. Genitourinary:  Negative for dysuria and hematuria. OBJECTIVE     PaO2/FiO2 RATIO:  No results for input(s): POCPO2 in the last 72 hours.    FiO2 : 28 %     VITAL SIGNS:   LAST:  /76   Pulse 87   Temp 98.7 °F (37.1 °C)   Resp 21   Ht 5' 6\" (1.676 m)   Wt 179 lb 7.3 oz (81.4 kg)   SpO2 95% BMI 28.96 kg/m²   8-24 HR RANGE:  TEMP Temp  Av.5 °F (36.9 °C)  Min: 98.2 °F (36.8 °C)  Max: 98.8 °F (64.9 °C)   BP Systolic (86KYE), YPQ:499 , Min:92 , RGZ:611      Diastolic (05UAO), CGK:06, Min:70, Max:88     PULSE Pulse  Av.8  Min: 86  Max: 96   RR Resp  Av.7  Min: 21  Max: 22   O2 SAT SpO2  Av %  Min: 94 %  Max: 96 %   OXYGEN DELIVERY O2 Flow Rate (L/min)  Av L/min  Min: 2 L/min  Max: 2 L/min        Systemic Examination:   Physical Exam -  Constitutional:  Alert, cooperative and no distress. Mental Status:  Oriented to person, place and time and normal affect. Lungs:  Bilateral air entry present, lung fields clear. Normal effort. Heart:  Regular rate and rhythm, no murmur. Abdomen:  Soft, nontender, nondistended, normal bowel sounds. Extremities:  No edema, redness, tenderness in the calves. Skin:  Warm, dry, no gross lesions or rashes. DATA REVIEW     Medications: Current Inpatient  Scheduled Meds:   furosemide  40 mg Oral BID    metoprolol tartrate  12.5 mg Oral BID    sodium chloride flush  5-40 mL IntraVENous 2 times per day    heparin (porcine)  5,000 Units SubCUTAneous 3 times per day    ipratropium  0.5 mg Nebulization 4x daily    insulin lispro  0-8 Units SubCUTAneous Q4H    midodrine  5 mg Oral BID WC    aspirin  81 mg Oral Daily     Continuous Infusions:   sodium chloride      dextrose         INPUT/OUTPUT:  In: 960 [P.O.:960]  Out: 5789 [Urine:2375]         LABS:  ABGs:   No results for input(s): POCPH, POCPCO2, POCPO2, POCHCO3, HTPW2EWU in the last 72 hours. CBC:   Recent Labs     10/16/22  0348 10/17/22  0642 10/18/22  0617   WBC 7.6 7.4 6.9   HGB 9.1* 9.3* 9.0*   HCT 30.0* 31.1* 30.0*   MCV 88.8 89.1 88.2    188 155   LYMPHOPCT 25 18* 23*   RBC 3.38* 3.49* 3.40*   MCH 26.9 26.6 26.5   MCHC 30.3 29.9 30.0   RDW 17.2* 16.9* 16.7*       CRP:   No results for input(s): CRP in the last 72 hours. LDH:   No results for input(s): LDH in the last 72 hours.   BMP: Recent Labs     10/16/22  0348 10/17/22  1827 10/18/22  0617    136 139   K 4.2 3.9 3.8    95* 96*   CO2 25 29 30   BUN 31* 30* 33*   CREATININE 1.65* 1.39* 1.42*   GLUCOSE 141* 182* 164*       Liver Function Test:   No results for input(s): PROT, LABALBU, ALT, AST, GGT, ALKPHOS, BILITOT in the last 72 hours. Coagulation Profile:   No results for input(s): INR, PROTIME, APTT in the last 72 hours. D-Dimer:  No results for input(s): DDIMER in the last 72 hours. Lactic Acid:  No results for input(s): LACTA in the last 72 hours. Cardiac Enzymes:  No results for input(s): CKTOTAL, CKMB, CKMBINDEX, TROPONINI in the last 72 hours. Invalid input(s): TROPONIN, HSTROP  BNP/ProBNP:   No results for input(s): BNP, PROBNP in the last 72 hours. Triglycerides:  No results for input(s): TRIG in the last 72 hours. Microbiology:  Urine Culture:  No components found for: CURINE  Blood Culture:  No components found for: CBLOOD, CFUNGUSBL  Sputum Culture:  No components found for: CSPUTUM  No results for input(s): SPECDESC, SPECIAL, CULTURE, STATUS, ORG, CDIFFTOXPCR, CAMPYLOBPCR, SALMONELLAPC, SHIGAPCR, SHIGELLAPCR, MPNEUG, MPNEUM, LACTOQL in the last 72 hours. No results for input(s): SPUTUM, SPECDESC, SPECIAL, CULTURE, STATUS, ORG, CDIFFTOXPCR, MPNEUM, MPNEUG in the last 72 hours. Invalid input(s): CURINE, CBLOOD, CFUNGUSBL     Pathology:    Radiology Reports:  XR CHEST PORTABLE   Final Result   1. Slight improvement in aeration of the lung bases. Persistent lower zone   predominant airspace disease, right greater than left with small bilateral   pleural effusions. Mild pulmonary vascular congestion. 2. Stable cardiomegaly. US RENAL COMPLETE   Final Result   1. Abdominal ascites. 2. Garcia catheter present within a collapsed urinary bladder. 3. Thinning of the left renal cortex. No hydronephrosis.          XR FOOT LEFT (MIN 3 VIEWS)   Final Result   No radiographic evidence of osteomyelitis. XR CHEST (SINGLE VIEW FRONTAL)   Final Result   Stable left lung. Markedly worse right basilar opacifications likely   representing effusion with consolidation and or atelectasis. XR CHEST (2 VW)    (Results Pending)        Echocardiogram:   No results found for this or any previous visit. ASSESSMENT AND PLAN     Assessment:    // Acute hypoxic respiratory failure  // systolic CHF s/p AICD placement  //  COPD   // CHAD superimposed on CKD stage III    Plan:  - continue antibiotic rocephin to complete 7 days of treatment; day 6 this am  - wean off oxygen as tolerated; goal O2 saturation 90%  - continue xopenex and ipratropium    We will continue to follow. Lisbet King  PGY-3  Internal Medicine  [address]  10/18/2022 3:55 PM      Please note that this chart was generated using voice recognition Dragon dictation software. Although every effort was made to ensure the accuracy of this automated transcription, some errors in transcription may have occurred.

## 2022-10-18 NOTE — CARE COORDINATION
Discharge [facility] Case Management Department  Written by: Marcelle Bueno RN    Patient Name: Gely Haridng  Attending Provider: Toney Quintero MD  Admit Date: 10/14/2022  4:20 AM  MRN: [id number]  Account: [de-identified]                     : 1960  Discharge Date: 10/18/22  Qualified for home o2 yesterday, faxed order & chart notes to [facility] spoke to [facility]. Updated avs with [facility] contact information. Attempted to call  Carlos Vargas to updated no answer  Met with patient educated her on home o2 use confirmed family will be home to let her in.    Offered donated RW patient declined   Will need cab trip ID # [id number]  Called [facility]  spoke to [facility] to notify of discharge and need for home set up   Spoke to the on call technician said she lives 10 minutes from her house and patient has to call when home to arrange delivery   Patient updated        Disposition: home    Marcelle Bueno RN

## 2022-10-18 NOTE — PLAN OF CARE
Problem: Discharge Planning  Goal: Discharge to home or other facility with appropriate resources  Outcome: Completed     Problem: Safety - Adult  Goal: Free from fall injury  Outcome: Completed     Problem: Chronic Conditions and Co-morbidities  Goal: Patient's chronic conditions and co-morbidity symptoms are monitored and maintained or improved  Outcome: Completed     Problem: Respiratory - Adult  Goal: Achieves optimal ventilation and oxygenation  Outcome: Completed     Problem: ABCDS Injury Assessment  Goal: Absence of physical injury  Outcome: Completed     Problem: Pain  Goal: Verbalizes/displays adequate comfort level or baseline comfort level  Outcome: Completed

## 2022-10-18 NOTE — PROGRESS NOTES
PULMONARY & CRITICAL CARE MEDICINE PROGRESS NOTE     Patient:  Sampson Mckeon  MRN: [id number]  Admit date: 10/14/2022  Primary Care Physician: Leslie Lynne MD  Consulting Physician: Dwight Cole MD  CODE Status: Full Code  LOS: 3     SUBJECTIVE     Chief Complaint/ Reason for consult:  SOB, leg swelling, cough    Hospital Course: The patient is a 60 y.o. female with PMH of CHF, DM, CAD s/p CABG presented as a trasnfer to MICU from outlying facility for SOB, cough, leg swelling. She was found to be hypotensive, with CXR showing consolidative changes suggestive of pneumonia. She was also hypoglycemic with BG in 45s and developed CHAD for which nephrology was consulted. Patient was initially treated with IV lasix gtt for CHF exacerbation causing cardiopulmonary syndrome; was evaluated for obstructive uropathy as cause for CHAD which was negative on renal US. She was treated with IV rocephin, azithromycin for pneumonia and initially kept on BIPAP then transitioned to high flow NC and was weaned down to NC oxygen and transferred to floor. Interval History:  10/17/22  Pt was seen and examined at bedside. Resting comfortably in the bed on 2.5 L NC  Saturating appropriately at 95%  Vitals stable. Labs reviewed. No acute events overnight. Review Of Systems:  Review of Systems   Constitutional:  Negative for activity change, appetite change and fatigue. HENT:  Negative for postnasal drip and rhinorrhea. Respiratory:  Positive for shortness of breath. Negative for cough. Cardiovascular:  Positive for leg swelling. Negative for chest pain. Genitourinary:  Negative for dysuria and hematuria. OBJECTIVE     PaO2/FiO2 RATIO:  No results for input(s): POCPO2 in the last 72 hours.    FiO2 : 28 %     VITAL SIGNS:   LAST:  /81   Pulse 88   Temp 98.2 °F (36.8 °C) (Oral)   Resp 15   Ht 5' 6\" (1.676 m)   Wt 179 lb 7.3 oz (81.4 kg)   SpO2 95%   BMI 28.96 kg/m²   8-24 HR RANGE:  TEMP Temp Av.2 °F (36.8 °C)  Min: 97.7 °F (36.5 °C)  Max: 98.7 °F (20.9 °C)   BP Systolic (51FDX), RPJ:003 , Min:101 , UEE:073      Diastolic (19WSF), FRR:43, Min:74, Max:96     PULSE Pulse  Av.6  Min: 88  Max: 94   RR Resp  Av.5  Min: 15  Max: 24   O2 SAT SpO2  Av.3 %  Min: 94 %  Max: 95 %   OXYGEN DELIVERY O2 Flow Rate (L/min)  Av L/min  Min: 2 L/min  Max: 2 L/min        Systemic Examination:   Physical Exam -  Constitutional:  Alert, cooperative and no distress. Mental Status:  Oriented to person, place and time and normal affect. Lungs:  Bilateral air entry present, lung fields clear. Normal effort. Heart:  Regular rate and rhythm, no murmur. Abdomen:  Soft, nontender, nondistended, normal bowel sounds. Extremities:  No edema, redness, tenderness in the calves. Skin:  Warm, dry, no gross lesions or rashes. DATA REVIEW     Medications: Current Inpatient  Scheduled Meds:   furosemide  40 mg Oral BID    metoprolol tartrate  12.5 mg Oral BID    sodium chloride flush  5-40 mL IntraVENous 2 times per day    heparin (porcine)  5,000 Units SubCUTAneous 3 times per day    ipratropium  0.5 mg Nebulization 4x daily    insulin lispro  0-8 Units SubCUTAneous Q4H    cefTRIAXone (ROCEPHIN) IV  1,000 mg IntraVENous Q24H    midodrine  5 mg Oral BID WC    aspirin  81 mg Oral Daily     Continuous Infusions:   sodium chloride      dextrose         INPUT/OUTPUT:  In: 960 [P.O.:960]  Out: 7041 [Urine:4125]  Date 10/17/22 0000 - 10/17/22 2359   Shift 6438-2092 1869-7248 8539-6857 24 Hour Total   INTAKE   P.O.(mL/kg/hr)  640(1) 320 960   Shift Total(mL/kg)  640(7.9) 320(3.9) 960(11.8)   OUTPUT   Urine(mL/kg/hr) 1000(1.5) 1500(2.3) 875 3375   Shift Total(mL/kg) 1000(12.3) 1500(18.4) 875(10.7) 3375(41.5)   Weight (kg) 81.4 81.4 81.4 81.4        LABS:  ABGs:   No results for input(s): POCPH, POCPCO2, POCPO2, POCHCO3, LVAP3OJJ in the last 72 hours.   CBC:   Recent Labs     10/15/22  0656 10/16/22  0348 10/17/22  7145 WBC 9.3 7.6 7.4   HGB 9.4* 9.1* 9.3*   HCT 31.8* 30.0* 31.1*   MCV 90.9 88.8 89.1    216 188   LYMPHOPCT 23* 25 18*   RBC 3.50* 3.38* 3.49*   MCH 26.9 26.9 26.6   MCHC 29.6 30.3 29.9   RDW 17.4* 17.2* 16.9*     CRP:   No results for input(s): CRP in the last 72 hours. LDH:   No results for input(s): LDH in the last 72 hours. BMP:   Recent Labs     10/15/22  0656 10/16/22  0348 10/17/22  1827   * 138 136   K 4.4 4.2 3.9    101 95*   CO2 20 25 29   BUN 33* 31* 30*   CREATININE 1.71* 1.65* 1.39*   GLUCOSE 74 141* 182*     Liver Function Test:   No results for input(s): PROT, LABALBU, ALT, AST, GGT, ALKPHOS, BILITOT in the last 72 hours. Coagulation Profile:   No results for input(s): INR, PROTIME, APTT in the last 72 hours. D-Dimer:  No results for input(s): DDIMER in the last 72 hours. Lactic Acid:  No results for input(s): LACTA in the last 72 hours. Cardiac Enzymes:  No results for input(s): CKTOTAL, CKMB, CKMBINDEX, TROPONINI in the last 72 hours. Invalid input(s): TROPONIN, HSTROP  BNP/ProBNP:   No results for input(s): BNP, PROBNP in the last 72 hours. Triglycerides:  No results for input(s): TRIG in the last 72 hours. Microbiology:  Urine Culture:  No components found for: CURINE  Blood Culture:  No components found for: CBLOOD, CFUNGUSBL  Sputum Culture:  No components found for: CSPUTUM  No results for input(s): SPECDESC, SPECIAL, CULTURE, STATUS, ORG, CDIFFTOXPCR, CAMPYLOBPCR, SALMONELLAPC, SHIGAPCR, SHIGELLAPCR, MPNEUG, MPNEUM, LACTOQL in the last 72 hours. No results for input(s): SPUTUM, SPECDESC, SPECIAL, CULTURE, STATUS, ORG, CDIFFTOXPCR, MPNEUM, MPNEUG in the last 72 hours. Invalid input(s): CARI SMITH, CFUNGUSBL     Pathology:    Radiology Reports:  XR CHEST PORTABLE   Final Result   1. Slight improvement in aeration of the lung bases. Persistent lower zone   predominant airspace disease, right greater than left with small bilateral   pleural effusions.   Mild pulmonary vascular congestion. 2. Stable cardiomegaly. US RENAL COMPLETE   Final Result   1. Abdominal ascites. 2. Garcia catheter present within a collapsed urinary bladder. 3. Thinning of the left renal cortex. No hydronephrosis. XR FOOT LEFT (MIN 3 VIEWS)   Final Result   No radiographic evidence of osteomyelitis. XR CHEST (SINGLE VIEW FRONTAL)   Final Result   Stable left lung. Markedly worse right basilar opacifications likely   representing effusion with consolidation and or atelectasis. XR CHEST (2 VW)    (Results Pending)        Echocardiogram:   No results found for this or any previous visit. ASSESSMENT AND PLAN     Assessment:    // Acute hypoxic respiratory failure  // systolic CHF s/p AICD placement  //  COPD   // CHAD superimposed on CKD stage III    Plan:  - continue antibiotic rocephin and azithromycin to complete 7 days of treatment  - wean off oxygen as tolerated; goal O2 saturation 90%  - continue xopenex and ipratropium    We will continue to follow. Shiloh Foss  PGY-3  Internal Medicine  [address]  10/17/2022 8:37 PM      Please note that this chart was generated using voice recognition Dragon dictation software. Although every effort was made to ensure the accuracy of this automated transcription, some errors in transcription may have occurred.

## 2022-10-18 NOTE — DISCHARGE INSTR - COC
Continuity of Care Form    Patient Name: Kalyan Tesfaye   :  [birth year]  MRN:  [id number]    Admit date:  10/14/2022  Discharge date:  ***    Code Status Order: Full Code   Advance Directives:     Admitting Physician:  Kayleigh Cedeno MD  PCP: Jazmin Taveras MD    Discharging Nurse: [facility] Unit/Room#: 3659/5187-76  Discharging Unit Phone Number: ***    Emergency Contact:   Extended Emergency Contact Information  Primary Emergency Contact: [facility]  Address: [address], [address] [facility] [place] [address]  Phone: [phone]  Mobile Phone: [phone]  Relation: Spouse  Preferred language: English   needed? No  Secondary Emergency Contact: Yanira Larios  Mobile Phone: [phone]  Relation: Grandchild   needed?  No    Past Surgical History:  Past Surgical History:   Procedure Laterality Date    ANKLE SURGERY Right 2009    plate and screws     CARDIAC DEFIBRILLATOR PLACEMENT  06/01/15    CHOLECYSTECTOMY      CORONARY ARTERY BYPASS GRAFT  05/28/15    X 3- Dr Araiza Nickel CABELLO-LAD<SVG-Diag, SVG-PDA, MV Repair with 30 min CE IMR ring    CYSTOSCOPY Left 2018    HYSTERECTOMY (CERVIX STATUS UNKNOWN)      KIDNEY STONE SURGERY Left     MITRAL VALVE SURGERY  6/4/15    DE CYSTO/URETERO W/LITHOTRIPSY &INDWELL STENT INSRT Left 2018    CYSTOSCOPY URETEROSCOPY performed by Pasha Mendoza MD at [address] &INDWELL STENT INSRT Left 2018    CYSTOSCOPY STENT INSERTION performed by Pasha Mendoza MD at [address] &INDWELL STENT INSRT Left 11/15/2018    LEFT CYSTOSCOPY URETEROSCOPY LASER,HLL, LEFT STENT EXCHANGE performed by Pasha Mendoza MD at [address]         Immunization History:   Immunization History   Administered Date(s) Administered    Influenza Virus Vaccine 2015, 2018    Influenza, FLUARIX, FLULAVAL, FLUZONE (age 10 mo+) AND AFLURIA, (age 1 y+), PF, 0.5mL 08/27/2020, 08/27/2020    Pneumococcal Polysaccharide (Kskravrto03) 04/11/2018    Zoster Recombinant (Shingrix) 07/22/2020, 07/22/2020       Active Problems:  Patient Active Problem List   Diagnosis Code    Acute coronary syndrome (HCC) I24.9    S/P cardiac cath-Patent grafts 6/5/15 Z98.890    Onychomycosis of toenail B35.1    CAD (coronary artery disease) I25.10    Hx of CABG Z95.1    ICD (implantable cardioverter-defibrillator) in place Z95.810    H/O mitral valve repair Z98.890    Hx of myocardial infarction I25.2    Hypertension I10    DM (diabetes mellitus), type 2 with neurological complications ([facility] [address] E83.75    Anxiety F41.9    CHAD (acute kidney injury) ([facility] [address] N17.9    Elevated serum immunoglobulin free light chains R76.8    Ureteral stone with hydronephrosis N13.2    Dyslipidemia E78.5    Pneumonia S12.9    Acute systolic CHF (congestive heart failure) ([facility]) I50.21    Congestive heart failure due to cardiomyopathy ([facility]) I50.9, I42.9    CHF (congestive heart failure), NYHA class I, acute on chronic, combined ([facility]) I50.43    Acute on chronic systolic CHF (congestive heart failure) ([facility]) I50.23    Stage 3b chronic kidney disease ([facility]) N18.32    Ischemic cardiomyopathy I25.5    Acute hypoxemic respiratory failure ([facility]) J96.01       Isolation/Infection:   Isolation            No Isolation          Patient Infection Status       Infection Onset Added Last Indicated Last Indicated By Review Planned Expiration Resolved Resolved By    None active    Resolved    C-diff Rule Out 10/13/22 10/13/22 10/13/22 C DIFF TOXIN/ANTIGEN (Ordered)   10/13/22 Rule-Out Test Resulted    COVID-19 (Rule Out) 10/11/22 10/11/22 10/11/22 COVID-19, Rapid (Ordered)   10/11/22 Rule-Out Test Resulted    COVID-19 (Rule Out) 02/24/22 02/24/22 02/24/22 COVID-19 (Ordered)   02/24/22 Rule-Out Test Resulted            Nurse Assessment:  Last Vital Signs: /76   Pulse 91   Temp 98.7 °F (37.1 °C)   Resp 22 Ht 5' 6\" (1.676 m)   Wt 179 lb 7.3 oz (81.4 kg)   SpO2 96%   BMI 28.96 kg/m²     Last documented pain score (0-10 scale): Pain Level: 0  Last Weight:   Wt Readings from Last 1 Encounters:   10/16/22 179 lb 7.3 oz (81.4 kg)     Mental Status:  {IP PT MENTAL STATUS:45058}    IV Access:  { SOPHIE IV ACCESS:601987621}    Nursing Mobility/ADLs:  Walking   {CHP DME KGQB:594649752}  Transfer  {CHP DME KPEA:331118732}  Bathing  {CHP DME KFYL:913853781}  Dressing  {CHP DME YUIV:431553376}  Toileting  {CHP DME ZLIL:756042822}  Feeding  {CHP DME SOZF:058150517}  Med Admin  {CHP DME YBAW:066104777}  Med Delivery   { SOPHIE MED Delivery:459939098}    Wound Care Documentation and Therapy:        Elimination:  Continence: Bowel: {YES / OP:30295}  Bladder: {YES / MU:95774}  Urinary Catheter: {Urinary Catheter:929456632}   Colostomy/Ileostomy/Ileal Conduit: {YES / TX:62028}       Date of Last BM: ***    Intake/Output Summary (Last 24 hours) at 10/18/2022 1007  Last data filed at 10/17/2022 1800  Gross per 24 hour   Intake 600 ml   Output 1675 ml   Net -1075 ml     I/O last 3 completed shifts:   In: 12 [P.O.:960]  Out: 4125 [Urine:4125]    Safety Concerns:     508 T[address]ase Safety Concerns:605406462}    Impairments/Disabilities:      508 Tript[address]ase Impairments/Disabilities:627456086}    Nutrition Therapy:  Current Nutrition Therapy:   508 Triptease Diet List:973949774}    Routes of Feeding: {CHP DME Other Feedings:643451790}  Liquids: {Slp liquid thickness:12251}  Daily Fluid Restriction: {CHP DME Yes amt example:245861844}  Last Modified Barium Swallow with Video (Video Swallowing Test): {Done Not Done XSHN:423711093}    Treatments at the Time of Hospital Discharge:   Respiratory Treatments: ***  Oxygen Therapy:  {Therapy; copd oxygen:17620}  Ventilator:    { JOVITA Vent HRSK:343410139}    Rehab Therapies: {THERAPEUTIC INTERVENTION:2475643140}  Weight Bearing Status/Restrictions: [address] Nandini HUSSEIN Weight Bearin}  Other Medical Equipment (for information only, NOT a DME order):  {EQUIPMENT:989823272}  Other Treatments: ***    Patient's personal belongings (please select all that are sent with patient):  {CHP DME Belongings:955964462}    RN SIGNATURE:  {Esignature:628511373}    CASE MANAGEMENT/SOCIAL WORK SECTION    Inpatient Status Date: ***    Readmission Risk Assessment Score:  Readmission Risk              Risk of Unplanned Readmission:  19           Discharging to Facility/ Agency   Name:   Address:  Phone:  Fax:    Dialysis Facility (if applicable)   Name:  Address:  Dialysis Schedule:  Phone:  Fax:    / signature: {Esignature:834314505}    PHYSICIAN SECTION    Prognosis: Good    Condition at Discharge: Stable    Rehab Potential (if transferring to Rehab): Good    Recommended Labs or Other Treatments After Discharge: needs to take meds    Physician Certification: I certify the above information and transfer of Lily Costello  is necessary for the continuing treatment of the diagnosis listed and that she requires Home Care for greater 30 days.      Update Admission H&P: No change in H&P    PHYSICIAN SIGNATURE:  Electronically signed by Jocelyne Germain MD on 10/18/22 at 10:07 AM EDT

## 2022-10-19 LAB
CULTURE: NORMAL
Lab: NORMAL
SPECIMEN DESCRIPTION: NORMAL

## 2022-10-24 ENCOUNTER — OFFICE VISIT (OUTPATIENT)
Dept: CARDIOLOGY CLINIC | Age: 62
End: 2022-10-24
Payer: MEDICARE

## 2022-10-24 ENCOUNTER — OFFICE VISIT (OUTPATIENT)
Dept: FAMILY MEDICINE CLINIC | Age: 62
End: 2022-10-24
Payer: MEDICARE

## 2022-10-24 VITALS
WEIGHT: 165 LBS | SYSTOLIC BLOOD PRESSURE: 100 MMHG | DIASTOLIC BLOOD PRESSURE: 60 MMHG | BODY MASS INDEX: 26.63 KG/M2 | OXYGEN SATURATION: 94 % | HEART RATE: 90 BPM

## 2022-10-24 VITALS
BODY MASS INDEX: 26.61 KG/M2 | OXYGEN SATURATION: 99 % | RESPIRATION RATE: 16 BRPM | WEIGHT: 165.6 LBS | SYSTOLIC BLOOD PRESSURE: 106 MMHG | HEIGHT: 66 IN | DIASTOLIC BLOOD PRESSURE: 70 MMHG | HEART RATE: 94 BPM

## 2022-10-24 DIAGNOSIS — E78.5 DYSLIPIDEMIA: ICD-10-CM

## 2022-10-24 DIAGNOSIS — R94.31 ABNORMAL EKG: ICD-10-CM

## 2022-10-24 DIAGNOSIS — I50.23 ACUTE ON CHRONIC SYSTOLIC CONGESTIVE HEART FAILURE (HCC): ICD-10-CM

## 2022-10-24 DIAGNOSIS — N30.01 ACUTE CYSTITIS WITH HEMATURIA: ICD-10-CM

## 2022-10-24 DIAGNOSIS — E11.49 DM (DIABETES MELLITUS), TYPE 2 WITH NEUROLOGICAL COMPLICATIONS (HCC): ICD-10-CM

## 2022-10-24 DIAGNOSIS — F41.9 ANXIETY: ICD-10-CM

## 2022-10-24 DIAGNOSIS — R06.02 SOB (SHORTNESS OF BREATH): Primary | ICD-10-CM

## 2022-10-24 DIAGNOSIS — I10 ESSENTIAL HYPERTENSION: ICD-10-CM

## 2022-10-24 DIAGNOSIS — I25.10 CORONARY ARTERY DISEASE INVOLVING NATIVE CORONARY ARTERY OF NATIVE HEART WITHOUT ANGINA PECTORIS: ICD-10-CM

## 2022-10-24 DIAGNOSIS — Z76.0 MEDICATION REFILL: ICD-10-CM

## 2022-10-24 DIAGNOSIS — Z09 HOSPITAL DISCHARGE FOLLOW-UP: Primary | ICD-10-CM

## 2022-10-24 PROCEDURE — 99214 OFFICE O/P EST MOD 30 MIN: CPT | Performed by: STUDENT IN AN ORGANIZED HEALTH CARE EDUCATION/TRAINING PROGRAM

## 2022-10-24 PROCEDURE — G8427 DOCREV CUR MEDS BY ELIG CLIN: HCPCS | Performed by: INTERNAL MEDICINE

## 2022-10-24 PROCEDURE — 99214 OFFICE O/P EST MOD 30 MIN: CPT | Performed by: INTERNAL MEDICINE

## 2022-10-24 PROCEDURE — G8484 FLU IMMUNIZE NO ADMIN: HCPCS | Performed by: INTERNAL MEDICINE

## 2022-10-24 PROCEDURE — G8419 CALC BMI OUT NRM PARAM NOF/U: HCPCS | Performed by: INTERNAL MEDICINE

## 2022-10-24 PROCEDURE — 3078F DIAST BP <80 MM HG: CPT | Performed by: INTERNAL MEDICINE

## 2022-10-24 PROCEDURE — 1111F DSCHRG MED/CURRENT MED MERGE: CPT | Performed by: INTERNAL MEDICINE

## 2022-10-24 PROCEDURE — 4004F PT TOBACCO SCREEN RCVD TLK: CPT | Performed by: INTERNAL MEDICINE

## 2022-10-24 PROCEDURE — 3017F COLORECTAL CA SCREEN DOC REV: CPT | Performed by: INTERNAL MEDICINE

## 2022-10-24 PROCEDURE — 3074F SYST BP LT 130 MM HG: CPT | Performed by: INTERNAL MEDICINE

## 2022-10-24 PROCEDURE — 1111F DSCHRG MED/CURRENT MED MERGE: CPT | Performed by: STUDENT IN AN ORGANIZED HEALTH CARE EDUCATION/TRAINING PROGRAM

## 2022-10-24 RX ORDER — INSULIN GLARGINE 100 [IU]/ML
INJECTION, SOLUTION SUBCUTANEOUS
Qty: 30 ML | Refills: 5 | Status: SHIPPED | OUTPATIENT
Start: 2022-10-24

## 2022-10-24 RX ORDER — ALBUTEROL SULFATE 2.5 MG/3ML
2.5 SOLUTION RESPIRATORY (INHALATION) EVERY 6 HOURS PRN
Qty: 120 EACH | Refills: 1 | Status: CANCELLED | OUTPATIENT
Start: 2022-10-24

## 2022-10-24 RX ORDER — CARVEDILOL 3.12 MG/1
TABLET ORAL
Qty: 60 TABLET | Refills: 11 | Status: SHIPPED | OUTPATIENT
Start: 2022-10-24

## 2022-10-24 RX ORDER — GABAPENTIN 400 MG/1
400 CAPSULE ORAL 3 TIMES DAILY
Qty: 270 CAPSULE | Refills: 1 | Status: SHIPPED | OUTPATIENT
Start: 2022-10-24 | End: 2023-04-22

## 2022-10-24 RX ORDER — SERTRALINE HYDROCHLORIDE 100 MG/1
TABLET, FILM COATED ORAL
Qty: 90 TABLET | Refills: 1 | Status: SHIPPED | OUTPATIENT
Start: 2022-10-24

## 2022-10-24 RX ORDER — CLOPIDOGREL BISULFATE 75 MG/1
TABLET ORAL
Qty: 30 TABLET | Refills: 11 | Status: SHIPPED | OUTPATIENT
Start: 2022-10-24

## 2022-10-24 RX ORDER — MIDODRINE HYDROCHLORIDE 5 MG/1
TABLET ORAL
Qty: 90 TABLET | Refills: 1 | Status: SHIPPED | OUTPATIENT
Start: 2022-10-24

## 2022-10-24 RX ORDER — ATORVASTATIN CALCIUM 80 MG/1
TABLET, FILM COATED ORAL
Qty: 30 TABLET | Refills: 11 | Status: SHIPPED | OUTPATIENT
Start: 2022-10-24

## 2022-10-24 RX ORDER — LANCETS 30 GAUGE
EACH MISCELLANEOUS
Qty: 100 EACH | Refills: 3 | Status: SHIPPED | OUTPATIENT
Start: 2022-10-24

## 2022-10-24 RX ORDER — PEN NEEDLE, DIABETIC 32GX 5/32"
NEEDLE, DISPOSABLE MISCELLANEOUS
Qty: 100 EACH | Refills: 0 | Status: SHIPPED | OUTPATIENT
Start: 2022-10-24

## 2022-10-24 NOTE — PROGRESS NOTES
Ov DR Padmini Kaplan follow up  Pt was admitted here for CHF  Then transferred to [facility] . States ran out of meds had  Trouble getting them from   Mail order   Placed on home oxygen cont. At 2 L   Pt did have battery change at   [facility]. For ICD checked today   Per Zia. Pt non compliant with appt   Checked in hospital was TIMBO  Sob improved   No chest pain. No dizziness. Some ankle edema. Pt still has no meds   Except the lasix prescription  She got at [facility] with    And custody of 4 grand   Children ages 16,16.8,11    Seeing DR HARTMAN today . We will refill coreg, plavix  And lipitor.     Will order lexiscan and call   With results  Will have pt schedule   Back with [facility] regarding   2nd lead in pacemaker     Scheduled for 3 mth device check

## 2022-10-24 NOTE — PROGRESS NOTES
Post-Discharge Transitional Care  Follow Up      Citlaly Bruno   YOB: 1960    Date of Office Visit:  10/24/2022  Date of Hospital Admission: 10/14/22  Date of Hospital Discharge: 10/18/22  Risk of hospital readmission (high >=14%. Medium >=10%) :Readmission Risk Score: 18.9      Care management risk score Rising risk (score 2-5) and Complex Care (Scores >=6): No Risk Score On File     Non face to face  following discharge, date last encounter closed (first attempt may have been earlier): *No documented post hospital discharge outreach found in the last 14 days    Call initiated 2 business days of discharge: *No response recorded in the last 14 days    ASSESSMENT/PLAN:   Hospital discharge follow-up  -     IN DISCHARGE MEDS RECONCILED W/ CURRENT OUTPATIENT MED LIST  Acute on chronic systolic congestive heart failure (HCC)  -     midodrine (PROAMATINE) 5 MG tablet; take 1 tablet by mouth three times a day, Disp-90 tablet, R-1Normal  Acute cystitis with hematuria  DM (diabetes mellitus), type 2 with neurological complications (HCC)  -     gabapentin (NEURONTIN) 400 MG capsule; Take 1 capsule by mouth 3 times daily for 180 days. , Disp-270 capsule, R-1Normal  -     insulin glargine (LANTUS SOLOSTAR) 100 UNIT/ML injection pen; INJECT 45 UNITS SUBCUTANEOUSLY EVERY MORNING AND 25 UNITS EVERY EVENING, Disp-30 mL, R-5Normal  -     Insulin Pen Needle (BD PEN NEEDLE PAIGE 2ND GEN) 32G X 4 MM MISC; use 1 PEN NEEDLE to inject MEDICATION subcutaneously daily, Disp-100 each, R-0Normal  -     Lancets MISC; Disp-100 each, R-3, NormalPt testing BS once daily and prn  Anxiety  -     sertraline (ZOLOFT) 100 MG tablet; Take one tablet by mouth once daily, Disp-90 tablet, R-1Normal  Medication refill      Medical Decision Making: moderate complexity  Return in 4 weeks (on 11/21/2022) for DM2.     On this date 10/24/2022 I have spent 22 minutes reviewing previous notes, test results and face to face with the patient MG tablet  Commonly known as: ZOLOFT  Take one tablet by mouth once daily  What changed: See the new instructions. Changed by: Jenna Begun, DO            CONTINUE taking these medications      albuterol (2.5 MG/3ML) 0.083% nebulizer solution  Commonly known as: PROVENTIL  Take 3 mLs by nebulization every 6 hours as needed for Wheezing (as needed for wheezing)     BD Pen Needle Elizabeth 2nd Gen 32G X 4 MM Misc  Generic drug: Insulin Pen Needle  use 1 PEN NEEDLE to inject MEDICATION subcutaneously daily     * blood glucose monitor kit and supplies  Use to test daily     * glucose monitoring kit  Please dispense meter covered by patients insurance. * blood glucose test strips strip  Commonly known as: [employer] Test  1 each by In Vitro route daily Pt testing BS once daily and prn     * blood glucose test strips  Use to test daily     * blood glucose test strips  Testing BS once daily and as needed. Dispense strips to go with new glucometer     carvedilol 3.125 MG tablet  Commonly known as: COREG  take 1 tablet by mouth twice a day with meals     furosemide 40 MG tablet  Commonly known as: LASIX  Take 1 tablet by mouth in the morning and 1 tablet in the evening.     gabapentin 400 MG capsule  Commonly known as: NEURONTIN  Take 1 capsule by mouth 3 times daily for 180 days.      lactobacillus capsule  Take 1 capsule by mouth 2 times daily (with meals)     Lancets [facility]  Pt testing BS once daily and prn     Lantus SoloStar 100 UNIT/ML injection pen  Generic drug: insulin glargine  INJECT 45 UNITS SUBCUTANEOUSLY EVERY MORNING AND 25 UNITS EVERY EVENING     midodrine 5 MG tablet  Commonly known as: PROAMATINE  take 1 tablet by mouth three times a day     * Nebulizer Compressor Kit  1 kit by Does not apply route once for 1 dose     * Nebulizer/Tubing/Mouthpiece Kit  1 kit by Does not apply route daily as needed (for SOB, cough)     therapeutic multivitamin-minerals tablet  Take 1 tablet by mouth daily (with breakfast) Vitamin D3 125 MCG (5000 UT) Tabs           * This list has 7 medication(s) that are the same as other medications prescribed for you. Read the directions carefully, and ask your doctor or other care provider to review them with you. Where to Get Your Medications        These medications were sent to [address] [place] [address], [address] [place] [address]      Phone: [phone]   atorvastatin 80 MG tablet  BD Pen Needle Paige 2nd Gen 32G X 4 MM Misc  carvedilol 3.125 MG tablet  clopidogrel 75 MG tablet  gabapentin 400 MG capsule  Lancets Misc  Lantus SoloStar 100 UNIT/ML injection pen  midodrine 5 MG tablet  sertraline 100 MG tablet           Medications marked \"taking\" at this time  Outpatient Medications Marked as Taking for the 10/24/22 encounter (Office Visit) with Clay Ng, DO   Medication Sig Dispense Refill    clopidogrel (PLAVIX) 75 MG tablet Take 1 tab daily 30 tablet 11    atorvastatin (LIPITOR) 80 MG tablet 1 tab daily 30 tablet 11    carvedilol (COREG) 3.125 MG tablet take 1 tablet by mouth twice a day with meals 60 tablet 11    gabapentin (NEURONTIN) 400 MG capsule Take 1 capsule by mouth 3 times daily for 180 days. 270 capsule 1    midodrine (PROAMATINE) 5 MG tablet take 1 tablet by mouth three times a day 90 tablet 1    sertraline (ZOLOFT) 100 MG tablet Take one tablet by mouth once daily 90 tablet 1    insulin glargine (LANTUS SOLOSTAR) 100 UNIT/ML injection pen INJECT 45 UNITS SUBCUTANEOUSLY EVERY MORNING AND 25 UNITS EVERY EVENING 30 mL 5    Insulin Pen Needle (BD PEN NEEDLE PAIGE 2ND GEN) 32G X 4 MM MISC use 1 PEN NEEDLE to inject MEDICATION subcutaneously daily 100 each 0    Lancets MISC Pt testing BS once daily and prn 100 each 3    furosemide (LASIX) 40 MG tablet Take 1 tablet by mouth in the morning and 1 tablet in the evening.  60 tablet 3    [DISCONTINUED] sertraline (ZOLOFT) 100 MG tablet take 1 tablet by mouth once daily 30 tablet 1    glucose monitoring (FREESTYLE) kit Please dispense meter covered by patients insurance. 1 kit 0    blood glucose monitor strips Testing BS once daily and as needed. Dispense strips to go with new glucometer 100 strip 3    albuterol (PROVENTIL) (2.5 MG/3ML) 0.083% nebulizer solution Take 3 mLs by nebulization every 6 hours as needed for Wheezing (as needed for wheezing) 120 each 1    Respiratory Therapy Supplies (NEBULIZER/TUBING/MOUTHPIECE) KIT 1 kit by Does not apply route daily as needed (for SOB, cough) 1 kit 0    Respiratory Therapy Supplies (NEBULIZER COMPRESSOR) KIT 1 kit by Does not apply route once for 1 dose 1 kit 0    lactobacillus (CULTURELLE) capsule Take 1 capsule by mouth 2 times daily (with meals) 30 capsule 0    blood glucose monitor strips Use to test daily 100 strip 5    blood glucose monitor kit and supplies Use to test daily 1 kit 0    Cholecalciferol (VITAMIN D3) 5000 units TABS Take 5,000 Units by mouth daily      glucose blood VI test strips (EXACTECH TEST) strip 1 each by In Vitro route daily Pt testing BS once daily and prn 100 each 3    Multiple Vitamins-Minerals (THERAPEUTIC MULTIVITAMIN-MINERALS) tablet Take 1 tablet by mouth daily (with breakfast) 60 tablet 3        Medications patient taking as of now reconciled against medications ordered at time of hospital discharge: Yes    A comprehensive review of systems was negative except for what was noted in the HPI.     Objective:    /70 (Site: Right Upper Arm, Position: Sitting, Cuff Size: Medium Adult)   Pulse 94   Resp 16   Ht 5' 6\" (1.676 m)   Wt 165 lb 9.6 oz (75.1 kg)   SpO2 99%   BMI 26.73 kg/m²   General Appearance: alert and oriented to person, place and time, well developed and well- nourished, in no acute distress  Skin: warm and dry, no rash or erythema  Head: normocephalic and atraumatic  Eyes: pupils equal, round, and reactive to light, extraocular eye movements intact, conjunctivae normal  ENT: tympanic membrane, external ear and ear canal normal bilaterally, nose without deformity, nasal mucosa and turbinates normal without polyps  Neck: supple and non-tender without mass, no thyromegaly or thyroid nodules, no cervical lymphadenopathy  Pulmonary/Chest: clear to auscultation bilaterally- no wheezes, rales or rhonchi, normal air movement, no respiratory distress  Cardiovascular: normal rate, regular rhythm, normal S1 and S2, no murmurs, rubs, clicks, or gallops, distal pulses intact, no carotid bruits  Abdomen: soft, non-tender, non-distended, normal bowel sounds, no masses or organomegaly  Extremities: no cyanosis, clubbing or edema  Musculoskeletal: normal range of motion, no joint swelling, deformity or tenderness  Neurologic: reflexes normal and symmetric, no cranial nerve deficit, gait, coordination and speech normal      An electronic signature was used to authenticate this note.   --Ale Dumont, DO

## 2022-10-24 NOTE — LETTER
Warden Demetrius MD  [facility]  [address], [place]  [phone]      2022      Jen Orlando MD  [address] [facility], [place]      RE:   Leata Solid  :        Dear Dr. Ariana Stanton:    CHIEF COMPLAINT:  1. Coronary artery disease. 2.  VF arrest status post [employer] ICD placed on 2015. HISTORY OF PRESENT ILLNESS:  Mrs. Rebecca Fall is a 79-year-old female who had acute chest pain on 2015, and was found to have a non-ST-elevation myocardial infarction, transferred to [facility]. [facility]. A catheterization on 2015, showed occluded right coronary artery, 90% LAD, 90% diagonal, circumflex was small and nondominant, with an EF of 20%. She had bypass surgery on 2015, with a LIMA to the LAD, vein graft to the diagonal, vein graft to the right coronary artery with mitral valve repair with a 30 mm Andi-Pérez bioprosthetic mitral valve. Her EF improved to 30% to 35%. Three days later, she had a VF arrest and another catheterization on 2015, showed widely patent bypass grafts with an EF of 30% to 35%. An ICD was placed with Dr. Ingrid Azevedo on 2015, which was a [address] [place] [address] number [id number], single-chamber ventricular lead. I had not seen her since . She came to the hospital and was admitted on 10/11/2022, for acute shortness of breath. She admits she has not been taking her medicines for the last two months. She has had progressive shortness of breath over the last several months and also had increasing abdominal swelling and edema of both lower extremities. She came to the emergency room and was admitted for CHF. We checked her ICD and it had reached TIMBO and we could not interrogate. She developed progressive renal insufficiency during the hospitalization and was transferred to [facility] on 10/14/2022.   At that time, she had acute CHF. She also had stage III chronic renal insufficiency. An echocardiogram on 10/12, showed an EF of 25%. She had an ICD generator change. She was discharged with a recommendation for a cardiac catheterization. When I see her today, she still has not received her medications. She does feel better since her hospitalization with less shortness of breath. She is on home oxygen at 2 liters. She lives with her  and has the custody of four grandchildren, ages 16, 15, 5 and 6. CARDIAC RISK FACTORS:  Known CAD:  Positive. Bypass Surgery:  Positive. Hypertension:  Positive. Hyperlipidemia:  Positive. Diabetes:  Positive. Other Family Members:  Positive. Smoking:  Positive. MEDICATIONS AT THIS TIME:  (Has not received them as of yet) Proventil inhaler every 6 hours p.r.n., Lipitor 80 mg daily, Coreg 3.125 mg b.i.d., vitamin D 5000 units daily, Plavix 75 mg daily, Lasix 40 mg b.i.d., Neurontin 400 mg t.i.d., glargine 45 units in the morning and 25 units in the evening, midodrine 5 mg t.i.d., Zoloft 100 mg daily. PAST MEDICAL AND SURGICAL HISTORY:  1. Depression. 2.  Hypertension although she has been hypotensive recently. 3.  Cholecystectomy in [facility].  4.  Hysterectomy in 1990.  5.  Right ankle surgery in 2009 with plate and screws. 6.  Lithotripsy on the left side in 2010.  7.  Bypass surgery by Dr. Nasrin Stanley on 05/28/2015, with mitral valve repair with a 30 mm Andi-Pérez annuloplasty ring as well as a LIMA to the LAD, vein graft to the diagonal, and vein graft to the right coronary artery. 8.  [place] defibrillator single-lead placed on 06/09/2015, by Dr. Fabrice Huggins, which at this time is working properly. 9.  Insulin-dependent diabetes. 10.  Chronic renal insufficiency. FAMILY HISTORY:  Mother alive at 68. Father had a stroke at 61. One brother and one sister alive and well.     SOCIAL HISTORY:  She is 60years old, , five children, five grandchildren. She and her  have the custody of four grandchildren, now living with them, ages 16, 15, 5 and 6. Smokes one pack of cigarettes per day. Does not drink alcohol. She is fairly inactive. REVIEW OF SYSTEMS:  Cardiac as above. Other systems reviewed including constitutional, eyes, ears, nose and throat, cardiovascular, respiratory, GI, , musculoskeletal, integumentary, neurologic, endocrine, hematologic and allergic/immunologic, are negative except for what is described above. No weight loss or weight gain. No change in bowel habits, no blood in stools. No fevers, sweats or chills. PHYSICAL EXAMINATION:  VITAL SIGNS:  Her blood pressure was 100/60 with a heart rate of 90 and regular. Respiratory rate 18. O2 sat 94%. Weight 165 pounds. GENERAL:  She is a pleasant 44-year-old female. Denied pain. She was oriented to person, place and time. Answered questions appropriately. SKIN:  No unusual skin changes. HEENT:  The pupils are equally round and intact. Mucous membranes were dry. NECK:  No JVD. Good carotid pulses. No carotid bruits. No lymphadenopathy or thyromegaly. CARDIOVASCULAR EXAM:  S1 and S2 were normal.  No S3 or S4. Soft systolic blowing type murmur. No diastolic murmur. PMI was normal.  No lift, thrust, or pericardial friction rub. LUNGS:  Clear to auscultation and percussion. ABDOMEN:  Soft and nontender. Good bowel sounds. EXTREMITIES:  Good femoral pulses. Good pedal pulses. No pedal edema. Skin was warm and dry. No calf tenderness. Nail beds pink. Good cap refill. PULSES:  Bilateral symmetrical radial, brachial and carotid pulses. No carotid bruits. Good femoral and pedal pulses. NEUROLOGIC EXAM:  Within normal limits. PSYCHIATRIC EXAM:  Within normal limits. LABORATORY DATA:  Her sodium was 139, potassium 3.8, BUN 33, creatinine 1.42, calcium was 8.5. White count 6.9, hemoglobin 9.0 with a platelet count 489,972.     Her ICD was interrogated. It is at a lower rate of 40 beats per minute. Echocardiogram on 10/12 showed an EF of 25% with mild mitral regurgitation with a good result from mitral valve repair. IMPRESSION:  1. Severe LV dysfunction, EF of 25%. 2.  Not taking medicine for the last two months. 3.  Continued smoking abuse. 4.  Initially felt that her ICD was not working appropriately; however, it was a malfunction of our interrogation device and appears that it is working properly. 5.  Status post cardiac catheterization on 05/26/2015, after a subendocardial myocardial infarction, that showed occluded LAD in the midportion, with 90% disease in the first diagonal, occluded very large right coronary artery, small and nondominant circumflex, EF of 20%. 6.  Bypass surgery by Dr. Ailyn Miranda on 05/28/2015, with a LIMA to the LAD, a vein graft to the diagonal, a vein graft to the right coronary artery, with a mitral valve repair with a 30 mm Andi-Pérez annuloplasty ring. 7.  [address] [employer] ICD on 06/09/2015, which is working appropriately. 8.  Insulin-dependent diabetes. 9.  Chronic renal insufficiency, about at baseline. 10.  Mild hypotension. PLAN:  1. We will do a Lexiscan Cardiolite stress test.  2.  We will call her with results. 3.  If the [place] shows an area of ischemia, we would do a cardiac catheterization. DISCUSSION:  Luther Lowe continues to struggle. She has been out of medications for at least several months. She did present with CHF and had progressive renal insufficiency. However, on a Lasix drip, she did well at [facility]. She has had a good result from her mitral valve repair. She does have severe LV dysfunction, EF in the 25% range. I will do a Lexiscan to determine whether we should proceed with a cardiac catheterization. If she does show significant area of ischemia, then I would plan on doing a cardiac catheterization.     I have encouraged her to quit smoking and to take her medications. Hopefully, she will comply. Thank you very much for allowing me the privilege of seeing Mrs. Adria Porter. If you have any questions on my thoughts, please do not hesitate to contact me.      Sincerely,        Lisha Olson    D: 10/30/2022 20:20:57     T: 10/30/2022 20:26:24     [id number]  Job#: [id number]   Doc#: [id number]

## 2022-10-29 NOTE — DISCHARGE SUMMARY
[address]     Department of Internal Medicine - Staff Internal Medicine Teaching Service    INPATIENT DISCHARGE SUMMARY      Patient Identification:  Zahraa Prasad is a 60 y.o. female. :  1960  MRN: [id number]     Acct: [de-identified]   PCP: Kenya Coleman MD  Admit Date:  10/14/2022  Discharge date and time: 10/18/2022  6:00 PM   Attending Provider: No att. providers found                                     [address] Problem Lists:  Principal Problem:    CHF (congestive heart failure), NYHA class I, acute on chronic, combined ([facility]  Active Problems:    Acute on chronic systolic CHF (congestive heart failure) (HCC)    Stage 3b chronic kidney disease ([facility]    Ischemic cardiomyopathy    Acute hypoxemic respiratory failure [facility]  Resolved Problems:    * No resolved hospital problems. *      HOSPITAL STAY     Brief Inpatient course:   Patient has past medical history of CAD with left 20 respiratory dysfunction with ejection fraction of 25%, COPD, AICD. Patient has a history of diabetes mellitus. Patient reports good compliance with medication x2 months     The patient is a 60 y.o. female with past medical history significant for  who was initially admitted on 10/14/2022 with   The patient initially presented to [facility] ED with shortness of breath, cough, sputum and swelling in the legs. On initial presentation the patient was bradycardic, BUN, creatinine, glucose, troponins were increasing chest x-ray showed effusions and right lung base consolidation. The patient was initially placed on Lasix and Aldactone and Rocephin and Zithromax for possible pneumonia. She was then started on Plavix and Coreg with possible breathing treatments of Atrovent and Pulmicort for pulm possible COPD exacerbation.   The patient later became lethargic, blood glucose was 40, blood pressure was 8060, started on dopamine drip and patient was transferred [facility]. The patient was placed in the ICU was placed on BiPAP furosemide drip and midodrine. Nephrology and cardiology was consulted. The patient was continued on diuresis as per cardiology. Renal ultrasound showed no hydronephrosis. The patient was gradually weaned off BiPAP and was shifted to high flow. The patient finished doses of ceftriaxone and Zithromax . For acute on chronic CHF, systolic heart failure, from 25%, status post mitral valve repair the patient was treated appropriately with diuretics and Lopressor 12.5 BD. We finished the patient on course of Zithromax and Rocephin, give the patient breathing treatment for hypoxic respiratory failure. #CKD stage IIIb is at baseline. Medium dose corrective sliding scale insulin for type 2 diabetes mellitus. The patient's medications were optimized and she was discharged when medically cleared. Procedures/ Significant Interventions:      Consults:     Consults:     Final Specialist Recommendations/Findings:   IP CONSULT TO CARDIOLOGY  IP CONSULT TO NEPHROLOGY      Any Hospital Acquired Infections: none    Discharge Functional Status:  stable    DISCHARGE PLAN     Disposition: home    Patient Instructions:   Discharge Medication List as of 10/18/2022  4:59 PM        CONTINUE these medications which have CHANGED    Details   furosemide (LASIX) 40 MG tablet Take 1 tablet by mouth in the morning and 1 tablet in the evening., Disp-60 tablet, R-3Normal           CONTINUE these medications which have NOT CHANGED    Details   gabapentin (NEURONTIN) 400 MG capsule Take 1 capsule by mouth in the morning and 1 capsule at noon and 1 capsule before bedtime. Do all this for 90 days. , Disp-270 capsule, R-0Normal      midodrine (PROAMATINE) 5 MG tablet take 1 tablet by mouth three times a day, Disp-90 tablet, R-1Normal      sertraline (ZOLOFT) 100 MG tablet take 1 tablet by mouth once daily, Disp-30 tablet, R-1Normal      clopidogrel (PLAVIX) 75 MG tablet take 1 tablet by mouth once daily, Disp-30 tablet, R-1Normal      atorvastatin (LIPITOR) 80 MG tablet take 1 tablet by mouth once daily, Disp-30 tablet, R-1Normal      carvedilol (COREG) 3.125 MG tablet take 1 tablet by mouth twice a day with meals, Disp-60 tablet, R-1Normal      insulin glargine (LANTUS SOLOSTAR) 100 UNIT/ML injection pen INJECT 45 UNITS SUBCUTANEOUSLY EVERY MORNING AND 25 UNITS EVERY EVENING, Disp-30 mL, R-5Normal      glucose monitoring (FREESTYLE) kit Disp-1 kit, R-0, NormalPlease dispense meter covered by patients insurance. !! blood glucose monitor strips Testing BS once daily and as needed.  Dispense strips to go with new glucometer, Disp-100 strip, R-3, Normal      albuterol (PROVENTIL) (2.5 MG/3ML) 0.083% nebulizer solution Take 3 mLs by nebulization every 6 hours as needed for Wheezing (as needed for wheezing), Disp-120 each, R-1Normal      Insulin Pen Needle (BD PEN NEEDLE PAIGE 2ND GEN) 32G X 4 MM MISC use 1 PEN NEEDLE to inject MEDICATION subcutaneously daily, Disp-100 each, R-0Normal      Lancets MISC Disp-100 each, R-3, NormalPt testing BS once daily and prn      Respiratory Therapy Supplies (NEBULIZER/TUBING/MOUTHPIECE) KIT DAILY PRN Starting Wed 11/13/2019, Disp-1 kit, R-0, Print      lactobacillus (CULTURELLE) capsule Take 1 capsule by mouth 2 times daily (with meals), Disp-30 capsule, R-0Normal      !! blood glucose monitor strips Use to test daily, Disp-100 strip, R-5, Normal      blood glucose monitor kit and supplies Use to test daily, Disp-1 kit, R-0, Normal      Cholecalciferol (VITAMIN D3) 5000 units TABS Take 5,000 Units by mouth dailyHistorical Med      !! glucose blood VI test strips (EXACTECH TEST) strip DAILY Starting Thu 11/19/2015, Disp-100 each, R-3, NormalPt testing BS once daily and prn      Multiple Vitamins-Minerals (THERAPEUTIC MULTIVITAMIN-MINERALS) tablet Take 1 tablet by mouth daily (with breakfast), Disp-60 tablet, R-3Normal       !! - Potential duplicate medications found. Please discuss with provider. STOP taking these medications       lisinopril (PRINIVIL;ZESTRIL) 5 MG tablet Comments:   Reason for Stopping:               Activity: activity as tolerated    Diet: regular diet    Follow-up:    Estella Glez MD  [address] [facility] [address]  [address] [place] MD  [address] [place] [address]  [phone]    Follow up in 2 week(s)  hospital follow up   f/u as OP for cath and ICD generator change    Deni Oneil MD  [facility] [address]  [facility] [address]  [phone]    Follow up in 2 week(s)  hospital follow up    [facility]  [phone]  Call  call [phone] when discharged to arrange delivery of oxygen equipment to home      Patient Instructions: You were admitted with CHF and COPD exacerbation  Treated with IV diuresis    Please start taking lasix 40 mg BID  Stop taking lisinopril  Taker all other home medications as you were taking before    Please follow up with PCP  Please follow up with your cardiologist  Please follow up with nephrology for CHAD  Follow up labs: N/A  Follow up imaging: Deann Angela MD, MD  Internal Medicine Resident, PGY-1  [address] [facility];  [place], [place]  10/29/2022, 3:24 AM

## 2022-11-01 NOTE — PROGRESS NOTES
Elly Pereyra MD  [facility]  [address], [facility]  [phone]      2022      Christal Olson MD  [address] [facility] [place] [facility]      RE:   Twin Walker  :        Dear Dr. Katt Linn:    CHIEF COMPLAINT:  1. Coronary artery disease. 2.  VF arrest status post [employer] ICD placed on 2015. HISTORY OF PRESENT ILLNESS:  Mrs. Paul Easton is a 59-year-old female who had acute chest pain on 2015, and was found to have a non-ST-elevation myocardial infarction, transferred to [facility]. [facility]. A catheterization on 2015, showed occluded right coronary artery, 90% LAD, 90% diagonal, circumflex was small and nondominant, with an EF of 20%. She had bypass surgery on 2015, with a LIMA to the LAD, vein graft to the diagonal, vein graft to the right coronary artery with mitral valve repair with a 30 mm Andi-Pérez bioprosthetic mitral valve. Her EF improved to 30% to 35%. Three days later, she had a VF arrest and another catheterization on 2015, showed widely patent bypass grafts with an EF of 30% to 35%. An ICD was placed with Dr. Cleve Cuellar on 2015, which was a [address] number 1890, single-chamber ventricular lead. I had not seen her since 2019. She came to the hospital and was admitted on 10/11/2022, for acute shortness of breath. She admits she has not been taking her medicines for the last two months. She has had progressive shortness of breath over the last several months and also had increasing abdominal swelling and edema of both lower extremities. She came to the emergency room and was admitted for CHF. We checked her ICD and it had reached TIMBO and we could not interrogate. She developed progressive renal insufficiency during the hospitalization and was transferred to [facility] on 10/14/2022.   At that time, she had acute CHF. She also had stage III chronic renal insufficiency. An echocardiogram on 10/12, showed an EF of 25%. She had an ICD generator change. She was discharged with a recommendation for a cardiac catheterization. When I see her today, she still has not received her medications. She does feel better since her hospitalization with less shortness of breath. She is on home oxygen at 2 liters. She lives with her  and has the custody of four grandchildren, ages 16, 15, 5 and 6. CARDIAC RISK FACTORS:  Known CAD:  Positive. Bypass Surgery:  Positive. Hypertension:  Positive. Hyperlipidemia:  Positive. Diabetes:  Positive. Other Family Members:  Positive. Smoking:  Positive. MEDICATIONS AT THIS TIME:  (Has not received them as of yet) Proventil inhaler every 6 hours p.r.n., Lipitor 80 mg daily, Coreg 3.125 mg b.i.d., vitamin D 5000 units daily, Plavix 75 mg daily, Lasix 40 mg b.i.d., Neurontin 400 mg t.i.d., glargine 45 units in the morning and 25 units in the evening, midodrine 5 mg t.i.d., Zoloft 100 mg daily. PAST MEDICAL AND SURGICAL HISTORY:  1. Depression. 2.  Hypertension although she has been hypotensive recently. 3.  Cholecystectomy in 200.  4.  Hysterectomy in 1990.  5.  Right ankle surgery in 2009 with plate and screws. 6.  Lithotripsy on the left side in 2010.  7.  Bypass surgery by Dr. Cole Quiroz on 05/28/2015, with mitral valve repair with a 30 mm Andi-Pérez annuloplasty ring as well as a LIMA to the LAD, vein graft to the diagonal, and vein graft to the right coronary artery. 8.  [employer] defibrillator single-lead placed on 06/09/2015, by Dr. Grace Alonso, which at this time is working properly. 9.  Insulin-dependent diabetes. 10.  Chronic renal insufficiency. FAMILY HISTORY:  Mother alive at 68. Father had a stroke at 61. One brother and one sister alive and well.     SOCIAL HISTORY:  She is 60years old, , five children, five grandchildren. She and her  have the custody of four grandchildren, now living with them, ages 16, 15, 5 and 6. Smokes one pack of cigarettes per day. Does not drink alcohol. She is fairly inactive. REVIEW OF SYSTEMS:  Cardiac as above. Other systems reviewed including constitutional, eyes, ears, nose and throat, cardiovascular, respiratory, GI, , musculoskeletal, integumentary, neurologic, endocrine, hematologic and allergic/immunologic, are negative except for what is described above. No weight loss or weight gain. No change in bowel habits, no blood in stools. No fevers, sweats or chills. PHYSICAL EXAMINATION:  VITAL SIGNS:  Her blood pressure was 100/60 with a heart rate of 90 and regular. Respiratory rate 18. O2 sat 94%. Weight 165 pounds. GENERAL:  She is a pleasant 57-year-old female. Denied pain. She was oriented to person, place and time. Answered questions appropriately. SKIN:  No unusual skin changes. HEENT:  The pupils are equally round and intact. Mucous membranes were dry. NECK:  No JVD. Good carotid pulses. No carotid bruits. No lymphadenopathy or thyromegaly. CARDIOVASCULAR EXAM:  S1 and S2 were normal.  No S3 or S4. Soft systolic blowing type murmur. No diastolic murmur. PMI was normal.  No lift, thrust, or pericardial friction rub. LUNGS:  Clear to auscultation and percussion. ABDOMEN:  Soft and nontender. Good bowel sounds. EXTREMITIES:  Good femoral pulses. Good pedal pulses. No pedal edema. Skin was warm and dry. No calf tenderness. Nail beds pink. Good cap refill. PULSES:  Bilateral symmetrical radial, brachial and carotid pulses. No carotid bruits. Good femoral and pedal pulses. NEUROLOGIC EXAM:  Within normal limits. PSYCHIATRIC EXAM:  Within normal limits. LABORATORY DATA:  Her sodium was 139, potassium 3.8, BUN 33, creatinine 1.42, calcium was 8.5. White count 6.9, hemoglobin 9.0 with a platelet count 699,742.     Her ICD was interrogated. It is at a lower rate of 40 beats per minute. Echocardiogram on 10/12 showed an EF of 25% with mild mitral regurgitation with a good result from mitral valve repair. IMPRESSION:  1. Severe LV dysfunction, EF of 25%. 2.  Not taking medicine for the last two months. 3.  Continued smoking abuse. 4.  Initially felt that her ICD was not working appropriately; however, it was a malfunction of our interrogation device and appears that it is working properly. 5.  Status post cardiac catheterization on 05/26/2015, after a subendocardial myocardial infarction, that showed occluded LAD in the midportion, with 90% disease in the first diagonal, occluded very large right coronary artery, small and nondominant circumflex, EF of 20%. 6.  Bypass surgery by Dr. Ailyn Miranda on 05/28/2015, with a LIMA to the LAD, a vein graft to the diagonal, a vein graft to the right coronary artery, with a mitral valve repair with a 30 mm Andi-Pérez annuloplasty ring. 7.  [address] [employer] ICD on 06/09/2015, which is working appropriately. 8.  Insulin-dependent diabetes. 9.  Chronic renal insufficiency, about at baseline. 10.  Mild hypotension. PLAN:  1. We will do a Lexiscan Cardiolite stress test.  2.  We will call her with results. 3.  If the [place] shows an area of ischemia, we would do a cardiac catheterization. DISCUSSION:  Luther Lowe continues to struggle. She has been out of medications for at least several months. She did present with CHF and had progressive renal insufficiency. However, on a Lasix drip, she did well at [facility]. She has had a good result from her mitral valve repair. She does have severe LV dysfunction, EF in the 25% range. I will do a Lexiscan to determine whether we should proceed with a cardiac catheterization. If she does show significant area of ischemia, then I would plan on doing a cardiac catheterization.     I have encouraged her to quit smoking and to take her medications. Hopefully, she will comply. Thank you very much for allowing me the privilege of seeing Mrs. Wendie Parsons. If you have any questions on my thoughts, please do not hesitate to contact me.      Sincerely,        Latoya Christensen    D: 10/30/2022 20:20:57     T: 10/30/2022 20:26:24     [id number]  Job#: [id number]   Doc#: [id number]

## 2022-11-21 RX ORDER — FUROSEMIDE 40 MG/1
40 TABLET ORAL 2 TIMES DAILY
Qty: 60 TABLET | Refills: 5 | Status: SHIPPED | OUTPATIENT
Start: 2022-11-21

## 2022-11-21 NOTE — TELEPHONE ENCOUNTER
Patient is asking for a refill on Lasix 40 mg. Patient last seen 10/24/22 for hospital follow up. [facility] Maintenance   Topic Date Due    COVID-19 Vaccine (1) Never done    HIV screen  Never done    Hepatitis C screen  Never done    DTaP/Tdap/Td vaccine (1 - Tdap) Never done    Diabetic retinal exam  03/30/2020    Colorectal Cancer Screen  05/06/2020    Shingles vaccine (2 of 2) 09/16/2020    Flu vaccine (1) 08/01/2022    Breast cancer screen  03/23/2023    Diabetic foot exam  04/12/2023    Depression Screen  04/12/2023    Diabetic microalbuminuria test  07/15/2023    Lipids  07/15/2023    A1C test (Diabetic or Prediabetic)  10/14/2023    Pneumococcal 0-64 years Vaccine  Completed    Hepatitis A vaccine  Aged Out    Hib vaccine  Aged Out    Meningococcal (ACWY) vaccine  Aged Out             (applicable per patient's age: Cancer Screenings, Depression Screening, Fall Risk Screening, Immunizations)    Hemoglobin A1C (%)   Date Value   10/14/2022 8.1 (H)   10/11/2022 8.1 (H)   07/13/2022 7.6     Microalb/Crt.  Ratio (mcg/mg creat)   Date Value   07/15/2022 57 (H)     LDL Cholesterol (mg/dL)   Date Value   07/15/2022          AST (U/L)   Date Value   10/11/2022 19     ALT (U/L)   Date Value   10/11/2022 21     BUN (mg/dL)   Date Value   10/18/2022 33 (H)      (goal A1C is < 7)   (goal LDL is <100) need 30-50% reduction from baseline     BP Readings from Last 3 Encounters:   10/24/22 106/70   10/24/22 100/60   10/18/22 114/76    (goal /80)      All Future Testing planned in [facility]:  Lab Frequency Next Occurrence   Comprehensive Metabolic Panel Once 94/96/5151   CBC with Auto Differential Once 10/24/2022   Lipid Panel Once 10/24/2022   Vitamin D 25 Hydroxy Once 10/24/2022   Comprehensive Metabolic Panel Once 63/10/4952   TSH with Reflex Once 10/24/2022   Magnesium Once 10/24/2022   EKG 12 Lead Once 10/24/2022   XR CHEST (2 VW) Once 10/24/2022   Hemoglobin A1C Once 10/24/2022   Stress test, lexiscan Once 10/25/2022   Transfuse RBC TRANSFUSE 1 UNIT        Next Visit Date:  Future Appointments   Date Time Provider Department Center   11/29/2022  2:20 PM MD Lorenzo Morales [facility]   1/23/2023  1:30 PM MD Nichol Arellano [facility]            Patient Active Problem List:     Acute coronary syndrome [facility])     S/P cardiac cath-Patent grafts 6/5/15     Onychomycosis of toenail     CAD (coronary artery disease)     Hx of CABG     ICD (implantable cardioverter-defibrillator) in place     H/O mitral valve repair     Hx of myocardial infarction     Hypertension     DM (diabetes mellitus), type 2 with neurological complications (HCC)     Anxiety     CHAD (acute kidney injury) ([address]     Elevated serum immunoglobulin free light chains     Ureteral stone with hydronephrosis     Dyslipidemia     Pneumonia     Acute systolic CHF (congestive heart failure) (HCC)     Congestive heart failure due to cardiomyopathy (HCC)     CHF (congestive heart failure), NYHA class I, acute on chronic, combined ([address]     Acute on chronic systolic CHF (congestive heart failure) ([address]     Stage 3b chronic kidney disease ([address]     Ischemic cardiomyopathy     Acute hypoxemic respiratory failure ([address]

## 2022-11-21 NOTE — TELEPHONE ENCOUNTER
Last OV: 10/24/2022  HFU   Last RX:    Next scheduled apt: 11/29/2022   4 WKS  DM           Pt requesting a refill

## 2022-12-14 ENCOUNTER — HOSPITAL ENCOUNTER (EMERGENCY)
Age: 62
Discharge: ANOTHER HOSPITAL | End: 2022-12-14
Attending: EMERGENCY MEDICINE
Payer: MEDICARE

## 2022-12-14 ENCOUNTER — APPOINTMENT (OUTPATIENT)
Dept: GENERAL RADIOLOGY | Age: 62
End: 2022-12-14
Payer: MEDICARE

## 2022-12-14 VITALS
OXYGEN SATURATION: 100 % | DIASTOLIC BLOOD PRESSURE: 73 MMHG | SYSTOLIC BLOOD PRESSURE: 104 MMHG | HEART RATE: 103 BPM | RESPIRATION RATE: 13 BRPM

## 2022-12-14 DIAGNOSIS — E16.2 HYPOGLYCEMIA: ICD-10-CM

## 2022-12-14 DIAGNOSIS — I46.9 CARDIAC ARREST (HCC): Primary | ICD-10-CM

## 2022-12-14 PROBLEM — I27.20 PULMONARY HYPERTENSION (HCC): Status: ACTIVE | Noted: 2022-12-14

## 2022-12-14 PROBLEM — J96.02 ACUTE RESPIRATORY FAILURE WITH HYPOXIA AND HYPERCAPNIA (HCC): Status: ACTIVE | Noted: 2022-01-01

## 2022-12-14 PROBLEM — E11.21 DIABETES MELLITUS WITH KIDNEY DISEASE (HCC): Status: ACTIVE | Noted: 2022-01-01

## 2022-12-14 LAB
-: ABNORMAL
ABSOLUTE BANDS #: 0.72 K/UL (ref 0–1)
ABSOLUTE EOS #: 0.18 K/UL (ref 0–0.4)
ABSOLUTE LYMPH #: 6.7 K/UL (ref 1–4.8)
ABSOLUTE MONO #: 0.91 K/UL (ref 0–1)
ALBUMIN SERPL-MCNC: 2.7 G/DL (ref 3.5–5.2)
ALLEN TEST: POSITIVE
ALP BLD-CCNC: 88 U/L (ref 35–104)
ALT SERPL-CCNC: 36 U/L (ref 5–33)
ANION GAP SERPL CALCULATED.3IONS-SCNC: 17 MMOL/L (ref 9–17)
AST SERPL-CCNC: 49 U/L
BACTERIA: ABNORMAL
BANDS: 4 % (ref 0–10)
BASOPHILS # BLD: ABNORMAL % (ref 0–2)
BASOPHILS ABSOLUTE: ABNORMAL K/UL (ref 0–0.2)
BILIRUB SERPL-MCNC: 0.8 MG/DL (ref 0.3–1.2)
BILIRUBIN URINE: NEGATIVE
BUN BLDV-MCNC: 46 MG/DL (ref 8–23)
BUN/CREAT BLD: 30 (ref 9–20)
CALCIUM SERPL-MCNC: 10 MG/DL (ref 8.6–10.4)
CHLORIDE BLD-SCNC: 99 MMOL/L (ref 98–107)
CO2: 16 MMOL/L (ref 20–31)
COLOR: YELLOW
COMMENT UA: ABNORMAL
CREAT SERPL-MCNC: 1.51 MG/DL (ref 0.5–0.9)
D-DIMER QUANTITATIVE: 10.94 MG/L FEU (ref 0–0.59)
EOSINOPHILS RELATIVE PERCENT: 1 % (ref 0–5)
EPITHELIAL CELLS UA: ABNORMAL /HPF
FIO2: 100
GFR SERPL CREATININE-BSD FRML MDRD: 39 ML/MIN/1.73M2
GLUCOSE BLD-MCNC: 457 MG/DL (ref 70–99)
GLUCOSE URINE: ABNORMAL
HCT VFR BLD CALC: 31.5 % (ref 36–46)
HEMOGLOBIN: 9.9 G/DL (ref 12–16)
KETONES, URINE: NEGATIVE
LEUKOCYTE ESTERASE, URINE: NEGATIVE
LYMPHOCYTES # BLD: 37 % (ref 15–40)
MCH RBC QN AUTO: 25.3 PG (ref 26–34)
MCHC RBC AUTO-ENTMCNC: 31.3 G/DL (ref 31–37)
MCV RBC AUTO: 80.8 FL (ref 80–100)
MONOCYTES # BLD: 5 % (ref 4–8)
MORPHOLOGY: ABNORMAL
NEGATIVE BASE EXCESS, ART: 16 (ref 0–2)
NITRITE, URINE: NEGATIVE
NUCLEATED RED BLOOD CELLS: 1 PER 100 WBC
O2 DEVICE/FLOW/%: ABNORMAL
PDW BLD-RTO: 19.1 % (ref 12.1–15.2)
PH UA: 5 (ref 5–8)
PLATELET # BLD: 258 K/UL (ref 140–450)
POC HCO3: 14.2 MMOL/L (ref 21–28)
POC O2 SATURATION: 100 % (ref 94–98)
POC PCO2: 51.7 MM HG (ref 35–48)
POC PH: 7.05 (ref 7.35–7.45)
POC PO2: 401.8 MM HG (ref 83–108)
POTASSIUM SERPL-SCNC: 4.4 MMOL/L (ref 3.7–5.3)
PRO-BNP: ABNORMAL PG/ML
PROTEIN UA: ABNORMAL
RBC # BLD: 3.9 M/UL (ref 4–5.2)
RBC UA: ABNORMAL /HPF (ref 0–2)
SAMPLE SITE: ABNORMAL
SEG NEUTROPHILS: 53 % (ref 47–75)
SEGMENTED NEUTROPHILS ABSOLUTE COUNT: 9.61 K/UL (ref 2.5–7)
SODIUM BLD-SCNC: 132 MMOL/L (ref 135–144)
SPECIFIC GRAVITY UA: 1.01 (ref 1–1.03)
TOTAL PROTEIN: 5.8 G/DL (ref 6.4–8.3)
TROPONIN, HIGH SENSITIVITY: 49 NG/L (ref 0–14)
TURBIDITY: CLEAR
URINE HGB: ABNORMAL
UROBILINOGEN, URINE: NORMAL
WBC # BLD: 18.1 K/UL (ref 3.5–11)
WBC UA: ABNORMAL /HPF

## 2022-12-14 PROCEDURE — 36600 WITHDRAWAL OF ARTERIAL BLOOD: CPT

## 2022-12-14 PROCEDURE — 71045 X-RAY EXAM CHEST 1 VIEW: CPT

## 2022-12-14 PROCEDURE — 92950 HEART/LUNG RESUSCITATION CPR: CPT

## 2022-12-14 PROCEDURE — 80053 COMPREHEN METABOLIC PANEL: CPT

## 2022-12-14 PROCEDURE — 85379 FIBRIN DEGRADATION QUANT: CPT

## 2022-12-14 PROCEDURE — 83880 ASSAY OF NATRIURETIC PEPTIDE: CPT

## 2022-12-14 PROCEDURE — 99291 CRITICAL CARE FIRST HOUR: CPT

## 2022-12-14 PROCEDURE — 36415 COLL VENOUS BLD VENIPUNCTURE: CPT

## 2022-12-14 PROCEDURE — 99285 EMERGENCY DEPT VISIT HI MDM: CPT

## 2022-12-14 PROCEDURE — 31500 INSERT EMERGENCY AIRWAY: CPT

## 2022-12-14 PROCEDURE — 96374 THER/PROPH/DIAG INJ IV PUSH: CPT

## 2022-12-14 PROCEDURE — 82803 BLOOD GASES ANY COMBINATION: CPT

## 2022-12-14 PROCEDURE — 81001 URINALYSIS AUTO W/SCOPE: CPT

## 2022-12-14 PROCEDURE — 84484 ASSAY OF TROPONIN QUANT: CPT

## 2022-12-14 PROCEDURE — 85025 COMPLETE CBC W/AUTO DIFF WBC: CPT

## 2022-12-14 PROCEDURE — 2500000003 HC RX 250 WO HCPCS: Performed by: EMERGENCY MEDICINE

## 2022-12-14 PROCEDURE — 6360000002 HC RX W HCPCS: Performed by: EMERGENCY MEDICINE

## 2022-12-14 PROCEDURE — 96375 TX/PRO/DX INJ NEW DRUG ADDON: CPT

## 2022-12-14 RX ORDER — ATROPINE SULFATE 0.1 MG/ML
INJECTION INTRAVENOUS DAILY PRN
Status: COMPLETED | OUTPATIENT
Start: 2022-12-14 | End: 2022-12-14

## 2022-12-14 RX ORDER — DEXTROSE MONOHYDRATE 25 G/50ML
INJECTION, SOLUTION INTRAVENOUS DAILY PRN
Status: COMPLETED | OUTPATIENT
Start: 2022-12-14 | End: 2022-12-14

## 2022-12-14 RX ORDER — CALCIUM CHLORIDE 100 MG/ML
INJECTION INTRAVENOUS; INTRAVENTRICULAR DAILY PRN
Status: COMPLETED | OUTPATIENT
Start: 2022-12-14 | End: 2022-12-14

## 2022-12-14 RX ORDER — EPINEPHRINE 0.1 MG/ML
SYRINGE (ML) INJECTION DAILY PRN
Status: COMPLETED | OUTPATIENT
Start: 2022-12-14 | End: 2022-12-14

## 2022-12-14 RX ADMIN — Medication 1 MG: at 08:47

## 2022-12-14 RX ADMIN — ATROPINE SULFATE 1 MG: 0.1 INJECTION, SOLUTION ENDOTRACHEAL; INTRAMUSCULAR; INTRAVENOUS; SUBCUTANEOUS at 08:45

## 2022-12-14 RX ADMIN — DEXTROSE MONOHYDRATE 25 G: 25 INJECTION, SOLUTION INTRAVENOUS at 08:47

## 2022-12-14 RX ADMIN — DEXTROSE MONOHYDRATE 25 G: 25 INJECTION, SOLUTION INTRAVENOUS at 08:42

## 2022-12-14 RX ADMIN — CALCIUM CHLORIDE 1000 MG: 100 INJECTION, SOLUTION INTRAVENOUS; INTRAVENTRICULAR at 08:47

## 2022-12-14 NOTE — PROGRESS NOTES
Pt arrives intubated with a 7.0 ET tube positioned 28 at the lip. Per dr. Armen Caldwell pull ET tube back to 24 at the lip. This RT complies. Pt currently being ventilated with a BMV with 100% FiO2.

## 2022-12-14 NOTE — ED NOTES
Levo restarted at 5mcg/min by Lucia Pharmacist per verbal order by Dr Ramona Haley.       Ralph Day, RN  12/14/22 [id number]

## 2022-12-14 NOTE — CONSULTS
Attestation signed by      Attending Physician Statement:    I have discussed the care of  Axel Leiva , including pertinent history and exam findings, with the Cardiology fellow/resident. I have seen and examined the patient and the key elements of all parts of the encounter have been performed by me. I agree with the assessment, plan and orders as documented by the fellow/resident, after I modified exam findings and plan of treatments, and the final version is my approved version of the assessment. Additional Comments: [facility] Cardiology Cardiology    Consult / H&P               Today's Date: 12/14/2022  Patient Name: Axel Leiva  Date of admission: 12/14/2022 10:09 AM  Patient's age: 60 y.o., 1960  Admission Dx: Cardiac arrest [facility]) [I46.9]    Reason for Consult:  Cardiac evaluation    Requesting Physician: Jah Barnett MD    CHIEF COMPLAINT:      History Obtained From: Chart review     HISTORY OF PRESENT ILLNESS:    No family available at bedside. The patient is a 60 y.o.  female with past medical history of CABG with mitral valve repair on 5/28/2015 HFrEF with EF of 25%, s/p AICD nonfunctional???, hypertension. Patient with above past medical history actually first presented to [facility] after her  found her down , no CPR initiated for around 30 minutes and then EMS arrived. .   Patient was found to have low blood glucose of 18mg > 2 amp of dextrose given. Patient was also found in PEA arrest, 3 rounds of 6 mg of epi given, calcium chloride and atropine was given en route. Apparently patient was intubated by EMS and and cooling catheter was passed at [facility]  . On arrival  to [facility] ,per reports patient was unresponsive.    patient was on levo ,core body temp of 91  Creatinine 1.51   Trops trending upward with 49>107   Prob bnp 24k  Uptrwening liver enzyme 36>86 ,49>138  Uptrending wbc  Per reports patient was doing decerebrate posturing during CT scan. Nimbex was given. On my evaluation at around 2:20 PM, patient pupil dilated, cough and gag was negative, unresponsive to painful stimuli on propofol only,  Patient was on levo and amio gtt. past Medical History:   has a past medical history of CAD (coronary artery disease), Cardiomyopathy ([facility] COPD (chronic obstructive pulmonary disease) ([facility] Depression, Diabetes mellitus ([facility] H/O mitral valve repair, History of fractured kneecap, Hx of CABG, Hx of myocardial infarction, Hyperlipidemia, Hypertension, ICD (implantable cardioverter-defibrillator) in place, Kidney calculi, Osteoarthritis, Pneumonia, and Renal calculi. Past Surgical History:   has a past surgical history that includes Cholecystectomy (); Hysterectomy (); Ankle surgery (Right, ); Kidney stone surgery (Left, ); Tubal ligation; Mitral valve surgery (6/4/15); Cardiac defibrillator placement (06/01/15); Coronary artery bypass graft (05/28/15); Cystocopy (Left, 2018); pr cysto/uretero w/lithotripsy &indwell stent insrt (Left, 2018); pr cysto/uretero w/lithotripsy &indwell stent insrt (Left, 2018); and pr cysto/uretero w/lithotripsy &indwell stent insrt (Left, 11/15/2018). Home Medications:    Prior to Admission medications    Medication Sig Start Date End Date Taking? Authorizing Provider   furosemide (LASIX) 40 MG tablet Take 1 tablet by mouth in the morning and 1 tablet in the evening. 22   Stephy Stafford MD   clopidogrel (PLAVIX) 75 MG tablet Take 1 tab daily 10/24/22   Patito Barajas MD   atorvastatin (LIPITOR) 80 MG tablet 1 tab daily 10/24/22   Patito Barajas MD   carvedilol (COREG) 3.125 MG tablet take 1 tablet by mouth twice a day with meals 10/24/22   Patito Barajas MD   gabapentin (NEURONTIN) 400 MG capsule Take 1 capsule by mouth 3 times daily for 180 days.  10/24/22 4/22/23  Iain Dale DO   midodrine (PROAMATINE) 5 MG tablet take 1 tablet by mouth three times a day 10/24/22   Donna Dale DO   sertraline (ZOLOFT) 100 MG tablet Take one tablet by mouth once daily 10/24/22   Donna Dale DO   insulin glargine (LANTUS SOLOSTAR) 100 UNIT/ML injection pen INJECT 45 UNITS SUBCUTANEOUSLY EVERY MORNING AND 25 UNITS EVERY EVENING 10/24/22   Donna Dale DO   Insulin Pen Needle (BD PEN NEEDLE PAIGE 2ND GEN) 32G X 4 MM MISC use 1 PEN NEEDLE to inject MEDICATION subcutaneously daily 10/24/22   Brittney Kay, DO   Lancets MISC Pt testing BS once daily and prn 10/24/22   Quillian Palms, DO   glucose monitoring (FREESTYLE) kit Please dispense meter covered by patients insurance. 4/12/22   Mason Garcias MD   blood glucose monitor strips Testing BS once daily and as needed.  Dispense strips to go with new glucometer 4/12/22   Mason Garcias MD   albuterol (PROVENTIL) (2.5 MG/3ML) 0.083% nebulizer solution Take 3 mLs by nebulization every 6 hours as needed for Wheezing (as needed for wheezing) 4/12/22   Mason Garcias MD   Respiratory Therapy Supplies (NEBULIZER/TUBING/MOUTHPIECE) KIT 1 kit by Does not apply route daily as needed (for SOB, cough) 11/13/19   Mason Garcias MD   Respiratory Therapy Supplies (NEBULIZER COMPRESSOR) KIT 1 kit by Does not apply route once for 1 dose 11/4/19 10/24/22  Mason Garcias MD   lactobacillus (CULTURELLE) capsule Take 1 capsule by mouth 2 times daily (with meals) 1/2/19   Denny Sewell MD   blood glucose monitor strips Use to test daily 11/7/18   Mason Garcias MD   blood glucose monitor kit and supplies Use to test daily 11/7/18   Mason Garcias MD   Cholecalciferol (VITAMIN D3) 5000 units TABS Take 5,000 Units by mouth daily    Historical Provider, MD   glucose blood VI test strips (EXACTECH TEST) strip 1 each by In Vitro route daily Pt testing BS once daily and prn 11/19/15   Mason Garcias MD   Multiple Vitamins-Minerals (THERAPEUTIC MULTIVITAMIN-MINERALS) tablet Take 1 tablet by mouth daily (with breakfast) 6/12/15   Salima Teran MD          Allergies:  Codeine and Nsaids    Social History:   reports that she has been smoking cigarettes. She has a 16.00 pack-year smoking history. She has never used smokeless tobacco. She reports that she does not drink alcohol and does not use drugs. Family History: family history includes Diabetes in her mother; Other in her father; Stroke (age of onset: 76) in her father. No h/o sudden cardiac death. No for premature CAD    REVIEW OF SYSTEMS:    Could not be obtained because intubated and sedated    PHYSICAL EXAM:      BP (!) 137/106   Pulse 81   Temp (!) 91.4 °F (33 °C) (Esophageal)   Resp 22   SpO2 98%    Constitutional and General Appearance: Intubated, nonresponsive. HEENT: PERRL, no cervical lymphadenopathy. No masses palpable. Normal oral mucosa  Respiratory:  Normal excursion and expansion without use of accessory muscles  Resp Auscultation: Good respiratory effort. No for increased work of breathing. On auscultation: clear to auscultation bilaterally  Cardiovascular: The apical impulse is not displaced  Heart tones are crisp and normal. regular S1 and S2.  Jugular venous pulsation Normal  The carotid upstroke is normal in amplitude and contour without delay or bruit  Peripheral pulses are symmetrical and full   Abdomen:   No masses or tenderness  Bowel sounds present  Extremities:   No Cyanosis or Clubbing   Lower extremity edema: No   Skin: Warm and dry  Neurological:  Alert and oriented. Moves all extremities well  No abnormalities of mood, affect, memory, mentation, or behavior are noted    DATA:    Diagnostics:    EKG:   ECHOIn summary, she has enlarged LV with severe LV dysfunction with EF 25%  Mild MR with overall a good result from MR repair. Moderate to mod-severe pulmonary hypertension with PAP in 40-50 mmHg range  Mod-severe TR  There has been a significant change in LV function with EF decreasing from  45% to 25%.     CBC:   Recent Labs     12/14/22  0855 12/14/22  1149   WBC 18.1* 23.0*   HGB 9.9* 10.4*   HCT 31.5* 36.0*    336     BMP:   Recent Labs     12/14/22  0855 12/14/22  1019 12/14/22  1149   *  --  135   K 4.4  --  4.4   CO2 16*  --  16*   BUN 46*  --  46*   CREATININE 1.51* 2.12* 1.70*   LABGLOM 39*  --  33*   GLUCOSE 457*  --  221*     BNP: No results for input(s): BNP in the last 72 hours. PT/INR:   Recent Labs     12/14/22  1149   PROTIME 14.1*   INR 1.4     APTT:  Recent Labs     12/14/22  1149   APTT 24.8     CARDIAC ENZYMES:No results for input(s): CKTOTAL, CKMB, CKMBINDEX, TROPONINI in the last 72 hours.   FASTING LIPID PANEL:  Lab Results   Component Value Date/Time    HDL 25 07/15/2022 09:50 AM    LDLDIRECT 70 07/15/2022 09:50 AM    TRIG 724 07/15/2022 09:50 AM     LIVER PROFILE:  Recent Labs     12/14/22  0855 12/14/22  1149   AST 49* 138*   ALT 36* 86*   LABALBU 2.7* 3.1*       IMPRESSION:    Patient Active Problem List   Diagnosis    Acute coronary syndrome (HCC)    S/P cardiac cath-Patent grafts 6/5/15    Onychomycosis of toenail    CAD (coronary artery disease)    Hx of CABG    ICD (implantable cardioverter-defibrillator) in place    H/O mitral valve repair    Hx of myocardial infarction    Hypertension    DM (diabetes mellitus), type 2 with neurological complications (HCC)    Anxiety    CHAD (acute kidney injury) ([address]    Elevated serum immunoglobulin free light chains    Ureteral stone with hydronephrosis    Dyslipidemia    Pneumonia    Acute systolic CHF (congestive heart failure) (HCC)    Congestive heart failure due to cardiomyopathy (HCC)    CHF (congestive heart failure), NYHA class I, acute on chronic, combined (HCC)    Acute on chronic systolic CHF (congestive heart failure) (HCC)    Stage 3b chronic kidney disease ([address]    Ischemic cardiomyopathy    Acute hypoxemic respiratory failure ([address]    Cardiac arrest ([address]       RECOMMENDATIONS:  PEA Arrest with unknown downtime rosc achieved after 6 mg of epi   Hypoglycemia   H/o CABG/MV REPAIR 5/28/15: LIMA-LAD, SVG-PDA, SVG-D. MV repair  H/o ICM with ef around 25 % s/p AICD which is nonfunctional??? Hypertension  Transaminitis secondary to ischemia  CKD stage IIIb with creatinine of 1.51  around baseline    Plan :  Continue amnio gtt. Will start aspirin,statin. Will order icd  interrogation ,echo and will follow. Wean pressor as tolerated  Trend trop   Neuro critical care recommendation appreciated. Will decide about cath after meaningful neurological recovery and echo result. Deyvi Gant MD  Internal Medicine Resident, PGY3  [facility]  12/14/2022,2:03 PM        Attending Physician Statement  I have discussed the care of Tariq Davies, including pertinent history and exam findings,  with the cardiology fellow/resident. I have seen and examined the patient and the key elements of all parts of the encounter have been performed by me. I have completed at least one if not all key elements of the E/M (history, physical exam, and MDM). I agree with the assessment, plan and orders as documented by the resident with additional recommendations as below:     MV CAD s/p CABG x 3 in 2015 (LIMA-ALD, SVG-D, SVG-RCA), Chronic HFrEF secondary to [address] last LVEF 25% in 10/2022, single chamber ICD ([place]) in situ, CKD and multiple other co-morbidities admitted after cardiac arrest at home. Initial rhythm PEA/Asystole. Prolonged downtime > 30 mins as per hx. Post ROSC ecg showed NSR with RAD and NS t wave abnormality. Evidence of anoxic virgil injury on clinical examination. Currently on low dose levophed. In sinus rhythm. Hypothermia protocol started.    Cont supportive care per cc  Trend troponins, start heparin drip if trending up   D/c amio drip  Resume plavix and statin   F/u neurological recovery   Overall prognosis extremely guarded at this time       Padmini Delacruz MD, Bharathi Leiva

## 2022-12-14 NOTE — ED NOTES
Report given to [facility]; all questions addressed.       Ta Caballero RN  12/14/22 [id number]

## 2022-12-14 NOTE — ED PROVIDER NOTES
[address]  ED  eMERGENCY dEPARTMENT eNCOUnter    Pt Name: Seun Lewis  MRN: [id number]  [place] 1960  Date of evaluation: 12/14/2022  PCP:  Courtney Harding MD    [address]       Chief Complaint   Patient presents with    Cardiac Arrest         HISTORY OF PRESENT ILLNESS    Seun Lewis is a 60 y.o. female who presents with LF as a transfer from [facility]. The pt was found unresponsive by  who has health problems per report no CPR performed. The patient was intubated esophogeally by EMS 2 rounds performed prior to arrival at [facility], tube placed in trachea, the pt was given sugar for glucose of 18. The patient had total of 6mg epinephrine PEA arrest with ROSC at [facility] gave 100mcg fentnayl, 25mg total of midazolam for posturing and seizure like activity. The pt was unresponsive and non purposeful on arrival.  Later discussion with family pt has known CHF but no hx of ascites or ascitic fluid. REVIEW OF SYSTEMS       Review of Systems   Unable to perform ROS: Intubated       PAST MEDICAL HISTORY    has a past medical history of CAD (coronary artery disease), Cardiomyopathy ([facility] [address] COPD (chronic obstructive pulmonary disease) ([facility] [address] Depression, Diabetes mellitus ([facility] [address] H/O mitral valve repair, History of fractured kneecap, Hx of CABG, Hx of myocardial infarction, Hyperlipidemia, Hypertension, ICD (implantable cardioverter-defibrillator) in place, Kidney calculi, Osteoarthritis, Pneumonia, and Renal calculi. SURGICAL HISTORY      has a past surgical history that includes Cholecystectomy (1990); Hysterectomy (1990); Ankle surgery (Right, 2009); Kidney stone surgery (Left, 2010); Tubal ligation; Mitral valve surgery (6/4/15); Cardiac defibrillator placement (06/01/15); Coronary artery bypass graft (05/28/15);  Cystocopy (Left, 11/08/2018); pr cysto/uretero w/lithotripsy &indwell stent insrt (Left, 11/8/2018); pr cysto/uretero w/lithotripsy &indwell stent insrt (Left, 11/8/2018); and pr cysto/uretero w/lithotripsy &indwell stent insrt (Left, 11/15/2018). CURRENT MEDICATIONS       Previous Medications    ALBUTEROL (PROVENTIL) (2.5 MG/3ML) 0.083% NEBULIZER SOLUTION    Take 3 mLs by nebulization every 6 hours as needed for Wheezing (as needed for wheezing)    ATORVASTATIN (LIPITOR) 80 MG TABLET    1 tab daily    BLOOD GLUCOSE MONITOR KIT AND SUPPLIES    Use to test daily    BLOOD GLUCOSE MONITOR STRIPS    Use to test daily    BLOOD GLUCOSE MONITOR STRIPS    Testing BS once daily and as needed. Dispense strips to go with new glucometer    CARVEDILOL (COREG) 3.125 MG TABLET    take 1 tablet by mouth twice a day with meals    CHOLECALCIFEROL (VITAMIN D3) 5000 UNITS TABS    Take 5,000 Units by mouth daily    CLOPIDOGREL (PLAVIX) 75 MG TABLET    Take 1 tab daily    FUROSEMIDE (LASIX) 40 MG TABLET    Take 1 tablet by mouth in the morning and 1 tablet in the evening. GABAPENTIN (NEURONTIN) 400 MG CAPSULE    Take 1 capsule by mouth 3 times daily for 180 days. GLUCOSE BLOOD VI TEST STRIPS (EXACTECH TEST) STRIP    1 each by In Vitro route daily Pt testing BS once daily and prn    GLUCOSE MONITORING (FREESTYLE) KIT    Please dispense meter covered by patients insurance.     INSULIN GLARGINE (LANTUS SOLOSTAR) 100 UNIT/ML INJECTION PEN    INJECT 45 UNITS SUBCUTANEOUSLY EVERY MORNING AND 25 UNITS EVERY EVENING    INSULIN PEN NEEDLE (BD PEN NEEDLE PAIGE 2ND GEN) 32G X 4 MM MISC    use 1 PEN NEEDLE to inject MEDICATION subcutaneously daily    LACTOBACILLUS (CULTURELLE) CAPSULE    Take 1 capsule by mouth 2 times daily (with meals)    LANCETS MISC    Pt testing BS once daily and prn    MIDODRINE (PROAMATINE) 5 MG TABLET    take 1 tablet by mouth three times a day    MULTIPLE VITAMINS-MINERALS (THERAPEUTIC MULTIVITAMIN-MINERALS) TABLET    Take 1 tablet by mouth daily (with breakfast)    RESPIRATORY THERAPY SUPPLIES (NEBULIZER COMPRESSOR) KIT    1 kit by Does not apply route once for 1 dose    RESPIRATORY THERAPY SUPPLIES (NEBULIZER/TUBING/MOUTHPIECE) KIT    1 kit by Does not apply route daily as needed (for SOB, cough)    SERTRALINE (ZOLOFT) 100 MG TABLET    Take one tablet by mouth once daily       ALLERGIES     is allergic to codeine and nsaids. FAMILY HISTORY     She indicated that her mother is alive. She indicated that her father is alive. She indicated that her sister is alive. She indicated that her brother is alive. family history includes Diabetes in her mother; Other in her father; Stroke (age of onset: 76) in her father. SOCIAL HISTORY      reports that she has been smoking cigarettes. She has a 16.00 pack-year smoking history. She has never used smokeless tobacco. She reports that she does not drink alcohol and does not use drugs. PHYSICAL EXAM     INITIAL VITALS:  oral temperature is 91.4 °F (33 °C) (abnormal). Her blood pressure is 137/106 (abnormal) and her pulse is 86. Her respiration is 26 and oxygen saturation is 100%. Physical Exam  Constitutional:       General: She is in acute distress. Appearance: She is obese. She is ill-appearing. HENT:      Head: Normocephalic and atraumatic. Right Ear: External ear normal.      Left Ear: External ear normal.      Nose: No congestion or rhinorrhea. Mouth/Throat:      Comments: ETT and OG in place  Eyes:      General: Scleral icterus: No scleral icterus seen on arrival.      Comments: Dilate bilaterally non reactive   Cardiovascular:      Rate and Rhythm: Normal rate. Pulses: Normal pulses. Pulmonary:      Effort: Respiratory distress present. Breath sounds: Wheezing and rhonchi (diffuse rhonchii but lung sounds in all fields) present. No rales. Chest:      Chest wall: No tenderness. Abdominal:      General: There is distension (Distended worse in lower abd). Musculoskeletal:      Right lower leg: No edema. Left lower leg: No edema. Skin:     General: Skin is warm and dry. Capillary Refill: Capillary refill takes less than 2 seconds. Neurological:      Comments: Intubated was not making purposeful movements gagging possibly but posturing            DIFFERENTIAL DIAGNOSIS/MDM:   The patient presents in cardiac arrest the potential etiology could be due to:  Hypovolemia  Hypoxia  Hypo or hyperkalemia  Hypoglycemia  Acidosis  Hypothermia  Toxins  Tension Pneumothorax. Tamponade  Thrombosis Due to ACS or PE    Based on the patients history and exam the most likely cause was: due to hypoglycemic induced PEA. PE was possible given the right-sided right bundle branch block EKG that is new PE study will be needed patient ultrasound showed no signs of tamponade on patient has lungs not consistent with pneumothorax patient may have metabolic acidosis that also triggered the PEA given the diabetes the new ascites the findings possible sepsis is likely will need antibiotics and source control. Patient's electrolytes being obtained at this time will follow these labs hypoxia not the issue as patient is remained normal with pulse ox levels patient is fluid overloaded not hypovolemic given the CHF additional fluids in this patient likely dangerous and could cause worsening condition. Head CT needed pt will be cooled. ED Course as of 12/14/22 1446   Wed Dec 14, 2022   1022 EKG new RBBB concerning  [WK]   1023 Cr Boarderline given the risks of the PE and strain will need PE study  [WK]   1034 Pt VBG shows worsening CO2 [WK]   1040 Pt moving more on CT scanner and will need paralysis this may also help blow off more of the CO2 with pt triggering breaths since RR now at 24 and TV of 500 [WK]   1142 Lactate critically elevated no source of infection but the pt bedside US showed ascites present possible SBP antibiotics start now  [WK]   1154 The  provides additional details that the pt has known CHF and implanted device.   The  noted no signs for a defibrillation.   [WK] 1157 CT PE study  IMPRESSION:  No evidence of pulmonary embolism. Evidence of CHF, with bilateral pleural  effusions, thickening of the interlobular septae the, and a prominent  cardiomegaly. There is four-chamber enlargement, with significant dilatation  of the left ventricle. There is evidence of tricuspid insufficiency. There  is a mitral valve prosthesis in place. Moderate ascites noted. ET tube and gastric tube are in good position. [WK]   1242 Significant WBC elevation possible infection antibiotics given [WK]   1242 INR and PT normal despite heavy bleeding during procedures pt likely not clotting due to acidosis [WK]   1245 Per RT the pH has increased to 7.3 and CO2 to 34. Will repeat and may be able to back off on the RR if maintaining [WK]   1258 Called to room for dropping EtCO2 into the 18 range pressure form good at this point likely overshoot on vent waiting ABG [WK]   1258 Uptrending troponin call placed to cardiology [WK]   1333 Dropped RR to 22  [WK]      ED Course User Index  [WK] Gagandeep Bowden DO     Sepsis Times and Checklist  Vital Signs: BP: (!) 137/106  Heart Rate: 84  Resp: 26  Temp: (!) 91.4 °F (33 °C) SpO2: 100 %  SIRS (>2) Non Pregnant       Temp > 38.3C or < 36C   HR > 90   RR > 20   WBC > 12 or < 4 or >10% bands  SIRS (>2) Pregnant 20 weeks until 3 days postpartum   Temp > 38C or <36C   HR >110   RR > 24   WBC >15 or < 4 or >10% bands   SIRS (>2) and confirmed or suspected source of infection = Sepsis  Is Sepsis due to likely bacterial infection?: Yes  If not, then sepsis is due to likely  Post arrest  etiology. Sepsis Identified:  Date: 12/14/22    Time: 11:49  Sepsis Orders:   CBC(required): Yes   CMP: Yes   PT/PTT: Yes   Blood Cultures x2(required): Yes   Urinalysis and Urine Culture: Yes   Lactate(required):  Yes   Antibiotics Given (within 3 hours of sepsis identification, after blood cultures):  Yes    (If unable to obtain IV access for IV antibiotics within 3 hours of identification of sepsis, IM antibiotics is acceptable.)              If lactate >2.0 MUST repeat within 6 hours    If elevated, is elevated lactate from a likely infectious source?: No it is secondary to Arrest .      IV Fluid Bolus:  Is lactate > 4.0:  Yes  If lactate >  4.0 OR hypotension (MAP<65 mmHg,BP<90 or SBP<85 pregnancy 20 weeks to 3 days  postpartum) (with 2 BP readings) 30 ml/kg crystalloid MUST be ordered. If 30 ml/kg crystalloid not ordered the following must be documented in the medical record:  Administration of 30 mL/kg of crystalloid fluids would be detrimental or harmful for the patient;   AND that the patient has one of the following conditions, OR that a portion of the crystalloid fluid volume was administered as colloids (if a portion consisted of colloids, there must be an order and documentation that colloids were started or noted as given); Concern for CHF, fluid overload, likely renal and liver failure and ascites that would worsen the pt condition . (If a portion consisted of colloids, there must be an order and documentation that colloids were started or noted as given.)   the volume of crystalloid fluids in place of 30 mL/kg the patient was to receive is 500ML LR (Order for this amount of fluid must be placed)     Fluids must be initiated within 3 hours of sepsis identification. These fluids must have a rate > 125 ml/hr. Is the patient Morbidly Obese (BMI > 30):  Yes pt given 500mL fluid bolus   IV Fluids given prior to arrival can be used in this calculation but the following information must be documented:  Start time: 1320, Type of fluid LR, Volume of fluid 500, and Rate (Duration or End time) ended 1230 no additional fluid given due to poor response of patient increasing fluid in IVC. Does the patient or patient advocate refuse the entire 30 ml/kg IV fluid bolus?   Given the worsening pt family would not want      Septic Shock Identified (Initial lactate > 4.0 or 2 Hypotensive Blood Pressure readings within the first 6 hours): For septic shock sepsis focus physical exam must be completed AND documented (within 6 hours). Date: 12:49 Time: 12/14/22        Sepsis focus exam completed. For persistent hypotension after 30ml/kg fluid bolus vasopressors must be started (within 6 hours)      DIAGNOSTIC RESULTS     EKG: All EKG's are interpreted by the Emergency Department Physician who either signs or Co-signs this chart in the absence of a cardiologist.    EKG Interpretation    Interpreted by emergency department physician    Rhythm: normal sinus   Rate: tachycardia  Axis: right  Ectopy: none  Conduction: right bundle branch block (complete)  ST Segments: nonspecific changes  T Waves: non specific changes  Q Waves: nonspecific    EKG  Impression: non-specific EKG      RADIOLOGY:   I directly visualized the following  images and reviewed the radiologist interpretations:  XR CHEST PORTABLE   Final Result   Atypical pulmonary edema         CT CHEST PULMONARY EMBOLISM W CONTRAST   Final Result   No evidence of pulmonary embolism. Evidence of CHF, with bilateral pleural   effusions, thickening of the interlobular septae the, and a prominent   cardiomegaly. There is four-chamber enlargement, with significant dilatation   of the left ventricle. There is evidence of tricuspid insufficiency. There   is a mitral valve prosthesis in place. Moderate ascites noted. ET tube and gastric tube are in good position. CT HEAD WO CONTRAST   Final Result   No acute intracranial abnormality. Bilateral maxillary and ethmoid sinusitis.            No ICH    LABS:  Labs Reviewed   COMPREHENSIVE METABOLIC PANEL - Abnormal; Notable for the following components:       Result Value    Glucose 221 (*)     BUN 46 (*)     Creatinine 1.70 (*)     Est, Glom Filt Rate 33 (*)     Calcium 8.4 (*)     CO2 16 (*)     Alkaline Phosphatase 128 (*)     ALT 86 (*)      (*) Albumin 3.1 (*)     Albumin/Globulin Ratio 0.9 (*)     All other components within normal limits   CBC WITH AUTO DIFFERENTIAL - Abnormal; Notable for the following components:    WBC 23.0 (*)     Hemoglobin 10.4 (*)     Hematocrit 36.0 (*)     RDW 17.4 (*)     Seg Neutrophils 90 (*)     Lymphocytes 7 (*)     Eosinophils % 0 (*)     Segs Absolute 20.70 (*)     All other components within normal limits   LACTATE, SEPSIS - Abnormal; Notable for the following components:    Lactic Acid, Sepsis, Whole Blood 5.5 (*)     All other components within normal limits   PROTIME-INR - Abnormal; Notable for the following components:    Protime 14.1 (*)     All other components within normal limits   TROPONIN - Abnormal; Notable for the following components:    Troponin, High Sensitivity 107 (*)     All other components within normal limits   ELECTROLYTES PLUS - Abnormal; Notable for the following components:    POC Potassium 4.8 (*)     POC Chloride 108 (*)     POC TCO2 18 (*)     All other components within normal limits   HGB/HCT - Abnormal; Notable for the following components:    POC Hematocrit 35 (*)     All other components within normal limits   VENOUS BLOOD GAS, POINT OF CARE - Abnormal; Notable for the following components:    pH, Galileo 6.987 (*)     pCO2, Galileo 74.6 (*)     pO2, Galileo 24.4 (*)     HCO3, Venous 17.9 (*)     Negative Base Excess, Galileo 14 (*)     O2 Sat, Galileo 21 (*)     All other components within normal limits   CREATININE W/GFR POINT OF CARE - Abnormal; Notable for the following components:    POC Creatinine 2.12 (*)     All other components within normal limits   UREA N (POC) - Abnormal; Notable for the following components:    POC BUN 54 (*)     All other components within normal limits   LACTIC ACID,POINT OF CARE - Abnormal; Notable for the following components:    POC Lactic Acid 6.08 (*)     All other components within normal limits   POCT GLUCOSE - Abnormal; Notable for the following components:    POC Glucose 224 (*)     All other components within normal limits   CULTURE, BLOOD 1   CULTURE, BLOOD 1   CULTURE, URINE   MAGNESIUM   APTT   CALCIUM, IONIC (POC)   LACTATE, SEPSIS   LACTATE, SEPSIS   LACTATE, SEPSIS   URINALYSIS WITH MICROSCOPIC   BLOOD GAS, ARTERIAL   POC BLOOD GAS     The pt in hepatic failure renal failure      EMERGENCY DEPARTMENT COURSE:   Vitals:    Vitals:    12/14/22 1215 12/14/22 1230 12/14/22 1244 12/14/22 1245   BP:       Pulse: 86 86  86   Resp: 23 26  26   Temp:   (!) 91.4 °F (33 °C)    TempSrc:   Oral    SpO2: 100% 100%  100%       CRITICAL CARE:  CRITICAL CARE: There was a high probability of clinically significant/life threatening deterioration in this patient's condition which required my urgent intervention. Total critical care time was 71 minutes. This excludes any time for separately reportable procedures. CONSULTS:  IP CONSULT TO CRITICAL CARE  IP CONSULT TO CARDIOLOGY      PROCEDURES:  PROCEDURE NOTE - ARTERIAL LINE PLACEMENT    PATIENT NAME: Naomi Shelley  RECORD NO. [id number]  DATE: 12/14/2022      PREOPERATIVE DIAGNOSIS:  Need for blood pressure monitoring and frequent ABGs  POSTOPERATIVE DIAGNOSIS:  Same  PROCEDURE PERFORMED: Left Radial Arterial Line Insertion  PERFORMING PHYSICIAN:  Christie Soto DO  ESTIMATED BLOOD LOSS:  Less than 25 ml  COMPLICATIONS:  None immediately appreciated. DISCUSSION:  Marci Luis is a 60 y.o. female who requires invasive pressure monitoring. The history and physical examination were reviewed and confirmed. CONSENT: Unable to be obtained due to the emergent nature of this procedure. PROCEDURE:  A timeout was initiated by the bedside nurse and was confirmed by those present. The patient was placed in a supine position. The skin overlying the Left Radial was prepped with chlorhexadine.  Through this region, the introducer needle through catheter was inserted into the artery under US guidance until pulsatile bright blood was seen in collection tubing. Guidewire was advanced with no resistance. Catheter was advanced into the artery, wire was pulled with brisk bleeding noted. Pressure monitoring setup was connected to the catheter, it aspirated and flushed easily. The catheter was secured with tape. Arterial wave form was obtained on the monitor. No immediate complication was evident. All sponge, instrument and needle counts were correct at the completion of the procedure. Adan Swanson DO  2:50 PM, 12/14/22    PROCEDURE NOTE - CENTRAL VENOUS LINE PLACEMENT    PATIENT NAME: Naomi Shelley  RECORD NO. [id number]  DATE: 12/14/2022    PREOPERATIVE DIAGNOSIS:  central venous monitoring, centrally administered medications, need for frequent blood draws, and Cooling cath  POSTOPERATIVE DIAGNOSIS:  Same  PROCEDURE PERFORMED:  Right Femoral Vein Central Line Insertion  PERFORMING PHYSICIAN: Adan Swanson DO  ANESTHESIA:  Local utilizing 1% lidocaine  ESTIMATED BLOOD LOSS:  Less than 25 ml  COMPLICATIONS:  None immediately appreciated. DISCUSSION:  Angelica Morataya is a 60y.o.-year-old female who requires central IV access vascular access, central venous monitoring, centrally administered medications, need for frequent blood draws, and Cooling cath. The history and physical examination were reviewed and confirmed. CONSENT: Unable to be obtained due to the emergent nature of this procedure. PROCEDURE:  A timeout was initiated by the bedside nurse and was confirmed by those present. The patient was placed in a supine position. The skin overlying the Right Femoral Vein was prepped with chlorhexadine and draped in sterile fashion. The skin was infiltrated with local anesthetic. The vessel and surrounding anatomy was visualized using ultrasoundand US utilized to perform. Through the anesthetized region, the introducer needle was inserted into the femoral vein returning dark red non pulsatile blood. A guidewire was placed through the center of the needle with no resistance. Ultrasound confirmed presence of wire in the vein confirmed by Yoel Rhodes RN and RT on US. A small incision made in the skin with a #11 scalpel blade. The dilator was inserted into the skin and vein over guidewire using Seldinger technique. The dilator was then removed and the cooling catheter was placed in the vein over the guidewire using Seldinger technique. The guidewire was then removed and all ports aspirated and flushed appropriately. The catheter then secured using silk suture and a temporary sterile dressing was applied. No immediate complication was evident. All sponge, instrument and needle counts were correct at the completion of the procedure. The patient tolerated the procedure well with no immediate complication evident. The bleeding was heavy from the site so femoral A line deffered for radial A line instead     Shirin Reyes DO  2:51 PM, 12/14/22      FINAL IMPRESSION      1. Cardiac arrest ([address]    2. Congestive heart failure, unspecified HF chronicity, unspecified heart failure type ([address]    3. Other ascites    4. Ventricular tachyarrhythmia          DISPOSITION/PLAN   DISPOSITION Decision To Admit 12/14/2022 12:35:28 PM      PATIENT REFERRED TO:  No follow-up provider specified.     DISCHARGE MEDICATIONS:  New Prescriptions    No medications on file       (Please note that portions of this note were completed with a voice recognition program.  Efforts were made to edit the dictations but occasionally words are mis-transcribed.)    Shirin Reyes DO  Emergency Medicine Attending         Shirin Reyes DO  12/14/22 [id number]

## 2022-12-14 NOTE — ED NOTES
Pt resting on stretcher, attached to monitor, RR even with vent, pulses present +2.      Freddie Means RN  12/14/22 [id number]

## 2022-12-14 NOTE — ED NOTES
[address], [address] [facility], [facility]  12/14/22 5847

## 2022-12-14 NOTE — ED NOTES
Pt to CT via stretcher by writer RN, Bernis Snellen RN, respiratory, and Dr Fran Jha.       Tiana Taylor RN  12/14/22 9002

## 2022-12-14 NOTE — RESEARCH
Patient Name: Leslie Villar  MRN: [id number]  YOB: 1960  Study: ICECAP: Influence of Cooling duration on Efficacy in Cardiac Arrest Patients  Reason for evaluation: Screening/Enrollment      Enrollment Note    The patient was evaluated for a multicenter, randomized, adaptive allocation clinical trial to identify the optimal duration of induced hypothermia for neuroprotection in comatose survivors of cardiac arrest.      ARIAN #: [id number]  Clinicaltrials.gov: NCT [id number]      Time of out-of-hospital cardiac arrest: 0801AM   The time of the first 911 call or when EMS witnessed the first cardiac arrest      Initial rhythm shockable or non-shockable: Non-shockable     Time the definitive cooling device was initiated in hospital: 1130AM  The time that the button on definitive closed loop cooling device that initiates cooling w/ device was pressed    Time patient cooled to <34C degrees: 1148AM      Time of randomization: 1642PM   Time that [place] generates the study intervention assessment    Duration of cooling arm the patient was randomized to: 24 HOURS    Patient's  was able to give verbal consent and also written consent by fax. Patient's home situation is difficult and that she and her  are the primary caretakers for their grandchildren. He has minimal transportation and is trying to find a card to get him to [facility]. He does not have a smart phone. We have tried to make him his aware is possible of the patient's current situation. Patient has been called to update him and we have also involved  and pastoral care to try to find a way to get the  to the patient's bedside.     Inclusion Criteria   Coma after resuscitation from out of hospital cardiac arrest    Defined as time when patient is intubated and unable to follow commands  Cooled to <34 degrees C within 240 minutes of cardiac arrest   Definitive temperature control device applied   Any endovascular or surface cooling system w/closed loop feedback  Age >25years of age  Informed consent from [facility] including intent to maintain life support for 96 hours   Enrollment (randomization) within 6 hours of initiation of cooling   Does patient meet ALL Inclusion Criteria (YES or NO) YES     Exclusion Criteria   Hemodynamic instability (systolic BP < 80 mm Hg despite aggressive management)  Cannot require mechanical support (ECMO, ventricular assist devices, and IABP) at time of enrollment  Pre-existing neurological disability or condition that confounds outcome determination    Pre-existing terminal illness, unlikely to survive to outcome determination    Planned early withdrawal of life support   Presumed sepsis as etiology of arrest   Prisoner   Does patient meet ANY Exclusion Criteria (YES or NO) NO         Additional Comments:          Meaghan Trinidad RN  Clinical Research Services  [address] [place] [address] Kd Garcia  P: [phone]  F: [phone]    For questions call the research office at [phone] or [facility].

## 2022-12-14 NOTE — PROGRESS NOTES
Assessment: Patient was brought to ED via ground ambulance. Patient was a transfer from [facility]. Patient was intubated and unresponsive. Family was not present at the time. However, ED doctor called patient's , Terrial Gravely, and had conversation with him. Intervention:  provided presence, prayed at patient's bedside and offered support to staff. Plan: Chaplains would continue to visit for ongoing assessment of patient's condition and for spiritual and emotional support to family.

## 2022-12-14 NOTE — ED NOTES
The following labs were labeled with appropriate pt sticker and tubed to lab:     [x] Blue     [x] Lavender   [] on ice  [x] Green/yellow  [x] Green/black [] on ice  [] Lovenia   [] on ice  [] Yellow  [] Red  [] Type/ Screen  [] ABG  [] VBG    [] COVID-19 swab    [] Rapid  [] PCR  [] Flu swab  [] Peds Viral Panel     [] Urine Sample  [] Fecal Sample  [] Pelvic Cultures  [] Blood Cultures  [] X 2  [] STREP Cultures         Swapnil Jose RN  12/14/22 8534

## 2022-12-14 NOTE — RESEARCH
Patient was randomized to the 24HR treatment arm in the ICECAP study. Per protocol, patient is to begin rewarming period at 1130AM on 12/15/2022. I spoke with current RN to explain the study and left contact information if there are any questions. Dr. Antonieta Aly spoke to crit care team to update on plan.

## 2022-12-14 NOTE — PROGRESS NOTES
Attending Physician Statement  I have discussed the care of Genaro Kiser, including pertinent history and exam findings with the resident. I have reviewed the key elements of all parts of the encounter with the resident. I have seen and examined the patient with the resident. I agree with the assessment and plan and status of the problem list as documented. I have seen the patient when MICU was consulted for admission to medical ICU. Please see full critical care H&P by critical care resident. She has history of coronary artery disease/chronic systolic heart failure, diabetes mellitus, chronic kidney disease, status post mitral valve repair, pulmonary hypertension secondary to severe LV dysfunction. Last EF was 25% in October 2022 history of ischemic cardiomyopathy history of chronic kidney disease. Apparently she was in the bathroom ambulated herself and  called EMS she was unresponsive apparently had PEA arrest exact duration is not known apparently prolonged CPR intubation initially reported esophageal intubation and was already intubated she was found to have a glucose of 18. She had received glucose remained unresponsive initially to [facility] and then was transferred to [facility] emergency room EKG was apparently right bundle branch block and was concern for pulm embolism also patient was on low-dose of propofol in the emergency room she had a Quadra placed in the emergency room for cooling and is started on cooling protocol no response was reported in the emergency room apparently according to available history she had some posturing noted while she was in CT. CTA chest was done which was negative for pulm embolism shows pulm edema and bilateral pleural effusion especially small right and minimal left pleural effusion.   Otherwise patient was not reported to have much response when I saw the patient in ICU as soon as she arrived in ICU pupils were dilated and fixed no corneal reflex was present no spontaneous respiration was present over the ventilator. Cardiology was also consulted from the emergency room. Initial labs shows a creatinine of 1.51 BUN was 49 bicarbonate was 16 lactic acid was 255 5.5 and repeated 1 was 1.6.  proBNP was 24,000 580 troponin was 107. AST and ALT was 138 and 186 WBC count was 23 and hemoglobin was 10.4. CTA chest also shows ascites around the liver IVC was dilated. She was on 5 mics of propofol no sponges movement was reported at that time pupils as mentioned dilated and fixed. Initial blood gas apparently in [facility] was pH was 6.99 PCO2 was 74. Last ABG on ventilator was 7.38/31 PCO2/87 PO2 bicarb of 18 on ventilatory support with rate of 20 tidal volume 480 PEEP of 5 and FiO2 was percent. Patient is on low-dose of Levophed 5 mcg systolic blood pressure when I saw her was 140 at that time. Follow-up troponins. 2D echocardiogram.  Heparin drip. Cardiology evaluation discussed with cardiology. Will need prognostication by neurology. Will get neurology evaluation and likely will need LTM E.  CT of the head was done in the emergency room which was negative for acute changes. Follow-up neurological status mentation. Cooling protocol to continue current temperature is 33. Patient is on amiodarone drip she had episode of V. tach/frequent ventricular beats. AICD interrogation. Will take her off propofol there is no shivering currently no spontaneous movement. Follow-up WBC count tomorrow. Overall prognosis is guarded will depend on the neurological status and neurological recovery. History of severe cardiomyopathy with chronic systolic heart failure/diabetes mellitus/chronic kidney disease    Discussed with nursing staff, treatment and plan discussed.   Discussed with respiratory therapist.    Total critical care time caring for this patient with life threatening, unstable organ failure, including direct patient contact, management of life support systems, review of data including imaging and labs, discussions with other team members and physicians at least 54  Min so far today, excluding procedures. Please note that this chart was generated using voice recognition Dragon dictation software. Although every effort was made to ensure the accuracy of this automated transcription, some errors in transcription may have occurred.      Jose Carty MD  12/14/2022 1:57 PM

## 2022-12-14 NOTE — ED NOTES
SW reached out to patient's spouse Tip Peraza. He stated he has reached out to family members about borrowing a vehicle and is waiting to hear back. Tip Peraza stated he and patient care for grandchildren and is working on establishing care for them after they get out of school. He stated his son gets out of work later and hopes he can assist. Tip Peraza stated he received updates from physician and aware of patient's condition. Tip Peraza does not anticipate arriving to ED until 7-9pm. JAN provided ER phone number and SW # for any updates.      TATI Garcia  12/14/22 2873

## 2022-12-14 NOTE — ED NOTES
SW looked into the possibility of assisting patient's  get to the ER from [facility] as he does not have transportation.  JAN spoke to Zarina  Hospitality Coordinator, to see if we are able to assist in any way.  She will look into options, if any.   to let ED SW know if he is in need of a ride here or if he is able to find his own.    Christa Chambers, TATI Noriega  12/14/22 3984

## 2022-12-14 NOTE — ED NOTES
Dr Eriberto Stinson, Respiratory, Pharmacst, Pau Olson RN, Maria Dolores Cueto RN, Buena Merlin RN, and writer RN at bedside.       Jay Dawkins, RN  12/14/22 1017

## 2022-12-14 NOTE — ED PROVIDER NOTES
EMERGENCY DEPARTMENT ENCOUNTER      CHIEF COMPLAINT    Chief Complaint   Patient presents with    Cardiac Arrest     6339 SEE CODE NARRATOR       HPI    Kari Jensen is a 60 y.o. female who presentsto ED from home. By EMS. Patient was found by her  unresponsive at home in the bathroom. Patient has history of diabetes patient has history of coronary artery disease COPD and cardiomyopathy. EMS was called. Patient was unresponsive and pulseless. CPR was started. Patient was also hypoglycemic. Patient's fingerstick blood sugar was 18. On arrival to ED patient is unresponsive CPR in progress.   Patient was endotracheally intubated by EMS [facility] device in place  PAST MEDICAL HISTORY    Past Medical History:   Diagnosis Date    CAD (coronary artery disease)     Cardiomyopathy ([facility] [address]     COPD (chronic obstructive pulmonary disease) ([facility] [address]     Depression     Diabetes mellitus ([facility] [address]     H/O mitral valve repair     History of fractured kneecap 2017    right    Hx of CABG     Hx of myocardial infarction     Hyperlipidemia     Hypertension     ICD (implantable cardioverter-defibrillator) in place     Kidney calculi     Osteoarthritis     Patient in clinical research study 12/14/2022    Pneumonia     Renal calculi        SURGICAL HISTORY    Past Surgical History:   Procedure Laterality Date    ANKLE SURGERY Right 2009    plate and screws     CARDIAC DEFIBRILLATOR PLACEMENT  06/01/15    CHOLECYSTECTOMY  1990    CORONARY ARTERY BYPASS GRAFT  05/28/15    X 3- Dr Vitaly Cheng CABELLO-LAD<SVG-Diag, SVG-PDA, MV Repair with 30 min CE IMR ring    CYSTOSCOPY Left 11/08/2018    HYSTERECTOMY (CERVIX STATUS UNKNOWN)  1990    KIDNEY STONE SURGERY Left 2010    MITRAL VALVE SURGERY  6/4/15    SC CYSTO/URETERO W/LITHOTRIPSY &INDWELL STENT INSRT Left 11/8/2018    CYSTOSCOPY URETEROSCOPY performed by Berna Mccain MD at [address] &[address] Left 11/8/2018    CYSTOSCOPY STENT INSERTION performed by Jaiden Abdalla MD at [address] &INDWELL STENT INSRT Left 11/15/2018    LEFT CYSTOSCOPY URETEROSCOPY LASER,HLL, LEFT STENT EXCHANGE performed by Jaiden Abdalla MD at [address] [facility]    REM      ALLERGIES    Allergies   Allergen Reactions    Codeine Nausea And Vomiting    Nsaids Nausea Only     nausea       FAMILY HISTORY    Family History   Problem Relation Age of Onset    Stroke Father 76    Other Father         low BS    Diabetes Mother        SOCIAL HISTORY    Social History     Socioeconomic History    Marital status:    Tobacco Use    Smoking status: Every Day     Packs/day: 0.50     Years: 32.00     Pack years: 16.00     Types: Cigarettes     Last attempt to quit: 12/25/2018     Years since quitting: 3.9    Smokeless tobacco: Never   Vaping Use    Vaping Use: Never used   Substance and Sexual Activity    Alcohol use: No     Alcohol/week: 0.0 standard drinks    Drug use: No     Social Determinants of Health     Financial Resource Strain: Low Risk     Difficulty of Paying Living Expenses: Not hard at all   Food Insecurity: No Food Insecurity    Worried About Running Out of Food in the Last Year: Never true    Ran Out of Food in the Last Year: Never true         ystems negative except as marked. PHYSICAL EXAM    VITAL SIGNS: /73   Pulse (!) 103   Resp 13   SpO2 100%    Constitutional: Unresponsive patient cardiac arrest CPR in progress. No signs of trauma. HENT: ET tube in place. Eyes: Pupils are nonreactive  Respiratory: Good chest rise on the left. The ET tube was moved out and adjusted with good bilateral chest rise  Cardiovascular: Patient was pulseless on arrival.  Patient received 2 doses of epinephrine IV chest compressions in progress.   ROSC after the second dose of epinephrine    GI: Abdomen is distended : Garcia catheter placed musculoskeletal: No signs of deformity or trauma integument: Pale and cool lymphatic:  No lymphadenopathy noted. Neurologic:  Alert & oriented x 3, Normal motor function, Normal sensory function, No focal deficits    RADIOLOGY    XR CHEST PORTABLE   Final Result   1. Endotracheal tube tip at the level of the tylor. It could be pulled back    several centimeters, if desired. 2. NG tube in stomach. 3. Cardiomegaly, ICD, with prior sternotomy, unchanged. 4. Mild pulmonary interstitial edema.              PROCEDURES        Labs  Labs Reviewed   CBC WITH AUTO DIFFERENTIAL - Abnormal; Notable for the following components:       Result Value    WBC 18.1 (*)     RBC 3.90 (*)     Hemoglobin 9.9 (*)     Hematocrit 31.5 (*)     MCH 25.3 (*)     RDW 19.1 (*)     nRBC 1 (*)     Segs Absolute 9.61 (*)     Absolute Lymph # 6.70 (*)     All other components within normal limits   COMPREHENSIVE METABOLIC PANEL - Abnormal; Notable for the following components:    Glucose 457 (*)     BUN 46 (*)     Creatinine 1.51 (*)     Est, Glom Filt Rate 39 (*)     Bun/Cre Ratio 30 (*)     Sodium 132 (*)     CO2 16 (*)     ALT 36 (*)     AST 49 (*)     Total Protein 5.8 (*)     Albumin 2.7 (*)     All other components within normal limits   BRAIN NATRIURETIC PEPTIDE - Abnormal; Notable for the following components:    Pro-BNP 24,580 (*)     All other components within normal limits   D-DIMER, QUANTITATIVE - Abnormal; Notable for the following components:    D-Dimer, Quant 10.94 (*)     All other components within normal limits   TROPONIN - Abnormal; Notable for the following components:    Troponin, High Sensitivity 49 (*)     All other components within normal limits   URINALYSIS - Abnormal; Notable for the following components:    Glucose, Ur 100 mg/dL (*)     Urine Hgb TRACE (*)     Protein, UA 2+ (*)     All other components within normal limits   MICROSCOPIC URINALYSIS - Abnormal; Notable for the following components:    Bacteria, UA RARE (*)     All other components within normal limits   ARTERIAL BLOOD GAS, POC - Abnormal; Notable for the following components:    POC pH 7.046 (*)     POC pCO2 51.7 (*)     POC PO2 401.8 (*)     POC HCO3 14.2 (*)     Negative Base Excess, Art 16 (*)     POC O2  (*)     All other components within normal limits   BLOOD GAS, ARTERIAL             Summation  Critical care 30 minutes    Patient Course: Patient presents to ED  cardiac arrest.  Patient also had hypoglycemia ROSC achieved after 30 minutes of CPR. Frothy secretions suctioned from the ET tube. [facility] called. Patient will be transferred to [place].  Patient is excepted for transfer. Patient's condition and prognosis were discussed with her . ED Medications administered this visit:    Medications   atropine injection (1 mg IntraVENous Given 12/14/22 0845)   calcium chloride 10 % injection (1,000 mg IntraVENous Given 12/14/22 0847)   EPINEPHrine 1 MG/10ML injection (1 mg IntraVENous Given 12/14/22 0847)   dextrose 50 % IV solution (25 g IntraVENous Given 12/14/22 0847)   dextrose 50 % IV solution (25 g IntraVENous Given 12/14/22 0842)       New Prescriptions from this visit:    Discharge Medication List as of 12/14/2022  9:41 AM          Follow-up:  No follow-up provider specified. Final Impression:   1. Cardiac arrest ([facility] [address]    2.  Hypoglycemia               (Please note that portions of this note were completed with a voice recognition program.  Efforts were made to edit the dictations but occasionally words are mis-transcribed.)         Sneha Kirkland MD  12/15/22 1672

## 2022-12-14 NOTE — ED NOTES
Patient's spouse called and stated he is faxing an authorization form to Dr. Theresa Bishop from the [employer]. He stated he now has a vehicle to use. He is in the process of notifying family members of patient's status. Antony Juan will be caring for grandchildren and after they are asleep will come up this evening. Unit # provided for him to call for update. Antony Juan can be reached at [phone]. LSW called Yves LOWRY to update him on timeframe Antony Juan will arrive.      Erik 33, TATI  12/14/22 9393

## 2022-12-14 NOTE — ED NOTES
Cooling started at this time with target tempt at [facility] by writer RN, Mercer Lanes RN, per verbal order by Dr Dina Martines.        Kiara Basurto RN  12/14/22 8809

## 2022-12-14 NOTE — Clinical Note
Discharge Plan[de-identified] Other/[facility])   Telemetry/Cardiac Monitoring Required?: Yes

## 2022-12-14 NOTE — RESEARCH
Received a call from Dr. Suzan Bunch and Emergency Department staff to prescreen patient for the [address] - Time of 1st 911 call [facility])  6851 - Patient arrived to [facility] cooling catheter was placed  1130 - Cooling initiated  1148 - Temp <34C  1203 - Target temp of 33C was reached    Dr. Suzan Bunch will speak to critical care regarding this study and contact family to discuss the study. No study procedures have been performed. All patient treatment at this time has been standard of care. Will continue to follow.

## 2022-12-14 NOTE — ED NOTES
Pt comes to ED via [facility] from [facility] with c/o post arrest. EMS states pt fell down, unknown down time but higher than 30 minutes, had PEA, came to facility, received two amps of dextrose, three epi, calcium chloride, and atropine; pt has OG, Garcia, + IO, + IV, pupils are fixed and dilated, is posturing, and has propofol drip. Vitals PTA are 140/80, 101 HR, 98 O2, ETT Tube 22 at teeth. Pt received levo, 100mcg fentanyl.       Elle Rosas, RN  12/14/22 1011

## 2022-12-14 NOTE — PROGRESS NOTES
[address] [facility] [address]     Emergency/Trauma Note    PATIENT NAME: Lolita Hua    Shift date: 12/14/2022   Shift day: Wednesday   Shift # 1    Room # 13/13   Name: Lolita Hua            Age: 60 y.o. Gender: female          Denominational: None   Place of Jew:     Trauma/Incident type: Adult Trauma Priority  Admit Date & Time: 12/14/2022 10:09 AM  TRAUMA NAME:     ADVANCE DIRECTIVES IN CHART? No    NAME OF DECISION MAKER: Adrienne Calles    RELATIONSHIP OF DECISION MAKER TO PATIENT:     PATIENT/EVENT DESCRIPTION:  Lolita Hua is a 60 y.o. female who arrived via (transport) from (scene/accident/event) as a (type/level of trauma). (Description of injuries/condition/event). Pt to be admitted to 13/13. SPIRITUAL ASSESSMENT-INTERVENTION-OUTCOME:   assisted with contacting  to get update from doctor. It was unclear from doctor's call if  understood the severity of patient's condition. He told  that he is unsure if he can get a ride here but will call to let us know. When inquired about genesis background, he said, \"I am the one who goes to [employer], she doesn't\".  provided support and assurance of prayers for patient. PATIENT BELONGINGS:  No belongings noted    ANY BELONGINGS OF SIGNIFICANT VALUE NOTED:      REGISTRATION STAFF NOTIFIED? No      WHAT IS YOUR SPIRITUAL CARE PLAN FOR THIS PATIENT?:  Discuss the possibility of a paid cab ride with social work for  per request from doctor, as well as a room at [facility].     Electronically signed by Angelina Dewey on 12/14/2022 at 12:11 PM.  [facility]  [phone]

## 2022-12-14 NOTE — H&P
Critical Care - History and Physical Examination    Patient's name:  Lane Killian  Medical Record Number: [id number]  Patient's account/billing number: [de-identified]  Patient's YOB: 1960  Age: 60 y.o. Date of Admission: 12/14/2022 10:09 AM  Date of History and Physical Examination: 12/14/2022      Primary Care Physician: Srinivasa Petersen MD  Attending Physician: Dr Cameron Rodriguez Status: Full Code    Chief complaint:   Chief Complaint   Patient presents with    Cardiac Arrest         HISTORY OF PRESENT ILLNESS:      History was obtained from chart review. Lane Killian is a 60 y.o. with PMH of   - CAD, s/p CABG in 2015  - Ischemic cardiomyopathy, last EF of 25%  - Insulin-dependent diabetes mellitus type 2  - CKD    Presented to [facility] ER as a transfer from [facility] ER. According to the chart review patient was found unresponsive by the  as per EMS. Unknown downtime. Per EMS report no CPR performed. EMS intubated the patient esophageally, prior to arrival at [facility], reintubation was done and then patient's glucose was found to be 18. At [facility] found to be in PEA arrest achieved ROSC needed 6 mg epinephrine, received multiple pushes of Versed and fentanyl for posturing and seizure-like activity. On arrival to our [facility] ER patient was unresponsive. Fixed and dilated pupils, posturing and on propofol infusion. EKG in the ED showed new right bundle branch block then due to concern for PE, CT PE was done. Bedside ultrasound did not show any signs of tamponade. CT negative for any PE. Around 10:25 AM there was an episode of pulselessness and bradycardia and PEA was given at epinephrine and troponin.   Cooling was started around 11:28 AM with target temperature of 38 Celsius    Initial vitals in the ED temperature around 35, heart rate 106, respiratory 22, pressure 124/108    CT head negative for any acute intracranial abnormality  CT chest showed pleural effusions and cardiomegaly    Initial labs -   WBC 18.1, hemoglobin 9.9, platelets 122  Glucose 457  Sodium 132, potassium 4.4, creatinine 1.5, bicarb 16  Albumin 2.7 AST 49, ALT 36  Troponin 49 > 107  proBNP 24 580  Lactic acid 5.5  UA negative for any UTI    Initial ABG - 7.04/51 point 7/401/14 point  In the ED there was a concern for PVCs/tachycardia patient was started on amiodarone infusion.     Last echo in October 2022 showed EF of 25%, LA/LV/RA/RV dilated, ICD in place, moderate to severe pulmonary hypertension    Repeat labs -   Last sodium 134, potassium 3.9, creatinine 1.5, glucose 256, lactic 1.6    Repeat ABG -7.38/30.9/87.6/18.5      PAST MEDICAL HISTORY:         Diagnosis Date    CAD (coronary artery disease)     Cardiomyopathy ([facility] [address]     COPD (chronic obstructive pulmonary disease) ([facility] [address]     Depression     Diabetes mellitus ([facility] [address]     H/O mitral valve repair     History of fractured kneecap 2017    right    Hx of CABG     Hx of myocardial infarction     Hyperlipidemia     Hypertension     ICD (implantable cardioverter-defibrillator) in place     Kidney calculi     Osteoarthritis     Pneumonia     Renal calculi          PAST SURGICAL HISTORY:         Procedure Laterality Date    ANKLE SURGERY Right 2009    plate and screws     CARDIAC DEFIBRILLATOR PLACEMENT  06/01/15    CHOLECYSTECTOMY  1990    CORONARY ARTERY BYPASS GRAFT  05/28/15    X 3- Dr Nury Gonzalez CABELLO-LAD<SVG-Diag, SVG-PDA, MV Repair with 30 min CE IMR ring    CYSTOSCOPY Left 11/08/2018    HYSTERECTOMY (CERVIX STATUS UNKNOWN)  1990    KIDNEY STONE SURGERY Left 2010    MITRAL VALVE SURGERY  6/4/15    NE CYSTO/URETERO W/LITHOTRIPSY &[place] STENT INSRT Left 11/8/2018    CYSTOSCOPY URETEROSCOPY performed by Dylan Jennings MD at [address] &[address] Left 11/8/2018    CYSTOSCOPY STENT INSERTION performed by Dylan Jennings MD at [address] [address] Left 11/15/2018 LEFT CYSTOSCOPY URETEROSCOPY LASER,HLL, LEFT STENT EXCHANGE performed by Young Rodriguez MD at [address] [facility]:      Allergies   Allergen Reactions    Codeine Nausea And Vomiting    Nsaids Nausea Only     nausea         HOME MEDS: :      Prior to Admission medications    Medication Sig Start Date End Date Taking? Authorizing Provider   furosemide (LASIX) 40 MG tablet Take 1 tablet by mouth in the morning and 1 tablet in the evening. 11/21/22   Yasmin Mina MD   clopidogrel (PLAVIX) 75 MG tablet Take 1 tab daily 10/24/22   Tyson Muro MD   atorvastatin (LIPITOR) 80 MG tablet 1 tab daily 10/24/22   Tyson Muro MD   carvedilol (COREG) 3.125 MG tablet take 1 tablet by mouth twice a day with meals 10/24/22   Tyson Muro MD   gabapentin (NEURONTIN) 400 MG capsule Take 1 capsule by mouth 3 times daily for 180 days. 10/24/22 4/22/23  Cora Dale DO   midodrine (PROAMATINE) 5 MG tablet take 1 tablet by mouth three times a day 10/24/22   Cora Dale DO   sertraline (ZOLOFT) 100 MG tablet Take one tablet by mouth once daily 10/24/22   Cora Dale DO   insulin glargine (LANTUS SOLOSTAR) 100 UNIT/ML injection pen INJECT 45 UNITS SUBCUTANEOUSLY EVERY MORNING AND 25 UNITS EVERY EVENING 10/24/22   Cora Dale DO   Insulin Pen Needle (BD PEN NEEDLE PAIGE 2ND GEN) 32G X 4 MM MISC use 1 PEN NEEDLE to inject MEDICATION subcutaneously daily 10/24/22   Dominique Braswell DO   Lancets MISC Pt testing BS once daily and prn 10/24/22   Dominique Braswell DO   glucose monitoring (FREESTYLE) kit Please dispense meter covered by patients insurance. 4/12/22   Yasmin Mina MD   blood glucose monitor strips Testing BS once daily and as needed.  Dispense strips to go with new glucometer 4/12/22   Yasmin Mina MD   albuterol (PROVENTIL) (2.5 MG/3ML) 0.083% nebulizer solution Take 3 mLs by nebulization every 6 hours as needed for Wheezing (as needed for wheezing) 22   Yasmin Mina MD   Respiratory Therapy Supplies (NEBULIZER/TUBING/MOUTHPIECE) KIT 1 kit by Does not apply route daily as needed (for SOB, cough) 19   Yasmin Mina MD   Respiratory Therapy Supplies (NEBULIZER COMPRESSOR) KIT 1 kit by Does not apply route once for 1 dose 11/4/19 10/24/22  Yasmin Mina MD   lactobacillus (CULTURELLE) capsule Take 1 capsule by mouth 2 times daily (with meals) 19   Denny Sewell MD   blood glucose monitor strips Use to test daily 18   Yasmin Mina MD   blood glucose monitor kit and supplies Use to test daily 18   Yasmin Mina MD   Cholecalciferol (VITAMIN D3) 5000 units TABS Take 5,000 Units by mouth daily    Historical Provider, MD   glucose blood VI test strips (EXACTECH TEST) strip 1 each by In Vitro route daily Pt testing BS once daily and prn 11/19/15   Yasmin Mina MD   Multiple Vitamins-Minerals (THERAPEUTIC MULTIVITAMIN-MINERALS) tablet Take 1 tablet by mouth daily (with breakfast) 6/12/15   Henrique Deleon MD       SOCIAL HISTORY:       TOBACCO:   reports that she has been smoking cigarettes. She has a 16.00 pack-year smoking history. She has never used smokeless tobacco.  ETOH:   reports no history of alcohol use. DRUGS:  reports no history of drug use.      FAMILY HISTORY:          Problem Relation Age of Onset    Stroke Father 76    Other Father         low BS    Diabetes Mother        REVIEW OF SYSTEMS (ROS):     Review of Systems - could not be obtained    OBJECTIVE:     VITAL SIGNS:  BP (!) 137/106   Pulse 78   Temp (!) 91.4 °F (33 °C) (Esophageal)   Resp 24   SpO2 98%   Tmax over 24 hours:  Temp (24hrs), Av.4 °F (33.6 °C), Min:91.4 °F (33 °C), Max:95.7 °F (35.4 °C)      Patient Vitals for the past 8 hrs:   BP Temp Temp src Pulse Resp SpO2   22 1432 -- -- -- 78 24 98 %   22 1400 -- -- -- 80 22 97 %   22 1345 -- -- -- 81 22 98 %   22 1330 -- (!) 91.4 °F (33 °C) Esophageal 81 22 97 % 12/14/22 1315 -- -- -- 80 22 96 %   12/14/22 1300 -- -- -- 84 26 100 %   12/14/22 1245 -- -- -- 86 26 100 %   12/14/22 1244 -- (!) 91.4 °F (33 °C) Oral -- -- --   12/14/22 1230 -- -- -- 86 26 100 %   12/14/22 1215 -- -- -- 86 23 100 %   12/14/22 1200 -- -- -- 89 26 100 %   12/14/22 1158 -- (!) 91.9 °F (33.3 °C) Esophageal -- -- --   12/14/22 1157 -- -- -- 94 26 100 %   12/14/22 1145 (!) 137/106 -- -- 97 23 100 %   12/14/22 1130 -- (!) 95.7 °F (35.4 °C) Esophageal -- -- --   12/14/22 1128 -- (!) 91.6 °F (33.1 °C) Esophageal -- -- --   12/14/22 1059 -- -- -- 96 -- 100 %   12/14/22 1014 (!) 124/108 -- -- -- -- 100 %   12/14/22 1012 -- -- -- (!) 106 22 --         Intake/Output Summary (Last 24 hours) at 12/14/2022 1501  Last data filed at 12/14/2022 1333  Gross per 24 hour   Intake 374 ml   Output 400 ml   Net -26 ml     Date 12/14/22 0000 - 12/14/22 2359   Shift 5100-3631 5975-8487 7853-9114 24 Hour Total   INTAKE   I.V.  374  374   Shift Total  374  374   OUTPUT   Urine  400  400   Shift Total  400  400   Weight (kg)         Wt Readings from Last 3 Encounters:   10/24/22 165 lb 9.6 oz (75.1 kg)   10/24/22 165 lb (74.8 kg)   10/16/22 179 lb 7.3 oz (81.4 kg)     There is no height or weight on file to calculate BMI. PHYSICAL EXAM:  Constitutional: Intubated and sedated  EENT: neck supple with midline trachea.   Neck: Supple  Respiratory: Bilateral rhonchi  Cardiovascular: Normal heart sounds  Abdomen: soft and distended  Extremities:   Bilateral pedal edema  Neurological: Fixed dilated pupils    MEDICATIONS:  Scheduled Meds:   lactated ringers bolus  500 mL IntraVENous Once    chlorhexidine  15 mL Mouth/Throat BID     Continuous Infusions:   norepinephrine 7.5 mcg/min (12/14/22 1255)    propofol 5 mcg/kg/min (12/14/22 1103)    amiodarone 1 mg/min (12/14/22 1207)    Followed by    amiodarone       PRN Meds:   ondansetron, 4 mg, Q8H PRN   Or  ondansetron, 4 mg, Q6H PRN          ABGs:   Lab Results   Component Value Date/Time    MJJ9SDM 17 08/24/2018 05:02 AM    FIO2 100.0 12/14/2022 12:21 PM       DATA:  Complete Blood Count:   Recent Labs     12/14/22  0855 12/14/22  1149   WBC 18.1* 23.0*   RBC 3.90* 4.12   HGB 9.9* 10.4*   HCT 31.5* 36.0*   MCV 80.8 87.4   MCH 25.3* 25.2   MCHC 31.3 28.9   RDW 19.1* 17.4*    336   MPV  --  11.0        Last 3 Blood Glucose:   Recent Labs     12/14/22  0855 12/14/22  1149 12/14/22  1338   GLUCOSE 457* 221* 256*        PT/INR:    Lab Results   Component Value Date/Time    PROTIME 13.7 12/14/2022 01:38 PM    INR 1.3 12/14/2022 01:38 PM     PTT:    Lab Results   Component Value Date/Time    APTT 26.0 12/14/2022 01:38 PM       Comprehensive Metabolic Profile:   Recent Labs     12/14/22  0855 12/14/22  1019 12/14/22  1149 12/14/22  1338   *  --  135 134*   K 4.4  --  4.4 3.8   CL 99  --  102 102   CO2 16*  --  16* 16*   BUN 46*  --  46* 49*   CREATININE 1.51* 2.12* 1.70* 1.51*   GLUCOSE 457*  --  221* 256*   CALCIUM 10.0  --  8.4* 8.6   PROT 5.8*  --  6.6  --    LABALBU 2.7*  --  3.1*  --    BILITOT 0.8  --  1.2  --    ALKPHOS 88  --  128*  --    AST 49*  --  138*  --    ALT 36*  --  86*  --       Magnesium:   Lab Results   Component Value Date/Time    MG 2.1 12/14/2022 01:38 PM    MG 2.2 12/14/2022 11:49 AM    MG 2.1 10/14/2022 06:29 PM     Phosphorus:   Lab Results   Component Value Date/Time    PHOS 5.8 12/14/2022 01:38 PM    PHOS 6.3 09/04/2018 04:47 AM    PHOS 7.7 09/03/2018 04:58 AM     Ionized Calcium:   Lab Results   Component Value Date/Time    CAION 1.18 12/14/2022 01:38 PM    CAION 1.06 09/04/2018 04:47 AM    CAION 1.15 09/03/2018 04:58 AM        Urinalysis:   Lab Results   Component Value Date/Time    NITRU NEGATIVE 12/14/2022 12:26 PM    COLORU Yellow 12/14/2022 12:26 PM    PHUR 6.0 12/14/2022 12:26 PM    WBCUA 50  12/14/2022 12:26 PM    RBCUA 0 TO 2 12/14/2022 12:26 PM    MUCUS NOT REPORTED 11/21/2018 12:12 PM    TRICHOMONAS NOT REPORTED 11/21/2018 12:12 PM YEAST FEW 12/14/2022 12:26 PM    BACTERIA RARE 12/14/2022 09:30 AM    SPECGRAV 1.018 12/14/2022 12:26 PM    LEUKOCYTESUR NEGATIVE 12/14/2022 12:26 PM    UROBILINOGEN Normal 12/14/2022 12:26 PM    BILIRUBINUR NEGATIVE 12/14/2022 12:26 PM    GLUCOSEU 1+ 12/14/2022 12:26 PM    KETUA NEGATIVE 12/14/2022 12:26 PM    AMORPHOUS TRACE 11/21/2018 12:12 PM       HgBA1c:    Lab Results   Component Value Date/Time    LABA1C 8.1 10/14/2022 10:23 AM     TSH:    Lab Results   Component Value Date/Time    TSH 1.66 10/14/2022 10:23 AM       Lactic Acid:   Lab Results   Component Value Date/Time    LACTA 1.4 10/13/2022 12:22 PM    LACTA 1.7 12/29/2018 11:57 AM    LACTA 1.5 12/29/2018 05:30 AM      Troponin: No results for input(s): TROPONINI in the last 72 hours. Radiological imaging  CT HEAD WO CONTRAST    Result Date: 12/14/2022  EXAMINATION: CT OF THE HEAD WITHOUT CONTRAST  12/14/2022 10:25 am TECHNIQUE: CT of the head was performed without the administration of intravenous contrast. Automated exposure control, iterative reconstruction, and/or weight based adjustment of the mA/kV was utilized to reduce the radiation dose to as low as reasonably achievable. COMPARISON: None. HISTORY: ORDERING SYSTEM PROVIDED HISTORY: Post arrest TECHNOLOGIST PROVIDED HISTORY: Post arrest Decision Support Exception - unselect if not a suspected or confirmed emergency medical condition->Emergency Medical Condition (MA) Reason for Exam: post arrest FINDINGS: BRAIN/VENTRICLES: There is no acute intracranial hemorrhage, mass effect or midline shift. No abnormal extra-axial fluid collection. The gray-white differentiation is maintained without evidence of an acute infarct. There is no evidence of hydrocephalus. ORBITS: The visualized portion of the orbits demonstrate no acute abnormality. SINUSES: The visualized paranasal sinuses reveal bilateral maxillary and ethmoid mucosal thickening. Mastoid air cells demonstrate no acute abnormality. SOFT TISSUES/SKULL:  No acute abnormality of the visualized skull or soft tissues. Tracheostomy tube noted. No acute intracranial abnormality. Bilateral maxillary and ethmoid sinusitis. XR CHEST PORTABLE    Result Date: 12/14/2022  EXAMINATION: ONE XRAY VIEW OF THE CHEST 12/14/2022 7:24 am COMPARISON: Earlier exam of same date HISTORY: ORDERING SYSTEM PROVIDED HISTORY: Post arrest TECHNOLOGIST PROVIDED HISTORY: Post arrest FINDINGS: Stable support lines and tubes Cardiac size is enlarged. Diffuse right lung opacity. The pulmonary vascularity is hazy and indistinct. No pneumothorax. No pleural effusions identified . Postsurgical changes overlying the mediastinum. Atypical pulmonary edema     XR CHEST PORTABLE    Result Date: 12/14/2022  EXAM: XR CHEST PORTABLE HISTORY: Reason for exam:->rosc intubated COMPARISON: 10/13/2022     1. Endotracheal tube tip at the level of the tylor. It could be pulled back several centimeters, if desired. 2. NG tube in stomach. 3. Cardiomegaly, ICD, with prior sternotomy, unchanged. 4. Mild pulmonary interstitial edema. CT CHEST PULMONARY EMBOLISM W CONTRAST    Result Date: 12/14/2022  EXAMINATION: CTA OF THE CHEST 12/14/2022 10:26 am TECHNIQUE: CTA of the chest was performed after the administration of intravenous contrast.  Multiplanar reformatted images are provided for review. MIP images are provided for review. Automated exposure control, iterative reconstruction, and/or weight based adjustment of the mA/kV was utilized to reduce the radiation dose to as low as reasonably achievable. COMPARISON: None. HISTORY: ORDERING SYSTEM PROVIDED HISTORY: Possible PE arrest TECHNOLOGIST PROVIDED HISTORY: Possible PE arrest Decision Support Exception - unselect if not a suspected or confirmed emergency medical condition->Emergency Medical Condition (MA) Reason for Exam: possible pe FINDINGS: Pulmonary Arteries: Pulmonary arteries are adequately opacified for evaluation.   No evidence of intraluminal filling defect to suggest pulmonary embolism. Main pulmonary artery is normal in caliber. Mediastinum: No evidence of mediastinal lymphadenopathy. There is moderate cardiomegaly, with four-chamber enlargement, with significant dilatation of the left ventricle. There is a mitral valve prosthesis in place. There is dilatation of the inferior vena cava with retrograde filling of contrast of the hepatic veins. There is no acute abnormality of the thoracic aorta. ET tube and gastric tube are in good position. Lungs/pleura: There is a moderate right and mild left pleural effusions noted, with compressive atelectasis in the right lower lobe. There are thickened interlobular septae the noted Upper Abdomen: There is moderate ascites noted. Soft Tissues/Bones: No acute bone or soft tissue abnormality. No evidence of pulmonary embolism. Evidence of CHF, with bilateral pleural effusions, thickening of the interlobular septae the, and a prominent cardiomegaly. There is four-chamber enlargement, with significant dilatation of the left ventricle. There is evidence of tricuspid insufficiency. There is a mitral valve prosthesis in place. Moderate ascites noted. ET tube and gastric tube are in good position. ASSESSMENT:     Principal Problem:    Cardiac arrest [facility])  Resolved Problems:    * No resolved hospital problems. *      PLAN:       Cardiac arrest -   - PEA ? Secondary to hypoglycemia  - Continue mechanical ventilation  - RT inpatient protocol  - Daily ABG  - Cooling as per hypothermia protocol  - Follow-up on echo  - Follow-up on ICD interrogation  - Monitor sugars  - Appreciate cardiology recommendations  - Pressor support as needed  - Monitor neurological response  - Follow upon neuro critical care recommendations  - Continue Zosyn  - Chest x-ray tomorrow    2.  CHFrEF -  - secondary to ischemic cardiomyopathy  - Last echo showing EF of 25%, AICD in place  - Appreciate cardiology recommendations    3. DM -   - Initially hypoglycemic  - Monitor sugars closely  - Medium dose sliding scale  - POCT glucose checks  - Hypoglycemia protocol    4. CKD -   - likely secondary to DM  - baseline around 1.5 - 1.7      Chastity Cunha MD, MD  ICU resident   [facility]  12/14/2022 3:01 PM    The critical care team assigned to the patient will be following up the patient in the intensive care unit. I have discussed the current plan with the critical care attending. The above mentioned assessment and plan will be reviewed again in detail by the critical care attending at bedside, and can be further changed or modified accordingly by the attending physician. Attending Physician Statement  I have discussed the care of Nu Rogers, including pertinent history and exam findings with the resident. I have reviewed the key elements of all parts of the encounter with the resident. I have seen and examined the patient with the resident. I agree with the assessment and plan and status of the problem list as documented. I have seen the patient when MICU was consulted for admission to medical ICU. Please see full critical care H&P by critical care resident. She has history of coronary artery disease/chronic systolic heart failure, diabetes mellitus, chronic kidney disease, status post mitral valve repair, pulmonary hypertension secondary to severe LV dysfunction. Last EF was 25% in October 2022 history of ischemic cardiomyopathy history of chronic kidney disease. Apparently she was in the bathroom ambulated herself and  called EMS she was unresponsive apparently had PEA arrest exact duration is not known apparently prolonged CPR intubation initially reported esophageal intubation and was already intubated she was found to have a glucose of 18.   She had received glucose remained unresponsive initially to [facility] and then was transferred to [address] emergency room EKG was apparently right bundle branch block and was concern for pulm embolism also patient was on low-dose of propofol in the emergency room she had a Quadra placed in the emergency room for cooling and is started on cooling protocol no response was reported in the emergency room apparently according to available history she had some posturing noted while she was in CT. CTA chest was done which was negative for pulm embolism shows pulm edema and bilateral pleural effusion especially small right and minimal left pleural effusion. Otherwise patient was not reported to have much response when I saw the patient in ICU as soon as she arrived in ICU pupils were dilated and fixed no corneal reflex was present no spontaneous respiration was present over the ventilator. Cardiology was also consulted from the emergency room. Initial labs shows a creatinine of 1.51 BUN was 49 bicarbonate was 16 lactic acid was 255 5.5 and repeated 1 was 1.6.  proBNP was 24,000 580 troponin was 107. AST and ALT was 138 and 186 WBC count was 23 and hemoglobin was 10.4. CTA chest also shows ascites around the liver IVC was dilated. She was on 5 mics of propofol no sponges movement was reported at that time pupils as mentioned dilated and fixed. Initial blood gas apparently in [facility] was pH was 6.99 PCO2 was 74. Last ABG on ventilator was 7.38/31 PCO2/87 PO2 bicarb of 18 on ventilatory support with rate of 20 tidal volume 480 PEEP of 5 and FiO2 was percent. Patient is on low-dose of Levophed 5 mcg systolic blood pressure when I saw her was 140 at that time. Follow-up troponins. 2D echocardiogram.  Heparin drip. Cardiology evaluation discussed with cardiology. Will need prognostication by neurology. Will get neurology evaluation and likely will need LTM E.  CT of the head was done in the emergency room which was negative for acute changes. Follow-up neurological status mentation.   Cooling protocol to continue current temperature is 33. Patient is on amiodarone drip she had episode of V. tach/frequent ventricular beats. AICD interrogation. Will take her off propofol there is no shivering currently no spontaneous movement. Follow-up WBC count tomorrow. Overall prognosis is guarded will depend on the neurological status and neurological recovery. History of severe cardiomyopathy with chronic systolic heart failure/diabetes mellitus/chronic kidney disease    Discussed with nursing staff, treatment and plan discussed. Discussed with respiratory therapist.    Total critical care time caring for this patient with life threatening, unstable organ failure, including direct patient contact, management of life support systems, review of data including imaging and labs, discussions with other team members and physicians at least 54  Min so far today, excluding procedures. Please note that this chart was generated using voice recognition Dragon dictation software. Although every effort was made to ensure the accuracy of this automated transcription, some errors in transcription may have occurred.      Robyn Osuna MD  12/14/2022 4:50 PM

## 2022-12-14 NOTE — CONSULTS
Neuro Critical Care Consult Note    Reason for Consult:  neuro prognostication  Requesting Physician: Darin Baca MD  Attending Physician: Angelica Stevens MD    History Obtained From:  electronic medical record    CHIEF COMPLAINT:       Cardiac arrest    HISTORY OF PRESENT ILLNESS:       The patient is a 60 y.o. female hx of CAD w/CABG, CHF EF 25% w/AICD, COPD, CKD, HTN, HLD, DM2 who presents with PEA arrest. Patient found down by her . Patient down for approximately 30 minutes without CPR. EMS arrived patient found to be in PEA arrest CPR initiated. Patient also found to be hypoglycemic given dextrose. Patient arrived at [facility] underwent multiple rounds of CPR/epi and ROSC was achieved. Patient intubated by EMS and cooling cath placed by ED. Patient transferred to [address] [place] [address] and was noted to have decerebrate posturing in CT scan at 1047 requiring 10mg of vec. [facility] Initial pH at [address] [place] [address] 6.99. Patient currently not on any sedation or paralytics and is undergoing hypothermic target temperature mgmt.     PAST MEDICAL HISTORY :       Past Medical History:        Diagnosis Date    CAD (coronary artery disease)     Cardiomyopathy ([facility] [address]     COPD (chronic obstructive pulmonary disease) ([facility] [address]     Depression     Diabetes mellitus ([facility] [address]     H/O mitral valve repair     History of fractured kneecap 2017    right    Hx of CABG     Hx of myocardial infarction     Hyperlipidemia     Hypertension     ICD (implantable cardioverter-defibrillator) in place     Kidney calculi     Osteoarthritis     Pneumonia     Renal calculi        Past Surgical History:        Procedure Laterality Date    ANKLE SURGERY Right 2009    plate and screws     CARDIAC DEFIBRILLATOR PLACEMENT  06/01/15    CHOLECYSTECTOMY  1990    CORONARY ARTERY BYPASS GRAFT  05/28/15    X 3- Dr Darryle Dows CABELLO-LAD<SVG-Diag, SVG-PDA, MV Repair with 30 min CE IMR ring    CYSTOSCOPY Left 11/08/2018    HYSTERECTOMY (CERVIX STATUS UNKNOWN)  1990    KIDNEY STONE SURGERY Left 2010    MITRAL VALVE SURGERY  6/4/15    [facility] CYSTO/URETERO W/LITHOTRIPSY &INDWELL STENT INSRT Left 11/8/2018    CYSTOSCOPY URETEROSCOPY performed by Erica Cornelius MD at [address] &INDWELL STENT INSRT Left 11/8/2018    CYSTOSCOPY STENT INSERTION performed by Erica Cornelius MD at [address] &INDWELL STENT INSRT Left 11/15/2018    LEFT CYSTOSCOPY URETEROSCOPY LASER,HLL, LEFT STENT EXCHANGE performed by Erica Cornelius MD at [facility] History:   Social History     Socioeconomic History    Marital status:      Spouse name: Not on file    Number of children: Not on file    Years of education: Not on file    Highest education level: Not on file   Occupational History    Not on file   Tobacco Use    Smoking status: Every Day     Packs/day: 0.50     Years: 32.00     Pack years: 16.00     Types: Cigarettes     Last attempt to quit: 12/25/2018     Years since quitting: 3.9    Smokeless tobacco: Never   Vaping Use    Vaping Use: Never used   Substance and Sexual Activity    Alcohol use: No     Alcohol/week: 0.0 standard drinks    Drug use: No    Sexual activity: Not on file   Other Topics Concern    Not on file   Social History Narrative    Not on file     Social Determinants of Health     Financial Resource Strain: Low Risk     Difficulty of Paying Living Expenses: Not hard at all   Food Insecurity: No Food Insecurity    Worried About Running Out of Food in the Last Year: Never true    Ran Out of Food in the Last Year: Never true   Transportation Needs: Not on file   Physical Activity: Not on file   Stress: Not on file   Social Connections: Not on file   Intimate Partner Violence: Not on file   Housing Stability: Not on file       Family History:       Problem Relation Age of Onset    Stroke Father 76    Other Father         low BS    Diabetes Mother Allergies:  Codeine and Nsaids    Home Medications:  Prior to Admission medications    Medication Sig Start Date End Date Taking? Authorizing Provider   furosemide (LASIX) 40 MG tablet Take 1 tablet by mouth in the morning and 1 tablet in the evening. 11/21/22   Shi Sotomayor MD   clopidogrel (PLAVIX) 75 MG tablet Take 1 tab daily 10/24/22   Magi Mays MD   atorvastatin (LIPITOR) 80 MG tablet 1 tab daily 10/24/22   Magi Mays MD   carvedilol (COREG) 3.125 MG tablet take 1 tablet by mouth twice a day with meals 10/24/22   Magi Mays MD   gabapentin (NEURONTIN) 400 MG capsule Take 1 capsule by mouth 3 times daily for 180 days. 10/24/22 4/22/23  Jackson Dale DO   midodrine (PROAMATINE) 5 MG tablet take 1 tablet by mouth three times a day 10/24/22   Jackson Dale DO   sertraline (ZOLOFT) 100 MG tablet Take one tablet by mouth once daily 10/24/22   Jackson Dale DO   insulin glargine (LANTUS SOLOSTAR) 100 UNIT/ML injection pen INJECT 45 UNITS SUBCUTANEOUSLY EVERY MORNING AND 25 UNITS EVERY EVENING 10/24/22   Jackson Dale DO   Insulin Pen Needle (BD PEN NEEDLE PAIGE 2ND GEN) 32G X 4 MM MISC use 1 PEN NEEDLE to inject MEDICATION subcutaneously daily 10/24/22   Milad Rockwellum, DO   Lancets MISC Pt testing BS once daily and prn 10/24/22   Milad Rockwellum,    glucose monitoring (FREESTYLE) kit Please dispense meter covered by patients insurance. 4/12/22   Shi Sotomayor MD   blood glucose monitor strips Testing BS once daily and as needed.  Dispense strips to go with new glucometer 4/12/22   Shi Sotomayor MD   albuterol (PROVENTIL) (2.5 MG/3ML) 0.083% nebulizer solution Take 3 mLs by nebulization every 6 hours as needed for Wheezing (as needed for wheezing) 4/12/22   Shi Sotomayor MD   Respiratory Therapy Supplies (NEBULIZER/TUBING/MOUTHPIECE) KIT 1 kit by Does not apply route daily as needed (for SOB, cough) 11/13/19   Shi Sotomayor MD   Respiratory Therapy Supplies (NEBULIZER COMPRESSOR) KIT 1 kit by Does not apply route once for 1 dose 11/4/19 10/24/22  Aundrea Sesay MD   lactobacillus (CULTURELLE) capsule Take 1 capsule by mouth 2 times daily (with meals) 1/2/19   Tito Sewell MD   blood glucose monitor strips Use to test daily 11/7/18   Aundrea Sesay MD   blood glucose monitor kit and supplies Use to test daily 11/7/18   Aundrea Sesay MD   Cholecalciferol (VITAMIN D3) 5000 units TABS Take 5,000 Units by mouth daily    Historical Provider, MD   glucose blood VI test strips (EXACTECH TEST) strip 1 each by In Vitro route daily Pt testing BS once daily and prn 11/19/15   Aundrea Sesay MD   Multiple Vitamins-Minerals (THERAPEUTIC MULTIVITAMIN-MINERALS) tablet Take 1 tablet by mouth daily (with breakfast) 6/12/15   Angela Newman MD       Current Medications:   Current Facility-Administered Medications: norepinephrine (LEVOPHED) 16 mg in sodium chloride 0.9 % 250 mL infusion, 1-100 mcg/min, IntraVENous, Continuous  propofol injection, 5-50 mcg/kg/min (Order-Specific), IntraVENous, Continuous  [COMPLETED] amiodarone (CORDARONE) 150 mg in dextrose 5 % 100 mL bolus, 150 mg, IntraVENous, Once **FOLLOWED BY** amiodarone (CORDARONE) 450 mg in dextrose 5 % 250 mL infusion, 1 mg/min, IntraVENous, Continuous **FOLLOWED BY** amiodarone (CORDARONE) 450 mg in dextrose 5 % 250 mL infusion, 0.5 mg/min, IntraVENous, Continuous  lactated ringers bolus, 500 mL, IntraVENous, Once  ondansetron (ZOFRAN-ODT) disintegrating tablet 4 mg, 4 mg, Oral, Q8H PRN **OR** ondansetron (ZOFRAN) injection 4 mg, 4 mg, IntraVENous, Q6H PRN  chlorhexidine (PERIDEX) 0.12 % solution 15 mL, 15 mL, Mouth/Throat, BID    REVIEW OF SYSTEMS:       Unable to obtain d/t intubation and mental status    PHYSICAL EXAM:     BP (!) 137/106   Pulse 78   Temp (!) 91.4 °F (33 °C) (Esophageal)   Resp 24   SpO2 98%     PHYSICAL EXAM:  CONSTITUTIONAL:  Does not open eyes to verbal/painful stimuli.  Does not follow commands. Appears critically ill   HEAD:  normocephalic, atraumatic    EYES:  6mm sluggish reactive BL. Mid gaze fixation.  Does not track   ENT:  moist mucous membranes   NECK:  supple, symmetric   LUNGS:  Equal air entry bilaterally   CARDIOVASCULAR:  normal s1 / s2, RRR, distal pulses intact   ABDOMEN:  Soft, no rigidity   NEUROLOGIC:  Mental Status:  comatose             Cranial Nerves:    No dolls eye reflex  No cough, gag, or corneal reflex  Pupils 6mm sluggishly reactive BL    Motor Exam:                No spontaneous movement of extremities    Sensory:                No movement to painful stimuli in extremities    Plantar Response:  Right:  no response  Left:  no response         LABS AND IMAGING:     RECENT LABS:  CBC with Differential:    Lab Results   Component Value Date/Time    WBC 23.0 12/14/2022 11:49 AM    RBC 4.12 12/14/2022 11:49 AM    HGB 10.4 12/14/2022 11:49 AM    HCT 36.0 12/14/2022 11:49 AM     12/14/2022 11:49 AM    MCV 87.4 12/14/2022 11:49 AM    MCH 25.2 12/14/2022 11:49 AM    MCHC 28.9 12/14/2022 11:49 AM    RDW 17.4 12/14/2022 11:49 AM    NRBC 1 12/14/2022 08:55 AM    LYMPHOPCT 7 12/14/2022 11:49 AM    MONOPCT 3 12/14/2022 11:49 AM    BASOPCT 0 12/14/2022 11:49 AM    MONOSABS 0.69 12/14/2022 11:49 AM    LYMPHSABS 1.61 12/14/2022 11:49 AM    EOSABS 0.00 12/14/2022 11:49 AM    BASOSABS 0.00 12/14/2022 11:49 AM    DIFFTYPE YES 10/13/2022 12:22 PM     BMP:    Lab Results   Component Value Date/Time     12/14/2022 01:38 PM    K 3.8 12/14/2022 01:38 PM     12/14/2022 01:38 PM    CO2 16 12/14/2022 01:38 PM    BUN 49 12/14/2022 01:38 PM    LABALBU 3.1 12/14/2022 11:49 AM    CREATININE 1.51 12/14/2022 01:38 PM    CREATININE 2.12 12/14/2022 10:19 AM    CALCIUM 8.6 12/14/2022 01:38 PM    GFRAA 42 02/12/2021 03:25 PM    LABGLOM 39 12/14/2022 01:38 PM    GLUCOSE 256 12/14/2022 01:38 PM       RADIOLOGY:  XR CHEST PORTABLE   Final Result   Atypical pulmonary edema         CT CHEST PULMONARY EMBOLISM W CONTRAST   Final Result   No evidence of pulmonary embolism. Evidence of CHF, with bilateral pleural   effusions, thickening of the interlobular septae the, and a prominent   cardiomegaly. There is four-chamber enlargement, with significant dilatation   of the left ventricle. There is evidence of tricuspid insufficiency. There   is a mitral valve prosthesis in place. Moderate ascites noted. ET tube and gastric tube are in good position. CT HEAD WO CONTRAST   Final Result   No acute intracranial abnormality. Bilateral maxillary and ethmoid sinusitis. Labs and Images reviewed with:    [] Mohammed S. Manuel Olszewski, MD    [x] Dimitry Franco MD  [] Rani Lorenz MD  --[] there are no new interval images to review. ASSESSMENT AND PLAN:         ASSESSMENT:     This is a 60 y.o. female with PEA attest unknown etiology at this point. Patient with poor neurological exam. Suspect anoxic brain injury with reported downtime and exam    Patient care will be discussed with attending, will reevaluate patient along with attending. PLAN/MEDICAL DECISION MAKING:    - MRI 72 hours  - Neuron specific enolase 24, 48, 72 hours  - LTME  - Neuro checks per protocol    We will continue to follow along. For any changes in exam or patient status please contact Neuro Critical Care.       Moy Vila,   Neuro Critical Care  Pager [phone]  12/14/2022     3:02 PM

## 2022-12-14 NOTE — PROGRESS NOTES
Responded to the Code [employer]. No family showed up. Had prayer at the bedside. Pt intubated. No response.

## 2022-12-14 NOTE — ED NOTES
Dr Ramon Calvo at bedside for [facility] placement.       Stefanie Iglesias RN  12/14/22 [id number]

## 2022-12-14 NOTE — ED NOTES
Jerald Saucedo 60 F  Found down by . Pulseless for EMS with BG of 18. 2 amps given. 3 rounds epi - pulse returned. Again pulseless and abbi in PEA for ED. Given epi and atropine. - pulse returned with rate of 112    Hx of dm    Pressure 99/50, intubated    CXR pending, frothy sputum from airway.       Lexis Gonzalez RN  12/14/22 [id number]

## 2022-12-14 NOTE — RESEARCH
Patient Name: Latosha Villagran  MRN: [id number]  YOB: 1960  Study: ICECAP: Influence of Cooling duration on Efficacy in Cardiac Arrest Patients  Reason for evaluation: Consent      Consent Note    The patient was evaluated for a multicenter, randomized, adaptive allocation clinical trial to identify the optimal duration of induced hypothermia for neuroprotection in comatose survivors of cardiac arrest.      ARIAN #: [id number]  Clinicaltrials.gov: NCT [id number]      [x] Patient met inclusion/exclusion criteria  [x] The LAR was educated on study purpose, risks, and benefits  [x] The LAR read the study consent. All questions were answered. Consent signed by  (LAR) and Dr. Cosette Denver (PI)    Consent clarifications: How was the consent form discussed with the study participant or LAR? Telephone Call    How was the consent form transmitted to the study participant or LAR? Please check which one applies  Fax    How was the consent form sent back to the research team?   Please check which one applies  Fax          Ayleen Lilly RN  Clinical Research Services  [address] [place] [address], [facility] [address]  P: [phone]  F: [phone]    For questions call the research office at [phone] or [facility] Dr. Cosette Denver.

## 2022-12-15 PROBLEM — I47.20 VENTRICULAR TACHYCARDIA: Status: ACTIVE | Noted: 2022-01-01

## 2022-12-15 PROBLEM — I21.4 NSTEMI (NON-ST ELEVATED MYOCARDIAL INFARCTION) (HCC): Status: ACTIVE | Noted: 2022-01-01

## 2022-12-15 NOTE — RESEARCH
Patient Name: Mariano Villanueva  MRN: [id number]  YOB: 1960  Study: ICECAP: Influence of Cooling duration on Efficacy in Cardiac Arrest Patients  Reason for evaluation: Screening/Enrollment      Interval note. We have called update family several times. I have spoken with the clinical care team.  They were also called to update family.  states that he will not have childcare available for another day likely. He anticipates being able to be here by the weekend. Patient's clinical course is proceeding. We will continue to follow. Patient still at 33.5 °C.  Will continue to watch neurologically as patient warms. Patient for MRI to look for brain injury at 72 hours. LTM E in progress. Additional Comments:          Ana Ochoa MD  Clinical Research Services  [address] [place] [address], [address]  P: [phone]  F: [phone]    For questions call the research office at [phone] or [facility].

## 2022-12-15 NOTE — PROGRESS NOTES
Critical Care Team - Daily Progress Note      Date and time: 12/15/2022 7:34 AM  Patient's name:  Yvonne Meehan  Medical Record Number: [id number]  Patient's account/billing number: [de-identified]  Patient's YOB: 1960  Age: 60 y.o. Date of Admission: 12/14/2022 10:09 AM  Length of stay during current admission: 1      Primary Care Physician: Leon Gutierrez MD  ICU Attending Physician: Dr. Elizabeth Shay Status: Full Code    Reason for ICU admission:   Chief Complaint   Patient presents with    Cardiac Arrest     Yvonne Meehan is a 60 y.o. with PMH of   - CAD, s/p CABG in 2015  - Ischemic cardiomyopathy, last EF of 25%  - Insulin-dependent diabetes mellitus type 2  - CKD     Presented to [facility] as a transfer from [facility]. According to the chart review patient was found unresponsive by the  as per EMS. Unknown downtime. Per EMS report no CPR performed. EMS intubated the patient esophageally, prior to arrival at [facility], reintubation was done and then patient's glucose was found to be 18. At [facility] found to be in PEA arrest achieved ROSC needed 6 mg epinephrine, received multiple pushes of Versed and fentanyl for posturing and seizure-like activity. On arrival to our [facility] ER patient was unresponsive. Fixed and dilated pupils, posturing and on propofol infusion. EKG in the ED showed new right bundle branch block then due to concern for PE, CT PE was done. Bedside ultrasound did not show any signs of tamponade. CT negative for any PE. Around 10:25 AM there was an episode of pulselessness and bradycardia and PEA was given at epinephrine and troponin.   Cooling was started around 11:28 AM with target temperature of 38 Celsius     Initial vitals in the ED temperature around 35, heart rate 106, respiratory 22, pressure 124/108     CT head negative for any acute intracranial abnormality  CT chest showed pleural effusions and cardiomegaly     Initial labs -   WBC 18.1, hemoglobin 9.9, platelets 952  Glucose 457  Sodium 132, potassium 4.4, creatinine 1.5, bicarb 16  Albumin 2.7 AST 49, ALT 36  Troponin 49 > 107  proBNP 24 580  Lactic acid 5.5  UA negative for any UTI     Initial ABG - 7.04/51 point 7/401/14 point  In the ED there was a concern for PVCs/tachycardia patient was started on amiodarone infusion. Last echo in 2022 showed EF of 25%, LA/LV/RA/RV dilated, ICD in place, moderate to severe pulmonary hypertension      SUBJECTIVE:     OVERNIGHT EVENTS:           No acute overnight events. Sedated on propofol at 15mcg/kg/min overnight for vent dyssynchrony, off this AM  Currently being actively cooled per research protocol. Temp 33.2. On levophed at 6mcg/min. Amiodarone restarted for increased PAC/PVCs overnight    On PRVC at 22/500/8/50%. AB.27/41/86.8/18. 9.    UOP: 1.6L since admit last night. Net +126.7cc. Labs:    Na 135 (133) / K 3.9 (3.9)   BUN 57 (55) / Cr 1.94 (1.84)   Ical 1.15  Mag 2.1  Phos 6.8  LA 2.2 (2.1)    WBC 18.3 (23)  HgB 10.4 (10.4) / Hct 33.9 (36)  Plts 273 (336)    Cardiology following -- amio gtt d/c'ed, started ASA, statin, ECHO pending. [place] following -- on LTME, MRI brain w/ and w/o in 72 hours.     AWAKE & FOLLOWING COMMANDS:  [x] No   [] Yes    CURRENT VENTILATION STATUS:     [x] Ventilator  [] BIPAP  [] Nasal Cannula [] Room Air      IF INTUBATED, ET TUBE MARKING AT LOWER LIP:       cms    SECRETIONS Amount:  [] Small [] Moderate  [] Large  [x] None  Color:     [] White [] Colored  [] Bloody    SEDATION:  RAAS Score:  [] Propofol gtt  [] Versed gtt  [] Ativan gtt   [x] No Sedation    PARALYZED:  [x] No    [] Yes    DIARRHEA:                [x] No                [] Yes  (C. Difficile status: [] positive                                                                                                                       [] negative [] pending)    VASOPRESSORS:  [] No    [x] Yes    If yes -   [x] Levophed       [] Dopamine     [] Vasopressin       [] Dobutamine  [] Phenylephrine         [] Epinephrine    CENTRAL LINES:     [] No   [x] Yes   (Date of Insertion:  22  )           If yes -     [] Right IJ     [] Left IJ [x] Right Femoral [] Left Femoral                   [] Right Subclavian [] Left Subclavian       DYER'S CATHETER:   [] No   [x] Yes  (Date of Insertion:  22  )     URINE OUTPUT:            [] Good   [x] Low              [] Anuric      OBJECTIVE:     VITAL SIGNS:  BP (!) 80/59   Pulse 61   Temp (!) 91.8 °F (33.2 °C)   Resp 21   Wt 173 lb 1 oz (78.5 kg)   SpO2 100%   BMI 27.93 kg/m²   Tmax over 24 hours:  Temp (24hrs), Av.9 °F (33.3 °C), Min:91.2 °F (32.9 °C), Max:95.7 °F (35.4 °C)      Patient Vitals for the past 8 hrs:   BP Temp Temp src Pulse Resp SpO2 Weight   12/15/22 0715 -- -- -- 61 21 100 % --   12/15/22 0700 -- -- -- 60 23 100 % --   12/15/22 0645 -- -- -- 60 22 100 % --   12/15/22 0630 -- -- -- 60 22 100 % --   12/15/22 0615 -- -- -- 60 22 100 % --   12/15/22 0600 (!) 80/59 (!) 91.8 °F (33.2 °C) -- 58 22 100 % 173 lb 1 oz (78.5 kg)   12/15/22 0545 -- -- -- 58 22 100 % --   12/15/22 0530 -- -- -- 59 22 100 % --   12/15/22 0515 -- -- -- 59 22 100 % --   12/15/22 0500 -- (!) 91.8 °F (33.2 °C) -- 59 18 100 % --   12/15/22 0445 -- -- -- 61 16 100 % --   12/15/22 0430 -- -- -- 63 23 98 % --   12/15/22 0417 -- -- -- 60 24 100 % --   12/15/22 0415 -- -- -- 60 20 100 % --   12/15/22 0400 (!) 93/58 (!) 91.8 °F (33.2 °C) Bladder 60 20 100 % --   12/15/22 0345 -- -- -- 60 20 99 % --   12/15/22 0341 -- -- -- 59 20 98 % --   12/15/22 0330 -- -- -- 60 19 98 % --   12/15/22 0315 -- -- -- 63 22 100 % --   12/15/22 0300 -- -- -- 60 22 99 % --   12/15/22 0245 -- -- -- 62 18 99 % --   12/15/22 0230 -- -- -- 61 18 99 % --   12/15/22 0215 -- -- -- 60 22 99 % --   12/15/22 0200 -- (!) 91.8 °F (33.2 °C) -- 62 22 98 % --   12/15/22 0145 -- -- -- 60 19 99 % --   12/15/22 0130 -- -- -- 61 22 99 % --   12/15/22 0115 -- (!) 91.8 °F (33.2 °C) -- 61 22 98 % --   12/15/22 0100 -- (!) 91.8 °F (33.2 °C) -- 61 22 100 % --   12/15/22 0045 -- -- -- 61 22 98 % --   12/15/22 0030 -- -- -- 61 20 100 % --   12/15/22 0015 -- -- -- 64 22 99 % --   12/15/22 0000 105/70 (!) 91.8 °F (33.2 °C) Bladder 64 22 99 % --   12/14/22 2345 -- -- -- 64 22 99 % --         Intake/Output Summary (Last 24 hours) at 12/15/2022 0734  Last data filed at 12/15/2022 0600  Gross per 24 hour   Intake 1878.7 ml   Output 1752 ml   Net 126.7 ml     Date 12/15/22 0000 - 12/15/22 2359   Shift 1786-9695 5731-1460 2644-6873 24 Hour Total   INTAKE   I.V.(mL/kg) 386.2(4.9)   386.2(4.9)   IV Piggyback(mL/kg) 50(0.6)   50(0.6)   Shift Total(mL/kg) 436.1(5.6)   436.1(5.6)   OUTPUT   Urine(mL/kg/hr) 147   147   Emesis/NG output(mL/kg) 0(0)   0(0)   Shift Total(mL/kg) 147(1.9)   147(1.9)   Weight (kg) 78.5 78.5 78.5 78.5     Wt Readings from Last 3 Encounters:   12/15/22 173 lb 1 oz (78.5 kg)   10/24/22 165 lb 9.6 oz (75.1 kg)   10/24/22 165 lb (74.8 kg)     Body mass index is 27.93 kg/m². PHYSICAL EXAM:  Constitutional: intubated, off sedation at this time  EENT: PERRLA, EOMI, sclera clear, anicteric, oropharynx clear, no lesions, neck supple with midline trachea. Neck: Supple, symmetrical, trachea midline, no adenopathy, thyroid symmetric, no jvd skin normal  Respiratory: clear to auscultation, no wheezes or rales and unlabored breathing.  No intercostal tenderness  Cardiovascular: regular rate and rhythm, intermittent PVCs/PACs, normal S1, S2, no murmur noted and 2+ pulses throughout  Abdomen: soft, nontender, nondistended, no masses or organomegaly  NEUROLOGIC: decerebrate posturing on painful sitmiuli, pupils equal and reactive bilaterally  Extremities:  peripheral pulses normal, no pedal edema, no clubbing or cyanosis  SKIN: normal coloration and turgor    Any additional physical findings:      MEDICATIONS:  Scheduled Meds:   sodium chloride flush  5-40 mL IntraVENous 2 times per day    chlorhexidine  15 mL Mouth/Throat BID    piperacillin-tazobactam  3,375 mg IntraVENous Q8H    insulin lispro  0-8 Units SubCUTAneous Q4H    atorvastatin  80 mg Oral Nightly    carvedilol  3.125 mg Oral BID WC    clopidogrel  75 mg Oral Daily     Continuous Infusions:   norepinephrine 6 mcg/min (12/15/22 0620)    propofol 15 mcg/kg/min (12/15/22 0600)    sodium chloride 10 mL/hr at 12/15/22 0601    dextrose      amiodarone 450mg/250ml D5W infusion 0.5 mg/min (12/15/22 0600)     PRN Meds:   sodium chloride flush, 5-40 mL, PRN  sodium chloride, , PRN  ondansetron, 4 mg, Q8H PRN   Or  ondansetron, 4 mg, Q6H PRN  polyethylene glycol, 17 g, Daily PRN  acetaminophen, 650 mg, Q6H PRN   Or  acetaminophen, 650 mg, Q6H PRN  ondansetron, 4 mg, Q8H PRN   Or  ondansetron, 4 mg, Q6H PRN  glucose, 4 tablet, PRN  dextrose bolus, 125 mL, PRN   Or  dextrose bolus, 250 mL, PRN  glucagon (rDNA), 1 mg, PRN  dextrose, , Continuous PRN  albuterol, 2.5 mg, As Directed RT PRN  ipratropium, 0.5 mg, As Directed RT PRN  acetylcysteine, 600 mg, As Directed RT PRN        SUPPORT DEVICES: [] Ventilator [] BIPAP  [] Nasal Cannula [] Room Air    VENT SETTINGS (Comprehensive) (if applicable):  Vent Information  Ventilator ID: [id number]  Equipment Changed: HME, Expiratory Filter  Ventilator Initiate: Yes  Additional Respiratory Assessments  Heart Rate: 61  Resp: 21  SpO2: 100 %  End Tidal CO2: 25 (%)  Position: Semi-Simon's  Humidification Source: HME  Skin Barrier Applied: No    ABGs:   Lab Results   Component Value Date/Time    LMJ2VUN 17 08/24/2018 05:02 AM    FIO2 50.0 12/15/2022 04:18 AM     Lactic Acid:   Lab Results   Component Value Date/Time    LACTA 1.4 10/13/2022 12:22 PM    LACTA 1.7 12/29/2018 11:57 AM    LACTA 1.5 12/29/2018 05:30 AM         DATA:  Complete Blood Count:   Recent Labs     12/14/22  0855 12/14/22  1149 12/15/22  0556   WBC 18.1* 23.0* 18.3*   HGB 9.9* 10.4* 10.4*   MCV 80.8 87.4 82.7    336 273   RBC 3.90* 4.12 4.10   HCT 31.5* 36.0* 33.9*   MCH 25.3* 25.2 25.4   MCHC 31.3 28.9 30.7   RDW 19.1* 17.4* 17.3*   MPV  --  11.0 11.2        PT/INR:    Lab Results   Component Value Date/Time    PROTIME 13.7 12/14/2022 01:38 PM    INR 1.3 12/14/2022 01:38 PM     PTT:    Lab Results   Component Value Date/Time    APTT 26.0 12/14/2022 01:38 PM       Basal Metabolic Profile:   Recent Labs     12/14/22  1707 12/15/22  0122 12/15/22  0556   * 133* 135   K 3.8 3.9 3.9   BUN 49* 55* 57*   CREATININE 1.56* 1.84* 1.94*    100 101   CO2 17* 15* 17*      Magnesium:   Lab Results   Component Value Date/Time    MG 2.1 12/15/2022 05:56 AM    MG 2.1 12/15/2022 01:22 AM    MG 1.9 12/14/2022 05:07 PM     Phosphorus:   Lab Results   Component Value Date/Time    PHOS 6.8 12/15/2022 05:56 AM    PHOS 6.7 12/15/2022 01:22 AM    PHOS 5.3 12/14/2022 05:07 PM     S. Calcium:  Recent Labs     12/15/22  0556   CALCIUM 8.4*     S. Ionized Calcium:No results for input(s): IONCA in the last 72 hours. Urinalysis:   Lab Results   Component Value Date/Time    NITRU NEGATIVE 12/14/2022 12:26 PM    COLORU Yellow 12/14/2022 12:26 PM    PHUR 6.0 12/14/2022 12:26 PM    WBCUA 50  12/14/2022 12:26 PM    RBCUA 0 TO 2 12/14/2022 12:26 PM    MUCUS NOT REPORTED 11/21/2018 12:12 PM    TRICHOMONAS NOT REPORTED 11/21/2018 12:12 PM    YEAST FEW 12/14/2022 12:26 PM    BACTERIA RARE 12/14/2022 09:30 AM    SPECGRAV 1.018 12/14/2022 12:26 PM    LEUKOCYTESUR NEGATIVE 12/14/2022 12:26 PM    UROBILINOGEN Normal 12/14/2022 12:26 PM    BILIRUBINUR NEGATIVE 12/14/2022 12:26 PM    GLUCOSEU 1+ 12/14/2022 12:26 PM    KETUA NEGATIVE 12/14/2022 12:26 PM    AMORPHOUS TRACE 11/21/2018 12:12 PM       CARDIAC ENZYMES: No results for input(s): CKMB, CKMBINDEX, TROPONINI in the last 72 hours.     Invalid input(s): CKTOTAL;3  BNP: No results for input(s): BNP in the last 72 hours. LFTS  Recent Labs     12/14/22  0855 12/14/22  1149   ALKPHOS 88 128*   ALT 36* 86*   AST 49* 138*   BILITOT 0.8 1.2   LABALBU 2.7* 3.1*       AMYLASE/LIPASE/AMMONIA  No results for input(s): AMYLASE, LIPASE, AMMONIA in the last 72 hours. Last 3 Blood Glucose:   Recent Labs     12/14/22  0855 12/14/22  1149 12/14/22  1338 12/14/22  1707 12/15/22  0122 12/15/22  0556   GLUCOSE 457* 221* 256* 268* 214* 172*      HgBA1c:    Lab Results   Component Value Date/Time    LABA1C 8.1 10/14/2022 10:23 AM         TSH:    Lab Results   Component Value Date/Time    TSH 1.66 10/14/2022 10:23 AM     ANEMIA STUDIES  No results for input(s): LABIRON, TIBC, FERRITIN, TKMBGHEI51, FOLATE, OCCULTBLD in the last 72 hours. Cultures during this admission:     Blood cultures:                 [] None drawn      [] Negative             []  Positive (Details:  )  Urine Culture:                   [] None drawn      [] Negative             []  Positive (Details:  )  Sputum Culture:               [] None drawn       [] Negative             []  Positive (Details:  )   Endotracheal aspirate:     [] None drawn       [] Negative             []  Positive (Details:  )       Chest Xray (12/15/2022):    ASSESSMENT:     Principal Problem:    Cardiac arrest ([facility] [address]  Active Problems:    Acute on chronic systolic congestive heart failure ([address]    Acute respiratory failure with hypoxia and hypercapnia ([address]    Pulmonary hypertension ([address]    Diabetes mellitus with kidney disease ([facility] [address]  Resolved Problems:    * No resolved hospital problems. *       PLAN:     Neuro  - Neurocritical care following for neuroprognositication    - LTME  - MRI at 72 hours  - Sedated on propofol at 15mcg/kg/min overnight for vent dyssynchrony   - Currently being actively cooled per research protocol. Temp 33.2.     CV  - hx CABG with mitral valve repair on 5/28/2015   - HFrEF with EF of 25%, s/p AICD -- ?nonfunctional  - Hx HTN -- holding antiHTN meds in setting of hemodynamic instability  - On levophed at 6mcg/min  - Cardiology following, appreciated recommendations  - Amio gtt initially discontinued but then restarted overnight for frequent PVCs/PACs  - cont lipitor, plavix    Resp  - On PRVC at 22/500/8/50%. - AB.27/41/86.8/18. 9. GI  - Diet NPO    /Renal  - CKD stage IIIb with creatinine of 1.51  around baseline  - CHAD on CKD  - Na 135 (133) / K 3.9 (3.9)   - BUN 57 (55) / Cr 1.94 (1.84)   - Garcia in place  - UOP: 1.6L since admit last night. - Net +126.7cc. Heme  - HgB 10.4 (10.4) / Hct 33.9 (36)  - Plts 273 (336)    ID  - WBC 18.3 (23)  - On Zosyn for sepsis of unknown origin  - urine cx NGTD  - Bcx NGTD  - MRSA swab pending    ENDO  - SSI    DVT ppx: SQH  GI ppx: Pepcid BID  Diet:  Diet NPO      Mir Shipman DO,  Department of Internal Medicine/ Critical care  [facility])             12/15/2022, 7:34 AM     Attending Physician Statement  I have discussed the care of Reyna Ramirez, including pertinent history and exam findings with the resident. I have reviewed the key elements of all parts of the encounter with the resident. I have seen and examined the patient with the resident. I agree with the assessment and plan and status of the problem list as documented. I seen the patient during my round today overnight events noted. Patient was seen by neurology yesterday started on LTM E which is consistent with severe diffuse encephalopathy. Patient had been on amiodarone drip which was started yesterday was taken off and then was restarted as she was having bigeminy/PVCs followed by cardiology. Patient had not been on sedation did not require paralytic and she had been on cooling protocol currently rewarmed. She does have a spontaneous respiration pupils are sluggishly reactive no corneal reflexes present.   Urine output is 1.6 L worsening creatinine 2.14 Lasix is 20 mg twice daily.  Echocardiogram is not done so far. Ventilator setting PRVC/22/500/8/50 percent ABG 7.3 0/42/75/18. Seen by nephrology we will follow-up with cardiology and neurology with neuro prognostication once rewarming is completed. Continue with LTM EEG, continue with ventilatory support and other supportive care at this time. Discussed with nursing staff, treatment and plan discussed. Discussed with respiratory therapist.    Total critical care time caring for this patient with life threatening, unstable organ failure, including direct patient contact, management of life support systems, review of data including imaging and labs, discussions with other team members and physicians at least 35 min so far today, excluding procedures. Please note that this chart was generated using voice recognition Dragon dictation software. Although every effort was made to ensure the accuracy of this automated transcription, some errors in transcription may have occurred.      Nadir Michaud MD  12/15/2022 1:17 PM

## 2022-12-15 NOTE — CARE COORDINATION
Transition planning:  Pt is intubated no family at bedside at this time. Per 's note, pt's  has not called this hospital yet today. Informed by Dr. Nelsy Aguilar pt and her  care for their grandchildren ages 4-15 y.o. Initial d/c plan assessment deferred at this time. Pt with PEA cardiac arrest, down for approximately 30 min. Monitor progress and follow along.

## 2022-12-15 NOTE — PROGRESS NOTES
Comprehensive Nutrition Assessment    Type and Reason for Visit:  Initial    Nutrition Recommendations/Plan:   Start nutrition as able; if TF warranted, suggest Renal Formula goal 45 mL/hr to provide 1944 kcal and 87 g pro/day. Will follow/monitor pt care plans. Malnutrition Assessment:  Malnutrition Status:  Insufficient data (12/15/22 1445)      Nutrition Assessment:    Pt admitted s/p cardiac arrest. Pt is currently intubated and on hypothermia protocol. No nutrition support at present. Labs reviewed: Phos 7.7 mg/dL, BUN 64 mg/dL, CR 2.14 mg/dL, Na 135 mmol/L, Glu 137-158 mg/dL. Meds reviewed. Nutrition Related Findings:    meds/labs reviewed Wound Type: None       Current Nutrition Intake & Therapies:    Diet NPO    Additional Calorie Sources:  none at present    Anthropometric Measures:  Height: 5' 6\" (167.6 cm)  Ideal Body Weight (IBW): 130 lbs (59 kg)    Admission Body Weight: 173 lb 1 oz (78.5 kg)  Current Body Weight: 173 lb 1 oz (78.5 kg), 133.1 % IBW. Weight Source: Bed Scale  Current BMI (kg/m2): 27.9                          BMI Categories: Overweight (BMI 25.0-29. 9)    Estimated Daily Nutrient Needs:  Energy Requirements Based On: Kcal/kg  Weight Used for Energy Requirements: Current  Energy (kcal/day): 3980-1765 kcal/day  Weight Used for Protein Requirements: Ideal  Protein (g/day): 90 g pro/day  Method Used for Fluid Requirements: Other (Comment)  Fluid (ml/day): per MD    Nutrition Diagnosis:   Inadequate oral intake related to impaired respiratory function as evidenced by NPO or clear liquid status due to medical condition    Nutrition Interventions:   Food and/or Nutrient Delivery: Start nutrition as able; if TF warranted, suggest Renal Formula goal 45 mL/hr to provide 1944 kcal and 87 g pro/day.   Nutrition Education/Counseling: No recommendation at this time  Coordination of Nutrition Care: Continue to monitor while inpatient       Goals:     Goals: Meet at least 75% of estimated needs, within 7 days       Nutrition Monitoring and Evaluation:   Behavioral-Environmental Outcomes: None Identified  Food/Nutrient Intake Outcomes: Diet Advancement/Tolerance  Physical Signs/Symptoms Outcomes: Biochemical Data, Fluid Status or Edema, Hemodynamic Status, Nutrition Focused Physical Findings, Weight, Skin    Discharge Planning:     Too soon to determine     [address], [place]  Contact: [phone]

## 2022-12-15 NOTE — PROGRESS NOTES
[facility]   Progress Note                   Date:   12/15/2022  Patient name: Wilder Pallas  Date of admission:  12/14/2022 10:09 AM  MRN:   [id number]  YOB: 1960  PCP: Shi Sotomayor MD    Reason for Admission:      Subjective:       Ckd stage 3   On levo at 6  Amio was dced but then patient was throing [facility] na dpvc so was restarted   Decerebrate posturing with pinching   Intubated sedated   On hypothermia protocol      Medications:   Scheduled Meds:   sodium chloride flush  5-40 mL IntraVENous 2 times per day    chlorhexidine  15 mL Mouth/Throat BID    piperacillin-tazobactam  3,375 mg IntraVENous Q8H    insulin lispro  0-8 Units SubCUTAneous Q4H    atorvastatin  80 mg Oral Nightly    carvedilol  3.125 mg Oral BID WC    clopidogrel  75 mg Oral Daily       Continuous Infusions:   norepinephrine 6 mcg/min (12/15/22 0620)    propofol 15 mcg/kg/min (12/15/22 0600)    sodium chloride 10 mL/hr at 12/15/22 0601    dextrose      amiodarone 450mg/250ml D5W infusion 0.5 mg/min (12/15/22 0600)       CBC:   Recent Labs     12/14/22  0855 12/14/22  1149 12/15/22  0556   WBC 18.1* 23.0* 18.3*   HGB 9.9* 10.4* 10.4*    336 273     BMP:    Recent Labs     12/14/22  1707 12/15/22  0122 12/15/22  0556   * 133* 135   K 3.8 3.9 3.9    100 101   CO2 17* 15* 17*   BUN 49* 55* 57*   CREATININE 1.56* 1.84* 1.94*   GLUCOSE 268* 214* 172*     Hepatic:   Recent Labs     12/14/22  0855 12/14/22  1149   AST 49* 138*   ALT 36* 86*   BILITOT 0.8 1.2   ALKPHOS 88 128*     Troponin: No results for input(s): TROPONINI in the last 72 hours. BNP: No results for input(s): BNP in the last 72 hours. Lipids: No results for input(s): CHOL, HDL in the last 72 hours.     Invalid input(s): LDLCALCU  INR:   Recent Labs     12/14/22  1149 12/14/22  1338   INR 1.4 1.3       Objective:   Vitals: BP (!) 80/59   Pulse 61   Temp (!) 91.8 °F (33.2 °C)   Resp 21   Wt 173 lb 1 oz (78.5 kg)   SpO2 100% BMI 27.93 kg/m²     General appearance:intubated and sedated   HEENT: Head: Normocephalic, no lesions, without obvious abnormality  Neck: no JVD  Lungs: clear to auscultation bilaterally, no basilar rales, no wheezing   Heart: regular rate and rhythm, S1, S2 normal, no murmur, click, rub or gallop  Abdomen: soft, non-tender; bowel sounds normal  Extremities: No LE edema  Neurologic: Mental status: Alert, oriented. Motor and sensory not done. Echocardiogram:  ECHOIn summary, she has enlarged LV with severe LV dysfunction with EF 25%  Mild MR with overall a good result from MR repair. Moderate to mod-severe pulmonary hypertension with PAP in 40-50 mmHg range  Mod-severe TR  There has been a significant change in LV function with EF decreasing from  45% to 25%. Coronary Angiography:     LMCA: Normal 0% stenosis.      LAD: 100% mid stenosis  LIMA - LAD is patent with 10-20% stenosis  SVG - D is patent with 10-20% stenosis     LCx: Mid 50% stenosis     RCA: 100% proximal stenosis  SVG - PDA is patent with 10-20% stenosis      Coronary Tree    Assessment:       Patient Active Problem List:     Acute coronary syndrome (HCC)     S/P cardiac cath-Patent grafts 6/5/15     Onychomycosis of toenail     CAD (coronary artery disease)     Hx of CABG     ICD (implantable cardioverter-defibrillator) in place     H/O mitral valve repair     Hx of myocardial infarction     Hypertension     DM (diabetes mellitus), type 2 with neurological complications ([facility])     Anxiety     CHAD (acute kidney injury) ([address]     Elevated serum immunoglobulin free light chains     Ureteral stone with hydronephrosis     Dyslipidemia     Pneumonia     Acute systolic CHF (congestive heart failure) ([facility])     Congestive heart failure due to cardiomyopathy (HCC)     CHF (congestive heart failure), NYHA class I, acute on chronic, combined ([address]     Acute on chronic systolic congestive heart failure ([facility])     Stage 3b chronic kidney disease [facility])     Ischemic cardiomyopathy     Acute respiratory failure with hypoxia and hypercapnia (HCC)     Cardiac arrest ([facility]     Pulmonary hypertension (HCC)     Diabetes mellitus with kidney disease ([facility]    MV CAD s/p CABG x 3 in 2015 (LIMA-ALD, SVG-D, SVG-RCA), Chronic HFrEF secondary to ICMP, last LVEF 25% in 10/2022, single chamber ICD ([place]) in situ, CKD and multiple other co-morbidities admitted after cardiac arrest at home. Initial rhythm PEA/Asystole. Prolonged downtime > 30 mins as per hx. Post ROSC ecg showed NSR with RAD and NS t wave abnormality. Evidence of anoxic virgil injury on clinical examination. Currently on low dose levophed. In sinus rhythm. Hypothermia protocol started. Treatment Plan:   PEA Arrest with unknown downtime rosc achieved after 6 mg of epi   Hypoglycemia   H/o CABG/MV REPAIR 5/28/15: LIMA-LAD, SVG-PDA, SVG-D. MV repair  H/o ICM with ef around 25 % s/p AICD which is nonfunctional??? Hypertension  Transaminitis secondary to ischemia  CKD stage IIIb with creatinine of 1.51  around baseline       Amio drip restarted because of PAC and PVC    Will start aspirin,statin. Heparin if trops trending up. Follow next trop    Follow icd  interrogation ,echo and will follow. Wean pressor as tolerated  Neuro critical care recommendation appreciated. Will decide about cath after meaningful neurological recovery and echo result. Renetta Rosales MD [id number]  [id number] [facility]   [facility]     I performed a history and physical examination of the patient and discussed management with the resident. I reviewed the residents note and agree with the documented findings and plan of care. Any areas of disagreement are noted on the chart. I was personally present for the key portions of any procedures. I have documented in the chart those procedures where I was not present during the key portions.  I have personally evaluated this patient and have completed at least one if not all key elements of the E/M (history, physical exam, and MDM). Additional findings are as noted. TTE pending. Will decide on further cardiac evaluation after meaningful neuro recovery.     Maine Thomas MD

## 2022-12-15 NOTE — CONSULTS
NEPHROLOGY     REASON FOR CONSULT:     CHAD (BUN/Creat 57/1.94          with baseline creatinine   1.4-1.6             )  Metabolic Acidosis    Risk Factors for CHAD:  #1 contrast exposure #2 cardiac arrest #3 hypertension #4 CKD stage III    ASSESSMENT     Transferred from [facility] where she presented when found down by her  and subsequently noted to be hypoglycemic cardiac arrest.  Initiated therapeutic hypothermia protocol further investigations revealed elevated creatinine initiating nephrology consultation. #1 acute kidney injury nonoliguric secondary to ischemic (cardiac arrest/shock) and nephrotoxic (contrast) ATN -evolving  #2 high nongap metabolic acidosis secondary to CHAD/hypoperfusion  #3 chronic kidney disease stage IIIb secondary to diabetic/ischemic nephrosclerosis with baseline creatinine 1.4-1.6. Serologies unremarkable. Asymmetric kidney size. Minimal proteinuria. #4 s/p cardiac arrest-multiple times on therapeutic hypothermia protocol  #5 cardiogenic shock on pressors  #6 decompensated systolic congestive heart failure  #7 acute hypoxic respiratory failure  #8 history of CABG/MVR with EF 25%  #9 pulmonary hypertension/severe tricuspid regurgitation  #10 type 2 diabetes     PLAN     #1 low-dose IV Lasix  #2 continue mechanical ventilation/therapeutic hypothermic protocol   and pressors  #3 prognosis guarded    HISTORY OF PRESENTING ILLNESS                 This is a 60 y.o. female who is a transfer from [facility] status post cardiac arrest.  She was brought by EMS to [facility] ER when  found her to be down and not responding. No CPR was initiated for around 30 minutes and then EMS arrived. Found to be in PEA arrest and cardiac resuscitation was initiated. ROSC was achieved. At the same time she was also noted to be hypoglycemic. She was transferred to ER where she was found to be unresponsive with fixed and dilated pupils.   CT chest was negative for pulmonary embolism. Again had a cardiac arrest and ROSC was achieved. CT head was negative for any acute intracranial abnormality. CT chest showed pleural effusions and cardiomegaly with no pulmonary embolism. Patient has a history of CABG with ejection fraction 25% with severe tricuspid regurgitation. Currently patient is intubated mechanically ventilated/on therapeutic hypothermia protocol along with pressor support. Nephrology is consulted for elevated creatinine of 1.94. Review of data reveals that her creatinine usually runs in the range of 1.4-1.6. Etiology diabetic / ischemic  nephrosclerosis. Serologies unremarkable. Minimal proteinuria. Asymmetric kidney size. Has been seen in the past in the month of October when she presented with acute kidney injury creatinine 1.9 secondary to cardiorenal syndrome type I. Never followed up as an outpatient.     Urine output sluggish  Present history of contrast exposure  Present history of hypotension  No history of NSAID/ACE/ARB/nephrotoxic agents  No history suggestive of obstruction  No history of nausea/vomiting/diarrhoea  Present history of CHAD in past  No history of recurrent UTI infection/surgeries to KUB        PAST MEDICAL HISTORY         Diagnosis Date    CAD (coronary artery disease)     Cardiomyopathy ([address]     COPD (chronic obstructive pulmonary disease) ([address]     Depression     Diabetes mellitus ([address]     H/O mitral valve repair     History of fractured kneecap 2017    right    Hx of CABG     Hx of myocardial infarction     Hyperlipidemia     Hypertension     ICD (implantable cardioverter-defibrillator) in place     Kidney calculi     Osteoarthritis     Patient in clinical research study 12/14/2022    Pneumonia     Renal calculi        PAST SURGICAL HISTORY          Procedure Laterality Date    ANKLE SURGERY Right 2009    plate and screws     CARDIAC DEFIBRILLATOR PLACEMENT  06/01/15    CHOLECYSTECTOMY  1990    CORONARY ARTERY BYPASS GRAFT 05/28/15    X 3- Dr Scot Farnsworth CABELLO-LAD<SVG-Diag, SVG-PDA, MV Repair with 30 min CE IMR ring    CYSTOSCOPY Left 11/08/2018    HYSTERECTOMY (CERVIX STATUS UNKNOWN)  1990    KIDNEY STONE SURGERY Left 2010    MITRAL VALVE SURGERY  6/4/15    MD CYSTO/URETERO W/LITHOTRIPSY &INDWELL STENT INSRT Left 11/8/2018    CYSTOSCOPY URETEROSCOPY performed by Emilee Joshi MD at [address] [place] [address]  &BANDAR STENT INSRT Left 11/8/2018    CYSTOSCOPY STENT INSERTION performed by Emilee Joshi MD at [address] [place] [address] &INDWELL STENT INSRT Left 11/15/2018    LEFT CYSTOSCOPY URETEROSCOPY LASER,HLL, LEFT STENT EXCHANGE performed by Emilee Joshi MD at [address]:                Medications Prior to Admission: furosemide (LASIX) 40 MG tablet, Take 1 tablet by mouth in the morning and 1 tablet in the evening. clopidogrel (PLAVIX) 75 MG tablet, Take 1 tab daily  atorvastatin (LIPITOR) 80 MG tablet, 1 tab daily  carvedilol (COREG) 3.125 MG tablet, take 1 tablet by mouth twice a day with meals  gabapentin (NEURONTIN) 400 MG capsule, Take 1 capsule by mouth 3 times daily for 180 days. midodrine (PROAMATINE) 5 MG tablet, take 1 tablet by mouth three times a day  sertraline (ZOLOFT) 100 MG tablet, Take one tablet by mouth once daily  insulin glargine (LANTUS SOLOSTAR) 100 UNIT/ML injection pen, INJECT 45 UNITS SUBCUTANEOUSLY EVERY MORNING AND 25 UNITS EVERY EVENING  Insulin Pen Needle (BD PEN NEEDLE PAIGE 2ND GEN) 32G X 4 MM MISC, use 1 PEN NEEDLE to inject MEDICATION subcutaneously daily  Lancets MISC, Pt testing BS once daily and prn  glucose monitoring (FREESTYLE) kit, Please dispense meter covered by patients insurance. blood glucose monitor strips, Testing BS once daily and as needed.  Dispense strips to go with new glucometer  albuterol (PROVENTIL) (2.5 MG/3ML) 0.083% nebulizer solution, Take 3 mLs by nebulization every 6 hours as needed for Wheezing (as needed for wheezing)  Respiratory Therapy Supplies (NEBULIZER/TUBING/MOUTHPIECE) KIT, 1 kit by Does not apply route daily as needed (for SOB, cough)  Respiratory Therapy Supplies (NEBULIZER COMPRESSOR) KIT, 1 kit by Does not apply route once for 1 dose  lactobacillus (CULTURELLE) capsule, Take 1 capsule by mouth 2 times daily (with meals)  blood glucose monitor strips, Use to test daily  blood glucose monitor kit and supplies, Use to test daily  Cholecalciferol (VITAMIN D3) 5000 units TABS, Take 5,000 Units by mouth daily  glucose blood VI test strips (EXACTECH TEST) strip, 1 each by In Vitro route daily Pt testing BS once daily and prn  Multiple Vitamins-Minerals (THERAPEUTIC MULTIVITAMIN-MINERALS) tablet, Take 1 tablet by mouth daily (with breakfast)  Scheduled Meds:    heparin (porcine)  5,000 Units SubCUTAneous 3 times per day    famotidine (PEPCID) injection  20 mg IntraVENous Daily    sodium chloride flush  5-40 mL IntraVENous 2 times per day    chlorhexidine  15 mL Mouth/Throat BID    piperacillin-tazobactam  3,375 mg IntraVENous Q8H    insulin lispro  0-8 Units SubCUTAneous Q4H    atorvastatin  80 mg Oral Nightly    [Held by provider] carvedilol  3.125 mg Oral BID WC    clopidogrel  75 mg Oral Daily     Continuous Infusions:    norepinephrine 4 mcg/min (12/15/22 0911)    propofol Stopped (12/15/22 0745)    sodium chloride Stopped (12/15/22 0830)    dextrose      amiodarone 450mg/250ml D5W infusion 0.5 mg/min (12/15/22 0911)     PRN Meds:  sodium chloride flush, sodium chloride, ondansetron **OR** ondansetron, polyethylene glycol, acetaminophen **OR** acetaminophen, ondansetron **OR** ondansetron, glucose, dextrose bolus **OR** dextrose bolus, glucagon (rDNA), dextrose, albuterol, ipratropium, acetylcysteine    ALLERGY     Codeine and Nsaids       SOCIAL HISTORY     Social History     Socioeconomic History    Marital status:      Spouse name: Not on file    Number of children: Not on file    Years of education: Not on file    Highest education level: Not on file   Occupational History    Not on file   Tobacco Use    Smoking status: Every Day     Packs/day: 0.50     Years: 32.00     Pack years: 16.00     Types: Cigarettes     Last attempt to quit: 12/25/2018     Years since quitting: 3.9    Smokeless tobacco: Never   Vaping Use    Vaping Use: Never used   Substance and Sexual Activity    Alcohol use: No     Alcohol/week: 0.0 standard drinks    Drug use: No    Sexual activity: Not on file   Other Topics Concern    Not on file   Social History Narrative    Not on file     Social Determinants of Health     Financial Resource Strain: Low Risk     Difficulty of Paying Living Expenses: Not hard at all   Food Insecurity: No Food Insecurity    Worried About Running Out of Food in the Last Year: Never true    Ran Out of Food in the Last Year: Never true   Transportation Needs: Not on file   Physical Activity: Not on file   Stress: Not on file   Social Connections: Not on file   Intimate Partner Violence: Not on file   Housing Stability: Not on file       FAMILY HISTORY     Family History   Problem Relation Age of Onset    Stroke Father 76    Other Father         low BS    Diabetes Mother           REVIEW OF SYSTEM     Unable to obtain as intubated mechanically ventilated      PHYSICAL EXAM     Vitals:    12/15/22 0912 12/15/22 0915 12/15/22 0930 12/15/22 0945   BP:       Pulse: 62 62 62 62   Resp:  23 26 26   Temp:       TempSrc:       SpO2: 100% 100% 100% 100%   Weight:         24HR INTAKE/OUTPUT:    Intake/Output Summary (Last 24 hours) at 12/15/2022 1000  Last data filed at 12/15/2022 0911  Gross per 24 hour   Intake 1940.49 ml   Output 1812 ml   Net 128.49 ml       General appearance: On mechanical ventilation present wheeze and  Respiratory: vesicular breath sounds, decent air entry  Cardiovascular: S1 S2 normal,no gallop or organic murmur. No carotid bruit  Abdomen: Distended  Extremities: No Cyanosis or Clubbing,Lower extremity edema  Neurological: On mechanical ventilation    INVESTIGATIONS      CBC:   Recent Labs     12/14/22  0855 12/14/22  1149 12/15/22  0556   WBC 18.1* 23.0* 18.3*   RBC 3.90* 4.12 4.10   HGB 9.9* 10.4* 10.4*   HCT 31.5* 36.0* 33.9*   MCV 80.8 87.4 82.7   MCH 25.3* 25.2 25.4   MCHC 31.3 28.9 30.7   RDW 19.1* 17.4* 17.3*    336 273   MPV  --  11.0 11.2      BMP:   Recent Labs     12/14/22  1707 12/15/22  0122 12/15/22  0556   * 133* 135   K 3.8 3.9 3.9    100 101   CO2 17* 15* 17*   BUN 49* 55* 57*   CREATININE 1.56* 1.84* 1.94*   GLUCOSE 268* 214* 172*   CALCIUM 8.2* 8.3* 8.4*        Phosphorus:    Recent Labs     12/14/22  1707 12/15/22  0122 12/15/22  0556   PHOS 5.3* 6.7* 6.8*     Magnesium:   Recent Labs     12/14/22  1707 12/15/22  0122 12/15/22  0556   MG 1.9 2.1 2.1     Albumin:   Recent Labs     12/14/22  0855 12/14/22  1149   LABALBU 2.7* 3.1*       Radiology:  Reviewed as available. Thank you for the consultation. Please do not hesitate to call with questions. This note is created with the assistance of a speech-recognition program. While intending to generate a document that actually reflects the content of the visit, no guarantees can be provided that every mistake has been identified and corrected by editing.     Henri Kenney MD MD, [facility] Diaz, FACP   12/15/2022 10:00 AM    [facility] [place] [facility]

## 2022-12-15 NOTE — PROGRESS NOTES
Daily Progress Note  Neuro Critical Care    Patient Name: Axel Leiva  Patient : 1960  Room/Bed: 3025/3025-01  Code Status: Full Code  Allergies: Allergies   Allergen Reactions    Codeine Nausea And Vomiting    Nsaids Nausea Only     nausea       CHIEF COMPLAINT:      PEA arrest     INTERVAL HISTORY    Initial Presentation (Admitted ): The patient is a 60 y.o. female hx of CAD w/CABG, CHF EF 25% w/AICD, COPD, CKD, HTN, HLD, DM2 who presents with PEA arrest. Patient found down by her . Patient down for approximately 30 minutes without CPR. EMS arrived patient found to be in PEA arrest CPR initiated. Patient also found to be hypoglycemic given dextrose. Patient arrived at [facility] underwent multiple rounds of CPR/epi and ROSC was achieved. Patient intubated by EMS and cooling cath placed by ED. Patient transferred to [facility] and was noted to have decerebrate posturing in CT scan at 1047 requiring 10mg of vec. [facility] Initial pH at [facility] 6.99. Patient currently not on any sedation or paralytics and is undergoing hypothermic target temperature mgmt. Last 24h:   Patient requiring levophed for pressure support.  Patient also requiring low dose propofol for vent dyssynchrony     CURRENT MEDICATIONS:  SCHEDULED MEDICATIONS:   sodium chloride flush  5-40 mL IntraVENous 2 times per day    chlorhexidine  15 mL Mouth/Throat BID    piperacillin-tazobactam  3,375 mg IntraVENous Q8H    insulin lispro  0-8 Units SubCUTAneous Q4H    atorvastatin  80 mg Oral Nightly    carvedilol  3.125 mg Oral BID WC    clopidogrel  75 mg Oral Daily     CONTINUOUS INFUSIONS:   norepinephrine 6 mcg/min (12/15/22 0620)    propofol 15 mcg/kg/min (12/15/22 0600)    sodium chloride 10 mL/hr at 12/15/22 06    dextrose      amiodarone 450mg/250ml D5W infusion 0.5 mg/min (12/15/22 0600)     PRN MEDICATIONS:   sodium chloride flush, sodium chloride, ondansetron **OR** ondansetron, polyethylene glycol, acetaminophen **OR** acetaminophen, ondansetron **OR** ondansetron, glucose, dextrose bolus **OR** dextrose bolus, glucagon (rDNA), dextrose, albuterol, ipratropium, acetylcysteine    VITALS:  Temperature Range: Temp: (!) 91.8 °F (33.2 °C) Temp  Av.9 °F (33.3 °C)  Min: 91.2 °F (32.9 °C)  Max: 95.7 °F (35.4 °C)  BP Range: Systolic (52FWF), WON:735 , Min:55 , VNC:863     Diastolic (38PBL), QDR:36, Min:39, Max:108    Pulse Range: Pulse  Av.3  Min: 58  Max: 143  Respiration Range: Resp  Av.4  Min: 11  Max: 58  Current Pulse Ox: SpO2: 100 %  24HR Pulse Ox Range: SpO2  Av.1 %  Min: 89 %  Max: 100 %  Patient Vitals for the past 12 hrs:   BP Temp Temp src Pulse Resp SpO2 Weight   12/15/22 0600 (!) 80/59 (!) 91.8 °F (33.2 °C) -- 58 22 100 % 173 lb 1 oz (78.5 kg)   12/15/22 0545 -- -- -- 58 22 100 % --   12/15/22 0530 -- -- -- 59 22 100 % --   12/15/22 0515 -- -- -- 59 22 100 % --   12/15/22 0500 -- (!) 91.8 °F (33.2 °C) -- 59 18 100 % --   12/15/22 0445 -- -- -- 61 16 100 % --   12/15/22 0430 -- -- -- 63 23 98 % --   12/15/22 0417 -- -- -- 60 24 100 % --   12/15/22 0415 -- -- -- 60 20 100 % --   12/15/22 0400 (!) 93/58 (!) 91.8 °F (33.2 °C) Bladder 60 20 100 % --   12/15/22 0345 -- -- -- 60 20 99 % --   12/15/22 0341 -- -- -- 59 20 98 % --   12/15/22 0330 -- -- -- 60 19 98 % --   12/15/22 0315 -- -- -- 63 22 100 % --   12/15/22 0300 -- -- -- 60 22 99 % --   12/15/22 0245 -- -- -- 62 18 99 % --   12/15/22 0230 -- -- -- 61 18 99 % --   12/15/22 0215 -- -- -- 60 22 99 % --   12/15/22 0200 -- (!) 91.8 °F (33.2 °C) -- 62 22 98 % --   12/15/22 0145 -- -- -- 60 19 99 % --   12/15/22 0130 -- -- -- 61 22 99 % --   12/15/22 0115 -- (!) 91.8 °F (33.2 °C) -- 61 22 98 % --   12/15/22 0100 -- (!) 91.8 °F (33.2 °C) -- 61 22 100 % --   12/15/22 0045 -- -- -- 61 22 98 % --   12/15/22 0030 -- -- -- 61 20 100 % --   12/15/22 0015 -- -- -- 64 22 99 % --   12/15/22 0000 105/70 (!) 91.8 °F (33.2 °C) Bladder 64 22 99 % --   22 -- -- -- 64 22 99 % --   22 -- -- -- 64 19 99 % --   22 -- -- -- 64 22 98 % --   22 -- -- -- 65 22 98 % --   22 -- -- -- 65 22 99 % --   22 -- -- -- 66 18 98 % --   22 -- -- -- 67 23 98 % --   22 -- -- -- 66 18 96 % --   22 -- (!) 91.8 °F (33.2 °C) -- 66 19 96 % --   22 -- -- -- 65 22 95 % --   22 -- -- -- 65 20 94 % --   22 -- -- -- 65 22 95 % --   22 -- -- -- 66 22 95 % --   22 -- -- -- 67 22 -- --   22 -- -- -- 67 22 -- --   22 -- -- -- 68 23 -- --   22 90/68 (!) 91.8 °F (33.2 °C) Bladder 69 20 (!) 89 % --   22 -- -- -- 70 23 91 % --   22 -- -- -- 70 23 98 % --   22 -- -- -- 69 22 98 % --         RECENT LABS:   Lab Results   Component Value Date    WBC 18.3 (H) 12/15/2022    HGB 10.4 (L) 12/15/2022    HCT 33.9 (L) 12/15/2022     12/15/2022    CHOL 199 07/15/2022    TRIG 724 (H) 07/15/2022    HDL 25 (L) 07/15/2022    LDLDIRECT 70 07/15/2022    ALT 86 (H) 2022     (H) 2022     12/15/2022    K 3.9 12/15/2022     12/15/2022    CREATININE 1.94 (H) 12/15/2022    BUN 57 (H) 12/15/2022    CO2 17 (L) 12/15/2022    TSH 1.66 10/14/2022    INR 1.3 2022    LABA1C 8.1 (H) 10/14/2022    LABMICR 57 (H) 07/15/2022     24 HOUR INTAKE/OUTPUT:  Intake/Output Summary (Last 24 hours) at 12/15/2022 0715  Last data filed at 12/15/2022 0600  Gross per 24 hour   Intake 1878.7 ml   Output 1752 ml   Net 126.7 ml       Labs and Images reviewed with:  [] Dr. Channie Mortimer. Arben    [x] Dr. Silvio Alexis  [] Dr. Jefm Apgar  [] There are no new interval images to review. PHYSICAL EXAM       CONSTITUTIONAL:  Does not open eyes to verbal/painful stimuli. Does not follow commands. Appears critically ill   HEAD:  normocephalic, atraumatic    EYES:  5mm birksly reactive BL.  Mid gaze fixation. Does not track   ENT:  moist mucous membranes   NECK:  supple, symmetric   LUNGS:  Equal air entry bilaterally   CARDIOVASCULAR:  normal s1 / s2, RRR, distal pulses intact   ABDOMEN:  Distended   NEUROLOGIC:  Mental Status:  comatose             Cranial Nerves:    Dolls eye reflex intact  Cough intact. No gag, or corneal reflex  Pupils 5mm briskly reactive BL     Motor Exam:                No spontaneous movement of extremities     Sensory:                No movement to painful stimuli in extremities     Plantar Response:  Right:  no response  Left:  no response           DRAINS:  [x] There are no drains for Neuro Critical Care to monitor at this time. ASSESSMENT AND PLAN:     This is a 60 y.o. female with PEA attest unknown etiology at this point. Patient with poor neurological exam. Suspect anoxic brain injury with reported downtime and exam    - MRI 72 hours  - Neuron specific enolase 24, 48, 72 hours  - LTME  - Neuro checks per protocol     We will continue to follow along. For any changes in exam or patient status please contact Neuro Critical Care. We will continue to follow along. For any changes in exam or patient status please contact Neuro Critical Care.       Izzy Gomez DO  Neuro Critical Care  Pager [phone]  12/15/2022     7:15 AM

## 2022-12-15 NOTE — PROGRESS NOTES
Pharmacy Note     Renal Dose Adjustment    Mariano Villanueva is a 60 y.o. female. Pharmacist assessment of renally cleared medications. Recent Labs     12/15/22  0122 12/15/22  0556   BUN 55* 57*       Recent Labs     12/15/22  0122 12/15/22  0556   CREATININE 1.84* 1.94*       Estimated Creatinine Clearance: 31 mL/min (A) (based on SCr of 1.94 mg/dL (H)). Height:   Ht Readings from Last 1 Encounters:   10/24/22 5' 6\" (1.676 m)     Weight:  Wt Readings from Last 1 Encounters:   12/15/22 173 lb 1 oz (78.5 kg)       The following medication dose has been adjusted based upon renal function per P&T Guidelines:             Decrease Pepcid IV from 20 mg BID to 20 mg daily. Will continue to follow.    Farzaneh Multani, PharmD

## 2022-12-15 NOTE — PROCEDURES
LONG-TERM EEG-VIDEO [address]    Patient: Mariano Villanueva  Age: 60 y.o. MRN: [id number]    Referring Physician: No ref. provider found  History: The patient is a 60 y.o. female who presented breakthrough seizure/encephalopathy. This long-term video-EEG monitoring study was performed to determine the nature of the patient's clinical events. The patient is on neuroactive medications.    Mariano Villanueva   Current Facility-Administered Medications   Medication Dose Route Frequency Provider Last Rate Last Admin    heparin (porcine) injection 5,000 Units  5,000 Units SubCUTAneous 3 times per day Fleurette Silence, DO        famotidine (PEPCID) 20 mg in sodium chloride (PF) 0.9 % 10 mL injection  20 mg IntraVENous Daily Fleurette Silence, DO        norepinephrine (LEVOPHED) 16 mg in sodium chloride 0.9 % 250 mL infusion  1-100 mcg/min IntraVENous Continuous Isamar Raven, DO 3.8 mL/hr at 12/15/22 0911 4 mcg/min at 12/15/22 0911    propofol injection  5-50 mcg/kg/min (Order-Specific) IntraVENous Continuous Isamar Nenzel, DO   Stopped at 12/15/22 0745    sodium chloride flush 0.9 % injection 5-40 mL  5-40 mL IntraVENous 2 times per day Anh Patel MD   10 mL at 12/15/22 0802    sodium chloride flush 0.9 % injection 5-40 mL  5-40 mL IntraVENous PRMAURIZIO Billingsley MD        0.9 % sodium chloride infusion   IntraVENous PRMAURIZIO Billingsley MD   Stopped at 12/15/22 0830    ondansetron (ZOFRAN-ODT) disintegrating tablet 4 mg  4 mg Oral Q8H PRN Elda Billingsley MD        Or    [facility]ron [facility] injection 4 mg  4 mg IntraVENous Q6H PRN Elda Billingsley MD        polyethylene glycol (GLYCOLAX) packet 17 g  17 g Oral Daily PRN Elda Billingsley MD        acetaminophen (TYLENOL) tablet 650 mg  650 mg Oral Q6H PRN Elda Billingsley MD        Or    acetaminophen (TYLENOL) suppository 650 mg  650 mg Rectal Q6H PRN Elda Billingsley MD        ondansetron (ZOFRAN-ODT) disintegrating tablet 4 mg  4 mg Oral Q8H PRN Elda Billingsley MD        Or    ondansetron (ZOFRAN) injection 4 mg  4 mg IntraVENous Q6H PRN Elda Billingsley MD        chlorhexidine (PERIDEX) 0.12 % solution 15 mL  15 mL Mouth/Throat BID Elda Billingsley MD   15 mL at 12/14/22 2050    piperacillin-tazobactam (ZOSYN) 3,375 mg in dextrose 5 % 50 mL IVPB extended infusion (mini-bag)  3,375 mg IntraVENous Q8H Elda Billingsley MD 12.5 mL/hr at 12/15/22 0911 Rate Verify at 12/15/22 0911    insulin lispro (HUMALOG) injection vial 0-8 Units  0-8 Units SubCUTAneous Q4H Elda Billingsley MD   2 Units at 12/15/22 0011    glucose chewable tablet 16 g  4 tablet Oral PRN Elda Billingsley MD        dextrose bolus 10% 125 mL  125 mL IntraVENous PRN Elda Billingsley MD        Or    dextrose bolus 10% 250 mL  250 mL IntraVENous PRMAURIZIO Billingsley MD        glucagon (rDNA) injection 1 mg  1 mg SubCUTAneous PRMAURIZIO Billingsley MD        dextrose 10 % infusion   IntraVENous Continuous PRN Elda Billingsley MD        atorvastatin (LIPITOR) tablet 80 mg  80 mg Oral Nightly Elda Billingsley MD   80 mg at 12/14/22 2050    [Held by provider] carvedilol (COREG) tablet 3.125 mg  3.125 mg Oral BID  Elda Billingsley MD        clopidogrel (PLAVIX) tablet 75 mg  75 mg Oral Daily Elda Billingsley MD   75 mg at 12/15/22 0830    albuterol (PROVENTIL) nebulizer solution 2.5 mg  2.5 mg Nebulization As Directed RT PRN Solo Marquez MD   2.5 mg at 12/15/22 0205    ipratropium (ATROVENT) 0.02 % nebulizer solution 0.5 mg  0.5 mg Nebulization As Directed RT PRN Solo Marquez MD        acetylcysteine (MUCOMYST) 20 % solution 600 mg  600 mg Inhalation As Directed RT PRN Solo Marquez MD amiodarone (CORDARONE) 450 mg in dextrose 5 % 250 mL infusion  0.5 mg/min IntraVENous Continuous Monica Perry MD 16.7 mL/hr at 12/15/22 0911 0.5 mg/min at 12/15/22 5433     Technical Description: This is a 21-channel digital EEG recording with time-locked video. Electrodes were placed in accordance with the 10-20 International System of Electrode Placement. Single lead EKG monitoring was included. Baseline EEG Recording:  A formal baseline EEG recording was not obtained. Day 1 - 12/14/22, starting at 16:29    Interictal EEG Samples: At the onset recording, the EEG was diffusely attenuated with low amplitude 1-2 Hz of delta activity. There was no staging reactivity. Occasional EMG artifact was seen. Sleep architecture was not seen. The EKG channel revealed no abnormalities. Ictal EEG Recording / Patient Events: During this period the patient had no events or seizures. Summary: During this day of recording no events were recorded. The interictal EEG was severely abnormal due to diffuse 1-2 Hz of polymorphic delta slowing suggesting severe encephalopathy. Monitoring was continued in order to record the patient's typical events. The EKG channel revealed no abnormalities. Day 2 - 12/15/22, reviewed through 7:30 am    Interictal EEG Samples: Interictal EEG was unchanged from yesterday. Ictal EEG Recording / Patient Events: During this period the patient had no events or seizures. Summary: During this day of recording no events were recorded. The interictal EEG was severely abnormal due to diffuse 1-2 Hz of polymorphic delta slowing suggesting severe encephalopathy. Monitoring was continued in order to record the patient's typical events. The EKG channel revealed no abnormalities.     Ramon Richmond MD  Diplomate, American Board of Psychiatry and Neurology  Diplomate, American Board of Clinical Neurophysiology  Diplomate, American Board of Epilepsy     Please note this is a preliminary report and updated daily. The final report will have a summary of behavior and electrographic findings with clinical correlation.

## 2022-12-15 NOTE — PLAN OF CARE
Problem: Chronic Conditions and Co-morbidities  Goal: Patient's chronic conditions and co-morbidity symptoms are monitored and maintained or improved  12/15/2022 0911 by Demetrio Manuel RN  Outcome: Progressing     Problem: Discharge Planning  Goal: Discharge to home or other facility with appropriate resources  12/15/2022 0911 by Demetrio Manuel RN  Outcome: Progressing     Problem: Pain  Goal: Verbalizes/displays adequate comfort level or baseline comfort level  12/15/2022 0911 by Demetrio Manuel RN  Outcome: Progressing     Problem: Respiratory - Adult  Goal: Achieves optimal ventilation and oxygenation  12/15/2022 0911 by Demetrio Manuel RN  Outcome: Progressing

## 2022-12-16 PROBLEM — Z51.5 PALLIATIVE CARE ENCOUNTER: Status: ACTIVE | Noted: 2022-12-16

## 2022-12-16 NOTE — PROGRESS NOTES
NEPHROLOGY PROGRESS NOTE      ASSESSMENT     Transferred from [facility] where she presented when found down by her  and subsequently noted to be hypoglycemic cardiac arrest.  Initiated therapeutic hypothermia protocol further investigations revealed elevated creatinine initiating nephrology consultation. #1 acute kidney injury nonoliguric secondary to ischemic (cardiac arrest/shock) and nephrotoxic (contrast) ATN -evolving  #2 high nongap metabolic acidosis secondary to CHAD/hypoperfusion  #3 chronic kidney disease stage IIIb secondary to diabetic/ischemic nephrosclerosis with baseline creatinine 1.4-1.6. Serologies unremarkable. Asymmetric kidney size. Minimal proteinuria. 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 Continues to be on pressors and mechanical ventilation. FiO2 45% PEEP of 8. On IV Lasix 20 mg twice a day. Not significant urine output.   Renal function continues to decline  Not much neurological response    OBJECTIVE     Vitals:    12/16/22 0830 12/16/22 0845 12/16/22 0900 12/16/22 0915   BP:   (!) 107/58    Pulse: 76 77 77 76   Resp: 22 22 22 22   Temp:       TempSrc:       SpO2: 99% 100% 99% 100%   Weight:       Height:         24HR INTAKE/OUTPUT:    Intake/Output Summary (Last 24 hours) at 12/16/2022 1030  Last data filed at 12/16/2022 1000  Gross per 24 hour   Intake 746.02 ml   Output 949 ml   Net -202.98 ml       General appearance: Mechanical ventilation  Respiratory::vesicular breath sounds present wheezing  Cardiovascular:S1 S2 normal,no gallop or organic murmur. Abdomen:Non tender/non distended. Bowel sounds present  Extremities: No Cyanosis or Clubbing, present lower extremity edema  Neurological: On mechanical ventilation      MEDICATIONS     Scheduled Meds:    furosemide  40 mg IntraVENous TID    heparin (porcine)  5,000 Units SubCUTAneous 3 times per day    famotidine (PEPCID) injection  20 mg IntraVENous Daily    sodium chloride flush  5-40 mL IntraVENous 2 times per day    chlorhexidine  15 mL Mouth/Throat BID    piperacillin-tazobactam  3,375 mg IntraVENous Q8H    insulin lispro  0-8 Units SubCUTAneous Q4H    atorvastatin  80 mg Oral Nightly    [Held by provider] carvedilol  3.125 mg Oral BID WC    clopidogrel  75 mg Oral Daily     Continuous Infusions:    norepinephrine 2 mcg/min (12/16/22 0916)    propofol Stopped (12/16/22 0811)    sodium chloride 10 mL/hr at 12/16/22 5415    dextrose      amiodarone 450mg/250ml D5W infusion 0.5 mg/min (12/16/22 0632)     PRN Meds:  sodium chloride flush, sodium chloride, ondansetron **OR** ondansetron, polyethylene glycol, acetaminophen **OR** acetaminophen, glucose, dextrose bolus **OR** dextrose bolus, glucagon (rDNA), dextrose, albuterol, acetylcysteine  Home Meds:                Medications Prior to Admission: furosemide (LASIX) 40 MG tablet, Take 1 tablet by mouth in the morning and 1 tablet in the evening. clopidogrel (PLAVIX) 75 MG tablet, Take 1 tab daily  atorvastatin (LIPITOR) 80 MG tablet, 1 tab daily  carvedilol (COREG) 3.125 MG tablet, take 1 tablet by mouth twice a day with meals  gabapentin (NEURONTIN) 400 MG capsule, Take 1 capsule by mouth 3 times daily for 180 days.   midodrine (PROAMATINE) 5 MG tablet, take 1 tablet by mouth three times a day  sertraline (ZOLOFT) 100 MG tablet, Take one tablet by mouth once daily  insulin glargine (LANTUS SOLOSTAR) 100 UNIT/ML injection pen, INJECT 45 UNITS SUBCUTANEOUSLY EVERY MORNING AND 25 UNITS EVERY EVENING  Insulin Pen Needle (BD PEN NEEDLE PAIGE 2ND GEN) 32G X 4 MM MISC, use 1 PEN NEEDLE to inject MEDICATION subcutaneously daily  Lancets MISC, Pt testing BS once daily and prn  glucose monitoring (FREESTYLE) kit, Please dispense meter covered by patients insurance. blood glucose monitor strips, Testing BS once daily and as needed.  Dispense strips to go with new glucometer  albuterol (PROVENTIL) (2.5 MG/3ML) 0.083% nebulizer solution, Take 3 mLs by nebulization every 6 hours as needed for Wheezing (as needed for wheezing)  Respiratory Therapy Supplies (NEBULIZER/TUBING/MOUTHPIECE) KIT, 1 kit by Does not apply route daily as needed (for SOB, cough)  Respiratory Therapy Supplies (NEBULIZER COMPRESSOR) KIT, 1 kit by Does not apply route once for 1 dose  lactobacillus (CULTURELLE) capsule, Take 1 capsule by mouth 2 times daily (with meals)  blood glucose monitor strips, Use to test daily  blood glucose monitor kit and supplies, Use to test daily  Cholecalciferol (VITAMIN D3) 5000 units TABS, Take 5,000 Units by mouth daily  glucose blood VI test strips (EXACTECH TEST) strip, 1 each by In Vitro route daily Pt testing BS once daily and prn  Multiple Vitamins-Minerals (THERAPEUTIC MULTIVITAMIN-MINERALS) tablet, Take 1 tablet by mouth daily (with breakfast)    INVESTIGATIONS     Last 3 CMP:    Recent Labs     12/14/22  0855 12/14/22  1019 12/14/22  1149 12/14/22  1338 12/15/22  1809 12/15/22  2000 12/16/22  0029 12/16/22  0230 12/16/22  0437 12/16/22  0615 12/16/22  0753   *  --  135   < > 139  --  135  --   --  139  --    K 4.4  --  4.4   < > 4.2   < > 4.2   < > 4.0 3.9 4.0   CL 99  --  102   < > 103  --  99  --   --  102  --    CO2 16*  --  16*   < > 15*  --  16*  --   --  16*  --    BUN 46*  --  46*   < > 64*  --  67*  --   --  67*  --    CREATININE 1.51*   < > 1.70*   < > 2.15*  --  2.38*  --   --  2.43*  --    CALCIUM 10.0  --  8.4*   < > 8.4*  --  8.3* --   --  8.0*  --    PROT 5.8*  --  6.6  --   --   --   --   --   --   --   --    LABALBU 2.7*  --  3.1*  --   --   --   --   --   --   --   --    BILITOT 0.8  --  1.2  --   --   --   --   --   --   --   --    ALKPHOS 88  --  128*  --   --   --   --   --   --   --   --    AST 49*  --  138*  --   --   --   --   --   --   --   --    ALT 36*  --  86*  --   --   --   --   --   --   --   --     < > = values in this interval not displayed.        Last 3 CBC:  Recent Labs     12/14/22  1149 12/15/22  0556 12/16/22  0615   WBC 23.0* 18.3* 16.1*   RBC 4.12 4.10 3.91*   HGB 10.4* 10.4* 9.8*   HCT 36.0* 33.9* 32.0*   MCV 87.4 82.7 81.8*   MCH 25.2 25.4 25.1*   MCHC 28.9 30.7 30.6   RDW 17.4* 17.3* 17.5*    273 236   MPV 11.0 11.2 11.1       Please do not hesitate to call with questions    This note is created with the assistance of a speech-recognition program. While intending to generate a document that actually reflects the content of the visit, no guarantees can be provided that every mistake has been identified and corrected by editing    Tessie Bowers MD MD, [facility] Jammie Escoto, [address]   12/16/2022 10:30 AM  [facility]

## 2022-12-16 NOTE — PROCEDURES
LONG-TERM EEG-VIDEO [id number] [address]    Patient: Guanakito Carreon  Age: 60 y.o. MRN: [id number]    Referring Physician: No ref. provider found  History: The patient is a 60 y.o. female who presented breakthrough seizure/encephalopathy. This long-term video-EEG monitoring study was performed to determine the nature of the patient's clinical events. The patient is on neuroactive medications.    Guanakito Carreon   Current Facility-Administered Medications   Medication Dose Route Frequency Provider Last Rate Last Admin    heparin (porcine) injection 5,000 Units  5,000 Units SubCUTAneous 3 times per day Jodelle Sol, DO   5,000 Units at 12/16/22 0606    famotidine (PEPCID) 20 mg in sodium chloride (PF) 0.9 % 10 mL injection  20 mg IntraVENous Daily Jodelle Sol, DO   20 mg at 12/16/22 0824    furosemide (LASIX) injection 20 mg  20 mg IntraVENous BID Michelle Hutchison MD   20 mg at 12/16/22 0829    norepinephrine (LEVOPHED) 16 mg in sodium chloride 0.9 % 250 mL infusion  1-100 mcg/min IntraVENous Continuous Vamsi [place], DO 2.8 mL/hr at 12/16/22 0826 3 mcg/min at 12/16/22 0826    propofol injection  5-50 mcg/kg/min (Order-Specific) IntraVENous Continuous Lavenia Madhuri, DO   Stopped at 12/16/22 1205    sodium chloride flush 0.9 % injection 5-40 mL  5-40 mL IntraVENous 2 times per day Pablo Marshall MD   10 mL at 12/16/22 3061    sodium chloride flush 0.9 % injection 5-40 mL  5-40 mL IntraVENous PRN Elda Billingsley MD        0.9 % sodium chloride infusion   IntraVENous PRN Pablo Marshall MD 10 mL/hr at 12/16/22 4845 Rate Verify at 12/16/22 0632    ondansetron (ZOFRAN-ODT) disintegrating tablet 4 mg  4 mg Oral Q8H PRN Elda Billingsley MD        Or    ondansetron [facility]) injection 4 mg  4 mg IntraVENous Q6H PRN Elda Billingsley MD        polyethylene glycol (GLYCOLAX) packet 17 g  17 g Oral Daily PRN Elda Menendez MD        acetaminophen (TYLENOL) tablet 650 mg  650 mg Oral Q6H BARBIN Elda Menendez MD        Or    acetaminophen (TYLENOL) suppository 650 mg  650 mg Rectal Q6H PRN Elda Menendez MD        chlorhexidine (PERIDEX) 0.12 % solution 15 mL  15 mL Mouth/Throat BID Elda Menendez MD   15 mL at 12/15/22 2222    piperacillin-tazobactam (ZOSYN) 3,375 mg in dextrose 5 % 50 mL IVPB extended infusion (mini-bag)  3,375 mg IntraVENous Q8H Elda Menendez MD 12.5 mL/hr at 12/16/22 0815 3,375 mg at 12/16/22 0815    insulin lispro (HUMALOG) injection vial 0-8 Units  0-8 Units SubCUTAneous Q4H Elda Menendez MD   2 Units at 12/15/22 0011    glucose chewable tablet 16 g  4 tablet Oral PRN Elda Menendez MD        dextrose bolus 10% 125 mL  125 mL IntraVENous PRN Elda Menendez MD        Or    dextrose bolus 10% 250 mL  250 mL IntraVENous MYRNA Menendez MD        glucagon (rDNA) injection 1 mg  1 mg SubCUTAneous PRMAURIZIO Menendez MD        dextrose 10 % infusion   IntraVENous Continuous PRMAURIZIO Menendez MD        atorvastatin (LIPITOR) tablet 80 mg  80 mg Oral Nightly Elda Menendez MD   80 mg at 12/15/22 2236    [Held by provider] carvedilol (COREG) tablet 3.125 mg  3.125 mg Oral BID WC Elda Menendez MD        clopidogrel (PLAVIX) tablet 75 mg  75 mg Oral Daily Elda Menendez MD   75 mg at 12/16/22 0882    albuterol (PROVENTIL) nebulizer solution 2.5 mg  2.5 mg Nebulization As Directed RT PRN Chari Soares MD   2.5 mg at 12/16/22 0753    acetylcysteine (MUCOMYST) 20 % solution 600 mg  600 mg Inhalation As Directed RT PRN Chari Soares MD        amiodarone (CORDARONE) 450 mg in dextrose 5 % 250 mL infusion  0.5 mg/min IntraVENous Continuous Chari Soares MD 16.7 mL/hr at 12/16/22 0632 0.5 mg/min at 12/16/22 6106     Technical Description: This is a 21-channel digital EEG recording with time-locked video. Electrodes were placed in accordance with the 10-20 International System of Electrode Placement. Single lead EKG monitoring was included. Baseline EEG Recording:  A formal baseline EEG recording was not obtained. Day 1 - 12/14/22, starting at 16:29    Interictal EEG Samples: At the onset recording, the EEG was diffusely attenuated with low amplitude 1-2 Hz of delta activity. There was no staging reactivity. Occasional EMG artifact was seen. Sleep architecture was not seen. The EKG channel revealed no abnormalities. Ictal EEG Recording / Patient Events: During this period the patient had no events or seizures. Summary: During this day of recording no events were recorded. The interictal EEG was severely abnormal due to diffuse 1-2 Hz of polymorphic delta slowing suggesting severe encephalopathy. Monitoring was continued in order to record the patient's typical events. The EKG channel revealed no abnormalities. Day 2 - 12/15/22    Interictal EEG Samples: As recording progressed, the background activity showed bursts of 3 to 4 Hz of activity lasting for 1 to 2 seconds followed by diffuse suppression lasting for 3 to 4 seconds. The background did not show any state change of activity. Ictal EEG Recording / Patient Events: During this period the patient had no events or seizures. Summary: During this day of recording no events were recorded. The interictal EEG was severely abnormal due to burst suppression pattern with bursts of 3 to 4 Hz of delta activity followed by suppression lasting for 3 to 4 seconds. This finding suggested severe diffuse cortical dysfunction or sedation medication effect. Monitoring was continued in order to record the patient's typical events. The EKG channel revealed no abnormalities.     Day 3 - 12/16/22, reviewed through 7:30 am    Interictal EEG Samples: Interictal EEG was unchanged from yesterday. Ictal EEG Recording / Patient Events: During this period the patient had no events or seizures. Summary: During this day of recording no events were recorded. The interictal EEG was severely abnormal due to burst suppression pattern with bursts of 3 to 4 Hz of delta activity followed by suppression lasting for 3 to 4 seconds. This finding suggested severe diffuse cortical dysfunction or sedation medication effect. Monitoring was continued in order to record the patient's typical events. The EKG channel revealed no abnormalities. Chidi Altamirano MD  Diplomate, American Board of Psychiatry and Neurology  Diplomate, American Board of Clinical Neurophysiology  Diplomate, American Board of Epilepsy     Please note this is a preliminary report and updated daily. The final report will have a summary of behavior and electrographic findings with clinical correlation.

## 2022-12-16 NOTE — PLAN OF CARE
Problem: Respiratory - Adult  Goal: Achieves optimal ventilation and oxygenation  12/16/2022 0906 by Shayla Sandra RCP  Outcome: Progressing    Problem: OXYGENATION/RESPIRATORY FUNCTION  Goal: Patient will maintain patent airway  Outcome: Ongoing  Goal: Patient will achieve/maintain normal respiratory rate/effort  Respiratory rate and effort will be within normal limits for the patient  Outcome: Ongoing     Problem: MECHANICAL VENTILATION  Goal: Patient will maintain patent airway  Outcome: Ongoing  Goal: Oral health is maintained or improved  Outcome: Ongoing  Goal: ET tube will be managed safely  Outcome: Ongoing  Goal: Ability to express needs and understand communication  Outcome: Ongoing  Goal: Mobility/activity is maintained at optimum level for patient  Outcome: Ongoing     Problem: ASPIRATION PRECAUTIONS  Goal: Patients risk of aspiration is minimized  Outcome: Ongoing     Problem: SKIN INTEGRITY  Goal: Skin integrity is maintained or improved  Outcome: Ongoing

## 2022-12-16 NOTE — CARE COORDINATION
12/16/22 1400   Service Assessment   Patient Orientation Unable to Assess; Sedated   History Provided By [facility]   Primary Caregiver Self   Support Systems Spouse/Significant Other   PCP Verified by CM Yes  Leroy Connors MD)   Last Visit to PCP   ( is unsure of last physician visit)   Prior Functional Level Independent in ADLs/IADLs   Can patient return to prior living arrangement Unknown at present   Ability to make needs known: Unable   Financial Resources Medicaid  ([place] Advantage)   Social/Functional History   Lives With Spouse; Other (comment)  (Grandchildren)   Home Equipment Oxygen   Discharge Planning   Current Services Prior To Admission Durable Medical Equipment; Oxygen Therapy   Current DME Prior to Arrival Oxygen Therapy (Comment)  (pt's  is unsure of Home O2 DME)   Condition of Participation: Discharge Planning   The Plan for Transition of Care is related to the following treatment goals: comfort   The Patient and/or Patient Representative was provided with a Choice of Provider? Patient Representative   Name of the Patient Representative who was provided with the Choice of Provider and agrees with the Discharge Plan? Lila Motts   The Patient and/Or Patient Representative agree with the Discharge Plan?   (pt has poor prognosis, POC to be determined)   Freedom of Choice list was provided with basic dialogue that supports the patient's individualized plan of care/goals, treatment preferences, and shares the quality data associated with the providers?    (Will provide choice if appropriate)    Case Management Assessment  Initial Evaluation    Date/Time of Evaluation: 12/16/2022 5:10 PM  Assessment Completed by: Lane Vieira RN    If patient is discharged prior to next notation, then this note serves as note for discharge by case management.     Patient Name: Alexis Hall                   YOB: 1960  Diagnosis: Cardiac arrest [facility]) [I46.9]  Ventricular tachyarrhythmia [I47.20]  Other ascites [R18.8]  Congestive heart failure, unspecified HF chronicity, unspecified heart failure type (HCC) [I50.9]                   Date / Time: 12/14/2022 10:09 AM    Patient Admission Status: Inpatient   Readmission Risk (Low < 19, Mod (19-27), High > 27): Readmission Risk Score: 22.5    Current PCP: Bess Mane MD  PCP verified by CM? Yes (Bess Mane MD)    Chart Reviewed: Yes      History Provided by: Spouse  Patient Orientation: Unable to Assess, Sedated    Patient Cognition:      Hospitalization in the last 30 days (Readmission):  No    If yes, Readmission Assessment in  Navigator will be completed.    Advance Directives:      Code Status: Full Code   Patient's Primary Decision Maker is:      Primary Decision Maker: BobbivaleriaVamsi stephens - Spouse - [phone]    Discharge Planning:    Patient lives with:   Type of Home:    Primary Care Giver: Self  Patient Support Systems include: Spouse/Significant Other   Current Financial resources: (P) Medicaid ([place] Advantage)  Current community resources:    Current services prior to admission: (P) Durable Medical Equipment, Oxygen Therapy            Current DME: (P) Oxygen Therapy (Comment) (pt's  is unsure of Home O2 DME)            Type of Home Care services:       ADLS  Prior functional level: (P) Independent in ADLs/IADLs  Current functional level:      PT AM-PAC:   /24  OT AM-PAC:   /24    Family can provide assistance at DC:    Would you like Case Management to discuss the discharge plan with any other family members/significant others, and if so, who?    Plans to Return to Present Housing: (P) Unknown at present  Other Identified Issues/Barriers to RETURNING to current housing: unsure at this time  Potential Assistance needed at discharge:              Potential DME:    Patient expects to discharge to:    Plan for transportation at discharge:      Financial    Payor: [employer] / Plan: [employer]  ADVANTAGE / Product Type: *No Product type* /     Does insurance require precert for SNF: Yes    Potential assistance Purchasing Medications:    Meds-to-Beds request: Yes      [facility] [id number] Andrea [place], [address] [address]  Phone: [phone] Fax: [phone] [address] [facility] [address] [place] - [address] [phone] Avis Ismay [phone]  [address]  [facility] [address]  Phone: [phone] Fax: [phone]    [facility] [address], [address] [facility] [address], [place] [address]  [facility] [address], [address]  [facility] [address]  Phone: [phone] Fax: [phone]      Notes:    Factors facilitating achievement of predicted outcomes: unable to determine at this time    Barriers to discharge: Medical complications    Additional Case Management Notes: Intubated FiO2 45%, PEEP of 8, pressor, amio gtt, ECHO, ECG ordered, MRI ordered 12/17, Palliative Care following, poor prognosis    LM for Yves w/ [facility] to call CM concerning pt's  having a place to stay. The Plan for Transition of Care is related to the following treatment goals of Cardiac arrest [facility]) [I46.9]  Ventricular tachyarrhythmia [I47.20]  Other ascites [R18.8]  Congestive heart failure, unspecified HF chronicity, unspecified heart failure type ([facility] [address] [I13.3]    IF APPLICABLE: The Patient and/or patient representative Allison Charter and her family were provided with a choice of provider and agrees with the discharge plan.  Freedom of choice list with basic dialogue that supports the patient's individualized plan of care/goals and shares the quality data associated with the providers was provided to: (P) Patient Representative   Patient Representative Name: (P) Oralee Sample     The Patient and/or Patient Representative Agree with the Discharge Plan? (P)  (pt has poor prognosis, POC to be determined)    LILIBETH MATT Angele Rinne, [facility]  Case Management Department  Ph: [phone] Fax: [phone]

## 2022-12-16 NOTE — CARE COORDINATION
Transitional planning. Yves with [facility] called, he stated he would call pt's RN tomorrow or RN can call him once pt arrives [phone]. He will provide information for lodging      [id number]  SW called and asked if she can transfer pt's  to  number to arrange a ride for him from [employer]. Explained to her that I could not set that up if she couldn't, and told her I would speak to pt's . Looking through notes ( 9304 note), he did state he had transportation. Previous  note states [employer], [address] would look into transportation options. 1648 LM for Shelly MONTOYA to call CM back concerning transportation options. Eddietad to [employer] concerning transportation and he confirmed that we would not arrange transport from [employer] and suggested pt call [facility].     6560 Informed pt's  of above information,   Pt's  states he will arrange his own transportation tomorrow and asked about [facility].  Informed him that pt's nurse or CM will call number tomorrow to arrange lodging

## 2022-12-16 NOTE — PLAN OF CARE
Problem: Chronic Conditions and Co-morbidities  Goal: Patient's chronic conditions and co-morbidity symptoms are monitored and maintained or improved  12/15/2022 2138 by Jam Linares RN  Outcome: Progressing     Problem: Discharge Planning  Goal: Discharge to home or other facility with appropriate resources  12/15/2022 2138 by Jam Linares RN  Outcome: Progressing     Problem: Pain  Goal: Verbalizes/displays adequate comfort level or baseline comfort level  12/15/2022 2138 by Jam Linares RN  Outcome: Progressing     Problem: Respiratory - Adult  Goal: Achieves optimal ventilation and oxygenation  12/15/2022 2138 by Jam Linares RN  Outcome: Progressing     Problem: Skin/Tissue Integrity  Goal: Absence of new skin breakdown  Description: 1. Monitor for areas of redness and/or skin breakdown  2. Assess vascular access sites hourly  3. Every 4-6 hours minimum:  Change oxygen saturation probe site  4. Every 4-6 hours:  If on nasal continuous positive airway pressure, respiratory therapy assess nares and determine need for appliance change or resting period.   Outcome: Progressing     Problem: Safety - Adult  Goal: Free from fall injury  Outcome: Progressing  Flowsheets (Taken 12/15/2022 2136)  Free From Fall Injury: Instruct family/caregiver on patient safety     Problem: ABCDS Injury Assessment  Goal: Absence of physical injury  Outcome: Progressing  Flowsheets (Taken 12/15/2022 2136)  Absence of Physical Injury: Implement safety measures based on patient assessment     Problem: Nutrition Deficit:  Goal: Optimize nutritional status  Outcome: Progressing

## 2022-12-16 NOTE — RESEARCH
Patient Name: Guanakito Carreon  MRN: [id number]  YOB: 1960  Study: ICECAP: Influence of Cooling duration on Efficacy in Cardiac Arrest Patients  Reason for evaluation: Inpatient Research patient Evaluation      The patient was evaluated for a multicenter, randomized, adaptive allocation clinical trial to identify the optimal duration of induced hypothermia for neuroprotection in comatose survivors of cardiac arrest.      ARIAN #: [id number]  Clinicaltrials.gov: NCT [id number]    EMR Review  for ICECAP DAY 2/3      07:29: EMR Review and data collection  08:25 Met with bedside nurse Karin Dunn, answered questions regarding protocol and patient care. Patient continues in rewarming phase. No family at bedside due to childcare commitments. Diprivan currently off  13:25 Writer and PI at patients bedside, PI inquired and updated admitting physician on patients family situation in regard to patient updates and visiting/communication restrictions due to personal circumstances. At the request of admitting physician, this writer confirmed correct contact number for spouse on facesheet due to difficulties with previous attempts to contact spouse for updates. Protocol defined events reported, will continue to monitor patient per protocol and SOC. Moses Gant RN  Clinical Research Services  [address] [place] [address], [address]  P: [phone]  F: [phone]    For questions call the research office at [phone] or [facility] Dr. Laurel Hahn.

## 2022-12-16 NOTE — PROGRESS NOTES
Critical Care Team - Daily Progress Note      Date and time: 12/16/2022 8:43 AM  Patient's name:  Nu Rogers  Medical Record Number: [id number]  Patient's account/billing number: [de-identified]  Patient's YOB: 1960  Age: 60 y.o. Date of Admission: 12/14/2022 10:09 AM  Length of stay during current admission: 2      Primary Care Physician: Elsie Snellen, MD  ICU Attending Physician: Dr. Ady Santos Status: Full Code    Reason for ICU admission:   Chief Complaint   Patient presents with    Cardiac Arrest       Subjective:     OVERNIGHT EVENTS:           No acute issues overnight  Patient seen and examined bedside remains intubated  Not on any sedation  On 3 mics of Levophed  On amiodarone    Undergoing rewarming currently    Vent settings - 20/500/8/25  ABG -7.30, 33.3, 66.4, 16.5    Morning labs -   WBC 16.1, hemoglobin 9.8, platelets 677  Sodium 139, potassium 4, creatinine 2.43 trending upwards    On Lasix 20 mg IV twice daily started yesterday by nephrology  Neuro critical care on board for neurological recovery, although Shannon, MRI at 72 hours  Cardiology on board  Troponin 49> 107> 191> 119, ?   Heparin drip    Tolerating tube feeds  Urine output 600 mL    Cultures no growth so far  Echocardiogram pending    AWAKE & FOLLOWING COMMANDS:  [x] No   [] Yes    CURRENT VENTILATION STATUS:     [x] Ventilator  [] BIPAP  [] Nasal Cannula [] Room Air      IF INTUBATED, ET TUBE MARKING AT LOWER LIP:       cms    SECRETIONS Amount:  [x] Small [] Moderate  [] Large  [] None  Color:     [x] White [] Colored  [] Bloody    SEDATION:  RAAS Score:  [x] Propofol gtt  [] Versed gtt  [] Ativan gtt   [] No Sedation    PARALYZED:  [x] No    [] Yes    DIARRHEA:                [x] No                [] Yes  (C. Difficile status: [] positive                                                                                                                       [] negative [] pending)    VASOPRESSORS:  [] No    [x] Yes    If yes -   [x] Levophed       [] Dopamine     [] Vasopressin       [] Dobutamine  [] Phenylephrine         [] Epinephrine    CENTRAL LINES:     [] No   [x] Yes   (Date of Insertion:   )           If yes -     [] Right IJ     [] Left IJ [x] Right Femoral [] Left Femoral                   [] Right Subclavian [] Left Subclavian       DYER'S CATHETER:   [] No   [x] Yes  (Date of Insertion:   )     URINE OUTPUT:            [] Good   [x] Low              [] Anuric    REVIEW OF SYSTEMS: Could not be obtained      OBJECTIVE:     VITAL SIGNS:  BP (!) 97/57   Pulse 74   Temp (!) 95.7 °F (35.4 °C) (Bladder)   Resp 19   Ht 5' 6\" (1.676 m)   Wt 171 lb 11.8 oz (77.9 kg)   SpO2 100%   BMI 27.72 kg/m²   Tmax over 24 hours:  Temp (24hrs), Av.8 °F (34.3 °C), Min:91.8 °F (33.2 °C), Max:95.7 °F (35.4 °C)      Patient Vitals for the past 8 hrs:   BP Temp Temp src Pulse Resp SpO2 Weight   22 -- -- -- 74 19 100 % --   22 0752 -- -- -- 73 -- -- --   22 0700 (!) 97/57 -- -- 70 20 100 % --   22 0645 -- -- -- 69 20 100 % --   22 0630 -- -- -- 70 20 99 % --   22 0615 -- -- -- 71 20 99 % --   22 0609 -- -- -- 71 18 98 % --   22 0600 (!) 83/48 (!) 95.7 °F (35.4 °C) Bladder 69 21 96 % 171 lb 11.8 oz (77.9 kg)   22 0545 -- -- -- 73 19 95 % --   22 0530 -- -- -- 73 22 95 % --   22 0515 -- -- -- 73 21 94 % --   22 0500 (!) 117/57 -- -- 74 19 100 % --   22 0445 -- -- -- 73 16 100 % --   22 0430 -- -- -- 72 20 100 % --   22 0415 -- -- -- 70 20 100 % --   22 0407 -- -- -- 68 13 92 % --   22 040 (!) 86/51 (!) 95.2 °F (35.1 °C) Bladder 67 20 98 % --   22 0345 -- -- -- 66 20 100 % --   22 0330 -- -- -- 66 20 100 % --   22 0315 -- -- -- 67 20 100 % --   22 0300 (!) 93/56 -- -- 69 20 100 % -- 12/16/22 0245 -- -- -- 69 20 100 % --   12/16/22 0230 -- -- -- 70 20 100 % --   12/16/22 0215 -- -- -- 69 22 93 % --   12/16/22 0200 (!) 107/92 (!) 94.6 °F (34.8 °C) Bladder 71 22 100 % --   12/16/22 0145 -- -- -- 72 24 100 % --   12/16/22 0130 -- -- -- 73 21 100 % --   12/16/22 0115 -- -- -- 73 (!) 32 100 % --   12/16/22 0109 -- -- -- 74 22 100 % --   12/16/22 0100 131/72 -- -- 75 22 99 % --   12/16/22 0045 -- -- -- 74 22 100 % --         Intake/Output Summary (Last 24 hours) at 12/16/2022 0843  Last data filed at 12/16/2022 0807  Gross per 24 hour   Intake 776.89 ml   Output 812 ml   Net -35.11 ml     Date 12/16/22 0000 - 12/16/22 2359   Shift 8524-6167 2971-6430 2473-6953 24 Hour Total   INTAKE   I.V.(mL/kg) 320. 5(4.1)   320. 5(4.1)   IV Piggyback(mL/kg) 50.1(0.6)   50.1(0.6)   Shift Total(mL/kg) 370.5(4.8)   370.5(4.8)   OUTPUT   Urine(mL/kg/hr) 314(0.5) 100  414   Shift Total(mL/kg) 314(4) 100(1.3)  414(5.3)   Weight (kg) 77.9 77.9 77.9 77.9     Wt Readings from Last 3 Encounters:   12/16/22 171 lb 11.8 oz (77.9 kg)   10/24/22 165 lb 9.6 oz (75.1 kg)   10/24/22 165 lb (74.8 kg)     Body mass index is 27.72 kg/m². PHYSICAL EXAM:  Constitutional: intubated,  HEENT: Pupils sluggish  Respiratory: clear to auscultation  Cardiovascular: Normal heart sounds   abdomen: soft, nontender, nondistended, no masses or organomegaly  NEUROLOGIC: Limited, pupils sluggish  Extremities:  peripheral pulses normal, no pedal edema,.       MEDICATIONS:  Scheduled Meds:   heparin (porcine)  5,000 Units SubCUTAneous 3 times per day    famotidine (PEPCID) injection  20 mg IntraVENous Daily    furosemide  20 mg IntraVENous BID    sodium chloride flush  5-40 mL IntraVENous 2 times per day    chlorhexidine  15 mL Mouth/Throat BID    piperacillin-tazobactam  3,375 mg IntraVENous Q8H    insulin lispro  0-8 Units SubCUTAneous Q4H    atorvastatin  80 mg Oral Nightly    [Held by provider] carvedilol  3.125 mg Oral BID WC    clopidogrel 75 mg Oral Daily     Continuous Infusions:   norepinephrine 3 mcg/min (12/16/22 0826)    propofol 10 mcg/kg/min (12/16/22 1424)    sodium chloride 10 mL/hr at 12/16/22 7072    dextrose      amiodarone 450mg/250ml D5W infusion 0.5 mg/min (12/16/22 0632)     PRN Meds:   sodium chloride flush, 5-40 mL, PRN  sodium chloride, , PRN  ondansetron, 4 mg, Q8H PRN   Or  ondansetron, 4 mg, Q6H PRN  polyethylene glycol, 17 g, Daily PRN  acetaminophen, 650 mg, Q6H PRN   Or  acetaminophen, 650 mg, Q6H PRN  glucose, 4 tablet, PRN  dextrose bolus, 125 mL, PRN   Or  dextrose bolus, 250 mL, PRN  glucagon (rDNA), 1 mg, PRN  dextrose, , Continuous PRN  albuterol, 2.5 mg, As Directed RT PRN  acetylcysteine, 600 mg, As Directed RT PRN        SUPPORT DEVICES: [x] Ventilator [] BIPAP  [] Nasal Cannula [] Room Air    VENT SETTINGS (Comprehensive) (if applicable):  Vent Information  Ventilator ID: [id number]  Equipment Changed: HME  Ventilator Initiate: Yes  Additional Respiratory Assessments  Heart Rate: 74  Resp: 19  SpO2: 100 %  End Tidal CO2: 24 (%)  Position: Semi-Simon's  Humidification Source: HME  Skin Barrier Applied: No    ABGs:     Lab Results   Component Value Date/Time    XQY4JYW 17 08/24/2018 05:02 AM    FIO2 40.0 12/16/2022 05:04 AM     Lactic Acid:   Lab Results   Component Value Date/Time    LACTA 1.4 10/13/2022 12:22 PM    LACTA 1.7 12/29/2018 11:57 AM    LACTA 1.5 12/29/2018 05:30 AM         DATA:  Complete Blood Count:   Recent Labs     12/14/22  1149 12/15/22  0556 12/16/22  0615   WBC 23.0* 18.3* 16.1*   HGB 10.4* 10.4* 9.8*   MCV 87.4 82.7 81.8*    273 236   RBC 4.12 4.10 3.91*   HCT 36.0* 33.9* 32.0*   MCH 25.2 25.4 25.1*   MCHC 28.9 30.7 30.6   RDW 17.4* 17.3* 17.5*   MPV 11.0 11.2 11.1        PT/INR:    Lab Results   Component Value Date/Time    PROTIME 13.7 12/14/2022 01:38 PM    INR 1.3 12/14/2022 01:38 PM     PTT:    Lab Results   Component Value Date/Time    APTT 26.0 12/14/2022 01:38 PM       Basal Metabolic Profile:   Recent Labs     12/15/22  1809 12/15/22  2000 12/16/22  0029 12/16/22  0230 12/16/22  0437 12/16/22  0615 12/16/22  0753     --  135  --   --  139  --    K 4.2   < > 4.2   < > 4.0 3.9 4.0   BUN 64*  --  67*  --   --  67*  --    CREATININE 2.15*  --  2.38*  --   --  2.43*  --      --  99  --   --  102  --    CO2 15*  --  16*  --   --  16*  --     < > = values in this interval not displayed. Magnesium:   Lab Results   Component Value Date/Time    MG 2.1 12/16/2022 06:15 AM    MG 2.2 12/16/2022 12:29 AM    MG 2.1 12/15/2022 06:09 PM     Phosphorus:   Lab Results   Component Value Date/Time    PHOS 7.7 12/16/2022 06:15 AM    PHOS 7.8 12/16/2022 12:29 AM    PHOS 7.7 12/15/2022 06:09 PM     S. Calcium:  Recent Labs     12/16/22  0615   CALCIUM 8.0*     S. Ionized Calcium:No results for input(s): IONCA in the last 72 hours. Urinalysis:   Lab Results   Component Value Date/Time    NITRU NEGATIVE 12/14/2022 12:26 PM    COLORU Yellow 12/14/2022 12:26 PM    PHUR 6.0 12/14/2022 12:26 PM    WBCUA 50  12/14/2022 12:26 PM    RBCUA 0 TO 2 12/14/2022 12:26 PM    MUCUS NOT REPORTED 11/21/2018 12:12 PM    TRICHOMONAS NOT REPORTED 11/21/2018 12:12 PM    YEAST FEW 12/14/2022 12:26 PM    BACTERIA RARE 12/14/2022 09:30 AM    SPECGRAV 1.018 12/14/2022 12:26 PM    LEUKOCYTESUR NEGATIVE 12/14/2022 12:26 PM    UROBILINOGEN Normal 12/14/2022 12:26 PM    BILIRUBINUR NEGATIVE 12/14/2022 12:26 PM    GLUCOSEU 1+ 12/14/2022 12:26 PM    KETUA NEGATIVE 12/14/2022 12:26 PM    AMORPHOUS TRACE 11/21/2018 12:12 PM       CARDIAC ENZYMES: No results for input(s): CKMB, CKMBINDEX, TROPONINI in the last 72 hours. Invalid input(s): CKTOTAL;3  BNP: No results for input(s): BNP in the last 72 hours.     LFTS  Recent Labs     12/14/22  0855 12/14/22  1149   ALKPHOS 88 128*   ALT 36* 86*   AST 49* 138*   BILITOT 0.8 1.2   LABALBU 2.7* 3.1*       AMYLASE/LIPASE/AMMONIA  Recent Labs     12/16/22  0753   AMMONIA 21 Last 3 Blood Glucose:   Recent Labs     12/14/22  1338 12/14/22  1707 12/15/22  0122 12/15/22  0556 12/15/22  1153 12/15/22  1809 12/16/22  0029 12/16/22  0615   GLUCOSE 256* 268* 214* 172* 141* 145* 180* 195*      HgBA1c:    Lab Results   Component Value Date/Time    LABA1C 8.5 12/15/2022 05:56 AM         TSH:    Lab Results   Component Value Date/Time    TSH 1.66 10/14/2022 10:23 AM     ANEMIA STUDIES  No results for input(s): LABIRON, TIBC, FERRITIN, IEUKCZYA21, FOLATE, OCCULTBLD in the last 72 hours. Cultures during this admission:     Blood cultures:                 [] None drawn      [] Negative             []  Positive (Details:  )  Urine Culture:                   [] None drawn      [] Negative             []  Positive (Details:  )  Sputum Culture:               [] None drawn       [] Negative             []  Positive (Details:  )   Endotracheal aspirate:     [] None drawn       [] Negative             []  Positive (Details:  )        ASSESSMENT:     Principal Problem:    Cardiac arrest ([facility] [address]  Active Problems:    Acute on chronic systolic congestive heart failure (HCC)    Acute respiratory failure with hypoxia and hypercapnia (HCC)    Pulmonary hypertension (HCC)    Diabetes mellitus with kidney disease ([facility] [address]    NSTEMI (non-ST elevated myocardial infarction) ([facility] [address]    Ventricular tachycardia    Acidosis    Acute pulmonary edema ([facility] [address]  Resolved Problems:    * No resolved hospital problems. *        PLAN:       Cardiac arrest -   - Continue mechanical ventilation  - RT inpatient protocol  - Daily ABG  - Cooling as per hypothermia protocol, currently rewarming  - Follow-up on echo  - Follow-up on ICD interrogation  - Monitor sugars  - Appreciate cardiology recommendations  - Pressor support as needed  - Monitor neurological response, LTME shows severe diffuse cortical dysfunction, severe encephalopathy  - Follow upon neuro critical care recommendations. Mri at 72 hours  - Continue Zosyn     2.  CHFrEF -  - secondary to ischemic cardiomyopathy  - Last echo showing EF of 25%, AICD in place  - Appreciate cardiology recommendations     3. DM -   - Initially hypoglycemic  - Monitor sugars closely  - Medium dose sliding scale  - POCT glucose checks  - Hypoglycemia protocol    4. CHAD -   - creatinine today 2.43, up trending  - Secondary to hypoperfusion  - Continue Lasix 20 mg IV twice daily  - Nephrology on board appreciate recommendations     5. CKD -   - likely secondary to DM  - baseline around 1.5-1.7        Joe Gutierrez MD, M.D. Department of Internal Medicine/ Critical care  [facility])             12/16/2022, 8:43 AM       Attending Physician Statement  I have discussed the care of Lex Cedillo, including pertinent history and exam findings with the resident. I have reviewed the key elements of all parts of the encounter with the resident. I have seen and examined the patient with the resident. I agree with the assessment and plan and status of the problem list as documented. Patient is on Levophed infusion. She is not on sedative not arousable to follow commands does not respond to name. Does have spontaneous respiration and pupillary reflex. She is on amiodarone infusion echocardiogram is pending. Creatinine is 2.43 she is on Lasix 20 mg twice daily per nephrology urine output is 600 mL in last 24 hours. Chest x-ray shows bilateral pleural effusion troponin is 191 need follow-up with cardiology. WBC is 16 bicarbonate is 16 currently. Ventilator setting PRVC/20/500/8/40 5% ABG 7.3 0/33/66/16. LTM shows severely abnormal EEG with burst suppression pattern consistent with severe diffuse encephalopathy. Followed by neuro critical care. MRI brain planned tomorrow. She is rewarmed. Patient was seen by palliative care and will need follow-up with palliative care.   Start tube feeding continue supportive care and ventilator support at this time.  was not able to be get in touch because of he is taking care of grandchildren at home and apparently did not respond to phone number provided we will try to contact again and update. Discussed with nursing staff, treatment and plan discussed. Discussed with respiratory therapist.    Total critical care time caring for this patient with life threatening, unstable organ failure, including direct patient contact, management of life support systems, review of data including imaging and labs, discussions with other team members and physicians at least 40 min so far today, excluding procedures. Please note that this chart was generated using voice recognition Dragon dictation software. Although every effort was made to ensure the accuracy of this automated transcription, some errors in transcription may have occurred.      Roberto Nieves MD  12/16/2022 10:45 AM

## 2022-12-16 NOTE — PLAN OF CARE
Problem: Pain  Goal: Verbalizes/displays adequate comfort level or baseline comfort level  12/16/2022 1008 by Jovon Lozano RN  Outcome: Progressing     Problem: Respiratory - Adult  Goal: Achieves optimal ventilation and oxygenation  12/16/2022 1008 by Jovon Lozano RN  Outcome: Progressing     Problem: Skin/Tissue Integrity  Goal: Absence of new skin breakdown  12/16/2022 1008 by Jovon Lozano RN  Outcome: Progressing     Problem: Safety - Adult  Goal: Free from fall injury  12/16/2022 1008 by Jovon Lozano RN  Outcome: Progressing

## 2022-12-16 NOTE — PROGRESS NOTES
Daily Progress Note  Neuro Critical Care    Patient Name: Genaro Kiser  Patient : 1960  Room/Bed: 3025/3025-01  Code Status: Full Code  Allergies: Allergies   Allergen Reactions    Codeine Nausea And Vomiting    Nsaids Nausea Only     nausea       CHIEF COMPLAINT:      PEA arrest     INTERVAL HISTORY    Initial Presentation (Admitted ): The patient is a 60 y.o. female hx of CAD w/CABG, CHF EF 25% w/AICD, COPD, CKD, HTN, HLD, DM2 who presents with PEA arrest. Patient found down by her . Patient down for approximately 30 minutes without CPR. EMS arrived patient found to be in PEA arrest CPR initiated. Patient also found to be hypoglycemic given dextrose. Patient arrived at [facility] underwent multiple rounds of CPR/epi and ROSC was achieved. Patient intubated by EMS and cooling cath placed by ED. Patient transferred to [facility] and was noted to have decerebrate posturing in CT scan at 1047 requiring 10mg of vec. [facility] pH at [facility] 6.99. Patient currently not on any sedation or paralytics and is undergoing hypothermic target temperature mgmt. 12/15: Patient requiring levophed for pressure support. Patient also requiring low dose propofol for vent dyssynchrony. Last 24h:   No acute events overnight. Currently rewarming, to be completed at 11 AM.  On minimal sedation. Brainstem reflexes intact, extensor posturing with bilateral upper extremities, no movement with the lower extremities. NSEs and MRI brain pending.     CURRENT MEDICATIONS:  SCHEDULED MEDICATIONS:   heparin (porcine)  5,000 Units SubCUTAneous 3 times per day    famotidine (PEPCID) injection  20 mg IntraVENous Daily    furosemide  20 mg IntraVENous BID    sodium chloride flush  5-40 mL IntraVENous 2 times per day    chlorhexidine  15 mL Mouth/Throat BID    piperacillin-tazobactam  3,375 mg IntraVENous Q8H    insulin lispro  0-8 Units SubCUTAneous Q4H    atorvastatin  80 mg Oral Nightly [Held by provider] carvedilol  3.125 mg Oral BID WC    clopidogrel  75 mg Oral Daily     CONTINUOUS INFUSIONS:   norepinephrine 4 mcg/min (22 7211)    propofol 10 mcg/kg/min (22)    sodium chloride 10 mL/hr at 22    dextrose      amiodarone 450mg/250ml D5W infusion 0.5 mg/min (22 0632)     PRN MEDICATIONS:   sodium chloride flush, sodium chloride, ondansetron **OR** ondansetron, polyethylene glycol, acetaminophen **OR** acetaminophen, glucose, dextrose bolus **OR** dextrose bolus, glucagon (rDNA), dextrose, albuterol, acetylcysteine    VITALS:  Temperature Range: Temp: (!) 95.7 °F (35.4 °C) Temp  Av.6 °F (34.2 °C)  Min: 91.8 °F (33.2 °C)  Max: 95.7 °F (35.4 °C)  BP Range: Systolic (02SVL), JTJ:879 , Min:83 , TZB:841     Diastolic (10LBX), XCM:55, Min:48, Max:92    Pulse Range: Pulse  Av.1  Min: 59  Max: 75  Respiration Range: Resp  Av.5  Min: 13  Max: 32  Current Pulse Ox: SpO2: 100 %  24HR Pulse Ox Range: SpO2  Av.2 %  Min: 92 %  Max: 100 %  Patient Vitals for the past 12 hrs:   BP Temp Temp src Pulse Resp SpO2 Weight   22 0700 (!) 97/57 -- -- 70 20 100 % --   22 0645 -- -- -- 69 20 100 % --   22 0630 -- -- -- 70 20 99 % --   22 0615 -- -- -- 71 20 99 % --   22 0609 -- -- -- 71 18 98 % --   22 0600 (!) 83/48 (!) 95.7 °F (35.4 °C) Bladder 69 21 96 % 171 lb 11.8 oz (77.9 kg)   22 0545 -- -- -- 73 19 95 % --   22 0530 -- -- -- 73 22 95 % --   22 0515 -- -- -- 73 21 94 % --   22 0500 (!) 117/57 -- -- 74 19 100 % --   22 0445 -- -- -- 73 16 100 % --   22 0430 -- -- -- 72 20 100 % --   22 0415 -- -- -- 70 20 100 % --   22 0407 -- -- -- 68 13 92 % --   22 0400 (!) 86/51 (!) 95.2 °F (35.1 °C) Bladder 67 20 98 % --   22 0345 -- -- -- 66 20 100 % --   22 0330 -- -- -- 66 20 100 % --   22 0315 -- -- -- 67 20 100 % --   22 0300 (!) 93/56 -- -- 69 20 100 % -- 12/16/22 0245 -- -- -- 69 20 100 % --   12/16/22 0230 -- -- -- 70 20 100 % --   12/16/22 0215 -- -- -- 69 22 93 % --   12/16/22 0200 (!) 107/92 (!) 94.6 °F (34.8 °C) Bladder 71 22 100 % --   12/16/22 0145 -- -- -- 72 24 100 % --   12/16/22 0130 -- -- -- 73 21 100 % --   12/16/22 0115 -- -- -- 73 (!) 32 100 % --   12/16/22 0109 -- -- -- 74 22 100 % --   12/16/22 0100 131/72 -- -- 75 22 99 % --   12/16/22 0045 -- -- -- 74 22 100 % --   12/16/22 0030 -- -- -- 74 23 100 % --   12/16/22 0015 -- -- -- 73 24 100 % --   12/16/22 0000 (!) 136/59 (!) 94.3 °F (34.6 °C) Bladder 73 23 100 % --   12/15/22 2345 -- -- -- 69 23 100 % --   12/15/22 2330 -- -- -- 70 24 99 % --   12/15/22 2315 -- -- -- 73 22 99 % --   12/15/22 2300 103/65 -- -- 69 24 99 % --   12/15/22 2245 -- -- -- 73 19 99 % --   12/15/22 2230 -- -- -- 71 25 99 % --   12/15/22 2215 -- -- -- 72 17 100 % --   12/15/22 2200 (!) 97/58 (!) 94.5 °F (34.7 °C) Bladder 72 21 100 % --   12/15/22 2145 -- -- -- 72 24 100 % --   12/15/22 2130 -- -- -- 71 23 100 % --   12/15/22 2115 -- -- -- 71 23 98 % --   12/15/22 2114 -- -- -- 71 21 100 % --   12/15/22 2100 99/88 -- -- 70 24 99 % --   12/15/22 2045 -- -- -- 70 21 98 % --   12/15/22 2030 -- -- -- 70 23 99 % --   12/15/22 2015 -- -- -- 69 23 100 % --   12/15/22 2000 (!) 99/58 (!) 93.9 °F (34.4 °C) Bladder 68 21 99 % --   12/15/22 1945 -- -- -- 69 23 100 % --   12/15/22 1930 90/62 -- -- 68 18 100 % --           RECENT LABS:   Lab Results   Component Value Date    WBC 16.1 (H) 12/16/2022    HGB 9.8 (L) 12/16/2022    HCT 32.0 (L) 12/16/2022     12/16/2022    CHOL 199 07/15/2022    TRIG 69 12/15/2022    HDL 25 (L) 07/15/2022    LDLDIRECT 70 07/15/2022    ALT 86 (H) 12/14/2022     (H) 12/14/2022     12/16/2022    K 4.0 12/16/2022    CL 99 12/16/2022    CREATININE 2.38 (H) 12/16/2022    BUN 67 (H) 12/16/2022    CO2 16 (L) 12/16/2022    TSH 1.66 10/14/2022    INR 1.3 12/14/2022    LABA1C 8.5 (H) 12/15/2022 LABMICR 57 (H) 07/15/2022     24 HOUR INTAKE/OUTPUT:  Intake/Output Summary (Last 24 hours) at 12/16/2022 0729  Last data filed at 12/16/2022 3631  Gross per 24 hour   Intake 822.33 ml   Output 757 ml   Net 65.33 ml         Labs and Images reviewed with:  [] Dr. Yudy Marshall. Arben    [x] Dr. Dimitry Franco  [] Dr. Rani Lorenz  [] There are no new interval images to review. PHYSICAL EXAM       CONSTITUTIONAL:  Does not open eyes to verbal/painful stimuli. Does not follow commands. Appears critically ill   HEAD:  normocephalic, atraumatic    EYES:  5mm birksly reactive BL. Mid gaze fixation. Does not track   ENT:  moist mucous membranes   NECK:  supple, symmetric   LUNGS:  Equal air entry bilaterally   CARDIOVASCULAR:  normal s1 / s2, RRR, distal pulses intact   ABDOMEN:  Distended   NEUROLOGIC:  Mental Status:  comatose, slight eye-opening to noxious stimuli. Cranial Nerves:    PERRL, roving eye movements. Oculocephalic reflex intact  Cough and gag reflex intact. Corneal reflex intact. Pupils 5mm briskly reactive BL     Motor Exam:                No spontaneous movement of extremities, extensor posturing to noxious stimuli in bilateral upper extremities. Sensory:                Extensor posturing to pain in bilateral upper extremities. Plantar Response:  Right:  no response  Left:  no response           DRAINS:  [x] There are no drains for Neuro Critical Care to monitor at this time. ASSESSMENT AND PLAN:     This is a 60 y.o. female with PEA arrest unknown etiology at this point. Suspect anoxic brain injury with downtime of 30+ minutes. Brainstem reflexes intact, limited exam.  Underwent TTM, currently rewarming.    - MRI brain without contrast at 72 hours post cardiac arrest.  - Neuron specific enolase at 24, 48 and 72 hours  - LTME ongoing, showing delta slowing of 1 to 2 Hz.  - Neuro checks per protocol     We will continue to follow along.       For any changes in exam or patient status please contact Neuro Critical Care. We will continue to follow along. For any changes in exam or patient status please contact Neuro Critical Care.       Yany Sandoval MD  Neuro Critical Care  Pager [phone]  12/16/2022     7:29 AM

## 2022-12-16 NOTE — CONSULTS
..  Palliative Care Inpatient Consult    NAME:  Naomi Alston RECORD NUMBER:  [id number]  AGE: 60 y.o. GENDER: female  : 1960  TODAY'S DATE:  2022    Reasons for Consultation:    Symptom and/or pain management  Provision of information regarding PC and/or hospice philosophies  Complex, time-intensive communication and interdisciplinary psychosocial support  Clarification of goals of care and/or assistance with difficult decision-making  Guidance in regards to resources and transition(s)    Members of PC team contributing to this consultation are : Yvonne Lucero, Palliative Care APRN-CNP    Plan      Palliative Interaction:   Went to see the patient in room. patient is currently intubated. Off sedation. Nonvertebral because of intubation. Does not follow-up any commands. I spoke to bedside nurse Vick Soto and she relayed to me for her neuro exam she was posturing. And she did not follow any commands and per neur-critical they are planning to get an MRI brain tomorrow. Family is not at bedside and they didn't call for any updates in last 2 days per nurse report. I reached out to next of Kin which is her  given phone number in chart. Called both Mobile and phone number. Unable to reach and unable to leave voice message. Tried calling emergency number daughter [place] but unable to reach her either. Then called her grand child Yanira Ledbetter. I explained our palliative role that we are extra layer of support and want to understand patient goals of cares and wishes. She asked for update about patient and she will get hold of grandfather. She mentioned that he is responsible to take care of four grandchildren including 2 girls age 15, age 16 and 2 boys age 6 and 5.  She also mentioned that he is scared to come to hospital and he may need a ride to come to hospital.     Updated patent grandchild about multiple cardiac arrest during this hospitalization, being intubated and on pressor support. Did update that we are waiting for brain MRI to look for anoxic brain injury and neuro-critical is on  board. We told her that she is not following commands despite being off sedation. We asked about the legal documentation regarding POA she said that her grandfather will be the decision maker and she will help her to arrange. I briefly explained about code status and went over the ACP which she filled in recent hospitalization on 10/12/2022 where it was mentioned that she wanted to be intubated for brief period of time but if needed to prolong she doesn't want if prognosis poor. We offered support. Education/support to staff  Education/support to family  Communications with primary service  Providing support for coping/adaptation/distress of family  Discussing meaning/purpose   Continue with current plan of care  Clarification of medical condition to patient and family  Palliative care orders introduced  Recognizing, reflecting, and empathizing with family members' anticipatory grief  Principle Problem/Diagnosis:  Cardiac arrest [facility])    Additional Assessments:   Principal Problem:    Cardiac arrest ([facility] [address]  Active Problems:    Acute on chronic systolic congestive heart failure ([address]    Acute respiratory failure with hypoxia and hypercapnia ([address]    Pulmonary hypertension ([address]    Diabetes mellitus with kidney disease ([address]    NSTEMI (non-ST elevated myocardial infarction) ([address]    Ventricular tachycardia    Acidosis    Acute pulmonary edema ([address]  Resolved Problems:    * No resolved hospital problems. *    1- Symptom management/ pain control     Pain Assessment:  The patient is not having any pain. Anxiety:  none                          Dyspnea:  acute dyspnea [id number]%                          Fatigue:  none    Other: We feel the patient symptoms are being controlled. her current regimen is reviewed by myself and discussed with the staff.    We recommend adjusting her medications as follows      2- Goals of care evaluation   The patient goals of care are support for family/caregiver   Goals of care discussed with:    [] Patient independently    [] Patient and Family    [x] Family or Healthcare DPOA independently    [] Unable to discuss with patient, family/DPOA not present    3- Code Status  Full Code    4- Other recommendations   - We will continue to provide comfort and support to the patient and the family  Please call with any palliative questions or concerns. Palliative Care Team is available via perfect serve or via phone. Palliative Care will continue to follow Ms. Vargas's care as needed. History of Present Illness     The patient is a 60 y.o. Non- / non  female who presents with Cardiac Arrest      Referred to Palliative Care by   [x] Physician   [] Nursing  [] Family Request   [] Other:       She was admitted to the critical care service for Cardiac arrest [facility]) [I46.9]  Ventricular tachyarrhythmia [I47.20]  Other ascites [R18.8]  Congestive heart failure, unspecified HF chronicity, unspecified heart failure type ([facility] [address] [I50.9]. Her hospital course has been associated with Cardiac arrest [facility]), acute kidney injury, presenting shock on pressors, aspiration pneumonia. The patient has a complicated medical history and has been hospitalized since 12/14/2022 10:09 AM. I reviewed patients EMR, spoke to RN, and patient/family to collect history. Patient with past medical history of V. fib arrest s/p AICD in 0335, systolic heart failure with EF 25%, hypertension, diabetes mellitus, pulmonary hypertension, CKD. She was found down in the bathroom with unknown downtime supposedly more than 30-minute. When EMS arrived and CPR initiated. Patient was found to be PEA arrest.  Patient was also found to be hypoglycemic with blood sugar of 18 on arrival.  Was transferred from [facility] ER.   She again cardiac arrest and ROSC was achieved CT head was negative without intracranial abnormality. CT chest show no pulmonary and small exam for bilateral pleural effusion. Currently intubated. Without sedation. Neuro critical care on board. LTME show severe encephalopathy. Suspected anoxic brain injury.   Pending MRI    Active Hospital Problems    Diagnosis Date Noted    NSTEMI (non-ST elevated myocardial infarction) ([address] [I21.4] 12/15/2022     Priority: Medium    Ventricular tachycardia [I47.20] 12/15/2022     Priority: Medium    Cardiac arrest ([address] [I46.9] 12/14/2022     Priority: Medium    Pulmonary hypertension ([address] [I27.20] 12/14/2022     Priority: Medium    Diabetes mellitus with kidney disease ([address] [E11.21] 12/14/2022     Priority: Medium    Acute respiratory failure with hypoxia and hypercapnia (HCC) [J96.01, J96.02] 10/17/2022     Priority: Medium    Acute on chronic systolic congestive heart failure ([address] [I50.23] 10/14/2022     Priority: Medium    Acute pulmonary edema ([address] [J81.0] 11/04/2018    Acidosis [E87.20] 08/23/2018       PAST MEDICAL HISTORY      Diagnosis Date    CAD (coronary artery disease)     Cardiomyopathy ([address]     COPD (chronic obstructive pulmonary disease) ([address]     Depression     Diabetes mellitus ([address]     H/O mitral valve repair     History of fractured kneecap 2017    right    Hx of CABG     Hx of myocardial infarction     Hyperlipidemia     Hypertension     ICD (implantable cardioverter-defibrillator) in place     Kidney calculi     Osteoarthritis     Patient in clinical research study 12/14/2022    Pneumonia     Renal calculi        PAST SURGICAL HISTORY  Past Surgical History:   Procedure Laterality Date    ANKLE SURGERY Right 2009    plate and screws     CARDIAC DEFIBRILLATOR PLACEMENT  06/01/15    CHOLECYSTECTOMY  1990    CORONARY ARTERY BYPASS GRAFT  05/28/15    X 3- Dr Verner Gable LIMA-LAD<SVG-Diag, SVG-PDA, MV Repair with 30 min CE IMR ring    CYSTOSCOPY Left 11/08/2018    HYSTERECTOMY (CERVIX STATUS UNKNOWN)  1990    KIDNEY STONE SURGERY Left 2010    MITRAL VALVE SURGERY  6/4/15    UT CYSTO/URETERO W/LITHOTRIPSY &INDWELL STENT INSRT Left 11/8/2018    CYSTOSCOPY URETEROSCOPY performed by Fela Palacio MD at [address] &INDWELL STENT INSRT Left 11/8/2018    CYSTOSCOPY STENT INSERTION performed by Fela Palacio MD at [address] &INDWELL STENT INSRT Left 11/15/2018    LEFT CYSTOSCOPY URETEROSCOPY LASER,HLL, LEFT STENT EXCHANGE performed by Fela Palacio MD at [address]  Social History     Tobacco Use    Smoking status: Every Day     Packs/day: 0.50     Years: 32.00     Pack years: 16.00     Types: Cigarettes     Last attempt to quit: 12/25/2018     Years since quitting: 3.9    Smokeless tobacco: Never   Vaping Use    Vaping Use: Never used   Substance Use Topics    Alcohol use: No     Alcohol/week: 0.0 standard drinks    Drug use: No       FAMILY HISTORY  . Miranda Red   Family History   Problem Relation Age of Onset    Stroke Father 76    Other Father         low BS    Diabetes Mother         ALLERGIES  Allergies   Allergen Reactions    Codeine Nausea And Vomiting    Nsaids Nausea Only     nausea         MEDICATIONS  Current Medications    heparin (porcine)  5,000 Units SubCUTAneous 3 times per day    famotidine (PEPCID) injection  20 mg IntraVENous Daily    furosemide  20 mg IntraVENous BID    sodium chloride flush  5-40 mL IntraVENous 2 times per day    chlorhexidine  15 mL Mouth/Throat BID    piperacillin-tazobactam  3,375 mg IntraVENous Q8H    insulin lispro  0-8 Units SubCUTAneous Q4H    atorvastatin  80 mg Oral Nightly    [Held by provider] carvedilol  3.125 mg Oral BID WC    clopidogrel  75 mg Oral Daily     sodium chloride flush, sodium chloride, ondansetron **OR** ondansetron, polyethylene glycol, acetaminophen **OR** acetaminophen, glucose, dextrose bolus **OR** dextrose bolus, glucagon (rDNA), dextrose, albuterol, acetylcysteine  IV Drips/Infusions   norepinephrine 2 mcg/min (12/16/22 0916)    propofol Stopped (12/16/22 0811)    sodium chloride 10 mL/hr at 12/16/22 0632    dextrose      amiodarone 450mg/250ml D5W infusion 0.5 mg/min (12/16/22 3182)     Home Medications  No current facility-administered medications on file prior to encounter. Current Outpatient Medications on File Prior to Encounter   Medication Sig Dispense Refill    furosemide (LASIX) 40 MG tablet Take 1 tablet by mouth in the morning and 1 tablet in the evening. 60 tablet 5    clopidogrel (PLAVIX) 75 MG tablet Take 1 tab daily 30 tablet 11    atorvastatin (LIPITOR) 80 MG tablet 1 tab daily 30 tablet 11    carvedilol (COREG) 3.125 MG tablet take 1 tablet by mouth twice a day with meals 60 tablet 11    gabapentin (NEURONTIN) 400 MG capsule Take 1 capsule by mouth 3 times daily for 180 days. 270 capsule 1    midodrine (PROAMATINE) 5 MG tablet take 1 tablet by mouth three times a day 90 tablet 1    sertraline (ZOLOFT) 100 MG tablet Take one tablet by mouth once daily 90 tablet 1    insulin glargine (LANTUS SOLOSTAR) 100 UNIT/ML injection pen INJECT 45 UNITS SUBCUTANEOUSLY EVERY MORNING AND 25 UNITS EVERY EVENING 30 mL 5    Insulin Pen Needle (BD PEN NEEDLE PAIGE 2ND GEN) 32G X 4 MM MISC use 1 PEN NEEDLE to inject MEDICATION subcutaneously daily 100 each 0    Lancets MISC Pt testing BS once daily and prn 100 each 3    glucose monitoring (FREESTYLE) kit Please dispense meter covered by patients insurance. 1 kit 0    blood glucose monitor strips Testing BS once daily and as needed.  Dispense strips to go with new glucometer 100 strip 3    albuterol (PROVENTIL) (2.5 MG/3ML) 0.083% nebulizer solution Take 3 mLs by nebulization every 6 hours as needed for Wheezing (as needed for wheezing) 120 each 1    Respiratory Therapy Supplies (NEBULIZER/TUBING/MOUTHPIECE) KIT 1 kit by Does not apply route daily as needed (for SOB, cough) 1 kit 0    Respiratory Therapy Supplies (NEBULIZER COMPRESSOR) KIT 1 kit by Does not apply route once for 1 dose 1 kit 0    lactobacillus (CULTURELLE) capsule Take 1 capsule by mouth 2 times daily (with meals) 30 capsule 0    blood glucose monitor strips Use to test daily 100 strip 5    blood glucose monitor kit and supplies Use to test daily 1 kit 0    Cholecalciferol (VITAMIN D3) 5000 units TABS Take 5,000 Units by mouth daily      glucose blood VI test strips (EXACTECH TEST) strip 1 each by In Vitro route daily Pt testing BS once daily and prn 100 each 3    Multiple Vitamins-Minerals (THERAPEUTIC MULTIVITAMIN-MINERALS) tablet Take 1 tablet by mouth daily (with breakfast) 60 tablet 3         Data         BP (!) 107/58   Pulse 76   Temp (!) 95.7 °F (35.4 °C) (Bladder)   Resp 22   Ht 5' 6\" (1.676 m)   Wt 171 lb 11.8 oz (77.9 kg)   SpO2 100%   BMI 27.72 kg/m²     Wt Readings from Last 3 Encounters:   12/16/22 171 lb 11.8 oz (77.9 kg)   10/24/22 165 lb 9.6 oz (75.1 kg)   10/24/22 165 lb (74.8 kg)        Lab Results   Component Value Date    WBC 16.1 (H) 12/16/2022    HGB 9.8 (L) 12/16/2022    HCT 32.0 (L) 12/16/2022    MCV 81.8 (L) 12/16/2022     12/16/2022    ,   Lab Results   Component Value Date/Time     12/16/2022 06:15 AM    K 4.0 12/16/2022 07:53 AM     12/16/2022 06:15 AM    CO2 16 12/16/2022 06:15 AM    BUN 67 12/16/2022 06:15 AM    CREATININE 2.43 12/16/2022 06:15 AM    GLUCOSE 195 12/16/2022 06:15 AM    CALCIUM 8.0 12/16/2022 06:15 AM    ,   Lab Results   Component Value Date    INR 1.3 12/14/2022    INR 1.4 12/14/2022    INR 1.1 11/28/2020    PROTIME 13.7 (H) 12/14/2022    PROTIME 14.1 (H) 12/14/2022    PROTIME 13.3 11/28/2020   , .   Lab Results   Component Value Date    ALT 86 (H) 12/14/2022     (H) 12/14/2022    ALKPHOS 128 (H) 12/14/2022    BILITOT 1.2 12/14/2022   ,   Lab Results   Component Value Date    CKTOTAL 221 (H) 08/23/2018   ,   Lab Results   Component Value Date/Time    COLORU Yellow 12/14/2022 12:26 PM NITRU NEGATIVE 12/14/2022 12:26 PM    GLUCOSEU 1+ 12/14/2022 12:26 PM    KETUA NEGATIVE 12/14/2022 12:26 PM    UROBILINOGEN Normal 12/14/2022 12:26 PM    [address] NEGATIVE 12/14/2022 12:26 PM   ,   Lab Results   Component Value Date/Time    AMYLASE 65 03/22/2014 09:30 PM   ,   Lab Results   Component Value Date    LIPASE 61 (H) 03/22/2014   ,   Lab Results   Component Value Date    DDIMER 10.94 (H) 12/14/2022   , No results found for: BNP, No results found for: CEA, No results found for: , No results found for: AFPAMN,   Lab Results   Component Value Date    LACTA 1.4 10/13/2022   ,     CT Result (most recent):  CT CHEST PULMONARY EMBOLISM W CONTRAST 12/14/2022    Narrative  EXAMINATION:  CTA OF THE CHEST 12/14/2022 10:26 am    TECHNIQUE:  CTA of the chest was performed after the administration of intravenous  contrast.  Multiplanar reformatted images are provided for review. MIP  images are provided for review. Automated exposure control, iterative  reconstruction, and/or weight based adjustment of the mA/kV was utilized to  reduce the radiation dose to as low as reasonably achievable. COMPARISON:  None. HISTORY:  ORDERING SYSTEM PROVIDED HISTORY: Possible PE arrest  TECHNOLOGIST PROVIDED HISTORY:  Possible PE arrest  Decision Support Exception - unselect if not a suspected or confirmed  emergency medical condition->Emergency Medical Condition (MA)  Reason for Exam: possible pe    FINDINGS:  Pulmonary Arteries: Pulmonary arteries are adequately opacified for  evaluation. No evidence of intraluminal filling defect to suggest pulmonary  embolism. Main pulmonary artery is normal in caliber. Mediastinum: No evidence of mediastinal lymphadenopathy. There is moderate  cardiomegaly, with four-chamber enlargement, with significant dilatation of  the left ventricle. There is a mitral valve prosthesis in place.   There is  dilatation of the inferior vena cava with retrograde filling of contrast of  the hepatic veins. There is no acute abnormality of the thoracic aorta. ET  tube and gastric tube are in good position. Lungs/pleura: There is a moderate right and mild left pleural effusions  noted, with compressive atelectasis in the right lower lobe. There are  thickened interlobular septae the noted    Upper Abdomen: There is moderate ascites noted. Soft Tissues/Bones: No acute bone or soft tissue abnormality. Impression  No evidence of pulmonary embolism. Evidence of CHF, with bilateral pleural  effusions, thickening of the interlobular septae the, and a prominent  cardiomegaly. There is four-chamber enlargement, with significant dilatation  of the left ventricle. There is evidence of tricuspid insufficiency. There  is a mitral valve prosthesis in place. Moderate ascites noted. ET tube and gastric tube are in good position. Xray Result (most recent):  XR CHEST PORTABLE 12/15/2022 (Preliminary)  This result has not been signed. Information might be incomplete. Narrative  EXAMINATION:  ONE X-RAY VIEW OF THE CHEST; ONE SUPINE X-RAY VIEW OF THE ABDOMEN    12/15/2022 8:35 am    COMPARISON:  12/14/2022    HISTORY:  ORDERING SYSTEM PROVIDED HISTORY: intubated, f/u opacity  TECHNOLOGIST PROVIDED HISTORY:  intubated, f/u opacity    FINDINGS:  Chest: Endotracheal tube tip is approximately 4 cm above the tylor. Nasogastric tube tip is in the mid body of the stomach. Sternal wires and  valve prosthesis from prior heart surgery. Left subclavian AICD  redemonstrated. Right femoral central venous catheter tip is in the right  atrium. Stable cardiomegaly with perihilar congestive changes and  indistinctness of pulmonary vessels suggesting interstitial pulmonary edema. There is mild patchy atelectasis at the left lung base. Similar hazy opacity  over the right lung likely due to layering pleural fluid, atelectasis, and  interstitial edema as noted on the recent CT.   Superimposed infection should  be excluded clinically. When taking into account technical differences,  appearance of the chest is similar. Abdomen: Overall exam is slightly limited by patient's size. Nasogastric  tube tip is in the stomach. There are clips in the right upper quadrant  abdomen. Right femoral venous catheter tip is in the right atrium. Subtle  haziness over the abdomen could be due to ascites as noted on the recent CT. There are a few gas-filled distended bowel loops in the central abdomen. Small amount of gas in the distal colon/rectal region. Bowel gas pattern is  generally nonspecific but not clearly obstructive. Degenerative changes of  the spine. Tubal ligation clips in the pelvis. Follow-up studies are  suggested. Impression  Overall relatively stable cardiopulmonary status. Cardiomegaly with congestive changes and pulmonary edema. Nasogastric tube tip is in the stomach. MRI Result (most recent):  MRI FOOT LEFT W WO CONTRAST 02/17/2021    Narrative  CLINICAL HISTORY: Ulcer of plantar left foot with necrosis of bone (L97.424). MRI LEFT FOOT WITH AND WITHOUT CONTRAST:      TECHNIQUE: Before and after the administration of 8 mL of IV MultiHance  contrast, multiplanar long and short TR and TE images were obtained through the  left forefoot. FINDINGS:    Skin wound/ulcer is noted plantar to the second metatarsal head, with  surrounding subcutaneous soft tissue edema/enhancement, suggestive of  cellulitis. No drainable abscess is seen. Moderately severe osteoarthrosis is noted in the medial aspect of the first IP  joint, with small marginal osteophytes and moderate degenerative subchondral  bone edema in the distal medial aspect of the first proximal phalanx. The bone marrow is otherwise normal in signal, including the second metatarsal,  without bone marrow edema or signs of osteomyelitis or acute fracture. The Lisfranc ligament is intact. No traumatic malalignment.     A small amount of fluid is noted between the first and second, second and  third, and third and fourth metatarsal heads, suggestive of mild  intermetatarsal bursitis. No neuroma or other abnormal soft tissue mass is seen. The visualized distal portions of the tendons appear intact. Impression  1. Skin wound/ulcer plantar to the second metatarsal head, with surrounding  subcutaneous soft tissue edema/enhancement, suggestive of cellulitis. No  abscess. 2. No sign of osteomyelitis, osteonecrosis or other acute bony abnormality. 3. Moderately severe osteoarthrosis in the medial aspect of the first IP joint,  with moderate degenerative subchondral bone edema. 4. Small amount of fluid between the first and second, second and third, and  third and fourth metatarsal heads, suggestive of mild intermetatarsal bursitis. No results found for this or any previous visit. No results found for this or any previous visit (from the past 4464 hour(s)). Code Status: Full Code     ADVANCED CARE PLANNING:  Patient has capacity for medical decisions: no  Health Care Power of :  unsure  Living Will: not asked     Personal, Social, and Family History  Marital Status:   Living situation:with family:  spouse  Importance of genesis/Restorationism/spiritual beliefs: [] Very [] Somewhat [] Not   Psychological Distress:   Does patient understand diagnosis/treatment? not asked  Does caregiver understand diagnosis/treatment? not asked      Assessment        REVIEW OF SYSTEMS  Unable to obtain due to intubation    PHYSICAL ASSESSMENT:  Constitutional: doesn't follow commands. Head: Normocephalic and atraumatic. Eyes: . Pupils are equal, round   Neck: Normal range of motion. Neck supple. No tracheal deviation present. Cardiovascular: Normal rate and regular rhythm, S1, S2, no murmur   Pulmonary/Chest: Effort normal and breath sounds are abnormal. Crackles audible on bilateral ausculation  Abdomen: Soft.  No tenderness, not distended, no ascites, no organomegaly   Musculoskeletal: Normal range of motion. No edema lower ext. Neurological: doesn't follow commands. Withdraws to pain   Skin: Normal turgor, no bleeding, no bruising     Palliative Performance Scale:  ___60%  Ambulation reduced; Significant disease; Can't do hobbies/housework; intake normal or reduced; occasional assist; LOC full/confusion  ___50%  Mainly sit/lie; Extensive disease; Can't do any work; Considerable assist; intake normal or reduced; LOC full/confusion  ___40%  Mainly in bed; Extensive disease; Mainly assist; intake normal or reduced; LOC full/confusion   ___30%  Bed Bound; Extensive disease; Total care; intake reduced; LOCfull/confusion  ___20%  Bed Bound; Extensive disease; Total care; intake minimal; Drowsy/coma  __x_10%  Bed Bound; Extensive disease; Total care; Mouth care only; Drowsy/coma  ___0       Death        Thank you for allowing Palliative Care to participate in the care of Ms. Jesús Rousseau . This note has been dictated by dragon, typing errors may be a possibility. The total time I spent in seeing the patient, discussing goals of care, advanced directives, code status and other major issues was more than 60minutes      Electronically signed by   Willa Cabot, APRN - CNP  Palliative Care Team  on 12/16/2022 at 10:07 AM    Palliative Care can be reached via perfect serve.

## 2022-12-17 NOTE — PROGRESS NOTES
20Critical Care Team - Daily Progress Note      Date and time: 12/17/2022 8:30 AM  Patient's name:  Familia Soriano Record Number: [id number]  Patient's account/billing number: [de-identified]  Patient's YOB: 1960  Age: 60 y.o.   Date of Admission: 12/14/2022 10:09 AM  Length of stay during current admission: 3      Primary Care Physician: True Chapa MD  ICU Attending Physician: Dr. Elias Snyder Status: Full Code    Reason for ICU admission:   Chief Complaint   Patient presents with    Cardiac Arrest       Subjective:     OVERNIGHT EVENTS:           No acute issues overnight  Patient seen and examined bedside  Remains intubated  Not on any sedation  On following any commands  Completed hypothermia protocol  On amiodarone infusion  On Levophed 3 mics    Vent settings - 20/500/8/45  ABG - 7.38/27.9/123.4/16.7    Morning labs -   Sodium 136, potassium 3.3, creatinine 2.66 trending upwards  WBC 11.6, hemoglobin 8.9, platelets 703    Urine output 2.4 L  On Zosyn for sepsis of unknown  On aspirin Plavix and Lipitor    Nephrology on board for CHAD recommend IV Lasix 3 times daily  Neuro critical care on board for neuro prognostication, recommend MRI today but MRI cannot be done due to AICD incompatibility    Echo completed result pending  Previous echo showed EF of 25% and all chambers dilated AICD in place      AWAKE & FOLLOWING COMMANDS:  [x] No   [] Yes    CURRENT VENTILATION STATUS:     [x] Ventilator  [] BIPAP  [] Nasal Cannula [] Room Air      IF INTUBATED, ET TUBE MARKING AT LOWER LIP:       cms    SECRETIONS Amount:  [x] Small [] Moderate  [] Large  [] None  Color:     [x] White [] Colored  [] Bloody    SEDATION: None RAAS Score:  [] Propofol gtt  [] Versed gtt  [] Ativan gtt   [] No Sedation    PARALYZED:  [x] No    [] Yes    DIARRHEA:                [x] No                [] Yes  (C. Difficile status: [] positive [] negative                                                                                                                     [] pending)    VASOPRESSORS:  [x] No    [] Yes    If yes -   [] Levophed       [] Dopamine     [] Vasopressin       [] Dobutamine  [] Phenylephrine         [] Epinephrine    CENTRAL LINES:     [] No   [x] Yes   (Date of Insertion:   )           If yes -     [] Right IJ     [] Left IJ [x] Right Femoral [] Left Femoral                   [] Right Subclavian [] Left Subclavian       DYER'S CATHETER:   [] No   [x] Yes  (Date of Insertion:   )     URINE OUTPUT:            [x] Good   [] Low              [] Anuric    REVIEW OF SYSTEMS: Could not be obtained      OBJECTIVE:     VITAL SIGNS:  BP (!) 89/57   Pulse 73   Temp 97.9 °F (36.6 °C) (Core)   Resp 20   Ht 5' 6\" (1.676 m)   Wt 170 lb 13.7 oz (77.5 kg)   SpO2 100%   BMI 27.58 kg/m²   Tmax over 24 hours:  Temp (24hrs), Av.6 °F (36.4 °C), Min:96.3 °F (35.7 °C), Max:97.9 °F (36.6 °C)      Patient Vitals for the past 8 hrs:   BP Temp Temp src Pulse Resp SpO2 Weight   22 0755 -- -- -- 73 20 100 % --   22 0748 -- -- -- 72 20 100 % --   22 0700 -- -- -- 71 20 100 % --   22 0645 -- -- -- 72 20 100 % --   22 0630 -- -- -- 73 20 100 % --   22 0615 -- -- -- 73 16 100 % --   22 0600 -- -- -- 73 19 100 % 170 lb 13.7 oz (77.5 kg)   22 0545 -- -- -- 73 16 100 % --   22 0530 -- -- -- 74 20 100 % --   22 0515 -- -- -- 74 19 100 % --   22 0500 -- -- -- 75 20 100 % --   22 0455 -- -- -- 74 20 100 % --   225 -- -- -- 74 20 100 % --   22 0430 -- -- -- 73 18 100 % --   225 -- -- -- 74 20 100 % --   22 040 (!) 89/57 97.9 °F (36.6 °C) CORE 74 19 100 % --   22 -- -- -- 74 20 100 % --   220 -- -- -- 74 16 100 % --   22 -- -- -- 74 20 100 % --   220 -- -- -- 75 19 100 % -- 12/17/22 0245 -- -- -- 74 20 100 % --   12/17/22 0230 -- -- -- 74 18 100 % --   12/17/22 0215 -- -- -- 74 20 100 % --   12/17/22 0200 -- 97.9 °F (36.6 °C) CORE 74 20 100 % --   12/17/22 0145 -- -- -- 74 20 100 % --   12/17/22 0130 -- -- -- 74 20 100 % --   12/17/22 0115 -- -- -- 73 20 100 % --   12/17/22 0100 95/62 -- -- 74 20 100 % --   12/17/22 0045 -- -- -- 75 20 100 % --         Intake/Output Summary (Last 24 hours) at 12/17/2022 0830  Last data filed at 12/17/2022 0755  Gross per 24 hour   Intake 826.34 ml   Output 2475 ml   Net -1648.66 ml     Date 12/17/22 0000 - 12/17/22 2359   Shift 8981-3034 0841-6199 4786-7858 24 Hour Total   INTAKE   I.V.(mL/kg) 181.1(2.3)   181.1(2.3)   IV Piggyback(mL/kg) 147.1(1.9)   147.1(1.9)   Shift Total(mL/kg) 328.1(4.2)   328.1(4.2)   OUTPUT   Urine(mL/kg/hr) 945(1.5)   945   Emesis/NG output(mL/kg) 0(0)   0(0)   Shift Total(mL/kg) 945(12.2)   945(12.2)   Weight (kg) 77.5 77.5 77.5 77.5     Wt Readings from Last 3 Encounters:   12/17/22 170 lb 13.7 oz (77.5 kg)   10/24/22 165 lb 9.6 oz (75.1 kg)   10/24/22 165 lb (74.8 kg)     Body mass index is 27.58 kg/m². PHYSICAL EXAM:  Constitutional: intubated  HEENT: Pupils sluggish  Respiratory: clear to auscultation, no wheezes or rales and unlabored breathing. Cardiovascular: regular rate and rhythm, normal S1, S2, no murmur noted and 2+ pulses throughout  Abdomen: soft, nontender, nondistended, no masses or organomegaly  NEUROLOGIC: Pupils sluggish  Extremities:  peripheral pulses normal, no pedal edema,.       MEDICATIONS:  Scheduled Meds:   furosemide  40 mg IntraVENous TID    heparin (porcine)  5,000 Units SubCUTAneous 3 times per day    famotidine (PEPCID) injection  20 mg IntraVENous Daily    sodium chloride flush  5-40 mL IntraVENous 2 times per day    chlorhexidine  15 mL Mouth/Throat BID    piperacillin-tazobactam  3,375 mg IntraVENous Q8H    insulin lispro  0-8 Units SubCUTAneous Q4H    atorvastatin  80 mg Oral Nightly    [Held by provider] carvedilol  3.125 mg Oral BID WC    clopidogrel  75 mg Oral Daily     Continuous Infusions:   norepinephrine 3 mcg/min (12/17/22 0739)    propofol Stopped (12/16/22 2977)    sodium chloride Stopped (12/16/22 1629)    dextrose      amiodarone 450mg/250ml D5W infusion 0.5 mg/min (12/17/22 0718)     PRN Meds:   sodium chloride flush, 5-40 mL, PRN  sodium chloride, , PRN  ondansetron, 4 mg, Q8H PRN   Or  ondansetron, 4 mg, Q6H PRN  polyethylene glycol, 17 g, Daily PRN  acetaminophen, 650 mg, Q6H PRN   Or  acetaminophen, 650 mg, Q6H PRN  glucose, 4 tablet, PRN  dextrose bolus, 125 mL, PRN   Or  dextrose bolus, 250 mL, PRN  glucagon (rDNA), 1 mg, PRN  dextrose, , Continuous PRN  albuterol, 2.5 mg, As Directed RT PRN  acetylcysteine, 600 mg, As Directed RT PRN        SUPPORT DEVICES: [x] Ventilator [] BIPAP  [] Nasal Cannula [] Room Air    VENT SETTINGS (Comprehensive) (if applicable):  Vent Information  Ventilator ID: [id number]  Equipment Changed: HME  Ventilator Initiate: Yes  Additional Respiratory Assessments  Heart Rate: 73  Resp: 20  SpO2: 100 %  End Tidal CO2: 27 (%)  Position: Semi-Simon's  Humidification Source: HME  Skin Barrier Applied: No    ABGs:     Lab Results   Component Value Date/Time    JSY3RTJ 17 08/24/2018 05:02 AM    FIO2 45.0 12/17/2022 04:42 AM     Lactic Acid:   Lab Results   Component Value Date/Time    LACTA 1.4 10/13/2022 12:22 PM    LACTA 1.7 12/29/2018 11:57 AM    LACTA 1.5 12/29/2018 05:30 AM         DATA:  Complete Blood Count:   Recent Labs     12/15/22  0556 12/16/22  0615 12/17/22  0609   WBC 18.3* 16.1* 11.6*   HGB 10.4* 9.8* 8.9*   MCV 82.7 81.8* 79.3*    236 217   RBC 4.10 3.91* 3.53*   HCT 33.9* 32.0* 28.0*   MCH 25.4 25.1* 25.2   MCHC 30.7 30.6 31.8   RDW 17.3* 17.5* 18.0*   MPV 11.2 11.1 11.0        PT/INR:    Lab Results   Component Value Date/Time    PROTIME 13.7 12/14/2022 01:38 PM    INR 1.3 12/14/2022 01:38 PM     PTT:    Lab Results Component Value Date/Time    APTT 26.0 12/14/2022 01:38 PM       Basal Metabolic Profile:   Recent Labs     12/16/22  1747 12/16/22  1950 12/16/22  2150 12/17/22  0003 12/17/22  0609     --   --  138 136   K 4.2   < > 3.7 3.8 3.3*   BUN 68*  --   --  68* 68*   CREATININE 2.64*  --   --  2.68* 2.66*   CL 99  --   --  102 101   CO2 14*  --   --  18* 18*    < > = values in this interval not displayed. Magnesium:   Lab Results   Component Value Date/Time    MG 2.2 12/17/2022 06:09 AM    MG 2.1 12/17/2022 12:03 AM    MG 2.1 12/16/2022 05:47 PM     Phosphorus:   Lab Results   Component Value Date/Time    PHOS 5.6 12/17/2022 06:09 AM    PHOS 6.5 12/17/2022 12:03 AM    PHOS 7.3 12/16/2022 05:47 PM     S. Calcium:  Recent Labs     12/17/22  0609   CALCIUM 7.8*     S. Ionized Calcium:No results for input(s): IONCA in the last 72 hours. Urinalysis:   Lab Results   Component Value Date/Time    NITRU NEGATIVE 12/14/2022 12:26 PM    COLORU Yellow 12/14/2022 12:26 PM    PHUR 6.0 12/14/2022 12:26 PM    WBCUA 50  12/14/2022 12:26 PM    RBCUA 0 TO 2 12/14/2022 12:26 PM    MUCUS NOT REPORTED 11/21/2018 12:12 PM    TRICHOMONAS NOT REPORTED 11/21/2018 12:12 PM    YEAST FEW 12/14/2022 12:26 PM    BACTERIA RARE 12/14/2022 09:30 AM    SPECGRAV 1.018 12/14/2022 12:26 PM    LEUKOCYTESUR NEGATIVE 12/14/2022 12:26 PM    UROBILINOGEN Normal 12/14/2022 12:26 PM    BILIRUBINUR NEGATIVE 12/14/2022 12:26 PM    GLUCOSEU 1+ 12/14/2022 12:26 PM    KETUA NEGATIVE 12/14/2022 12:26 PM    AMORPHOUS TRACE 11/21/2018 12:12 PM       CARDIAC ENZYMES: No results for input(s): CKMB, CKMBINDEX, TROPONINI in the last 72 hours. Invalid input(s): CKTOTAL;3  BNP: No results for input(s): BNP in the last 72 hours.     LFTS  Recent Labs     12/14/22  0855 12/14/22  1149   ALKPHOS 88 128*   ALT 36* 86*   AST 49* 138*   BILITOT 0.8 1.2   LABALBU 2.7* 3.1*       AMYLASE/LIPASE/AMMONIA  Recent Labs     12/16/22  0753   AMMONIA 21       Last 3 Blood Glucose:   Recent Labs     12/15/22  1153 12/15/22  1809 12/16/22  0029 12/16/22  0615 12/16/22  1217 12/16/22  1747 12/17/22  0003 12/17/22  0609   GLUCOSE 141* 145* 180* 195* 200* 233* 233* 183*      HgBA1c:    Lab Results   Component Value Date/Time    LABA1C 8.5 12/15/2022 05:56 AM         TSH:    Lab Results   Component Value Date/Time    TSH 1.66 10/14/2022 10:23 AM     ANEMIA STUDIES  No results for input(s): LABIRON, TIBC, FERRITIN, [id number], FOLATE, OCCULTBLD in the last 72 hours. ASSESSMENT:     Principal Problem:    Cardiac arrest [facility])  Active Problems:    Acute on chronic systolic congestive heart failure (HCC)    Acute respiratory failure with hypoxia and hypercapnia (HCC)    Pulmonary hypertension (HCC)    Diabetes mellitus with kidney disease (HCC)    NSTEMI (non-ST elevated myocardial infarction) ([facility] [address]    Ventricular tachycardia    Palliative care encounter    Acute kidney injury ([facility] [address]    Acidosis    Acute pulmonary edema ([facility])  Resolved Problems:    * No resolved hospital problems. *        PLAN:          Cardiac arrest -   - Continue mechanical ventilation  - RT inpatient protocol  - Daily ABG  - Cooling as per hypothermia protocol, currently rewarming  - Follow-up on echo result  - on ICD interrogation, no V. fib or V. tach recorded  - Monitor sugars  - Appreciate cardiology recommendations  - Pressor support as needed  - Monitor neurological response, LTME shows severe diffuse cortical dysfunction, severe encephalopathy  - Follow upon neuro critical care recommendations. MRI cannot be done due to AICD incompatibility  - Continue Zosyn     2. CHFrEF -  - secondary to ischemic cardiomyopathy  - Last echo showing EF of 25%, AICD in place  - Appreciate cardiology recommendations     3. DM -   - Initially hypoglycemic  - Monitor sugars closely  - Medium dose sliding scale  - POCT glucose checks  - Hypoglycemia protocol     4.   CHAD -   - creatinine today 2.66, up trending  - Secondary to hypoperfusion  - Continue Lasix 20 mg IV TID  - Nephrology on board appreciate recommendations     5. CKD -   - likely secondary to DM  - baseline around 1.5-1.7         Zee Sands MD, M.D. Department of Internal Medicine/ Critical care  [facility])             12/17/2022, 8:30 AM       Attending Physician Statement  I have discussed the care of Yvonne Meehan, including pertinent history and exam findings with the resident. I have reviewed the key elements of all parts of the encounter with the resident. I have seen and examined the patient with the resident. I agree with the assessment and plan and status of the problem list as documented. There is no overall improvement in neurological status and his eyes blinking did not follow command no tracking of eyes no significant spontaneous movement noted pupils are reactive no current reflex was present does have a spontaneous respiration. She is on Levophed low-dose on amiodarone infusion troponin highest was 146. CT head to be repeated per neurology today. MRI of the brain could not be done because of AICD. Ventilator setting PRVC/20/500/8/40 5% ABG was 7.3 8/28/123/17 if wean FiO2  Creatinine is increasing 2.66 she is on Lasix 40 mg 3 times a day per nephrology urine output is 2.4 L in last 24 hours. Will try to wean down Levophed. CNS prognostication by neurology. Follow-up CT head. Follow-up urine output renal function. Will update  today  was updated by critical care team today. Discussed with nursing staff, treatment and plan discussed.   Discussed with respiratory therapist.    Total critical care time caring for this patient with life threatening, unstable organ failure, including direct patient contact, management of life support systems, review of data including imaging and labs, discussions with other team members and physicians at least 27  Min so far today, excluding procedures. Please note that this chart was generated using voice recognition Dragon dictation software. Although every effort was made to ensure the accuracy of this automated transcription, some errors in transcription may have occurred.      Nidia López MD  12/17/2022 11:31 AM

## 2022-12-17 NOTE — PROGRESS NOTES
[facility]   Progress Note                   Date:   12/17/2022  Patient name: Emily Victoria  Date of admission:  12/14/2022 10:09 AM  MRN:   [id number]  YOB: 1960  PCP: Patricia Nielsen MD    Reason for Admission:  cardiac arrest     Subjective:       Patient seen and examined. Currently on amiodarone and Levophed at 3. Intubated and sedated. Decerebrate posturing with pinching       Intake/Output Summary (Last 24 hours) at 12/17/2022 0840  Last data filed at 12/17/2022 0755  Gross per 24 hour   Intake 826.34 ml   Output 2475 ml   Net -1648.66 ml           Medications:   Scheduled Meds:   furosemide  40 mg IntraVENous TID    heparin (porcine)  5,000 Units SubCUTAneous 3 times per day    famotidine (PEPCID) injection  20 mg IntraVENous Daily    sodium chloride flush  5-40 mL IntraVENous 2 times per day    chlorhexidine  15 mL Mouth/Throat BID    piperacillin-tazobactam  3,375 mg IntraVENous Q8H    insulin lispro  0-8 Units SubCUTAneous Q4H    atorvastatin  80 mg Oral Nightly    [Held by provider] carvedilol  3.125 mg Oral BID WC    clopidogrel  75 mg Oral Daily       Continuous Infusions:   norepinephrine 3 mcg/min (12/17/22 0739)    propofol Stopped (12/16/22 2357)    sodium chloride Stopped (12/16/22 1629)    dextrose      amiodarone 450mg/250ml D5W infusion 0.5 mg/min (12/17/22 0718)       CBC:   Recent Labs     12/15/22  0556 12/16/22  0615 12/17/22  0609   WBC 18.3* 16.1* 11.6*   HGB 10.4* 9.8* 8.9*    236 217       BMP:    Recent Labs     12/16/22  1747 12/16/22  1950 12/16/22  2150 12/17/22  0003 12/17/22  0609     --   --  138 136   K 4.2   < > 3.7 3.8 3.3*   CL 99  --   --  102 101   CO2 14*  --   --  18* 18*   BUN 68*  --   --  68* 68*   CREATININE 2.64*  --   --  2.68* 2.66*   GLUCOSE 233*  --   --  233* 183*    < > = values in this interval not displayed.        Hepatic:   Recent Labs     12/14/22  0855 12/14/22  1149   AST 49* 138*   ALT 36* 86* BILITOT 0.8 1.2   ALKPHOS 88 128*       Troponin: No results for input(s): TROPONINI in the last 72 hours. BNP: No results for input(s): BNP in the last 72 hours. Lipids: No results for input(s): CHOL, HDL in the last 72 hours. Invalid input(s): LDLCALCU  INR:   Recent Labs     12/14/22  1149 12/14/22  1338   INR 1.4 1.3         Objective:   Vitals: BP (!) 89/57   Pulse 73   Temp 97.9 °F (36.6 °C) (Core)   Resp 20   Ht 5' 6\" (1.676 m)   Wt 170 lb 13.7 oz (77.5 kg)   SpO2 100%   BMI 27.58 kg/m²     General appearance:intubated and sedated   HEENT: Head: Normocephalic, no lesions, without obvious abnormality  Neck: no JVD  Lungs: clear to auscultation bilaterally, no basilar rales, no wheezing   Heart: regular rate and rhythm, S1, S2 normal, no murmur, click, rub or gallop  Abdomen: soft, non-tender; bowel sounds normal  Extremities: No LE edema        ECHO  In summary, she has enlarged LV with severe LV dysfunction with EF 25%  Mild MR with overall a good result from MR repair. Moderate to mod-severe pulmonary hypertension with PAP in 40-50 mmHg range  Mod-severe TR  There has been a significant change in LV function with EF decreasing from  45% to 25%. Coronary Angiography:   LMCA: Normal 0% stenosis. LAD: 100% mid stenosis  LIMA - LAD is patent with 10-20% stenosis  SVG - D is patent with 10-20% stenosis  LCx: Mid 50% stenosis  RCA: 100% proximal stenosis  SVG - PDA is patent with 10-20% stenosis          Assessment:   Status post cardiac arrest with rhythm PEA/Asystole 12/14/2022. Prolonged downtime > 30 mins as per hx. Post ROSC ecg showed NSR with RAD and NS t wave abnormality. Elevated troponin likely secondary to PEA arrest.  MV CAD s/p CABG x 3 in 2015 (LIMA-ALD, SVG-D, SVG-RCA),   Chronic HFrEF secondary to ICMP, last LVEF 25% in 10/2022. Single chamber ICD ([place]) in situ, CKD and multiple other co-morbidities admitted after cardiac arrest at home.    Evidence of anoxic virgil injury on clinical examination. Currently on low dose levophed. In sinus rhythm. Treatment Plan: Will DC amio drip if no further PVCs. Will start aspirin. Repeat 1 troponin today. Follow echo read. Wean pressor as tolerated  Neuro critical care recommendation appreciated. Will decide about cath after meaningful neurological recovery and echo result.     Marilyn Luna MD       Cardiovascular Fellow  12/17/2022, 8:41 AM

## 2022-12-17 NOTE — PROGRESS NOTES
Pt cannot have a MRI at this time. Patient needs cleared and the patient has a pacemaker. Per policy, pacemakers are not able to be scanned due to pt not being able to verbalize any pain or discomfort during scan. Also, no info on pacemaker at this time to proceed. At this time, MRI is on hold. RN aware via phone call.

## 2022-12-17 NOTE — PROGRESS NOTES
Daily Progress Note  Neuro Critical Care    Patient Name: Latosha Villagran  Patient : 1960  Room/Bed: 3025/3025-01  Code Status: Full Code  Allergies: Allergies   Allergen Reactions    Codeine Nausea And Vomiting    Nsaids Nausea Only     nausea       CHIEF COMPLAINT:      PEA arrest     INTERVAL HISTORY    Initial Presentation (Admitted ): The patient is a 60 y.o. female hx of CAD w/CABG, CHF EF 25% w/AICD, COPD, CKD, HTN, HLD, DM2 who presents with PEA arrest. Patient found down by her . Patient down for approximately 30 minutes without CPR. EMS arrived patient found to be in PEA arrest CPR initiated. Patient also found to be hypoglycemic given dextrose. Patient arrived at [facility] underwent multiple rounds of CPR/epi and ROSC was achieved. Patient intubated by EMS and cooling cath placed by ED. Patient transferred to [facility] and was noted to have decerebrate posturing in CT scan at 1047 requiring 10mg of vec. [facility] 6.99. Patient currently not on any sedation or paralytics and is undergoing hypothermic target temperature mgmt. 12/15: Patient requiring levophed for pressure support. Patient also requiring low dose propofol for vent dyssynchrony. : No MRI due to AICD incompatibility    Last 24h:   No acute events overnight. Rewarmed-normothermic. Off sedation since 3am.  Pupils 4mm and reactive, cough/gag reflex intact, absent corneals, absent oculocephalic reflex, flexor and extensor response in uppers, babinski reflex. NSEs pending.     CURRENT MEDICATIONS:  SCHEDULED MEDICATIONS:   furosemide  40 mg IntraVENous TID    heparin (porcine)  5,000 Units SubCUTAneous 3 times per day    famotidine (PEPCID) injection  20 mg IntraVENous Daily    sodium chloride flush  5-40 mL IntraVENous 2 times per day    chlorhexidine  15 mL Mouth/Throat BID    piperacillin-tazobactam  3,375 mg IntraVENous Q8H    insulin lispro  0-8 Units SubCUTAneous Q4H    atorvastatin  80 mg Oral Nightly    [Held by provider] carvedilol  3.125 mg Oral BID WC    clopidogrel  75 mg Oral Daily     CONTINUOUS INFUSIONS:   norepinephrine 2 mcg/min (22)    propofol Stopped (22 539)    sodium chloride Stopped (22 162)    dextrose      amiodarone 450mg/250ml D5W infusion 0.5 mg/min (22)     PRN MEDICATIONS:   sodium chloride flush, sodium chloride, ondansetron **OR** ondansetron, polyethylene glycol, acetaminophen **OR** acetaminophen, glucose, dextrose bolus **OR** dextrose bolus, glucagon (rDNA), dextrose, albuterol, acetylcysteine    VITALS:  Temperature Range: Temp: 97.9 °F (36.6 °C) Temp  Av.4 °F (36.3 °C)  Min: 95.7 °F (35.4 °C)  Max: 97.9 °F (36.6 °C)  BP Range: Systolic (57TLA), NVU:775 , Min:89 , KZP:052     Diastolic (82VRC), JLB:03, Min:52, Max:73    Pulse Range: Pulse  Av.1  Min: 69  Max: 86  Respiration Range: Resp  Av.1  Min: 16  Max: 29  Current Pulse Ox: SpO2: 100 %  24HR Pulse Ox Range: SpO2  Av.9 %  Min: 97 %  Max: 100 %  Patient Vitals for the past 12 hrs:   BP Temp Temp src Pulse Resp SpO2   22 0615 -- -- -- 73 16 100 %   22 0600 -- -- -- 73 19 100 %   22 0545 -- -- -- 73 16 100 %   22 0530 -- -- -- 74 20 100 %   22 0515 -- -- -- 74 19 100 %   22 0500 -- -- -- 75 20 100 %   22 0455 -- -- -- 74 20 100 %   22 0445 -- -- -- 74 20 100 %   22 0430 -- -- -- 73 18 100 %   22 0415 -- -- -- 74 20 100 %   22 0400 (!) 89/57 97.9 °F (36.6 °C) CORE 74 19 100 %   22 0345 -- -- -- 74 20 100 %   22 0330 -- -- -- 74 16 100 %   22 0315 -- -- -- 74 20 100 %   22 0300 -- -- -- 75 19 100 %   22 0245 -- -- -- 74 20 100 %   22 0230 -- -- -- 74 18 100 %   22 0215 -- -- -- 74 20 100 %   22 0200 -- 97.9 °F (36.6 °C) CORE 74 20 100 %   22 0145 -- -- -- 74 20 100 %   22 0130 -- -- -- 74 20 100 %   22 0115 -- -- -- 73 20 100 %   12/17/22 0100 95/62 -- -- 74 20 100 %   12/17/22 0045 -- -- -- 75 20 100 %   12/17/22 0030 -- -- -- 76 18 100 %   12/17/22 0015 -- -- -- 75 17 100 %   12/17/22 0000 (!) 90/59 97.9 °F (36.6 °C) CORE 74 20 100 %   12/16/22 2345 -- -- -- 78 20 100 %   12/16/22 2333 -- -- -- 77 -- 100 %   12/16/22 2330 -- -- -- 78 20 100 %   12/16/22 2315 -- -- -- 77 20 100 %   12/16/22 2300 98/65 -- -- 77 18 100 %   12/16/22 2245 -- -- -- 75 20 100 %   12/16/22 2230 -- -- -- 74 20 100 %   12/16/22 2215 -- -- -- 74 20 100 %   12/16/22 2200 96/63 97.7 °F (36.5 °C) CORE 75 20 100 %   12/16/22 2100 105/61 -- -- 75 20 100 %   12/16/22 2045 -- -- -- 75 20 100 %   12/16/22 2030 -- -- -- 77 20 100 %   12/16/22 2015 -- -- -- 76 20 100 %   12/16/22 2011 -- -- -- 77 20 100 %   12/16/22 2000 115/62 97.9 °F (36.6 °C) CORE 78 16 100 %   12/16/22 1945 -- -- -- 76 20 100 %   12/16/22 1930 -- -- -- 77 20 100 %   12/16/22 1915 -- -- -- 76 20 100 %   12/16/22 1900 (!) 111/59 97.7 °F (36.5 °C) Esophageal 77 20 100 %         RECENT LABS:   Lab Results   Component Value Date    WBC 11.6 (H) 12/17/2022    HGB 8.9 (L) 12/17/2022    HCT 28.0 (L) 12/17/2022     12/17/2022    CHOL 199 07/15/2022    TRIG 69 12/15/2022    HDL 25 (L) 07/15/2022    LDLDIRECT 70 07/15/2022    ALT 86 (H) 12/14/2022     (H) 12/14/2022     12/17/2022    K PENDING 12/17/2022     12/17/2022    CREATININE 2.66 (H) 12/17/2022    BUN 68 (H) 12/17/2022    CO2 18 (L) 12/17/2022    TSH 1.66 10/14/2022    INR 1.3 12/14/2022    LABA1C 8.5 (H) 12/15/2022    LABMICR 57 (H) 07/15/2022     24 HOUR INTAKE/OUTPUT:  Intake/Output Summary (Last 24 hours) at 12/17/2022 0655  Last data filed at 12/17/2022 0631  Gross per 24 hour   Intake 811.82 ml   Output 2400 ml   Net -1588.18 ml       Labs and Images reviewed with:  [] Dr. Radha Theodore    [x] Dr. Carroll Chauhan  [] Dr. Derrick Paiz  [] There are no new interval images to review.      PHYSICAL EXAM CONSTITUTIONAL:  Does not open eyes to verbal/painful stimuli. Does not follow commands. Appears critically ill   HEAD:  normocephalic, atraumatic    EYES:  5mm briskly reactive BL. Mid gaze fixation. Does not track   ENT:  moist mucous membranes   NECK:  supple, symmetric   LUNGS:  Equal air entry bilaterally   CARDIOVASCULAR:  normal s1 / s2, RRR, distal pulses intact   ABDOMEN:  Distended   NEUROLOGIC:  Mental Status:  comatose, slight eye-opening to noxious stimuli. Cranial Nerves:    PERRL, roving eye movements. Oculocephalic reflex intact  Cough and gag reflex intact. Corneal reflex intact. Pupils 5mm briskly reactive BL     Motor Exam:                No spontaneous movement of extremities, extensor posturing to noxious stimuli in bilateral upper extremities. Sensory:                Extensor posturing to pain in bilateral upper extremities. Plantar Response:  Right:  upgoing  Left:  upgoing           DRAINS:  [x] There are no drains for Neuro Critical Care to monitor at this time. ASSESSMENT AND PLAN:     This is a 60 y.o. female with PEA arrest unknown etiology at this point. Suspect anoxic brain injury with downtime of 30+ minutes. Brainstem reflexes intact, limited exam.  Underwent TTM, rewarmed back to goal 36.6    - MRI brain unable to be obtained due to AICD incompatibility  -CT head today  - Neuron specific enolase at 24, 48 and 72 hours  - LTME ongoing, showing delta slowing of 1 to 2 Hz.  - Neuro checks per protocol     We will continue to follow along. For any changes in exam or patient status please contact Neuro Critical Care. We will continue to follow along. For any changes in exam or patient status please contact Neuro Critical Care.       Pat Mak MD  Neuro Critical Care  Pager [phone]  12/17/2022     6:55 AM

## 2022-12-17 NOTE — PROGRESS NOTES
Renal Progress Note    Patient :  Alexis Hall; 60 y.o. MRN# [id number]  Location:  3025/3025-01  Attending:  Shan Echeverria MD  Admit Date:  12/14/2022   Hospital Day: 3      Subjective: Following for CHAD/ATN in the setting of cardiac arrest, on CKD with baseline 1.4-1.6. Transferred from [facility] where she presented when found down by her  and subsequently noted to be hypoglycemic cardiac arrest.  PEA arrest.  Down time unknown. Initiated therapeutic hypothermia protocol further investigations revealed elevated creatinine. She is s/p hypothermia protocol. ECHO with EF 25%. She has ICD in place. Per cardiology ICD to be interrogated. Creatinine unchanged and at 2.66, potassium 3.3, covered with 40meq  Blood pressure  systolic. On Levophed 3mcq/min, along with amiodarone. Remains intubated with FIO2 40/PEEP 8. Not on sedation. Grimaces  Urine output 2400 last 24 hours plus 175 today. Negative 1.5 liters. On Lasix 40mg IV TID    Outpatient Medications:     Medications Prior to Admission: furosemide (LASIX) 40 MG tablet, Take 1 tablet by mouth in the morning and 1 tablet in the evening. clopidogrel (PLAVIX) 75 MG tablet, Take 1 tab daily  atorvastatin (LIPITOR) 80 MG tablet, 1 tab daily  carvedilol (COREG) 3.125 MG tablet, take 1 tablet by mouth twice a day with meals  gabapentin (NEURONTIN) 400 MG capsule, Take 1 capsule by mouth 3 times daily for 180 days.   midodrine (PROAMATINE) 5 MG tablet, take 1 tablet by mouth three times a day  sertraline (ZOLOFT) 100 MG tablet, Take one tablet by mouth once daily  insulin glargine (LANTUS SOLOSTAR) 100 UNIT/ML injection pen, INJECT 45 UNITS SUBCUTANEOUSLY EVERY MORNING AND 25 UNITS EVERY EVENING  Insulin Pen Needle (BD PEN NEEDLE PAIGE 2ND GEN) 32G X 4 MM MISC, use 1 PEN NEEDLE to inject MEDICATION subcutaneously daily  Lancets MISC, Pt testing BS once daily and prn  glucose monitoring (FREESTYLE) kit, Please dispense meter covered by patients insurance. blood glucose monitor strips, Testing BS once daily and as needed.  Dispense strips to go with new glucometer  albuterol (PROVENTIL) (2.5 MG/3ML) 0.083% nebulizer solution, Take 3 mLs by nebulization every 6 hours as needed for Wheezing (as needed for wheezing)  Respiratory Therapy Supplies (NEBULIZER/TUBING/MOUTHPIECE) KIT, 1 kit by Does not apply route daily as needed (for SOB, cough)  Respiratory Therapy Supplies (NEBULIZER COMPRESSOR) KIT, 1 kit by Does not apply route once for 1 dose  lactobacillus (CULTURELLE) capsule, Take 1 capsule by mouth 2 times daily (with meals)  blood glucose monitor strips, Use to test daily  blood glucose monitor kit and supplies, Use to test daily  Cholecalciferol (VITAMIN D3) 5000 units TABS, Take 5,000 Units by mouth daily  glucose blood VI test strips (EXACTECH TEST) strip, 1 each by In Vitro route daily Pt testing BS once daily and prn  Multiple Vitamins-Minerals (THERAPEUTIC MULTIVITAMIN-MINERALS) tablet, Take 1 tablet by mouth daily (with breakfast)    Current Medications:     Scheduled Meds:    aspirin  81 mg Oral Daily    furosemide  40 mg IntraVENous TID    heparin (porcine)  5,000 Units SubCUTAneous 3 times per day    famotidine (PEPCID) injection  20 mg IntraVENous Daily    sodium chloride flush  5-40 mL IntraVENous 2 times per day    chlorhexidine  15 mL Mouth/Throat BID    piperacillin-tazobactam  3,375 mg IntraVENous Q8H    insulin lispro  0-8 Units SubCUTAneous Q4H    atorvastatin  80 mg Oral Nightly    [Held by provider] carvedilol  3.125 mg Oral BID WC    clopidogrel  75 mg Oral Daily     Continuous Infusions:    norepinephrine 3 mcg/min (12/17/22 4339)    propofol Stopped (12/16/22 5435)    sodium chloride Stopped (12/16/22 7574)    dextrose      amiodarone 450mg/250ml D5W infusion 0.5 mg/min (12/17/22 0718)     PRN Meds:  potassium chloride, sodium chloride flush, sodium chloride, ondansetron **OR** ondansetron, polyethylene glycol, acetaminophen **OR** acetaminophen, glucose, dextrose bolus **OR** dextrose bolus, glucagon (rDNA), dextrose, albuterol, acetylcysteine    Input/Output:       I/O last 3 completed shifts: In: 1251.2 [I.V.:910.9; NG/GT:30; IV Piggyback:310.3]  Out: 2819 [Urine:2819]. Patient Vitals for the past 96 hrs (Last 3 readings):   Weight   22 0600 170 lb 13.7 oz (77.5 kg)   22 0600 171 lb 11.8 oz (77.9 kg)   12/15/22 0600 173 lb 1 oz (78.5 kg)       Vital Signs:   Temperature:  Temp: 97.9 °F (36.6 °C)  TMax:   Temp (24hrs), Av.6 °F (36.4 °C), Min:96.8 °F (36 °C), Max:97.9 °F (36.6 °C)    Respirations:  Resp: 20  Pulse:   Heart Rate: 72  BP:    BP: 105/72  BP Range: Systolic (76MVX), SHT:477 , Min:89 , MICHAEL:653       Diastolic (95OHD), FVN:66, Min:52, Max:73      Physical Examination:     General:  Intubated and sedated  Neck:   No JVD, no thyromegaly, no lymphadenopathy. Chest:              Bilateral vesicular breath sounds, no rales or wheezes. Cardiac:  S1 S2 RR, no murmurs, gallops or rubs, JVP not raised. Abdomen: Soft, non-tender, no masses or organomegaly, BS audible. :   No suprapubic or flank tenderness. SKIN:  No rashes, good skin turgor. Extremities:  No edema, palpable peripheral pulses, no calf tenderness.     Labs:       Recent Labs     12/15/22  0556 22  0615 22  0609   WBC 18.3* 16.1* 11.6*   RBC 4.10 3.91* 3.53*   HGB 10.4* 9.8* 8.9*   HCT 33.9* 32.0* 28.0*   MCV 82.7 81.8* 79.3*   MCH 25.4 25.1* 25.2   MCHC 30.7 30.6 31.8   RDW 17.3* 17.5* 18.0*    236 217   MPV 11.2 11.1 11.0      BMP:   Recent Labs     22  1747 22  1950 22  2150 22  0003 22  0609     --   --  138 136   K 4.2   < > 3.7 3.8 3.3*   CL 99  --   --  102 101   CO2 14*  --   --  18* 18*   BUN 68*  --   --  68* 68*   CREATININE 2.64*  --   --  2.68* 2.66*   GLUCOSE 233*  --   --  233* 183*   CALCIUM 7.6*  --   --  7.5* 7.8*    < > = values in this interval not displayed. Phosphorus:     Recent Labs     12/16/22  1747 12/17/22  0003 12/17/22  0609   PHOS 7.3* 6.5* 5.6*     Magnesium:    Recent Labs     12/16/22  1747 12/17/22  0003 12/17/22  0609   MG 2.1 2.1 2.2     Albumin:    Recent Labs     12/14/22  1149   LABALBU 3.1*     ALEJANDRO:      Lab Results   Component Value Date/Time    ALEJANDRO  08/26/2018 10:16 AM     This test is equivocal, which represents a borderline ALEJANDRO result. Suggest     SPEP:  Lab Results   Component Value Date/Time    PROT 6.6 12/14/2022 11:49 AM    ALBCAL 4.1 08/23/2018 04:33 AM    ALBPCT 62 08/23/2018 04:33 AM    LABALPH 0.2 08/23/2018 04:33 AM    LABALPH 0.7 08/23/2018 04:33 AM    A1PCT 3 08/23/2018 04:33 AM    A2PCT 10 08/23/2018 04:33 AM    LABBETA 0.9 08/23/2018 04:33 AM    BETAPCT 13 08/23/2018 04:33 AM    GAMGLOB 0.8 08/23/2018 04:33 AM    GGPCT 12 08/23/2018 04:33 AM    PATH ELECTRONICALLY SIGNED.  Oskar Georges M.D. 08/27/2018 03:15 PM     C3:     Lab Results   Component Value Date/Time    C3 111 08/27/2018 03:15 PM     C4:     Lab Results   Component Value Date/Time    C4 35 08/27/2018 03:15 PM     MPO ANCA:     Lab Results   Component Value Date/Time    MPO 11 08/26/2018 10:16 AM     PR3 ANCA:     Lab Results   Component Value Date/Time    PR3 15 08/26/2018 10:16 AM     Anti-GBM:     Lab Results   Component Value Date/Time    GBMABIGG 12 08/26/2018 10:16 AM     Hep BsAg:         Lab Results   Component Value Date/Time    HEPBSAG NONREACTIVE 08/24/2018 12:24 PM       Urinalysis/Chemistries:      Lab Results   Component Value Date/Time    NITRU NEGATIVE 12/14/2022 12:26 PM    COLORU Yellow 12/14/2022 12:26 PM    PHUR 6.0 12/14/2022 12:26 PM    WBCUA 50  12/14/2022 12:26 PM    RBCUA 0 TO 2 12/14/2022 12:26 PM    MUCUS NOT REPORTED 11/21/2018 12:12 PM    TRICHOMONAS NOT REPORTED 11/21/2018 12:12 PM    YEAST FEW 12/14/2022 12:26 PM    BACTERIA RARE 12/14/2022 09:30 AM    SPECGRAV 1.018 12/14/2022 12:26 PM    LEUKOCYTESUR NEGATIVE 12/14/2022 12:26 PM    UROBILINOGEN Normal 12/14/2022 12:26 PM    BILIRUBINUR NEGATIVE 12/14/2022 12:26 PM    GLUCOSEU 1+ 12/14/2022 12:26 PM    KETUA NEGATIVE 12/14/2022 12:26 PM    AMORPHOUS TRACE 11/21/2018 12:12 PM     Urine Sodium:     Lab Results   Component Value Date/Time    ROULA 20 12/15/2022 11:49 AM      Urine Creatinine:     Lab Results   Component Value Date/Time    LABCREA 71.1 12/15/2022 11:49 AM     Radiology:     CXR: 12/16/22  Overall relatively stable cardiopulmonary status. Cardiomegaly with congestive changes and pulmonary edema. Nasogastric tube tip is in the stomach. Assessment:     #1 acute kidney injury nonoliguric secondary to ischemic (cardiac arrest/shock) and nephrotoxic (contrast) ATN -evolving  #2 high nongap metabolic acidosis secondary to CHAD/hypoperfusion  #3 chronic kidney disease stage IIIb secondary to diabetic/ischemic nephrosclerosis with baseline creatinine 1.4-1.6. Serologies unremarkable. Asymmetric kidney size. Minimal proteinuria. #4 s/p cardiac arrest-multiple times status post therapeutic hypothermia protocol  #5 cardiogenic shock on pressors  #6 decompensated systolic congestive heart failure  #7 acute hypoxic respiratory failure  #8 history of CABG/MVR with EF 25%  #9 pulmonary hypertension/severe tricuspid regurgitation  #10 type 2 diabetes    Plan:   1. Continue Lasix 40mg IV TID  2. Add Midodrine 5mg TID  3. Wean Levophed as able  4. Electrolyte replacement     Nutrition   Please ensure that patient is on a renal diet/TF. Avoid nephrotoxic drugs/contrast exposure. We will continue to follow along with you. Zaynab Laura CNP     Attending Physician Statement  I have discussed the care of Vadim Lion, including pertinent history and exam findings,  with the CNP. I have reviewed the key elements of all parts of the encounter with the CNP. I agree with the assessment, plan and orders as documented.     William Michaels MD MD, MRCP Selvin Mccabe, [facility]   12/17/2022 2:29 PM    Nephrology [address] [place] [address] Drive

## 2022-12-17 NOTE — PLAN OF CARE
Problem: Pain  Goal: Verbalizes/displays adequate comfort level or baseline comfort level  Outcome: Progressing     Problem: Respiratory - Adult  Goal: Achieves optimal ventilation and oxygenation  12/17/2022 0944 by Ashley Arnold RN  Outcome: Progressing     Problem: Skin/Tissue Integrity  Goal: Absence of new skin breakdown  Outcome: Progressing     Problem: Safety - Adult  Goal: Free from fall injury  Outcome: Progressing     Problem: ABCDS Injury Assessment  Goal: Absence of physical injury  Outcome: Progressing

## 2022-12-17 NOTE — PLAN OF CARE
Problem: Respiratory - Adult  Goal: Achieves optimal ventilation and oxygenation  12/17/2022 0756 by Memo Nation RCP  Outcome: Progressing  12/16/2022 1916 by Primitivo Trevizo RN  Outcome: Progressing

## 2022-12-17 NOTE — PLAN OF CARE
Problem: Chronic Conditions and Co-morbidities  Goal: Patient's chronic conditions and co-morbidity symptoms are monitored and maintained or improved  Outcome: Progressing     Problem: Discharge Planning  Goal: Discharge to home or other facility with appropriate resources  Outcome: Progressing     Problem: Pain  Goal: Verbalizes/displays adequate comfort level or baseline comfort level  12/16/2022 1916 by Citlalli Ashford RN  Outcome: Progressing     Problem: Respiratory - Adult  Goal: Achieves optimal ventilation and oxygenation  12/16/2022 1916 by Citlalli Ashford RN  Outcome: Progressing     Problem: Skin/Tissue Integrity  Goal: Absence of new skin breakdown  Description: 1. Monitor for areas of redness and/or skin breakdown  2. Assess vascular access sites hourly  3. Every 4-6 hours minimum:  Change oxygen saturation probe site  4. Every 4-6 hours:  If on nasal continuous positive airway pressure, respiratory therapy assess nares and determine need for appliance change or resting period.   12/16/2022 1916 by Citlalli Ashford RN  Outcome: Progressing     Problem: Safety - Adult  Goal: Free from fall injury  12/16/2022 1916 by Citlalli Ashford RN  Outcome: Progressing     Problem: ABCDS Injury Assessment  Goal: Absence of physical injury  Outcome: Progressing     Problem: Nutrition Deficit:  Goal: Optimize nutritional status  Outcome: Progressing

## 2022-12-17 NOTE — PLAN OF CARE
Writer spoke to patient's  Mr. Belkis Rodriguez and updated him about slowly passing away course health. Explained to him that she has been off of sedation for the past few days now and is not waking up and her neurological recovery has been poor and also explained him that her expected neurological recovery might be poor due to concerns of anoxic brain injury which might have happened when she was unresponsive. Also explained to him that she is on pressor support to maintain blood pressure and about her heart pumping capacity and also about her CHAD. Given this additional multiorgan failure explained to him that her prognosis is guarded and her neurological recovery is very poor. Mr. Fanny Tomlin sounded understanding. Writer asked if he or his family members can come in person to see Mrs. Gulshan Jones and talk to us in person, Mr. Fanny Tomlin told us that they are working on it and that trying to come to the hospital.         Meenu Benedict  PGY-2  Internal Medicine  [address] [facility]   11:51 [place] 12/17/2022

## 2022-12-18 NOTE — PROGRESS NOTES
Daily Progress Note  Neuro Critical Care    Patient Name: Axel Leiva  Patient : 1960  Room/Bed: 3025/3025-01  Code Status: Full Code  Allergies: Allergies   Allergen Reactions    Codeine Nausea And Vomiting    Nsaids Nausea Only     nausea       CHIEF COMPLAINT:      PEA arrest     INTERVAL HISTORY    Initial Presentation (Admitted ): The patient is a 60 y.o. female hx of CAD w/CABG, CHF EF 25% w/AICD, COPD, CKD, HTN, HLD, DM2 who presents with PEA arrest. Patient found down by her . Patient down for approximately 30 minutes without CPR. EMS arrived patient found to be in PEA arrest CPR initiated. Patient also found to be hypoglycemic given dextrose. Patient arrived at [facility] underwent multiple rounds of CPR/epi and ROSC was achieved. Patient intubated by EMS and cooling cath placed by ED. Patient transferred to [address] [place] [address] and was noted to have decerebrate posturing in CT scan at 1047 requiring 10mg of vec. [facility] Initial pH at [address] [place] [address] 6.99. Patient currently not on any sedation or paralytics and is undergoing hypothermic target temperature mgmt. 12/15: Patient requiring levophed for pressure support. Patient also requiring low dose propofol for vent dyssynchrony. : No MRI due to AICD incompatibility    Last 24h:   No acute events overnight. On Propofol 5 for tachypnea, agitation. Exam unchanged from prior. Pupils 4mm and reactive, cough/gag reflex intact, absent corneals, absent oculocephalic reflex, flexor and extensor response in uppers, babinski reflex. NSEs pending.     CURRENT MEDICATIONS:  SCHEDULED MEDICATIONS:   aspirin  81 mg Oral Daily    midodrine  5 mg Oral TID WC    furosemide  40 mg IntraVENous TID    heparin (porcine)  5,000 Units SubCUTAneous 3 times per day    famotidine (PEPCID) injection  20 mg IntraVENous Daily    sodium chloride flush  5-40 mL IntraVENous 2 times per day    chlorhexidine  15 mL Mouth/Throat BID piperacillin-tazobactam  3,375 mg IntraVENous Q8H    insulin lispro  0-8 Units SubCUTAneous Q4H    atorvastatin  80 mg Oral Nightly    [Held by provider] carvedilol  3.125 mg Oral BID WC    clopidogrel  75 mg Oral Daily     CONTINUOUS INFUSIONS:   norepinephrine Stopped (22 0645)    propofol 5 mcg/kg/min (22 2715)    sodium chloride Stopped (22 1629)    dextrose       PRN MEDICATIONS:   sodium chloride flush, sodium chloride, ondansetron **OR** ondansetron, polyethylene glycol, acetaminophen **OR** acetaminophen, glucose, dextrose bolus **OR** dextrose bolus, glucagon (rDNA), dextrose, albuterol, acetylcysteine    VITALS:  Temperature Range: Temp: 98.4 °F (36.9 °C) Temp  Av.4 °F (36.9 °C)  Min: 98.4 °F (36.9 °C)  Max: 98.4 °F (36.9 °C)  BP Range: Systolic (36MVK), QJE:58 , Min:78 , FUH:720     Diastolic (79WQL), KDI:47, Min:50, Max:72    Pulse Range: Pulse  Av.7  Min: 71  Max: 82  Respiration Range: Resp  Av  Min: 16  Max: 23  Current Pulse Ox: SpO2: 100 %  24HR Pulse Ox Range: SpO2  Av.7 %  Min: 98 %  Max: 100 %  Patient Vitals for the past 12 hrs:   BP Temp Temp src Pulse Resp SpO2   22 0446 -- -- -- 78 20 100 %   22 0400 101/62 -- -- 75 20 98 %   22 0302 -- -- -- 82 20 98 %   22 0200 -- -- -- 82 22 99 %   22 0000 (!) 84/52 98.4 °F (36.9 °C) Bladder 76 20 99 %   22 -- -- -- 81 19 100 %   22 -- -- -- 81 20 100 %   22 -- -- -- 76 20 100 %   22 2000 (!) 78/50 -- -- 76 20 99 %           RECENT LABS:   Lab Results   Component Value Date    WBC 9.3 2022    HGB 8.4 (L) 2022    HCT 27.7 (L) 2022     2022    CHOL 199 07/15/2022    TRIG 69 12/15/2022    HDL 25 (L) 07/15/2022    LDLDIRECT 70 07/15/2022    ALT 86 (H) 2022     (H) 2022     2022    K 3.7 2022     2022    CREATININE 2.66 (H) 2022    BUN 66 (H) 2022    CO2 21 12/18/2022    TSH 1.66 10/14/2022    INR 1.3 12/14/2022    LABA1C 8.5 (H) 12/15/2022    LABMICR 57 (H) 07/15/2022     24 HOUR INTAKE/OUTPUT:  Intake/Output Summary (Last 24 hours) at 12/18/2022 2175  Last data filed at 12/18/2022 9817  Gross per 24 hour   Intake 972.4 ml   Output 3175 ml   Net -2202.6 ml         Labs and Images reviewed with:  [] Dr. Jeffrey Palencia. Arben    [x] Dr. Octavio Ortez  [] Dr. Quoc Anthony  [] There are no new interval images to review. PHYSICAL EXAM       CONSTITUTIONAL:  Does not open eyes to verbal/painful stimuli. Does not follow commands. Appears critically ill   HEAD:  normocephalic, atraumatic    EYES:  5mm briskly reactive BL. Mid gaze fixation. Does not track   ENT:  moist mucous membranes   NECK:  supple, symmetric   LUNGS:  Equal air entry bilaterally   CARDIOVASCULAR:  normal s1 / s2, RRR, distal pulses intact   ABDOMEN:  Distended   NEUROLOGIC:  Mental Status:  comatose, slight eye-opening to noxious stimuli. Cranial Nerves:    PERRL, roving eye movements. No Oculocephalic reflex   Cough and gag reflex intact. No corneals  Pupils 5mm briskly reactive BL     Motor Exam:                No spontaneous movement of extremities, extensor posturing to noxious stimuli in bilateral upper extremities. Sensory:                Extensor posturing to pain in bilateral upper extremities. Plantar Response:  Right:  upgoing  Left:  upgoing           DRAINS:  [x] There are no drains for Neuro Critical Care to monitor at this time. ASSESSMENT AND PLAN:     This is a 60 y.o. female with PEA arrest unknown etiology at this point. Suspect anoxic brain injury with downtime of 30+ minutes.   Brainstem reflexes intact, limited exam.  Underwent TTM, rewarmed back to goal 36.6    - MRI brain unable to be obtained due to AICD incompatibility  -CT head shows mild small vessel ischemic deep white matter disease  - Neuron specific enolase at 24, 48 and 72 hours - pending  - LTME ongoing, showing bursts of 3 to 4 Hz delta with suppression lasting 3-4 seconds  - Neuro checks per protocol     We will continue to follow along. For any changes in exam or patient status please contact Neuro Critical Care. We will continue to follow along. For any changes in exam or patient status please contact Neuro Critical Care.       Milton Dimas MD  Neuro Critical Care  Pager [phone]  12/18/2022     6:52 AM

## 2022-12-18 NOTE — PLAN OF CARE
Problem: Chronic Conditions and Co-morbidities  Goal: Patient's chronic conditions and co-morbidity symptoms are monitored and maintained or improved  12/18/2022 1006 by Maura Hall RN  Outcome: Progressing  Flowsheets (Taken 12/18/2022 0800)  Care Plan - Patient's Chronic Conditions and Co-Morbidity Symptoms are Monitored and Maintained or Improved: Monitor and assess patient's chronic conditions and comorbid symptoms for stability, deterioration, or improvement     Problem: Pain  Goal: Verbalizes/displays adequate comfort level or baseline comfort level  12/18/2022 1006 by Maura Hall RN  Outcome: Progressing     Problem: Respiratory - Adult  Goal: Achieves optimal ventilation and oxygenation  12/18/2022 1006 by Maura Hall RN  Outcome: Progressing     Problem: Skin/Tissue Integrity  Goal: Absence of new skin breakdown  12/18/2022 1006 by Maura Hall RN  Outcome: Progressing     Problem: Safety - Adult  Goal: Free from fall injury  12/18/2022 1006 by Maura Hall RN  Outcome: Progressing     Problem: ABCDS Injury Assessment  Goal: Absence of physical injury  12/18/2022 1006 by Maura Hall RN  Outcome: Progressing     Problem: Nutrition Deficit:  Goal: Optimize nutritional status  12/18/2022 1006 by Maura Hall RN  Outcome: Progressing

## 2022-12-18 NOTE — PROGRESS NOTES
Renal Progress Note    Patient :  Vadim Lion; 60 y.o. MRN# [id number]  Location:  3025/3025-01  Attending:  Nadir Michaud MD  Admit Date:  12/14/2022   Hospital Day: 4      Subjective: Following for CHAD/ATN in the setting of cardiac arrest, on CKD with baseline 1.4-1.6. Transferred from [facility] where she presented when found down by her  and subsequently noted to be hypoglycemic cardiac arrest.  PEA arrest.  Down time unknown. Initiated therapeutic hypothermia protocol further investigations revealed elevated creatinine. She is s/p hypothermia protocol. ECHO with EF 20%. She has ICD in place. Creatinine unchanged and at 2.66, potassium 3.7. Blood pressure 89-84 systolic. Had midodrine 5mg TID  On Levophed 4mcq/min. Remains intubated with FIO2 30%/PEEP 8. Urine output 54859 last 24 hours. Negative 3.9 liters. On Lasix 40mg IV TID    Outpatient Medications:     Medications Prior to Admission: furosemide (LASIX) 40 MG tablet, Take 1 tablet by mouth in the morning and 1 tablet in the evening. clopidogrel (PLAVIX) 75 MG tablet, Take 1 tab daily  atorvastatin (LIPITOR) 80 MG tablet, 1 tab daily  carvedilol (COREG) 3.125 MG tablet, take 1 tablet by mouth twice a day with meals  gabapentin (NEURONTIN) 400 MG capsule, Take 1 capsule by mouth 3 times daily for 180 days. midodrine (PROAMATINE) 5 MG tablet, take 1 tablet by mouth three times a day  sertraline (ZOLOFT) 100 MG tablet, Take one tablet by mouth once daily  insulin glargine (LANTUS SOLOSTAR) 100 UNIT/ML injection pen, INJECT 45 UNITS SUBCUTANEOUSLY EVERY MORNING AND 25 UNITS EVERY EVENING  Insulin Pen Needle (BD PEN NEEDLE PAIGE 2ND GEN) 32G X 4 MM MISC, use 1 PEN NEEDLE to inject MEDICATION subcutaneously daily  Lancets MISC, Pt testing BS once daily and prn  glucose monitoring (FREESTYLE) kit, Please dispense meter covered by patients insurance. blood glucose monitor strips, Testing BS once daily and as needed.  Dispense strips to go with new glucometer  albuterol (PROVENTIL) (2.5 MG/3ML) 0.083% nebulizer solution, Take 3 mLs by nebulization every 6 hours as needed for Wheezing (as needed for wheezing)  Respiratory Therapy Supplies (NEBULIZER/TUBING/MOUTHPIECE) KIT, 1 kit by Does not apply route daily as needed (for SOB, cough)  Respiratory Therapy Supplies (NEBULIZER COMPRESSOR) KIT, 1 kit by Does not apply route once for 1 dose  lactobacillus (CULTURELLE) capsule, Take 1 capsule by mouth 2 times daily (with meals)  blood glucose monitor strips, Use to test daily  blood glucose monitor kit and supplies, Use to test daily  Cholecalciferol (VITAMIN D3) 5000 units TABS, Take 5,000 Units by mouth daily  glucose blood VI test strips (EXACTECH TEST) strip, 1 each by In Vitro route daily Pt testing BS once daily and prn  Multiple Vitamins-Minerals (THERAPEUTIC MULTIVITAMIN-MINERALS) tablet, Take 1 tablet by mouth daily (with breakfast)    Current Medications:     Scheduled Meds:    calcium gluconate-NaCl  1,000 mg IntraVENous Once    midodrine  10 mg Oral TID WC    aspirin  81 mg Oral Daily    furosemide  40 mg IntraVENous TID    heparin (porcine)  5,000 Units SubCUTAneous 3 times per day    famotidine (PEPCID) injection  20 mg IntraVENous Daily    sodium chloride flush  5-40 mL IntraVENous 2 times per day    chlorhexidine  15 mL Mouth/Throat BID    piperacillin-tazobactam  3,375 mg IntraVENous Q8H    insulin lispro  0-8 Units SubCUTAneous Q4H    atorvastatin  80 mg Oral Nightly    [Held by provider] carvedilol  3.125 mg Oral BID WC    clopidogrel  75 mg Oral Daily     Continuous Infusions:    norepinephrine 4 mcg/min (12/18/22 0727)    propofol 5 mcg/kg/min (12/18/22 0643)    sodium chloride Stopped (12/16/22 2615)    dextrose       PRN Meds:  potassium chloride, sodium chloride flush, sodium chloride, ondansetron **OR** ondansetron, polyethylene glycol, acetaminophen **OR** acetaminophen, glucose, dextrose bolus **OR** dextrose bolus, glucagon (rDNA), dextrose, albuterol, acetylcysteine    Input/Output:       I/O last 3 completed shifts: In: 1408.4 [I.V.:610.3; NG/GT:271; IV Piggyback:527.1]  Out: 4620 [Urine:4620]. Patient Vitals for the past 96 hrs (Last 3 readings):   Weight   22 0600 170 lb 13.7 oz (77.5 kg)   22 0600 171 lb 11.8 oz (77.9 kg)   12/15/22 0600 173 lb 1 oz (78.5 kg)         Vital Signs:   Temperature:  Temp: 98.4 °F (36.9 °C)  TMax:   Temp (24hrs), Av.4 °F (36.9 °C), Min:98.4 °F (36.9 °C), Max:98.4 °F (36.9 °C)    Respirations:  Resp: 20  Pulse:   Heart Rate: 80  BP:    BP: (!) 89/52  BP Range: Systolic (06JDP), RFQ:19 , Min:78 , NJR:071       Diastolic (67GEP), YRQ:50, Min:50, Max:62      Physical Examination:     General:  Intubated and sedated  Neck:   No JVD, no thyromegaly, no lymphadenopathy. Chest:              Bilateral vesicular breath sounds, no rales or wheezes. Cardiac:  S1 S2 RR, no murmurs, gallops or rubs, JVP not raised. Abdomen: Soft, non-tender, no masses or organomegaly, BS audible. :   No suprapubic or flank tenderness. SKIN:  No rashes, good skin turgor. Extremities:  No edema, palpable peripheral pulses, no calf tenderness.     Labs:       Recent Labs     22  0615 22  0609 22  0408   WBC 16.1* 11.6* 9.3   RBC 3.91* 3.53* 3.36*   HGB 9.8* 8.9* 8.4*   HCT 32.0* 28.0* 27.7*   MCV 81.8* 79.3* 82.4*   MCH 25.1* 25.2 25.0*   MCHC 30.6 31.8 30.3   RDW 17.5* 18.0* 18.0*    217 184   MPV 11.1 11.0 10.8        BMP:   Recent Labs     22  0003 22  0609 22  1645 22  0408    136  --  139   K 3.8 3.3* 3.5* 3.7    101  --  103   CO2 18* 18*  --  21   BUN 68* 68*  --  66*   CREATININE 2.68* 2.66*  --  2.66*   GLUCOSE 233* 183*  --  260*   CALCIUM 7.5* 7.8*  --  8.2*        Phosphorus:     Recent Labs     22  0003 22  0609 22  0408   PHOS 6.5* 5.6* 4.1       Magnesium:    Recent Labs     22  0003 22  3741 12/18/22  0408   MG 2.1 2.2 2.1       Albumin:    No results for input(s): LABALBU in the last 72 hours. ALEJANDRO:      Lab Results   Component Value Date/Time    ALEJANDRO  08/26/2018 10:16 AM     This test is equivocal, which represents a borderline ALEJANDRO result. Suggest     SPEP:  Lab Results   Component Value Date/Time    PROT 6.6 12/14/2022 11:49 AM    ALBCAL 4.1 08/23/2018 04:33 AM    ALBPCT 62 08/23/2018 04:33 AM    LABALPH 0.2 08/23/2018 04:33 AM    LABALPH 0.7 08/23/2018 04:33 AM    A1PCT 3 08/23/2018 04:33 AM    A2PCT 10 08/23/2018 04:33 AM    LABBETA 0.9 08/23/2018 04:33 AM    BETAPCT 13 08/23/2018 04:33 AM    GAMGLOB 0.8 08/23/2018 04:33 AM    GGPCT 12 08/23/2018 04:33 AM    PATH ELECTRONICALLY SIGNED.  Lynn Contreras M.D. 08/27/2018 03:15 PM     C3:     Lab Results   Component Value Date/Time    C3 111 08/27/2018 03:15 PM     C4:     Lab Results   Component Value Date/Time    C4 35 08/27/2018 03:15 PM     MPO ANCA:     Lab Results   Component Value Date/Time    MPO 11 08/26/2018 10:16 AM     PR3 ANCA:     Lab Results   Component Value Date/Time    PR3 15 08/26/2018 10:16 AM     Anti-GBM:     Lab Results   Component Value Date/Time    GBMABIGG 12 08/26/2018 10:16 AM     Hep BsAg:         Lab Results   Component Value Date/Time    HEPBSAG NONREACTIVE 08/24/2018 12:24 PM       Urinalysis/Chemistries:      Lab Results   Component Value Date/Time    NITRU NEGATIVE 12/14/2022 12:26 PM    COLORU Yellow 12/14/2022 12:26 PM    PHUR 6.0 12/14/2022 12:26 PM    WBCUA 50  12/14/2022 12:26 PM    RBCUA 0 TO 2 12/14/2022 12:26 PM    MUCUS NOT REPORTED 11/21/2018 12:12 PM    TRICHOMONAS NOT REPORTED 11/21/2018 12:12 PM    YEAST FEW 12/14/2022 12:26 PM    BACTERIA RARE 12/14/2022 09:30 AM    SPECGRAV 1.018 12/14/2022 12:26 PM    LEUKOCYTESUR NEGATIVE 12/14/2022 12:26 PM    UROBILINOGEN Normal 12/14/2022 12:26 PM    BILIRUBINUR NEGATIVE 12/14/2022 12:26 PM    GLUCOSEU 1+ 12/14/2022 12:26 PM    KETUA NEGATIVE 12/14/2022 12:26 PM    AMORPHOUS TRACE 11/21/2018 12:12 PM     Urine Sodium:     Lab Results   Component Value Date/Time    ROULA 20 12/15/2022 11:49 AM      Urine Creatinine:     Lab Results   Component Value Date/Time    LABCREA 71.1 12/15/2022 11:49 AM     Radiology:     CXR: 12/16/22  Overall relatively stable cardiopulmonary status. Cardiomegaly with congestive changes and pulmonary edema. Nasogastric tube tip is in the stomach. Assessment:     #1 acute kidney injury nonoliguric secondary to ischemic (cardiac arrest/shock) and nephrotoxic (contrast) ATN -evolving  #2 high nongap metabolic acidosis secondary to HCAD/hypoperfusion  #3 chronic kidney disease stage IIIb secondary to diabetic/ischemic nephrosclerosis with baseline creatinine 1.4-1.6. Serologies unremarkable. Asymmetric kidney size. Minimal proteinuria. #4 s/p cardiac arrest-multiple times status post therapeutic hypothermia protocol  #5 cardiogenic shock on pressors  #6 decompensated systolic congestive heart failure  #7 acute hypoxic respiratory failure  #8 history of CABG/MVR with EF 25%  #9 pulmonary hypertension/severe tricuspid regurgitation  #10 type 2 diabetes    Plan:   1. Decrease Lasix 40mg IV to BID  2. Increase Midodrine to 10mg TID  3. Wean Levophed as able  4. Electrolyte replacement   5. Daily Labs  6. Poor prognosis  7. Will continue to follow    Nutrition   Please ensure that patient is on a renal diet/TF. Avoid nephrotoxic drugs/contrast exposure. We will continue to follow along with you. Aidan Clemons CNP   Attending Physician Statement  I have discussed the care of Subhash Ruano, including pertinent history and exam findings,  with the CNP. I have reviewed the key elements of all parts of the encounter with the CNP. I agree with the assessment, plan and orders as documented .     Richard Monroe MD MD, MRCP Chris Don, FACP   12/18/2022 1:35 PM    Nephrology [address] [place] [address]

## 2022-12-18 NOTE — PLAN OF CARE
Problem: Respiratory - Adult  Goal: Achieves optimal ventilation and oxygenation  12/18/2022 0830 by Lovely Hester RCP  Outcome: Progressing  12/18/2022 0531 by Willi Shepherd  Outcome: Progressing

## 2022-12-18 NOTE — PLAN OF CARE
Problem: Chronic Conditions and Co-morbidities  Goal: Patient's chronic conditions and co-morbidity symptoms are monitored and maintained or improved  Outcome: Progressing     Problem: Discharge Planning  Goal: Discharge to home or other facility with appropriate resources  Outcome: Progressing     Problem: Pain  Goal: Verbalizes/displays adequate comfort level or baseline comfort level  Outcome: Progressing     Problem: Respiratory - Adult  Goal: Achieves optimal ventilation and oxygenation  Outcome: Progressing     Problem: Skin/Tissue Integrity  Goal: Absence of new skin breakdown  Description: 1. Monitor for areas of redness and/or skin breakdown  2. Assess vascular access sites hourly  3. Every 4-6 hours minimum:  Change oxygen saturation probe site  4. Every 4-6 hours:  If on nasal continuous positive airway pressure, respiratory therapy assess nares and determine need for appliance change or resting period.   Outcome: Progressing     Problem: Safety - Adult  Goal: Free from fall injury  Outcome: Progressing     Problem: ABCDS Injury Assessment  Goal: Absence of physical injury  Outcome: Progressing     Problem: Nutrition Deficit:  Goal: Optimize nutritional status  Outcome: Progressing

## 2022-12-18 NOTE — PROGRESS NOTES
Critical Care Team - Daily Progress Note      Date and time: 12/18/2022 8:32 AM  Patient's name:  Angelica Morataya  Medical Record Number: [id number]  Patient's account/billing number: [de-identified]  Patient's YOB: 1960  Age: 60 y.o.   Date of Admission: 12/14/2022 10:09 AM  Length of stay during current admission: 4      Primary Care Physician: True Chapa MD  ICU Attending Physician: Dr. Elias Snyder Status: Full Code    Reason for ICU admission:   Chief Complaint   Patient presents with    Cardiac Arrest       Subjective:     OVERNIGHT EVENTS:             Overnight patient had to be put back on propofol for tachypnea and agitation, nonpurposeful movements of the extremities  Remains intubated  On 5 mics of propofol  On levo 4 mics was off overnight but then had to be put on back    Vent settings - 20/500/8/30  ABG -7.44/32.8/36.9/22.3    Morning labs -   WBC 9.3, hemoglobin 8.4, platelets 703  Sodium 139, potassium 3.7, creatinine 2.6     Urine output 2.1 L    Repeat CT done as per neurology  Echo completed which showed severe global hypokinesis and EF of 15 to 20%    On Lasix 40 mg IV 3 times daily as per nephrology, on midodrine  Discontinued amnioinfusion yesterday    Neurosurgery on board for neuro prognostication MRI cannot be done due to AICD    AWAKE & FOLLOWING COMMANDS:  [x] No   [] Yes    CURRENT VENTILATION STATUS:     [x] Ventilator  [] BIPAP  [] Nasal Cannula [] Room Air      IF INTUBATED, ET TUBE MARKING AT LOWER LIP:       cms    SECRETIONS Amount:  [x] Small [] Moderate  [] Large  [] None  Color:     [x] White [] Colored  [] Bloody    SEDATION:  RAAS Score:  [x] Propofol gtt  [] Versed gtt  [] Ativan gtt   [] No Sedation    PARALYZED:  [x] No    [] Yes    DIARRHEA:                [x] No                [] Yes  (C. Difficile status: [] positive                                                                                                                       [] negative [] pending)    VASOPRESSORS:  [] No    [] Yes    If yes -   [x] Levophed       [] Dopamine     [] Vasopressin       [] Dobutamine  [] Phenylephrine         [] Epinephrine    CENTRAL LINES:     [] No   [x] Yes   (Date of Insertion:   )           If yes -     [] Right IJ     [] Left IJ [x] Right Femoral [] Left Femoral                   [] Right Subclavian [] Left Subclavian       DYER'S CATHETER:   [] No   [x] Yes  (Date of Insertion:   )     URINE OUTPUT:            [x] Good   [] Low              [] Anuric    REVIEW OF SYSTEMS: Could not be obtained      OBJECTIVE:     VITAL SIGNS:  BP (!) 89/52   Pulse 80   Temp 98.4 °F (36.9 °C) (Bladder)   Resp 20   Ht 5' 6\" (1.676 m)   Wt 170 lb 13.7 oz (77.5 kg)   SpO2 99%   BMI 27.58 kg/m²   Tmax over 24 hours:  Temp (24hrs), Av.4 °F (36.9 °C), Min:98.4 °F (36.9 °C), Max:98.4 °F (36.9 °C)      Patient Vitals for the past 8 hrs:   BP Pulse Resp SpO2   22 0829 -- 80 20 99 %   22 0823 -- 79 20 99 %   22 0728 -- 75 20 98 %   22 0727 -- 75 20 98 %   22 0722 (!) 89/52 79 20 97 %   22 0700 -- 87 24 98 %   22 0659 -- -- 24 98 %   22 0446 -- 78 20 100 %   22 0400 101/62 75 20 98 %   22 0302 -- 82 20 98 %   22 0200 -- 82 22 99 %         Intake/Output Summary (Last 24 hours) at 2022 0832  Last data filed at 2022 0730  Gross per 24 hour   Intake 957.88 ml   Output 3325 ml   Net -2367.12 ml     Date 22 0000 - 22 2359   Shift 4798-2356 1614-3142 3531-6554 24 Hour Total   INTAKE   I.V.(mL/kg) 137(1.8)   137(1.8)   NG/GT(mL/kg) 236(3)   236(3)   IV Piggyback(mL/kg) 151. 9(2)   151. 9(2)   Shift Total(mL/kg) 524. 9(6.8)   524. 9(6.8)   OUTPUT   Urine(mL/kg/hr) 1500(2.4)   1500   Shift Total(mL/kg) 1500(19.4)   1500(19.4)   Weight (kg) 77.5 77.5 77.5 77.5     Wt Readings from Last 3 Encounters: 12/17/22 170 lb 13.7 oz (77.5 kg)   10/24/22 165 lb 9.6 oz (75.1 kg)   10/24/22 165 lb (74.8 kg)     Body mass index is 27.58 kg/m². PHYSICAL EXAM:  Constitutional: intubated, appears ill  HEENT: pupils reactive  Respiratory: clear to auscultation, no wheezes or rales and unlabored breathing. Cardiovascular: Normal heart sounds  Abdomen: soft, nontender, nondistended, no masses or organomegaly  NEUROLOGIC: Brain flexes intact  Extremities:  peripheral pulses normal, no pedal edema,.       MEDICATIONS:  Scheduled Meds:   calcium gluconate-NaCl  1,000 mg IntraVENous Once    aspirin  81 mg Oral Daily    midodrine  5 mg Oral TID WC    furosemide  40 mg IntraVENous TID    heparin (porcine)  5,000 Units SubCUTAneous 3 times per day    famotidine (PEPCID) injection  20 mg IntraVENous Daily    sodium chloride flush  5-40 mL IntraVENous 2 times per day    chlorhexidine  15 mL Mouth/Throat BID    piperacillin-tazobactam  3,375 mg IntraVENous Q8H    insulin lispro  0-8 Units SubCUTAneous Q4H    atorvastatin  80 mg Oral Nightly    [Held by provider] carvedilol  3.125 mg Oral BID WC    clopidogrel  75 mg Oral Daily     Continuous Infusions:   norepinephrine 4 mcg/min (12/18/22 0729)    propofol 5 mcg/kg/min (12/18/22 0643)    sodium chloride Stopped (12/16/22 1629)    dextrose       PRN Meds:   potassium chloride, 20 mEq, PRN  sodium chloride flush, 5-40 mL, PRN  sodium chloride, , PRN  ondansetron, 4 mg, Q8H PRN   Or  ondansetron, 4 mg, Q6H PRN  polyethylene glycol, 17 g, Daily PRN  acetaminophen, 650 mg, Q6H PRN   Or  acetaminophen, 650 mg, Q6H PRN  glucose, 4 tablet, PRN  dextrose bolus, 125 mL, PRN   Or  dextrose bolus, 250 mL, PRN  glucagon (rDNA), 1 mg, PRN  dextrose, , Continuous PRN  albuterol, 2.5 mg, As Directed RT PRN  acetylcysteine, 600 mg, As Directed RT PRN        SUPPORT DEVICES: [x] Ventilator [] BIPAP  [] Nasal Cannula [] Room Air    VENT SETTINGS (Comprehensive) (if applicable):  Vent Information  Ventilator ID: [id number]  Equipment Changed: Expiratory Filter, HME  Ventilator Initiate: Yes  Additional Respiratory Assessments  Heart Rate: 80  Resp: 20  SpO2: 99 %  End Tidal CO2: 28 (%)  Position: Semi-Simon's  Humidification Source: HME  Cuff Pressure (cm H2O):  (mlt)  Skin Barrier Applied: No    ABGs:     Lab Results   Component Value Date/Time    FOL2WEV 17 08/24/2018 05:02 AM    FIO2 30.0 12/18/2022 04:48 AM     Lactic Acid:   Lab Results   Component Value Date/Time    LACTA 1.4 10/13/2022 12:22 PM    LACTA 1.7 12/29/2018 11:57 AM    LACTA 1.5 12/29/2018 05:30 AM         DATA:  Complete Blood Count:   Recent Labs     12/16/22  0615 12/17/22  0609 12/18/22  0408   WBC 16.1* 11.6* 9.3   HGB 9.8* 8.9* 8.4*   MCV 81.8* 79.3* 82.4*    217 184   RBC 3.91* 3.53* 3.36*   HCT 32.0* 28.0* 27.7*   MCH 25.1* 25.2 25.0*   MCHC 30.6 31.8 30.3   RDW 17.5* 18.0* 18.0*   MPV 11.1 11.0 10.8        PT/INR:    Lab Results   Component Value Date/Time    PROTIME 13.7 12/14/2022 01:38 PM    INR 1.3 12/14/2022 01:38 PM     PTT:    Lab Results   Component Value Date/Time    APTT 26.0 12/14/2022 01:38 PM       Basal Metabolic Profile:   Recent Labs     12/17/22  0003 12/17/22  0609 12/17/22  1645 12/18/22  0408    136  --  139   K 3.8 3.3* 3.5* 3.7   BUN 68* 68*  --  66*   CREATININE 2.68* 2.66*  --  2.66*    101  --  103   CO2 18* 18*  --  21      Magnesium:   Lab Results   Component Value Date/Time    MG 2.1 12/18/2022 04:08 AM    MG 2.2 12/17/2022 06:09 AM    MG 2.1 12/17/2022 12:03 AM     Phosphorus:   Lab Results   Component Value Date/Time    PHOS 4.1 12/18/2022 04:08 AM    PHOS 5.6 12/17/2022 06:09 AM    PHOS 6.5 12/17/2022 12:03 AM     S. Calcium:  Recent Labs     12/18/22  0408   CALCIUM 8.2*     S. Ionized Calcium:No results for input(s): IONCA in the last 72 hours.       Urinalysis:   Lab Results   Component Value Date/Time    NITRU NEGATIVE 12/14/2022 12:26 PM    COLORU Yellow 12/14/2022 12:26 PM    PHUR 6.0 12/14/2022 12:26 PM    WBCUA 50  12/14/2022 12:26 PM    RBCUA 0 TO 2 12/14/2022 12:26 PM    MUCUS NOT REPORTED 11/21/2018 12:12 PM    TRICHOMONAS NOT REPORTED 11/21/2018 12:12 PM    YEAST FEW 12/14/2022 12:26 PM    BACTERIA RARE 12/14/2022 09:30 AM    SPECGRAV 1.018 12/14/2022 12:26 PM    LEUKOCYTESUR NEGATIVE 12/14/2022 12:26 PM    UROBILINOGEN Normal 12/14/2022 12:26 PM    BILIRUBINUR NEGATIVE 12/14/2022 12:26 PM    GLUCOSEU 1+ 12/14/2022 12:26 PM    KETUA NEGATIVE 12/14/2022 12:26 PM    AMORPHOUS TRACE 11/21/2018 12:12 PM       CARDIAC ENZYMES: No results for input(s): CKMB, CKMBINDEX, TROPONINI in the last 72 hours. Invalid input(s): CKTOTAL;3  BNP: No results for input(s): BNP in the last 72 hours. LFTS  No results for input(s): ALKPHOS, ALT, AST, BILITOT, BILIDIR, LABALBU in the last 72 hours. AMYLASE/LIPASE/AMMONIA  Recent Labs     12/16/22  0753   AMMONIA 21       Last 3 Blood Glucose:   Recent Labs     12/15/22  1809 12/16/22  0029 12/16/22  0615 12/16/22  1217 12/16/22  1747 12/17/22  0003 12/17/22  0609 12/18/22  0408   GLUCOSE 145* 180* 195* 200* 233* 233* 183* 260*      HgBA1c:    Lab Results   Component Value Date/Time    LABA1C 8.5 12/15/2022 05:56 AM         TSH:    Lab Results   Component Value Date/Time    TSH 1.66 10/14/2022 10:23 AM     ANEMIA STUDIES  No results for input(s): LABIRON, TIBC, FERRITIN, GHMPHNOS06, FOLATE, OCCULTBLD in the last 72 hours.       Cultures during this admission:     Blood cultures:                 [] None drawn      [] Negative             []  Positive (Details:  )  Urine Culture:                   [] None drawn      [] Negative             []  Positive (Details:  )  Sputum Culture:               [] None drawn       [] Negative             []  Positive (Details:  )   Endotracheal aspirate:     [] None drawn       [] Negative             []  Positive (Details:  )        ASSESSMENT:     Principal Problem:    Cardiac arrest ([facility] [address]  Active Problems:    Acute on chronic systolic congestive heart failure (HCC)    Acute respiratory failure with hypoxia and hypercapnia (HCC)    Pulmonary hypertension (HCC)    Diabetes mellitus with kidney disease (HCC)    NSTEMI (non-ST elevated myocardial infarction) ([facility] [address]    Ventricular tachycardia    Palliative care encounter    Acute kidney injury ([facility] [address]    Acidosis    Acute pulmonary edema (HCC)  Resolved Problems:    * No resolved hospital problems. *        PLAN:     Cardiac arrest -   - Continue mechanical ventilation  - RT inpatient protocol  - Daily ABG  - Completed hypothermia protocol  - Echo shows EF of 15 to 20%, severe global hypokinesis, RV severely dilated ICD in place, LA dilated  - on ICD interrogation, no V. fib or V. tach recorded  - Monitor sugars  - Appreciate cardiology recommendations  - Pressor support as needed  - Monitor neurological response, LTME shows severe diffuse cortical dysfunction, severe encephalopathy  - Follow upon neuro critical care recommendations. MRI cannot be done due to AICD incompatibility  - Repeat CT done yesterday  - Continue Zosyn     2. CHFrEF -  - secondary to ischemic cardiomyopathy  - Last echo showing EF of 25%, AICD in place  - Appreciate cardiology recommendations     3. DM -   - Initially hypoglycemic  - Monitor sugars closely  - Medium dose sliding scale  - POCT glucose checks  - Hypoglycemia protocol     4. CHAD -   - creatinine today 2.66, up trending  - Secondary to hypoperfusion  - Continue Lasix 20 mg IV TID  - Nephrology on board appreciate recommendations     5. CKD -   - likely secondary to DM  - baseline around 1.5-1.7         Jud Lee MD, M.D. Department of Internal Medicine/ Critical care  [facility])             12/18/2022, 8:32 AM       Attending Physician Statement  I have discussed the care of Lane Robd, including pertinent history and exam findings with the resident.  I have reviewed the key elements of all parts of the encounter with the resident. I have seen and examined the patient with the resident. I agree with the assessment and plan and status of the problem list as documented. I seen the patient during around today, ventilator setting and arterial blood gases seen. Patient is on low-dose of propofol she had been off sedation for since admission but last night he started on propofol currently 10 mcg only as she was tachypneic hyperventilation and apparently was posturing. Creatinine is 2.66 stable BUN is 66 she is on Lasix 40 mg 3 times a day yesterday but today Lasix was changed to twice daily by nephrology but this morning Lasix was on hold as patient was urinating large amount of the urine we will continue to monitor urine output and renal function follow-up with nephrology urine output is 3 L in last 24 hours. Remain encephalopathic does not follow commands no seizure-like activity was reported. Ventilator setting PRVC/20/500/8/30 percent ABG 7.4 4/33/87/22. Neurology/neuro critical care has following the patient for prognostication.  was contacted yesterday and advised to come to the hospital for further discussion and goals of care as seen as prognosis is guarded because of continued encephalopathy    Discussed with nursing staff, treatment and plan discussed. Discussed with respiratory therapist.    Total critical care time caring for this patient with life threatening, unstable organ failure, including direct patient contact, management of life support systems, review of data including imaging and labs, discussions with other team members and physicians at least 35 min so far today, excluding procedures. Please note that this chart was generated using voice recognition Dragon dictation software. Although every effort was made to ensure the accuracy of this automated transcription, some errors in transcription may have occurred.      Reynaldo Kamila Phillip MD  12/18/2022 1:43 PM

## 2022-12-18 NOTE — PROGRESS NOTES
[facility]   Progress Note                   Date:   12/18/2022  Patient name: Latosha Villagran  Date of admission:  12/14/2022 10:09 AM  MRN:   [id number]  YOB: 1960  PCP: Stephy Stafford MD    Reason for Admission:  cardiac arrest     Subjective:       Patient seen and examined. Levophed weaned off. Will DC amiodarone. Intubated and sedated. Intake/Output Summary (Last 24 hours) at 12/18/2022 0646  Last data filed at 12/18/2022 0643  Gross per 24 hour   Intake 972.4 ml   Output 3175 ml   Net -2202.6 ml             Medications:   Scheduled Meds:   aspirin  81 mg Oral Daily    midodrine  5 mg Oral TID WC    furosemide  40 mg IntraVENous TID    heparin (porcine)  5,000 Units SubCUTAneous 3 times per day    famotidine (PEPCID) injection  20 mg IntraVENous Daily    sodium chloride flush  5-40 mL IntraVENous 2 times per day    chlorhexidine  15 mL Mouth/Throat BID    piperacillin-tazobactam  3,375 mg IntraVENous Q8H    insulin lispro  0-8 Units SubCUTAneous Q4H    atorvastatin  80 mg Oral Nightly    [Held by provider] carvedilol  3.125 mg Oral BID WC    clopidogrel  75 mg Oral Daily       Continuous Infusions:   norepinephrine Stopped (12/18/22 0645)    propofol 5 mcg/kg/min (12/18/22 0643)    sodium chloride Stopped (12/16/22 1629)    dextrose         CBC:   Recent Labs     12/16/22  0615 12/17/22  0609 12/18/22  0408   WBC 16.1* 11.6* 9.3   HGB 9.8* 8.9* 8.4*    217 184       BMP:    Recent Labs     12/17/22  0003 12/17/22  0609 12/17/22  1645 12/18/22  0408    136  --  139   K 3.8 3.3* 3.5* 3.7    101  --  103   CO2 18* 18*  --  21   BUN 68* 68*  --  66*   CREATININE 2.68* 2.66*  --  2.66*   GLUCOSE 233* 183*  --  260*       Hepatic:   No results for input(s): AST, ALT, ALB, BILITOT, ALKPHOS in the last 72 hours. Troponin: No results for input(s): TROPONINI in the last 72 hours. BNP: No results for input(s): BNP in the last 72 hours.   Lipids: No results for input(s): CHOL, HDL in the last 72 hours. Invalid input(s): LDLCALCU  INR:   No results for input(s): INR in the last 72 hours. Objective:   Vitals: /62   Pulse 78   Temp 98.4 °F (36.9 °C) (Bladder)   Resp 20   Ht 5' 6\" (1.676 m)   Wt 170 lb 13.7 oz (77.5 kg)   SpO2 100%   BMI 27.58 kg/m²     General appearance:intubated and sedated   HEENT: Head: Normocephalic, no lesions, without obvious abnormality  Neck: no JVD  Lungs: clear to auscultation bilaterally, no basilar rales, no wheezing   Heart: regular rate and rhythm, S1, S2 normal, no murmur, click, rub or gallop  Abdomen: soft, non-tender; bowel sounds normal  Extremities: No LE edema        ECHO  In summary, she has enlarged LV with severe LV dysfunction with EF 25%  Mild MR with overall a good result from MR repair. Moderate to mod-severe pulmonary hypertension with PAP in 40-50 mmHg range  Mod-severe TR  There has been a significant change in LV function with EF decreasing from  45% to 25%. Coronary Angiography:   LMCA: Normal 0% stenosis. LAD: 100% mid stenosis  LIMA - LAD is patent with 10-20% stenosis  SVG - D is patent with 10-20% stenosis  LCx: Mid 50% stenosis  RCA: 100% proximal stenosis  SVG - PDA is patent with 10-20% stenosis     Echo 12/16/2022: The Left ventricle appears normal in size. Severe global hypokinesis  Severely reduced LV systolic function, Ejection Fraction 15-20 %  RV is severely dilated impaired function. RV systolic pressure 35 mmHg  ICD lead noted in the Rt heart chambers  The LA appears dilated  MV prosthetic annuloplastic ring noted. Mean gradient 2 mmHg  Mild to moderate TR  AV is sclerotic opens well  Normal aortic root dimensions. No significant pericardial infusion seen. The IVC appears normal in size, normal respiratory variation suggestive of  normal right atrial filling pressure. Assessment:   Status post cardiac arrest with rhythm PEA/Asystole 12/14/2022.  Prolonged downtime > 30 mins as per hx. Post ROSC ecg showed NSR with RAD and NS t wave abnormality. Elevated troponin likely secondary to PEA arrest.  MV CAD s/p CABG x 3 in 2015 (LIMA-ALD, SVG-D, SVG-RCA),   Chronic HFrEF secondary to ICMP, last LVEF 25% in 10/2022. Single chamber ICD ([place]) in situ. CHAD on CKD stage IIIb with baseline creatinine 1.4-1.6 likely secondary to ischemia and nephrotoxic ATN. Evidence of anoxic virgil injury. Treatment Plan:     Amio drip discontinued   Continue aspirin, plavix and statin. Neuro critical care recommendation appreciated. Will decide about cath after meaningful neurological recovery    Coty Serrano MD       Cardiovascular Fellow  12/18/2022, 6:46 AM      Attending Physician Statement  I have discussed the care of Genaro Kiser, including pertinent history and exam findings,  with the cardiology fellow/resident. I have seen and examined the patient and the key elements of all parts of the encounter have been performed by me. I have completed at least one if not all key elements of the E/M (history, physical exam, and MDM). I agree with the assessment, plan and orders as documented by the resident with additional recommendations as below:     No significant neurological recovery. On low dose levophed this am. In nSR. ICD functioning normally with no arrhythmias. Continue supportive care. F/u with neurology recommendations.      Subhsah Martino MD, [facility], [facility]

## 2022-12-19 PROBLEM — R57.9 SHOCK (HCC): Status: ACTIVE | Noted: 2018-08-23

## 2022-12-19 NOTE — PROGRESS NOTES
Patient placed on CPAP trial with pressure support of 8, tolerating well at this time.        12/19/22 0830   NICU Vent Information   Vent Type Servo i   Vent Mode (S)  CPAP   Vt (Set, mL) 500 mL   Vt (Measured) 441 mL   Resp Rate (Set) 20 bmp   Rate Measured 28 br/min   Minute Volume (L/min) 6.1 Liters   Pressure Support (S)  8 cmH20   FiO2  30 %   Peak Inspiratory Pressure (cmH2O) 29 cmH2O   I:E Ratio 1:2.5   Sensitivity 4   PEEP/CPAP (cmH2O) 8   I Time/ I Time % 0.85 s   Mean Airway Pressure (cmH2O) 13.4 cmH20

## 2022-12-19 NOTE — PROGRESS NOTES
Comprehensive Nutrition Assessment    Type and Reason for Visit:  Initial    Nutrition Recommendations/Plan:   Continue current TF as tolerated. Aconitine to monitor weight, labs, and TF tolerance. Malnutrition Assessment:  Malnutrition Status:  Insufficient data (12/15/22 1445)    Context:  Acute Illness     Findings of the 6 clinical characteristics of malnutrition:  Energy Intake:  Unable to assess  Weight Loss:  Unable to assess     Body Fat Loss:  Unable to assess     Muscle Mass Loss:  Unable to assess    Fluid Accumulation:  Unable to assess     Strength:  Not Performed    Nutrition Assessment:    Patient seen for follow up. Patient on vent. Per RN, patient is at goal rate and tolerating TF. Labs BUN: 56, Cr: 2.57, Glu: 232-238, Ca2+: 8.3. Last BM on 12/19. Nutrition Related Findings:    meds/labs reviewed Wound Type: None       Current Nutrition Intake & Therapies:    Average Meal Intake: NPO  Average Supplements Intake: NPO  Current Tube Feeding (TF) Orders:  Feeding Route:    Formula:    Schedule:    Feeding Regimen:    Additives/Modulars:    Water Flushes:    Current TF & Flush Orders Provides:    Goal TF & Flush Orders Provides:      Anthropometric Measures:  Height: 5' 6\" (167.6 cm)  Ideal Body Weight (IBW): 130 lbs (59 kg)    Admission Body Weight: 173 lb 1 oz (78.5 kg)  Current Body Weight: 175 lb (79.4 kg), 134.6 % IBW. Weight Source: Bed Scale  Current BMI (kg/m2): 28.3                          BMI Categories: Overweight (BMI 25.0-29. 9)    Estimated Daily Nutrient Needs:  Energy Requirements Based On: Formula  Weight Used for Energy Requirements: Current  Energy (kcal/day): 4920-6183 kcal/day  Weight Used for Protein Requirements: Ideal  Protein (g/day): 90 g pro/day  Method Used for Fluid Requirements: Other (Comment)  Fluid (ml/day): per MD    Nutrition Diagnosis:   Inadequate oral intake related to impaired respiratory function as evidenced by NPO or clear liquid status due to medical condition    Nutrition Interventions:   Food and/or Nutrient Delivery:  (Start nutrition as able; if TF warranted, suggest Renal Formula goal 45 mL/hr to provide 1944 kcal and 87 g pro/day)  Nutrition Education/Counseling: No recommendation at this time  Coordination of Nutrition Care: Continue to monitor while inpatient  Plan of Care discussed with: RN    Goals:     Goals: Meet at least 75% of estimated needs, within 7 days       Nutrition Monitoring and Evaluation:   Behavioral-Environmental Outcomes: None Identified  Food/Nutrient Intake Outcomes: Diet Advancement/Tolerance, Enteral Nutrition Intake/Tolerance  Physical Signs/Symptoms Outcomes: Biochemical Data, Fluid Status or Edema, Hemodynamic Status, Nutrition Focused Physical Findings, Weight, Skin    Discharge Planning:     Too soon to determine     Boo Dahl RD  Contact: [phone] [place]

## 2022-12-19 NOTE — PROGRESS NOTES
[facility]   Progress Note                   Date:   12/19/2022  Patient name: Rickie Barnard  Date of admission:  12/14/2022 10:09 AM  MRN:   [id number]  YOB: 1960  PCP: Zuleika Smith MD    Reason for Admission:  cardiac arrest     Subjective:       Patient seen and examined. Levophed weaned off. Amiodarone dced . Intubated and sedated. Intake/Output Summary (Last 24 hours) at 12/19/2022 0838  Last data filed at 12/19/2022 0630  Gross per 24 hour   Intake 2056.33 ml   Output 2805 ml   Net -748.67 ml             Medications:   Scheduled Meds:   midodrine  10 mg Oral TID WC    aspirin  81 mg Oral Daily    heparin (porcine)  5,000 Units SubCUTAneous 3 times per day    famotidine (PEPCID) injection  20 mg IntraVENous Daily    sodium chloride flush  5-40 mL IntraVENous 2 times per day    chlorhexidine  15 mL Mouth/Throat BID    piperacillin-tazobactam  3,375 mg IntraVENous Q8H    insulin lispro  0-8 Units SubCUTAneous Q4H    atorvastatin  80 mg Oral Nightly    [Held by provider] carvedilol  3.125 mg Oral BID WC    clopidogrel  75 mg Oral Daily       Continuous Infusions:   norepinephrine Stopped (12/18/22 1326)    propofol 10 mcg/kg/min (12/19/22 0659)    sodium chloride Stopped (12/16/22 1629)    dextrose         CBC:   Recent Labs     12/17/22  0609 12/18/22  0408 12/19/22  0507   WBC 11.6* 9.3 7.9   HGB 8.9* 8.4* 8.0*    184 166       BMP:    Recent Labs     12/17/22  0609 12/17/22  1645 12/18/22  0408 12/18/22  1621 12/19/22  0507     --  139  --  141   K 3.3*   < > 3.7 4.0 3.7     --  103  --  106   CO2 18*  --  21  --  21   BUN 68*  --  66*  --  56*   CREATININE 2.66*  --  2.66*  --  2.57*   GLUCOSE 183*  --  260*  --  238*    < > = values in this interval not displayed. Hepatic:   No results for input(s): AST, ALT, ALB, BILITOT, ALKPHOS in the last 72 hours. Troponin: No results for input(s): TROPONINI in the last 72 hours.   BNP: No results for input(s): BNP in the last 72 hours. Lipids: No results for input(s): CHOL, HDL in the last 72 hours. Invalid input(s): LDLCALCU  INR:   No results for input(s): INR in the last 72 hours. Objective:   Vitals: /62   Pulse 77   Temp 98.2 °F (36.8 °C) (Bladder)   Resp 20   Ht 5' 6\" (1.676 m)   Wt 175 lb 14.8 oz (79.8 kg)   SpO2 97%   BMI 28.40 kg/m²     General appearance:intubated and sedated   HEENT: Head: Normocephalic, no lesions, without obvious abnormality  Neck: no JVD  Lungs: clear to auscultation bilaterally, no basilar rales, no wheezing   Heart: regular rate and rhythm, S1, S2 normal, no murmur, click, rub or gallop  Abdomen: soft, non-tender; bowel sounds normal  Extremities: No LE edema        ECHO  In summary, she has enlarged LV with severe LV dysfunction with EF 25%  Mild MR with overall a good result from MR repair. Moderate to mod-severe pulmonary hypertension with PAP in 40-50 mmHg range  Mod-severe TR  There has been a significant change in LV function with EF decreasing from  45% to 25%. Coronary Angiography:   LMCA: Normal 0% stenosis. LAD: 100% mid stenosis  LIMA - LAD is patent with 10-20% stenosis  SVG - D is patent with 10-20% stenosis  LCx: Mid 50% stenosis  RCA: 100% proximal stenosis  SVG - PDA is patent with 10-20% stenosis     Echo 12/16/2022: The Left ventricle appears normal in size. Severe global hypokinesis  Severely reduced LV systolic function, Ejection Fraction 15-20 %  RV is severely dilated impaired function. RV systolic pressure 35 mmHg  ICD lead noted in the Rt heart chambers  The LA appears dilated  MV prosthetic annuloplastic ring noted. Mean gradient 2 mmHg  Mild to moderate TR  AV is sclerotic opens well  Normal aortic root dimensions. No significant pericardial infusion seen. The IVC appears normal in size, normal respiratory variation suggestive of  normal right atrial filling pressure.     Assessment:   Status post cardiac arrest with rhythm PEA/Asystole 12/14/2022. Prolonged downtime > 30 mins as per hx. Post ROSC ecg showed NSR with RAD and NS t wave abnormality. Elevated troponin likely secondary to PEA arrest.  MV CAD s/p CABG x 3 in 2015 (LIMA-ALD, SVG-D, SVG-RCA),   Chronic HFrEF secondary to ICMP, last LVEF 25% in 10/2022. Single chamber ICD (b[place]) in situ. CHAD on CKD stage IIIb with baseline creatinine 1.4-1.6 likely secondary to ischemia and nephrotoxic ATN. Evidence of anoxic virgil injury. Treatment Plan:     Amio drip discontinued   Continue aspirin, plavix and statin. ICD functioning normally with no arrhythmias  Neuro critical care recommendation appreciated. Will decide about cath after meaningful neurological recovery    Harsh Rogers MD  Internal Medicine Resident, PGY3  [address] [place] [address], [facility] 372  12/19/2022,8:39 AM    Attending Physician Statement  I have discussed the care of the patient, including pertinent history and exam findings, with the resident. I have seen and examined the patient and the key elements of all parts of the encounter have been performed by me. I agree with the assessment, plan and orders as documented by the resident.   Further cardiac work-up depends upon the recovery    Gabo Morris MD

## 2022-12-19 NOTE — PROGRESS NOTES
Renal Progress Note    Patient :  Clarissa Jones; 60 y.o. MRN# [id number]  Location:  3025/3025-01  Attending:  Jose Carty MD  Admit Date:  12/14/2022   Hospital Day: 5      Subjective: Following for CHAD/ATN in the setting of cardiac arrest, on CKD with baseline 1.4-1.6. Transferred from [facility] where she presented when found down by her  and subsequently noted to be hypoglycemic cardiac arrest.  PEA arrest.  Down time unknown. Initiated therapeutic hypothermia protocol further investigations revealed elevated creatinine. Patient was seen and examined. No new issues reported overnight. Plan pending family communication. Per nursing there has been difficulty reaching . Patient is posturing. Sedation is given for vent compliance. She is s/p hypothermia protocol. Creatinine unchanged and at 2.57, potassium 3.7. Blood pressure 123-017 systolic. Had midodrine 10mg TID  Remains intubated with FIO2 30%/PEEP 8. Urine output 3.1L last 24 hours. Net -4.6 liters. lasix was discontiued. ECHO with EF 20%. She has ICD in place. Outpatient Medications:     Medications Prior to Admission: furosemide (LASIX) 40 MG tablet, Take 1 tablet by mouth in the morning and 1 tablet in the evening. clopidogrel (PLAVIX) 75 MG tablet, Take 1 tab daily  atorvastatin (LIPITOR) 80 MG tablet, 1 tab daily  carvedilol (COREG) 3.125 MG tablet, take 1 tablet by mouth twice a day with meals  gabapentin (NEURONTIN) 400 MG capsule, Take 1 capsule by mouth 3 times daily for 180 days.   midodrine (PROAMATINE) 5 MG tablet, take 1 tablet by mouth three times a day  sertraline (ZOLOFT) 100 MG tablet, Take one tablet by mouth once daily  insulin glargine (LANTUS SOLOSTAR) 100 UNIT/ML injection pen, INJECT 45 UNITS SUBCUTANEOUSLY EVERY MORNING AND 25 UNITS EVERY EVENING  Insulin Pen Needle (BD PEN NEEDLE PAIGE 2ND GEN) 32G X 4 MM MISC, use 1 PEN NEEDLE to inject MEDICATION subcutaneously daily  Lancets MISC, Pt testing BS once daily and prn  glucose monitoring (FREESTYLE) kit, Please dispense meter covered by patients insurance. blood glucose monitor strips, Testing BS once daily and as needed.  Dispense strips to go with new glucometer  albuterol (PROVENTIL) (2.5 MG/3ML) 0.083% nebulizer solution, Take 3 mLs by nebulization every 6 hours as needed for Wheezing (as needed for wheezing)  Respiratory Therapy Supplies (NEBULIZER/TUBING/MOUTHPIECE) KIT, 1 kit by Does not apply route daily as needed (for SOB, cough)  Respiratory Therapy Supplies (NEBULIZER COMPRESSOR) KIT, 1 kit by Does not apply route once for 1 dose  lactobacillus (CULTURELLE) capsule, Take 1 capsule by mouth 2 times daily (with meals)  blood glucose monitor strips, Use to test daily  blood glucose monitor kit and supplies, Use to test daily  Cholecalciferol (VITAMIN D3) 5000 units TABS, Take 5,000 Units by mouth daily  glucose blood VI test strips (EXACTECH TEST) strip, 1 each by In Vitro route daily Pt testing BS once daily and prn  Multiple Vitamins-Minerals (THERAPEUTIC MULTIVITAMIN-MINERALS) tablet, Take 1 tablet by mouth daily (with breakfast)    Current Medications:     Scheduled Meds:    midodrine  10 mg Oral TID WC    aspirin  81 mg Oral Daily    heparin (porcine)  5,000 Units SubCUTAneous 3 times per day    famotidine (PEPCID) injection  20 mg IntraVENous Daily    sodium chloride flush  5-40 mL IntraVENous 2 times per day    chlorhexidine  15 mL Mouth/Throat BID    piperacillin-tazobactam  3,375 mg IntraVENous Q8H    insulin lispro  0-8 Units SubCUTAneous Q4H    atorvastatin  80 mg Oral Nightly    [Held by provider] carvedilol  3.125 mg Oral BID     clopidogrel  75 mg Oral Daily     Continuous Infusions:    norepinephrine Stopped (12/18/22 1326)    propofol 10 mcg/kg/min (12/19/22 0659)    sodium chloride Stopped (12/16/22 1629)    dextrose       PRN Meds:  sodium chloride flush, sodium chloride, ondansetron **OR** ondansetron, polyethylene glycol, acetaminophen **OR** acetaminophen, glucose, dextrose bolus **OR** dextrose bolus, glucagon (rDNA), dextrose, albuterol, acetylcysteine    Input/Output:       I/O last 3 completed shifts: In: 2611.2 [I.V.:488.8; NG/GT:1747; IV Piggyback:375.5]  Out: 4908 [WKIW]. Patient Vitals for the past 96 hrs (Last 3 readings):   Weight   22 0435 175 lb 14.8 oz (79.8 kg)   22 0600 170 lb 13.7 oz (77.5 kg)   22 0600 171 lb 11.8 oz (77.9 kg)         Vital Signs:   Temperature:  Temp: 98.2 °F (36.8 °C)  TMax:   Temp (24hrs), Av.5 °F (36.9 °C), Min:97.9 °F (36.6 °C), Max:99 °F (37.2 °C)    Respirations:  Resp: 20  Pulse:   Heart Rate: 77  BP:    BP: 108/62  BP Range: Systolic (23GGN), SAQ:150 , Min:90 , SWI:661       Diastolic (48PMK), QWS:41, Min:51, Max:70      Physical Examination:     General:  Intubated and sedated, nonpurposeful movements  Neck:   No JVD, no thyromegaly, no lymphadenopathy. Chest:              Bilateral vesicular breath sounds, no rales or wheezes. Cardiac:  S1 S2 RR, no murmurs, gallops or rubs, JVP not raised. Abdomen: Soft, non-tender, no masses or organomegaly, BS audible. Neuro: On mechanical ventilation, sedated. Does not follow commands. :   No suprapubic or flank tenderness. SKIN:  No rashes, good skin turgor. Extremities:  No edema.     Labs:       Recent Labs     22  0609 22  0408 22  0507   WBC 11.6* 9.3 7.9   RBC 3.53* 3.36* 3.18*   HGB 8.9* 8.4* 8.0*   HCT 28.0* 27.7* 25.9*   MCV 79.3* 82.4* 81.4*   MCH 25.2 25.0* 25.2   MCHC 31.8 30.3 30.9   RDW 18.0* 18.0* 18.1*    184 166   MPV 11.0 10.8 11.4        BMP:   Recent Labs     22  0609 22  1645 22  0408 22  1621 22  0507     --  139  --  141   K 3.3*   < > 3.7 4.0 3.7     --  103  --  106   CO2 18*  --  21  --  21   BUN 68*  --  66*  --  56*   CREATININE 2.66*  --  2.66*  --  2.57*   GLUCOSE 183*  --  260*  --  238*   CALCIUM 7.8*  -- 8.2*  --  8.3*    < > = values in this interval not displayed. Phosphorus:     Recent Labs     12/17/22  0003 12/17/22  0609 12/18/22  0408   PHOS 6.5* 5.6* 4.1       Magnesium:    Recent Labs     12/17/22  0003 12/17/22  0609 12/18/22  0408   MG 2.1 2.2 2.1       ALEJANDRO:      Lab Results   Component Value Date/Time    ALEJANDRO  08/26/2018 10:16 AM     This test is equivocal, which represents a borderline ALEJANDRO result. Suggest     SPEP:  Lab Results   Component Value Date/Time    PROT 6.6 12/14/2022 11:49 AM    ALBCAL 4.1 08/23/2018 04:33 AM    ALBPCT 62 08/23/2018 04:33 AM    LABALPH 0.2 08/23/2018 04:33 AM    LABALPH 0.7 08/23/2018 04:33 AM    A1PCT 3 08/23/2018 04:33 AM    A2PCT 10 08/23/2018 04:33 AM    LABBETA 0.9 08/23/2018 04:33 AM    BETAPCT 13 08/23/2018 04:33 AM    GAMGLOB 0.8 08/23/2018 04:33 AM    GGPCT 12 08/23/2018 04:33 AM    PATH ELECTRONICALLY SIGNED.  Mary Diggs M.D. 08/27/2018 03:15 PM     C3:     Lab Results   Component Value Date/Time    C3 111 08/27/2018 03:15 PM     C4:     Lab Results   Component Value Date/Time    C4 35 08/27/2018 03:15 PM     MPO ANCA:     Lab Results   Component Value Date/Time    MPO 11 08/26/2018 10:16 AM     PR3 ANCA:     Lab Results   Component Value Date/Time    PR3 15 08/26/2018 10:16 AM     Anti-GBM:     Lab Results   Component Value Date/Time    GBMABIGG 12 08/26/2018 10:16 AM     Hep BsAg:         Lab Results   Component Value Date/Time    HEPBSAG NONREACTIVE 08/24/2018 12:24 PM       Urinalysis/Chemistries:      Lab Results   Component Value Date/Time    NITRU NEGATIVE 12/14/2022 12:26 PM    COLORU Yellow 12/14/2022 12:26 PM    PHUR 6.0 12/14/2022 12:26 PM    WBCUA 50  12/14/2022 12:26 PM    RBCUA 0 TO 2 12/14/2022 12:26 PM    MUCUS NOT REPORTED 11/21/2018 12:12 PM    TRICHOMONAS NOT REPORTED 11/21/2018 12:12 PM    YEAST FEW 12/14/2022 12:26 PM    BACTERIA RARE 12/14/2022 09:30 AM    SPECGRAV 1.018 12/14/2022 12:26 PM    LEUKOCYTESUR NEGATIVE 12/14/2022 12:26 PM    UROBILINOGEN Normal 12/14/2022 12:26 PM    BILIRUBINUR NEGATIVE 12/14/2022 12:26 PM    GLUCOSEU 1+ 12/14/2022 12:26 PM    KETUA NEGATIVE 12/14/2022 12:26 PM    AMORPHOUS TRACE 11/21/2018 12:12 PM     Urine Sodium:     Lab Results   Component Value Date/Time    ROULA 20 12/15/2022 11:49 AM      Urine Creatinine:     Lab Results   Component Value Date/Time    LABCREA 71.1 12/15/2022 11:49 AM     Radiology:     CXR: 12/16/22  Overall relatively stable cardiopulmonary status. Cardiomegaly with congestive changes and pulmonary edema. Nasogastric tube tip is in the stomach. Assessment:     #1 acute kidney injury nonoliguric secondary to ischemic (cardiac arrest/shock) and nephrotoxic (contrast) ATN -evolving  #2 high nongap metabolic acidosis secondary to CHAD/hypoperfusion  #3 chronic kidney disease stage IIIb secondary to diabetic/ischemic nephrosclerosis with baseline creatinine 1.4-1.6. Serologies unremarkable. Asymmetric kidney size. Minimal proteinuria. #4 s/p cardiac arrest-multiple times status post therapeutic hypothermia protocol  #5 cardiogenic shock on pressors  #6 decompensated systolic congestive heart failure  #7 acute hypoxic respiratory failure  #8 history of CABG/MVR with EF 25%  #9 pulmonary hypertension/severe tricuspid regurgitation  #10 type 2 diabetes    Plan:   1. Continue Midodrine to 10mg TID  2. Electrolyte replacement   3. Monitor strict I's and O's and renal function. 4. Poor prognosis  5. BMP in AM.  6.  Will follow. Nutrition   Please ensure that patient is on a renal diet/TF. Avoid nephrotoxic drugs/contrast exposure. MICKEY Vance CNP  12/19/2022 10:10 AM  Nephrology [address] [place] [address] Drive. Attending Physician Statement  I have discussed the care of this patient, including pertinent history and exam findings, with the Resident/CNP. I have reviewed and edited the key elements of all parts of the encounter with the Resident/CNP.   I agree with the assessment, plan and orders as documented by the Resident/CNP. Jacek Keith MD   Nephrology [address] [place] [address] Drive    This note is created with the assistance of a speech-recognition program. While intending to generate a document that actually reflects the content of the visit, no guarantees can be provided that every mistake has been identified and corrected by editing.

## 2022-12-19 NOTE — RESEARCH
Patient Name: Cristopher Shane  MRN: [id number]  YOB: 1960  Study: ICECAP: Influence of Cooling duration on Efficacy in Cardiac Arrest Patients  Reason for evaluation: Inpatient Research patient Evaluation      Daily Note    The patient was evaluated for a multicenter, randomized, adaptive allocation clinical trial to identify the optimal duration of induced hypothermia for neuroprotection in comatose survivors of cardiac arrest.     ARIAN #: [id number]  Clinicaltrials.gov: NCT [id number]      EMR Review for ICECAP DAY 5/6      08:14 EMR Review & data collection. 10:15 Went to bedside to assess patient. Devan Garrido RN provided update on patient condition. Family has not been able to come to the hospital.  Per chart review, creatinine remains elevated \"CHAD on CKD stage IIIb with baseline creatinine 1.4 - 1.6 likely secondary to ischemia and nephrotoxic ATN. \"    Protocol defined events reported and updated. Will continue to monitor patient per protocol and SOC. Kiah Rodrigez RN  Clinical Research Services  [address] [place] [address], [facility]  P: [phone]  F: [phone]    For questions call the research office at [phone] or [facility].

## 2022-12-19 NOTE — PROGRESS NOTES
INTENSIVE CARE UNIT  Resident Physician Progress Note    Patient - Angelica Morataya  Date of Admission -  12/14/2022 10:09 AM  Date of Evaluation -  12/19/2022  Room and Bed Number -  3025/3025-01   Hospital Day - 5    HPI:      Angelica Morataya is a 60 y.o. with PMH of   - CAD, s/p CABG in 2015  - Ischemic cardiomyopathy, last EF of 25%  - Insulin-dependent diabetes mellitus type 2  - CKD     Presented to [facility] ER as a transfer from [facility] ER. According to the chart review patient was found unresponsive by the  as per EMS. Unknown downtime. Per EMS report no CPR performed. EMS intubated the patient esophageally, prior to arrival at [facility], reintubation was done and then patient's glucose was found to be 18. At [facility] found to be in PEA arrest achieved ROSC needed 6 mg epinephrine, received multiple pushes of Versed and fentanyl for posturing and seizure-like activity. On arrival to our [facility] ER patient was unresponsive. Fixed and dilated pupils, posturing and on propofol infusion. EKG in the ED showed new right bundle branch block then due to concern for PE, CT PE was done. Bedside ultrasound did not show any signs of tamponade. CT negative for any PE. Around 10:25 AM there was an episode of pulselessness and bradycardia and PEA was given at epinephrine and troponin.   Cooling was started around 11:28 AM with target temperature of 38 Celsius     Initial vitals in the ED temperature around 35, heart rate 106, respiratory 22, pressure 124/108     CT head negative for any acute intracranial abnormality  CT chest showed pleural effusions and cardiomegaly    Initial labs -   WBC 18.1, hemoglobin 9.9, platelets 711  Glucose 457  Sodium 132, potassium 4.4, creatinine 1.5, bicarb 16  Albumin 2.7 AST 49, ALT 36  Troponin 49 > 107  proBNP 24 580  Lactic acid 5.5  UA negative for any UTI     Initial ABG - 7.04/51 point 7/401/14 point  In the ED there was a concern for PVCs/tachycardia patient was started on amiodarone infusion. Last echo in 2022 showed EF of 25%, LA/LV/RA/RV dilated, ICD in place, moderate to severe pulmonary hypertension     Repeat labs -   Last sodium 134, potassium 3.9, creatinine 1.5, glucose 256, lactic 1.6     Repeat ABG -7.38/30.9/87.6/18.5    : CT head showing no acute processes, chronic small vessel ischemic white matter disease. LTME showing diffuse cortical dysfunction, severe encephalopathy. : Levo discontinued. Remains on propofol. : Tolerated CPAP trial. Per neuro ICU prognostication, brainstem reflexes intact. Poor prognosis. Discussion with family about goals, T&P vs withdrawal of care. SUBJECTIVE:     OVERNIGHT EVENTS:    CPAP for several hours, tolerated.      AWAKE & FOLLOWING COMMANDS:  [x] No   [] Yes    SECRETIONS Amount:  [x] Small [] Moderate  [] Large  [] None  Color:     [x] White [] Colored  [] Bloody    SEDATION:  RAAS Score:  [x] Propofol gtt  [] Versed gtt  [] Ativan gtt   [] No Sedation    PARALYZED:  [x] No    [] Yes    VASOPRESSORS:  [x] No    [] Yes  [] Levophed [] Dopamine [] Vasopressin  [] Dobutamine [] Phenylephrine [] Epinephrine      OBJECTIVE:     VITAL SIGNS:  BP (!) 95/51   Pulse 73   Temp 98.2 °F (36.8 °C) (Bladder)   Resp 20   Ht 5' 6\" (1.676 m)   Wt 175 lb 14.8 oz (79.8 kg)   SpO2 100%   BMI 28.40 kg/m²   Tmax over 24 hours:  Temp (24hrs), Av.6 °F (37 °C), Min:97.9 °F (36.6 °C), Max:99 °F (37.2 °C)      Patient Vitals for the past 8 hrs:   BP Temp Temp src Pulse Resp SpO2 Weight   22 0806 -- 98.2 °F (36.8 °C) Bladder -- -- -- --   22 0700 -- -- -- 73 20 100 % --   22 0600 -- 98.6 °F (37 °C) Bladder 81 20 99 % --   22 0500 -- -- -- 77 18 98 % --   22 0458 -- -- -- 80 20 99 % --   22 0435 -- -- -- -- -- -- 175 lb 14.8 oz (79.8 kg)   22 040 (!) 95/51 98.4 °F (36.9 °C) Bladder 71 20 98 % --   228 -- -- -- 82 20 98 % --   22 0300 -- -- -- 82 19 99 % --   12/19/22 0200 -- 98.8 °F (37.1 °C) Bladder 80 20 100 % --   12/19/22 0100 -- -- -- 76 20 99 % --         Intake/Output Summary (Last 24 hours) at 12/19/2022 0830  Last data filed at 12/19/2022 0630  Gross per 24 hour   Intake 2056.33 ml   Output 2805 ml   Net -748.67 ml     Date 12/19/22 0000 - 12/19/22 2359   Shift 4635-0880 6746-7669 9477-5898 24 Hour Total   INTAKE   I.V.(mL/kg) 295.9(3.7)   295.9(3.7)   NG/GT(mL/kg) 587(7.4)   587(7.4)   Shift Total(mL/kg) 882. 9(11.1)   882. 9(11.1)   OUTPUT   Urine(mL/kg/hr) 1200(1.9)   1200   Shift Total(mL/kg) 1200(15)   1200(15)   Weight (kg) 79.8 79.8 79.8 79.8     Wt Readings from Last 3 Encounters:   12/19/22 175 lb 14.8 oz (79.8 kg)   10/24/22 165 lb 9.6 oz (75.1 kg)   10/24/22 165 lb (74.8 kg)     Body mass index is 28.4 kg/m².         PHYSICAL EXAM:  GEN:  Intubated, sedated, not following commands  EYES:   Not tracking, pupils equal, round, and reactive to light  HEENT:  Normocepalic, without obvious abnormality  LUNGS:  clear to auscultation  CV:    regular rate and rhythm and normal S1 and S2  ABDOMEN:   soft and non-distended  MSK:    there is no redness, warmth, or swelling of the joints  NEURO[de-identified]   Intubated, sedated, not following commands  SKIN:   Normal skin color, texture, turgor  EXTREMITIES:  No pedal or leg edema, no calf tenderness/swelling, no erythema, distal pulses intact       MEDICATIONS:  Scheduled Meds:   midodrine  10 mg Oral TID WC    aspirin  81 mg Oral Daily    heparin (porcine)  5,000 Units SubCUTAneous 3 times per day    famotidine (PEPCID) injection  20 mg IntraVENous Daily    sodium chloride flush  5-40 mL IntraVENous 2 times per day    chlorhexidine  15 mL Mouth/Throat BID    piperacillin-tazobactam  3,375 mg IntraVENous Q8H    insulin lispro  0-8 Units SubCUTAneous Q4H    atorvastatin  80 mg Oral Nightly    [Held by provider] carvedilol  3.125 mg Oral BID WC    clopidogrel  75 mg Oral Daily     Continuous Infusions: norepinephrine Stopped (12/18/22 1326)    propofol 10 mcg/kg/min (12/19/22 0653)    sodium chloride Stopped (12/16/22 1629)    dextrose       PRN Meds:   sodium chloride flush, 5-40 mL, PRN  sodium chloride, , PRN  ondansetron, 4 mg, Q8H PRN   Or  ondansetron, 4 mg, Q6H PRN  polyethylene glycol, 17 g, Daily PRN  acetaminophen, 650 mg, Q6H PRN   Or  acetaminophen, 650 mg, Q6H PRN  glucose, 4 tablet, PRN  dextrose bolus, 125 mL, PRN   Or  dextrose bolus, 250 mL, PRN  glucagon (rDNA), 1 mg, PRN  dextrose, , Continuous PRN  albuterol, 2.5 mg, As Directed RT PRN  acetylcysteine, 600 mg, As Directed RT PRN        SUPPORT DEVICES: [x] Ventilator [] BIPAP  [] Nasal Cannula [] Room Air    VENT SETTINGS (Comprehensive) (if applicable):   PRVC mode, FiO2 30%, PEEP 8, Respiratory Rate 20, Tidal Volume 500  Vent Information  Ventilator ID: [email or web address]  Equipment Changed: HME  Ventilator Initiate: Yes  Additional Respiratory Assessments  Heart Rate: 73  Resp: 20  SpO2: 100 %  End Tidal CO2: 27 (%)  Position: Semi-Simon's  Humidification Source: HME  Cuff Pressure (cm H2O):  (mlt)  Skin Barrier Applied: No    ABGs:   Lab Results   Component Value Date/Time    LFI4XOH 17 08/24/2018 05:02 AM    FIO2 30.0 12/19/2022 04:48 AM         DATA:  Complete Blood Count:   Recent Labs     12/17/22  0609 12/18/22  0408 12/19/22  0507   WBC 11.6* 9.3 7.9   RBC 3.53* 3.36* 3.18*   HGB 8.9* 8.4* 8.0*   HCT 28.0* 27.7* 25.9*   MCV 79.3* 82.4* 81.4*   MCH 25.2 25.0* 25.2   MCHC 31.8 30.3 30.9   RDW 18.0* 18.0* 18.1*    184 166   MPV 11.0 10.8 11.4        Last 3 Blood Glucose:   Recent Labs     12/16/22  1217 12/16/22  1747 12/17/22  0003 12/17/22  0609 12/18/22  0408 12/19/22  0507   GLUCOSE 200* 233* 233* 183* 260* 238*        PT/INR:    Lab Results   Component Value Date/Time    PROTIME 13.7 12/14/2022 01:38 PM    INR 1.3 12/14/2022 01:38 PM     PTT:    Lab Results   Component Value Date/Time    APTT 26.0 12/14/2022 01:38 PM       Comprehensive Metabolic Profile:   Recent Labs     12/17/22  0609 12/17/22  1645 12/18/22  0408 12/18/22  1621 12/19/22  0507     --  139  --  141   K 3.3*   < > 3.7 4.0 3.7     --  103  --  106   CO2 18*  --  21  --  21   BUN 68*  --  66*  --  56*   CREATININE 2.66*  --  2.66*  --  2.57*   GLUCOSE 183*  --  260*  --  238*   CALCIUM 7.8*  --  8.2*  --  8.3*    < > = values in this interval not displayed.       Magnesium:   Lab Results   Component Value Date/Time    MG 2.1 12/18/2022 04:08 AM    MG 2.2 12/17/2022 06:09 AM    MG 2.1 12/17/2022 12:03 AM     Phosphorus:   Lab Results   Component Value Date/Time    PHOS 4.1 12/18/2022 04:08 AM    PHOS 5.6 12/17/2022 06:09 AM    PHOS 6.5 12/17/2022 12:03 AM     Ionized Calcium:   Lab Results   Component Value Date/Time    CAION 1.16 12/19/2022 05:07 AM    CAION 1.10 12/18/2022 04:08 AM    CAION 1.07 12/17/2022 06:09 AM        Urinalysis:   Lab Results   Component Value Date/Time    NITRU NEGATIVE 12/14/2022 12:26 PM    COLORU Yellow 12/14/2022 12:26 PM    PHUR 6.0 12/14/2022 12:26 PM    WBCUA 50  12/14/2022 12:26 PM    RBCUA 0 TO 2 12/14/2022 12:26 PM    MUCUS NOT REPORTED 11/21/2018 12:12 PM    TRICHOMONAS NOT REPORTED 11/21/2018 12:12 PM    YEAST FEW 12/14/2022 12:26 PM    BACTERIA RARE 12/14/2022 09:30 AM    SPECGRAV 1.018 12/14/2022 12:26 PM    LEUKOCYTESUR NEGATIVE 12/14/2022 12:26 PM    UROBILINOGEN Normal 12/14/2022 12:26 PM    BILIRUBINUR NEGATIVE 12/14/2022 12:26 PM    GLUCOSEU 1+ 12/14/2022 12:26 PM    KETUA NEGATIVE 12/14/2022 12:26 PM    AMORPHOUS TRACE 11/21/2018 12:12 PM       HgBA1c:    Lab Results   Component Value Date/Time    LABA1C 8.5 12/15/2022 05:56 AM     TSH:    Lab Results   Component Value Date/Time    TSH 1.66 10/14/2022 10:23 AM     Lactic Acid:   Lab Results   Component Value Date/Time    LACTA 1.4 10/13/2022 12:22 PM    LACTA 1.7 12/29/2018 11:57 AM    LACTA 1.5 12/29/2018 05:30 AM      Troponin: No results for input(s): TROPONINI in the last 72 hours.     ASSESSMENT:     Patient Active Problem List    Diagnosis Date Noted    Palliative care encounter 12/16/2022    NSTEMI (non-ST elevated myocardial infarction) ([address] 12/15/2022    Ventricular tachycardia 12/15/2022    Cardiac arrest ([address] 12/14/2022    Pulmonary hypertension ([address] 12/14/2022    Diabetes mellitus with kidney disease ([address] 12/14/2022    Stage 3b chronic kidney disease ([address] 10/17/2022    Ischemic cardiomyopathy 10/17/2022    Acute respiratory failure with hypoxia and hypercapnia ([address] 10/17/2022    CHF (congestive heart failure), NYHA class I, acute on chronic, combined ([address] 10/14/2022    Acute on chronic systolic congestive heart failure ([address] 10/14/2022    Congestive heart failure due to cardiomyopathy ([address] 35/69/9304    Acute systolic CHF (congestive heart failure) ([address] 10/12/2022    Pneumonia 12/28/2018    Dyslipidemia     Ureteral stone with hydronephrosis 11/04/2018    Acute pulmonary edema ([address] 11/04/2018    Elevated serum immunoglobulin free light chains     Acute kidney injury ([address] 08/23/2018    Acidosis 08/23/2018    DM (diabetes mellitus), type 2 with neurological complications ([address] 29/48/5731    Anxiety 01/26/2016    CAD (coronary artery disease)     Hx of CABG     ICD (implantable cardioverter-defibrillator) in place     H/O mitral valve repair     Hx of myocardial infarction     Hypertension     Onychomycosis of toenail 11/19/2015    S/P cardiac cath-Patent grafts 6/5/15 06/04/2015    Acute coronary syndrome ([address] 05/26/2015          PLAN:     Neuro  Intubated, sedated  Propofol gtt  Neuro ICU consulted for neuro prognostication - no MRI due to AICD incompatibility  LTME - diffuse cortical dysfunction, severe encephalopathy  CT head 12/17 - no acute processes, chronic small vessel ischemic deep white matter disease    Cardiology  Levophed gtt  CAD s/p AICD, PEA arrest  ASA, lipitor, Plavix  Midodrine 10 TID  Cardiology consulted, will decide about cath after any meaningful neurologic recovery  ECHO 12/16/22 - EF 15-20%, global hypokinesis, MV  prosthesis    Pulmonary  PRVC 20/500/8/30%  Albuterol nebulizer per RT protocol  Mucomyst per RT protocol    Renal  CHAD on CKD stage IIIb with baseline Cr 1.4-1.6  Cr 2.57  Nephrology following  I/O: 3130 mL  Garcia in place    GI  TF @ 45 ml/hr at goal  Pepcid 20    ID  WBC 7.9  Zosyn    Endo  HDISS  Glucose 242      F.A.S.T. M. H.U.G.S. B.I.D. Feeding Diet: Diet NPO  ADULT TUBE FEEDING; Orogastric; Renal Formula; Continuous; 10; Yes; 10; Q 4 hours; 45; 30; Q 4 hours  Fluids: TF @ 45 ml/hr,   Family: updated  Analgesic: Tylenol q6h PRN  Sedation: Propofol gtt   Thrombo-prophylaxis: [] Enoxaparin, [x] Unfract. Heparin Subcutaneously, [] EPC Cuffs  Mobility: Intubated, sedated, turn per protocol  Heads up: HOB 30  Ulcer prophylaxis: [] PPI Agent, [x] [id number], [] Sucralfate, [] Other:  Glycemic control: HDISS  Spontaneous breathing trial: Not tolerated   Bowel regimen/urine output: Glycolax Prn  Indwelling catheter/lines: Art line, R femoral CVC, Garcia, peripheral IV  De-escalation: To remain ICU      Annmarie Lawson M.D.   Emergency Medicine Resident, PGY-2  12/19/2022 8:30 AM

## 2022-12-19 NOTE — PLAN OF CARE
Problem: Respiratory - Adult  Goal: Achieves optimal ventilation and oxygenation  Outcome: Progressing  Flowsheets (Taken 12/19/2022 1845)  Achieves optimal ventilation and oxygenation:   Assess for changes in respiratory status   Position to facilitate oxygenation and minimize respiratory effort   Assess the need for suctioning and aspirate as needed   Respiratory therapy support as indicated   Oxygen supplementation based on oxygen saturation or arterial blood gases

## 2022-12-19 NOTE — PROGRESS NOTES
Daily Progress Note  Neuro Critical Care    Patient Name: Reyna Ramirez  Patient : 1960  Room/Bed: 3025/3025-01  Code Status: Full Code  Allergies: Allergies   Allergen Reactions    Codeine Nausea And Vomiting    Nsaids Nausea Only     nausea       CHIEF COMPLAINT:      PEA arrest     INTERVAL HISTORY    Initial Presentation (Admitted ): The patient is a 60 y.o. female hx of CAD w/CABG, CHF EF 25% w/AICD, COPD, CKD, HTN, HLD, DM2 who presents with PEA arrest. Patient found down by her . Patient down for approximately 30 minutes without CPR. EMS arrived patient found to be in PEA arrest CPR initiated. Patient also found to be hypoglycemic given dextrose. Patient arrived at [facility] underwent multiple rounds of CPR/epi and ROSC was achieved. Patient intubated by EMS and cooling cath placed by ED. Patient transferred to [facility] and was noted to have decerebrate posturing in CT scan at 1047 requiring 10mg of vec. [facility] pH at [facility] 6.99. Patient currently not on any sedation or paralytics and is undergoing hypothermic target temperature mgmt. 12/15: Patient requiring levophed for pressure support. Patient also requiring low dose propofol for vent dyssynchrony. : No MRI due to AICD incompatibility    Last 24h:   No acute events overnight. On Propofol 10 for tachypnea, agitation. Exam unchanged from prior. Pupils 4mm and reactive, cough/gag reflex intact, trace corneals, absent oculocephalic reflex, extensor response in uppers, babinski reflex. NSEs pending.     CURRENT MEDICATIONS:  SCHEDULED MEDICATIONS:   midodrine  10 mg Oral TID     aspirin  81 mg Oral Daily    heparin (porcine)  5,000 Units SubCUTAneous 3 times per day    famotidine (PEPCID) injection  20 mg IntraVENous Daily    sodium chloride flush  5-40 mL IntraVENous 2 times per day    chlorhexidine  15 mL Mouth/Throat BID    piperacillin-tazobactam  3,375 mg IntraVENous Q8H    insulin lispro  0-8 Units SubCUTAneous Q4H    atorvastatin  80 mg Oral Nightly    [Held by provider] carvedilol  3.125 mg Oral BID WC    clopidogrel  75 mg Oral Daily     CONTINUOUS INFUSIONS:   norepinephrine Stopped (22 1326)    propofol 10 mcg/kg/min (22 0659)    sodium chloride Stopped (22 1629)    dextrose       PRN MEDICATIONS:   sodium chloride flush, sodium chloride, ondansetron **OR** ondansetron, polyethylene glycol, acetaminophen **OR** acetaminophen, glucose, dextrose bolus **OR** dextrose bolus, glucagon (rDNA), dextrose, albuterol, acetylcysteine    VITALS:  Temperature Range: Temp: 98.6 °F (37 °C) Temp  Av.6 °F (37 °C)  Min: 97.9 °F (36.6 °C)  Max: 99 °F (37.2 °C)  BP Range: Systolic (07LBZ), QJF:920 , Min:90 , DE:534     Diastolic (44YWM), RNS:50, Min:51, Max:70    Pulse Range: Pulse  Av  Min: 71  Max: 100  Respiration Range: Resp  Av.3  Min: 16  Max: 29  Current Pulse Ox: SpO2: 100 %  24HR Pulse Ox Range: SpO2  Av.7 %  Min: 96 %  Max: 100 %  Patient Vitals for the past 12 hrs:   BP Temp Temp src Pulse Resp SpO2 Weight   22 0700 -- -- -- 73 20 100 % --   22 0600 -- 98.6 °F (37 °C) Bladder 81 20 99 % --   22 0500 -- -- -- 77 18 98 % --   22 0458 -- -- -- 80 20 99 % --   22 0435 -- -- -- -- -- -- 175 lb 14.8 oz (79.8 kg)   22 0400 (!) 95/51 98.4 °F (36.9 °C) Bladder 71 20 98 % --   22 0318 -- -- -- 82 20 98 % --   22 0300 -- -- -- 82 19 99 % --   22 0200 -- 98.8 °F (37.1 °C) Bladder 80 20 100 % --   22 0100 -- -- -- 76 20 99 % --   22 0000 110/66 99 °F (37.2 °C) Bladder 79 20 100 % --   22 2324 -- -- -- 77 20 99 % --   22 2300 -- -- -- 75 20 99 % --   22 2200 -- 99 °F (37.2 °C) Bladder 74 20 99 % --   22 -- -- -- 74 20 99 % --   22 -- -- -- 81 22 98 % --   22 111/68 -- -- 82 21 100 % --           RECENT LABS:   Lab Results   Component Value Date    WBC 7.9 12/19/2022    HGB 8.0 (L) 12/19/2022    HCT 25.9 (L) 12/19/2022     12/19/2022    CHOL 199 07/15/2022    TRIG 69 12/15/2022    HDL 25 (L) 07/15/2022    LDLDIRECT 70 07/15/2022    ALT 86 (H) 12/14/2022     (H) 12/14/2022     12/19/2022    K 3.7 12/19/2022     12/19/2022    CREATININE 2.57 (H) 12/19/2022    BUN 56 (H) 12/19/2022    CO2 21 12/19/2022    TSH 1.66 10/14/2022    INR 1.3 12/14/2022    LABA1C 8.5 (H) 12/15/2022    LABMICR 57 (H) 07/15/2022     24 HOUR INTAKE/OUTPUT:  Intake/Output Summary (Last 24 hours) at 12/19/2022 0754  Last data filed at 12/19/2022 0630  Gross per 24 hour   Intake 2086.33 ml   Output 2805 ml   Net -718.67 ml         Labs and Images reviewed with:  [] Dr. Danilo Schneider. Arben    [x] Dr. Lety River  [] Dr. Varun Gore  [] There are no new interval images to review. PHYSICAL EXAM       CONSTITUTIONAL:  Does not open eyes to verbal/painful stimuli. Does not follow commands. Appears critically ill   HEAD:  normocephalic, atraumatic    EYES:  5mm briskly reactive BL. Mid gaze fixation. Does not track   ENT:  moist mucous membranes   NECK:  supple, symmetric   LUNGS:  Equal air entry bilaterally   CARDIOVASCULAR:  normal s1 / s2, RRR, distal pulses intact   ABDOMEN:  Distended   NEUROLOGIC:  Mental Status:  comatose, slight eye-opening to noxious stimuli. Cranial Nerves:    PERRL, roving eye movements. No Oculocephalic reflex   Cough and gag reflex intact. Trace corneals  Pupils 5mm briskly reactive BL     Motor Exam:                No spontaneous movement of extremities, extensor posturing to noxious stimuli in bilateral upper extremities. Sensory:                Extensor posturing to pain in bilateral upper extremities. Plantar Response:  Right:  upgoing  Left:  upgoing           DRAINS:  [x] There are no drains for Neuro Critical Care to monitor at this time.      ASSESSMENT AND PLAN:     This is a 60 y.o. female with PEA arrest unknown etiology at this point. Suspect anoxic brain injury with downtime of 30+ minutes. Brainstem reflexes intact, limited exam.  Underwent TTM, rewarmed back to goal 36.6      -Family not present for discussion in goals of care  -Recommend consideration of trach/peg  -Recommend ethics consult for evaluation of goals of care   -Given the unchanged neuro status, with brain stem reflexes intact, a decision needs to be made regarding withdrawal of care versus trach and peg  -NCC to sign off    Poor prognosis    For any changes in exam or patient status please contact Neuro Critical Care.       Lesley hCi MD  Neuro Critical Care  Pager [phone]  12/19/2022     7:54 AM

## 2022-12-19 NOTE — PLAN OF CARE
Problem: Chronic Conditions and Co-morbidities  Goal: Patient's chronic conditions and co-morbidity symptoms are monitored and maintained or improved  12/19/2022 0003 by Naina Gonzalez RN  Outcome: Progressing     Problem: Discharge Planning  Goal: Discharge to home or other facility with appropriate resources  Outcome: Progressing     Problem: Pain  Goal: Verbalizes/displays adequate comfort level or baseline comfort level  12/19/2022 0003 by Naina Gonzalez RN  Outcome: Progressing     Problem: Respiratory - Adult  Goal: Achieves optimal ventilation and oxygenation  12/19/2022 0003 by Naina Gonzalez RN  Outcome: Progressing     Problem: Safety - Adult  Goal: Free from fall injury  12/19/2022 0003 by Naina Gonzalez RN  Outcome: Progressing     Problem: ABCDS Injury Assessment  Goal: Absence of physical injury  12/19/2022 0003 by Naina Gonzalez RN  Outcome: Progressing     Problem: Nutrition Deficit:  Goal: Optimize nutritional status  12/19/2022 0003 by Naina Gonzalez RN  Outcome: Progressing

## 2022-12-20 NOTE — PROGRESS NOTES
INTENSIVE CARE UNIT  Resident Physician Progress Note    Patient - Cristopher Shane  Date of Admission -  12/14/2022 10:09 AM  Date of Evaluation -  12/20/2022  Room and Bed Number -  3025/3025-01   Hospital Day - 6    HPI:     Cristopher Shane is a 60 y.o. with PMH of   - CAD, s/p CABG in 2015  - Ischemic cardiomyopathy, last EF of 25%  - Insulin-dependent diabetes mellitus type 2  - CKD     Presented to [facility] ER as a transfer from [facility] ER. According to the chart review patient was found unresponsive by the  as per EMS. Unknown downtime. Per EMS report no CPR performed. EMS intubated the patient esophageally, prior to arrival at [facility], reintubation was done and then patient's glucose was found to be 18. At [facility] found to be in PEA arrest achieved ROSC needed 6 mg epinephrine, received multiple pushes of Versed and fentanyl for posturing and seizure-like activity. On arrival to our [facility] ER patient was unresponsive. Fixed and dilated pupils, posturing and on propofol infusion. EKG in the ED showed new right bundle branch block then due to concern for PE, CT PE was done. Bedside ultrasound did not show any signs of tamponade. CT negative for any PE. Around 10:25 AM there was an episode of pulselessness and bradycardia and PEA was given at epinephrine and troponin.   Cooling was started around 11:28 AM with target temperature of 38 Celsius     Initial vitals in the ED temperature around 35, heart rate 106, respiratory 22, pressure 124/108     CT head negative for any acute intracranial abnormality  CT chest showed pleural effusions and cardiomegaly     Initial labs -   WBC 18.1, hemoglobin 9.9, platelets 451  Glucose 457  Sodium 132, potassium 4.4, creatinine 1.5, bicarb 16  Albumin 2.7 AST 49, ALT 36  Troponin 49 > 107  proBNP 24 580  Lactic acid 5.5  UA negative for any UTI     Initial ABG - 7.04/51 point 7/401/14 point  In the ED there was a concern for PVCs/tachycardia patient was started on amiodarone infusion. Last echo in 2022 showed EF of 25%, LA/LV/RA/RV dilated, ICD in place, moderate to severe pulmonary hypertension     Repeat labs -   Last sodium 134, potassium 3.9, creatinine 1.5, glucose 256, lactic 1.6     Repeat ABG -7.38/30.9/87.6/18.5     : CT head showing no acute processes, chronic small vessel ischemic white matter disease. LTME showing diffuse cortical dysfunction, severe encephalopathy. : Levo discontinued. Remains on propofol. : Tolerated CPAP trial. Per neuro ICU prognostication, brainstem reflexes intact. Poor prognosis. Discussion with family about goals, T&P vs withdrawal of care. Palliative following. : Plan for bedside discussion of goals of care and code status with family tomorrow,  with palliative and ICU team.    SUBJECTIVE:     OVERNIGHT EVENTS:    No acute changes.     AWAKE & FOLLOWING COMMANDS:  [x] No   [] Yes    SECRETIONS Amount:  [x] Small [] Moderate  [] Large  [] None  Color:     [x] White [] Colored  [] Bloody    SEDATION:  RAAS Score:  [x] Propofol gtt  [] Versed gtt  [] Ativan gtt   [] No Sedation    PARALYZED:  [x] No    [] Yes    VASOPRESSORS:  [x] No    [] Yes  [] Levophed [] Dopamine [] Vasopressin  [] Dobutamine [] Phenylephrine [] Epinephrine      OBJECTIVE:     VITAL SIGNS:  /65   Pulse 79   Temp 99.1 °F (37.3 °C) (Core)   Resp 19   Ht 5' 6\" (1.676 m)   Wt 160 lb 11.5 oz (72.9 kg)   SpO2 97%   BMI 25.94 kg/m²   Tmax over 24 hours:  Temp (24hrs), Av.4 °F (37.4 °C), Min:98.8 °F (37.1 °C), Max:100 °F (37.8 °C)      Patient Vitals for the past 8 hrs:   BP Temp Temp src Pulse Resp SpO2   22 1500 -- -- -- 79 19 97 %   22 1400 139/65 99.1 °F (37.3 °C) CORE 77 20 100 %   22 1300 -- -- -- 73 18 93 %   22 1200 130/69 99.1 °F (37.3 °C) CORE 80 20 98 %   22 1144 -- -- -- 79 22 100 %   22 1100 -- -- -- 76 19 98 %   22 1000 133/62 98.8 °F (37.1 °C) CORE 79 20 99 %   12/20/22 0831 -- -- -- 76 18 96 %   12/20/22 0800 116/66 98.8 °F (37.1 °C) CORE 83 18 99 %         Intake/Output Summary (Last 24 hours) at 12/20/2022 1558  Last data filed at 12/20/2022 1500  Gross per 24 hour   Intake 746.14 ml   Output 2190 ml   Net -1443.86 ml     Date 12/20/22 0000 - 12/20/22 2359   Shift 2944-3117 0320-9695 7185-0244 24 Hour Total   INTAKE   I.V.(mL/kg) 56.1(0.8)   56.1(0.8)   NG/GT(mL/kg) 351(4.8) 60(0.8)  411(5.6)   IV Piggyback(mL/kg) 99.5(1.4)   99.5(1.4)   Shift Total(mL/kg) 506.6(6.9) 60(0.8)  566.6(7.8)   OUTPUT   Urine(mL/kg/hr) 855(1.5) 555  1410   Emesis/NG output(mL/kg)  0(0)  0(0)   Shift Total(mL/kg) 855(11.7) 555(7.6)  1410(19.3)   Weight (kg) 72.9 72.9 72.9 72.9     Wt Readings from Last 3 Encounters:   12/20/22 160 lb 11.5 oz (72.9 kg)   10/24/22 165 lb 9.6 oz (75.1 kg)   10/24/22 165 lb (74.8 kg)     Body mass index is 25.94 kg/m².         PHYSICAL EXAM:  GEN:                  Intubated, sedated, not following commands  EYES:                Not tracking, pupils equal, round, and reactive to light  HEENT:            Normocepalic, without obvious abnormality  LUNGS:            clear to auscultation  CV:                     regular rate and rhythm and normal S1 and S2  ABDOMEN:     soft and non-distended  MSK:                  there is no redness, warmth, or swelling of the joints  NEURO[de-identified]           Intubated, sedated, not following commands  SKIN:                 Normal skin color, texture, turgor  EXTREMITIES:  No pedal or leg edema, no calf tenderness/swelling, no erythema, distal pulses intact       MEDICATIONS:  Scheduled Meds:   insulin lispro  0-16 Units SubCUTAneous Q4H    insulin lispro  0-4 Units SubCUTAneous Nightly    midodrine  10 mg Oral TID WC    aspirin  81 mg Oral Daily    heparin (porcine)  5,000 Units SubCUTAneous 3 times per day    famotidine (PEPCID) injection  20 mg IntraVENous Daily    sodium chloride flush  5-40 mL IntraVENous 2 times per day    chlorhexidine  15 mL Mouth/Throat BID    piperacillin-tazobactam  3,375 mg IntraVENous Q8H    atorvastatin  80 mg Oral Nightly    [Held by provider] carvedilol  3.125 mg Oral BID WC    clopidogrel  75 mg Oral Daily     Continuous Infusions:   propofol 10 mcg/kg/min (12/20/22 0531)    sodium chloride Stopped (12/16/22 1629)    dextrose       PRN Meds:   sodium chloride flush, 5-40 mL, PRN  sodium chloride, , PRN  ondansetron, 4 mg, Q8H PRN   Or  ondansetron, 4 mg, Q6H PRN  polyethylene glycol, 17 g, Daily PRN  acetaminophen, 650 mg, Q6H PRN   Or  acetaminophen, 650 mg, Q6H PRN  glucose, 4 tablet, PRN  dextrose bolus, 125 mL, PRN   Or  dextrose bolus, 250 mL, PRN  glucagon (rDNA), 1 mg, PRN  dextrose, , Continuous PRN  albuterol, 2.5 mg, As Directed RT PRN  acetylcysteine, 600 mg, As Directed RT PRN      SUPPORT DEVICES: [x] Ventilator [] BIPAP  [] Nasal Cannula [] Room Air    VENT SETTINGS (Comprehensive) (if applicable):   PRVC mode, FiO2 40%, PEEP 5, Respiratory Rate 18, Tidal Volume 500  Vent Information  Ventilator ID: [id number]  Equipment Changed: HME, Expiratory Filter  Ventilator Initiate: Yes  Additional Respiratory Assessments  Heart Rate: 79  Resp: 19  SpO2: 97 %  End Tidal CO2: 20 (%)  Position: Semi-Simon's  Humidification Source: HME  Cuff Pressure (cm H2O):  (mlt)  Skin Barrier Applied: No    DATA:  Complete Blood Count:   Recent Labs     12/18/22  0408 12/19/22  0507 12/20/22  0455   WBC 9.3 7.9 7.2   RBC 3.36* 3.18* 3.31*   HGB 8.4* 8.0* 8.5*   HCT 27.7* 25.9* 27.7*   MCV 82.4* 81.4* 83.7   MCH 25.0* 25.2 25.7   MCHC 30.3 30.9 30.7   RDW 18.0* 18.1* 18.3*    166 158   MPV 10.8 11.4 10.6        Last 3 Blood Glucose:   Recent Labs     12/18/22  0408 12/19/22  0507 12/20/22  0455   GLUCOSE 260* 238* 241*        PT/INR:    Lab Results   Component Value Date/Time    PROTIME 13.7 12/14/2022 01:38 PM    INR 1.3 12/14/2022 01:38 PM     PTT:    Lab Results   Component Value Date/Time    APTT 26.0 12/14/2022 01:38 PM       Comprehensive Metabolic Profile:   Recent Labs     12/18/22  0408 12/18/22  1621 12/19/22  0507 12/20/22  0455     --  141 142   K 3.7 4.0 3.7 3.7     --  106 106   CO2 21  --  21 24   BUN 66*  --  56* 49*   CREATININE 2.66*  --  2.57* 2.19*   GLUCOSE 260*  --  238* 241*   CALCIUM 8.2*  --  8.3* 8.8      Magnesium:   Lab Results   Component Value Date/Time    MG 2.1 12/18/2022 04:08 AM    MG 2.2 12/17/2022 06:09 AM    MG 2.1 12/17/2022 12:03 AM     Phosphorus:   Lab Results   Component Value Date/Time    PHOS 4.1 12/18/2022 04:08 AM    PHOS 5.6 12/17/2022 06:09 AM    PHOS 6.5 12/17/2022 12:03 AM     Ionized Calcium:   Lab Results   Component Value Date/Time    CAION 1.17 12/20/2022 04:55 AM    CAION 1.16 12/19/2022 05:07 AM    CAION 1.10 12/18/2022 04:08 AM        Urinalysis:   Lab Results   Component Value Date/Time    NITRU NEGATIVE 12/14/2022 12:26 PM    COLORU Yellow 12/14/2022 12:26 PM    PHUR 6.0 12/14/2022 12:26 PM    WBCUA 50  12/14/2022 12:26 PM    RBCUA 0 TO 2 12/14/2022 12:26 PM    MUCUS NOT REPORTED 11/21/2018 12:12 PM    TRICHOMONAS NOT REPORTED 11/21/2018 12:12 PM    YEAST FEW 12/14/2022 12:26 PM    BACTERIA RARE 12/14/2022 09:30 AM    SPECGRAV 1.018 12/14/2022 12:26 PM    LEUKOCYTESUR NEGATIVE 12/14/2022 12:26 PM    UROBILINOGEN Normal 12/14/2022 12:26 PM    BILIRUBINUR NEGATIVE 12/14/2022 12:26 PM    GLUCOSEU 1+ 12/14/2022 12:26 PM    KETUA NEGATIVE 12/14/2022 12:26 PM    AMORPHOUS TRACE 11/21/2018 12:12 PM       HgBA1c:    Lab Results   Component Value Date/Time    LABA1C 8.5 12/15/2022 05:56 AM     TSH:    Lab Results   Component Value Date/Time    TSH 1.66 10/14/2022 10:23 AM     Lactic Acid:   Lab Results   Component Value Date/Time    LACTA 1.4 10/13/2022 12:22 PM    LACTA 1.7 12/29/2018 11:57 AM    LACTA 1.5 12/29/2018 05:30 AM      Troponin: No results for input(s): TROPONINI in the last 72 hours.     ASSESSMENT:     Patient Active Problem List Diagnosis Date Noted    Palliative care encounter 12/16/2022    NSTEMI (non-ST elevated myocardial infarction) ([address] 12/15/2022    Ventricular tachycardia 12/15/2022    Cardiac arrest ([address] 12/14/2022    Pulmonary hypertension ([address] 12/14/2022    Diabetes mellitus with kidney disease ([address] 12/14/2022    Stage 3b chronic kidney disease ([address] 10/17/2022    Ischemic cardiomyopathy 10/17/2022    Acute respiratory failure with hypoxia and hypercapnia ([address] 10/17/2022    CHF (congestive heart failure), NYHA class I, acute on chronic, combined ([address] 10/14/2022    Acute on chronic systolic congestive heart failure ([address] 10/14/2022    Congestive heart failure due to cardiomyopathy ([address] 64/10/9563    Acute systolic CHF (congestive heart failure) ([address] 10/12/2022    Pneumonia 12/28/2018    Dyslipidemia     Ureteral stone with hydronephrosis 11/04/2018    Acute pulmonary edema ([address] 11/04/2018    Elevated serum immunoglobulin free light chains     CHAD (acute kidney injury) ([address] 49/54/0939    Metabolic acidosis 56/61/1807    Shock ([address] 08/23/2018    DM (diabetes mellitus), type 2 with neurological complications ([address] 86/73/9451    Anxiety 01/26/2016    CAD (coronary artery disease)     Hx of CABG     ICD (implantable cardioverter-defibrillator) in place     H/O mitral valve repair     Hx of myocardial infarction     Hypertension     Onychomycosis of toenail 11/19/2015    S/P cardiac cath-Patent grafts 6/5/15 06/04/2015    Acute coronary syndrome ([address] 05/26/2015          PLAN:     Neuro  Intubated, sedated  Propofol gtt  Neuro ICU evaluated for neuro prognostication, brainstem reflex intact, poor prognosis, neuro has signed off   No MRI due to AICD incompatibility  LTME - diffuse cortical dysfunction, severe encephalopathy  CT head 12/17 - no acute processes, chronic small vessel ischemic deep white matter disease     Cardiology  Off pressors  CAD s/p AICD, PEA arrest  ASA, lipitor, Plavix  Midodrine 10 TID  Cardiology consulted, will decide about cath after any meaningful neurologic recovery  ECHO 12/16/22 - EF 15-20%, global hypokinesis, MV  prosthesis     Pulmonary  PRVC 18/500/5/40%  Albuterol nebulizer per RT protocol  Mucomyst per RT protocol     Renal  CHAD on CKD stage IIIb with baseline Cr 1.4-1.6  Cr 2.19  Nephrology following  I/O: 2035 ml  Garcia in place     GI  TF @ 45 ml/hr at goal  Pepcid 20     ID  WBC 7.2  Zosyn  Remove R femoral central line 12/20     Endo  HDISS  Glucose 257       F.A.S.T. M. H.U.G.S. B.I.D. Feeding Diet: Diet NPO  ADULT TUBE FEEDING; Orogastric; Renal Formula; Continuous; 10; Yes; 10; Q 4 hours; 45; 30; Q 4 hours  Fluids: Propofol gtt, Zosyn  Analgesic: Tylenol  Sedation: Propofol   Thrombo-prophylaxis: [] Enoxaparin, [x] Unfract. Heparin Subcutaneously, [] EPC Cuffs  Mobility: PT/OT  Heads up: HOB 30  Ulcer prophylaxis: [] PPI Agent, [x] [id number], [] Sucralfate, [] Other:  Glycemic control: HDISS  Spontaneous breathing trial: Tolerated   Bowel regimen/urine output: Glycolax prn / Reita [place] 2035  Indwelling catheter/lines: R femoral CVC, art line, Garcia. R femoral line to be removed today  De-escalation: To remain ICU    Annamarie Guaman M.D.   Emergency Medicine Resident, PGY-2  12/20/2022 3:58 PM

## 2022-12-20 NOTE — PROGRESS NOTES
Renal Progress Note    Patient :  Delvis Zheng; 60 y.o. MRN# [id number]  Location:  3025/3025-01  Attending:  Rolanda Tamez MD  Admit Date:  12/14/2022   Hospital Day: 6      Subjective: Following for CHAD/ATN in the setting of cardiac arrest, on CKD with baseline 1.4-1.6. Transferred from [facility] where she presented when found down by her  and subsequently noted to be hypoglycemic cardiac arrest.  PEA arrest.  Down time unknown. Initiated therapeutic hypothermia protocol further investigations revealed elevated creatinine. Patient was seen and examined. No new issues reported overnight. Palliative was consulted today. Patient is posturing. Sedation is given for vent compliance. She is s/p hypothermia protocol. Creatinine is 2.19, potassium 3.7. Blood pressure 479-435 systolic. Had midodrine 10mg TID  Remains intubated with FIO2 up to 40%/PEEP 5. Urine output 2.0L last 24 hours. Net -5.7 liters, lasix discontinued. Tube feed infusing. ECHO with EF 20%. She has ICD in place. Outpatient Medications:     Medications Prior to Admission: furosemide (LASIX) 40 MG tablet, Take 1 tablet by mouth in the morning and 1 tablet in the evening. clopidogrel (PLAVIX) 75 MG tablet, Take 1 tab daily  atorvastatin (LIPITOR) 80 MG tablet, 1 tab daily  carvedilol (COREG) 3.125 MG tablet, take 1 tablet by mouth twice a day with meals  gabapentin (NEURONTIN) 400 MG capsule, Take 1 capsule by mouth 3 times daily for 180 days.   midodrine (PROAMATINE) 5 MG tablet, take 1 tablet by mouth three times a day  sertraline (ZOLOFT) 100 MG tablet, Take one tablet by mouth once daily  insulin glargine (LANTUS SOLOSTAR) 100 UNIT/ML injection pen, INJECT 45 UNITS SUBCUTANEOUSLY EVERY MORNING AND 25 UNITS EVERY EVENING  Insulin Pen Needle (BD PEN NEEDLE PAIGE 2ND GEN) 32G X 4 MM MISC, use 1 PEN NEEDLE to inject MEDICATION subcutaneously daily  Lancets MISC, Pt testing BS once daily and prn  glucose monitoring (FREESTYLE) kit, Please dispense meter covered by patients insurance. blood glucose monitor strips, Testing BS once daily and as needed.  Dispense strips to go with new glucometer  albuterol (PROVENTIL) (2.5 MG/3ML) 0.083% nebulizer solution, Take 3 mLs by nebulization every 6 hours as needed for Wheezing (as needed for wheezing)  Respiratory Therapy Supplies (NEBULIZER/TUBING/MOUTHPIECE) KIT, 1 kit by Does not apply route daily as needed (for SOB, cough)  Respiratory Therapy Supplies (NEBULIZER COMPRESSOR) KIT, 1 kit by Does not apply route once for 1 dose  lactobacillus (CULTURELLE) capsule, Take 1 capsule by mouth 2 times daily (with meals)  blood glucose monitor strips, Use to test daily  blood glucose monitor kit and supplies, Use to test daily  Cholecalciferol (VITAMIN D3) 5000 units TABS, Take 5,000 Units by mouth daily  glucose blood VI test strips (EXACTECH TEST) strip, 1 each by In Vitro route daily Pt testing BS once daily and prn  Multiple Vitamins-Minerals (THERAPEUTIC MULTIVITAMIN-MINERALS) tablet, Take 1 tablet by mouth daily (with breakfast)    Current Medications:     Scheduled Meds:    insulin lispro  0-16 Units SubCUTAneous Q4H    insulin lispro  0-4 Units SubCUTAneous Nightly    midodrine  10 mg Oral TID WC    aspirin  81 mg Oral Daily    heparin (porcine)  5,000 Units SubCUTAneous 3 times per day    famotidine (PEPCID) injection  20 mg IntraVENous Daily    sodium chloride flush  5-40 mL IntraVENous 2 times per day    chlorhexidine  15 mL Mouth/Throat BID    piperacillin-tazobactam  3,375 mg IntraVENous Q8H    atorvastatin  80 mg Oral Nightly    [Held by provider] carvedilol  3.125 mg Oral BID WC    clopidogrel  75 mg Oral Daily     Continuous Infusions:    propofol 10 mcg/kg/min (12/20/22 4398)    sodium chloride Stopped (12/16/22 2199)    dextrose       PRN Meds:  sodium chloride flush, sodium chloride, ondansetron **OR** ondansetron, polyethylene glycol, acetaminophen **OR** acetaminophen, glucose, dextrose bolus **OR** dextrose bolus, glucagon (rDNA), dextrose, albuterol, acetylcysteine    Input/Output:       I/O last 3 completed shifts: In: 1816.9 [I.V.:396.7; NG/GT:1196; IV Piggyback:224.2]  Out: 2168 [Urine:3235]. Patient Vitals for the past 96 hrs (Last 3 readings):   Weight   22 0600 160 lb 11.5 oz (72.9 kg)   22 0435 175 lb 14.8 oz (79.8 kg)   22 0600 170 lb 13.7 oz (77.5 kg)         Vital Signs:   Temperature:  Temp: 98.8 °F (37.1 °C)  TMax:   Temp (24hrs), Av.2 °F (37.3 °C), Min:97.9 °F (36.6 °C), Max:100 °F (37.8 °C)    Respirations:  Resp: 20  Pulse:   Heart Rate: 79  BP:    BP: 133/62  BP Range: Systolic (72UUB), RZT:209 , Min:100 , XVR:545       Diastolic (41BNP), ANABELLE:70, Min:52, Max:76      Physical Examination:     General:  Intubated and sedated, nonpurposeful movements  Neck:   No JVD, no thyromegaly, no lymphadenopathy. Chest:              Bilateral vesicular breath sounds, no rales or wheezes. Cardiac:  S1 S2 RR, no murmurs, gallops or rubs, JVP not raised. Abdomen: Soft, non-tender, no masses or organomegaly, BS audible. Neuro: On mechanical ventilation, sedated. Does not follow commands. :   No suprapubic or flank tenderness. SKIN:  No rashes, good skin turgor. Extremities:  No edema.     Labs:       Recent Labs     22  0408 22  0507 22  0455   WBC 9.3 7.9 7.2   RBC 3.36* 3.18* 3.31*   HGB 8.4* 8.0* 8.5*   HCT 27.7* 25.9* 27.7*   MCV 82.4* 81.4* 83.7   MCH 25.0* 25.2 25.7   MCHC 30.3 30.9 30.7   RDW 18.0* 18.1* 18.3*    166 158   MPV 10.8 11.4 10.6        BMP:   Recent Labs     22  0408 22  1621 22  0507 22  0455     --  141 142   K 3.7 4.0 3.7 3.7     --  106 106   CO2 21  --  21 24   BUN 66*  --  56* 49*   CREATININE 2.66*  --  2.57* 2.19*   GLUCOSE 260*  --  238* 241*   CALCIUM 8.2*  --  8.3* 8.8        Phosphorus:     Recent Labs     22   PHOS 4.1 Magnesium:    Recent Labs     12/18/22  0408   MG 2.1       ALEJANDRO:      Lab Results   Component Value Date/Time    ALEJANDRO  08/26/2018 10:16 AM     This test is equivocal, which represents a borderline ALEJANDRO result. Suggest     SPEP:  Lab Results   Component Value Date/Time    PROT 6.6 12/14/2022 11:49 AM    ALBCAL 4.1 08/23/2018 04:33 AM    ALBPCT 62 08/23/2018 04:33 AM    LABALPH 0.2 08/23/2018 04:33 AM    LABALPH 0.7 08/23/2018 04:33 AM    A1PCT 3 08/23/2018 04:33 AM    A2PCT 10 08/23/2018 04:33 AM    LABBETA 0.9 08/23/2018 04:33 AM    BETAPCT 13 08/23/2018 04:33 AM    GAMGLOB 0.8 08/23/2018 04:33 AM    GGPCT 12 08/23/2018 04:33 AM    PATH ELECTRONICALLY SIGNED.  Nupur Linares M.D. 08/27/2018 03:15 PM     C3:     Lab Results   Component Value Date/Time    C3 111 08/27/2018 03:15 PM     C4:     Lab Results   Component Value Date/Time    C4 35 08/27/2018 03:15 PM     MPO ANCA:     Lab Results   Component Value Date/Time    MPO 11 08/26/2018 10:16 AM     PR3 ANCA:     Lab Results   Component Value Date/Time    PR3 15 08/26/2018 10:16 AM     Anti-GBM:     Lab Results   Component Value Date/Time    GBMABIGG 12 08/26/2018 10:16 AM     Hep BsAg:         Lab Results   Component Value Date/Time    HEPBSAG NONREACTIVE 08/24/2018 12:24 PM       Urinalysis/Chemistries:      Lab Results   Component Value Date/Time    NITRU NEGATIVE 12/14/2022 12:26 PM    COLORU Yellow 12/14/2022 12:26 PM    PHUR 6.0 12/14/2022 12:26 PM    WBCUA 50  12/14/2022 12:26 PM    RBCUA 0 TO 2 12/14/2022 12:26 PM    MUCUS NOT REPORTED 11/21/2018 12:12 PM    TRICHOMONAS NOT REPORTED 11/21/2018 12:12 PM    YEAST FEW 12/14/2022 12:26 PM    BACTERIA RARE 12/14/2022 09:30 AM    SPECGRAV 1.018 12/14/2022 12:26 PM    LEUKOCYTESUR NEGATIVE 12/14/2022 12:26 PM    UROBILINOGEN Normal 12/14/2022 12:26 PM    [address] NEGATIVE 12/14/2022 12:26 PM    GLUCOSEU 1+ 12/14/2022 12:26 PM    KETUA NEGATIVE 12/14/2022 12:26 PM    AMORPHOUS TRACE 11/21/2018 12:12 PM     Urine Sodium:     Lab Results   Component Value Date/Time    ROULA 20 12/15/2022 11:49 AM      Urine Creatinine:     Lab Results   Component Value Date/Time    LABCREA 71.1 12/15/2022 11:49 AM     Radiology:     CXR: 12/16/22  Overall relatively stable cardiopulmonary status. Cardiomegaly with congestive changes and pulmonary edema. Nasogastric tube tip is in the stomach. Assessment:     #1 acute kidney injury nonoliguric secondary to ischemic (cardiac arrest/shock) and nephrotoxic (contrast) ATN -evolving  #2 high nongap metabolic acidosis secondary to CHAD/hypoperfusion  #3 chronic kidney disease stage IIIb secondary to diabetic/ischemic nephrosclerosis with baseline creatinine 1.4-1.6. Serologies unremarkable. Asymmetric kidney size. Minimal proteinuria. #4 s/p cardiac arrest-multiple times status post therapeutic hypothermia protocol  #5 cardiogenic shock on pressors  #6 decompensated systolic congestive heart failure  #7 acute hypoxic respiratory failure  #8 history of CABG/MVR with EF 25%  #9 pulmonary hypertension/severe tricuspid regurgitation  #10 type 2 diabetes    Plan:   1. Continue Midodrine to 10mg TID  2. Monitor strict I's and O's and renal function. 3. Poor prognosis  4. BMP in AM.  5. Will follow. Nutrition   Please ensure that patient is on a renal diet/TF. Avoid nephrotoxic drugs/contrast exposure. MICKEY Hong CNP  12/20/2022 10:46 AM  Nephrology [address] [place] [address] Drive. Attending Physician Statement  I have discussed the care of this patient, including pertinent history and exam findings, with the Resident/CNP. I have reviewed and edited the key elements of all parts of the encounter with the Resident/CNP. I agree with the assessment, plan and orders as documented by the Resident/CNP. Jacek Dominguez MD   Nephrology [address] [place] [address] Drive    This note is created with the assistance of a speech-recognition program. While intending to generate a document that actually reflects the content of the visit, no guarantees can be provided that every mistake has been identified and corrected by editing.

## 2022-12-20 NOTE — PROGRESS NOTES
Occupational [employer] [employer]  Occupational Therapy Not Seen Note    DATE: 2022    NAME: Sofie Erwin  MRN: [id number]   : 1960      Patient not seen this date for Occupational Therapy due to:       Other: Pt intubated/sedated and not appropriate for active participation in OT this date       Next Scheduled Treatment: Will check back - as able     Electronically signed by Emerson Portillo OT on 2022 at 1:21 PM

## 2022-12-20 NOTE — CONSULTS
Palliative care is seeing the patient, consult was seen on 12/06/22. We will follow up with the patient. Thank you for referring.         Dr. Darryle [facility] attending

## 2022-12-20 NOTE — PLAN OF CARE
Problem: Respiratory - Adult  Goal: Achieves optimal ventilation and oxygenation  12/20/2022 1543 by Callmayela Melo RCP  Flowsheets (Taken 12/20/2022 1543)  Achieves optimal ventilation and oxygenation:   Assess for changes in respiratory status   Assess the need for suctioning and aspirate as needed   Respiratory therapy support as indicated   Assess and instruct to report shortness of breath or any respiratory difficulty

## 2022-12-20 NOTE — PLAN OF CARE
Problem: Chronic Conditions and Co-morbidities  Goal: Patient's chronic conditions and co-morbidity symptoms are monitored and maintained or improved  Outcome: Progressing  Flowsheets (Taken 12/19/2022 2000)  Care Plan - Patient's Chronic Conditions and Co-Morbidity Symptoms are Monitored and Maintained or Improved: Monitor and assess patient's chronic conditions and comorbid symptoms for stability, deterioration, or improvement     Problem: Discharge Planning  Goal: Discharge to home or other facility with appropriate resources  Outcome: Progressing     Problem: Pain  Goal: Verbalizes/displays adequate comfort level or baseline comfort level  Outcome: Progressing  Flowsheets  Taken 12/20/2022 0000  Verbalizes/displays adequate comfort level or baseline comfort level: Assess pain using appropriate pain scale  Taken 12/19/2022 2000  Verbalizes/displays adequate comfort level or baseline comfort level: Assess pain using appropriate pain scale     Problem: Respiratory - Adult  Goal: Achieves optimal ventilation and oxygenation  12/20/2022 0431 by Alin Potts RN  Outcome: Progressing  12/19/2022 2114 by Lary Lundberg RCP  Outcome: Progressing  Flowsheets  Taken 12/19/2022 2114 by Lary Lundberg RCP  Achieves optimal ventilation and oxygenation:   Assess for changes in respiratory status   Respiratory therapy support as indicated   Assess the need for suctioning and aspirate as needed   Assess for changes in mentation and behavior   Oxygen supplementation based on oxygen saturation or arterial blood gases  Taken 12/19/2022 2000 by Alin Potts RN  Achieves optimal ventilation and oxygenation: Assess for changes in respiratory status  12/19/2022 1845 by Gisselle Sen  Outcome: Progressing  Flowsheets (Taken 12/19/2022 1845)  Achieves optimal ventilation and oxygenation:   Assess for changes in respiratory status   Position to facilitate oxygenation and minimize respiratory effort   Assess the need for suctioning and aspirate as needed   Respiratory therapy support as indicated   Oxygen supplementation based on oxygen saturation or arterial blood gases     Problem: Skin/Tissue Integrity  Goal: Absence of new skin breakdown  Description: 1. Monitor for areas of redness and/or skin breakdown  2. Assess vascular access sites hourly  3. Every 4-6 hours minimum:  Change oxygen saturation probe site  4. Every 4-6 hours:  If on nasal continuous positive airway pressure, respiratory therapy assess nares and determine need for appliance change or resting period.   Outcome: Progressing     Problem: Safety - Adult  Goal: Free from fall injury  Outcome: Progressing     Problem: ABCDS Injury Assessment  Goal: Absence of physical injury  Outcome: Progressing     Problem: Nutrition Deficit:  Goal: Optimize nutritional status  Outcome: Progressing       Electronically signed by Elizabeth Anthony RN on 12/20/2022 at 4:32 AM

## 2022-12-20 NOTE — PROGRESS NOTES
Physical Therapy Cancel Note      DATE: 2022    NAME: Cindy Potter  MRN: [id number]   : 1960      Patient not seen this date for Physical Therapy due to:    Strict Bedrest: hypothermia protocol, intubated/sedated; monitor, eval when medically appropriate      Electronically signed by Dolores Busby PT on 2022 at 8:52 AM

## 2022-12-20 NOTE — PROGRESS NOTES
Called and spoke with Duane Paci regarding setting up a meeting to discuss plan of care and code status with critical care team and palliative team. Duane Paci stated he would try to make it in tomorrow afternoon. RN discussed we would be able to have discussion over phone as well if he is unable to obtain ride to [email or web address]. Duane Paci stated he will have phone on him 24/7 incase he is unable to make it in tomorrow and the discussion needs to occur over the phone.  Will update critical care team, CM, and palliative team.

## 2022-12-20 NOTE — PLAN OF CARE
Problem: Chronic Conditions and Co-morbidities  Goal: Patient's chronic conditions and co-morbidity symptoms are monitored and maintained or improved  12/20/2022 9728 by Susana Interiano RN  Outcome: Progressing  12/20/2022 0431 by Nini Diamond RN  Outcome: Progressing  Flowsheets (Taken 12/19/2022 2000)  Care Plan - Patient's Chronic Conditions and Co-Morbidity Symptoms are Monitored and Maintained or Improved: Monitor and assess patient's chronic conditions and comorbid symptoms for stability, deterioration, or improvement     Problem: Discharge Planning  Goal: Discharge to home or other facility with appropriate resources  12/20/2022 0922 by Susana Interiano RN  Outcome: Progressing  12/20/2022 0431 by Nini Diamond RN  Outcome: Progressing     Problem: Pain  Goal: Verbalizes/displays adequate comfort level or baseline comfort level  12/20/2022 0922 by Susana Interiano RN  Outcome: Progressing  12/20/2022 0431 by Nini Diamond RN  Outcome: Progressing  Flowsheets  Taken 12/20/2022 0400  Verbalizes/displays adequate comfort level or baseline comfort level: Assess pain using appropriate pain scale  Taken 12/20/2022 0000  Verbalizes/displays adequate comfort level or baseline comfort level: Assess pain using appropriate pain scale  Taken 12/19/2022 2000  Verbalizes/displays adequate comfort level or baseline comfort level: Assess pain using appropriate pain scale     Problem: Respiratory - Adult  Goal: Achieves optimal ventilation and oxygenation  12/20/2022 0922 by Susana Interiano RN  Outcome: Progressing  12/20/2022 0431 by Nini Diamond RN  Outcome: Progressing  12/19/2022 2114 by Caity Lundberg RCP  Outcome: Progressing  Flowsheets  Taken 12/19/2022 2114 by Caity Lundberg RCP  Achieves optimal ventilation and oxygenation:   Assess for changes in respiratory status   Respiratory therapy support as indicated   Assess the need for suctioning and aspirate as needed   Assess for changes in mentation and behavior   Oxygen supplementation based on oxygen saturation or arterial blood gases  Taken 12/19/2022 2000 by Darling Choudhury RN  Achieves optimal ventilation and oxygenation: Assess for changes in respiratory status     Problem: Skin/Tissue Integrity  Goal: Absence of new skin breakdown  Description: 1. Monitor for areas of redness and/or skin breakdown  2. Assess vascular access sites hourly  3. Every 4-6 hours minimum:  Change oxygen saturation probe site  4. Every 4-6 hours:  If on nasal continuous positive airway pressure, respiratory therapy assess nares and determine need for appliance change or resting period.   12/20/2022 3974 by Gordon Amador RN  Outcome: Progressing  12/20/2022 0431 by Darling Choudhury RN  Outcome: Progressing     Problem: Safety - Adult  Goal: Free from fall injury  12/20/2022 0922 by Gordon Amador RN  Outcome: Progressing  12/20/2022 0431 by Darling Choudhury RN  Outcome: Progressing     Problem: ABCDS Injury Assessment  Goal: Absence of physical injury  12/20/2022 0460 by Gordon Amador RN  Outcome: Progressing  12/20/2022 0431 by Darling Choudhury RN  Outcome: Progressing     Problem: Nutrition Deficit:  Goal: Optimize nutritional status  12/20/2022 0922 by Gordon Amador RN  Outcome: Progressing  12/20/2022 0431 by Darling Choudhury RN  Outcome: Progressing

## 2022-12-21 NOTE — PROGRESS NOTES
INTENSIVE CARE UNIT  Resident Physician Progress Note    Patient - Lane Killian  Date of Admission -  12/14/2022 10:09 AM  Date of Evaluation -  12/21/2022  Room and Bed Number -  3025/3025-01   Hospital Day - 7    HPI:     Lane Killian is a 60 y.o. with PMH of   - CAD, s/p CABG in 2015  - Ischemic cardiomyopathy, last EF of 25%  - Insulin-dependent diabetes mellitus type 2  - CKD     Presented to [facility] ER as a transfer from [facility] ER. According to the chart review patient was found unresponsive by the  as per EMS. Unknown downtime. Per EMS report no CPR performed. EMS intubated the patient esophageally, prior to arrival at [facility], reintubation was done and then patient's glucose was found to be 18. At [facility] found to be in PEA arrest achieved ROSC needed 6 mg epinephrine, received multiple pushes of Versed and fentanyl for posturing and seizure-like activity. On arrival to our [facility] ER patient was unresponsive. Fixed and dilated pupils, posturing and on propofol infusion. EKG in the ED showed new right bundle branch block then due to concern for PE, CT PE was done. Bedside ultrasound did not show any signs of tamponade. CT negative for any PE. Around 10:25 AM there was an episode of pulselessness and bradycardia and PEA was given at epinephrine and troponin. Cooling was started around 11:28 AM with target temperature of 38 Celsius     Initial vitals in the ED temperature around 35, heart rate 106, respiratory 22, pressure 124/108     CT head negative for any acute intracranial abnormality  CT chest showed pleural effusions and cardiomegaly      12/17: CT head showing no acute processes, chronic small vessel ischemic white matter disease. LTME showing diffuse cortical dysfunction, severe encephalopathy. 12/18: Levo discontinued. Remains on propofol. 12/19: Tolerated CPAP trial. Per neuro ICU prognostication, brainstem reflexes intact. Poor prognosis.  Discussion with family about goals, T&P vs withdrawal of care. Palliative following. : Plan for bedside discussion of goals of care and code status with family tomorrow,  with palliative and ICU team.    SUBJECTIVE:     OVERNIGHT EVENTS:    No acute events.  Plan for family meeting  with palliative care and ICU team.    AWAKE & FOLLOWING COMMANDS:  [x] No   [] Yes    SECRETIONS Amount:  [x] Small [] Moderate  [] Large [] None    Color:     [x] White [] Colored  [] Bloody    SEDATION:  RAAS Score:  [x] Propofol gtt  [] Versed gtt  [] Ativan gtt   [] No Sedation    PARALYZED:  [x] No    [] Yes    VASOPRESSORS:  [x] No    [] Yes  [] Levophed [] Dopamine [] Vasopressin  [] Dobutamine [] Phenylephrine [] Epinephrine      OBJECTIVE:     VITAL SIGNS:  /68   Pulse 79   Temp 99.7 °F (37.6 °C) (Bladder)   Resp 18   Ht 5' 6\" (1.676 m)   Wt 159 lb 13.3 oz (72.5 kg)   SpO2 100%   BMI 25.80 kg/m²   Tmax over 24 hours:  Temp (24hrs), Av.3 °F (37.4 °C), Min:98.8 °F (37.1 °C), Max:99.7 °F (37.6 °C)      Patient Vitals for the past 8 hrs:   BP Temp Temp src Pulse Resp SpO2 Weight   22 0600 -- -- -- 79 18 100 % --   22 0554 -- -- -- 87 23 98 % --   22 0539 -- -- -- -- -- -- 159 lb 13.3 oz (72.5 kg)   22 0500 -- -- -- 87 20 99 % --   22 0400 118/68 99.7 °F (37.6 °C) Bladder 85 22 92 % --   22 0300 -- -- -- 85 25 92 % --   22 0200 -- -- -- 87 25 96 % --   22 0100 -- -- -- 87 23 92 % --   22 0000 134/73 99.5 °F (37.5 °C) Bladder 87 25 91 % --         Intake/Output Summary (Last 24 hours) at 2022 0708  Last data filed at 2022 0385  Gross per 24 hour   Intake 1362.3 ml   Output 1700 ml   Net -337.7 ml     Date 22 0000 - 22 2359   Shift 3337-4070 9151-9719 9277-2134 24 Hour Total   INTAKE   I.V.(mL/kg) 56.8(0.8)   56.8(0.8)   NG/GT(mL/kg) 477(6.6)   477(6.6)   IV Piggyback(mL/kg) 81.6(1.1)   81.6(1.1)   Shift Total(mL/kg) 615.4(8.5)   615.4(8.5) OUTPUT   Urine(mL/kg/hr) 425   425   Shift Total(mL/kg) 425(5.9)   425(5.9)   Weight (kg) 72.5 72.5 72.5 72.5     Wt Readings from Last 3 Encounters:   12/21/22 159 lb 13.3 oz (72.5 kg)   10/24/22 165 lb 9.6 oz (75.1 kg)   10/24/22 165 lb (74.8 kg)     Body mass index is 25.8 kg/m².         PHYSICAL EXAM:  GEN:                  Intubated, sedated, not following commands  EYES:                Not tracking, pupils equal, round, and reactive to light  HEENT:            Normocepalic, without obvious abnormality  LUNGS:            clear to auscultation  CV:                     regular rate and rhythm and normal S1 and S2  ABDOMEN:     soft and non-distended  MSK:                  there is no redness, warmth, or swelling of the joints  NEURO[de-identified]           Intubated, sedated, not following commands  SKIN:                 Normal skin color, texture, turgor  EXTREMITIES:  No pedal or leg edema, no calf tenderness/swelling, no erythema, distal pulses intact       MEDICATIONS:  Scheduled Meds:   insulin lispro  0-16 Units SubCUTAneous Q4H    insulin lispro  0-4 Units SubCUTAneous Nightly    midodrine  10 mg Oral TID WC    aspirin  81 mg Oral Daily    heparin (porcine)  5,000 Units SubCUTAneous 3 times per day    famotidine (PEPCID) injection  20 mg IntraVENous Daily    sodium chloride flush  5-40 mL IntraVENous 2 times per day    chlorhexidine  15 mL Mouth/Throat BID    piperacillin-tazobactam  3,375 mg IntraVENous Q8H    atorvastatin  80 mg Oral Nightly    [Held by provider] carvedilol  3.125 mg Oral BID WC    clopidogrel  75 mg Oral Daily     Continuous Infusions:   propofol 10 mcg/kg/min (12/21/22 0640)    sodium chloride Stopped (12/16/22 1629)    dextrose       PRN Meds:   sodium chloride flush, 5-40 mL, PRN  sodium chloride, , PRN  ondansetron, 4 mg, Q8H PRN   Or  ondansetron, 4 mg, Q6H PRN  polyethylene glycol, 17 g, Daily PRN  acetaminophen, 650 mg, Q6H PRN   Or  acetaminophen, 650 mg, Q6H PRN  glucose, 4 tablet, PRN  dextrose bolus, 125 mL, PRN   Or  dextrose bolus, 250 mL, PRN  glucagon (rDNA), 1 mg, PRN  dextrose, , Continuous PRN  albuterol, 2.5 mg, As Directed RT PRN  acetylcysteine, 600 mg, As Directed RT PRN        SUPPORT DEVICES: [x] Ventilator [] BIPAP  [] Nasal Cannula [] Room Air    VENT SETTINGS (Comprehensive) (if applicable):   PRVC mode, FiO2 40%, PEEP 5, Respiratory Rate 18, Tidal Volume 500  Vent Information  Ventilator ID: [id number]  Equipment Changed: HME, Expiratory Filter  Ventilator Initiate: Yes  Additional Respiratory Assessments  Heart Rate: 79  Resp: 18  SpO2: 100 %  End Tidal CO2: 30 (%)  Position: Semi-Simon's  Humidification Source: HME  Cuff Pressure (cm H2O):  (mlt)  Skin Barrier Applied: No    ABGs:   Arterial Blood Gas result:  pH 7.467; pCO2 35.6; pO2 64  Lab Results   Component Value Date/Time    [id number] 17 08/24/2018 05:02 AM    FIO2 30.0 12/21/2022 04:54 AM         DATA:  Complete Blood Count:   Recent Labs     12/19/22  0507 12/20/22 0455 12/21/22 0459   WBC 7.9 7.2 8.1   RBC 3.18* 3.31* 3.40*   HGB 8.0* 8.5* 8.5*   HCT 25.9* 27.7* 28.5*   MCV 81.4* 83.7 83.8   MCH 25.2 25.7 25.0*   MCHC 30.9 30.7 29.8   RDW 18.1* 18.3* 18.5*    158 156   MPV 11.4 10.6 11.9        Last 3 Blood Glucose:   Recent Labs     12/19/22  0507 12/20/22 0455 12/21/22 0459   GLUCOSE 238* 241* 228*        PT/INR:    Lab Results   Component Value Date/Time    PROTIME 13.7 12/14/2022 01:38 PM    INR 1.3 12/14/2022 01:38 PM     PTT:    Lab Results   Component Value Date/Time    APTT 26.0 12/14/2022 01:38 PM       Comprehensive Metabolic Profile:   Recent Labs     12/19/22  0507 12/20/22 0455 12/21/22 0459    142 143   K 3.7 3.7 3.7    106 108*   CO2 21 24 24   BUN 56* 49* 39*   CREATININE 2.57* 2.19* 1.85*   GLUCOSE 238* 241* 228*   CALCIUM 8.3* 8.8 8.9      Magnesium:   Lab Results   Component Value Date/Time    MG 2.1 12/18/2022 04:08 AM    MG 2.2 12/17/2022 06:09 AM    MG 2.1 12/17/2022 12:03 AM     Phosphorus:   Lab Results   Component Value Date/Time    PHOS 4.1 12/18/2022 04:08 AM    PHOS 5.6 12/17/2022 06:09 AM    PHOS 6.5 12/17/2022 12:03 AM     Ionized Calcium:   Lab Results   Component Value Date/Time    CAION 1.18 12/21/2022 04:59 AM    CAION 1.17 12/20/2022 04:55 AM    CAION 1.16 12/19/2022 05:07 AM        Urinalysis:   Lab Results   Component Value Date/Time    NITRU NEGATIVE 12/14/2022 12:26 PM    COLORU Yellow 12/14/2022 12:26 PM    PHUR 6.0 12/14/2022 12:26 PM    WBCUA 50  12/14/2022 12:26 PM    RBCUA 0 TO 2 12/14/2022 12:26 PM    MUCUS NOT REPORTED 11/21/2018 12:12 PM    TRICHOMONAS NOT REPORTED 11/21/2018 12:12 PM    YEAST FEW 12/14/2022 12:26 PM    BACTERIA RARE 12/14/2022 09:30 AM    SPECGRAV 1.018 12/14/2022 12:26 PM    LEUKOCYTESUR NEGATIVE 12/14/2022 12:26 PM    UROBILINOGEN Normal 12/14/2022 12:26 PM    BILIRUBINUR NEGATIVE 12/14/2022 12:26 PM    GLUCOSEU 1+ 12/14/2022 12:26 PM    KETUA NEGATIVE 12/14/2022 12:26 PM    AMORPHOUS TRACE 11/21/2018 12:12 PM       HgBA1c:    Lab Results   Component Value Date/Time    LABA1C 8.5 12/15/2022 05:56 AM     TSH:    Lab Results   Component Value Date/Time    TSH 1.66 10/14/2022 10:23 AM     Lactic Acid:   Lab Results   Component Value Date/Time    LACTA 1.4 10/13/2022 12:22 PM    LACTA 1.7 12/29/2018 11:57 AM    LACTA 1.5 12/29/2018 05:30 AM      Troponin: No results for input(s): TROPONINI in the last 72 hours.     ASSESSMENT:     Patient Active Problem List    Diagnosis Date Noted    Palliative care encounter 12/16/2022    NSTEMI (non-ST elevated myocardial infarction) ([facility] [address] 12/15/2022    Ventricular tachycardia 12/15/2022    Cardiac arrest ([facility] [address] 12/14/2022    Pulmonary hypertension ([facility] [address] 12/14/2022    Diabetes mellitus with kidney disease ([facility] [address] 12/14/2022    Stage 3b chronic kidney disease ([facility] [address] 10/17/2022    Ischemic cardiomyopathy 10/17/2022    Acute respiratory failure with hypoxia and hypercapnia ([facility] [address] 10/17/2022    CHF (congestive heart failure), NYHA class I, acute on chronic, combined ([facility] [address] 10/14/2022    Acute on chronic systolic congestive heart failure ([facility] [address] 10/14/2022    Congestive heart failure due to cardiomyopathy ([facility] [address] 45/90/6538    Acute systolic CHF (congestive heart failure) ([facility] [address] 10/12/2022    Pneumonia 12/28/2018    Dyslipidemia     Ureteral stone with hydronephrosis 11/04/2018    Acute pulmonary edema ([address] 11/04/2018    Elevated serum immunoglobulin free light chains     CHAD (acute kidney injury) ([address] 54/67/7863    Metabolic acidosis 06/79/2238    Shock ([address] 08/23/2018    DM (diabetes mellitus), type 2 with neurological complications ([facility] [address] 66/80/2263    Anxiety 01/26/2016    CAD (coronary artery disease)     Hx of CABG     ICD (implantable cardioverter-defibrillator) in place     H/O mitral valve repair     Hx of myocardial infarction     Hypertension     Onychomycosis of toenail 11/19/2015    S/P cardiac cath-Patent grafts 6/5/15 06/04/2015    Acute coronary syndrome ([facility] [address] 05/26/2015          PLAN:     Neuro  Intubated, sedated  Propofol gtt @ 10  Neuro ICU evaluated for neuro prognostication, brainstem reflex intact, poor prognosis, neuro has signed off   No MRI due to AICD incompatibility  LTME - diffuse cortical dysfunction, severe encephalopathy  CT head 12/17 - no acute processes, chronic small vessel ischemic deep white matter disease  Plan for family meeting 12/21 with palliative care and ICU team to discuss prognosis and goals of care     Cardiology  Off pressors  CAD s/p AICD, PEA arrest  ASA, Lipitor, Plavix  Midodrine 10 TID  Cardiology consulted, will decide about cath after any meaningful neurologic recovery  ECHO 12/16/22 - EF 15-20%, global hypokinesis, MV  prosthesis     Pulmonary  PRVC 18/500/5/40%  Albuterol nebulizer per RT protocol  Mucomyst per RT protocol     Renal  CHAD on CKD stage IIIb with baseline Cr 1.4-1.6  Cr 1.85, improving  Nephrology following  I/O: 1700 ml  Garcia in place     GI  TF @ 45 ml/hr at goal  Pepcid 20     ID  WBC 8.1  Zosyn course, finishing course today 12/21  Removed R femoral central line 12/20     Endo  HDISS  Glucose 228        F.A.S.T. M. H.U.G.S. B.I.D. Feeding Diet: Diet NPO  ADULT TUBE FEEDING; Orogastric; Renal Formula; Continuous; 10; Yes; 10; Q 4 hours; 45; 30; Q 4 hours  Fluids: Propofol gtt, zosyn (finishing course today)  Family: Plan for family meeting 12/21 with palliative care team and ICU team to discuss goals of care  Analgesic: Tylenol prn  Sedation: Propofol gtt   Thrombo-prophylaxis: [] Enoxaparin, [x] Unfract. Heparin Subcutaneously, [] EPC Cuffs  Mobility: PT/OT, bedbound, intubated  Heads up: HOB 30  Ulcer prophylaxis: [] PPI Agent, [x] [id number], [] Sucralfate, [] Other:  Glycemic control: HDISS, Glucose 228  Spontaneous breathing trial: Tolerated   Bowel regimen/urine output: Glycolax prn/ Uout 1700  Indwelling catheter/lines: Art line, villa  De-escalation: To remain ICU, family meeting today 12/21 with palliative care to discuss goals of care    Octavio Cevallos M.D.   Emergency Medicine Resident, PGY-2  12/21/2022 7:08 AM

## 2022-12-21 NOTE — RESEARCH
Patient Name: Wilder Pallas  MRN: [id number]  YOB: 1960  Study: ICECAP: Influence of Cooling duration on Efficacy in Cardiac Arrest Patients  Site: [facility]  Subject #: [id number]  Reason for evaluation: Inpatient Research patient Evaluation      Daily Note    The patient was evaluated for a multicenter, randomized, adaptive allocation clinical trial to identify the optimal duration of induced hypothermia for neuroprotection in comatose survivors of cardiac arrest.      ARIAN #: [id number]  Clinicaltrials.gov: NCT [id number]      EMR Chart Review for ICECAP DAYS 6 & 7     Comments: Met with ICU, RN to discuss patient status. Patient remains on Propofol infusion. No seizures noted on Days 6 & 7. Not on any neuromuscular blocking agents. FOUR SCORES documented on paper source document. Palliative Care currently waiting on  arrival to unit to have goals of care discussion. Protocol defined events updated and reviewed with PI. Jayro Jacobs RN  Clinical Research Services  [address] [place] [address], [facility]  P: [phone]  F: [phone]    For questions call the research office at [phone] or [facility].

## 2022-12-21 NOTE — PLAN OF CARE
Problem: Chronic Conditions and Co-morbidities  Goal: Patient's chronic conditions and co-morbidity symptoms are monitored and maintained or improved  12/20/2022 2253 by Bertha English RN  Outcome: Progressing  12/20/2022 0922 by Eric Paiz RN  Outcome: Progressing     Problem: Discharge Planning  Goal: Discharge to home or other facility with appropriate resources  12/20/2022 0922 by Eric Paiz RN  Outcome: Progressing     Problem: Pain  Goal: Verbalizes/displays adequate comfort level or baseline comfort level  12/20/2022 2253 by Bertha nEglish RN  Outcome: Progressing  12/20/2022 0922 by Eric Paiz RN  Outcome: Progressing     Problem: Respiratory - Adult  Goal: Achieves optimal ventilation and oxygenation  12/20/2022 2253 by Bertha English RN  Outcome: Progressing  12/20/2022 1543 by Matilde Padilla RCP  Flowsheets (Taken 12/20/2022 1543)  Achieves optimal ventilation and oxygenation:   Assess for changes in respiratory status   Assess the need for suctioning and aspirate as needed   Respiratory therapy support as indicated   Assess and instruct to report shortness of breath or any respiratory difficulty  12/20/2022 0922 by Eric Paiz RN  Outcome: Progressing     Problem: Skin/Tissue Integrity  Goal: Absence of new skin breakdown  Description: 1. Monitor for areas of redness and/or skin breakdown  2. Assess vascular access sites hourly  3. Every 4-6 hours minimum:  Change oxygen saturation probe site  4. Every 4-6 hours:  If on nasal continuous positive airway pressure, respiratory therapy assess nares and determine need for appliance change or resting period.   12/20/2022 2253 by Bertha English RN  Outcome: Progressing  12/20/2022 0922 by Eric Paiz RN  Outcome: Progressing     Problem: Safety - Adult  Goal: Free from fall injury  12/20/2022 2253 by Bertha English RN  Outcome: Progressing  12/20/2022 0922 by Eric Paiz RN  Outcome: Progressing     Problem: ABCDS Injury Assessment  Goal: Absence of physical injury  12/20/2022 2253 by Bertha English RN  Outcome: Progressing  12/20/2022 0922 by Eric Paiz RN  Outcome: Progressing     Problem: Nutrition Deficit:  Goal: Optimize nutritional status  12/20/2022 2253 by Bertha English RN  Outcome: Progressing  12/20/2022 0922 by Eric Paiz RN  Outcome: Progressing

## 2022-12-21 NOTE — PLAN OF CARE
Problem: Respiratory - Adult  Goal: Achieves optimal ventilation and oxygenation  12/21/2022 1756 by Feliberto Soto  Outcome: Progressing  Flowsheets  Taken 12/21/2022 1756 by Ronald Nicholas optimal ventilation and oxygenation:   Assess for changes in respiratory status   Position to facilitate oxygenation and minimize respiratory effort   Assess the need for suctioning and aspirate as needed   Respiratory therapy support as indicated   Assess and instruct to report shortness of breath or any respiratory difficulty   Oxygen supplementation based on oxygen saturation or arterial blood gases   Assess for changes in mentation and behavior

## 2022-12-21 NOTE — CODE DOCUMENTATION
I spoke directly with the patient's  and decision maker, Urbano Couch, on the phone this evening. We discussed the patient's care and current status at length, and discussed the patient's poor neurologic prognosis. All his questions were answered. He reiterated that he would like patient to remain full code at this time. He would like to proceed with tracheostomy and peg tube placement if clinically appropriate. I spent approximately thirty minutes on the phone with him. He expressed gratitude and all questions were answered. He has custody of their grandchildren and states he has had difficulty getting into the hospital as a result, but states he is available by phone and appreciates the daily updates. We will continue to update him by phone.     Elodia Carnes MD

## 2022-12-21 NOTE — CARE COORDINATION
Transitional planning. Intubated/ Sedated FiO2 45%, PEEP of 8, not following commands. Palliative care working with pt's family to arrange meeting with Critical Care team concerning goals of care for pt.

## 2022-12-21 NOTE — PROGRESS NOTES
Patient Name: Subhash Ruano  MRN: [id number]  YOB: 1960  Study: ICECAP: Influence of Cooling duration on Efficacy in Cardiac Arrest Patients  Reason for evaluation: Inpatient Research patient Evaluation      Daily Note    The patient was evaluated for a multicenter, randomized, adaptive allocation clinical trial to identify the optimal duration of induced hypothermia for neuroprotection in comatose survivors of cardiac arrest.      ARIAN #: [id number]  Clinicaltrials.gov: NCT [id number]    Comments: Events reviewed   Efforts made to contact legal representative. Unsuccessful. Continuing to monitor patient progress        Nereyda De Paz MD  Clinical Research Services  [address] [place] [address] Kd Garcia  P: [phone]  F: [phone]    For questions call the research office at [phone] or [facility].

## 2022-12-21 NOTE — PROGRESS NOTES
PALLIATIVE CARE NURSING ASSESSMENT    Patient: Tariq Davies  Room: 3025/3025-01    Reason For Consult   Goals of care evaluation  Distress management  Guidance and support  Facilitate communications  Assistance in coordinating care    Code Status: Full Code. Summary:  Palliative Care visit to bedside, pt not responsive. Palliative care phone calls placed to  Almaz Lopez and granddaughter Yanira. Requested family meeting with critical care doctors/palliative care today at 2:00pm via in person, phone, or zoom. Almaz Lopez can not do 2:00pm meeting. He is trying to get here to [facility]. 's today or tomorrow. if he is unable to come up tonight he would like an update call at 5:00pm.  Dr. Ed Walsh and Dr. Mary ortiz notified. Dr. Ed Walsh relates that they will call Almaz Lopez with update after afternoon rounds. Impression: Tariq Davies is a 60y.o. year old female  has a past medical history of CAD (coronary artery disease), Cardiomyopathy ([address] COPD (chronic obstructive pulmonary disease) ([address] Depression, Diabetes mellitus ([address] H/O mitral valve repair, History of fractured kneecap, Hx of CABG, Hx of myocardial infarction, Hyperlipidemia, Hypertension, ICD (implantable cardioverter-defibrillator) in place, Kidney calculi, Osteoarthritis, Patient in clinical research study, Pneumonia, and Renal calculi. .  Currently hospitalized for the management of cardiac arrest.  The Palliative Care Team is following to assist with patient and family support, goals of care. Vital Signs  Blood pressure 115/75, pulse 76, temperature 100 °F (37.8 °C), temperature source Bladder, resp. rate 18, height 5' 6\" (1.676 m), weight 159 lb 13.3 oz (72.5 kg), SpO2 98 %.     Patient Active Problem List   Diagnosis    Acute coronary syndrome (HCC)    S/P cardiac cath-Patent grafts 6/5/15    Onychomycosis of toenail    CAD (coronary artery disease)    Hx of CABG    ICD (implantable cardioverter-defibrillator) in place    H/O mitral valve repair    Hx of myocardial infarction    Hypertension    DM (diabetes mellitus), type 2 with neurological complications (HCC)    Anxiety    CHAD (acute kidney injury) ([address]    Metabolic acidosis    Shock (HCC)    Elevated serum immunoglobulin free light chains    Ureteral stone with hydronephrosis    Acute pulmonary edema (HCC)    Dyslipidemia    Pneumonia    Acute systolic CHF (congestive heart failure) (HCC)    Congestive heart failure due to cardiomyopathy (HCC)    CHF (congestive heart failure), NYHA class I, acute on chronic, combined (HCC)    Acute on chronic systolic congestive heart failure (HCC)    Stage 3b chronic kidney disease (HCC)    Ischemic cardiomyopathy    Acute respiratory failure with hypoxia and hypercapnia (HCC)    Cardiac arrest ([address]    Pulmonary hypertension ([address]    Diabetes mellitus with kidney disease ([address]    NSTEMI (non-ST elevated myocardial infarction) [facility])    Ventricular tachycardia    Palliative care encounter       Palliative Interaction: Following with patient for continuation of care. Reviewed course of care, updates were received from bedside nurse Jami. Pt remains intubated on vent.  Note relates poor prognosis. Off pressors, on diprivan low dose. Pt appears comfortable at rest in bed. Oral gastric tube with tube feedings. Pt is not responsive, she withdraws to pain. Ct head no acute process. LTME done. Notes indicate cortical dysfunction and severe encephalopathy. Visited with patient. No visitors, patient is not responsive. Discussed with nurse and plan is to try to set up a family meeting with critical care to discuss goals of care. Will determine if family is interested in continued aggressive treatment (trach/peg/placement) or  if they would want comfort care and withdrawal of extraordinary measures. Spoke with Dr. Woods Chouteau and he relates that meeting around 2:00pm should work.   Dr. Marcy Millan would be available then for this meeting. Called patient's  unable to reach per home phone, unable to reach per cell phone. Cell phone voicemail was full no way to leave a message. Called and spoke with Yanira patient's granddaughter. Explained who I am and why I am calling. Let her know that we would like to have a meeting for the doctors to talk to Mónica Bryant to discuss her care so far and options for future care. I let Yanira know that she and anyone else that her grandfather would want to be there can be a part of meeting. If they are able to come to [facility] we will plan to meet in person. If unable to get here we can offer phone call or zoom meeting. I let her know I was unable to get a hold of her grandfather and wondered if she could help us. She will get message to him requesting 2:00pm meeting today in person, on phone or on zoom. Yanira called back and let me know that her grandfather is trying to get to [facility]. [facility] today or tomorrow. He wants us to continue \"aggressive care\" at this time. If he is unable to come tonight he would like an update at 5:00pm.  I let Yanira know that I will update the critical care team of his request.  If he finds out he is able to come up I asked that he or Yanira let us know as soon as possible so that we can try to have the doctors available to talk to him. Message sent to Dr. Marybeth Pardo and Dr. Lidna Josue that Mónica Bryant can not make it in by 2:00pm today. He does not want phone meeting at 2:00pm.  He is trying to get here to hospital evening or tomorrow. He would like an update at 5:00pm.  Asked Dr. Linda Josue if she would be able to call Mónica Bryant with update. Dr. Linda Josue relates that they will call Mónica Bryant with update after rounds this afternoon. Palliative Care will continue to follow.             Goals/Plan of care  Education/support to staff  Education/support to family  Education/support to patient  Communications with primary service  Providing support for coping/adaptation/distress of family  Continue with current plan of care  Validating patient/family distress  Continued communication updates  Dr. Douglass Gilford and Dr. Gamboa Batter notified of  not wanting phone meeting at 2:00pm.  He would like update at 5:00pm if he does not make it up to [address]. V's. Trying to make it here this evening or tomorrow.         Palliative Care Coordinator  Tyna Boeck BSN, RN, ONN-CG    [facility] Office: [address] [facility] Office: [phone]  [facility] Office: [phone]    For [facility] scheduling please call [phone]

## 2022-12-21 NOTE — PLAN OF CARE
Problem: Respiratory - Adult  Goal: Achieves optimal ventilation and oxygenation  12/21/2022 0514 by Onur Lundberg RCP  Outcome: Progressing  Flowsheets (Taken 12/21/2022 0514)  Achieves optimal ventilation and oxygenation:   Assess for changes in respiratory status   Respiratory therapy support as indicated   Assess the need for suctioning and aspirate as needed   Assess for changes in mentation and behavior   Oxygen supplementation based on oxygen saturation or arterial blood gases

## 2022-12-21 NOTE — PROGRESS NOTES
Renal Progress Note    Patient :  Valentina Vargas; 62 y.o. MRN# [id number]  Location:  3025/3025-01  Attending:  Reynaldo Hansen MD  Admit Date:  12/14/2022   Hospital Day: 7      Subjective:     Following for CHAD/ATN in the setting of cardiac arrest, on CKD with baseline 1.4-1.6.  Transferred from [facility] where she presented when found down by her  and subsequently noted to be hypoglycemic cardiac arrest.  PEA arrest.  Down time unknown. Initiated therapeutic hypothermia protocol further investigations revealed elevated creatinine.     Patient was seen and examined.  No new issues reported overnight.  Palliative was consulted today, planning meeting with  but he is having difficulty getting to hospital due to watching grandchildren, per nursing.   Patient is posturing. Sedation is given for vent compliance.   She is s/p hypothermia protocol.    Creatinine is 1.85, potassium 3.7.  Blood pressure 110s-130s systolic.  Has midodrine 10mg TID  Remains intubated with FIO2 up to 40%/PEEP 5.    Urine output 1.7L last 24 hours.  Net -6.1 liters, lasix was discontinued.  Tube feed infusing.   ECHO with EF 20%.  She has ICD in place.     Outpatient Medications:     Medications Prior to Admission: furosemide (LASIX) 40 MG tablet, Take 1 tablet by mouth in the morning and 1 tablet in the evening.  clopidogrel (PLAVIX) 75 MG tablet, Take 1 tab daily  atorvastatin (LIPITOR) 80 MG tablet, 1 tab daily  carvedilol (COREG) 3.125 MG tablet, take 1 tablet by mouth twice a day with meals  gabapentin (NEURONTIN) 400 MG capsule, Take 1 capsule by mouth 3 times daily for 180 days.  midodrine (PROAMATINE) 5 MG tablet, take 1 tablet by mouth three times a day  sertraline (ZOLOFT) 100 MG tablet, Take one tablet by mouth once daily  insulin glargine (LANTUS SOLOSTAR) 100 UNIT/ML injection pen, INJECT 45 UNITS SUBCUTANEOUSLY EVERY MORNING AND 25 UNITS EVERY EVENING  Insulin Pen Needle (BD PEN NEEDLE PAIGE 2ND GEN) 32G X 4 MM  MISC, use 1 PEN NEEDLE to inject MEDICATION subcutaneously daily  Lancets MISC, Pt testing BS once daily and prn  glucose monitoring (FREESTYLE) kit, Please dispense meter covered by patients insurance. blood glucose monitor strips, Testing BS once daily and as needed.  Dispense strips to go with new glucometer  albuterol (PROVENTIL) (2.5 MG/3ML) 0.083% nebulizer solution, Take 3 mLs by nebulization every 6 hours as needed for Wheezing (as needed for wheezing)  Respiratory Therapy Supplies (NEBULIZER/TUBING/MOUTHPIECE) KIT, 1 kit by Does not apply route daily as needed (for SOB, cough)  Respiratory Therapy Supplies (NEBULIZER COMPRESSOR) KIT, 1 kit by Does not apply route once for 1 dose  lactobacillus (CULTURELLE) capsule, Take 1 capsule by mouth 2 times daily (with meals)  blood glucose monitor strips, Use to test daily  blood glucose monitor kit and supplies, Use to test daily  Cholecalciferol (VITAMIN D3) 5000 units TABS, Take 5,000 Units by mouth daily  glucose blood VI test strips (EXACTECH TEST) strip, 1 each by In Vitro route daily Pt testing BS once daily and prn  Multiple Vitamins-Minerals (THERAPEUTIC MULTIVITAMIN-MINERALS) tablet, Take 1 tablet by mouth daily (with breakfast)    Current Medications:     Scheduled Meds:    insulin lispro  0-16 Units SubCUTAneous Q4H    insulin lispro  0-4 Units SubCUTAneous Nightly    midodrine  10 mg Oral TID WC    aspirin  81 mg Oral Daily    heparin (porcine)  5,000 Units SubCUTAneous 3 times per day    famotidine (PEPCID) injection  20 mg IntraVENous Daily    sodium chloride flush  5-40 mL IntraVENous 2 times per day    chlorhexidine  15 mL Mouth/Throat BID    piperacillin-tazobactam  3,375 mg IntraVENous Q8H    atorvastatin  80 mg Oral Nightly    [Held by provider] carvedilol  3.125 mg Oral BID WC    clopidogrel  75 mg Oral Daily     Continuous Infusions:    propofol 10 mcg/kg/min (12/21/22 0803)    sodium chloride Stopped (12/16/22 1629)    dextrose       PRN Meds:  sodium chloride flush, sodium chloride, ondansetron **OR** ondansetron, polyethylene glycol, acetaminophen **OR** acetaminophen, glucose, dextrose bolus **OR** dextrose bolus, glucagon (rDNA), dextrose, albuterol, acetylcysteine    Input/Output:       I/O last 3 completed shifts: In: 2006.9 [I.V.:169; NG/GT:1589; IV Piggyback:248.9]  Out: 2925 [Urine:2925]. Patient Vitals for the past 96 hrs (Last 3 readings):   Weight   22 0539 159 lb 13.3 oz (72.5 kg)   22 0600 160 lb 11.5 oz (72.9 kg)   22 0435 175 lb 14.8 oz (79.8 kg)         Vital Signs:   Temperature:  Temp: 100 °F (37.8 °C)  TMax:   Temp (24hrs), Av.5 °F (37.5 °C), Min:99.1 °F (37.3 °C), Max:100 °F (37.8 °C)    Respirations:  Resp: 18  Pulse:   Heart Rate: 76  BP:    BP: 115/75  BP Range: Systolic (85BWZ), XBJ:279 , Min:115 , QGC:233       Diastolic (65KSV), QYF:01, Min:59, Max:75      Physical Examination:     General:  Intubated and sedated, nonpurposeful movements  Neck:   No JVD, no thyromegaly, no lymphadenopathy. Chest:              Bilateral vesicular breath sounds, no rales or wheezes. Cardiac:  S1 S2 RR, no murmurs, gallops or rubs, JVP not raised. Abdomen: Soft, non-tender, no masses or organomegaly, BS audible. Neuro: On mechanical ventilation, sedated. Does not follow commands. :   No suprapubic or flank tenderness. SKIN:  No rashes, good skin turgor. Extremities:  No edema.     Labs:       Recent Labs     22  0507 22  0455 22  0459   WBC 7.9 7.2 8.1   RBC 3.18* 3.31* 3.40*   HGB 8.0* 8.5* 8.5*   HCT 25.9* 27.7* 28.5*   MCV 81.4* 83.7 83.8   MCH 25.2 25.7 25.0*   MCHC 30.9 30.7 29.8   RDW 18.1* 18.3* 18.5*    158 156   MPV 11.4 10.6 11.9        BMP:   Recent Labs     22  0507 22  0455 22  0459    142 143   K 3.7 3.7 3.7    106 108*   CO2 21 24 24   BUN 56* 49* 39*   CREATININE 2.57* 2.19* 1.85*   GLUCOSE 238* 241* 228*   CALCIUM 8.3* 8.8 8.9 ALEJANDRO:      Lab Results   Component Value Date/Time    ALEJANDRO  08/26/2018 10:16 AM     This test is equivocal, which represents a borderline ALEJANDRO result. Suggest     SPEP:  Lab Results   Component Value Date/Time    PROT 6.6 12/14/2022 11:49 AM    ALBCAL 4.1 08/23/2018 04:33 AM    ALBPCT 62 08/23/2018 04:33 AM    LABALPH 0.2 08/23/2018 04:33 AM    LABALPH 0.7 08/23/2018 04:33 AM    A1PCT 3 08/23/2018 04:33 AM    A2PCT 10 08/23/2018 04:33 AM    LABBETA 0.9 08/23/2018 04:33 AM    BETAPCT 13 08/23/2018 04:33 AM    GAMGLOB 0.8 08/23/2018 04:33 AM    GGPCT 12 08/23/2018 04:33 AM    PATH ELECTRONICALLY SIGNED.  Angelica Mccann M.D. 08/27/2018 03:15 PM     C3:     Lab Results   Component Value Date/Time    C3 111 08/27/2018 03:15 PM     C4:     Lab Results   Component Value Date/Time    C4 35 08/27/2018 03:15 PM     MPO ANCA:     Lab Results   Component Value Date/Time    MPO 11 08/26/2018 10:16 AM     PR3 ANCA:     Lab Results   Component Value Date/Time    PR3 15 08/26/2018 10:16 AM     Anti-GBM:     Lab Results   Component Value Date/Time    GBMABIGG 12 08/26/2018 10:16 AM     Hep BsAg:         Lab Results   Component Value Date/Time    HEPBSAG NONREACTIVE 08/24/2018 12:24 PM       Urinalysis/Chemistries:      Lab Results   Component Value Date/Time    NITRU NEGATIVE 12/14/2022 12:26 PM    COLORU Yellow 12/14/2022 12:26 PM    PHUR 6.0 12/14/2022 12:26 PM    WBCUA 50  12/14/2022 12:26 PM    RBCUA 0 TO 2 12/14/2022 12:26 PM    MUCUS NOT REPORTED 11/21/2018 12:12 PM    TRICHOMONAS NOT REPORTED 11/21/2018 12:12 PM    YEAST FEW 12/14/2022 12:26 PM    BACTERIA RARE 12/14/2022 09:30 AM    SPECGRAV 1.018 12/14/2022 12:26 PM    LEUKOCYTESUR NEGATIVE 12/14/2022 12:26 PM    UROBILINOGEN Normal 12/14/2022 12:26 PM    BILIRUBINUR NEGATIVE 12/14/2022 12:26 PM    GLUCOSEU 1+ 12/14/2022 12:26 PM    KETUA NEGATIVE 12/14/2022 12:26 PM    AMORPHOUS TRACE 11/21/2018 12:12 PM     Urine Sodium:     Lab Results   Component Value Date/Time    ROULA 20 12/15/2022 11:49 AM      Urine Creatinine:     Lab Results   Component Value Date/Time    LABCREA 71.1 12/15/2022 11:49 AM     Radiology:     CXR: 12/16/22  Overall relatively stable cardiopulmonary status. Cardiomegaly with congestive changes and pulmonary edema. Nasogastric tube tip is in the stomach. Assessment:     #1 acute kidney injury nonoliguric secondary to ischemic (cardiac arrest/shock) and nephrotoxic (contrast) ATN -evolving  #2 high nongap metabolic acidosis secondary to CHAD/hypoperfusion  #3 chronic kidney disease stage IIIb secondary to diabetic/ischemic nephrosclerosis with baseline creatinine 1.4-1.6. Serologies unremarkable. Asymmetric kidney size. Minimal proteinuria. #4 s/p cardiac arrest-multiple times status post therapeutic hypothermia protocol  #5 cardiogenic shock on pressors  #6 decompensated systolic congestive heart failure  #7 acute hypoxic respiratory failure  #8 history of CABG/MVR with EF 25%  #9 pulmonary hypertension/severe tricuspid regurgitation  #10 type 2 diabetes    Plan:   1. Continue Midodrine to 10mg TID  2. Monitor strict I's and O's and renal function. 3. Poor prognosis  4. BMP in AM.  5. Will follow. Nutrition   Please ensure that patient is on a renal diet/TF. Avoid nephrotoxic drugs/contrast exposure. MICKEY Jeff CNP  12/21/2022 10:12 AM  [facility]. Attending Physician Statement  I have discussed the care of this patient, including pertinent history and exam findings, with the Resident/CNP. I have reviewed and edited the key elements of all parts of the encounter with the Resident/CNP. I agree with the assessment, plan and orders as documented by the Resident/CNP. Jacek Lara MD   Nephrology [address] [place] [address] Drive    This note is created with the assistance of a speech-recognition program. While intending to generate a document that actually reflects the content of the visit, no guarantees can be provided that every mistake has been identified and corrected by editing.

## 2022-12-22 NOTE — PROGRESS NOTES
INTENSIVE CARE UNIT  Resident Physician Progress Note    Patient - Yvonne Meehan  Date of Admission -  12/14/2022 10:09 AM  Date of Evaluation -  12/22/2022  Room and Bed Number -  3025/3025-01   Hospital Day - 8    HPI:     Yvonne Meehan is a 60 y.o. with PMH of   - CAD, s/p CABG in 2015  - Ischemic cardiomyopathy, last EF of 25%  - Insulin-dependent diabetes mellitus type 2  - CKD     Presented to [facility] ER as a transfer from [facility] ER. According to the chart review patient was found unresponsive by the  as per EMS. Unknown downtime. Per EMS report no CPR performed. EMS intubated the patient esophageally, prior to arrival at [facility], reintubation was done and then patient's glucose was found to be 18. At [facility] found to be in PEA arrest achieved ROSC needed 6 mg epinephrine, received multiple pushes of Versed and fentanyl for posturing and seizure-like activity. [facility] patient was unresponsive. Fixed and dilated pupils, posturing and on propofol infusion. EKG in the ED showed new right bundle branch block then due to concern for PE, CT PE was done. Bedside ultrasound did not show any signs of tamponade. CT negative for any PE. Around 10:25 AM there was an episode of pulselessness and bradycardia and PEA was given at epinephrine and troponin. Cooling was started around 11:28 AM with target temperature of 38 Celsius     Initial vitals in the ED temperature around 35, heart rate 106, respiratory 22, pressure 124/108     CT head negative for any acute intracranial abnormality  CT chest showed pleural effusions and cardiomegaly      12/17: CT head showing no acute processes, chronic small vessel ischemic white matter disease. LTME showing diffuse cortical dysfunction, severe encephalopathy. 12/18: Levo discontinued. Remains on propofol. 12/19: Tolerated CPAP trial. Per neuro ICU prognostication, brainstem reflexes intact. Poor prognosis.  Discussion with family about goals, T&P vs withdrawal of care. Palliative following. : Plan for bedside discussion of goals of care and code status with family tomorrow,  with palliative and ICU team.  :  not able to attend bedside discussion, I spoke with him via phone. Code status remains full code.  would like to discuss trach and peg. SUBJECTIVE:     OVERNIGHT EVENTS:     not able to attend bedside discussion, I spoke with him via phone. Code status remains full code.  would like to discuss trach and peg.     AWAKE & FOLLOWING COMMANDS:  [x] No   [] Yes    SECRETIONS Amount:  [x] Small [] Moderate  [] Large [] None    Color:     [x] White [] Colored  [] Bloody    SEDATION:  RAAS Score:  [x] Propofol gtt  [] Versed gtt  [] Ativan gtt   [] No Sedation    PARALYZED:  [x] No    [] Yes    VASOPRESSORS:  [x] No    [] Yes  [] Levophed [] Dopamine [] Vasopressin  [] Dobutamine [] Phenylephrine [] Epinephrine      OBJECTIVE:     VITAL SIGNS:  /71   Pulse 92   Temp 100.4 °F (38 °C) (Bladder)   Resp 21   Ht 5' 6\" (1.676 m)   Wt 158 lb 15.2 oz (72.1 kg)   SpO2 99%   BMI 25.66 kg/m²   Tmax over 24 hours:  Temp (24hrs), Av °F (37.8 °C), Min:99.5 °F (37.5 °C), Max:100.6 °F (38.1 °C)      Patient Vitals for the past 8 hrs:   BP Temp Temp src Pulse Resp SpO2 Weight   22 0600 -- 100.4 °F (38 °C) Bladder -- -- -- --   22 0511 -- -- -- -- -- -- 158 lb 15.2 oz (72.1 kg)   22 0441 -- -- -- 92 21 99 % --   22 0400 125/71 99.7 °F (37.6 °C) Bladder 91 20 99 % --   22 0344 -- -- -- 90 22 99 % --         Intake/Output Summary (Last 24 hours) at 2022 0812  Last data filed at 2022 0745  Gross per 24 hour   Intake 1128.02 ml   Output 1085 ml   Net 43.02 ml     Date 22 0000 - 22 2359   Shift 4186-4903 3976-5875 9131-0368 24 Hour Total   INTAKE   I.V.(mL/kg) 174(2.4)   174(2.4)   NG/GT(mL/kg) 303(4.2)   303(4.2)   Shift Total(mL/kg) 477(6.6) 477(6.6)   OUTPUT   Urine(mL/kg/hr) 415(0.7)   415   Shift Total(mL/kg) 415(5.8)   415(5.8)   Weight (kg) 72.1 72.1 72.1 72.1     Wt Readings from Last 3 Encounters:   12/22/22 158 lb 15.2 oz (72.1 kg)   10/24/22 165 lb 9.6 oz (75.1 kg)   10/24/22 165 lb (74.8 kg)     Body mass index is 25.66 kg/m².         PHYSICAL EXAM:  GEN:                  Intubated, sedated, not following commands  EYES:                Not tracking, pupils equal, round, and reactive to light  HEENT:            Normocepalic, without obvious abnormality  LUNGS:            clear to auscultation  CV:                     regular rate and rhythm and normal S1 and S2  ABDOMEN:     soft and non-distended  MSK:                  there is no redness, warmth, or swelling of the joints  NEURO[de-identified]           Intubated, sedated, not following commands  SKIN:                 Normal skin color, texture, turgor  EXTREMITIES:  distal pulses intact       MEDICATIONS:  Scheduled Meds:   insulin lispro  0-16 Units SubCUTAneous Q6H    midodrine  10 mg Oral TID WC    aspirin  81 mg Oral Daily    heparin (porcine)  5,000 Units SubCUTAneous 3 times per day    famotidine (PEPCID) injection  20 mg IntraVENous Daily    sodium chloride flush  5-40 mL IntraVENous 2 times per day    chlorhexidine  15 mL Mouth/Throat BID    atorvastatin  80 mg Oral Nightly    [Held by provider] carvedilol  3.125 mg Oral BID WC    clopidogrel  75 mg Oral Daily     Continuous Infusions:   propofol 10 mcg/kg/min (12/22/22 0653)    sodium chloride 10 mL/hr at 12/22/22 0655    dextrose       PRN Meds:   sodium chloride flush, 5-40 mL, PRN  sodium chloride, , PRN  ondansetron, 4 mg, Q8H PRN   Or  ondansetron, 4 mg, Q6H PRN  polyethylene glycol, 17 g, Daily PRN  acetaminophen, 650 mg, Q6H PRN   Or  acetaminophen, 650 mg, Q6H PRN  glucose, 4 tablet, PRN  dextrose bolus, 125 mL, PRN   Or  dextrose bolus, 250 mL, PRN  glucagon (rDNA), 1 mg, PRN  dextrose, , Continuous PRN  albuterol, 2.5 mg, As Directed RT PRN  acetylcysteine, 600 mg, As Directed RT PRN        SUPPORT DEVICES: [x] Ventilator [] BIPAP  [] Nasal Cannula [] Room Air    VENT SETTINGS (Comprehensive) (if applicable):   PRVC mode, FiO2 40%, PEEP 5, Respiratory Rate 18, Tidal Volume 500  Vent Information  Ventilator ID: [id number]  Equipment Changed: HME  Ventilator Initiate: Yes  Additional Respiratory Assessments  Heart Rate: 92  Resp: 21  SpO2: 99 %  End Tidal CO2: 30 (%)  Position: Semi-Simon's  Humidification Source: HME  Cuff Pressure (cm H2O):  (mlt)  Skin Barrier Applied: No    ABGs:   Arterial Blood Gas result:  pH 7.42; pCO2 40; pO2 74; HCO3 27.  Lab Results   Component Value Date/Time    ORI4DVS 17 08/24/2018 05:02 AM    FIO2 40.0 12/22/2022 04:33 AM         DATA:  Complete Blood Count:   Recent Labs     12/20/22 0455 12/21/22 0459 12/22/22  0548   WBC 7.2 8.1 9.1   RBC 3.31* 3.40* 3.48*   HGB 8.5* 8.5* 8.7*   HCT 27.7* 28.5* 29.3*   MCV 83.7 83.8 84.2   MCH 25.7 25.0* 25.0*   MCHC 30.7 29.8 29.7   RDW 18.3* 18.5* 18.6*    156 162   MPV 10.6 11.9 12.0        Last 3 Blood Glucose:   Recent Labs     12/20/22  0455 12/21/22  0459 12/22/22  0548   GLUCOSE 241* 228* 266*        PT/INR:    Lab Results   Component Value Date/Time    PROTIME 13.7 12/14/2022 01:38 PM    INR 1.3 12/14/2022 01:38 PM     PTT:    Lab Results   Component Value Date/Time    APTT 26.0 12/14/2022 01:38 PM       Comprehensive Metabolic Profile:   Recent Labs     12/20/22  0455 12/21/22  0459 12/22/22  0548    143 148*   K 3.7 3.7 4.0    108* 111*   CO2 24 24 25   BUN 49* 39* 36*   CREATININE 2.19* 1.85* 1.70*   GLUCOSE 241* 228* 266*   CALCIUM 8.8 8.9 8.9      Magnesium:   Lab Results   Component Value Date/Time    MG 2.1 12/18/2022 04:08 AM    MG 2.2 12/17/2022 06:09 AM    MG 2.1 12/17/2022 12:03 AM     Phosphorus:   Lab Results   Component Value Date/Time    PHOS 4.1 12/18/2022 04:08 AM    PHOS 5.6 12/17/2022 06:09 AM    PHOS 6.5 12/17/2022 12:03 AM     Ionized Calcium:   Lab Results   Component Value Date/Time    CAION 1.19 12/22/2022 05:48 AM    CAION 1.18 12/21/2022 04:59 AM    CAION 1.17 12/20/2022 04:55 AM        Urinalysis:   Lab Results   Component Value Date/Time    NITRU NEGATIVE 12/14/2022 12:26 PM    COLORU Yellow 12/14/2022 12:26 PM    PHUR 6.0 12/14/2022 12:26 PM    WBCUA 50  12/14/2022 12:26 PM    RBCUA 0 TO 2 12/14/2022 12:26 PM    MUCUS NOT REPORTED 11/21/2018 12:12 PM    TRICHOMONAS NOT REPORTED 11/21/2018 12:12 PM    YEAST FEW 12/14/2022 12:26 PM    BACTERIA RARE 12/14/2022 09:30 AM    SPECGRAV 1.018 12/14/2022 12:26 PM    LEUKOCYTESUR NEGATIVE 12/14/2022 12:26 PM    UROBILINOGEN Normal 12/14/2022 12:26 PM    BILIRUBINUR NEGATIVE 12/14/2022 12:26 PM    GLUCOSEU 1+ 12/14/2022 12:26 PM    KETUA NEGATIVE 12/14/2022 12:26 PM    AMORPHOUS TRACE 11/21/2018 12:12 PM       HgBA1c:    Lab Results   Component Value Date/Time    LABA1C 8.5 12/15/2022 05:56 AM     TSH:    Lab Results   Component Value Date/Time    TSH 1.66 10/14/2022 10:23 AM     Lactic Acid:   Lab Results   Component Value Date/Time    LACTA 1.4 10/13/2022 12:22 PM    LACTA 1.7 12/29/2018 11:57 AM    LACTA 1.5 12/29/2018 05:30 AM      Troponin: No results for input(s): TROPONINI in the last 72 hours.       ASSESSMENT:     Patient Active Problem List    Diagnosis Date Noted    Palliative care encounter 12/16/2022    NSTEMI (non-ST elevated myocardial infarction) ([facility] 12/15/2022    Ventricular tachycardia 12/15/2022    Cardiac arrest ([facility] 12/14/2022    Pulmonary hypertension ([facility] 12/14/2022    Diabetes mellitus with kidney disease ([facility] 12/14/2022    Stage 3b chronic kidney disease ([facility] 10/17/2022    Ischemic cardiomyopathy 10/17/2022    Acute respiratory failure with hypoxia and hypercapnia ([facility] 10/17/2022    CHF (congestive heart failure), NYHA class I, acute on chronic, combined ([facility] 10/14/2022    Acute on chronic systolic congestive heart failure ([facility] 10/14/2022    Congestive heart failure due to cardiomyopathy ([facility] 11/15/4587    Acute systolic CHF (congestive heart failure) ([facility] 10/12/2022    Pneumonia 12/28/2018    Dyslipidemia     Ureteral stone with hydronephrosis 11/04/2018    Acute pulmonary edema ([facility] 11/04/2018    Elevated serum immunoglobulin free light chains     CHAD (acute kidney injury) ([facility] 50/35/7873    Metabolic acidosis 55/84/9924    Shock ([facility] 08/23/2018    DM (diabetes mellitus), type 2 with neurological complications ([facility] 17/48/7679    Anxiety 01/26/2016    CAD (coronary artery disease)     Hx of CABG     ICD (implantable cardioverter-defibrillator) in place     H/O mitral valve repair     Hx of myocardial infarction     Hypertension     Onychomycosis of toenail 11/19/2015    S/P cardiac cath-Patent grafts 6/5/15 06/04/2015    Acute coronary syndrome ([facility] 05/26/2015          PLAN:     Neuro  Intubated, sedated  Propofol gtt @ 10  Neuro ICU evaluated for neuro prognostication, brainstem reflex intact, poor prognosis, neuro has signed off   No MRI due to AICD incompatibility  LTME - diffuse cortical dysfunction, severe encephalopathy  CT head 12/17 - no acute processes, chronic small vessel ischemic deep white matter disease   unable to attending meeting 12/21, I spoke with him via phone  Full code   would like trach and peg     Cardiology  Off pressors  CAD s/p AICD, PEA arrest  ASA, Lipitor, Plavix  Midodrine 10 TID  Cardiology consulted, will decide about cath after any meaningful neurologic recovery  ECHO 12/16/22 - EF 15-20%, global hypokinesis, MV  prosthesis     Pulmonary  PRVC 18/500/5/40%  ABG 12/22 7.42/40/74/27  Procal pending  Albuterol nebulizer per RT protocol  Mucomyst per RT protocol     Renal  CHAD on CKD stage IIIb with baseline Cr 1.4-1.6  Cr 1.7, improving  Nephrology following  I/O: 1235  Garcia in place since 12/14, will remove and replace     GI  TF @ 45 ml/hr at goal  Pepcid 20     ID  WBC 9.1  S/p Zosyn, ended 12/21  Removed R femoral central line 12/20     Endo  HDISS  Glucose 228       F.A.S.T. M. [facility]U[facility]S. B.I.D. Feeding Diet: Diet NPO  ADULT TUBE FEEDING; Orogastric; Renal Formula; Continuous; 10; Yes; 10; Q 4 hours; 45; 30; Q 4 hours  Fluids: Propofol gtt  Family: Updated,  would like full code, trach and peg  Analgesic: Tylenol  Sedation: Propofol   Thrombo-prophylaxis: [] Enoxaparin, [x] Unfract. Heparin Subcutaneously, [] EPC Cuffs  Mobility: PT  Heads up: Hob  Ulcer prophylaxis: [] PPI Agent, [x] F3Xavel, [] Sucralfate, [] Other:  Glycemic control: HDISS  Spontaneous breathing trial: Not tolerating   Bowel regimen/urine output: gycolax prn / U out 1235  Indwelling catheter/lines: Garcia since 12/14, remove and replace, peripheral IVs  De-escalation: Family would like full code, discuss trach and peg      Anny Byers M.D.   Emergency Medicine Resident, PGY-2  12/22/2022 8:12 AM

## 2022-12-22 NOTE — PLAN OF CARE
Problem: Respiratory - Adult  Goal: Achieves optimal ventilation and oxygenation  Outcome: Progressing  Flowsheets (Taken 12/22/2022 4852)  Achieves optimal ventilation and oxygenation:   Assess for changes in respiratory status   Assess the need for suctioning and aspirate as needed   Assess for changes in mentation and behavior   Respiratory therapy support as indicated

## 2022-12-22 NOTE — PLAN OF CARE
Problem: Chronic Conditions and Co-morbidities  Goal: Patient's chronic conditions and co-morbidity symptoms are monitored and maintained or improved  Outcome: Progressing  Flowsheets (Taken 12/22/2022 0800 by Soraida Lewis RN)  Care Plan - Patient's Chronic Conditions and Co-Morbidity Symptoms are Monitored and Maintained or Improved: Monitor and assess patient's chronic conditions and comorbid symptoms for stability, deterioration, or improvement     Problem: Discharge Planning  Goal: Discharge to home or other facility with appropriate resources  Outcome: Progressing  Flowsheets (Taken 12/22/2022 0800 by Soraida Lewis RN)  Discharge to home or other facility with appropriate resources: Identify barriers to discharge with patient and caregiver     Problem: Pain  Goal: Verbalizes/displays adequate comfort level or baseline comfort level  Outcome: Progressing     Problem: Respiratory - Adult  Goal: Achieves optimal ventilation and oxygenation  12/22/2022 1421 by Eleanor Lezama RN  Outcome: Progressing  12/22/2022 0953 by Memo Montelongo RCP  Outcome: Progressing  Flowsheets (Taken 12/22/2022 0953)  Achieves optimal ventilation and oxygenation:   Assess for changes in respiratory status   Assess the need for suctioning and aspirate as needed   Assess for changes in mentation and behavior   Respiratory therapy support as indicated     Problem: Skin/Tissue Integrity  Goal: Absence of new skin breakdown  Description: 1. Monitor for areas of redness and/or skin breakdown  2. Assess vascular access sites hourly  3. Every 4-6 hours minimum:  Change oxygen saturation probe site  4. Every 4-6 hours:  If on nasal continuous positive airway pressure, respiratory therapy assess nares and determine need for appliance change or resting period.   Outcome: Progressing     Problem: Safety - Adult  Goal: Free from fall injury  Outcome: Progressing     Problem: ABCDS Injury Assessment  Goal: Absence of physical injury  Outcome: Progressing     Problem: Nutrition Deficit:  Goal: Optimize nutritional status  Outcome: Progressing

## 2022-12-22 NOTE — PROGRESS NOTES
Renal Progress Note    Patient :  Sweetie Rosas; 60 y.o. MRN# [id number]  Location:  3025/3025-01  Attending:  Joselo Caal MD  Admit Date:  12/14/2022   Hospital Day: 8      Subjective: Following for CHAD/ATN in the setting of cardiac arrest, on CKD with baseline 1.4-1.6. Transferred from [facility] where she presented when found down by her  and subsequently noted to be hypoglycemic cardiac arrest.  PEA arrest.  Down time unknown. Initiated therapeutic hypothermia protocol further investigations revealed elevated creatinine. Patient was seen and examined. No new issues reported overnight. Patient is on mechanical ventilation, sedated. Does not follow any commands. Palliative was consulted this admission and critical care did speak with patient's  who would like her to remain full code and proceed with tracheostomy and PEG tube placement if required. She is s/p hypothermia protocol. BMP results from today reviewed creatinine did improve to 1.70 mg/dl, BUN 36, sodium 140, potassium 4.0, chloride 111, bicarb 25, calcium 8.9. Urine output documented as about 1.2 L in the last 24 hours. Tube feed infusing. ECHO with EF 20%. She has ICD in place. Outpatient Medications:     Medications Prior to Admission: furosemide (LASIX) 40 MG tablet, Take 1 tablet by mouth in the morning and 1 tablet in the evening. clopidogrel (PLAVIX) 75 MG tablet, Take 1 tab daily  atorvastatin (LIPITOR) 80 MG tablet, 1 tab daily  carvedilol (COREG) 3.125 MG tablet, take 1 tablet by mouth twice a day with meals  gabapentin (NEURONTIN) 400 MG capsule, Take 1 capsule by mouth 3 times daily for 180 days.   midodrine (PROAMATINE) 5 MG tablet, take 1 tablet by mouth three times a day  sertraline (ZOLOFT) 100 MG tablet, Take one tablet by mouth once daily  insulin glargine (LANTUS SOLOSTAR) 100 UNIT/ML injection pen, INJECT 45 UNITS SUBCUTANEOUSLY EVERY MORNING AND 25 UNITS EVERY EVENING  Insulin Pen Needle (BD PEN NEEDLE PAIGE 2ND GEN) 32G X 4 MM MISC, use 1 PEN NEEDLE to inject MEDICATION subcutaneously daily  Lancets MISC, Pt testing BS once daily and prn  glucose monitoring (FREESTYLE) kit, Please dispense meter covered by patients insurance. blood glucose monitor strips, Testing BS once daily and as needed.  Dispense strips to go with new glucometer  albuterol (PROVENTIL) (2.5 MG/3ML) 0.083% nebulizer solution, Take 3 mLs by nebulization every 6 hours as needed for Wheezing (as needed for wheezing)  Respiratory Therapy Supplies (NEBULIZER/TUBING/MOUTHPIECE) KIT, 1 kit by Does not apply route daily as needed (for SOB, cough)  Respiratory Therapy Supplies (NEBULIZER COMPRESSOR) KIT, 1 kit by Does not apply route once for 1 dose  lactobacillus (CULTURELLE) capsule, Take 1 capsule by mouth 2 times daily (with meals)  blood glucose monitor strips, Use to test daily  blood glucose monitor kit and supplies, Use to test daily  Cholecalciferol (VITAMIN D3) 5000 units TABS, Take 5,000 Units by mouth daily  glucose blood VI test strips (EXACTECH TEST) strip, 1 each by In Vitro route daily Pt testing BS once daily and prn  Multiple Vitamins-Minerals (THERAPEUTIC MULTIVITAMIN-MINERALS) tablet, Take 1 tablet by mouth daily (with breakfast)    Current Medications:     Scheduled Meds:    insulin glargine  10 Units SubCUTAneous Nightly    insulin lispro  0-8 Units SubCUTAneous Q6H    midodrine  10 mg Oral TID WC    aspirin  81 mg Oral Daily    heparin (porcine)  5,000 Units SubCUTAneous 3 times per day    famotidine (PEPCID) injection  20 mg IntraVENous Daily    sodium chloride flush  5-40 mL IntraVENous 2 times per day    chlorhexidine  15 mL Mouth/Throat BID    atorvastatin  80 mg Oral Nightly    [Held by provider] carvedilol  3.125 mg Oral BID WC    clopidogrel  75 mg Oral Daily     Continuous Infusions:    propofol Stopped (12/22/22 0741)    sodium chloride 10 mL/hr at 12/22/22 1146    dextrose       PRN Meds:  sodium chloride flush, sodium chloride, ondansetron **OR** ondansetron, polyethylene glycol, acetaminophen **OR** acetaminophen, glucose, dextrose bolus **OR** dextrose bolus, glucagon (rDNA), dextrose, albuterol, acetylcysteine    Input/Output:       I/O last 3 completed shifts: In: 1827.7 [I.V.:285.5; NG/GT:1419; IV Piggyback:123.2]  Out:  [Urine:]. Patient Vitals for the past 96 hrs (Last 3 readings):   Weight   22 0511 158 lb 15.2 oz (72.1 kg)   22 0539 159 lb 13.3 oz (72.5 kg)   22 0600 160 lb 11.5 oz (72.9 kg)       Vital Signs:   Temperature:  Temp: 99.9 °F (37.7 °C)  TMax:   Temp (24hrs), Av °F (37.8 °C), Min:99.5 °F (37.5 °C), Max:100.6 °F (38.1 °C)    Respirations:  Resp: (!) 9  Pulse:   Heart Rate: 95  BP:    BP: 134/81  BP Range: Systolic (90TCH), NGZ:763 , Min:106 , GKS:060       Diastolic (96XTO), ZCT:50, Min:62, Max:81      Physical Examination:     General:  Intubated and sedated, nonpurposeful movements  Neck:   No JVD, no thyromegaly, no lymphadenopathy. Chest:              Bilateral vesicular breath sounds, no rales or wheezes. Cardiac:  S1 S2 RR, no murmurs, gallops or rubs, JVP not raised. Abdomen: Soft, non-tender, no masses or organomegaly, BS audible. Neuro: On mechanical ventilation, sedated. Does not follow commands. :   No suprapubic or flank tenderness. SKIN:  No rashes, good skin turgor. Extremities:  No edema.     Labs:       Recent Labs     22  0455 22  0459 22  0548   WBC 7.2 8.1 9.1   RBC 3.31* 3.40* 3.48*   HGB 8.5* 8.5* 8.7*   HCT 27.7* 28.5* 29.3*   MCV 83.7 83.8 84.2   MCH 25.7 25.0* 25.0*   MCHC 30.7 29.8 29.7   RDW 18.3* 18.5* 18.6*    156 162   MPV 10.6 11.9 12.0      BMP:   Recent Labs     22  0455 22  0459 22  0548    143 148*   K 3.7 3.7 4.0    108* 111*   CO2 24 24 25   BUN 49* 39* 36*   CREATININE 2.19* 1.85* 1.70*   GLUCOSE 241* 228* 266*   CALCIUM 8.8 8.9 8.9     ALEJANDRO:      Lab Results   Component Value Date/Time    ALEJANDRO  08/26/2018 10:16 AM     This test is equivocal, which represents a borderline ALEJANDRO result.  Suggest     SPEP:  Lab Results   Component Value Date/Time    PROT 6.6 12/14/2022 11:49 AM    ALBCAL 4.1 08/23/2018 04:33 AM    ALBPCT 62 08/23/2018 04:33 AM    LABALPH 0.2 08/23/2018 04:33 AM    LABALPH 0.7 08/23/2018 04:33 AM    A1PCT 3 08/23/2018 04:33 AM    A2PCT 10 08/23/2018 04:33 AM    LABBETA 0.9 08/23/2018 04:33 AM    BETAPCT 13 08/23/2018 04:33 AM    GAMGLOB 0.8 08/23/2018 04:33 AM    GGPCT 12 08/23/2018 04:33 AM    PATH ELECTRONICALLY SIGNED. LOGAN KAUR M.D. 08/27/2018 03:15 PM     C3:     Lab Results   Component Value Date/Time    C3 111 08/27/2018 03:15 PM     C4:     Lab Results   Component Value Date/Time    C4 35 08/27/2018 03:15 PM     MPO ANCA:     Lab Results   Component Value Date/Time    MPO 11 08/26/2018 10:16 AM     PR3 ANCA:     Lab Results   Component Value Date/Time    PR3 15 08/26/2018 10:16 AM     Anti-GBM:     Lab Results   Component Value Date/Time    GBMABIGG 12 08/26/2018 10:16 AM     Hep BsAg:         Lab Results   Component Value Date/Time    HEPBSAG NONREACTIVE 08/24/2018 12:24 PM       Urinalysis/Chemistries:      Lab Results   Component Value Date/Time    NITRU NEGATIVE 12/14/2022 12:26 PM    COLORU Yellow 12/14/2022 12:26 PM    PHUR 6.0 12/14/2022 12:26 PM    WBCUA 50  12/14/2022 12:26 PM    RBCUA 0 TO 2 12/14/2022 12:26 PM    MUCUS NOT REPORTED 11/21/2018 12:12 PM    TRICHOMONAS NOT REPORTED 11/21/2018 12:12 PM    YEAST FEW 12/14/2022 12:26 PM    BACTERIA RARE 12/14/2022 09:30 AM    SPECGRAV 1.018 12/14/2022 12:26 PM    LEUKOCYTESUR NEGATIVE 12/14/2022 12:26 PM    UROBILINOGEN Normal 12/14/2022 12:26 PM    BILIRUBINUR NEGATIVE 12/14/2022 12:26 PM    GLUCOSEU 1+ 12/14/2022 12:26 PM    KETUA NEGATIVE 12/14/2022 12:26 PM    AMORPHOUS TRACE 11/21/2018 12:12 PM     Urine Sodium:     Lab Results   Component Value Date/Time    ROULA 20  12/15/2022 11:49 AM      Urine Creatinine:     Lab Results   Component Value Date/Time    LABCREA 71.1 12/15/2022 11:49 AM     Radiology:     CXR: 12/16/22  Overall relatively stable cardiopulmonary status. Cardiomegaly with congestive changes and pulmonary edema. Nasogastric tube tip is in the stomach. Assessment:     #1 acute kidney injury nonoliguric secondary to ischemic (cardiac arrest/shock) and nephrotoxic (contrast) ATN -now started to improve. Creatinine fairly close to baseline. #2 high nongap metabolic acidosis secondary to CHAD/hypoperfusion  #3 chronic kidney disease stage IIIb secondary to diabetic/ischemic nephrosclerosis with baseline creatinine 1.4-1.6. Serologies unremarkable. Asymmetric kidney size. Minimal proteinuria. #4 s/p cardiac arrest-multiple times status post therapeutic hypothermia protocol  #5 cardiogenic shock on pressors  #6 decompensated systolic congestive heart failure  #7 acute hypoxic respiratory failure  #8 history of CABG/MVR with EF 25%  #9 pulmonary hypertension/severe tricuspid regurgitation  #10 hypernatremia. #11 type 2 diabetes    Plan:   1. Continue Midodrine to 10mg TID  2. Monitor strict I's and O's and renal function. 3. Start free water flushes 200 every 4 hours. 4. Poor prognosis  5. Continue monitor electrolytes and BMP in AM.  6.  Since renal function is improving appropriately and urine output okay, nephrology will sign off. Please call with any other questions. Nutrition   Please ensure that patient is on a renal diet/TF. Avoid nephrotoxic drugs/contrast exposure. Nishant K. Joanette Bence, MD  [facility]     This note is created with the assistance of a speech-recognition program. While intending to generate a document that actually reflects the content of the visit, no guarantees can be provided that every mistake has been identified and corrected by editing.

## 2022-12-23 NOTE — PROGRESS NOTES
called , Claritzather Amaris, to provide emotional and spiritual support.  was tearful at times. He said his biggest concern is making sure the grandchildren are cared for. He shared that their mothers are drug addicts and it sounded as if he is afraid to leave them alone for fear that their mothers will come to the home when he is gone.  talked about his understanding of the patient's condition based on what he has been told by doctors. He said he has been praying daily and that he \"hasn't given up\" and that he \"still believes and has hope\". When asked what  could do for him, he began crying and said the only thing he wants is prayers for his wife.  provided a listening ear and words of comfort and affirmation.  assured him of prayer for patient, himself and the children.  expressed that he felt relief to be able to talk. He expressed appreciation for prayers and support. Chaplains will remain available to offer spiritual and emotional support as needed. Electronically signed by Bernardino Jaquez on 12/23/2022 at 10:46 AM.  [facility]  [phone]        12/23/22 1035   Encounter Summary   Service Provided For: Family   Referral/Consult From: [place]   Last Encounter  12/23/22   Complexity of Encounter High   Begin Time 1017   End Time  1040   Total Time Calculated 23 min   Encounter    Type Family Care   Spiritual/Emotional needs   Type Spiritual Support;Emotional Distress   Assessment/Intervention/Outcome   Assessment Coping   Intervention Active listening;Discussed belief system/Shinto practices/genesis; Explored/Affirmed feelings, thoughts, concerns;Prayer (assurance of)/[place]   Outcome Comfort;Engaged in conversation;Expressed feelings, needs, and concerns;Expressed Gratitude;Receptive  (Tearful)

## 2022-12-23 NOTE — PLAN OF CARE
Problem: Chronic Conditions and Co-morbidities  Goal: Patient's chronic conditions and co-morbidity symptoms are monitored and maintained or improved  12/23/2022 0419 by Micaela Lopez RN  Outcome: Progressing  12/22/2022 1421 by Luis Eduardo Mckoy RN  Outcome: Progressing  Flowsheets (Taken 12/22/2022 0800 by Irma Noriega RN)  Care Plan - Patient's Chronic Conditions and Co-Morbidity Symptoms are Monitored and Maintained or Improved: Monitor and assess patient's chronic conditions and comorbid symptoms for stability, deterioration, or improvement     Problem: Discharge Planning  Goal: Discharge to home or other facility with appropriate resources  12/23/2022 0419 by Micaela Lopez RN  Outcome: Progressing  12/22/2022 1421 by Luis Eduardo Mckoy RN  Outcome: Progressing  Flowsheets (Taken 12/22/2022 0800 by Irma Noriega RN)  Discharge to home or other facility with appropriate resources: Identify barriers to discharge with patient and caregiver     Problem: Pain  Goal: Verbalizes/displays adequate comfort level or baseline comfort level  12/23/2022 0419 by Micaela Lopez RN  Outcome: Progressing  12/22/2022 1421 by Luis Eduardo Mckoy RN  Outcome: Progressing     Problem: Respiratory - Adult  Goal: Achieves optimal ventilation and oxygenation  12/23/2022 0419 by Micaela Lopez RN  Outcome: Progressing  12/22/2022 1421 by Luis Eduardo Mckoy RN  Outcome: Progressing     Problem: Skin/Tissue Integrity  Goal: Absence of new skin breakdown  Description: 1. Monitor for areas of redness and/or skin breakdown  2. Assess vascular access sites hourly  3. Every 4-6 hours minimum:  Change oxygen saturation probe site  4. Every 4-6 hours:  If on nasal continuous positive airway pressure, respiratory therapy assess nares and determine need for appliance change or resting period.   12/23/2022 0419 by Micaela Lopez RN  Outcome: Progressing  12/22/2022 1421 by Luis Eduardo Mckoy RN  Outcome: Progressing     Problem: Safety - Adult  Goal: Free from fall injury  12/23/2022 0419 by Dede Crowell RN  Outcome: Progressing  12/22/2022 1421 by Lizet Suresh RN  Outcome: Progressing     Problem: ABCDS Injury Assessment  Goal: Absence of physical injury  12/23/2022 0419 by Dede Crowell RN  Outcome: Progressing  12/22/2022 1421 by Lizet Suresh RN  Outcome: Progressing     Problem: Nutrition Deficit:  Goal: Optimize nutritional status  12/23/2022 0419 by Dede Crowell RN  Outcome: Progressing  12/22/2022 1421 by Lizet Suresh RN  Outcome: Progressing

## 2022-12-23 NOTE — CONSULTS
Clinical Ethics Consultation Note    History and Context:  61 yo female.  found her unresponsive with unknown downtime. EMS intubated her esophageally, prior to arrival at [facility]. At [facility] ED she was re-intubated and her glucose was 18. At [facility] she was in PEA arrest, received 6 mg, achieved ROSC. She also received multiple pushes of Versed and fentanyl for posturing and seizure-like activity. On arrival at [facility] ER patient was unresponsive with fixed and dilated pupils, posturing and on propofol infusion. EKG showed new right bundle branch block. She again had an episode of PEA and received epinephrine and troponin. She was then cooled. LTME showed diffuse cortical dysfunction, severe encephalopathy and per neuro ICU prognostication, brainstem reflexes were intact and she has poor prognosis. Due to her neurologic condition and prognosis, combined with her co-morbidities, surgery clearly stated to the patient's /legal surrogate that they strongly recommended AGAINST tracheostomy and feeding tube placement. The team planned for a family meeting today but the  could not make it. PMH: HFrEF (15%), CKD 3b, CAD s/p CABG  Principal Problem:    Cardiac arrest [facility])  Active Problems:    Acute on chronic systolic congestive heart failure (HCC)    Acute respiratory failure with hypoxia and hypercapnia (HCC)    Pulmonary hypertension (HCC)    Diabetes mellitus with kidney disease (HCC)    NSTEMI (non-ST elevated myocardial infarction) ([facility]    Ventricular tachycardia    Palliative care encounter    CHAD (acute kidney injury) ([facility]    Metabolic acidosis    Shock ([facility]    Acute pulmonary edema ([facility]  Resolved Problems:    * No resolved hospital problems. *    Age: 60 y.o.   Gender: female  Gender Identity: female  Admitted Date: 12/14/2022  Consult Date: 12/23/22  MRN: [id number]  Valid Advanced Medical Directive: No    Process:  I reviewed the chart and have perfect served with the ICU resident and spoke with the bedside RN. Ethical Question(s) and Concern(s):  The concern is related to treatment that is potentially inappropriate/disproportionately burdensome to beneficial.    Analysis:  The patient's condition and prognosis, per neurosurgery prognostication, combined with her co morbidities, mean the medical/surgical teams can agree that trach and peg are potentially inappropriate/disproportionately burdensome to beneficial and there fore not medically indicated. We do have a policy that our care teams are not required to provide interventions that are not medically indicated. The legal surrogate can pursue another hospital to obtain the treatments that he wants. The hospital policy regarding interventions that are not medically covers procedures but not code status. Recommendations:  Call the  as needed. Listen to the /legal surrogate's understanding of the patient's co-morbidities plus current condition and prognosis. Listen to his description of her values and desires re QOL. The medical, neuro prognostic and surgical teams discuss and agree to a treatment plan that is consistent with the patient's co-morbidities plus her current condition and prognosis. Keep in mind the hospital policy that our care teams are not required to provide interventions that are not medically indicated. Clearly communicate that recommended treatment plan to the /legal surrogate. Closing: Thank you. Please perfect serve me with comments or questions.   Nabil Nick MD  N/A    Primary Ethical Theme: Primary Ethical Themes: Treatment appropriateness  Secondary Ethical Theme: Secondary Ethical Themes: End of Life related

## 2022-12-23 NOTE — PLAN OF CARE
Problem: Chronic Conditions and Co-morbidities  Goal: Patient's chronic conditions and co-morbidity symptoms are monitored and maintained or improved  12/23/2022 1245 by Betty Baldwin RN  Outcome: Progressing  12/23/2022 0419 by Kaleb Baldwin RN  Outcome: Progressing     Problem: Discharge Planning  Goal: Discharge to home or other facility with appropriate resources  12/23/2022 1245 by Betty Baldwin RN  Outcome: Progressing  12/23/2022 0419 by Kaleb Baldwin RN  Outcome: Progressing     Problem: Pain  Goal: Verbalizes/displays adequate comfort level or baseline comfort level  12/23/2022 1245 by Betty Baldwin RN  Outcome: Progressing  12/23/2022 0419 by Kaleb Baldwin RN  Outcome: Progressing     Problem: Respiratory - Adult  Goal: Achieves optimal ventilation and oxygenation  12/23/2022 1245 by Betty Baldwin RN  Outcome: Progressing  12/23/2022 0419 by Kaleb Baldwin RN  Outcome: Progressing     Problem: Skin/Tissue Integrity  Goal: Absence of new skin breakdown  Description: 1. Monitor for areas of redness and/or skin breakdown  2. Assess vascular access sites hourly  3. Every 4-6 hours minimum:  Change oxygen saturation probe site  4. Every 4-6 hours:  If on nasal continuous positive airway pressure, respiratory therapy assess nares and determine need for appliance change or resting period.   12/23/2022 1245 by Betty Baldwin RN  Outcome: Progressing  12/23/2022 0419 by Kaleb Baldwin RN  Outcome: Progressing     Problem: Safety - Adult  Goal: Free from fall injury  12/23/2022 1245 by Betty Baldwin RN  Outcome: Progressing  12/23/2022 0419 by Kaleb Baldwin RN  Outcome: Progressing     Problem: ABCDS Injury Assessment  Goal: Absence of physical injury  12/23/2022 1245 by Betty Baldwin RN  Outcome: Progressing  12/23/2022 0419 by Kaleb Baldwin RN  Outcome: Progressing     Problem: Nutrition Deficit:  Goal: Optimize nutritional status  12/23/2022 1245 by Fili Polanco Thien Pro RN  Outcome: Progressing  12/23/2022 0419 by Lizzeth Avery RN  Outcome: Progressing

## 2022-12-23 NOTE — SIGNIFICANT EVENT
Goals of Care Discussion Note:    I phoned the patient's  Daniel Price this morning to discuss the patient's current clinical status and prognosis. I reiterated her poor clinical exam (GCS 3-4T, intermittent extensor posturing, overbreathes vent) along with her dismal prognosis. Additionally, I discussed her case this morning with the neuro critical care service who are in agreement that she unlikely to have any meaningful recovery and communicated this to Daniel Price.  We spoke at length that she will be unable to do or participate in the majority of activities she did before her most recent hospitalization (enjoyed coloring, watching TV programs about nature and space, and occasionally playing with grandkids). I discussed my concern that despite all aggressive medical interventions, she will likely be confined to a nursing home with 24/7 care and suffer from bed sores, wounds, and recurrent infections. I believe this scenario in the background of her other signfiicant comorbidities (HFrEF (15%), CKD 3b, CAD s/p CABG, and now severe anoxic brain injury) will limit her expected lifespan to months to 1-2 years at most.  I also clearly stated that I do not believe she is able to perceive or feel anything due to her severe anoxic injury, but that in the unlikely event that she can feel/sense external stimuli, she will most likely only feel discomfort, pain, and suffering. For the aforementioned reasons, I clearly stated that I strongly recommend AGAINST tracheostomy and feeding tube placement. Daniel Price verbalized understanding. He stated that \"This was an energizing call and has given me a lot of information. Please tell your decision to the other doctors, and they can give me a call if they have questions\", and then he abruptly hung up.   I attempted to call back, but there was no answer.      - Recommend AGAINST trach/PEG  - Recommend ethics consult  - Recommend changing code status to [facility]  - Please call surgery with any questions; we will sign off for now    LEXI Navarro MD  Acute Care Surgery Attending  [facility]

## 2022-12-23 NOTE — CONSULTS
Consult - General Surgery    PATIENT NAME: Lolita Hua  AGE: 60 y.o. MEDICAL RECORD NO. [id number]  DATE: 12/22/2022  SURGEON: Dr. Nanette James: Lindsay Santana MD    Patient evaluated at the request of  Dr. Nevin Sims for evaluation: Claudia Recio    Patient information was obtained from chart review. History/Exam limitations: due to condition. Patient presented to the Emergency Department by ambulance     IMPRESSION:   60 y.o. female with anoxic brain injury after being in PEA arrest.        MEDICAL DECISION MAKING AND PLAN:     Examined the patient in person. Discussed the patient, history, physical, labs, imaging with Dr. Orville Gottlieb. Patient with poor prognosis overall from her anoxic brain injury. She is currently on minimal vent settings with PEEP of 5 and FiO2 of 40%. Extensive cardiovascular history: EF 15%, history of CABG, MI, ICD placement  Given overall clinical picture, will need to have goals of care discussion with POA prior to any surgical intervention  Final recommendations to follow after reviewing with the rest of the team and attending. Continue medical management per primary. HISTORY:   History of Chief Complaint:    Lolita Hua is a 60 y.o. female with history of CAD, COPD, DMT2, CABG, MI, HTN, ICD, who presented to the ED via EMS after being found down for an unknown period of time. Patient was found unresponsive with unknown downtime. She was intubated on scene and on arrival to [facility] needed to be reintubated due to the field ET tube being in the esophagus. She was hypoglycemic with BG of 18. She was in PEA arrest and ROSC was ultimately achieved. She was transferred to [address] [place] [address] for higher level of care on 12/14. CT head was negative for acute intracranial abnormality. EEG was done which suggested severe diffuse cortical dysfunction. Neurology and neuro critical care are on board.   Patient's exam remains very poor today. She does not follow any commands and seems to have only brainstem reflexes intact. Her prognosis is poor. Her spouse wishes her to remain full code. For this reason, general surgery was consulted for tracheostomy and PEG tube placement. Patient's prior abdominal surgery includes a cholecystectomy in 1990 which appears to be open given her surgical scars, hysterectomy, and tubal ligation. She is currently intubated with PEEP of 5 and FiO2 of 40%. Past Medical History   has a past medical history of CAD (coronary artery disease), Cardiomyopathy ([address] COPD (chronic obstructive pulmonary disease) ([address] Depression, Diabetes mellitus ([address] H/O mitral valve repair, History of fractured kneecap, Hx of CABG, Hx of myocardial infarction, Hyperlipidemia, Hypertension, ICD (implantable cardioverter-defibrillator) in place, Kidney calculi, Osteoarthritis, Patient in clinical research study, Pneumonia, and Renal calculi. Past Surgical History   has a past surgical history that includes Cholecystectomy (1990); Hysterectomy (1990); Ankle surgery (Right, 2009); Kidney stone surgery (Left, 2010); Tubal ligation; Mitral valve surgery (6/4/15); Cardiac defibrillator placement (06/01/15); Coronary artery bypass graft (05/28/15); Cystocopy (Left, 11/08/2018); pr cysto/uretero w/lithotripsy &indwell stent insrt (Left, 11/8/2018); pr cysto/uretero w/lithotripsy &indwell stent insrt (Left, 11/8/2018); and pr cysto/uretero w/lithotripsy &indwell stent insrt (Left, 11/15/2018). Medications  Prior to Admission medications    Medication Sig Start Date End Date Taking? Authorizing Provider   furosemide (LASIX) 40 MG tablet Take 1 tablet by mouth in the morning and 1 tablet in the evening.  11/21/22   Elsie Snellen, MD   clopidogrel (PLAVIX) 75 MG tablet Take 1 tab daily 10/24/22   Jaxon Day MD   atorvastatin (LIPITOR) 80 MG tablet 1 tab daily 10/24/22   Jaxon Day MD   carvedilol (COREG) 3.125 MG tablet take 1 tablet by mouth twice a day with meals 10/24/22   Tim Rodriguez MD   gabapentin (NEURONTIN) 400 MG capsule Take 1 capsule by mouth 3 times daily for 180 days. 10/24/22 4/22/23  Radha aDle DO   midodrine (PROAMATINE) 5 MG tablet take 1 tablet by mouth three times a day 10/24/22   Radha Dale DO   sertraline (ZOLOFT) 100 MG tablet Take one tablet by mouth once daily 10/24/22   Radha Dale DO   insulin glargine (LANTUS SOLOSTAR) 100 UNIT/ML injection pen INJECT 45 UNITS SUBCUTANEOUSLY EVERY MORNING AND 25 UNITS EVERY EVENING 10/24/22   Radha Dale DO   Insulin Pen Needle (BD PEN NEEDLE PAIGE 2ND GEN) 32G X 4 MM MISC use 1 PEN NEEDLE to inject MEDICATION subcutaneously daily 10/24/22   Simba Rich DO   Lancets MISC Pt testing BS once daily and prn 10/24/22   Simba Rich DO   glucose monitoring (FREESTYLE) kit Please dispense meter covered by patients insurance. 4/12/22   Sarah Nicholas MD   blood glucose monitor strips Testing BS once daily and as needed.  Dispense strips to go with new glucometer 4/12/22   Sarah Nicholas MD   albuterol (PROVENTIL) (2.5 MG/3ML) 0.083% nebulizer solution Take 3 mLs by nebulization every 6 hours as needed for Wheezing (as needed for wheezing) 4/12/22   Sarah Nicholas MD   Respiratory Therapy Supplies (NEBULIZER/TUBING/MOUTHPIECE) KIT 1 kit by Does not apply route daily as needed (for SOB, cough) 11/13/19   Sarah Nicholas MD   Respiratory Therapy Supplies (NEBULIZER COMPRESSOR) KIT 1 kit by Does not apply route once for 1 dose 11/4/19 10/24/22  Sarah Nicholas MD   lactobacillus (CULTURELLE) capsule Take 1 capsule by mouth 2 times daily (with meals) 1/2/19   Denny Sewell MD   blood glucose monitor strips Use to test daily 11/7/18   Sarah Nicholas MD   blood glucose monitor kit and supplies Use to test daily 11/7/18   Sarah Nicholas MD   Cholecalciferol (VITAMIN D3) 5000 units TABS Take 5,000 Units by mouth daily    Historical Provider, MD   glucose blood VI test strips (EXACTECH TEST) strip 1 each by In Vitro route daily Pt testing BS once daily and prn 11/19/15   Jessee Pimentel MD   Multiple Vitamins-Minerals (THERAPEUTIC MULTIVITAMIN-MINERALS) tablet Take 1 tablet by mouth daily (with breakfast) 6/12/15   Patricio Osorio MD    Scheduled Meds:   insulin glargine  10 Units SubCUTAneous Nightly    insulin lispro  0-8 Units SubCUTAneous Q6H    midodrine  10 mg Oral TID WC    aspirin  81 mg Oral Daily    heparin (porcine)  5,000 Units SubCUTAneous 3 times per day    famotidine (PEPCID) injection  20 mg IntraVENous Daily    sodium chloride flush  5-40 mL IntraVENous 2 times per day    chlorhexidine  15 mL Mouth/Throat BID    atorvastatin  80 mg Oral Nightly    [Held by provider] carvedilol  3.125 mg Oral BID WC    clopidogrel  75 mg Oral Daily     Continuous Infusions:   propofol Stopped (12/22/22 0741)    sodium chloride 10 mL/hr at 12/22/22 1817    dextrose       PRN Meds:.sodium chloride flush, sodium chloride, ondansetron **OR** ondansetron, polyethylene glycol, acetaminophen **OR** acetaminophen, glucose, dextrose bolus **OR** dextrose bolus, glucagon (rDNA), dextrose, albuterol, acetylcysteine  Allergies  is allergic to codeine and nsaids. Family History  family history includes Diabetes in her mother; Other in her father; Stroke (age of onset: 76) in her father. Social History   reports that she has been smoking cigarettes. She has a 16.00 pack-year smoking history. She has never used smokeless tobacco.   reports no history of alcohol use. reports no history of drug use. Review of Systems  Unable to determine based on patient factors: Intubated and sedated, anoxic brain injury    PHYSICAL:   VITALS:  height is 5' 6\" (1.676 m) and weight is 158 lb 15.2 oz (72.1 kg). Her axillary temperature is 99.6 °F (37.6 °C). Her blood pressure is 135/62 and her pulse is 99. Her respiration is 22 and oxygen saturation is 99%. CONSTITUTIONAL: Laying in bed, intubated without sedation, does not follow commands or respond  HEENT: NCAT, PERRLA, not tracking  NECK: Soft, trachea midline and straight  LUNGS: Chest expands equally bilaterally, mechanical ventilation   CARDIOVASCULAR: Heart regular rate and rhythm  ABDOMEN: Soft, nondistended, no TTP P appreciate. scars are consistent with previous surgical history; there is a moderate sized horizontal incision in the RUQ consistent with prior open cholecystectomy  NEUROLOGIC: GCS3, does not follow commands, Babinski reflex appreciated at the right foot  EXTREMITIES: no cyanosis, clubbing or edema    LABS:     Recent Labs     12/20/22  0455 12/21/22  0459 12/22/22  0548   WBC 7.2 8.1 9.1   HGB 8.5* 8.5* 8.7*   HCT 27.7* 28.5* 29.3*    156 162    143 148*   K 3.7 3.7 4.0    108* 111*   CO2 24 24 25   BUN 49* 39* 36*   CREATININE 2.19* 1.85* 1.70*   CALCIUM 8.8 8.9 8.9     No results for input(s): ALKPHOS, ALT, AST, BILITOT, BILIDIR, LABALBU, AMYLASE, LIPASE in the last 72 hours.   CBC with Differential:    Lab Results   Component Value Date/Time    WBC 9.1 12/22/2022 05:48 AM    RBC 3.48 12/22/2022 05:48 AM    HGB 8.7 12/22/2022 05:48 AM    HCT 29.3 12/22/2022 05:48 AM     12/22/2022 05:48 AM    MCV 84.2 12/22/2022 05:48 AM    MCH 25.0 12/22/2022 05:48 AM    MCHC 29.7 12/22/2022 05:48 AM    RDW 18.6 12/22/2022 05:48 AM    NRBC 1 12/14/2022 08:55 AM    LYMPHOPCT 13 12/22/2022 05:48 AM    MONOPCT 8 12/22/2022 05:48 AM    BASOPCT 0 12/22/2022 05:48 AM    MONOSABS 0.76 12/22/2022 05:48 AM    LYMPHSABS 1.22 12/22/2022 05:48 AM    EOSABS 0.07 12/22/2022 05:48 AM    BASOSABS <0.03 12/22/2022 05:48 AM    DIFFTYPE YES 10/13/2022 12:22 PM     BMP:    Lab Results   Component Value Date/Time     12/22/2022 05:48 AM    K 4.0 12/22/2022 05:48 AM     12/22/2022 05:48 AM    CO2 25 12/22/2022 05:48 AM    BUN 36 12/22/2022 05:48 AM    LABALBU 3.1 12/14/2022 11:49 AM CREATININE 1.70 12/22/2022 05:48 AM    CALCIUM 8.9 12/22/2022 05:48 AM    GFRAA 42 02/12/2021 03:25 PM    LABGLOM 33 12/22/2022 05:48 AM    GLUCOSE 266 12/22/2022 05:48 AM     Urine Culture:  No components found for: CURINE  Blood Culture:  No components found for: CBLOOD, CFUNGUSBL    RADIOLOGY:     CT HEAD WO CONTRAST    Result Date: 12/17/2022  1. No evidence of acute intracranial process. 2.  Findings of presumed mild small vessel ischemic deep white matter disease.           Shahid Salgado DO  12/22/22, 9:14 PM

## 2022-12-23 NOTE — PROGRESS NOTES
PROGRESS NOTE      PATIENT NAME: Naomi Shelley  RECORD NO. [id number]  DATE: 12/23/2022    HD: # 9      Patient Active Problem List   Diagnosis    Acute coronary syndrome (HCC)    S/P cardiac cath-Patent grafts 6/5/15    Onychomycosis of toenail    CAD (coronary artery disease)    Hx of CABG    ICD (implantable cardioverter-defibrillator) in place    H/O mitral valve repair    Hx of myocardial infarction    Hypertension    DM (diabetes mellitus), type 2 with neurological complications (HCC)    Anxiety    CHAD (acute kidney injury) ([address]    Metabolic acidosis    Shock (HCC)    Elevated serum immunoglobulin free light chains    Ureteral stone with hydronephrosis    Acute pulmonary edema (HCC)    Dyslipidemia    Pneumonia    Acute systolic CHF (congestive heart failure) (HCC)    Congestive heart failure due to cardiomyopathy (HCC)    CHF (congestive heart failure), NYHA class I, acute on chronic, combined (HCC)    Acute on chronic systolic congestive heart failure (HCC)    Stage 3b chronic kidney disease ([address]    Ischemic cardiomyopathy    Acute respiratory failure with hypoxia and hypercapnia ([facility])    Cardiac arrest ([address]    Pulmonary hypertension (HCC)    Diabetes mellitus with kidney disease ([address]    NSTEMI (non-ST elevated myocardial infarction) ([address]    Ventricular tachycardia    Palliative care encounter       DIAGNOSIS AND PLAN    - Phone call with patient POA, as documented  - Recommend AGAINST trach/PEG  - Recommend ethics consult  - Recommend changing code status to [facility]  - Please call surgery with any questions; we will sign off for now      Chief Complaint: \"None\"    SUBJECTIVE    Patient stable from yesterday, not following commands    OBJECTIVE  VITALS:   Vitals:    12/23/22 1200   BP: 116/72   Pulse: 89   Resp: 18   Temp: 98.4 °F (36.9 °C)   SpO2: 99%     CONSTITUTIONAL: Laying in bed, intubated without sedation, does not follow commands or respond  HEENT: NCAT, PERRLA, not tracking  NECK: Soft, trachea midline and straight  LUNGS: Chest expands equally bilaterally, mechanical ventilation   CARDIOVASCULAR: Heart regular rate and rhythm  ABDOMEN: Soft, nondistended, no TTP P appreciate. scars are consistent with previous surgical history; there is a moderate sized horizontal incision in the RUQ consistent with prior open cholecystectomy  NEUROLOGIC: GCS3, does not follow commands, Babinski reflex appreciated at the right foot  EXTREMITIES: no cyanosis, clubbing or edema      LAB:  CBC:   Recent Labs     12/21/22 0459 12/22/22  0548 12/23/22  0519   WBC 8.1 9.1 8.9   HGB 8.5* 8.7* 8.7*   HCT 28.5* 29.3* 30.3*   MCV 83.8 84.2 86.1    162 174     BMP:   Recent Labs     12/21/22 0459 12/22/22  0548 12/23/22  0519    148* 146*   K 3.7 4.0 3.8   * 111* 111*   CO2 24 25 26   BUN 39* 36* 34*   CREATININE 1.85* 1.70* 1.63*   GLUCOSE 228* 266* 295*       RADIOLOGY:  No results found.         Kana Regalado DO  12/23/2022, 12:48 PM

## 2022-12-23 NOTE — PROGRESS NOTES
Comprehensive Nutrition Assessment    Type and Reason for Visit:  Reassess    Nutrition Recommendations/Plan:   Continue current Renal TF at goal rate of 45 mL/hr. Monitor TF tolerance/adequacy of intakes - modify as needed. Will monitor labs, weights, and plan of care. Malnutrition Assessment:  Malnutrition Status:  Insufficient data (12/15/22 1445)    Context:  Acute Illness       Nutrition Assessment:    Pt remains on vent. Tolerating Renal TF at goal rate of 45 mL/hr with minimal/no residuals. Receiving free water flushes of 200 mL every 4 hrs. Last BM 12/23. Labs reviewed: Na 146 mmol/L, BUN 34 mg/dL, CR 1.63 mg/dL, Glucose 257-294 mg/dL. Meds reviewed. Weight fluctuations noted. Nutrition Related Findings:    Labs/Meds reviewed. Distended abdomen. Last BM 12/23. Wound Type: None       Current Nutrition Intake & Therapies:    Average Meal Intake: NPO  Average Supplements Intake: NPO  Diet NPO  ADULT TUBE FEEDING; Orogastric; Renal Formula; Continuous; 10; Yes; 10; Q 4 hours; 45; 200; Q 4 hours  Current Tube Feeding (TF) Orders:  Feeding Route: Orogastric  Formula: Renal Formula  Schedule: Continuous  Feeding Regimen: 45 mL/hr  Water Flushes: per MD: 200 mL every 4 hrs  Current TF & Flush Orders Provides: At 45 mL/hr = 1944 kcals, 87 gm pro/day  Goal TF & Flush Orders Provides: Renal TF (Nepro with Carb Steady) goal 45 mL/hr = 1944 kcals, 87 gm pro/day    Anthropometric Measures:  Height: 5' 6\" (167.6 cm)  Ideal Body Weight (IBW): 130 lbs (59 kg)    Admission Body Weight: 173 lb 1 oz (78.5 kg)  Current Body Weight: 156 lb 15.5 oz (71.2 kg), 120.7 % IBW. Weight Source: Bed Scale  Current BMI (kg/m2): 25.3                          BMI Categories: Overweight (BMI 25.0-29. 9)    Estimated Daily Nutrient Needs:  Energy Requirements Based On: Formula  Weight Used for Energy Requirements: Current  Energy (kcal/day): 9527-6098 kcal/day  Weight Used for Protein Requirements: Ideal  Protein (g/day): 90 g pro/day  Method Used for Fluid Requirements: Other (Comment)  Fluid (ml/day): per MD    Nutrition Diagnosis:   Inadequate oral intake related to impaired respiratory function as evidenced by NPO or clear liquid status due to medical condition, nutrition support - enteral nutrition    Nutrition Interventions:   Food and/or Nutrient Delivery: Continue Current Tube Feeding  Nutrition Education/Counseling: No recommendation at this time  Coordination of Nutrition Care: Continue to monitor while inpatient  Plan of Care discussed with: RN    Goals:  Previous Goal Met: Progressing toward Goal(s)  Goals: Meet at least 75% of estimated needs, within 7 days       Nutrition Monitoring and Evaluation:   Behavioral-Environmental Outcomes: None Identified  Food/Nutrient Intake Outcomes: Enteral Nutrition Intake/Tolerance  Physical Signs/Symptoms Outcomes: Biochemical Data, GI Status, Fluid Status or Edema, Nutrition Focused Physical Findings, Skin, Weight    Discharge Planning:     Too soon to determine     Marian Lord RD, LD  Contact: [phone]

## 2022-12-23 NOTE — PROGRESS NOTES
INTENSIVE CARE UNIT  Resident Physician Progress Note    Patient - Leslie Villar  Date of Admission -  12/14/2022 10:09 AM  Date of Evaluation -  12/23/2022  Room and Bed Number -  3025/3025-01   Hospital Day - 9    HPI:     Leslie Vlilar is a 60 y.o. with PMH of   - CAD, s/p CABG in 2015  - Ischemic cardiomyopathy, last EF of 25%  - Insulin-dependent diabetes mellitus type 2  - CKD     Presented to [facility] ER as a transfer from [facility]. According to the chart review patient was found unresponsive by the  as per EMS. Unknown downtime. Per EMS report no CPR performed. EMS intubated the patient esophageally, prior to arrival at [facility], reintubation was done and then patient's glucose was found to be 18. At [facility] found to be in PEA arrest achieved ROSC needed 6 mg epinephrine, received multiple pushes of Versed and fentanyl for posturing and seizure-like activity. On arrival to our [facility] ER patient was unresponsive. Fixed and dilated pupils, posturing and on propofol infusion. EKG in the ED showed new right bundle branch block then due to concern for PE, CT PE was done. Bedside ultrasound did not show any signs of tamponade. CT negative for any PE. Around 10:25 AM there was an episode of pulselessness and bradycardia and PEA was given at epinephrine and troponin. Cooling was started around 11:28 AM with target temperature of 38 Celsius     Initial vitals in the ED temperature around 35, heart rate 106, respiratory 22, pressure 124/108     CT head negative for any acute intracranial abnormality  CT chest showed pleural effusions and cardiomegaly      12/17: CT head showing no acute processes, chronic small vessel ischemic white matter disease. LTME showing diffuse cortical dysfunction, severe encephalopathy. 12/18: Levo discontinued. Remains on propofol. 12/19: Tolerated CPAP trial. Per neuro ICU prognostication, brainstem reflexes intact. Poor prognosis.  Discussion with family about goals, T&P vs withdrawal of care. Palliative following. : Plan for bedside discussion of goals of care and code status with family tomorrow,  with palliative and ICU team.  :  not able to attend bedside discussion, I spoke with him via phone. Code status remains full code.  would like to discuss trach and peg. Surgery consulted. : Off propofol. SUBJECTIVE:     OVERNIGHT EVENTS:    Off propofol. Started free water flushes per nephrology.     AWAKE & FOLLOWING COMMANDS:  [x] No   [] Yes    SECRETIONS Amount:  [x] Small [] Moderate  [] Large [] None    Color:     [x] White [] Colored  [] Bloody    SEDATION:  RAAS Score:  [] Propofol gtt  [] Versed gtt  [] Ativan gtt   [x] No Sedation    PARALYZED:  [x] No    [] Yes    VASOPRESSORS:  [x] No    [] Yes  [] Levophed [] Dopamine [] Vasopressin  [] Dobutamine [] Phenylephrine [] Epinephrine      OBJECTIVE:     VITAL SIGNS:  /75   Pulse 96   Temp 99 °F (37.2 °C) (Tympanic)   Resp 23   Ht 5' 6\" (1.676 m)   Wt 156 lb 15.5 oz (71.2 kg)   SpO2 99%   BMI 25.34 kg/m²   Tmax over 24 hours:  Temp (24hrs), Av.5 °F (37.5 °C), Min:98.7 °F (37.1 °C), Max:100.9 °F (38.3 °C)      Patient Vitals for the past 8 hrs:   BP Temp Temp src Pulse Resp SpO2 Weight   22 0600 -- 99 °F (37.2 °C) Tympanic 96 23 99 % --   22 0525 -- -- -- -- -- -- 156 lb 15.5 oz (71.2 kg)   22 0400 122/75 98.9 °F (37.2 °C) Axillary 94 18 97 % --   22 0300 -- -- -- 96 19 97 % --   22 0200 -- 98.9 °F (37.2 °C) Axillary 99 21 98 % --   22 0100 -- -- -- 97 20 97 % --   22 0000 131/72 98.9 °F (37.2 °C) Axillary 97 19 94 % --         Intake/Output Summary (Last 24 hours) at 2022 0751  Last data filed at 2022 0649  Gross per 24 hour   Intake 1316.4 ml   Output 950 ml   Net 366.4 ml     Date 22 0000 - 22 2359   Shift 0829-7470 6465-7572 5021-9781 24 Hour Total   INTAKE   I.V.(mL/kg) 125.4(1.8) 125.4(1.8)   NG/GT(mL/kg) 272(3.8)   272(3.8)   Shift Total(mL/kg) 397.4(5.6)   397.4(5.6)   OUTPUT   Urine(mL/kg/hr) 250   250   Shift Total(mL/kg) 250(3.5)   250(3.5)   Weight (kg) 71.2 71.2 71.2 71.2     Wt Readings from Last 3 Encounters:   12/23/22 156 lb 15.5 oz (71.2 kg)   10/24/22 165 lb 9.6 oz (75.1 kg)   10/24/22 165 lb (74.8 kg)     Body mass index is 25.34 kg/m².         PHYSICAL EXAM:  GEN:                  Intubated, not following commands, opens eyes to pain intermittently  EYES:                Not tracking, pupils equal, round, and reactive to light  HEENT:            Normocepalic, without obvious abnormality  LUNGS:            clear to auscultation  CV:                     regular rate and rhythm and normal S1 and S2  ABDOMEN:     soft and non-distended  MSK:                  there is no redness, warmth, or swelling of the joints  NEURO[de-identified]           Intubated, not following commands  SKIN:                 Normal skin color, texture, turgor  EXTREMITIES:  distal pulses intact       MEDICATIONS:  Scheduled Meds:   insulin lispro  0-16 Units SubCUTAneous Q6H    insulin glargine  15 Units SubCUTAneous Nightly    aspirin  81 mg Oral Daily    heparin (porcine)  5,000 Units SubCUTAneous 3 times per day    famotidine (PEPCID) injection  20 mg IntraVENous Daily    sodium chloride flush  5-40 mL IntraVENous 2 times per day    chlorhexidine  15 mL Mouth/Throat BID    atorvastatin  80 mg Oral Nightly    [Held by provider] carvedilol  3.125 mg Oral BID     clopidogrel  75 mg Oral Daily     Continuous Infusions:   propofol Stopped (12/22/22 0741)    sodium chloride 10 mL/hr at 12/23/22 0649    dextrose       PRN Meds:   sodium chloride flush, 5-40 mL, PRN  sodium chloride, , PRN  ondansetron, 4 mg, Q8H PRN   Or  ondansetron, 4 mg, Q6H PRN  polyethylene glycol, 17 g, Daily PRN  acetaminophen, 650 mg, Q6H PRN   Or  acetaminophen, 650 mg, Q6H PRN  glucose, 4 tablet, PRN  dextrose bolus, 125 mL, PRN   Or  dextrose bolus, 250 mL, PRN  glucagon (rDNA), 1 mg, PRN  dextrose, , Continuous PRN  albuterol, 2.5 mg, As Directed RT PRN  acetylcysteine, 600 mg, As Directed RT PRN        SUPPORT DEVICES: [x] Ventilator [] BIPAP  [] Nasal Cannula [] Room Air    VENT SETTINGS (Comprehensive) (if applicable): PRVC mode, FiO2 40%, PEEP 5, Respiratory Rate 18, Tidal Volume 500  Vent Information  Ventilator ID: [id number]  Equipment Changed: HME, Expiratory Filter  Ventilator Initiate: Yes  Additional Respiratory Assessments  Heart Rate: 96  Resp: 23  SpO2: 99 %  End Tidal CO2: 33 (%)  Position: Semi-Simon's  Humidification Source: HME  Cuff Pressure (cm H2O):  (mlt)  Skin Barrier Applied: No    ABGs:   Arterial Blood Gas result:  pH 7.46; pCO2 35; pO2 70; HCO3 25.   Lab Results   Component Value Date/Time    KJA6KXM 17 08/24/2018 05:02 AM    FIO2 40.0 12/23/2022 04:55 AM         DATA:  Complete Blood Count:   Recent Labs     12/21/22 0459 12/22/22  0548   WBC 8.1 9.1   RBC 3.40* 3.48*   HGB 8.5* 8.7*   HCT 28.5* 29.3*   MCV 83.8 84.2   MCH 25.0* 25.0*   MCHC 29.8 29.7   RDW 18.5* 18.6*    162   MPV 11.9 12.0        Last 3 Blood Glucose:   Recent Labs     12/21/22  0459 12/22/22  0548   GLUCOSE 228* 266*        PT/INR:    Lab Results   Component Value Date/Time    PROTIME 13.7 12/14/2022 01:38 PM    INR 1.3 12/14/2022 01:38 PM     PTT:    Lab Results   Component Value Date/Time    APTT 26.0 12/14/2022 01:38 PM       Comprehensive Metabolic Profile:   Recent Labs     12/21/22  0459 12/22/22  0548    148*   K 3.7 4.0   * 111*   CO2 24 25   BUN 39* 36*   CREATININE 1.85* 1.70*   GLUCOSE 228* 266*   CALCIUM 8.9 8.9      Magnesium:   Lab Results   Component Value Date/Time    MG 2.1 12/18/2022 04:08 AM    MG 2.2 12/17/2022 06:09 AM    MG 2.1 12/17/2022 12:03 AM     Phosphorus:   Lab Results   Component Value Date/Time    PHOS 4.1 12/18/2022 04:08 AM    PHOS 5.6 12/17/2022 06:09 AM    PHOS 6.5 12/17/2022 12:03 AM     Ionized Calcium:   Lab Results   Component Value Date/Time    CAION 1.19 12/22/2022 05:48 AM    CAION 1.18 12/21/2022 04:59 AM    CAION 1.17 12/20/2022 04:55 AM        Urinalysis:   Lab Results   Component Value Date/Time    NITRU NEGATIVE 12/14/2022 12:26 PM    COLORU Yellow 12/14/2022 12:26 PM    PHUR 6.0 12/14/2022 12:26 PM    WBCUA 50  12/14/2022 12:26 PM    RBCUA 0 TO 2 12/14/2022 12:26 PM    MUCUS NOT REPORTED 11/21/2018 12:12 PM    TRICHOMONAS NOT REPORTED 11/21/2018 12:12 PM    YEAST FEW 12/14/2022 12:26 PM    BACTERIA RARE 12/14/2022 09:30 AM    SPECGRAV 1.018 12/14/2022 12:26 PM    LEUKOCYTESUR NEGATIVE 12/14/2022 12:26 PM    UROBILINOGEN Normal 12/14/2022 12:26 PM    BILIRUBINUR NEGATIVE 12/14/2022 12:26 PM    GLUCOSEU 1+ 12/14/2022 12:26 PM    KETUA NEGATIVE 12/14/2022 12:26 PM    AMORPHOUS TRACE 11/21/2018 12:12 PM       HgBA1c:    Lab Results   Component Value Date/Time    LABA1C 8.5 12/15/2022 05:56 AM     TSH:    Lab Results   Component Value Date/Time    TSH 1.66 10/14/2022 10:23 AM     Lactic Acid:   Lab Results   Component Value Date/Time    LACTA 1.4 10/13/2022 12:22 PM    LACTA 1.7 12/29/2018 11:57 AM    LACTA 1.5 12/29/2018 05:30 AM      Troponin: No results for input(s): TROPONINI in the last 72 hours.     ASSESSMENT:     Patient Active Problem List    Diagnosis Date Noted    Palliative care encounter 12/16/2022    NSTEMI (non-ST elevated myocardial infarction) ([facility] 12/15/2022    Ventricular tachycardia 12/15/2022    Cardiac arrest ([facility] 12/14/2022    Pulmonary hypertension ([facility] 12/14/2022    Diabetes mellitus with kidney disease ([facility] 12/14/2022    Stage 3b chronic kidney disease ([facility] 10/17/2022    Ischemic cardiomyopathy 10/17/2022    Acute respiratory failure with hypoxia and hypercapnia ([facility] 10/17/2022    CHF (congestive heart failure), NYHA class I, acute on chronic, combined ([facility] 10/14/2022    Acute on chronic systolic congestive heart failure ([facility] 10/14/2022    Congestive heart failure due to cardiomyopathy ([facility] 86/42/1592    Acute systolic CHF (congestive heart failure) [facility] 10/12/2022    Pneumonia 12/28/2018    Dyslipidemia     Ureteral stone with hydronephrosis 11/04/2018    Acute pulmonary edema ([facility] 11/04/2018    Elevated serum immunoglobulin free light chains     CHAD (acute kidney injury) ([facility] 73/46/1893    Metabolic acidosis 74/58/7260    Shock ([facility] 08/23/2018    DM (diabetes mellitus), type 2 with neurological complications ([facility] 82/85/8596    Anxiety 01/26/2016    CAD (coronary artery disease)     Hx of CABG     ICD (implantable cardioverter-defibrillator) in place     H/O mitral valve repair     Hx of myocardial infarction     Hypertension     Onychomycosis of toenail 11/19/2015    S/P cardiac cath-Patent grafts 6/5/15 06/04/2015    Acute coronary syndrome ([facility] 05/26/2015          PLAN:     Neuro  Intubated  Off propofol  Neuro ICU evaluated for neuro prognostication, brainstem reflex intact, poor prognosis, neuro has signed off   No MRI due to AICD incompatibility  LTME - diffuse cortical dysfunction, severe encephalopathy  CT head 12/17 - no acute processes, chronic small vessel ischemic deep white matter disease   would like patient to be full code, would like trach and peg  Surgery consulted for T&P     Cardiology  Off pressors  CAD s/p AICD, PEA arrest  ASA, Lipitor, Plavix  Midodrine 10 TID - continue per nephrology  Cardiology consulted, no cath unless any meaningful neurologic recovery  ECHO 12/16/22 - EF 15-20%, global hypokinesis, MV  prosthesis     Pulmonary  PRVC 18/500/5/40%  ABG 12/23 7.46/35/75/25  Procal 0.13  Albuterol nebulizer per RT protocol  Mucomyst per RT protocol     Renal  CHAD on CKD stage IIIb with baseline Cr 1.4-1.6  Cr 1.63, improving  Nephrology following  Free water flushes 200 q 4h per nephrology  I/O: 950 + 1 unmeasured  Garcia removed 12/22, external catheter in place     GI  TF @ 45 ml/hr at goal  Pepcid 20     ID  WBC 8.9  S/p Zosyn, ended 12/21  Removed R femoral central line 12/20     Endo  Glucose 269  Lantus increased to 15 U nightly  ISS increased to HDISS  Glucose 269    F.A.S.T. M. H.U.G.S. B.I.D. Feeding Diet: Diet NPO  ADULT TUBE FEEDING; Orogastric; Renal Formula; Continuous; 10; Yes; 10; Q 4 hours; 45; 200; Q 4 hours  Fluids: TF @ 45 ml/hr, free water 200 q 4h  Family: updated, remains full code, would like T&P  Analgesic: tylenol prn  Sedation: none   Thrombo-prophylaxis: [] Enoxaparin, [x] Unfract. Heparin Subcutaneously, [] EPC Cuffs  Mobility: PT  Heads up: up  Ulcer prophylaxis: [] PPI Agent, [x] [id number], [] Sucralfate, [] Other:  Glycemic control: HDISS, Lantus 15 u nightly  Spontaneous breathing trial: Not tolerated   Bowel regimen/urine output: Glycolax prn / U out 950 + 1 unmeasured  Indwelling catheter/lines: Peripheral Ivs, L radial art line, NG, ETT  De-escalation:  would like to discuss T&P      Andrzej Sheikh M.D.   Emergency Medicine Resident, PGY-2  12/23/2022 7:51 AM

## 2022-12-24 NOTE — PLAN OF CARE
Problem: Respiratory - Adult  Goal: Achieves optimal ventilation and oxygenation  12/24/2022 1023 by Clarice Eason RCP  Outcome: Progressing  12/23/2022 2343 by Renetta Guzman RN  Outcome: Progressing

## 2022-12-24 NOTE — PLAN OF CARE
Problem: Chronic Conditions and Co-morbidities  Goal: Patient's chronic conditions and co-morbidity symptoms are monitored and maintained or improved  12/23/2022 2343 by Rasta Mendez RN  Outcome: Progressing     Problem: Discharge Planning  Goal: Discharge to home or other facility with appropriate resources  12/23/2022 2343 by Rasta Mendez RN  Outcome: Progressing     Problem: Pain  Goal: Verbalizes/displays adequate comfort level or baseline comfort level  12/23/2022 2343 by Rasta Mendez RN  Outcome: Progressing     Problem: Respiratory - Adult  Goal: Achieves optimal ventilation and oxygenation  12/23/2022 2343 by Rasta Mendez RN  Outcome: Progressing     Problem: Skin/Tissue Integrity  Goal: Absence of new skin breakdown  Description: 1. Monitor for areas of redness and/or skin breakdown  2. Assess vascular access sites hourly  3. Every 4-6 hours minimum:  Change oxygen saturation probe site  4. Every 4-6 hours:  If on nasal continuous positive airway pressure, respiratory therapy assess nares and determine need for appliance change or resting period.   12/23/2022 2343 by Rasta Mendez RN  Outcome: Progressing     Problem: Safety - Adult  Goal: Free from fall injury  12/23/2022 2343 by Rasta Mendez RN  Outcome: Progressing     Problem: ABCDS Injury Assessment  Goal: Absence of physical injury  12/23/2022 2343 by Rasta Mendez RN  Outcome: Progressing     Problem: Nutrition Deficit:  Goal: Optimize nutritional status  12/23/2022 2343 by Rasta Mendez RN  Outcome: Progressing

## 2022-12-24 NOTE — PROGRESS NOTES
[facility])  Occupational Therapy Not Seen Note    DATE: 2022    NAME: Wilder Pallas  MRN: [id number]   : 1960      Patient not seen this date for Occupational Therapy due to:    Patient is not appropriate for active participation in OT evaluation/treatment at this time d/t currently remains int/sed.  Will check back       Electronically signed by OLLIE Coley/L on 2022 at 10:49 AM

## 2022-12-24 NOTE — PROGRESS NOTES
Patient's  Asael Cartwright (Sony Mario) was called on his cell phone, and I spoke with him at length. I reiterated patient's current clinical status and prognosis. I reiterated her extensor posturing and upgoing Babinski and her very poor neurologic prognosis. Discussed code status at length with [place]. Documentation from 10/12/22 shows patient had previously stated she would want to be intubated, but if her prognosis was poor patient would not want to continue care. When we discussed this, Sony Mario stated \"I know my wife and she would want everything done for her\". He states that he would want her to remain full code at this time, and would want compressions performed. He states \"I would want her resuscitated no matter what, but if we try the CPR and that fails, then I can accept that\". We discussed that our surgeons, supported by our ethics team, will not be performing a trach and peg procedure due to lack of clinical benefit and risk of patient harm and increased suffering. Patient voiced understanding. We requested that patient come visit his wife, he states he is unable to come into the hospital at this time as he is taking care of grandchildren. He has previously been offered transportation to the hospital which he has declined. He states \"I am so grateful for what you all are doing for her, I have given this a lot of consideration and a lot of thought and I will keep thinking about where we go from here\". He requested that we not call him tomorrow on Christmas Day as he will be spending it with his grandchildren, and asked that we call him the following day to discuss her status.     Nancy Nascimento MD

## 2022-12-24 NOTE — PROGRESS NOTES
INTENSIVE CARE UNIT  Resident Physician Progress Note    Patient - Delvis Zheng  Date of Admission -  12/14/2022 10:09 AM  Date of Evaluation -  12/24/2022  Room and Bed Number -  3025/3025-01   Hospital Day - 10    HPI:     Delvis Zheng is a 60 y.o. with PMH of   - CAD, s/p CABG in 2015  - Ischemic cardiomyopathy, last EF of 25%  - Insulin-dependent diabetes mellitus type 2  - CKD     Presented to [facility] ER as a transfer from [facility] ER. According to the chart review patient was found unresponsive by the  as per EMS. Unknown downtime. Per EMS report no CPR performed. EMS intubated the patient esophageally, prior to arrival at [facility], reintubation was done and then patient's glucose was found to be 18. At [facility] found to be in PEA arrest achieved ROSC needed 6 mg epinephrine, received multiple pushes of Versed and fentanyl for posturing and seizure-like activity. On arrival to our [facility] ER patient was unresponsive. Fixed and dilated pupils, posturing and on propofol infusion. EKG in the ED showed new right bundle branch block then due to concern for PE, CT PE was done. Bedside ultrasound did not show any signs of tamponade. CT negative for any PE. Around 10:25 AM there was an episode of pulselessness and bradycardia and PEA was given at epinephrine and troponin. Cooling was started around 11:28 AM with target temperature of 38 Celsius     Initial vitals in the ED temperature around 35, heart rate 106, respiratory 22, pressure 124/108     CT head negative for any acute intracranial abnormality  CT chest showed pleural effusions and cardiomegaly      12/17: CT head showing no acute processes, chronic small vessel ischemic white matter disease. LTME showing diffuse cortical dysfunction, severe encephalopathy. 12/18: Levo discontinued. Remains on propofol. 12/19: Tolerated CPAP trial. Per neuro ICU prognostication, brainstem reflexes intact. Poor prognosis.  Discussion with family about goals, T&P vs withdrawal of care. Palliative following. : Plan for bedside discussion of goals of care and code status with family tomorrow,  with palliative and ICU team.  :  not able to attend bedside discussion, I spoke with him via phone. Code status remains full code.  would like to discuss trach and peg. Surgery consulted. : Off propofol.     SUBJECTIVE:     AWAKE & FOLLOWING COMMANDS:  [x] No   [] Yes    SECRETIONS Amount:  [x] Small [] Moderate  [] Large  [] None  Color:     [x] White [] Colored  [] Bloody    SEDATION:  RAAS Score:  [] Propofol gtt  [] Versed gtt  [] Ativan gtt   [x] No Sedation    PARALYZED:  [x] No    [] Yes    VASOPRESSORS:  [x] No    [] Yes  [] Levophed [] Dopamine [] Vasopressin  [] Dobutamine [] Phenylephrine [] Epinephrine      OBJECTIVE:     VITAL SIGNS:  /66   Pulse 87   Temp 98.8 °F (37.1 °C) (Esophageal)   Resp 28   Ht 5' 6\" (1.676 m)   Wt 153 lb 14.1 oz (69.8 kg)   SpO2 96%   BMI 24.84 kg/m²   Tmax over 24 hours:  Temp (24hrs), Av.7 °F (37.1 °C), Min:98.1 °F (36.7 °C), Max:99.7 °F (37.6 °C)      Patient Vitals for the past 8 hrs:   BP Temp Temp src Pulse Resp SpO2 Weight   22 0630 -- -- -- 87 28 96 % --   22 0600 -- 98.8 °F (37.1 °C) Esophageal 84 26 97 % 153 lb 14.1 oz (69.8 kg)   22 0530 -- -- -- 86 30 95 % --   22 0500 -- -- -- 77 24 94 % --   22 0430 -- -- -- 76 22 95 % --   22 0400 125/66 99.1 °F (37.3 °C) Esophageal 87 29 95 % --   22 0349 -- -- -- 90 23 95 % --   22 0330 -- -- -- 79 20 94 % --   22 0300 -- -- -- 85 29 95 % --   22 0230 -- -- -- 84 28 97 % --   22 0200 -- 99 °F (37.2 °C) Axillary 82 26 96 % --   22 0130 -- -- -- 86 28 97 % --   22 0100 -- -- -- 79 24 95 % --   22 0030 -- -- -- 79 24 94 % --   22 0000 (!) 111/53 99.3 °F (37.4 °C) Axillary 83 28 97 % --         Intake/Output Summary (Last 24 hours) at 12/24/2022 0743  Last data filed at 12/24/2022 0600  Gross per 24 hour   Intake 1852.49 ml   Output 650 ml   Net 1202.49 ml     Date 12/24/22 0000 - 12/24/22 2359   Shift 8029-6454 4068-7950 1193-9994 24 Hour Total   INTAKE   NG/GT(mL/kg) 611(8.8)   611(8.8)   Shift Total(mL/kg) 611(8.8)   611(8.8)   OUTPUT   Urine(mL/kg/hr) 300   300   Shift Total(mL/kg) 300(4.3)   300(4.3)   Weight (kg) 69.8 69.8 69.8 69.8     Wt Readings from Last 3 Encounters:   12/24/22 153 lb 14.1 oz (69.8 kg)   10/24/22 165 lb 9.6 oz (75.1 kg)   10/24/22 165 lb (74.8 kg)     Body mass index is 24.84 kg/m². PHYSICAL EXAM:  GEN:                  Intubated, not following commands  EYES:                Not tracking, pupils equal, round, and reactive to light  HEENT:            Normocephalic, without obvious abnormality  LUNGS:            clear to auscultation  CV:                     regular rate and rhythm and normal S1 and S2  ABDOMEN:     soft and non-distended  MSK:                  there is no redness, warmth, or swelling of the joints  NEURO[de-identified]           Intubated, not following commands. Decerebrate posturing. Upward going Babinski.   SKIN:                 Normal skin color, texture, turgor  EXTREMITIES:  distal pulses intact    MEDICATIONS:  Scheduled Meds:   insulin lispro  0-16 Units SubCUTAneous Q6H    insulin glargine  15 Units SubCUTAneous Nightly    midodrine  10 mg Oral TID WC    aspirin  81 mg Oral Daily    heparin (porcine)  5,000 Units SubCUTAneous 3 times per day    famotidine (PEPCID) injection  20 mg IntraVENous Daily    sodium chloride flush  5-40 mL IntraVENous 2 times per day    chlorhexidine  15 mL Mouth/Throat BID    atorvastatin  80 mg Oral Nightly    [Held by provider] carvedilol  3.125 mg Oral BID WC    clopidogrel  75 mg Oral Daily     Continuous Infusions:   propofol Stopped (12/22/22 0741)    sodium chloride 10 mL/hr at 12/24/22 0618    dextrose       PRN Meds:   sodium chloride flush, 5-40 mL, PRN  sodium chloride, , PRN  ondansetron, 4 mg, Q8H PRN   Or  ondansetron, 4 mg, Q6H PRN  polyethylene glycol, 17 g, Daily PRN  acetaminophen, 650 mg, Q6H PRN   Or  acetaminophen, 650 mg, Q6H PRN  glucose, 4 tablet, PRN  dextrose bolus, 125 mL, PRN   Or  dextrose bolus, 250 mL, PRN  glucagon (rDNA), 1 mg, PRN  dextrose, , Continuous PRN  albuterol, 2.5 mg, As Directed RT PRN  acetylcysteine, 600 mg, As Directed RT PRN        SUPPORT DEVICES: [x] Ventilator [] BIPAP  [] Nasal Cannula [] Room Air    VENT SETTINGS (Comprehensive) (if applicable):   PRVC mode, FiO2 40%, PEEP 5, Respiratory Rate 18, Tidal Volume 400  Vent Information  Ventilator ID: [id number]  Equipment Changed: HME, Expiratory Filter  Ventilator Initiate: Yes  Vent Mode: AC/PRVC  Additional Respiratory Assessments  Heart Rate: 87  Resp: 28  SpO2: 96 %  End Tidal CO2: 34 (%)  Position: Semi-Simon's  Humidification Source: HME  Cuff Pressure (cm H2O):  (mlt)  Skin Barrier Applied: No    DATA:  Complete Blood Count:   Recent Labs     12/22/22  0548 12/23/22  0519 12/24/22  0631   WBC 9.1 8.9 10.0   RBC 3.48* 3.52* 3.29*   HGB 8.7* 8.7* 8.2*   HCT 29.3* 30.3* 27.8*   MCV 84.2 86.1 84.5   MCH 25.0* 24.7* 24.9*   MCHC 29.7 28.7 29.5   RDW 18.6* 18.6* 18.6*    174 183   MPV 12.0 11.7 12.1        Last 3 Blood Glucose:   Recent Labs     12/22/22  0548 12/23/22  0519 12/24/22  0631   GLUCOSE 266* 295* 236*        PT/INR:    Lab Results   Component Value Date/Time    PROTIME 13.7 12/14/2022 01:38 PM    INR 1.3 12/14/2022 01:38 PM     PTT:    Lab Results   Component Value Date/Time    APTT 26.0 12/14/2022 01:38 PM       Comprehensive Metabolic Profile:   Recent Labs     12/22/22  0548 12/23/22  0519 12/24/22  0631   * 146* 142   K 4.0 3.8 3.7   * 111* 109*   CO2 25 26 24   BUN 36* 34* 35*   CREATININE 1.70* 1.63* 1.49*   GLUCOSE 266* 295* 236*   CALCIUM 8.9 8.8 8.6      Magnesium:   Lab Results   Component Value Date/Time    MG 2.1 12/18/2022 04:08 AM    MG 2.2 12/17/2022 06:09 AM    MG 2.1 12/17/2022 12:03 AM     Phosphorus:   Lab Results   Component Value Date/Time    PHOS 4.1 12/18/2022 04:08 AM    PHOS 5.6 12/17/2022 06:09 AM    PHOS 6.5 12/17/2022 12:03 AM     Ionized Calcium:   Lab Results   Component Value Date/Time    CAION 1.19 12/22/2022 05:48 AM    CAION 1.18 12/21/2022 04:59 AM    CAION 1.17 12/20/2022 04:55 AM        Urinalysis:   Lab Results   Component Value Date/Time    NITRU NEGATIVE 12/14/2022 12:26 PM    COLORU Yellow 12/14/2022 12:26 PM    PHUR 6.0 12/14/2022 12:26 PM    WBCUA 50  12/14/2022 12:26 PM    RBCUA 0 TO 2 12/14/2022 12:26 PM    MUCUS NOT REPORTED 11/21/2018 12:12 PM    TRICHOMONAS NOT REPORTED 11/21/2018 12:12 PM    YEAST FEW 12/14/2022 12:26 PM    BACTERIA RARE 12/14/2022 09:30 AM    SPECGRAV 1.018 12/14/2022 12:26 PM    LEUKOCYTESUR NEGATIVE 12/14/2022 12:26 PM    UROBILINOGEN Normal 12/14/2022 12:26 PM    BILIRUBINUR NEGATIVE 12/14/2022 12:26 PM    GLUCOSEU 1+ 12/14/2022 12:26 PM    KETUA NEGATIVE 12/14/2022 12:26 PM    AMORPHOUS TRACE 11/21/2018 12:12 PM       HgBA1c:    Lab Results   Component Value Date/Time    LABA1C 8.5 12/15/2022 05:56 AM     TSH:    Lab Results   Component Value Date/Time    TSH 1.66 10/14/2022 10:23 AM     Lactic Acid:   Lab Results   Component Value Date/Time    LACTA 1.4 10/13/2022 12:22 PM    LACTA 1.7 12/29/2018 11:57 AM    LACTA 1.5 12/29/2018 05:30 AM      Troponin: No results for input(s): TROPONINI in the last 72 hours.     ASSESSMENT:     Patient Active Problem List    Diagnosis Date Noted    Palliative care encounter 12/16/2022    NSTEMI (non-ST elevated myocardial infarction) ([facility] 12/15/2022    Ventricular tachycardia 12/15/2022    Cardiac arrest ([facility] 12/14/2022    Pulmonary hypertension ([facility] 12/14/2022    Diabetes mellitus with kidney disease ([facility] 12/14/2022    Stage 3b chronic kidney disease ([facility] 10/17/2022    Ischemic cardiomyopathy 10/17/2022    Acute respiratory failure with hypoxia and hypercapnia ([address] 10/17/2022    CHF (congestive heart failure), NYHA class I, acute on chronic, combined ([address] 10/14/2022    Acute on chronic systolic congestive heart failure ([address] 10/14/2022    Congestive heart failure due to cardiomyopathy ([address] 66/21/7772    Acute systolic CHF (congestive heart failure) ([address] 10/12/2022    Pneumonia 12/28/2018    Dyslipidemia     Ureteral stone with hydronephrosis 11/04/2018    Acute pulmonary edema ([address] 11/04/2018    Elevated serum immunoglobulin free light chains     CHAD (acute kidney injury) ([address] 98/96/9417    Metabolic acidosis 38/64/5761    Shock ([address] 08/23/2018    DM (diabetes mellitus), type 2 with neurological complications ([address] 89/18/1569    Anxiety 01/26/2016    CAD (coronary artery disease)     Hx of CABG     ICD (implantable cardioverter-defibrillator) in place     H/O mitral valve repair     Hx of myocardial infarction     Hypertension     Onychomycosis of toenail 11/19/2015    S/P cardiac cath-Patent grafts 6/5/15 06/04/2015    Acute coronary syndrome ([address] 05/26/2015          PLAN:     Neuro  Intubated  Off sedation  Neuro ICU evaluated for neuro prognostication, only brainstem reflexes intact, decerebrate posturing, upward babinksi, poor prognosis, neuro has signed off   No MRI due to AICD incompatibility  LTME - diffuse cortical dysfunction, severe encephalopathy  CT head 12/17 - no acute processes, chronic small vessel ischemic deep white matter disease   would like patient to be full code, would like trach and peg  Surgery consulted, they do not feel trach and peg is medically appropriate at this time, may even cause patient harm  Ethics consulted, in agreement with surgery  Attempts made to update  12/23 and 12/24, will continue to contact him    Cardiology  Off pressors  CAD s/p AICD, PEA arrest  ASA, Lipitor, Plavix  Midodrine 10 TID  Cardiology consulted, no cath unless any meaningful neurologic recovery  ECHO 12/16/22 - EF 15-20%, global hypokinesis, MV prosthesis     Pulmonary  PRVC 18/500/5/40%  ABG 12/23 7.46/35/75/25  Albuterol nebulizer per RT protocol  Mucomyst per RT protocol     Renal  CHAD on CKD stage IIIb with baseline Cr 1.4-1.6  Cr 1.49, improving  Nephrology following  Free water flushes 200 q 4h per nephrology  I/O: 650 + 2 unmeasured  External catheter in place     GI  TF @ 45 ml/hr at goal  Pepcid 20     ID  WBC 10  S/p Zosyn, ended 12/21     Endo  Glucose 236  Lantus 15 U nightly  ISS increased to HDISS    F.A.S.T. M. H.U.G.S. B.I.D. Feeding Diet: Diet NPO  ADULT TUBE FEEDING; Orogastric; Renal Formula; Continuous; 10; Yes; 10; Q 4 hours; 45; 200; Q 4 hours  Fluids: TF @ 45/hr, free water flushes 200 q4h  Family:  was called yesterday  Analgesic: none  Sedation: no sedation   Thrombo-prophylaxis: [] Enoxaparin, [x] Unfract. Heparin Subcutaneously, [] EPC Cuffs  Mobility: PT for PROM  Heads up: up  Ulcer prophylaxis: [] PPI Agent, [x] [id number], [] Sucralfate, [] Other:  Glycemic control: HDISS, Lantus 15 nightly, Glucose 236  Spontaneous breathing trial: Tolerated   Bowel regimen/urine output: Glycolax prn  Indwelling catheter/lines: NG, L radial art line, ETT  De-escalation: Poor neuro prognosis, T&P not medically appropriate at this time per surgery and ethics teams,  will be contacted again this morning    Caity Steiner M.D.   Emergency Medicine Resident, PGY-2  12/24/2022 7:43 AM

## 2022-12-24 NOTE — PLAN OF CARE
Problem: Chronic Conditions and Co-morbidities  Goal: Patient's chronic conditions and co-morbidity symptoms are monitored and maintained or improved  12/24/2022 1230 by Ulysses Canes, RN  Outcome: Progressing  12/23/2022 2343 by Scottie Mejia RN  Outcome: Progressing     Problem: Discharge Planning  Goal: Discharge to home or other facility with appropriate resources  12/24/2022 1230 by Ulysses Canes, RN  Outcome: Progressing  12/23/2022 2343 by Scottie Mejia RN  Outcome: Progressing     Problem: Pain  Goal: Verbalizes/displays adequate comfort level or baseline comfort level  12/24/2022 1230 by Ulysses Canes, RN  Outcome: Progressing  12/23/2022 2343 by Scottie Mejia RN  Outcome: Progressing     Problem: Respiratory - Adult  Goal: Achieves optimal ventilation and oxygenation  12/24/2022 1230 by Ulysses Canes, RN  Outcome: Progressing  12/24/2022 1023 by Toño Caceres RCP  Outcome: Progressing  12/23/2022 2343 by Scottie Mejia RN  Outcome: Progressing     Problem: Skin/Tissue Integrity  Goal: Absence of new skin breakdown  Description: 1. Monitor for areas of redness and/or skin breakdown  2. Assess vascular access sites hourly  3. Every 4-6 hours minimum:  Change oxygen saturation probe site  4. Every 4-6 hours:  If on nasal continuous positive airway pressure, respiratory therapy assess nares and determine need for appliance change or resting period.   12/24/2022 1230 by Ulysses Canes, RN  Outcome: Progressing  12/23/2022 2343 by Scottie Mejia RN  Outcome: Progressing     Problem: Safety - Adult  Goal: Free from fall injury  12/24/2022 1230 by Ulysses Canes, RN  Outcome: Progressing  Flowsheets (Taken 12/24/2022 1228)  Free From Fall Injury: Instruct family/caregiver on patient safety  12/23/2022 2343 by Scottie Mejia RN  Outcome: Progressing     Problem: ABCDS Injury Assessment  Goal: Absence of physical injury  12/24/2022 1230 by Ulysses Canes, RN  Outcome: Progressing  Flowsheets (Taken 12/24/2022 1228)  Absence of Physical Injury: Implement safety measures based on patient assessment  12/23/2022 2343 by Cristopher Schreiber, RN  Outcome: Progressing     Problem: Nutrition Deficit:  Goal: Optimize nutritional status  12/24/2022 1230 by Janny Garrett RN  Outcome: Progressing  12/23/2022 2343 by Cristopher Schreiber, RN  Outcome: Progressing

## 2022-12-25 NOTE — PLAN OF CARE
Problem: Chronic Conditions and Co-morbidities  Goal: Patient's chronic conditions and co-morbidity symptoms are monitored and maintained or improved  12/25/2022 1052 by Chandrika Osullivan RN  Outcome: Progressing  Flowsheets (Taken 12/25/2022 0800)  Care Plan - Patient's Chronic Conditions and Co-Morbidity Symptoms are Monitored and Maintained or Improved: Monitor and assess patient's chronic conditions and comorbid symptoms for stability, deterioration, or improvement     Problem: Discharge Planning  Goal: Discharge to home or other facility with appropriate resources  12/25/2022 1052 by Chandrika Osullivan RN  Outcome: Progressing     Problem: Pain  Goal: Verbalizes/displays adequate comfort level or baseline comfort level  12/25/2022 1052 by Chandrika Osullivan RN  Outcome: Progressing     Problem: Respiratory - Adult  Goal: Achieves optimal ventilation and oxygenation  12/25/2022 1052 by Chandrika Osullivan RN  Outcome: Progressing     Problem: Skin/Tissue Integrity  Goal: Absence of new skin breakdown  Description: 1. Monitor for areas of redness and/or skin breakdown  2. Assess vascular access sites hourly  3. Every 4-6 hours minimum:  Change oxygen saturation probe site  4. Every 4-6 hours:  If on nasal continuous positive airway pressure, respiratory therapy assess nares and determine need for appliance change or resting period.   12/25/2022 1052 by Chandrika Osullivan RN  Outcome: Progressing     Problem: ABCDS Injury Assessment  Goal: Absence of physical injury  12/25/2022 1052 by Chandrika Osullivan RN  Outcome: Progressing  Flowsheets (Taken 12/25/2022 1047)  Absence of Physical Injury: Implement safety measures based on patient assessment     Problem: Nutrition Deficit:  Goal: Optimize nutritional status  12/25/2022 1052 by Chandrika Osullivan RN  Outcome: Progressing

## 2022-12-25 NOTE — PROGRESS NOTES
INTENSIVE CARE UNIT  Resident Physician Progress Note    Patient - Roe Jain  Date of Admission -  12/14/2022 10:09 AM  Date of Evaluation -  12/25/2022  Room and Bed Number -  3025/3025-01   Hospital Day - 11    HPI:     Roe Jain is a 60 y.o. with PMH of   - CAD, s/p CABG in 2015  - Ischemic cardiomyopathy, last EF of 25%  - Insulin-dependent diabetes mellitus type 2  - CKD     Presented to [facility] ER as a transfer from [facility] ER. According to the chart review patient was found unresponsive by the  as per EMS. Unknown downtime. Per EMS report no CPR performed. EMS intubated the patient esophageally, prior to arrival at [facility], reintubation was done and then patient's glucose was found to be 18. At [facility] found to be in PEA arrest achieved ROSC needed 6 mg epinephrine, received multiple pushes of Versed and fentanyl for posturing and seizure-like activity. On arrival to our [facility] ER patient was unresponsive. Fixed and dilated pupils, posturing and on propofol infusion. EKG in the ED showed new right bundle branch block then due to concern for PE, CT PE was done. Bedside ultrasound did not show any signs of tamponade. CT negative for any PE. Around 10:25 AM there was an episode of pulselessness and bradycardia and PEA was given at epinephrine and troponin. Cooling was started around 11:28 AM with target temperature of 38 Celsius     Initial vitals in the ED temperature around 35, heart rate 106, respiratory 22, pressure 124/108     CT head negative for any acute intracranial abnormality  CT chest showed pleural effusions and cardiomegaly      12/17: CT head showing no acute processes, chronic small vessel ischemic white matter disease. LTME showing diffuse cortical dysfunction, severe encephalopathy. 12/18: Levo discontinued. Remains on propofol. 12/19: Tolerated CPAP trial. Per neuro ICU prognostication, brainstem reflexes intact. Poor prognosis.  Discussion with family about goals, T&P vs withdrawal of care. Palliative following. : Plan for bedside discussion of goals of care and code status with family tomorrow,  with palliative and ICU team.  :  not able to attend bedside discussion, I spoke with him via phone. Code status remains full code.  would like to discuss trach and peg. Surgery consulted. : Off propofol. : Mildly tachypneic. Started precedex. CXR ordered. SUBJECTIVE:     OVERNIGHT EVENTS:    This morning patient mildly tachypneic and tachycardic at 109, increased abdominal breathing. Started precedex. CXR ordered. Suctioned at bedside. Will consider propofol if no improvement with precedex.     AWAKE & FOLLOWING COMMANDS:  [x] No   [] Yes    SECRETIONS Amount:  [x] Small [] Moderate  [] Large  [] None  Color:     [x] White [] Colored  [] Bloody    SEDATION:  RAAS Score:  [] Propofol gtt  [] Versed gtt  [] Ativan gtt   [x] Precedex    PARALYZED:  [x] No    [] Yes    VASOPRESSORS:  [x] No    [] Yes  [] Levophed [] Dopamine [] Vasopressin  [] Dobutamine [] Phenylephrine [] Epinephrine      OBJECTIVE:     VITAL SIGNS:  /76   Pulse (!) 110   Temp 97.5 °F (36.4 °C) (Esophageal)   Resp 22   Ht 5' 6\" (1.676 m)   Wt 156 lb 1.4 oz (70.8 kg)   SpO2 93%   BMI 25.19 kg/m²   Tmax over 24 hours:  Temp (24hrs), Av.9 °F (37.2 °C), Min:97.5 °F (36.4 °C), Max:99.7 °F (37.6 °C)      Patient Vitals for the past 8 hrs:   BP Temp Temp src Pulse Resp SpO2 Weight   22 0744 -- -- -- (!) 110 22 93 % --   22 0700 -- -- -- (!) 109 24 96 % --   22 0600 -- 97.5 °F (36.4 °C) Esophageal (!) 111 23 95 % 156 lb 1.4 oz (70.8 kg)   22 0530 -- -- -- (!) 112 27 96 % --   22 0500 -- -- -- (!) 114 23 94 % --   22 0430 -- -- -- (!) 116 26 96 % --   22 0400 122/76 98.2 °F (36.8 °C) Esophageal 89 29 94 % --   22 0330 -- -- -- 90 25 92 % --   22 0324 -- -- -- 93 29 96 % --   22 0300 -- -- -- 91 24 96 % --   12/25/22 0230 -- -- -- 89 22 94 % --   12/25/22 0200 -- 98.4 °F (36.9 °C) Esophageal 91 (!) 31 94 % --   12/25/22 0130 -- -- -- 87 26 90 % --   12/25/22 0100 -- -- -- 88 20 93 % --   12/25/22 0030 -- -- -- 86 27 95 % --   12/25/22 0000 133/86 99 °F (37.2 °C) Esophageal 92 (!) 32 99 % --         Intake/Output Summary (Last 24 hours) at 12/25/2022 0759  Last data filed at 12/25/2022 0600  Gross per 24 hour   Intake 1815.1 ml   Output 980 ml   Net 835.1 ml     Date 12/25/22 0000 - 12/25/22 2359   Shift 1941-9802 4634-1415 9514-2134 24 Hour Total   INTAKE   I.V.(mL/kg) 100(1.4)   100(1.4)   NG/GT(mL/kg) 722(10.2)   722(10.2)   Shift Total(mL/kg) 822(11.6)   822(11.6)   OUTPUT   Urine(mL/kg/hr) 550   550   Shift Total(mL/kg) 550(7.8)   550(7.8)   Weight (kg) 70.8 70.8 70.8 70.8     Wt Readings from Last 3 Encounters:   12/25/22 156 lb 1.4 oz (70.8 kg)   10/24/22 165 lb 9.6 oz (75.1 kg)   10/24/22 165 lb (74.8 kg)     Body mass index is 25.19 kg/m². PHYSICAL EXAM:  GEN:                  Intubated, not following commands  EYES:                Not tracking, pupils equal, round, and reactive to light  HEENT:            Normocephalic, without obvious abnormality  LUNGS:            clear to auscultation, with similar appearing secretions in ETT. Suctioned at bedside. Increased abdominal breathing, mildly tachypneic. CV:                     regular rate and rhythm and normal S1 and S2  ABDOMEN:     soft and non-distended  MSK:                  there is no redness, warmth, or swelling of the joints  NEURO[de-identified]           Intubated, not following commands. Decerebrate posturing. Upward going Babinski.   SKIN:                 Normal skin color, texture, turgor  EXTREMITIES:  distal pulses intact       MEDICATIONS:  Scheduled Meds:   insulin glargine  20 Units SubCUTAneous Nightly    insulin lispro  0-16 Units SubCUTAneous Q6H    midodrine  10 mg Oral TID WC    aspirin  81 mg Oral Daily    heparin (porcine) 5,000 Units SubCUTAneous 3 times per day    famotidine (PEPCID) injection  20 mg IntraVENous Daily    sodium chloride flush  5-40 mL IntraVENous 2 times per day    chlorhexidine  15 mL Mouth/Throat BID    atorvastatin  80 mg Oral Nightly    [Held by provider] carvedilol  3.125 mg Oral BID WC    clopidogrel  75 mg Oral Daily     Continuous Infusions:   dexmedetomidine      sodium chloride 10 mL/hr at 12/25/22 0607    dextrose       PRN Meds:   sodium chloride flush, 5-40 mL, PRN  sodium chloride, , PRN  ondansetron, 4 mg, Q8H PRN   Or  ondansetron, 4 mg, Q6H PRN  polyethylene glycol, 17 g, Daily PRN  acetaminophen, 650 mg, Q6H PRN   Or  acetaminophen, 650 mg, Q6H PRN  glucose, 4 tablet, PRN  dextrose bolus, 125 mL, PRN   Or  dextrose bolus, 250 mL, PRN  glucagon (rDNA), 1 mg, PRN  dextrose, , Continuous PRN  albuterol, 2.5 mg, As Directed RT PRN  acetylcysteine, 600 mg, As Directed RT PRN        SUPPORT DEVICES: [x] Ventilator [] BIPAP  [] Nasal Cannula [] Room Air    VENT SETTINGS (Comprehensive) (if applicable):   PRVC mode, FiO2 30%, PEEP 5, Respiratory Rate 18, Tidal Volume 400  Vent Information  Ventilator ID: [id number]  Equipment Changed: HME  Ventilator Initiate: Yes  Vent Mode: AC/PRVC  Additional Respiratory Assessments  Heart Rate: (!) 110  Resp: 22  SpO2: 93 %  End Tidal CO2: 30 (%)  Position: Semi-Simon's  Humidification Source: HME  Cuff Pressure (cm H2O):  (mlt)  Skin Barrier Applied: No    DATA:  Complete Blood Count:   Recent Labs     12/23/22  0519 12/24/22  0631 12/25/22  0618   WBC 8.9 10.0 16.2*   RBC 3.52* 3.29* 3.76*   HGB 8.7* 8.2* 9.4*   HCT 30.3* 27.8* 32.1*   MCV 86.1 84.5 85.4   MCH 24.7* 24.9* 25.0*   MCHC 28.7 29.5 29.3   RDW 18.6* 18.6* 18.7*    183 269   MPV 11.7 12.1 12.3        Last 3 Blood Glucose:   Recent Labs     12/23/22  0519 12/24/22  0631 12/25/22  0618   GLUCOSE 295* 236* 287*        PT/INR:    Lab Results   Component Value Date/Time    PROTIME 13.7 12/14/2022 01:38 PM INR 1.3 12/14/2022 01:38 PM     PTT:    Lab Results   Component Value Date/Time    APTT 26.0 12/14/2022 01:38 PM       Comprehensive Metabolic Profile:   Recent Labs     12/23/22  0519 12/24/22  0631 12/25/22  0618   * 142 136   K 3.8 3.7 4.1   * 109* 101   CO2 26 24 22   BUN 34* 35* 36*   CREATININE 1.63* 1.49* 1.44*   GLUCOSE 295* 236* 287*   CALCIUM 8.8 8.6 8.6      Magnesium:   Lab Results   Component Value Date/Time    MG 2.1 12/18/2022 04:08 AM    MG 2.2 12/17/2022 06:09 AM    MG 2.1 12/17/2022 12:03 AM     Phosphorus:   Lab Results   Component Value Date/Time    PHOS 4.1 12/18/2022 04:08 AM    PHOS 5.6 12/17/2022 06:09 AM    PHOS 6.5 12/17/2022 12:03 AM     Ionized Calcium:   Lab Results   Component Value Date/Time    CAION 1.19 12/22/2022 05:48 AM    CAION 1.18 12/21/2022 04:59 AM    CAION 1.17 12/20/2022 04:55 AM        Urinalysis:   Lab Results   Component Value Date/Time    NITRU NEGATIVE 12/14/2022 12:26 PM    COLORU Yellow 12/14/2022 12:26 PM    PHUR 6.0 12/14/2022 12:26 PM    WBCUA 50  12/14/2022 12:26 PM    RBCUA 0 TO 2 12/14/2022 12:26 PM    MUCUS NOT REPORTED 11/21/2018 12:12 PM    TRICHOMONAS NOT REPORTED 11/21/2018 12:12 PM    YEAST FEW 12/14/2022 12:26 PM    BACTERIA RARE 12/14/2022 09:30 AM    SPECGRAV 1.018 12/14/2022 12:26 PM    LEUKOCYTESUR NEGATIVE 12/14/2022 12:26 PM    UROBILINOGEN Normal 12/14/2022 12:26 PM    BILIRUBINUR NEGATIVE 12/14/2022 12:26 PM    GLUCOSEU 1+ 12/14/2022 12:26 PM    KETUA NEGATIVE 12/14/2022 12:26 PM    AMORPHOUS TRACE 11/21/2018 12:12 PM       HgBA1c:    Lab Results   Component Value Date/Time    LABA1C 8.5 12/15/2022 05:56 AM     TSH:    Lab Results   Component Value Date/Time    TSH 1.66 10/14/2022 10:23 AM     Lactic Acid:   Lab Results   Component Value Date/Time    LACTA 1.4 10/13/2022 12:22 PM    LACTA 1.7 12/29/2018 11:57 AM    LACTA 1.5 12/29/2018 05:30 AM      Troponin: No results for input(s): TROPONINI in the last 72 hours.      ASSESSMENT:     Patient Active Problem List    Diagnosis Date Noted    Palliative care encounter 12/16/2022    NSTEMI (non-ST elevated myocardial infarction) ([address] 12/15/2022    Ventricular tachycardia 12/15/2022    Cardiac arrest ([address] 12/14/2022    Pulmonary hypertension ([address] 12/14/2022    Diabetes mellitus with kidney disease ([address] 12/14/2022    Stage 3b chronic kidney disease ([address] 10/17/2022    Ischemic cardiomyopathy 10/17/2022    Acute respiratory failure with hypoxia and hypercapnia ([address] 10/17/2022    CHF (congestive heart failure), NYHA class I, acute on chronic, combined ([address] 10/14/2022    Acute on chronic systolic congestive heart failure ([address] 10/14/2022    Congestive heart failure due to cardiomyopathy ([address] 02/11/8418    Acute systolic CHF (congestive heart failure) ([address] 10/12/2022    Pneumonia 12/28/2018    Dyslipidemia     Ureteral stone with hydronephrosis 11/04/2018    Acute pulmonary edema ([address] 11/04/2018    Elevated serum immunoglobulin free light chains     CHAD (acute kidney injury) ([address] 21/32/7651    Metabolic acidosis 81/61/1616    Shock ([address] 08/23/2018    DM (diabetes mellitus), type 2 with neurological complications ([address] 63/50/8829    Anxiety 01/26/2016    CAD (coronary artery disease)     Hx of CABG     ICD (implantable cardioverter-defibrillator) in place     H/O mitral valve repair     Hx of myocardial infarction     Hypertension     Onychomycosis of toenail 11/19/2015    S/P cardiac cath-Patent grafts 6/5/15 06/04/2015    Acute coronary syndrome ([address] 05/26/2015          PLAN:     Neuro  Intubated  Precedex added this morning for mild tachypnea and mild tachycardia  Will add propofol if needed  Neuro ICU evaluated for neuro prognostication, only brainstem reflexes intact, decerebrate posturing, upward babinksi, poor prognosis, neuro has signed off   No MRI due to AICD incompatibility  LTME - diffuse cortical dysfunction, severe encephalopathy  CT head 12/17 - no acute processes, chronic small vessel ischemic deep white matter disease  Spoke with  again on 12/24, at this time patient remains full code, discussed surgical and ethics expert consultation and recommendations with him, will continue to contact     Cardiology  Off pressors  CAD s/p AICD, PEA arrest  ASA, Lipitor, Plavix  Midodrine 10 TID  Cardiology consulted, no cath unless any meaningful neurologic recovery  ECHO 12/16/22 - EF 15-20%, global hypokinesis, MV prosthesis     Pulmonary  PRVC 18/500/5/40%  ABG 12/23 7.46/35/75/25  Albuterol nebulizer per RT protocol  Mucomyst per RT protocol     Renal  CHAD on CKD stage IIIb with baseline Cr 1.4-1.6  Cr 1.44, improving  Nephrology following  Free water flushes 200 q 4h per nephrology  I/O: 950 + 1 unmeasured  External catheter in place     GI  TF @ 45 ml/hr at goal  Pepcid 20     ID  WBC 16.2  Procal pending  UA pending  S/p Zosyn, ended 12/21     Endo  Glucose 287  Change Lantus to 15 U BID for improved glycemic control  HDISS    F.A.S.T. M. H.U.G.S. B.I.D. Feeding Diet: Diet NPO  ADULT TUBE FEEDING; Orogastric; Renal Formula; Continuous; 10; Yes; 10; Q 4 hours; 45; 200; Q 4 hours  Fluids: Free water flushes 200 q 4h, TF @ 45ml/hr  Family: spoke with  Winsome Patel again 12/24, he reiterated that he would like patient to remain full code with compressions performed despite patient's stated wishes on 10/12/22, discussed ethics and surgical recommendations with him again  Analgesic: tylenol  Sedation: precedex   Thrombo-prophylaxis: [] Enoxaparin, [x] Unfract.  Heparin Subcutaneously, [] EPC Cuffs  Mobility: PT PROM  Heads up: up  Ulcer prophylaxis: [] PPI Agent, [x] S3Epule, [] Sucralfate, [] Other:  Glycemic control: HDISS, Lantus 15 u BID  Spontaneous breathing trial: Not performed yet today   Bowel regimen/urine output: glycolax prn, U out 980+1 unmeasured  Indwelling catheter/lines: NG, ETT, L art line, peripherals  De-escalation: Continued code status discussions with slava Lauren M.D.   Emergency Medicine Resident, PGY-2  12/25/2022 7:59 AM

## 2022-12-25 NOTE — PLAN OF CARE
Problem: Chronic Conditions and Co-morbidities  Goal: Patient's chronic conditions and co-morbidity symptoms are monitored and maintained or improved  12/24/2022 2341 by Lilia Grajeda RN  Outcome: Progressing     Problem: Discharge Planning  Goal: Discharge to home or other facility with appropriate resources  12/24/2022 2341 by Lilia Grajeda RN  Outcome: Progressing     Problem: Pain  Goal: Verbalizes/displays adequate comfort level or baseline comfort level  12/24/2022 2341 by Lilia Grajeda RN  Outcome: Progressing     Problem: Respiratory - Adult  Goal: Achieves optimal ventilation and oxygenation  12/24/2022 2341 by Lilia Grajeda RN  Outcome: Progressing     Problem: Skin/Tissue Integrity  Goal: Absence of new skin breakdown  Description: 1. Monitor for areas of redness and/or skin breakdown  2. Assess vascular access sites hourly  3. Every 4-6 hours minimum:  Change oxygen saturation probe site  4. Every 4-6 hours:  If on nasal continuous positive airway pressure, respiratory therapy assess nares and determine need for appliance change or resting period.   12/24/2022 2341 by Lilia Grajeda RN  Outcome: Progressing     Problem: Safety - Adult  Goal: Free from fall injury  12/24/2022 2341 by Lilia Grajeda RN  Outcome: Progressing     Problem: ABCDS Injury Assessment  Goal: Absence of physical injury  12/24/2022 2341 by Lilia Grajeda RN  Outcome: Progressing     Problem: Nutrition Deficit:  Goal: Optimize nutritional status  12/24/2022 2341 by Lilia Grajeda RN  Outcome: Progressing

## 2022-12-25 NOTE — PLAN OF CARE
Problem: Respiratory - Adult  Goal: Achieves optimal ventilation and oxygenation  12/25/2022 0946 by Jacky Lnodono RCP  Outcome: Progressing     Problem: OXYGENATION/RESPIRATORY FUNCTION  Goal: Patient will maintain patent airway  Outcome: Ongoing  Goal: Patient will achieve/maintain normal respiratory rate/effort  Respiratory rate and effort will be within normal limits for the patient  Outcome: Ongoing    Problem: MECHANICAL VENTILATION  Goal: Patient will maintain patent airway  Outcome: Ongoing  Goal: Oral health is maintained or improved  Outcome: Ongoing  Goal: ET tube will be managed safely  Outcome: Ongoing  Goal: Ability to express needs and understand communication  Outcome: Ongoing  Goal: Mobility/activity is maintained at optimum level for patient  Outcome: Ongoing    Problem: ASPIRATION PRECAUTIONS  Goal: Patients risk of aspiration is minimized  Outcome: Ongoing    Problem: SKIN INTEGRITY  Goal: Skin integrity is maintained or improved  Outcome: Ongoing

## 2022-12-26 PROBLEM — G93.1 ACUTE ANOXIC ENCEPHALOPATHY (HCC): Status: ACTIVE | Noted: 2022-01-01

## 2022-12-26 NOTE — PROGRESS NOTES
There is no ACP document scanned into the chart. If there is a completed form at the bedside that is a binding document that would require us to follow the patient's wishes. The \"conversation\" that was documented previously reflects the patient's wishes, her choice, but is not binding. Completing the ACP document at the time of the conversation would assist the patient, family and care teams going forward. The , as many family members say, feels like he is \"killing her\" to provide care focused on comfort. She has made the decision and sometimes it is helpful to remind the legal surrogate that the patient counts on them to be certain that the medical/surgical team follows her wishes. It was her decision and his opportunity to advocate for her. Thank you. Please perfect serve with questions or comments.

## 2022-12-26 NOTE — PROGRESS NOTES
called patient's , Bita Glez, at 1:48 pm for grief support. The patient's  expressed that during this time he is trying to make all the needed preparations before the end of the month. He also shared that he is still in the process of telling his grandchildren and great-grandchildren the status of the patient. The patient's  expressed that he is \"coping as he can\" and named that his daughter  in August, mother-in-law in September, and now \"this. \"     When asking how Spiritual Care can continue to support him, the patient's  requested that, if possible, he would like last rites administered to the patient.  The patient's  explained that he is taking 48 hours to make his decision and

## 2022-12-26 NOTE — PLAN OF CARE
Problem: Respiratory - Adult  Goal: Achieves optimal ventilation and oxygenation  12/25/2022 2132 by Codey Solomon RCP  Outcome: Progressing  Flowsheets (Taken 12/25/2022 2132)  Achieves optimal ventilation and oxygenation:   Assess for changes in respiratory status   Position to facilitate oxygenation and minimize respiratory effort   Assess the need for suctioning and aspirate as needed   Respiratory therapy support as indicated   Assess and instruct to report shortness of breath or any respiratory difficulty   Oxygen supplementation based on oxygen saturation or arterial blood gases  12/25/2022 1052 by Juventino Webb RN  Outcome: Progressing  12/25/2022 0946 by Jose Garcia RCP  Outcome: Progressing

## 2022-12-26 NOTE — PLAN OF CARE
Problem: Respiratory - Adult  Goal: Achieves optimal ventilation and oxygenation  12/26/2022 1232 by Feliberto Soto  Outcome: Progressing  Flowsheets (Taken 12/26/2022 1232)  Achieves optimal ventilation and oxygenation:   Assess for changes in respiratory status   Position to facilitate oxygenation and minimize respiratory effort   Assess the need for suctioning and aspirate as needed   Respiratory therapy support as indicated   Encourage broncho-pulmonary hygiene including cough, deep breathe, incentive spirometry   Oxygen supplementation based on oxygen saturation or arterial blood gases   Assess for changes in mentation and behavior

## 2022-12-26 NOTE — PROGRESS NOTES
Physical Therapy Cancel Note      DATE: 2022    NAME: Yvonne Meehan  MRN: [id number]   : 1960      Patient not seen this date for Physical Therapy due to:     Other:  deciding about CODE status; currently DNRCC-A, talking to family about withdrawing support; will continue to monitor; check status       Electronically signed by Gabriela Harmon PT on 2022 at 12:00 PM

## 2022-12-26 NOTE — PLAN OF CARE
Problem: Chronic Conditions and Co-morbidities  Goal: Patient's chronic conditions and co-morbidity symptoms are monitored and maintained or improved  12/26/2022 1143 by Chandrika Osullivan RN  Outcome: Progressing     Problem: Discharge Planning  Goal: Discharge to home or other facility with appropriate resources  12/26/2022 1143 by Chandrika Osullivan RN  Outcome: Progressing     Problem: Pain  Goal: Verbalizes/displays adequate comfort level or baseline comfort level  12/26/2022 1143 by Chandrika Osullivan RN  Outcome: Progressing     Problem: Respiratory - Adult  Goal: Achieves optimal ventilation and oxygenation  12/26/2022 1143 by Chandrika Osullivan RN  Outcome: Progressing     Problem: Skin/Tissue Integrity  Goal: Absence of new skin breakdown  Description: 1. Monitor for areas of redness and/or skin breakdown  2. Assess vascular access sites hourly  3. Every 4-6 hours minimum:  Change oxygen saturation probe site  4. Every 4-6 hours:  If on nasal continuous positive airway pressure, respiratory therapy assess nares and determine need for appliance change or resting period.   12/26/2022 1143 by Chandrika Osullivan RN  Outcome: Progressing     Problem: Safety - Adult  Goal: Free from fall injury  12/26/2022 1143 by Chandrika Osullivan RN  Outcome: Progressing     Problem: ABCDS Injury Assessment  Goal: Absence of physical injury  12/26/2022 1143 by Chandrika Osullivan RN  Outcome: Progressing     Problem: Nutrition Deficit:  Goal: Optimize nutritional status  12/26/2022 1143 by Chandrika Osullivan RN  Outcome: Progressing

## 2022-12-26 NOTE — PLAN OF CARE
Problem: Chronic Conditions and Co-morbidities  Goal: Patient's chronic conditions and co-morbidity symptoms are monitored and maintained or improved  12/25/2022 2358 by Lizett Wolf RN  Outcome: Progressing     Problem: Discharge Planning  Goal: Discharge to home or other facility with appropriate resources  12/25/2022 2358 by Lizett Wolf RN  Outcome: Progressing     Problem: Pain  Goal: Verbalizes/displays adequate comfort level or baseline comfort level  12/25/2022 2358 by Lizett Wolf RN  Outcome: Progressing     Problem: Respiratory - Adult  Goal: Achieves optimal ventilation and oxygenation  12/25/2022 2358 by Lizett Wolf RN  Outcome: Progressing     Problem: Skin/Tissue Integrity  Goal: Absence of new skin breakdown  Description: 1. Monitor for areas of redness and/or skin breakdown  2. Assess vascular access sites hourly  3. Every 4-6 hours minimum:  Change oxygen saturation probe site  4. Every 4-6 hours:  If on nasal continuous positive airway pressure, respiratory therapy assess nares and determine need for appliance change or resting period.   12/25/2022 2358 by Lizett Wolf RN  Outcome: Progressing     Problem: Safety - Adult  Goal: Free from fall injury  12/25/2022 2358 by Lizett Wolf RN  Outcome: Progressing     Problem: ABCDS Injury Assessment  Goal: Absence of physical injury  12/25/2022 2358 by Lizett Wolf RN  Outcome: Progressing     Problem: Nutrition Deficit:  Goal: Optimize nutritional status  12/25/2022 2358 by Lizett Wolf RN  Outcome: Progressing

## 2022-12-26 NOTE — ACP (ADVANCE CARE PLANNING)
Advance Care Planning     Advance Care Planning (ACP) Physician/NP/PA Conversation    Date of Conversation: 12/14/2022  Conducted with:  Healthcare Decision Maker: Next of Kin by law (only applies in absence of above) (name)     Healthcare Decision Maker:      Primary Decision Maker: Joselo Mccabe - Spouse - [phone]    Click here to complete [address] including selection of the Healthcare Decision Maker Relationship (ie \"Primary\")  Today we documented Decision Maker(s) consistent with Legal Next of Kin hierarchy. Care Preferences:    Hospitalization: \"If your health worsens and it becomes clear that your chance of recovery is unlikely, what would be your preference regarding hospitalization? \"  The patient would prefer hospitalization. Ventilation: \"If you were unable to breath on your own and your chance of recovery was unlikely, what would be your preference about the use of a ventilator (breathing machine) if it was available to you? \"  The patient would desire the use of a ventilator. Resuscitation: \"In the event your heart stopped as a result of an underlying serious health condition, would you want attempts made to restart your heart, or would you prefer a natural death? \"  No, do NOT attempt to resuscitate.     treatment goals, benefit/burden of treatment options, ventilation preferences, hospitalization preferences, resuscitation preferences, end of life care preferences (vegetative state/imminent death), and hospice care    Conversation Outcomes / Follow-Up Plan:  ACP in process - information provided, considering goals and options  Reviewed DNR/DNI and patient elects DNR order - completed portable DNR form & placed order    Length of Voluntary ACP Conversation in minutes:  16 minutes    MICKEY Paul - CNP

## 2022-12-26 NOTE — PROGRESS NOTES
..Palliative Care Family Conference    Patient: Reyna Ramirez  Room: 9202/3565-44    Attended By: Maralyn Litten and writer    Reason for meeting: Follow-up to prior meeting  Discuss goals of care  Treatment options/plans  Provide clinical updates and answer questions  Share information and provider education for the family  Listen to patient/family concerns  Interdiscplinary collaboration  Build trust  Poor prognosis is anticipated  Respond to a family crisis  Provide emotional support to the family  Elicit patient's goals and values (through substituted judgment of a surrogate decision maker), and use these to establish or modify goals of care  Discuss prospect of foregoing or withdrawing life support measures     Conference Summary: I received PS over weekend requesting follow up with patients  as patient has significant anoxic brain injury and he was electing for trach/peg, but according to previous ACP documentation wife did not want prolonged intubation if unlikely to recover. I reached out to  via telephone conference and explained my role. We discussed patients condition in detail. He reports being up to the hospital and receiving updates from staff. I discussed patients poor neurological outcome and poor QOL. I also discussed this previous ACP note that she had filled out in the past with a staff member.  reports not knowing this prior and we discussed how sometimes these conversation aren't had with our loved ones d/t how difficult they are. I explained in the hospital we tend to have these conversations with patients when hospitalized. I asked  if he has considered comfort care and taking all support off allowing the natural process for his wife.  was clearly upset/distressed and reports, \"I feel like I am killing her. \" I offered him much support and was of listening presence. I acknowledged his grief and struggles.  He is caring for Mx grandchildren now alone. He reports trying to reach out to their  to help him. I offered to call out  but he declined. I discussed how he should look as his decision of comfort care would be honoring patients wishes and discussed how sometimes certain heroic measures can be prolonging suffering. I discussed DNRCC-A vs [facility] code status and he asked about the process of terminal extubation as far as timing. I explained to him that he and his family are part of the timing. I informed him that once he and family are ready, we can have staff here to do this and be of support to them.  is asking for 2 days to contact her siblings including sister Dewayne Ruiz and brother Ellen Funez. Both are OOT. He is not sure any family can come besides him. I discussed making patient a DNRCC-A until this time and he was agreeable. He confirms that we continue all but if patients heart stops we do nothing more. I again asked  if he has any needs or would need help contacting family to explain and he reported no at this time. He reports taking the last few days to discuss patients condition with children and grandchildren as able. He reports some likely needs with how to approach this with grandchildren and we discussed that if hospice is involved then they also have support programs and SW.     I recapped our conversation and discussed how code status would be changed today to [facility] and staff would be in touch Tuesday evening/Wednesday regarding timing and accommodations for family. Conclusion/Plan:  decided to change code status to [facility] today and is planning comfort care possibly 12/28. He is contacting family and his local  to help him with his grief. Much emotional support and listening presence provided. We will follow.      Electronically signed by   MICKEY Garcia CNP  Palliative Care Team  on 12/26/2022 at 10:03 AM

## 2022-12-26 NOTE — PROGRESS NOTES
called patient's , Ronnie Oconnell, at 1:48 pm for grief support. The patient's  expressed that during this time he is trying to make all the needed preparations before the end of the month. He also shared that he is still in the process of telling his grandchildren and great-grandchildren the status of the patient. The patient's  expressed that he is \"coping as he can\" and named that his daughter  in August, mother-in-law in September, and now \"this. \"     When asking how Spiritual Care can continue to support him, the patient's  requested that, if possible, he would like last rites administered to the patient. The patient's  explained that he is taking 48 hours to make any decision(s) and he expressed gratitude for all of the support received from medical staff and spiritual care. 22 1401   Encounter Summary   Service Provided For: Significant other   Referral/Consult From:  [address] Family members   Last Encounter  22   Complexity of Encounter Low   Begin Time 1348   End Time  1401   Total Time Calculated 13 min   Spiritual/Emotional needs   Type Difficult news received   Assessment/Intervention/Outcome   Assessment Coping   Intervention Sustaining Presence/Ministry of presence; Active listening;Discussed belief system/Restorationism practices/genesis; Explored/Affirmed feelings, thoughts, concerns;Grief Care   Outcome Comfort;Engaged in conversation;Expressed Gratitude

## 2022-12-26 NOTE — PROGRESS NOTES
responded to Palliative Care consult regarding grief support for the patient's , Yoan Munoz. The  visited with the patient who was intubated and sedated, there was no sign of any family/visitors. Per the nurse, the patient's  has not come to the hospital and receives updates from medical staff via telephone. They expressed that their understanding is that the  is hesitant to make decisions due to feeling like he/we are giving up on the patient. The nurse shared that the patient's  does not call to request information, rather he relies on medical staff to call him regularly with updates. The patient's  met with Palliative Care via phone this morning - see palliative note for more details of that meeting. Rose Chakraborty will attempt to call the patient's  to offer grief support.       12/26/22 1235   Encounter Summary   Service Provided For: Significant other   Begin Time 1235   End Time  1250   Total Time Calculated 15 min

## 2022-12-26 NOTE — PROGRESS NOTES
INTENSIVE CARE UNIT  Resident Physician Progress Note    Patient - Axel Leiva  Date of Admission -  12/14/2022 10:09 AM  Date of Evaluation -  12/26/2022  Room and Bed Number -  3025/3025-01   Hospital Day - 12    HPI:     Axel Leiva is a 60 y.o. with PMH of   - CAD, s/p CABG in 2015  - Ischemic cardiomyopathy, last EF of 25%  - Insulin-dependent diabetes mellitus type 2  - CKD     Presented to [facility] ER as a transfer from [facility] ER. According to the chart review patient was found unresponsive by the  as per EMS. Unknown downtime. Per EMS report no CPR performed. EMS intubated the patient esophageally, prior to arrival at [facility], reintubation was done and then patient's glucose was found to be 18. At [facility] found to be in PEA arrest achieved ROSC needed 6 mg epinephrine, received multiple pushes of Versed and fentanyl for posturing and seizure-like activity. On arrival to our [facility] ER patient was unresponsive. Fixed and dilated pupils, posturing and on propofol infusion. EKG in the ED showed new right bundle branch block then due to concern for PE, CT PE was done. Bedside ultrasound did not show any signs of tamponade. CT negative for any PE. Around 10:25 AM there was an episode of pulselessness and bradycardia and PEA was given at epinephrine and troponin. Cooling was started around 11:28 AM with target temperature of 38 Celsius. CT head negative for any acute intracranial abnormality  CT chest showed pleural effusions and cardiomegaly      12/17: CT head showing no acute processes, chronic small vessel ischemic white matter disease. LTME showing diffuse cortical dysfunction, severe encephalopathy. 12/18: Levo discontinued. Remains on propofol. 12/19: Tolerated CPAP trial. Per neuro ICU prognostication, brainstem reflexes intact. Poor prognosis. Discussion with family about goals, T&P vs withdrawal of care. Palliative following.   12/20: Plan for bedside discussion of goals of care and code status with family tomorrow,  with palliative and ICU team.  :  not able to attend bedside discussion, I spoke with him via phone. Code status remains full code.  would like to discuss trach and peg. Surgery consulted. : Off propofol. : Mildly tachypneic. Started precedex. CXR ordered. SUBJECTIVE:     OVERNIGHT EVENTS:         was contacted  per documentation, was informed of the ethics consultants opinion. He would like current care continued.   Hyperglycemia, increased ISS to HDISS q 4 h and Lantus 15 BID  Tolerating tube feeds    AWAKE & FOLLOWING COMMANDS:  [x] No   [] Yes    SECRETIONS Amount:  [x] Small [] Moderate  [] Large  [] None  Color:     [x] White [] Colored  [] Bloody    SEDATION:  RAAS Score:  [] Propofol gtt  [] Versed gtt  [] Ativan gtt   [x] Precedex    PARALYZED:  [x] No    [] Yes    VASOPRESSORS:  [x] No    [] Yes  [] Levophed [] Dopamine [] Vasopressin  [] Dobutamine [] Phenylephrine [] Epinephrine      OBJECTIVE:     VITAL SIGNS:  /60   Pulse 89   Temp 99.1 °F (37.3 °C) (Esophageal)   Resp (!) 36   Ht 5' 6\" (1.676 m)   Wt 160 lb 11.5 oz (72.9 kg)   SpO2 100%   BMI 25.94 kg/m²   Tmax over 24 hours:  Temp (24hrs), Av.3 °F (37.4 °C), Min:98.1 °F (36.7 °C), Max:100.4 °F (38 °C)      Patient Vitals for the past 8 hrs:   BP Temp Temp src Pulse Resp SpO2 Weight   22 0756 -- -- -- 89 (!) 36 100 % --   22 0700 104/60 -- -- 83 30 98 % --   22 0600 109/72 99.1 °F (37.3 °C) Esophageal 89 (!) 32 99 % 160 lb 11.5 oz (72.9 kg)   22 0530 -- -- -- 66 27 99 % --   22 0500 (!) 81/48 -- -- 68 28 97 % --   22 0444 -- -- -- 69 28 97 % --   22 0430 -- -- -- 69 30 93 % --   22 0400 (!) 88/51 99.3 °F (37.4 °C) Esophageal 72 29 95 % --   22 0351 -- -- -- 91 (!) 37 96 % --   22 0330 -- -- -- 90 (!) 32 97 % --   22 0300 111/80 -- -- 90 29 98 % -- 12/26/22 0230 -- -- -- 83 21 95 % --   12/26/22 0200 121/85 99.5 °F (37.5 °C) Esophageal 100 25 97 % --   12/26/22 0130 -- -- -- 95 30 99 % --   12/26/22 0100 -- -- -- 99 (!) 31 98 % --   12/26/22 0030 -- -- -- 98 (!) 35 98 % --   12/26/22 0011 -- -- -- (!) 101 (!) 36 99 % --         Intake/Output Summary (Last 24 hours) at 12/26/2022 0806  Last data filed at 12/26/2022 6378  Gross per 24 hour   Intake 1671.78 ml   Output 1000 ml   Net 671.78 ml     Date 12/26/22 0000 - 12/26/22 2359   Shift 2368-9702 4400-2059 1999-7658 24 Hour Total   INTAKE   I.V.(mL/kg) 159.6(2.2)   159.6(2.2)   NG/GT(mL/kg) 870(11.9)   870(11.9)   Shift Total(mL/kg) 1029. 6(14.1)   1029. 6(14.1)   OUTPUT   Urine(mL/kg/hr) 450(0.8)   450   Shift Total(mL/kg) 450(6.2)   450(6.2)   Weight (kg) 72.9 72.9 72.9 72.9     Wt Readings from Last 3 Encounters:   12/26/22 160 lb 11.5 oz (72.9 kg)   10/24/22 165 lb 9.6 oz (75.1 kg)   10/24/22 165 lb (74.8 kg)     Body mass index is 25.94 kg/m². PHYSICAL EXAM:  GEN:                  Intubated, not following commands  EYES:                Not tracking, pupils equal, round, and reactive to light  HEENT:            Normocephalic, without obvious abnormality  LUNGS:            clear to auscultation. Suctioned at bedside. Mildly tachypneic. CV:                     regular rate and rhythm and normal S1 and S2  ABDOMEN:     soft and non-distended  MSK:                  there is no redness, warmth, or swelling of the joints  NEURO[de-identified]           Intubated, not following commands. Decerebrate posturing. Upward going Babinski.   SKIN:                 Normal skin color, texture, turgor  EXTREMITIES:  distal pulses intact       MEDICATIONS:  Scheduled Meds:   insulin glargine  15 Units SubCUTAneous BID    insulin lispro  0-16 Units SubCUTAneous Q4H    midodrine  10 mg Oral TID WC    aspirin  81 mg Oral Daily    heparin (porcine)  5,000 Units SubCUTAneous 3 times per day    famotidine (PEPCID) injection  20 mg IntraVENous Daily    sodium chloride flush  5-40 mL IntraVENous 2 times per day    chlorhexidine  15 mL Mouth/Throat BID    atorvastatin  80 mg Oral Nightly    [Held by provider] carvedilol  3.125 mg Oral BID WC    clopidogrel  75 mg Oral Daily     Continuous Infusions:   dexmedetomidine 0.4 mcg/kg/hr (12/26/22 0629)    sodium chloride 10 mL/hr at 12/26/22 0629    dextrose       PRN Meds:   sodium chloride flush, 5-40 mL, PRN  sodium chloride, , PRN  ondansetron, 4 mg, Q8H PRN   Or  ondansetron, 4 mg, Q6H PRN  polyethylene glycol, 17 g, Daily PRN  acetaminophen, 650 mg, Q6H PRN   Or  acetaminophen, 650 mg, Q6H PRN  glucose, 4 tablet, PRN  dextrose bolus, 125 mL, PRN   Or  dextrose bolus, 250 mL, PRN  glucagon (rDNA), 1 mg, PRN  dextrose, , Continuous PRN  albuterol, 2.5 mg, As Directed RT PRN  acetylcysteine, 600 mg, As Directed RT PRN        SUPPORT DEVICES: [x] Ventilator [] BIPAP  [] Nasal Cannula [] Room Air    VENT SETTINGS (Comprehensive) (if applicable): PRVC mode, FiO2 40%, PEEP 5, Respiratory Rate 18, Tidal Volume 400  Vent Information  Ventilator ID: [id number]  Equipment Changed: HME, Expiratory Filter  Ventilator Initiate: Yes  Vent Mode: AC/PRVC  Additional Respiratory Assessments  Heart Rate: 89  Resp: (!) 36  SpO2: 100 %  End Tidal CO2: 30 (%)  Position: Semi-Simon's  Humidification Source: HME  Cuff Pressure (cm H2O):  (mlt)  Skin Barrier Applied: No    ABGs:   Arterial Blood Gas result:  pH 7.434; pCO2 34; pO2 60; HCO3 23.   Lab Results   Component Value Date/Time    QNJ8URT 17 08/24/2018 05:02 AM    FIO2 30.0 12/26/2022 04:35 AM         DATA:  Complete Blood Count:   Recent Labs     12/24/22 0631 12/25/22 0618 12/26/22  0545   WBC 10.0 16.2* 12.5*   RBC 3.29* 3.76* 3.16*   HGB 8.2* 9.4* 8.1*   HCT 27.8* 32.1* 26.5*   MCV 84.5 85.4 83.9   MCH 24.9* 25.0* 25.6   MCHC 29.5 29.3 30.6   RDW 18.6* 18.7* 18.3*    269 235   MPV 12.1 12.3 12.9        Last 3 Blood Glucose:   Recent Labs 12/24/22  0631 12/25/22  0618 12/26/22  0545   GLUCOSE 236* 287* 226*        PT/INR:    Lab Results   Component Value Date/Time    PROTIME 13.7 12/14/2022 01:38 PM    INR 1.3 12/14/2022 01:38 PM     PTT:    Lab Results   Component Value Date/Time    APTT 26.0 12/14/2022 01:38 PM       Comprehensive Metabolic Profile:   Recent Labs     12/24/22  0631 12/25/22  0618 12/26/22  0545    136 138   K 3.7 4.1 4.1   * 101 105   CO2 24 22 22   BUN 35* 36* 42*   CREATININE 1.49* 1.44* 1.40*   GLUCOSE 236* 287* 226*   CALCIUM 8.6 8.6 8.3*      Magnesium:   Lab Results   Component Value Date/Time    MG 2.1 12/18/2022 04:08 AM    MG 2.2 12/17/2022 06:09 AM    MG 2.1 12/17/2022 12:03 AM     Phosphorus:   Lab Results   Component Value Date/Time    PHOS 4.1 12/18/2022 04:08 AM    PHOS 5.6 12/17/2022 06:09 AM    PHOS 6.5 12/17/2022 12:03 AM     Ionized Calcium:   Lab Results   Component Value Date/Time    CAION 1.19 12/22/2022 05:48 AM    CAION 1.18 12/21/2022 04:59 AM    CAION 1.17 12/20/2022 04:55 AM        Urinalysis:   Lab Results   Component Value Date/Time    NITRU NEGATIVE 12/25/2022 11:17 AM    COLORU Yellow 12/25/2022 11:17 AM    PHUR 5.5 12/25/2022 11:17 AM    WBCUA None 12/25/2022 11:17 AM    RBCUA 2 TO 5 12/25/2022 11:17 AM    MUCUS NOT REPORTED 11/21/2018 12:12 PM    TRICHOMONAS NOT REPORTED 11/21/2018 12:12 PM    YEAST FEW 12/14/2022 12:26 PM    BACTERIA RARE 12/14/2022 09:30 AM    SPECGRAV 1.025 12/25/2022 11:17 AM    LEUKOCYTESUR NEGATIVE 12/25/2022 11:17 AM    UROBILINOGEN Normal 12/25/2022 11:17 AM    BILIRUBINUR NEGATIVE 12/25/2022 11:17 AM    GLUCOSEU 2+ 12/25/2022 11:17 AM    KETUA NEGATIVE 12/25/2022 11:17 AM    AMORPHOUS 1+ 12/25/2022 11:17 AM       HgBA1c:    Lab Results   Component Value Date/Time    LABA1C 8.5 12/15/2022 05:56 AM     TSH:    Lab Results   Component Value Date/Time    TSH 1.66 10/14/2022 10:23 AM     Lactic Acid:   Lab Results   Component Value Date/Time    LACTA 1.4 10/13/2022 12:22 PM    LACTA 1.7 12/29/2018 11:57 AM    LACTA 1.5 12/29/2018 05:30 AM      Troponin: No results for input(s): TROPONINI in the last 72 hours.     ASSESSMENT:     Patient Active Problem List    Diagnosis Date Noted    Palliative care encounter 12/16/2022    NSTEMI (non-ST elevated myocardial infarction) ([address] 12/15/2022    Ventricular tachycardia 12/15/2022    Cardiac arrest ([address] 12/14/2022    Pulmonary hypertension ([address] 12/14/2022    Diabetes mellitus with kidney disease ([address] 12/14/2022    Stage 3b chronic kidney disease ([address] 10/17/2022    Ischemic cardiomyopathy 10/17/2022    Acute respiratory failure with hypoxia and hypercapnia ([address] 10/17/2022    CHF (congestive heart failure), NYHA class I, acute on chronic, combined ([address] 10/14/2022    Acute on chronic systolic congestive heart failure ([address] 10/14/2022    Congestive heart failure due to cardiomyopathy ([address] 92/54/7299    Acute systolic CHF (congestive heart failure) ([address] 10/12/2022    Pneumonia 12/28/2018    Dyslipidemia     Ureteral stone with hydronephrosis 11/04/2018    Acute pulmonary edema ([address] 11/04/2018    Elevated serum immunoglobulin free light chains     CHAD (acute kidney injury) ([address] 19/49/2924    Metabolic acidosis 89/43/5890    Shock ([address] 08/23/2018    DM (diabetes mellitus), type 2 with neurological complications ([address] 14/53/7680    Anxiety 01/26/2016    CAD (coronary artery disease)     Hx of CABG     ICD (implantable cardioverter-defibrillator) in place     H/O mitral valve repair     Hx of myocardial infarction     Hypertension     Onychomycosis of toenail 11/19/2015    S/P cardiac cath-Patent grafts 6/5/15 06/04/2015    Acute coronary syndrome ([address] 05/26/2015          PLAN:     Neuro  Intubated  Precedex gtt  Will add propofol if needed  Neuro ICU evaluated for neuro prognostication, only brainstem reflexes intact, decerebrate posturing, upward babinksi, poor prognosis, neuro has signed off   No MRI due to AICD incompatibility  LTME - diffuse cortical dysfunction, severe encephalopathy  CT head 12/17 - no acute processes, chronic small vessel ischemic deep white matter disease  Spoke with  again on 12/24, at this time patient remains full code, discussed surgical and ethics expert consultation and recommendations with him, will continue to contact    Cardiology  Off pressors  CAD s/p AICD, PEA arrest  ASA, Lipitor, Plavix  Midodrine 10 TID  Cardiology consulted, no cath unless any meaningful neurologic recovery  ECHO 12/16/22 - EF 15-20%, global hypokinesis, MV prosthesis    Pulmonary  PRVC 18/500/5/40%  ABG 7.434/34/60/23  Albuterol nebulizer per RT protocol  Mucomyst per RT protocol    Renal  CHAD on CKD stage IIIb with baseline Cr 1.4-1.6  Cr 1.40, improving  Nephrology following  Free water flushes 200 q 4h per nephrology  I/O: 1100  External catheter in place    GI  TF @ 45 ml/hr at goal  Pepcid 20    ID  WBC 12.5  S/p Zosyn, ended 12/21    Endo  Glucose 226  Lantus 15 U BID  HDISS q 4h      WEAN PER PROTOCOL:  [] No   [x] Yes  [] N/A    ICU PROPHYLAXIS:  Stress ulcer:  [] PPI Agent  [x] [id number] [] Sucralfate  [] Other:  VTE:   [] Enoxaparin  [x] Unfract. Heparin Subcut  [] EPC Cuffs    NUTRITION:  [] NPO [x] Tube Feeding (Specify: ) Diet NPO  ADULT TUBE FEEDING; Orogastric; Renal Formula; Continuous; 10; Yes; 10; Q 4 hours; 45; 200; Q 4 hours [] TPN  [] PO    HOME MEDS RECONCILED: [] No  [x] Yes    CONSULTATION NEEDED:   [x] No  [] Yes    FAMILY UPDATED:    [] No  [x] Yes    TRANSFER OUT OF ICU:   [x] No  [] Yes    Karma Guerrero M.D.   Emergency Medicine Resident, PGY-2  12/26/2022 8:06 AM

## 2022-12-27 NOTE — PROGRESS NOTES
Comprehensive Nutrition Assessment    Type and Reason for Visit:  Reassess    Nutrition Recommendations/Plan:   Continue Current Tube Feeding at this time; will continue to monitor TF tolerance/adequacy, labs, and pt progress/care plans. Modify TF as needed. Malnutrition Assessment:  Malnutrition Status:  Insufficient data (12/15/22 1445)      Nutrition Assessment:    Pt remains on vent. Renal TF continues at goal rate of 45 ml/hr; tolerating well per RN. Large loose BM early this morning; FMS placed. Meds/labs reviewed. Nutrition Related Findings:    FMS placed 12/27/22 Wound Type: None       Current Nutrition Intake & Therapies:    Diet NPO  ADULT TUBE FEEDING; Orogastric; Renal Formula; Continuous; 10; Yes; 10; Q 4 hours; 45; 200; Q 4 hours  Current Tube Feeding (TF) Orders:  Feeding Route: Orogastric  Formula: Renal Formula (Nepro with CarbSteady)  Schedule: Continuous  Feeding Regimen: 45 mL/hr  Water Flushes: per MD: 200 mL every 4 hrs  Current TF & Flush Orders Provides: Renal at 45 mL/hr + 200 mL water flush every 4 hrs = 1944 kcals, 87 g pro, 1985 mL water/day  Goal TF & Flush Orders Provides: Renal at 45 mL/hr + 200 mL water flush every 4 hrs = 1944 kcals, 87 g pro, 1985 mL water/day  Additional Calorie Sources:  Propofol at 2.2 ml/hr =58 g pro/day    Anthropometric Measures:  Height: 5' 6\" (167.6 cm)  Ideal Body Weight (IBW): 130 lbs (59 kg)    Admission Body Weight: 173 lb 1 oz (78.5 kg)  Current Body Weight: 160 lb 11.5 oz (72.9 kg), 120.7 % IBW. Weight Source: Bed Scale  Current BMI (kg/m2): 26                          BMI Categories: Overweight (BMI 25.0-29. 9)    Estimated Daily Nutrient Needs:  Energy Requirements Based On: Kcal/kg  Weight Used for Energy Requirements: Current  Energy (kcal/day): 1800 kcal/day  Weight Used for Protein Requirements: Ideal  Protein (g/day): 90 g pro/day  Method Used for Fluid Requirements: Other (Comment)  Fluid (ml/day): per MD    Nutrition Diagnosis: Inadequate oral intake related to impaired respiratory function as evidenced by NPO or clear liquid status due to medical condition, nutrition support - enteral nutrition    Nutrition Interventions:   Food and/or Nutrient Delivery: Continue Current Tube Feeding  Nutrition Education/Counseling: No recommendation at this time  Coordination of Nutrition Care: Continue to monitor while inpatient  Plan of Care discussed with: RN    Goals:  Previous Goal Met: Goal(s) Achieved  Goals: Meet at least 75% of estimated needs, within 7 days       Nutrition Monitoring and Evaluation:   Behavioral-Environmental Outcomes: None Identified  Food/Nutrient Intake Outcomes: Enteral Nutrition Intake/Tolerance  Physical Signs/Symptoms Outcomes: Biochemical Data, Nutrition Focused Physical Findings, Skin, Weight, Fluid Status or Edema    Discharge Planning:     Too soon to determine     [address], [place]  Contact: [phone]

## 2022-12-27 NOTE — PROGRESS NOTES
INTENSIVE CARE UNIT  Resident Physician Progress Note    Patient - Priscilla Obregon  Date of Admission -  12/14/2022 10:09 AM  Date of Evaluation -  12/27/2022  Room and Bed Number -  3025/3025-01   Hospital Day - 13    HPI:     Priscilla Obregon is a 60 y.o. with PMH of   - CAD, s/p CABG in 2015  - Ischemic cardiomyopathy, last EF of 25%  - Insulin-dependent diabetes mellitus type 2  - CKD     Presented to [facility] as a transfer from [facility] ER. According to the chart review patient was found unresponsive by the  as per EMS. Unknown downtime. Per EMS report no CPR performed. EMS intubated the patient esophageally, prior to arrival at [facility], reintubation was done and then patient's glucose was found to be 18. At [facility] found to be in PEA arrest achieved ROSC needed 6 mg epinephrine, received multiple pushes of Versed and fentanyl for posturing and seizure-like activity. On arrival to our [facility] patient was unresponsive. Fixed and dilated pupils, posturing and on propofol infusion. EKG in the ED showed new right bundle branch block then due to concern for PE, CT PE was done. Bedside ultrasound did not show any signs of tamponade. CT negative for any PE. Around 10:25 AM there was an episode of pulselessness and bradycardia and PEA was given at epinephrine and troponin. Cooling was started around 11:28 AM with target temperature of 38 Celsius. CT head negative for any acute intracranial abnormality  CT chest showed pleural effusions and cardiomegaly      12/17: CT head showing no acute processes, chronic small vessel ischemic white matter disease. LTME showing diffuse cortical dysfunction, severe encephalopathy. 12/18: Levo discontinued. Remains on propofol. 12/19: Tolerated CPAP trial. Per neuro ICU prognostication, brainstem reflexes intact. Poor prognosis. Discussion with family about goals, T&P vs withdrawal of care. Palliative following.   12/20: Plan for bedside discussion of goals of care and code status with family tomorrow,  with palliative and ICU team.  :  not able to attend bedside discussion, I spoke with him via phone. Code status remains full code.  would like to discuss trach and peg. Surgery consulted. : Off propofol. : Mildly tachypneic. Started precedex. CXR ordered. SUBJECTIVE:     OVERNIGHT EVENTS:      DNRCCA yesterday after palliative care meeting w/family  Aferile Tmax 37.8  Propofol gtt now at 5mcg/kg/min was restarted due to patient breathing over the vent  PRVC 32/400/5/40%  -  ABG 7.441/33.5/69.7/22.8  Labs stable, stable leukocytosis to 12.5, hgb 7.6 from 8.1  Decreased UOP of 570cc yesterday, FMS; net negative 2L  BG >200, ISS and lantus ordered. Increase to 25 from 15u last night.  Has since required 12u sliding scal in the last 2 BG checks       AWAKE & FOLLOWING COMMANDS:  [x] No   [] Yes    SECRETIONS Amount:  [x] Small [] Moderate  [] Large  [] None  Color:     [x] White [] Colored  [] Bloody    SEDATION:  RAAS Score:  [x] Propofol gtt  [] Versed gtt  [] Ativan gtt   [] Precedex    PARALYZED:  [x] No    [] Yes    VASOPRESSORS:  [x] No    [] Yes  [] Levophed [] Dopamine [] Vasopressin  [] Dobutamine [] Phenylephrine [] Epinephrine      OBJECTIVE:     VITAL SIGNS:  /70   Pulse 88   Temp 99 °F (37.2 °C) (Core)   Resp 25   Ht 5' 6\" (1.676 m)   Wt 160 lb 11.5 oz (72.9 kg)   SpO2 99%   BMI 25.94 kg/m²   Tmax over 24 hours:  Temp (24hrs), Av.4 °F (37.4 °C), Min:99 °F (37.2 °C), Max:100 °F (37.8 °C)      Patient Vitals for the past 8 hrs:   BP Temp Temp src Pulse Resp SpO2   22 0600 111/70 -- -- 88 25 99 %   22 0510 -- -- -- 84 29 100 %   22 0500 -- -- -- 82 20 95 %   22 0400 100/76 -- -- 84 29 95 %   22 0358 -- -- -- 83 25 95 %   22 0300 111/73 -- -- 87 25 99 %   22 0200 116/68 -- -- 86 28 99 %   22 0100 123/81 -- -- 94 28 100 %   22 0000 121/79 99 °F (37.2 °C) CORE 95 22 100 %   12/26/22 2359 -- -- -- 93 28 100 %           Intake/Output Summary (Last 24 hours) at 12/27/2022 0731  Last data filed at 12/27/2022 0428  Gross per 24 hour   Intake 1287.68 ml   Output 570 ml   Net 717.68 ml       Date 12/27/22 0000 - 12/27/22 2359   Shift [id number] 7012-4017 24 Hour Total   INTAKE   I.V.(mL/kg) 128.8(1.8)   128.8(1.8)   Shift Total(mL/kg) 128.8(1.8)   128.8(1.8)   OUTPUT   Urine(mL/kg/hr) 100   100   Shift Total(mL/kg) 100(1.4)   100(1.4)   Weight (kg) 72.9 72.9 72.9 72.9       Wt Readings from Last 3 Encounters:   12/26/22 160 lb 11.5 oz (72.9 kg)   10/24/22 165 lb 9.6 oz (75.1 kg)   10/24/22 165 lb (74.8 kg)     Body mass index is 25.94 kg/m². PHYSICAL EXAM:  GEN:                  Intubated, not following commands  EYES:                Not tracking, pupils equal, round, and reactive to light,  HEENT:            Normocephalic, without obvious abnormality  LUNGS:            clear to auscultation. Suctioned at bedside. Mildly tachypneic. CV:                     regular rate and rhythm and normal S1 and S2  ABDOMEN:     soft and non-distended  MSK:                  there is no redness, warmth, or swelling of the joints  NEURO[de-identified]           Intubated, not following commands. Decerebrate posturing. Upward going Babinski.   SKIN:                 Normal skin color, texture, turgor  EXTREMITIES:  distal pulses intact       MEDICATIONS:  Scheduled Meds:   insulin glargine  25 Units SubCUTAneous BID    famotidine  20 mg Per NG tube Daily    insulin lispro  0-16 Units SubCUTAneous Q4H    midodrine  10 mg Oral TID WC    aspirin  81 mg Oral Daily    heparin (porcine)  5,000 Units SubCUTAneous 3 times per day    sodium chloride flush  5-40 mL IntraVENous 2 times per day    chlorhexidine  15 mL Mouth/Throat BID    atorvastatin  80 mg Oral Nightly    [Held by provider] carvedilol  3.125 mg Oral BID WC    clopidogrel  75 mg Oral Daily     Continuous Infusions: propofol 10 mcg/kg/min (12/27/22 0428)    dexmedetomidine Stopped (12/26/22 2101)    sodium chloride 10 mL/hr at 12/27/22 0428    dextrose       PRN Meds:   fentanNYL, 25 mcg, Q2H PRN  sodium chloride flush, 5-40 mL, PRN  sodium chloride, , PRN  ondansetron, 4 mg, Q8H PRN   Or  ondansetron, 4 mg, Q6H PRN  polyethylene glycol, 17 g, Daily PRN  acetaminophen, 650 mg, Q6H PRN   Or  acetaminophen, 650 mg, Q6H PRN  glucose, 4 tablet, PRN  dextrose bolus, 125 mL, PRN   Or  dextrose bolus, 250 mL, PRN  glucagon (rDNA), 1 mg, PRN  dextrose, , Continuous PRN  albuterol, 2.5 mg, As Directed RT PRN  acetylcysteine, 600 mg, As Directed RT PRN      SUPPORT DEVICES: [x] Ventilator [] BIPAP  [] Nasal Cannula [] Room Air    VENT SETTINGS (Comprehensive) (if applicable): PRVC mode, FiO2 40%, PEEP 5, Respiratory Rate 18, Tidal Volume 400  Vent Information  Ventilator ID: [id number]  Equipment Changed: HME, Expiratory Filter  Ventilator Initiate: Yes  Vent Mode: AC/PRVC  Additional Respiratory Assessments  Heart Rate: 88  Resp: 25  SpO2: 99 %  End Tidal CO2: 24 (%)  Position: Semi-Simon's  Humidification Source: HME  Cuff Pressure (cm H2O):  (mlt)  Skin Barrier Applied: No    ABGs:   Arterial Blood Gas result:  pH 7.434; pCO2 34; pO2 60; HCO3 23.   Lab Results   Component Value Date/Time    DBK4ZYP 17 08/24/2018 05:02 AM    FIO2 40.0 12/27/2022 05:12 AM         DATA:  Complete Blood Count:   Recent Labs     12/25/22  0618 12/26/22  0545 12/27/22  0400   WBC 16.2* 12.5* 12.5*   RBC 3.76* 3.16* 3.01*   HGB 9.4* 8.1* 7.6*   HCT 32.1* 26.5* 25.2*   MCV 85.4 83.9 83.7   MCH 25.0* 25.6 25.2   MCHC 29.3 30.6 30.2   RDW 18.7* 18.3* 18.6*    235 234   MPV 12.3 12.9 12.7          Last 3 Blood Glucose:   Recent Labs     12/25/22  0618 12/26/22  0545 12/27/22  0400   GLUCOSE 287* 226* 212*          PT/INR:    Lab Results   Component Value Date/Time    PROTIME 13.7 12/14/2022 01:38 PM    INR 1.3 12/14/2022 01:38 PM     PTT:    Lab Results Component Value Date/Time    APTT 26.0 12/14/2022 01:38 PM       Comprehensive Metabolic Profile:   Recent Labs     12/25/22  0618 12/26/22  0545 12/27/22  0400    138 138   K 4.1 4.1 3.9    105 104   CO2 22 22 21   BUN 36* 42* 46*   CREATININE 1.44* 1.40* 1.46*   GLUCOSE 287* 226* 212*   CALCIUM 8.6 8.3* 8.3*        Magnesium:   Lab Results   Component Value Date/Time    MG 2.1 12/27/2022 04:00 AM    MG 2.1 12/18/2022 04:08 AM    MG 2.2 12/17/2022 06:09 AM     Phosphorus:   Lab Results   Component Value Date/Time    PHOS 4.1 12/18/2022 04:08 AM    PHOS 5.6 12/17/2022 06:09 AM    PHOS 6.5 12/17/2022 12:03 AM     Ionized Calcium:   Lab Results   Component Value Date/Time    CAION 1.19 12/22/2022 05:48 AM    CAION 1.18 12/21/2022 04:59 AM    CAION 1.17 12/20/2022 04:55 AM        Urinalysis:   Lab Results   Component Value Date/Time    NITRU NEGATIVE 12/25/2022 11:17 AM    COLORU Yellow 12/25/2022 11:17 AM    PHUR 5.5 12/25/2022 11:17 AM    WBCUA None 12/25/2022 11:17 AM    RBCUA 2 TO 5 12/25/2022 11:17 AM    MUCUS NOT REPORTED 11/21/2018 12:12 PM    TRICHOMONAS NOT REPORTED 11/21/2018 12:12 PM    YEAST FEW 12/14/2022 12:26 PM    BACTERIA RARE 12/14/2022 09:30 AM    SPECGRAV 1.025 12/25/2022 11:17 AM    LEUKOCYTESUR NEGATIVE 12/25/2022 11:17 AM    UROBILINOGEN Normal 12/25/2022 11:17 AM    BILIRUBINUR NEGATIVE 12/25/2022 11:17 AM    GLUCOSEU 2+ 12/25/2022 11:17 AM    KETUA NEGATIVE 12/25/2022 11:17 AM    AMORPHOUS 1+ 12/25/2022 11:17 AM       HgBA1c:    Lab Results   Component Value Date/Time    LABA1C 8.5 12/15/2022 05:56 AM     TSH:    Lab Results   Component Value Date/Time    TSH 1.66 10/14/2022 10:23 AM     Lactic Acid:   Lab Results   Component Value Date/Time    LACTA 1.4 10/13/2022 12:22 PM    LACTA 1.7 12/29/2018 11:57 AM    LACTA 1.5 12/29/2018 05:30 AM      Troponin: No results for input(s): TROPONINI in the last 72 hours.     ASSESSMENT:     Patient Active Problem List    Diagnosis Date Noted Acute anoxic encephalopathy ([address] 12/26/2022    Palliative care encounter 12/16/2022    NSTEMI (non-ST elevated myocardial infarction) ([address] 12/15/2022    Ventricular tachyarrhythmia 12/15/2022    Cardiac arrest ([address] 12/14/2022    Pulmonary hypertension ([address] 12/14/2022    Diabetes mellitus with kidney disease ([address] 12/14/2022    Stage 3b chronic kidney disease ([address] 10/17/2022    Ischemic cardiomyopathy 10/17/2022    Acute respiratory failure with hypoxia and hypercapnia ([address] 10/17/2022    CHF (congestive heart failure), NYHA class I, acute on chronic, combined ([address] 10/14/2022    Acute on chronic systolic congestive heart failure ([address] 10/14/2022    Congestive heart failure due to cardiomyopathy ([address] 71/03/1419    Acute systolic CHF (congestive heart failure) ([address] 10/12/2022    Pneumonia 12/28/2018    Dyslipidemia     Ureteral stone with hydronephrosis 11/04/2018    Acute pulmonary edema ([address] 11/04/2018    Elevated serum immunoglobulin free light chains     CHAD (acute kidney injury) ([address] 32/35/3090    Metabolic acidosis 25/55/5354    Shock ([address] 08/23/2018    DM (diabetes mellitus), type 2 with neurological complications ([address] 90/54/3130    Anxiety 01/26/2016    CAD (coronary artery disease)     Hx of CABG     ICD (implantable cardioverter-defibrillator) in place     H/O mitral valve repair     Hx of myocardial infarction     Hypertension     Onychomycosis of toenail 11/19/2015    S/P cardiac cath-Patent grafts 6/5/15 06/04/2015    Acute coronary syndrome ([address] 05/26/2015          PLAN:     Neuro  Intubated  Precedex gtt not running  Propofol gtt running @5mcg/kg/min due to patient breathing over the vent  Neuro ICU evaluated for neuro prognostication, only brainstem reflexes intact, decerebrate posturing, upward babinksi, poor prognosis, neuro has signed off   No MRI due to AICD incompatibility  LTME - diffuse cortical dysfunction, severe encephalopathy  CT head 12/17 - no acute processes, chronic small vessel ischemic deep white matter disease  Palliative met with family via telephone, pt [facility], discussed terminal extubation however  would like to discuss w/patient's siblings    Cardiology  Off pressors  CAD s/p AICD, PEA arrest  ASA, Lipitor, Plavix  Midodrine 10 TID  Cardiology consulted, no cath unless any meaningful neurologic recovery  ECHO 12/16/22 - EF 15-20%, global hypokinesis, MV prosthesis    Pulmonary  PRVC 32/400/5/40%  -  ABG 7.441/33.5/69.7/22.8  Albuterol nebulizer per RT protocol  Mucomyst per RT protocol    Renal  CHAD on CKD stage IIIb with baseline Cr 1.4-1.6  BUN/Cr: 46/1.46  Nephrology following  Free water flushes 200 q 4h per nephrology  I/O: 570cc, plus unvoided  External catheter in place    GI  TF @ 45 ml/hr at goal  Pepcid 20    ID  WBC 12.5  S/p Zosyn, ended 12/21    Endo  Glucose >200  Lantus 25 U BID  HDISS q 4h      WEAN PER PROTOCOL:  [] No   [x] Yes  [] N/A    ICU PROPHYLAXIS:  Stress ulcer:  [] PPI Agent  [x] [id number] [] Sucralfate  [] Other:  VTE:   [] Enoxaparin  [x] Unfract.  Heparin Subcut  [x] EPC Cuffs    NUTRITION:  [] NPO [x] Tube Feeding (Specify: ) Diet NPO  ADULT TUBE FEEDING; Orogastric; Renal Formula; Continuous; 10; Yes; 10; Q 4 hours; 45; 200; Q 4 hours [] TPN  [] PO    HOME MEDS RECONCILED: [] No  [x] Yes    CONSULTATION NEEDED:   [x] No  [] Yes    FAMILY UPDATED:    [] No  [x] Yes    TRANSFER OUT OF ICU:   [x] No  [] Yes    ---  Gudelia Escobedo DO, MS  Emergency Medicine Resident  [facility]  12/27/22 8:43 AM

## 2022-12-27 NOTE — FLOWSHEET NOTE
Pt stooling large amount of liquid stool. After two large amounts, a FMS was inserted to help contain liquid stool and help prevent breakdown.

## 2022-12-27 NOTE — PLAN OF CARE
Problem: Respiratory - Adult  Goal: Achieves optimal ventilation and oxygenation  Flowsheets (Taken 12/27/2022 1821)  Achieves optimal ventilation and oxygenation:   Assess for changes in respiratory status   Position to facilitate oxygenation and minimize respiratory effort   Assess the need for suctioning and aspirate as needed   Respiratory therapy support as indicated   Assess and instruct to report shortness of breath or any respiratory difficulty   Assess for changes in mentation and behavior   Oxygen supplementation based on oxygen saturation or arterial blood gases

## 2022-12-28 NOTE — CARE COORDINATION
Transitional planning. Intubated/ Sedated FiO2 40%, PEEP of 5, non-responsive. Waiting for pt's  to decide on Comfort care. Pt's  lives in [facility] and had not been able to see pt since her transfer to [place]. V's. Per 12/28 Palliative Care note, pt's  would like to wait until Friday to make any further decisions- he would like to contact more family members. Palliative care offered transportation and Home Away from Home, pt will inform them on Friday if he needs this. Spoke to [employer], she stated she could be called on Friday if pt needs transportation assistance. [employer] also following pt.

## 2022-12-28 NOTE — PROGRESS NOTES
PALLIATIVE CARE NURSING ASSESSMENT    Patient: Nu Rogers  Room: 3025/3025-01    Reason For Consult   Goals of care evaluation  Distress management  Guidance and support  Facilitate communications  Assistance in coordinating care    Code Status: [facility]    Summary:  Palliative Care visit to bedside. Palliative Care call for support to  Lisa Hernandez relates he is strongly leaning toward comfort care and removing ventilator. Ophelia Jarrell is not ready for terminal extubation today. Would like more time to reach family. Ophelia Jarrell relates he will call on Friday and plan for comfort care and vent removal.   Offered to help Ophelia Jarrell reach family  Offered to get patient taxi to [facility]. V's. Ophelia Jarrell declines offers at this time. Palliative Care will continue to follow. Impression: Nu Rogers is a 60y.o. year old female  has a past medical history of CAD (coronary artery disease), Cardiomyopathy ([facility] COPD (chronic obstructive pulmonary disease) ([facility] Depression, Diabetes mellitus ([facility] [address] [facility] H/O mitral valve repair, History of fractured kneecap, Hx of CABG, Hx of myocardial infarction, Hyperlipidemia, Hypertension, ICD (implantable cardioverter-defibrillator) in place, Kidney calculi, Osteoarthritis, Patient in clinical research study, Pneumonia, and Renal calculi. .  Currently hospitalized for the management of cardiac arrest.  The Palliative Care Team is following to assist with family support, goals of care. Vital Signs  Blood pressure 131/68, pulse 93, temperature 99.1 °F (37.3 °C), temperature source Esophageal, resp. rate 25, height 5' 6\" (1.676 m), weight 159 lb 6.3 oz (72.3 kg), SpO2 100 %.     Patient Active Problem List   Diagnosis    Acute coronary syndrome (HCC)    S/P cardiac cath-Patent grafts 6/5/15    Onychomycosis of toenail    CAD (coronary artery disease)    Hx of CABG    ICD (implantable cardioverter-defibrillator) in place    H/O mitral valve repair    Hx of myocardial infarction    Hypertension    DM (diabetes mellitus), type 2 with neurological complications (HCC)    Anxiety    CHAD (acute kidney injury) ([address]    Metabolic acidosis    Shock (HCC)    Elevated serum immunoglobulin free light chains    Ureteral stone with hydronephrosis    Acute pulmonary edema (HCC)    Dyslipidemia    Pneumonia    Acute systolic CHF (congestive heart failure) (HCC)    Congestive heart failure due to cardiomyopathy ([address]    CHF (congestive heart failure), NYHA class I, acute on chronic, combined ([address]    Acute on chronic systolic congestive heart failure (HCC)    Stage 3b chronic kidney disease ([address]    Ischemic cardiomyopathy    Acute respiratory failure with hypoxia and hypercapnia (HCC)    Cardiac arrest ([address]    Pulmonary hypertension ([address]    Diabetes mellitus with kidney disease ([address]    NSTEMI (non-ST elevated myocardial infarction) [facility])    Ventricular tachyarrhythmia    Palliative care encounter    Acute anoxic encephalopathy ([facility] [address]       Palliative Interaction:Reviewed course of care, updates received from [facility]. Palliative Care visit to bedside. Pt sedated on vent, non responsive to [address] Call made to spouse. He relates leaning towards comfort measures and taking her off of the ventilator. Spouse is asking for more time to reach Valentina's brother and sister and his children to allow for them to come up as needed. Support offered, we discussed patient's wishes on ACP that she stated on admission 10/12/22 to . Asael Cartwright relates he does not want her to suffer and wants to follow her wishes. He was just recently made aware of what she had expressed on 10/12/22. Let Asael Cartwright know that we will provide him with taxi transportation, I want to get that set up and asked if I could do that for Friday morning so he could be here. I discussed considering bringing grandchildren up to say good byes.   I let him know that we will accommodate all who would like to see her before taking her off of the ventilator. I asked Yenni Floyd if I can help him by calling family and or friends to help get them up to see Sandeep Peterson. Yenni Floyd denies my help. Asked Yenni Floyd what happens if we wait til Friday and we talk and he has still not been able to get a hold of certain people. Acknowledged that I appreciate his attempts to reach people, but that we need to consider that this is possibly prolonging suffering. Yenni Floyd declined me setting up transportation to get him to the hospital on Friday. He relates he will call me and no matter whether he comes up or anyone else things will be taken care of Friday. Yenni Floyd asked if he needs to be here for the terminal weaning. I let him know that he does not, but we want to help him be here if that is what he wants and needs to support him. He relates to me that he does not feel his grandchildren should come up and see her. I let him know that he certainly knows them and what they have been through and if he does not feel they should come up then I agree with his decision. Home away from home is an option we can offer as well. If needed come Friday I will offer it. Updated bedside nurse Teresa Meza of conversation with . No plan to terminally wean until Friday.         Goals/Plan of care  Education/support to staff  Education/support to family  Communications with primary service  Providing support for coping/adaptation/distress of family  Discussing meaning/purpose   Specific spiritual beliefs/practices  Decision making regarding life prolonging treatment  Continue with current plan of care  Validating patient/family distress  Continued communication updates  Recognizing, reflecting, and empathizing with family members' anticipatory grief      Palliative Care Coordinator  Nick BEARN, RN, ONN-CG    [facility] Office: [address] Viviane [address] Office: [phone]. [facility] Office: [phone]    For [facility] scheduling please call [phone]

## 2022-12-28 NOTE — PROGRESS NOTES
INTENSIVE CARE UNIT  Resident Physician Progress Note    Patient - Stephanie Tierney  Date of Admission -  12/14/2022 10:09 AM  Date of Evaluation -  12/28/2022  Room and Bed Number -  3025/3025-01   Hospital Day - 14    HPI:     Stephanie Tierney is a 60 y.o. with PMH of   - CAD, s/p CABG in 2015  - Ischemic cardiomyopathy, last EF of 25%  - Insulin-dependent diabetes mellitus type 2  - CKD     Presented to [facility] ER as a transfer from [facility] ER. According to the chart review patient was found unresponsive by the  as per EMS. Unknown downtime. Per EMS report no CPR performed. EMS intubated the patient esophageally, prior to arrival at [facility], reintubation was done and then patient's glucose was found to be 18. At [facility] found to be in PEA arrest achieved ROSC needed 6 mg epinephrine, received multiple pushes of Versed and fentanyl for posturing and seizure-like activity. On arrival to our [facility] ER patient was unresponsive. Fixed and dilated pupils, posturing and on propofol infusion. EKG in the ED showed new right bundle branch block then due to concern for PE, CT PE was done. Bedside ultrasound did not show any signs of tamponade. CT negative for any PE. Around 10:25 AM there was an episode of pulselessness and bradycardia and PEA was given at epinephrine and troponin. Cooling was started around 11:28 AM with target temperature of 38 Celsius. CT head negative for any acute intracranial abnormality  CT chest showed pleural effusions and cardiomegaly      12/17: CT head showing no acute processes, chronic small vessel ischemic white matter disease. LTME showing diffuse cortical dysfunction, severe encephalopathy. 12/18: Levo discontinued. Remains on propofol. 12/19: Tolerated CPAP trial. Per neuro ICU prognostication, brainstem reflexes intact. Poor prognosis. Discussion with family about goals, T&P vs withdrawal of care. Palliative following.   12/20: Plan for bedside discussion of goals of care and code status with family tomorrow,  with palliative and ICU team.  :  not able to attend bedside discussion, I spoke with him via phone. Code status remains full code.  would like to discuss trach and peg. Surgery consulted. : Off propofol. : Mildly tachypneic. Started precedex. CXR ordered. SUBJECTIVE:     OVERNIGHT EVENTS:      DNRCCA   Aferile   Propofol gtt now at 10mcg/kg/min was restarted due to patient breathing over the vent  PRVC 32/470/5/40%  -  ABG 7.457/32.6/75.  Labs stable, stable leukocytosis resolved, hgb 7.1  Decreased UOP of 900cc yesterday, FMS; net negative 2L  BG improved, ISS and lantus ordered. Increased to 30u BID.      Should be  expecting palliative care and spiritual care to follow up with family this afternoon re care    AWAKE & FOLLOWING COMMANDS:  [x] No   [] Yes    SECRETIONS Amount:  [x] Small [] Moderate  [] Large  [] None  Color:     [x] White [] Colored  [] Bloody    SEDATION:  RAAS Score:  [x] Propofol gtt  [] Versed gtt  [] Ativan gtt   [] Precedex    PARALYZED:  [x] No    [] Yes    VASOPRESSORS:  [x] No    [] Yes  [] Levophed [] Dopamine [] Vasopressin  [] Dobutamine [] Phenylephrine [] Epinephrine      OBJECTIVE:     VITAL SIGNS:  BP (!) 125/54   Pulse 83   Temp 99 °F (37.2 °C) (Esophageal)   Resp 24   Ht 5' 6\" (1.676 m)   Wt 159 lb 6.3 oz (72.3 kg)   SpO2 94%   BMI 25.73 kg/m²   Tmax over 24 hours:  Temp (24hrs), Av °F (37.2 °C), Min:98.8 °F (37.1 °C), Max:99.1 °F (37.3 °C)      Patient Vitals for the past 8 hrs:   BP Temp Temp src Pulse Resp SpO2 Weight   22 0702 -- -- -- 83 24 94 % --   22 0700 (!) 125/54 -- -- 85 24 97 % --   22 0600 (!) 106/59 -- -- 86 20 97 % 159 lb 6.3 oz (72.3 kg)   22 0528 -- -- -- 89 25 98 % --   22 0500 104/70 -- -- 84 24 95 % --   22 0400 113/87 99 °F (37.2 °C) Esophageal 87 22 97 % --   22 0300 104/75 -- -- 82 23 97 % -- 12/28/22 0200 99/68 99.1 °F (37.3 °C) -- 83 24 97 % --   12/28/22 0100 109/60 -- -- 83 24 98 % --           Intake/Output Summary (Last 24 hours) at 12/28/2022 0827  Last data filed at 12/28/2022 5337  Gross per 24 hour   Intake 1415.18 ml   Output 900 ml   Net 515.18 ml       Date 12/28/22 0000 - 12/28/22 2359   Shift 5796-9813 6182-0392 2300-2067 24 Hour Total   INTAKE   I.V.(mL/kg) 146.7(2)   146.7(2)   NG/GT(mL/kg) 412(5.7)   412(5.7)   Shift Total(mL/kg) 558.7(7.7)   558.7(7.7)   OUTPUT   Urine(mL/kg/hr) 0(0)   0   Shift Total(mL/kg) 0(0)   0(0)   Weight (kg) 72.3 72.3 72.3 72.3       Wt Readings from Last 3 Encounters:   12/28/22 159 lb 6.3 oz (72.3 kg)   10/24/22 165 lb 9.6 oz (75.1 kg)   10/24/22 165 lb (74.8 kg)     Body mass index is 25.73 kg/m². PHYSICAL EXAM:  GEN:                  Intubated, not following commands  EYES:                Not tracking, pupils equal, round, and reactive to light,  HEENT:            Normocephalic, without obvious abnormality, grimaces to pain but no withrdawal  LUNGS:            clear to auscultation. Suctioned at bedside. Mildly tachypneic. CV:                     regular rate and rhythm and normal S1 and S2  ABDOMEN:     soft and non-distended  MSK:                  there is no redness, warmth, or swelling of the joints  NEURO[de-identified]           Intubated, not following commands. Decerebrate posturing. Upward going Babinski.   SKIN:                 Normal skin color, texture, turgor  EXTREMITIES:  distal pulses intact       MEDICATIONS:  Scheduled Meds:   insulin glargine  30 Units SubCUTAneous BID    famotidine  20 mg Per NG tube Daily    insulin lispro  0-16 Units SubCUTAneous Q4H    midodrine  10 mg Oral TID WC    aspirin  81 mg Oral Daily    heparin (porcine)  5,000 Units SubCUTAneous 3 times per day    sodium chloride flush  5-40 mL IntraVENous 2 times per day    chlorhexidine  15 mL Mouth/Throat BID    atorvastatin  80 mg Oral Nightly    [Held by provider] carvedilol  3.125 mg Oral BID WC    clopidogrel  75 mg Oral Daily     Continuous Infusions:   propofol 5 mcg/kg/min (12/28/22 0620)    dexmedetomidine Stopped (12/26/22 2101)    sodium chloride 10 mL/hr at 12/28/22 0620    dextrose       PRN Meds:   fentanNYL, 25 mcg, Q2H PRN  sodium chloride flush, 5-40 mL, PRN  sodium chloride, , PRN  ondansetron, 4 mg, Q8H PRN   Or  ondansetron, 4 mg, Q6H PRN  polyethylene glycol, 17 g, Daily PRN  acetaminophen, 650 mg, Q6H PRN   Or  acetaminophen, 650 mg, Q6H PRN  glucose, 4 tablet, PRN  dextrose bolus, 125 mL, PRN   Or  dextrose bolus, 250 mL, PRN  glucagon (rDNA), 1 mg, PRN  dextrose, , Continuous PRN  albuterol, 2.5 mg, As Directed RT PRN  acetylcysteine, 600 mg, As Directed RT PRN      SUPPORT DEVICES: [x] Ventilator [] BIPAP  [] Nasal Cannula [] Room Air    VENT SETTINGS (Comprehensive) (if applicable): PRVC mode, FiO2 40%, PEEP 5, Respiratory Rate 18, Tidal Volume 400  Vent Information  Ventilator ID: [id number]  Equipment Changed: HME, Expiratory Filter  Ventilator Initiate: Yes  Vent Mode: AC/PRVC  Additional Respiratory Assessments  Heart Rate: 83  Resp: 24  SpO2: 94 %  End Tidal CO2: 26 (%)  Position: Semi-Simon's  Humidification Source: HME  Cuff Pressure (cm H2O):  (mlt)  Skin Barrier Applied: No    ABGs:   Arterial Blood Gas result:  pH 7.434; pCO2 34; pO2 60; HCO3 23.   Lab Results   Component Value Date/Time    NZN6QPG 17 08/24/2018 05:02 AM    FIO2 40.0 12/28/2022 05:19 AM         DATA:  Complete Blood Count:   Recent Labs     12/26/22  0545 12/27/22  0400 12/28/22  0422   WBC 12.5* 12.5* 9.8   RBC 3.16* 3.01* 2.80*   HGB 8.1* 7.6* 7.1*   HCT 26.5* 25.2* 23.2*   MCV 83.9 83.7 82.9   MCH 25.6 25.2 25.4   MCHC 30.6 30.2 30.6   RDW 18.3* 18.6* 18.8*    234 231   MPV 12.9 12.7 12.6          Last 3 Blood Glucose:   Recent Labs     12/26/22  0545 12/27/22  0400 12/28/22  0422   GLUCOSE 226* 212* 168*          PT/INR:    Lab Results   Component Value Date/Time PROTIME 13.7 12/14/2022 01:38 PM    INR 1.3 12/14/2022 01:38 PM     PTT:    Lab Results   Component Value Date/Time    APTT 26.0 12/14/2022 01:38 PM       Comprehensive Metabolic Profile:   Recent Labs     12/26/22  0545 12/27/22  0400 12/28/22  0422    138 141   K 4.1 3.9 3.8    104 107   CO2 22 21 22   BUN 42* 46* 44*   CREATININE 1.40* 1.46* 1.26*   GLUCOSE 226* 212* 168*   CALCIUM 8.3* 8.3* 8.3*        Magnesium:   Lab Results   Component Value Date/Time    MG 2.1 12/27/2022 04:00 AM    MG 2.1 12/18/2022 04:08 AM    MG 2.2 12/17/2022 06:09 AM     Phosphorus:   Lab Results   Component Value Date/Time    PHOS 4.1 12/18/2022 04:08 AM    PHOS 5.6 12/17/2022 06:09 AM    PHOS 6.5 12/17/2022 12:03 AM     Ionized Calcium:   Lab Results   Component Value Date/Time    CAION 1.19 12/22/2022 05:48 AM    CAION 1.18 12/21/2022 04:59 AM    CAION 1.17 12/20/2022 04:55 AM        Urinalysis:   Lab Results   Component Value Date/Time    NITRU NEGATIVE 12/25/2022 11:17 AM    COLORU Yellow 12/25/2022 11:17 AM    PHUR 5.5 12/25/2022 11:17 AM    WBCUA None 12/25/2022 11:17 AM    RBCUA 2 TO 5 12/25/2022 11:17 AM    MUCUS NOT REPORTED 11/21/2018 12:12 PM    TRICHOMONAS NOT REPORTED 11/21/2018 12:12 PM    YEAST FEW 12/14/2022 12:26 PM    BACTERIA RARE 12/14/2022 09:30 AM    SPECGRAV 1.025 12/25/2022 11:17 AM    LEUKOCYTESUR NEGATIVE 12/25/2022 11:17 AM    UROBILINOGEN Normal 12/25/2022 11:17 AM    BILIRUBINUR NEGATIVE 12/25/2022 11:17 AM    GLUCOSEU 2+ 12/25/2022 11:17 AM    KETUA NEGATIVE 12/25/2022 11:17 AM    AMORPHOUS 1+ 12/25/2022 11:17 AM       HgBA1c:    Lab Results   Component Value Date/Time    LABA1C 8.5 12/15/2022 05:56 AM     TSH:    Lab Results   Component Value Date/Time    TSH 1.66 10/14/2022 10:23 AM     Lactic Acid:   Lab Results   Component Value Date/Time    LACTA 1.4 10/13/2022 12:22 PM    LACTA 1.7 12/29/2018 11:57 AM    LACTA 1.5 12/29/2018 05:30 AM      Troponin: No results for input(s): TROPONINI in the last 72 hours.     ASSESSMENT:     Patient Active Problem List    Diagnosis Date Noted    Acute anoxic encephalopathy ([address] 12/26/2022    Palliative care encounter 12/16/2022    NSTEMI (non-ST elevated myocardial infarction) ([address] 12/15/2022    Ventricular tachyarrhythmia 12/15/2022    Cardiac arrest ([address] 12/14/2022    Pulmonary hypertension ([address] 12/14/2022    Diabetes mellitus with kidney disease ([address] 12/14/2022    Stage 3b chronic kidney disease ([address] 10/17/2022    Ischemic cardiomyopathy 10/17/2022    Acute respiratory failure with hypoxia and hypercapnia ([address] 10/17/2022    CHF (congestive heart failure), NYHA class I, acute on chronic, combined ([address] 10/14/2022    Acute on chronic systolic congestive heart failure ([address] 10/14/2022    Congestive heart failure due to cardiomyopathy ([address] 37/88/3250    Acute systolic CHF (congestive heart failure) ([address] 10/12/2022    Pneumonia 12/28/2018    Dyslipidemia     Ureteral stone with hydronephrosis 11/04/2018    Acute pulmonary edema ([address] 11/04/2018    Elevated serum immunoglobulin free light chains     CHAD (acute kidney injury) ([address] 09/51/5815    Metabolic acidosis 13/50/8624    Shock ([address] 08/23/2018    DM (diabetes mellitus), type 2 with neurological complications ([address] 51/06/1737    Anxiety 01/26/2016    CAD (coronary artery disease)     Hx of CABG     ICD (implantable cardioverter-defibrillator) in place     H/O mitral valve repair     Hx of myocardial infarction     Hypertension     Onychomycosis of toenail 11/19/2015    S/P cardiac cath-Patent grafts 6/5/15 06/04/2015    Acute coronary syndrome ([address] 05/26/2015          PLAN:     Neuro  Intubated  Precedex gtt not running  Propofol gtt running @10mcg/kg/min due to patient breathing over the vent  Neuro ICU evaluated for neuro prognostication, only brainstem reflexes intact, decerebrate posturing, upward babinksi, poor prognosis, neuro has signed off   No MRI due to AICD incompatibility  LTME - diffuse cortical dysfunction, severe encephalopathy  CT head 12/17 - no acute processes, chronic small vessel ischemic deep white matter disease  Palliative met with family via telephone, pt [facility], discussed terminal extubation however  would like to discuss w/patient's siblings    Cardiology  Off pressors  CAD s/p AICD, PEA arrest  ASA, Lipitor, Plavix  Midodrine 10 TID  Cardiology consulted, no cath unless any meaningful neurologic recovery  ECHO 12/16/22 - EF 15-20%, global hypokinesis, MV prosthesis    Pulmonary  PRVC 24/470/5/40%  -  ABG 7.457/32.6/75.7/23  Albuterol nebulizer per RT protocol  Mucomyst per RT protocol    Renal  CHAD on CKD stage IIIb with baseline Cr 1.4-1.6  BUN/Cr: 44/1.26  Nephrology following  Free water flushes 200 q 4h per nephrology  I/O: 900cc,   External catheter in place    GI  TF @ 45 ml/hr at goal  Pepcid 20    ID  WBC 9.8  S/p Zosyn, ended 12/21    Endo  Glucose >200  Lantus 30 U BID  HDISS q 4h      WEAN PER PROTOCOL:  [] No   [x] Yes  [] N/A    ICU PROPHYLAXIS:  Stress ulcer:  [] PPI Agent  [x] [id number] [] Sucralfate  [] Other:  VTE:   [] Enoxaparin  [x] Unfract.  Heparin Subcut  [x] EPC Cuffs    NUTRITION:  [] NPO [x] Tube Feeding (Specify: ) Diet NPO  ADULT TUBE FEEDING; Orogastric; Renal Formula; Continuous; 10; Yes; 10; Q 4 hours; 45; 200; Q 4 hours [] TPN  [] PO    HOME MEDS RECONCILED: [] No  [x] Yes    CONSULTATION NEEDED:   [x] No  [] Yes    FAMILY UPDATED:    [] No  [x] Yes    TRANSFER OUT OF ICU:   [x] No  [] Yes    ---  Teri Bolivar DO, MS  Emergency Medicine Resident  [facility]  12/28/22 8:27 AM

## 2022-12-28 NOTE — PLAN OF CARE
Problem: Chronic Conditions and Co-morbidities  Goal: Patient's chronic conditions and co-morbidity symptoms are monitored and maintained or improved  Outcome: Progressing     Problem: Discharge Planning  Goal: Discharge to home or other facility with appropriate resources  Outcome: Progressing     Problem: Pain  Goal: Verbalizes/displays adequate comfort level or baseline comfort level  Outcome: Progressing     Problem: Respiratory - Adult  Goal: Achieves optimal ventilation and oxygenation  12/27/2022 1904 by Corinne Mae, RN  Outcome: Progressing  12/27/2022 1821 by Pato Graves (Taken 12/27/2022 1821)  Achieves optimal ventilation and oxygenation:   Assess for changes in respiratory status   Position to facilitate oxygenation and minimize respiratory effort   Assess the need for suctioning and aspirate as needed   Respiratory therapy support as indicated   Assess and instruct to report shortness of breath or any respiratory difficulty   Assess for changes in mentation and behavior   Oxygen supplementation based on oxygen saturation or arterial blood gases     Problem: Skin/Tissue Integrity  Goal: Absence of new skin breakdown  Description: 1. Monitor for areas of redness and/or skin breakdown  2. Assess vascular access sites hourly  3. Every 4-6 hours minimum:  Change oxygen saturation probe site  4. Every 4-6 hours:  If on nasal continuous positive airway pressure, respiratory therapy assess nares and determine need for appliance change or resting period.   Outcome: Progressing     Problem: Safety - Adult  Goal: Free from fall injury  Outcome: Progressing     Problem: ABCDS Injury Assessment  Goal: Absence of physical injury  Outcome: Progressing     Problem: Nutrition Deficit:  Goal: Optimize nutritional status  Outcome: Progressing  Flowsheets (Taken 12/27/2022 1339 by Jeana Coreas RD, LD)  Nutrient intake appropriate for improving, restoring, or maintaining nutritional needs: Recommend, monitor, and adjust tube feedings and TPN/PPN based on assessed needs

## 2022-12-28 NOTE — PLAN OF CARE
Problem: Respiratory - Adult  Goal: Achieves optimal ventilation and oxygenation  12/28/2022 1817 by Stacey Castillo  Outcome: Progressing  Flowsheets (Taken 12/28/2022 1817)  Achieves optimal ventilation and oxygenation:   Assess for changes in respiratory status   Position to facilitate oxygenation and minimize respiratory effort   Assess the need for suctioning and aspirate as needed   Respiratory therapy support as indicated   Assess and instruct to report shortness of breath or any respiratory difficulty   Assess for changes in mentation and behavior   Oxygen supplementation based on oxygen saturation or arterial blood gases

## 2022-12-29 NOTE — PLAN OF CARE
Problem: Pain  Goal: Verbalizes/displays adequate comfort level or baseline comfort level  12/29/2022 1803 by Dany Desouza RCP  Outcome: Not Progressing  Flowsheets  Taken 12/29/2022 1600 by Nu Valero RN  Verbalizes/displays adequate comfort level or baseline comfort level: Assess pain using appropriate pain scale  Taken 12/29/2022 1200 by Nu Valero RN  Verbalizes/displays adequate comfort level or baseline comfort level: Assess pain using appropriate pain scale  12/29/2022 1023 by Nu Valero RN  Outcome: Progressing  Flowsheets (Taken 12/29/2022 0800)  Verbalizes/displays adequate comfort level or baseline comfort level: Assess pain using appropriate pain scale

## 2022-12-29 NOTE — PROCEDURES
Central Line Placement Procedure Note    Performed by: Cher Edmond MD    Indication: long term access, central venous monitoring, and need for frequent blood draws    Consent: Unable to be obtained due to patient's condition. Time out performed: Immediately prior to the procedure a \"time out\" was called to verify the correct patient, the correct procedure, equipment, support staff and site/side marked as required. All elements of maximal sterile barrier techniques were followed. Procedure: The patient was positioned appropriately and the skin over the right femoral vein was prepped with betadine and draped in a sterile fashion. Local anesthesia was obtained by infiltration using 1% Lidocaine without epinephrine. A large bore needle was used to identify the vein. A guide wire was then inserted into the vein through the needle. A triple lumen catheter was then inserted into the vessel over the guide wire using the Seldinger technique. All ports showed good, free flowing blood return and were flushed with saline solution. The catheter was then securely fastened to the skin with sutures and covered with a sterile dressing.      Complications: None

## 2022-12-29 NOTE — PROGRESS NOTES
INTENSIVE CARE UNIT  Resident Physician Progress Note    Patient - Angelica Morataya  Date of Admission -  12/14/2022 10:09 AM  Date of Evaluation -  12/29/2022  Room and Bed Number -  3025/3025-01   Hospital Day - 15    HPI:     Angelica Morataya is a 60 y.o. with PMH of   - CAD, s/p CABG in 2015  - Ischemic cardiomyopathy, last EF of 25%  - Insulin-dependent diabetes mellitus type 2  - CKD     Presented to [facility] ER as a transfer from [facility] ER. According to the chart review patient was found unresponsive by the  as per EMS. Unknown downtime. Per EMS report no CPR performed. EMS intubated the patient esophageally, prior to arrival at [facility], reintubation was done and then patient's glucose was found to be 18. At [facility] found to be in PEA arrest achieved ROSC needed 6 mg epinephrine, received multiple pushes of Versed and fentanyl for posturing and seizure-like activity. On arrival to our [facility] ER patient was unresponsive. Fixed and dilated pupils, posturing and on propofol infusion. EKG in the ED showed new right bundle branch block then due to concern for PE, CT PE was done. Bedside ultrasound did not show any signs of tamponade. CT negative for any PE. Around 10:25 AM there was an episode of pulselessness and bradycardia and PEA was given at epinephrine and troponin. Cooling was started around 11:28 AM with target temperature of 38 Celsius. CT head negative for any acute intracranial abnormality  CT chest showed pleural effusions and cardiomegaly      12/17: CT head showing no acute processes, chronic small vessel ischemic white matter disease. LTME showing diffuse cortical dysfunction, severe encephalopathy. 12/18: Levo discontinued. Remains on propofol. 12/19: Tolerated CPAP trial. Per neuro ICU prognostication, brainstem reflexes intact. Poor prognosis. Discussion with family about goals, T&P vs withdrawal of care. Palliative following.   12/20: Plan for bedside discussion of goals of care and code status with family tomorrow,  with palliative and ICU team.  :  not able to attend bedside discussion, I spoke with him via phone. Code status remains full code.  would like to discuss trach and peg. Surgery consulted. : Off propofol. : Mildly tachypneic. Started precedex. CXR ordered.   : DNR CCA  :  has decided [facility] but wants to wait to change to DNR CC until  when he is able to be at bedside    SUBJECTIVE:     OVERNIGHT EVENTS:      [facility] - but  has decided to go with [facility], will wait to change to DNR CC until able to be at bedside tomorrow ; required central and art line for this and needs bronch today per LC  Afebrile   Propofol @5mcg/kg/min, decreased due to softer pressures   PRVC 18/470/5/40%  -  ABG 7.451/34.2/445.3/23.8  Labs stable, hgb 7.0 today  Decreased UOP of 1200 + unmeasured cc yesterday, FMS; net even    AWAKE & FOLLOWING COMMANDS:  [x] No   [] Yes    SECRETIONS Amount:  [x] Small [] Moderate  [] Large  [] None  Color:     [x] White [] Colored  [] Bloody    SEDATION:  RAAS Score:  [x] Propofol gtt  [] Versed gtt  [] Ativan gtt   [] Precedex    PARALYZED:  [x] No    [] Yes    VASOPRESSORS:  [x] No    [] Yes  [] Levophed [] Dopamine [] Vasopressin  [] Dobutamine [] Phenylephrine [] Epinephrine      OBJECTIVE:     VITAL SIGNS:  BP 98/64   Pulse 81   Temp 99.1 °F (37.3 °C)   Resp 22   Ht 5' 6\" (1.676 m)   Wt 159 lb 6.3 oz (72.3 kg)   SpO2 99%   BMI 25.73 kg/m²   Tmax over 24 hours:  Temp (24hrs), Av.3 °F (37.4 °C), Min:97.3 °F (36.3 °C), Max:100.2 °F (37.9 °C)      Patient Vitals for the past 8 hrs:   BP Temp Temp src Pulse Resp SpO2   22 0600 98/64 99.1 °F (37.3 °C) -- 81 22 99 %   22 0500 104/70 -- -- 81 19 100 %   22 0400 122/73 99.5 °F (37.5 °C) Bladder 84 26 100 %   22 0300 103/67 -- -- 77 20 98 %   22 0200 97/67 99.7 °F (37.6 °C) -- 77 18 100 %   12/29/22 0100 101/68 -- -- 83 25 92 %   12/29/22 0000 (!) 143/94 100 °F (37.8 °C) Bladder (!) 107 (!) 34 93 %   12/28/22 2354 -- -- -- (!) 102 (!) 35 100 %           Intake/Output Summary (Last 24 hours) at 12/29/2022 0738  Last data filed at 12/29/2022 0726  Gross per 24 hour   Intake 2636.39 ml   Output 1200 ml   Net 1436.39 ml       Date 12/29/22 0000 - 12/29/22 2359   Shift 5941-8514 7474-2489 7635-1606 24 Hour Total   INTAKE   I.V.(mL/kg) 117.7(1.6)   117.7(1.6)   NG/GT(mL/kg) 587(8.1)   587(8.1)   IV Piggyback(mL/kg) 426.2(5.9)   426.2(5.9)   Shift Total(mL/kg) 1130. 9(15.6)   1130. 9(15.6)   OUTPUT   Urine(mL/kg/hr) 425   425   Shift Total(mL/kg) 425(5.9)   425(5.9)   Weight (kg) 72.3 72.3 72.3 72.3       Wt Readings from Last 3 Encounters:   12/28/22 159 lb 6.3 oz (72.3 kg)   10/24/22 165 lb 9.6 oz (75.1 kg)   10/24/22 165 lb (74.8 kg)     Body mass index is 25.73 kg/m². PHYSICAL EXAM:  GEN:                  Intubated, not following commands  EYES:                Not tracking, pupils equal, round, and reactive to light,  HEENT:            Normocephalic, without obvious abnormality, grimaces to pain but no withrdawal  LUNGS:            clear to auscultation. Suctioned at bedside. Mildly tachypneic. CV:                     regular rate and rhythm and normal S1 and S2  ABDOMEN:     soft and non-distended  MSK:                  there is no redness, warmth, or swelling of the joints  NEURO[de-identified]           Intubated, not following commands. Decerebrate posturing. Upward going Babinski.   SKIN:                 Normal skin color, texture, turgor  EXTREMITIES:  distal pulses intact       MEDICATIONS:  Scheduled Meds:   piperacillin-tazobactam  4,500 mg IntraVENous Q8H    sodium chloride (Inhalant)  3 mL Nebulization Q8H    insulin glargine  30 Units SubCUTAneous BID    famotidine  20 mg Per NG tube Daily    insulin lispro  0-16 Units SubCUTAneous Q4H    midodrine  10 mg Oral TID WC aspirin  81 mg Oral Daily    heparin (porcine)  5,000 Units SubCUTAneous 3 times per day    sodium chloride flush  5-40 mL IntraVENous 2 times per day    chlorhexidine  15 mL Mouth/Throat BID    atorvastatin  80 mg Oral Nightly    [Held by provider] carvedilol  3.125 mg Oral BID WC    clopidogrel  75 mg Oral Daily     Continuous Infusions:   propofol 10 mcg/kg/min (12/29/22 0726)    dexmedetomidine Stopped (12/26/22 2101)    sodium chloride Stopped (12/29/22 0423)    dextrose       PRN Meds:   fentanNYL, 25 mcg, Q2H PRN  sodium chloride flush, 5-40 mL, PRN  sodium chloride, , PRN  ondansetron, 4 mg, Q8H PRN   Or  ondansetron, 4 mg, Q6H PRN  polyethylene glycol, 17 g, Daily PRN  acetaminophen, 650 mg, Q6H PRN   Or  acetaminophen, 650 mg, Q6H PRN  glucose, 4 tablet, PRN  dextrose bolus, 125 mL, PRN   Or  dextrose bolus, 250 mL, PRN  glucagon (rDNA), 1 mg, PRN  dextrose, , Continuous PRN  albuterol, 2.5 mg, As Directed RT PRN  acetylcysteine, 600 mg, As Directed RT PRN      SUPPORT DEVICES: [x] Ventilator [] BIPAP  [] Nasal Cannula [] Room Air    VENT SETTINGS (Comprehensive) (if applicable): PRVC mode, FiO2 40%, PEEP 5, Respiratory Rate 18, Tidal Volume 400  Vent Information  Ventilator ID: [id number]  Equipment Changed: HME, Expiratory Filter  Ventilator Initiate: Yes  Vent Mode: AC/PRVC  Additional Respiratory Assessments  Heart Rate: 81  Resp: 22  SpO2: 99 %  End Tidal CO2: 28 (%)  Position: Semi-Simon's  Humidification Source: HME  Cuff Pressure (cm H2O):  (mlt)  Skin Barrier Applied: No    ABGs:   Arterial Blood Gas result:  pH 7.434; pCO2 34; pO2 60; HCO3 23.   Lab Results   Component Value Date/Time    HCX0WFU 17 08/24/2018 05:02 AM    FIO2 100.0 12/29/2022 05:08 AM         DATA:  Complete Blood Count:   Recent Labs     12/27/22  0400 12/28/22  0422 12/28/22 2211   WBC 12.5* 9.8 10.1   RBC 3.01* 2.80* 2.80*   HGB 7.6* 7.1* 7.0*   HCT 25.2* 23.2* 24.4*   MCV 83.7 82.9 87.1   MCH 25.2 25.4 25.0*   MCHC 30.2 30.6 28.7   RDW 18.6* 18.8* 18.9*    231 249   MPV 12.7 12.6 12.7          Last 3 Blood Glucose:   Recent Labs     12/27/22 0400 12/28/22 0422 12/28/22 2211 12/29/22  0420   GLUCOSE 212* 168* 145* 126*          PT/INR:    Lab Results   Component Value Date/Time    PROTIME 10.7 12/29/2022 04:20 AM    INR 1.0 12/29/2022 04:20 AM     PTT:    Lab Results   Component Value Date/Time    APTT 25.0 12/29/2022 04:20 AM       Comprehensive Metabolic Profile:   Recent Labs     12/28/22 0422 12/28/22 2211 12/29/22  0420    143 145*   K 3.8 3.9 3.9    108* 109*   CO2 22 24 22   BUN 44* 42* 42*   CREATININE 1.26* 1.28* 1.25*   GLUCOSE 168* 145* 126*   CALCIUM 8.3* 8.5* 8.5*   PROT  --  6.4 6.7   LABALBU  --  2.8* 3.0*   BILITOT  --  0.5 0.5   ALKPHOS  --  122* 122*   AST  --  24 25   ALT  --  17 16        Magnesium:   Lab Results   Component Value Date/Time    MG 2.0 12/29/2022 04:20 AM    MG 2.1 12/28/2022 10:11 PM    MG 2.1 12/27/2022 04:00 AM     Phosphorus:   Lab Results   Component Value Date/Time    PHOS 3.4 12/29/2022 04:20 AM    PHOS 3.1 12/28/2022 10:11 PM    PHOS 4.1 12/18/2022 04:08 AM     Ionized Calcium:   Lab Results   Component Value Date/Time    CAION 1.19 12/22/2022 05:48 AM    CAION 1.18 12/21/2022 04:59 AM    CAION 1.17 12/20/2022 04:55 AM        Urinalysis:   Lab Results   Component Value Date/Time    NITRU NEGATIVE 12/25/2022 11:17 AM    COLORU Yellow 12/25/2022 11:17 AM    PHUR 5.5 12/25/2022 11:17 AM    WBCUA None 12/25/2022 11:17 AM    RBCUA 2 TO 5 12/25/2022 11:17 AM    MUCUS NOT REPORTED 11/21/2018 12:12 PM    TRICHOMONAS NOT REPORTED 11/21/2018 12:12 PM    YEAST FEW 12/14/2022 12:26 PM    BACTERIA RARE 12/14/2022 09:30 AM    SPECGRAV 1.025 12/25/2022 11:17 AM    LEUKOCYTESUR NEGATIVE 12/25/2022 11:17 AM    UROBILINOGEN Normal 12/25/2022 11:17 AM    BILIRUBINUR NEGATIVE 12/25/2022 11:17 AM    GLUCOSEU 2+ 12/25/2022 11:17 AM    KETUA NEGATIVE 12/25/2022 11:17 AM    AMORPHOUS 1+ 12/25/2022 11:17 AM       HgBA1c:    Lab Results   Component Value Date/Time    LABA1C 8.5 12/15/2022 05:56 AM     TSH:    Lab Results   Component Value Date/Time    TSH 1.66 10/14/2022 10:23 AM     Lactic Acid:   Lab Results   Component Value Date/Time    LACTA 1.4 10/13/2022 12:22 PM    LACTA 1.7 12/29/2018 11:57 AM    LACTA 1.5 12/29/2018 05:30 AM      Troponin: No results for input(s): TROPONINI in the last 72 hours.     ASSESSMENT:     Patient Active Problem List    Diagnosis Date Noted    Acute anoxic encephalopathy ([address] 12/26/2022    Palliative care encounter 12/16/2022    NSTEMI (non-ST elevated myocardial infarction) ([address] 12/15/2022    Ventricular tachyarrhythmia 12/15/2022    Cardiac arrest ([address] 12/14/2022    Pulmonary hypertension ([address] 12/14/2022    Diabetes mellitus with kidney disease ([address] 12/14/2022    Stage 3b chronic kidney disease ([address] 10/17/2022    Ischemic cardiomyopathy 10/17/2022    Acute respiratory failure with hypoxia and hypercapnia ([address] 10/17/2022    CHF (congestive heart failure), NYHA class I, acute on chronic, combined ([address] 10/14/2022    Acute on chronic systolic congestive heart failure ([address] 10/14/2022    Congestive heart failure due to cardiomyopathy ([address] 90/76/4014    Acute systolic CHF (congestive heart failure) ([address] 10/12/2022    Pneumonia 12/28/2018    Dyslipidemia     Ureteral stone with hydronephrosis 11/04/2018    Acute pulmonary edema ([address] 11/04/2018    Elevated serum immunoglobulin free light chains     CHAD (acute kidney injury) ([address] 72/31/3255    Metabolic acidosis 25/89/4439    Shock ([address] 08/23/2018    DM (diabetes mellitus), type 2 with neurological complications ([address] 31/50/7323    Anxiety 01/26/2016    CAD (coronary artery disease)     Hx of CABG     ICD (implantable cardioverter-defibrillator) in place     H/O mitral valve repair     Hx of myocardial infarction     Hypertension     Onychomycosis of toenail 11/19/2015    S/P cardiac cath-Patent grafts 6/5/15 06/04/2015    Acute coronary syndrome ([facility] 05/26/2015          PLAN:     Neuro  Intubated  Propofol gtt running @5mcg/kg/min due to patient breathing over the vent  Neuro ICU evaluated for neuro prognostication, only brainstem reflexes intact, decerebrate posturing, upward babinksi, poor prognosis, neuro has signed off   No MRI due to AICD incompatibility  LTME - diffuse cortical dysfunction, severe encephalopathy  CT head 12/17 - no acute processes, chronic small vessel ischemic deep white matter disease  Palliative met with family via telephone, pt [facility],  decided to go with [facility], will be at bedside tomorrow before terminal wean  Labs and imaging ordered accordingly, lines placed per [facility]    Cardiology  Off pressors  CAD s/p AICD, PEA arrest  ASA, Lipitor, Plavix  Midodrine 10 TID  Cardiology consulted, no cath unless any meaningful neurologic recovery  ECHO 12/16/22 - EF 15-20%, global hypokinesis, MV prosthesis    Pulmonary  PRVC 18/470/5/40%  ; ABG  stable  Albuterol nebulizer per RT protocol  Mucomyst per RT protocol  CXR q8h per   Likely bronch per     Renal  CHAD on CKD stage IIIb with baseline Cr 1.4-1.6  Nephrology following  Free water flushes 200 q 4h per nephrology  I/O: 1200cc + unrecorded void   External catheter in place  Recurring labs per     GI  TF @ 45 ml/hr at goal  Pepcid 20    ID  No leukocytosis  S/p Zosyn, ended 12/21  Zosyn reordered per  + vanc x1    Endo  Glucose <200  Lantus 30 U BID  HDISS q 4h      WEAN PER PROTOCOL:  [] No   [x] Yes  [] N/A    ICU PROPHYLAXIS:  Stress ulcer:  [] PPI Agent  [x] H2Utkms [] Sucralfate  [] Other:  VTE:   [] Enoxaparin  [x] Unfract.  Heparin Subcut  [x] EPC Cuffs    NUTRITION:  [] NPO [x] Tube Feeding (Specify: ) Diet NPO  ADULT TUBE FEEDING; Orogastric; Renal Formula; Continuous; 10; Yes; 10; Q 4 hours; 45; 200; Q 4 hours [] TPN  [] PO    HOME MEDS RECONCILED: [] No  [x] Yes    CONSULTATION NEEDED:   [x] No  [] Yes    FAMILY UPDATED:    [] No  [x] Yes    TRANSFER OUT OF ICU:   [x] No  [] Yes    ---  Zoraida Huggins DO, MS  Emergency Medicine Resident  [facility]  12/29/22 7:38 AM

## 2022-12-29 NOTE — PROGRESS NOTES
PALLIATIVE CARE NURSING ASSESSMENT    Patient: Halie Muñoz  Room: 3025/3025-01    Reason For Consult   Goals of care evaluation  Distress management  Guidance and support  Facilitate communications  Assistance in coordinating care    Code Status: [facility]    Summary:  Palliative Care rounds. Updated by bedside nurse. Discussed case with bedside nurse James Maurer.  has decided to do organ donation if possible. Palliative Care will continue to follow. Continue current care. Palliative care rounds and met with two granddaughters and daughter Lizabeth Mack. Support offered. Impression: Halie Muñoz is a 60y.o. year old female  has a past medical history of CAD (coronary artery disease), Cardiomyopathy ([address] COPD (chronic obstructive pulmonary disease) ([address] Depression, Diabetes mellitus ([address] H/O mitral valve repair, History of fractured kneecap, Hx of CABG, Hx of myocardial infarction, Hyperlipidemia, Hypertension, ICD (implantable cardioverter-defibrillator) in place, Kidney calculi, Osteoarthritis, Patient in clinical research study, Pneumonia, and Renal calculi. .  Currently hospitalized for the management of cardiac arrest.  The Palliative Care Team is following to assist with family support, goals of care. Vital Signs  Blood pressure 98/64, pulse 67, temperature 99.1 °F (37.3 °C), temperature source Bladder, resp. rate 18, height 5' 6\" (1.676 m), weight 159 lb 6.3 oz (72.3 kg), SpO2 100 %.     Patient Active Problem List   Diagnosis    Acute coronary syndrome (HCC)    S/P cardiac cath-Patent grafts 6/5/15    Onychomycosis of toenail    CAD (coronary artery disease)    Hx of CABG    ICD (implantable cardioverter-defibrillator) in place    H/O mitral valve repair    Hx of myocardial infarction    Hypertension    DM (diabetes mellitus), type 2 with neurological complications ([address]    Anxiety    CHAD (acute kidney injury) ([address]    Metabolic acidosis    Shock (HCC)    Elevated serum immunoglobulin free light chains    Ureteral stone with hydronephrosis    Acute pulmonary edema (HCC)    Dyslipidemia    Pneumonia    Acute systolic CHF (congestive heart failure) (HCC)    Congestive heart failure due to cardiomyopathy (HCC)    CHF (congestive heart failure), NYHA class I, acute on chronic, combined ([facility]    Acute on chronic systolic congestive heart failure (HCC)    Stage 3b chronic kidney disease ([facility]    Ischemic cardiomyopathy    Acute respiratory failure with hypoxia and hypercapnia (HCC)    Cardiac arrest ([facility]    Pulmonary hypertension ([facility]    Diabetes mellitus with kidney disease ([facility]    NSTEMI (non-ST elevated myocardial infarction) [facility])    Ventricular tachyarrhythmia    Palliative care encounter    Acute anoxic encephalopathy ([facility]       Palliative Interaction: Reviewed patient chart, updates per bedside nurse Fadia Payton. Pt's  trying to reach family members, he is leaning towards comfort care on Friday. Updated nurse that we can offer taxi service to help patient get to hospital and back. Home away from home may also be available as needed. Please reach out to [facility] on Friday if transportation assistance is needed.  spoke with [employer] and wants to proceed with organ donation if possible. Met with daughter Ward Patel and two of patient's granddaughters. There is tension and family dynamics in family. Offered Home away from home to granddaughters and or daughter since they are here now and want to come tomorrow. They decide against home away from home. Pt's two daughters Ward Patel and BJ would like transportation assistance to get up here tomorrow. I spoke with Stephen Frederick from [employer] [facility] she has spoken to [place] [employer] and he is still trying to get a friends vehicle for the day tomorrow. He relates that he will know and update Aurelio Gordon if he get's ride or if he needs one.   Adam Speak RN case manager the daughters names, phone number and address in case we can get them a ride to hospital tomorrow. Strongly encouraged them to continue to try to find alternate rides because I can not be sure that we will be able to provide taxi service. If Mirela Day has no ride in and we can get a taxi covered we have to offer it to him first.  Daughters express that he will not let them ride with him. Encouraged them to try to get a ride and we will try on our end as well. Emaline Mess from [facility] relates that they will be planning on surgery tomorrow afternoon/evening.               Goals/Plan of care  Education/support to staff  Education/support to family  Communications with primary service  Providing support for coping/adaptation/distress of family  Addressing bereavement needs  Discussing meaning/purpose   Specific spiritual beliefs/practices  Continue with current plan of care  Code status clarified: [facility]  Validating patient/family distress  Continued communication updates  Recognizing, reflecting, and empathizing with family members' anticipatory grief      Palliative Care Coordinator  Richard SALDANA, RN, ONN-CG    [facility] Office: [address] [facility] Office: [phone]  [facility] Office: [phone]    For [facility] scheduling please call [phone]

## 2022-12-29 NOTE — PROGRESS NOTES
Per [facility] Presley asked to have [facility] office called and notified of the potential organ donation procedure (DCD) for tomorrow. Writer talked to Alanis Pierre, [facility]. Alanis Pierre stated the patient will not be a 's case based on the information given.

## 2022-12-29 NOTE — PROCEDURES
Arterial Line Placement Procedure Note          Performed by: Daniela Lee MD               Indication: Need for serial blood work, arterial blood gases, and mechanical ventilation    Consent: Unable to be obtained due to patient's condition. Antonio's Test: Normal    Time out performed: Immediately prior to the procedure a \"time out\" was called to verify the correct patient, the correct procedure, equipment, support staff and site/side marked as required. All elements of maximal sterile barrier techniques were followed. Procedure: The skin over the right radial artery was prepped with betadine and draped in a sterile fashion. Local anesthesia was obtained by infiltration using 1% Lidocaine without epinephrine. A 20 gauge arterial line catheter was then inserted, using a modified Seldinger technique, into the vessel. The transducer set was then attached and securely fastened to the skin with sutures. Waveforms on the monitor were observed and found to be adequate. The patient had good distal perfusion after the procedure. The site was then dressed in a sterile fashion.     Complications: None

## 2022-12-29 NOTE — CARE COORDINATION
SBIRT is deferred due to pt's medical condition.           Alcohol Screening and Brief Intervention          Deferred [x]    Completed on: 12/29/2022   TATI Atkinson

## 2022-12-30 NOTE — PROGRESS NOTES
INTENSIVE CARE UNIT  Resident Physician Progress Note    Patient - Clarissa Jones  Date of Admission -  12/14/2022 10:09 AM  Date of Evaluation -  12/30/2022  Room and Bed Number -  3025/3025-01   Hospital Day - 16    HPI:     Clarissa Jones is a 60 y.o. with PMH of   - CAD, s/p CABG in 2015  - Ischemic cardiomyopathy, last EF of 25%  - Insulin-dependent diabetes mellitus type 2  - CKD     Presented to [facility] ER as a transfer from [facility] ER. According to the chart review patient was found unresponsive by the  as per EMS. Unknown downtime. Per EMS report no CPR performed. EMS intubated the patient esophageally, prior to arrival at [facility], reintubation was done and then patient's glucose was found to be 18. At [facility] found to be in PEA arrest achieved ROSC needed 6 mg epinephrine, received multiple pushes of Versed and fentanyl for posturing and seizure-like activity. On arrival to our [facility] ER patient was unresponsive. Fixed and dilated pupils, posturing and on propofol infusion. EKG in the ED showed new right bundle branch block then due to concern for PE, CT PE was done. Bedside ultrasound did not show any signs of tamponade. CT negative for any PE. Around 10:25 AM there was an episode of pulselessness and bradycardia and PEA was given at epinephrine and troponin. Cooling was started around 11:28 AM with target temperature of 38 Celsius. CT head negative for any acute intracranial abnormality  CT chest showed pleural effusions and cardiomegaly      12/17: CT head showing no acute processes, chronic small vessel ischemic white matter disease. LTME showing diffuse cortical dysfunction, severe encephalopathy. 12/18: Levo discontinued. Remains on propofol. 12/19: Tolerated CPAP trial. Per neuro ICU prognostication, brainstem reflexes intact. Poor prognosis. Discussion with family about goals, T&P vs withdrawal of care. Palliative following.   12/20: Plan for bedside discussion of goals of care and code status with family tomorrow,  with palliative and ICU team.  :  not able to attend bedside discussion, I spoke with him via phone. Code status remains full code.  would like to discuss trach and peg. Surgery consulted. : Off propofol. : Mildly tachypneic. Started precedex. CXR ordered. : DNR CCA  :  has decided [facility] but wants to wait to change to DNR CC until  when he is able to be at bedside    SUBJECTIVE:     OVERNIGHT EVENTS:      [facility] palliative met with two daughters yesterday. Transportation assistance required for granddaughters.  also still trying to arrange transport. Still planning for DCD today at 1500    Spiked a fever at 100.4 at 1900 last night; urine did show +LE, continues on zosyn.  Sputum had gram + cocci in pair and few gram neg rods; blood cultures neg    Propofol @10mcg/kg/min  Stable vent settings and blood gas; PRVC 18/470/5/40%  -  ABG 7.413/38.3/478.9/24.4    Required 1u PRBC yesterday, f/u hgb 8.4    Decreased UOP of 860 + unmeasured cc yesterday, FMS; net positive 2L now since admit    AWAKE & FOLLOWING COMMANDS:  [x] No   [] Yes    SECRETIONS Amount:  [x] Small [] Moderate  [] Large  [] None  Color:     [x] White [] Colored  [] Bloody    SEDATION:  RAAS Score:  [x] Propofol gtt  [] Versed gtt  [] Ativan gtt   [] Precedex    PARALYZED:  [x] No    [] Yes    VASOPRESSORS:  [x] No    [] Yes  [] Levophed [] Dopamine [] Vasopressin  [] Dobutamine [] Phenylephrine [] Epinephrine      OBJECTIVE:     VITAL SIGNS:  BP 98/62   Pulse 76   Temp 99.3 °F (37.4 °C)   Resp 18   Ht 5' 6\" (1.676 m)   Wt 158 lb 4.6 oz (71.8 kg)   SpO2 99%   BMI 25.55 kg/m²   Tmax over 24 hours:  Temp (24hrs), Av.6 °F (37.6 °C), Min:99.1 °F (37.3 °C), Max:100.4 °F (38 °C)      Patient Vitals for the past 8 hrs:   BP Temp Temp src Pulse Resp SpO2 Height Weight   22 0745 -- -- -- 76 18 99 % 5' 6\" (1.676 m) --   12/30/22 0600 98/62 99.3 °F (37.4 °C) -- 78 23 98 % -- --   12/30/22 0546 -- -- -- -- -- -- -- 158 lb 4.6 oz (71.8 kg)   12/30/22 0500 102/76 -- -- 80 21 97 % -- --   12/30/22 0400 113/63 99.5 °F (37.5 °C) Bladder 80 19 98 % -- --   12/30/22 0359 -- -- -- 80 25 98 % -- --   12/30/22 0300 98/66 -- -- 80 15 99 % -- --   12/30/22 0200 93/68 99.7 °F (37.6 °C) -- 78 23 98 % -- --   12/30/22 0100 104/68 -- -- 74 21 99 % -- --           Intake/Output Summary (Last 24 hours) at 12/30/2022 0802  Last data filed at 12/30/2022 0800  Gross per 24 hour   Intake 2364.65 ml   Output 870 ml   Net 1494.65 ml       Date 12/30/22 0000 - 12/30/22 2359   Shift 7064-9502 9692-6844 8592-5301 24 Hour Total   INTAKE   I.V.(mL/kg) 109.4(1.5) 6.3(0.1)  115.6(1.6)   NG/GT(mL/kg) 762(10.6)   762(10.6)   IV Piggyback(mL/kg) 174.8(2.4) 47.2(0.7)  221. 9(3.1)   Shift Total(mL/kg) 1046. 1(14.6) 53.4(0.7)  1099. 6(15.3)   OUTPUT   Urine(mL/kg/hr) 265(0.5)   265   Stool(mL/kg) 50(0.7)   50(0.7)   Shift Total(mL/kg) 315(4.4)   315(4.4)   Weight (kg) 71.8 71.8 71.8 71.8       Wt Readings from Last 3 Encounters:   12/30/22 158 lb 4.6 oz (71.8 kg)   10/24/22 165 lb 9.6 oz (75.1 kg)   10/24/22 165 lb (74.8 kg)     Body mass index is 25.55 kg/m². PHYSICAL EXAM:  GEN:                  Intubated, not following commands  EYES:                Not tracking, pupils equal, round, and reactive to light,  HEENT:            Normocephalic, without obvious abnormality, grimaces to pain but no withrdawal  LUNGS:            clear to auscultation. Suctioned at bedside. Mildly tachypneic. CV:                     regular rate and rhythm and normal S1 and S2  ABDOMEN:     soft and non-distended  MSK:                  there is no redness, warmth, or swelling of the joints  NEURO[de-identified]           Intubated, not following commands. Decerebrate posturing. Upward going Babinski.   SKIN:                 Normal skin color, texture, turgor  EXTREMITIES: distal pulses intact       MEDICATIONS:  Scheduled Meds:   piperacillin-tazobactam  4,500 mg IntraVENous Q8H    insulin glargine  30 Units SubCUTAneous BID    famotidine  20 mg Per NG tube Daily    insulin lispro  0-16 Units SubCUTAneous Q4H    midodrine  10 mg Oral TID WC    aspirin  81 mg Oral Daily    heparin (porcine)  5,000 Units SubCUTAneous 3 times per day    sodium chloride flush  5-40 mL IntraVENous 2 times per day    chlorhexidine  15 mL Mouth/Throat BID    atorvastatin  80 mg Oral Nightly    [Held by provider] carvedilol  3.125 mg Oral BID WC    clopidogrel  75 mg Oral Daily     Continuous Infusions:   sodium chloride      propofol 10 mcg/kg/min (12/30/22 0800)    dexmedetomidine Stopped (12/26/22 2101)    sodium chloride Stopped (12/30/22 0400)    dextrose       PRN Meds:   sodium chloride, , PRN  fentanNYL, 25 mcg, Q2H PRN  sodium chloride flush, 5-40 mL, PRN  sodium chloride, , PRN  ondansetron, 4 mg, Q8H PRN   Or  ondansetron, 4 mg, Q6H PRN  polyethylene glycol, 17 g, Daily PRN  acetaminophen, 650 mg, Q6H PRN   Or  acetaminophen, 650 mg, Q6H PRN  glucose, 4 tablet, PRN  dextrose bolus, 125 mL, PRN   Or  dextrose bolus, 250 mL, PRN  glucagon (rDNA), 1 mg, PRN  dextrose, , Continuous PRN  albuterol, 2.5 mg, As Directed RT PRN      SUPPORT DEVICES: [x] Ventilator [] BIPAP  [] Nasal Cannula [] Room Air    VENT SETTINGS (Comprehensive) (if applicable): PRVC mode, FiO2 40%, PEEP 5, Respiratory Rate 18, Tidal Volume 400  Vent Information  Ventilator ID: [id number]  Equipment Changed: E  Ventilator Initiate: Yes  Vent Mode: AC/PRVC  Additional Respiratory Assessments  Heart Rate: 76  Resp: 18  SpO2: 99 %  End Tidal CO2: 26 (%)  Position: Semi-Simon's  Humidification Source: E  Cuff Pressure (cm H2O):  (mlt)  Skin Barrier Applied: No    ABGs:   Arterial Blood Gas result:  pH 7.434; pCO2 34; pO2 60; HCO3 23.   Lab Results   Component Value Date/Time    ZBB4CDA 17 08/24/2018 05:02 AM    FIO2 100.0 12/29/2022 01:44 PM         DATA:  Complete Blood Count:   Recent Labs     12/28/22  2211 12/29/22  1020 12/29/22  1623 12/29/22  2145   WBC 10.1 8.4  --  9.6   RBC 2.80* 2.61*  --  3.31*   HGB 7.0* 6.5* 7.6* 8.4*   HCT 24.4* 21.9* 25.4* 27.8*   MCV 87.1 83.9  --  84.0   MCH 25.0* 24.9*  --  25.4   MCHC 28.7 29.7  --  30.2   RDW 18.9* 19.1*  --  18.7*    244  --  276   MPV 12.7 12.5  --  12.2          Last 3 Blood Glucose:   Recent Labs     12/28/22  0422 12/28/22 2211 12/29/22  0420 12/29/22  1020 12/29/22  1600 12/29/22 2145 12/30/22  0333   GLUCOSE 168* 145* 126* 147* 164* 161* 151*          PT/INR:    Lab Results   Component Value Date/Time    PROTIME 10.9 12/30/2022 03:33 AM    INR 1.0 12/30/2022 03:33 AM     PTT:    Lab Results   Component Value Date/Time    APTT 24.1 12/30/2022 03:33 AM       Comprehensive Metabolic Profile:   Recent Labs     12/29/22  1600 12/29/22  2145 12/30/22  0333    137 137   K 3.9 4.5 3.9    107 106   CO2 22 22 22   BUN 41* 41* 41*   CREATININE 1.28* 1.32* 1.30*   GLUCOSE 164* 161* 151*   CALCIUM 8.0* 8.2* 8.2*   PROT 6.5 6.4 6.3*   LABALBU 2.8* 2.7* 2.7*   BILITOT 0.5 0.5 0.5   ALKPHOS 119* 120* 116*   AST 24 24 22   ALT 17 16 16        Magnesium:   Lab Results   Component Value Date/Time    MG 2.0 12/30/2022 03:33 AM    MG 2.0 12/29/2022 09:45 PM    MG 1.9 12/29/2022 04:00 PM     Phosphorus:   Lab Results   Component Value Date/Time    PHOS 3.4 12/30/2022 03:33 AM    PHOS 3.6 12/29/2022 09:45 PM    PHOS 3.6 12/29/2022 04:00 PM     Ionized Calcium:   Lab Results   Component Value Date/Time    CAION 1.19 12/22/2022 05:48 AM    CAION 1.18 12/21/2022 04:59 AM    CAION 1.17 12/20/2022 04:55 AM        Urinalysis:   Lab Results   Component Value Date/Time    NITRU NEGATIVE 12/29/2022 08:21 AM    COLORU Yellow 12/29/2022 08:21 AM    PHUR 5.0 12/29/2022 08:21 AM    WBCUA TOO NUMEROUS TO COUNT 12/29/2022 08:21 AM    RBCUA 2 TO 5 12/29/2022 08:21 AM    MUCUS NOT REPORTED 11/21/2018 12:12 PM    TRICHOMONAS NOT REPORTED 11/21/2018 12:12 PM    YEAST FEW 12/14/2022 12:26 PM    BACTERIA RARE 12/14/2022 09:30 AM    SPECGRAV 1.021 12/29/2022 08:21 AM    LEUKOCYTESUR LARGE 12/29/2022 08:21 AM    UROBILINOGEN Normal 12/29/2022 08:21 AM    BILIRUBINUR NEGATIVE 12/29/2022 08:21 AM    GLUCOSEU NEGATIVE 12/29/2022 08:21 AM    KETUA NEGATIVE 12/29/2022 08:21 AM    AMORPHOUS 1+ 12/25/2022 11:17 AM       HgBA1c:    Lab Results   Component Value Date/Time    LABA1C 8.5 12/15/2022 05:56 AM     TSH:    Lab Results   Component Value Date/Time    TSH 1.66 10/14/2022 10:23 AM     Lactic Acid:   Lab Results   Component Value Date/Time    LACTA 1.4 10/13/2022 12:22 PM    LACTA 1.7 12/29/2018 11:57 AM    LACTA 1.5 12/29/2018 05:30 AM      Troponin: No results for input(s): TROPONINI in the last 72 hours.     ASSESSMENT:     Patient Active Problem List    Diagnosis Date Noted    Acute anoxic encephalopathy ([address] 12/26/2022    Palliative care encounter 12/16/2022    NSTEMI (non-ST elevated myocardial infarction) ([address] 12/15/2022    Ventricular tachyarrhythmia 12/15/2022    Cardiac arrest ([address] 12/14/2022    Pulmonary hypertension ([address] 12/14/2022    Diabetes mellitus with kidney disease ([address] 12/14/2022    Stage 3b chronic kidney disease ([address] 10/17/2022    Ischemic cardiomyopathy 10/17/2022    Acute respiratory failure with hypoxia and hypercapnia ([address] 10/17/2022    CHF (congestive heart failure), NYHA class I, acute on chronic, combined ([address] 10/14/2022    Acute on chronic systolic congestive heart failure ([address] 10/14/2022    Congestive heart failure due to cardiomyopathy ([address] 27/82/4109    Acute systolic CHF (congestive heart failure) ([facility] 10/12/2022    Pneumonia 12/28/2018    Dyslipidemia     Ureteral stone with hydronephrosis 11/04/2018    Acute pulmonary edema ([facility] 11/04/2018    Elevated serum immunoglobulin free light chains     CHAD (acute kidney injury) ([facility] 95/67/4767    Metabolic acidosis 08/23/2018    Shock ([facility] 08/23/2018    DM (diabetes mellitus), type 2 with neurological complications ([facility] 31/09/4526    Anxiety 01/26/2016    CAD (coronary artery disease)     Hx of CABG     ICD (implantable cardioverter-defibrillator) in place     H/O mitral valve repair     Hx of myocardial infarction     Hypertension     Onychomycosis of toenail 11/19/2015    S/P cardiac cath-Patent grafts 6/5/15 06/04/2015    Acute coronary syndrome ([facility] 05/26/2015          PLAN:     Neuro  Intubated  Propofol gtt running @10mcg/kg/min due to patient breathing over the vent  Neuro ICU evaluated for neuro prognostication, only brainstem reflexes intact, decerebrate posturing, upward babinksi, poor prognosis, neuro has signed off   No MRI due to AICD incompatibility  LTME - diffuse cortical dysfunction, severe encephalopathy  CT head 12/17 - no acute processes, chronic small vessel ischemic deep white matter disease  Palliative met with family via telephone, pt [facility],  decided to go with [facility], will be at bedside tomorrow before terminal wean  Labs and imaging ordered accordingly, lines placed per [facility]    Cardiology  Off pressors  CAD s/p AICD, PEA arrest  ASA, Lipitor, Plavix  Midodrine 10 TID  Cardiology consulted, no cath unless any meaningful neurologic recovery  ECHO 12/16/22 - EF 15-20%, global hypokinesis, MV prosthesis    Pulmonary  PRVC 18/470/5/40%  ;  ABG  stable  Albuterol nebulizer per RT protocol  Mucomyst per RT protocol  CXR q8h per LC  No plan to harvest lungs  Febrile last night 12/29, sputum cultures shows gram + cocci in pairs and few gram neg rods    Renal  CHAD on CKD stage IIIb with baseline Cr 1.4-1.6  Nephrology following  Free water flushes 200 q 4h per nephrology  I/O: 860cc + unrecorded voids  External catheter in place  Recurring labs per [facility]  Febrile last night 12/29; Urine w/+LE culture pending,     GI  TF @ 45 ml/hr at goal  Pepcid 20    ID  No leukocytosis  S/p Zosyn, ended 12/21  Zosyn reordered per LC + vanc x1    Endo  Glucose <200  Lantus 30 U BID  HDISS q 4h      WEAN PER PROTOCOL:  [] No   [x] Yes  [] N/A    ICU PROPHYLAXIS:  Stress ulcer:  [] PPI Agent  [x] H2Fangf [] Sucralfate  [] Other:  VTE:   [] Enoxaparin  [x] Unfract.  Heparin Subcut  [x] EPC Cuffs    NUTRITION:  [] NPO [x] Tube Feeding (Specify: ) Diet NPO  ADULT TUBE FEEDING; Orogastric; Renal Formula; Continuous; 10; Yes; 10; Q 4 hours; 45; 200; Q 4 hours [] TPN  [] PO    HOME MEDS RECONCILED: [] No  [x] Yes    CONSULTATION NEEDED:   [x] No  [] Yes    FAMILY UPDATED:    [] No  [x] Yes    TRANSFER OUT OF ICU:   [x] No  [] Yes    ---  Ghulam Garibay DO, MS  Emergency Medicine Resident  [facility]  12/30/22 8:02 AM

## 2022-12-30 NOTE — CARE COORDINATION
Transitional planning. Pt remains Intubated FiO2 40%, PEEP of 5, not following Commands. Plans are to provide transportation for pt's  today and [facility] DCD for 3pm today. Spoke to Winsome Godoy with SW and informed her of pt's 's needs for transportation. Forwarded [employer] email to her so she would have pt's 's contact information. Per Winsome MONTOYA, CM can call for cab for pt's return home when he is ready for transport home. 1100 informed by Winsome Godoy w/ [employer] that she has contacted pt's  Mirela Day and arranged for cab transportation to pick him up at his [place] @ noon. 1130 Spoke to Marko Robertson with [employer]. Informed her of above information. Spiritual Care informed that there are plans for an [place] Walk. Marko Robertson has talked to pt's     200 Marko Robertson will [facility] called and informed CM that she talked to [employer]. and was informed that pt's  can take care of everything over the phone.  He will need pt's SS#.

## 2022-12-30 NOTE — ANESTHESIA PRE PROCEDURE
Department of Anesthesiology  Preprocedure Note       Name:  Teresa Ledbetter   Age:  60 y.o.  :  1960                                          MRN:  [id number]         Date:  2022      Surgeon: Juana Richmond):  [facility]    Procedure: ORGAN PROCUREMENT (LIVER, BILATERAL KIDNEYS)    Medications prior to admission:   Prior to Admission medications    Medication Sig Start Date End Date Taking? Authorizing Provider   furosemide (LASIX) 40 MG tablet Take 1 tablet by mouth in the morning and 1 tablet in the evening. 22   Kingston Sims MD   clopidogrel (PLAVIX) 75 MG tablet Take 1 tab daily 10/24/22   Pelon Vasquez MD   atorvastatin (LIPITOR) 80 MG tablet 1 tab daily 10/24/22   Pelon Vasquez MD   carvedilol (COREG) 3.125 MG tablet take 1 tablet by mouth twice a day with meals 10/24/22   Pelon Vasquez MD   gabapentin (NEURONTIN) 400 MG capsule Take 1 capsule by mouth 3 times daily for 180 days. 10/24/22 4/22/23  Ary Dale DO   midodrine (PROAMATINE) 5 MG tablet take 1 tablet by mouth three times a day 10/24/22   Ary Dale DO   sertraline (ZOLOFT) 100 MG tablet Take one tablet by mouth once daily 10/24/22   Ary Dale DO   insulin glargine (LANTUS SOLOSTAR) 100 UNIT/ML injection pen INJECT 45 UNITS SUBCUTANEOUSLY EVERY MORNING AND 25 UNITS EVERY EVENING 10/24/22   Ary Dale DO   Insulin Pen Needle (BD PEN NEEDLE PAIGE 2ND GEN) 32G X 4 MM MISC use 1 PEN NEEDLE to inject MEDICATION subcutaneously daily 10/24/22   Nuzhat Remy DO   Lancets MISC Pt testing BS once daily and prn 10/24/22   Nuzhat Remy DO   glucose monitoring (FREESTYLE) kit Please dispense meter covered by patients insurance. 22   Kingston Sims MD   blood glucose monitor strips Testing BS once daily and as needed.  Dispense strips to go with new glucometer 22   Kingston Sims MD   albuterol (PROVENTIL) (2.5 MG/3ML) 0.083% nebulizer solution Take 3 mLs by nebulization every 6 hours as needed for Wheezing (as needed for wheezing) 4/12/22   Anyi Rowland MD   Respiratory Therapy Supplies (NEBULIZER/TUBING/MOUTHPIECE) KIT 1 kit by Does not apply route daily as needed (for SOB, cough) 11/13/19   Anyi Rowland MD   Respiratory Therapy Supplies (NEBULIZER COMPRESSOR) KIT 1 kit by Does not apply route once for 1 dose 11/4/19 10/24/22  Anyi Rowland MD   lactobacillus (CULTURELLE) capsule Take 1 capsule by mouth 2 times daily (with meals) 1/2/19   Denny Sewell MD   blood glucose monitor strips Use to test daily 11/7/18   Anyi Rowland MD   blood glucose monitor kit and supplies Use to test daily 11/7/18   Anyi Rowladn MD   Cholecalciferol (VITAMIN D3) 5000 units TABS Take 5,000 Units by mouth daily    Historical Provider, MD   glucose blood VI test strips (EXACTECH TEST) strip 1 each by In Vitro route daily Pt testing BS once daily and prn 11/19/15   Anyi Rowland MD   Multiple Vitamins-Minerals (THERAPEUTIC MULTIVITAMIN-MINERALS) tablet Take 1 tablet by mouth daily (with breakfast) 6/12/15   Hailey Desouza MD       Current medications:    No current facility-administered medications for this visit. No current outpatient medications on file.      Facility-Administered Medications Ordered in Other Visits   Medication Dose Route Frequency Provider Last Rate Last Admin    0.9 % sodium chloride infusion   IntraVENous PRN Chiara Murphy DO        piperacillin-tazobactam (ZOSYN) 4,500 mg in dextrose 5 % 100 mL IVPB (mini-bag)  4,500 mg IntraVENous Q8H Martha Cole MD 31.3 mL/hr at 12/29/22 2001 4,500 mg at 12/29/22 2001    insulin glargine (LANTUS) injection vial 30 Units  30 Units SubCUTAneous BID Chiara Murphy DO   30 Units at 12/29/22 2001    famotidine (PEPCID) tablet 20 mg  20 mg Per NG tube Daily Martha Cole MD   20 mg at 12/29/22 0835    fentaNYL (SUBLIMAZE) injection 25 mcg  25 mcg IntraVENous Q2H PRN Martha Cole MD   25 mcg at 12/27/22 2014    propofol injection  5-50 mcg/kg/min IntraVENous Continuous Micheal Neely MD 2.2 mL/hr at 12/29/22 1729 5 mcg/kg/min at 12/29/22 1729    dexmedetomidine (PRECEDEX) 400 mcg in sodium chloride 0.9 % 100 mL infusion  0.1-1.5 mcg/kg/hr IntraVENous Continuous Donna Pelaez MD   Stopped at 12/26/22 2101    insulin lispro (HUMALOG) injection vial 0-16 Units  0-16 Units SubCUTAneous Q4H Elda Dutta MD   4 Units at 12/27/22 1229    midodrine (PROAMATINE) tablet 10 mg  10 mg Oral TID WC Micheal Neely MD   10 mg at 12/29/22 1648    aspirin chewable tablet 81 mg  81 mg Oral Daily Arnold Grijalva MD   81 mg at 12/29/22 0891    heparin (porcine) injection 5,000 Units  5,000 Units SubCUTAneous 3 times per day Gloria Gibbons DO   5,000 Units at 12/29/22 1347    sodium chloride flush 0.9 % injection 5-40 mL  5-40 mL IntraVENous 2 times per day Daniel Stanley MD   10 mL at 12/29/22 2002    sodium chloride flush 0.9 % injection 5-40 mL  5-40 mL IntraVENous PRN Elda Dutta MD        0.9 % sodium chloride infusion   IntraVENous PRN Ashlie Dutta MD 10 mL/hr at 12/29/22 1623 Rate Verify at 12/29/22 1623    ondansetron (ZOFRAN-ODT) disintegrating tablet 4 mg  4 mg Oral Q8H PRN Elda Dutta MD        Or    [facility]dansetron [facility]) injection 4 mg  4 mg IntraVENous Q6H PRN Elda Dutta MD        polyethylene glycol (GLYCOLAX) packet 17 g  17 g Oral Daily PRN Elda Dutta MD        acetaminophen (TYLENOL) tablet 650 mg  650 mg Oral Q6H PRN Elda Dutta MD   650 mg at 12/26/22 1208    Or    acetaminophen (TYLENOL) suppository 650 mg  650 mg Rectal Q6H PRN Elda Dutta MD        chlorhexidine (PERIDEX) 0.12 % solution 15 mL  15 mL Mouth/Throat BID Elda Dutta MD   15 mL at 12/29/22 2001    glucose chewable tablet 16 g  4 tablet Oral PRN Elda Connors MD Ryan        dextrose bolus 10% 125 mL  125 mL IntraVENous PRN Elda Billingsley MD        Or    dextrose bolus 10% 250 mL  250 mL IntraVENous PRN Elda Billingsley MD        glucagon (rDNA) injection 1 mg  1 mg SubCUTAneous PRN Elda Billingsley MD        dextrose 10 % infusion   IntraVENous Continuous PRN Elda Billingsley MD        atorvastatin (LIPITOR) tablet 80 mg  80 mg Oral Nightly Elda Billingsley MD   80 mg at 12/29/22 2001    [Held by provider] carvedilol (COREG) tablet 3.125 mg  3.125 mg Oral BID WC Elda Billingsley MD        clopidogrel (PLAVIX) tablet 75 mg  75 mg Oral Daily Elda Billingsley MD   75 mg at 12/29/22 0835    albuterol (PROVENTIL) nebulizer solution 2.5 mg  2.5 mg Nebulization As Directed RT PRN Angle Velasco MD   2.5 mg at 12/26/22 1558       Allergies:     Allergies   Allergen Reactions    Codeine Nausea And Vomiting    Nsaids Nausea Only     nausea       Problem List:    Patient Active Problem List   Diagnosis Code    Acute coronary syndrome (HCC) I24.9    S/P cardiac cath-Patent grafts 6/5/15 Z98.890    Onychomycosis of toenail B35.1    CAD (coronary artery disease) I25.10    Hx of CABG Z95.1    ICD (implantable cardioverter-defibrillator) in place Z95.810    H/O mitral valve repair Z98.890    Hx of myocardial infarction I25.2    Hypertension I10    DM (diabetes mellitus), type 2 with neurological complications ([facility] T49.91    Anxiety F41.9    CHAD (acute kidney injury) ([facility] A59.4    Metabolic acidosis O30.25    Shock ([facility] R57.9    Elevated serum immunoglobulin free light chains R76.8    Ureteral stone with hydronephrosis N13.2    Acute pulmonary edema (HCC) J81.0    Dyslipidemia E78.5    Pneumonia B37.1    Acute systolic CHF (congestive heart failure) ([facility]) I50.21    Congestive heart failure due to cardiomyopathy (HCC) I50.9, I42.9    CHF (congestive heart failure), NYHA class I, acute on chronic, combined (HCC) I50.43    Acute on chronic systolic congestive heart failure (HCC) I50.23    Stage 3b chronic kidney disease (HCC) N18.32    Ischemic cardiomyopathy I25.5    Acute respiratory failure with hypoxia and hypercapnia (HCC) J96.01, J96.02    Cardiac arrest (HCC) I46.9    Pulmonary hypertension (HCC) I27.20    Diabetes mellitus with kidney disease (HCC) E11.21    NSTEMI (non-ST elevated myocardial infarction) ([facility] I21.4    Ventricular tachyarrhythmia I47.20    Palliative care encounter Z51.5    Acute anoxic encephalopathy (HCC) G93.1       Past Medical History:        Diagnosis Date    CAD (coronary artery disease)     Cardiomyopathy ([facility]     COPD (chronic obstructive pulmonary disease) ([facility]     Depression     Diabetes mellitus ([facility]     H/O mitral valve repair     History of fractured kneecap 2017    right    Hx of CABG     Hx of myocardial infarction     Hyperlipidemia     Hypertension     ICD (implantable cardioverter-defibrillator) in place     Kidney calculi     Osteoarthritis     Patient in clinical research study 12/14/2022    Pneumonia     Renal calculi        Past Surgical History:        Procedure Laterality Date    ANKLE SURGERY Right 2009    plate and screws     CARDIAC DEFIBRILLATOR PLACEMENT  06/01/15    CHOLECYSTECTOMY  1990    CORONARY ARTERY BYPASS GRAFT  05/28/15    X 3- Dr Araiza Nickel CABELLO-LAD<SVG-Diag, SVG-PDA, MV Repair with 30 min CE IMR ring    CYSTOSCOPY Left 11/08/2018    HYSTERECTOMY (CERVIX STATUS UNKNOWN)  1990    KIDNEY STONE SURGERY Left 2010    MITRAL VALVE SURGERY  6/4/15    ME CYSTO/URETERO W/LITHOTRIPSY &INDWELL STENT INSRT Left 11/8/2018    CYSTOSCOPY URETEROSCOPY performed by Pasha Mendoza MD at [facility] &INDWELL STENT INSRT Left 11/8/2018    CYSTOSCOPY STENT INSERTION performed by Pasha Mendoza MD at [address] [place] [address]  W/LITHOTRIPSY &INDWELL STENT INSRT Left 11/15/2018    LEFT CYSTOSCOPY URETEROSCOPY LASER,HLL, LEFT STENT EXCHANGE performed by London Orellana MD at [place] [address]         Social History:    Social History     Tobacco Use    Smoking status: Every Day     Packs/day: 0.50     Years: 32.00     Pack years: 16.00     Types: Cigarettes     Last attempt to quit: 2018     Years since quittin.0    Smokeless tobacco: Never   Substance Use Topics    Alcohol use: No     Alcohol/week: 0.0 standard drinks                                Ready to quit: Not Answered  Counseling given: Not Answered      Vital Signs (Current): There were no vitals filed for this visit.                                            BP Readings from Last 3 Encounters:   22 103/65   22 104/73   10/24/22 106/70       NPO Status:                                                                                 BMI:   Wt Readings from Last 3 Encounters:   22 159 lb 6.3 oz (72.3 kg)   10/24/22 165 lb 9.6 oz (75.1 kg)   10/24/22 165 lb (74.8 kg)     There is no height or weight on file to calculate BMI.    CBC:   Lab Results   Component Value Date/Time    WBC 8.4 2022 10:20 AM    RBC 2.61 2022 10:20 AM    HGB 7.6 2022 04:23 PM    HCT 25.4 2022 04:23 PM    MCV 83.9 2022 10:20 AM    RDW 19.1 2022 10:20 AM     2022 10:20 AM       CMP:   Lab Results   Component Value Date/Time     2022 04:00 PM    K 3.9 2022 04:00 PM     2022 04:00 PM    CO2 22 2022 04:00 PM    BUN 41 2022 04:00 PM    CREATININE 1.28 2022 04:00 PM    GFRAA 42 2021 03:25 PM    LABGLOM 47 2022 04:00 PM    GLUCOSE 164 2022 04:00 PM    PROT 6.5 2022 04:00 PM    CALCIUM 8.0 2022 04:00 PM    BILITOT 0.5 2022 04:00 PM    ALKPHOS 119 2022 04:00 PM    AST 24 2022 04:00 PM    ALT 17 2022 04:00 PM       POC Tests:   Recent Labs     22   POCGLU 152*       Coags:   Lab Results   Component Value Date/Time    PROTIME 11.2 12/29/2022 04:00 PM    INR 1.1 12/29/2022 04:00 PM    APTT 23.1 12/29/2022 04:00 PM       HCG (If Applicable): No results found for: PREGTESTUR, PREGSERUM, HCG, HCGQUANT     ABGs: No results found for: PHART, PO2ART, JRD8WGI, SWS9FZR, BEART, Q9GDSAJN     Type & Screen (If Applicable):  No results found for: LABABO, [address]    Anesthesia Evaluation  Patient summary reviewed and Nursing notes reviewed  Airway: Mallampati: Unable to assess / NA         Intubated Dental:          Pulmonary:   (+) COPD:  current smoker          Patient smoked on day of surgery. Cardiovascular:    (+) hypertension: no interval change, valvular problems/murmurs (MVR):, CAD: no interval change, CABG/stent: no interval change,       ECG reviewed               Beta Blocker:  Dose within 24 Hrs      ROS comment: - CAD, s/p CABG in 2015  - Ischemic cardiomyopathy, last EF of 25%  -episode of PEA with compressions and epinephrine     Neuro/Psych:   (+) psychiatric history:             ROS comment:  Acute anoxic encephalopathy      GI/Hepatic/Renal:   (+) renal disease: kidney stones,           Endo/Other:    (+) DiabetesType II DM, no interval change, using insulin, . Pt had PAT visit. Abdominal:             Vascular: Other Findings:             Anesthesia Plan      general     ASA 6     (Ventolin aerosol ordered pre op for expiratory wheezes)  Induction: intravenous. Anesthetic plan and risks discussed with spouse. Plan discussed with CRNA.                     Elissa Abernathy MD   12/29/2022

## 2022-12-30 NOTE — CARE COORDINATION
Advised pt's genny would like to be here this afternoon as pt will be having surgery ([facility]) at 3:00p and is in need of transportation.  [employer] arranged for 12:00p to pick genny up at [address] and return trip approved.     Attempted to call genny x2 to inform of above - no answer and VM is full.  Will attempt again later    1045 - Addendum  Called Vamsi royal, and advised him of above. Vamsi to call JAN if cab not there by 12:30p

## 2022-12-30 NOTE — FLOWSHEET NOTE
Pt to pre pre-op w/ [facility] , RRT and Dr Ena Alfaro, pt's , Radha Arrant her daughter, Seun Bang at 55. Pt was extubated at 24-20-52-61. She did not  within 1 hour. Was brought back to room at 1725. Dianne Kwan went home via taxi.

## 2022-12-30 NOTE — PROGRESS NOTES
[address] [place] [address]     Department of Internal Medicine - Staff Internal Medicine Service   ICU PATIENT TRANSFER NOTE        Patient:  Tariq Heading  YOB: 1960  MRN: [id number]     Acct: [de-identified]     Admit date: 12/14/2022    Code Status:-  Full code     Reason for ICU Admission:-       SUPPORT DEVICES: [] Ventilator [] BIPAP  [] Nasal Cannula [x] Room Air    Consultations:- [] Cardiology [] Nephrology  [] Hemo onco  [] GI                               [] ID [] ENT  [] Rheum [] Endo   []Physiotherapy                                 Others:-     NUTRITION:  [] NPO [] Tube Feeding (Specify: ) [] TPN  [] PO    Central Lines:- [x] No   [] Yes           If yes - Days/Date of Insertion. Pt seen,examined and Chart reviewed. ICU COURSE:    Briefly, patient is a 25-year-old female with history of CABG, AICD placement with EF of 1 5 less than 5 per diabetes mellitus who had an unwitnessed PEA cardiac arrest, found by the  and was able to perform CPR and on EMS contact patient had blood sugar of 18. Patient underwent CPR with 6 doses of epi and got ROSC. Patient was initially transferred to [facility] for further care and had repeat PEA arrest during the time of transfer. Patient was further transferred to [facility] ICU for further care. Patient had poor neurological examinations, reportedly suffered an anoxic brain injury after PEA arrest.  Patient was also evaluated by general surgery for trach and PEG but determined the patient is an appropriate and a poor candidate for this. Ultimately, the patient has been made DNR CC and was scheduled for organ donation. The patient was taken to the OR for an donation, but did not pass in the timeframe. Patient was transferred back to ICU and then transferred onto the floor for palliative care. 12/17: CT head showing no acute processes, chronic small vessel ischemic white matter disease.  LTME showing diffuse cortical dysfunction, severe encephalopathy. 12/18: Levo discontinued. Remains on propofol. 12/19: Tolerated CPAP trial. Per neuro ICU prognostication, brainstem reflexes intact. Poor prognosis. Discussion with family about goals, T&P vs withdrawal of care. Palliative following. 12/20: Plan for bedside discussion of goals of care and code status with family tomorrow, 12/21 with palliative and ICU team.  12/21:  not able to attend bedside discussion, I spoke with him via phone. Code status remains full code.  would like to discuss trach and peg. Surgery consulted. 12/23: Off propofol. 12/25: Mildly tachypneic. Started precedex. CXR ordered. 12/26: DNR CCA  12/28:  has decided to [facility] but wants to wait to change to DNR CC until 12/30 when he is able to be at bedside      Physical Exam:   Vitals: BP (!) 75/51   Pulse 92   Temp 97.3 °F (36.3 °C) (Oral)   Resp 20   Ht 5' 6\" (1.676 m)   Wt 158 lb 4.6 oz (71.8 kg)   SpO2 (!) 75%   BMI 25.55 kg/m²   24 hour intake/output:  Intake/Output Summary (Last 24 hours) at 12/30/2022 2224  Last data filed at 12/30/2022 1920  Gross per 24 hour   Intake 1924.16 ml   Output 573 ml   Net 1351.16 ml     Last 3 weights: Wt Readings from Last 3 Encounters:   12/30/22 158 lb 4.6 oz (71.8 kg)   10/24/22 165 lb 9.6 oz (75.1 kg)   10/24/22 165 lb (74.8 kg)     Physical Exam  Constitutional:       Comments: Patient cachectic, with deep shallow breathing. Neurological:      Comments: Patient unresponsive to verbal and deep painful stimuli.          Medications:Current Inpatient  Scheduled Meds:      Continuous Infusions:      PRN Meds:glycopyrrolate, morphine **OR** morphine, LORazepam, fentanNYL, ondansetron **OR** ondansetron    Objective:    CBC:   Recent Labs     12/29/22  1020 12/29/22  1623 12/29/22  2145 12/30/22  1026   WBC 8.4  --  9.6 8.4   HGB 6.5* 7.6* 8.4* 7.7*     --  276 376     BMP:    Recent Labs     12/29/22  6516 12/30/22  0333 12/30/22  1026    137 140   K 4.5 3.9 3.8    106 107   CO2 22 22 21   BUN 41* 41* 40*   CREATININE 1.32* 1.30* 1.38*   GLUCOSE 161* 151* 112*     Calcium:  Recent Labs     12/30/22  1026   CALCIUM 8.0*     Ionized Calcium:No results for input(s): IONCA in the last 72 hours.  Magnesium:  Recent Labs     12/30/22  1026   MG 2.1     Phosphorus:  Recent Labs     12/30/22  1026   PHOS 3.7     BNP:No results for input(s): BNP in the last 72 hours.  Glucose:  Recent Labs     12/30/22  0049 12/30/22  0813 12/30/22  1222   POCGLU 151* 134* 94     HgbA1C: No results for input(s): LABA1C in the last 72 hours.  INR:   Recent Labs     12/30/22  1026   INR 1.0     Hepatic:   Recent Labs     12/30/22  1026   ALKPHOS 114*   ALT 15   AST 23   PROT 6.7   BILITOT 0.6   BILIDIR 0.3*   LABALBU 2.9*     Amylase and Lipase:  Recent Labs     12/28/22  2211   AMYLASE 79     Lactic Acid: No results for input(s): LACTA in the last 72 hours.  CARDIAC ENZYMES:  Recent Labs     12/28/22  2211   CKTOTAL 39     BNP: No results for input(s): BNP in the last 72 hours.  Lipids: No results for input(s): CHOL, TRIG, HDL, LDL, LDLCALC in the last 72 hours.  ABGs: No results found for: PH, PCO2, PO2, HCO3, O2SAT  Thyroid:   Lab Results   Component Value Date/Time    TSH 1.66 10/14/2022 10:23 AM        Urinalysis:   Recent Labs     12/30/22  1143   COLORU Yellow   PHUR 5.0   PROTEINU 1+*   RBCUA 5 TO 10   SPECGRAV 1.024   BILIRUBINUR NEGATIVE   NITRU NEGATIVE   WBCUA TOO NUMEROUS TO COUNT   LEUKOCYTESUR LARGE*   GLUCOSEU NEGATIVE           Assessment:  Principal Problem:    Cardiac arrest (HCC)  Active Problems:    Acute on chronic systolic congestive heart failure (HCC)    Acute respiratory failure with hypoxia and hypercapnia (HCC)    Pulmonary hypertension (HCC)    Diabetes mellitus with kidney disease (HCC)    NSTEMI (non-ST elevated myocardial infarction) ([facility])    Ventricular tachyarrhythmia    Palliative care encounter     Acute anoxic encephalopathy (HCC)    CHAD (acute kidney injury) ([facility]    Metabolic acidosis    Shock ([facility]    Acute pulmonary edema (HCC)  Resolved Problems:    * No resolved hospital problems. *          Plan:    -Patient is DNR CC and management includes only comfort care. -We will continue the patient on fentanyl, glycopyrrolate, Ativan and other comfort measures as required.       Shirley Mendoza MD             Department of Internal Medicine  [facility]           12/30/2022, 10:24 PM

## 2022-12-30 NOTE — PLAN OF CARE
Problem: Chronic Conditions and Co-morbidities  Goal: Patient's chronic conditions and co-morbidity symptoms are monitored and maintained or improved  Outcome: Not Progressing     Problem: Discharge Planning  Goal: Discharge to home or other facility with appropriate resources  Outcome: Progressing     Problem: Pain  Goal: Verbalizes/displays adequate comfort level or baseline comfort level  12/30/2022 0554 by Sudheer Tabarse RN  Outcome: Not Progressing  12/29/2022 1803 by Elvis Lowe RCP  Outcome: Not Progressing  Flowsheets  Taken 12/29/2022 1600 by Merary Wick RN  Verbalizes/displays adequate comfort level or baseline comfort level: Assess pain using appropriate pain scale  Taken 12/29/2022 1200 by Merary Wick RN  Verbalizes/displays adequate comfort level or baseline comfort level: Assess pain using appropriate pain scale     Problem: Respiratory - Adult  Goal: Achieves optimal ventilation and oxygenation  Outcome: Progressing     Problem: Skin/Tissue Integrity  Goal: Absence of new skin breakdown  Description: 1. Monitor for areas of redness and/or skin breakdown  2. Assess vascular access sites hourly  3. Every 4-6 hours minimum:  Change oxygen saturation probe site  4. Every 4-6 hours:  If on nasal continuous positive airway pressure, respiratory therapy assess nares and determine need for appliance change or resting period.   Outcome: Progressing     Problem: Safety - Adult  Goal: Free from fall injury  Outcome: Progressing     Problem: ABCDS Injury Assessment  Goal: Absence of physical injury  Outcome: Progressing     Problem: Nutrition Deficit:  Goal: Optimize nutritional status  Outcome: Progressing     Problem: Chronic Conditions and Co-morbidities  Goal: Patient's chronic conditions and co-morbidity symptoms are monitored and maintained or improved  Outcome: Not Progressing     Problem: Pain  Goal: Verbalizes/displays adequate comfort level or baseline comfort level  12/30/2022 0554 by Gisell Espinal RN  Outcome: Not Progressing  12/29/2022 1803 by Grecia Farley RCP  Outcome: Not Progressing  Flowsheets  Taken 12/29/2022 1600 by Sakshi Sood RN  Verbalizes/displays adequate comfort level or baseline comfort level: Assess pain using appropriate pain scale  Taken 12/29/2022 1200 by Sakshi Sood RN  Verbalizes/displays adequate comfort level or baseline comfort level: Assess pain using appropriate pain scale

## 2022-12-30 NOTE — PLAN OF CARE
[facility] at bedside. Here in anticipation of organ donation w/[facility]. He understands the process that the patient will be made DNR comfort care and extubated in PACU. Code status has been changed to reflect patient and  wishes. He did not have any further questions at this time.      ---  Adrien Hernandez DO, MS  Emergency Medicine Resident  [facility]  12/30/22 3:42 PM

## 2022-12-30 NOTE — PLAN OF CARE
Problem: Chronic Conditions and Co-morbidities  Goal: Patient's chronic conditions and co-morbidity symptoms are monitored and maintained or improved  12/30/2022 0931 by Geovanna Rock RN  Outcome: Progressing     Problem: Discharge Planning  Goal: Discharge to home or other facility with appropriate resources  12/30/2022 0931 by Geovanna Rock RN  Outcome: Progressing     Problem: Pain  Goal: Verbalizes/displays adequate comfort level or baseline comfort level  12/30/2022 0931 by Geovanna Rock RN  Outcome: Progressing     Problem: Respiratory - Adult  Goal: Achieves optimal ventilation and oxygenation  12/30/2022 0931 by Geovanna Rock RN  Outcome: Progressing     Problem: Skin/Tissue Integrity  Goal: Absence of new skin breakdown  Description: 1. Monitor for areas of redness and/or skin breakdown  2. Assess vascular access sites hourly  3. Every 4-6 hours minimum:  Change oxygen saturation probe site  4. Every 4-6 hours:  If on nasal continuous positive airway pressure, respiratory therapy assess nares and determine need for appliance change or resting period.   12/30/2022 0931 by Geovanna Rock RN  Outcome: Progressing     Problem: Safety - Adult  Goal: Free from fall injury  12/30/2022 0931 by Geovanna Rock RN  Outcome: Progressing     Problem: ABCDS Injury Assessment  Goal: Absence of physical injury  12/30/2022 0931 by Geovanna Rock RN  Outcome: Progressing     Problem: Nutrition Deficit:  Goal: Optimize nutritional status  12/30/2022 0931 by Geovanna Rock RN  Outcome: Progressing     Problem: Chronic Conditions and Co-morbidities  Goal: Patient's chronic conditions and co-morbidity symptoms are monitored and maintained or improved  12/30/2022 0931 by Geovanna Rock RN  Outcome: Progressing  12/30/2022 0554 by Alvaro Byrd RN  Outcome: Not Progressing     Problem: Pain  Goal: Verbalizes/displays adequate comfort level or baseline comfort level  12/30/2022 0931 by Geovanna Rock RN  Outcome: Progressing  12/30/2022 0554 by Edvin Pedersen RN  Outcome: Not Progressing

## 2022-12-30 NOTE — PROGRESS NOTES
Critical care team - Resident sign-out to medicine service      Date and time: 12/30/2022 6:10 PM  Patient's name:  Lyn Horton Record Number: [id number]  Patient's account/billing number: [de-identified]  Patient's YOB: 1960  Age: 60 y.o. Date of Admission: 12/14/2022 10:09 AM  Length of stay during current admission: 16    Primary Care Physician: Sarah Nicholas MD    Code Status: [facility]    Mode of physician to physician communication:        [x] Via telephone   [] In person     Date and time of sign-out: 12/30/2022 6:10 PM    Accepting Internal Medicine resident: Dr. Jenna Carver team: IM Team 2    Accepting team's attending: Dr. Krystal Lackey    Patient's current ICU Bed:  4732     Patient's assigned bed on floor:  440        [x] Med-Surg Monitored [] Step-down       [] Psychiatry ICU       [] Psych floor     Reason for ICU admission:   Cardiac arrest [facility]) [I46.9]  Ventricular tachyarrhythmia [I47.20]  Other ascites [R18.8]  Congestive heart failure, unspecified HF chronicity, unspecified heart failure type ([facility] [address] [I50.9]    ICU course summary:   Patient has hx of CAPG, AICD, 25% EF and DM who was an unwitnessed PEA arrest possibly from a hypoglycemic or cardiac event.  found her down, was unable to perform CPR and on EMS contact patient had a blood sugar of 18. Also noted to have a new RBBB. She underwent CPR w/6doses of epi and got ROSC. Was transferred from [address] [place] [address] for further care and had repeat PEA arrests aroun the time of transfer. She was enrolled in the cooling study. Poor neurologic exams, patient did suffer anoxic brain injury after PEA arrest. Initially was evaluated by general surgery for trach and peg but determined to be an inappropriate candidate for this. Ultimately her  communicated that her wishes would have been to make an attempt at organ donation. Patient did not pass in the hour timeframe and has been made DNR CC.  Due to bed shortage has been recommended for transfer to [facility] surg for palliation. Procedures during patient's ICU stay:   Art line  CVC  Intubation 12/14  Extubated 12/30/22     Current Vitals:   BP 88/68   Pulse 69   Temp 99.3 °F (37.4 °C) (Oral)   Resp 20   Ht 5' 6\" (1.676 m)   Wt 158 lb 4.6 oz (71.8 kg)   SpO2 100%   BMI 25.55 kg/m²     Cultures:     Blood cultures:                 [] None drawn      [] Negative             []  Positive (Details:  )  Urine Culture:                   [] None drawn      [] Negative             []  Positive (Details:  )  Sputum Culture:               [] None drawn       [] Negative             []  Positive (Details:  )   Endotracheal aspirate:     [] None drawn       [] Negative             []  Positive (Details:  )       Consults:     1. Cardiology  2. Neuro critical care  3. Palliative care  4.  General surgery  5. nephrology      Assessment:     Patient Active Problem List    Diagnosis Date Noted    Acute anoxic encephalopathy ([address] 12/26/2022    Palliative care encounter 12/16/2022    NSTEMI (non-ST elevated myocardial infarction) ([address] 12/15/2022    Ventricular tachyarrhythmia 12/15/2022    Cardiac arrest ([address] 12/14/2022    Pulmonary hypertension ([address] 12/14/2022    Diabetes mellitus with kidney disease ([address] 12/14/2022    Stage 3b chronic kidney disease ([address] 10/17/2022    Ischemic cardiomyopathy 10/17/2022    Acute respiratory failure with hypoxia and hypercapnia ([address] 10/17/2022    CHF (congestive heart failure), NYHA class I, acute on chronic, combined ([address] 10/14/2022    Acute on chronic systolic congestive heart failure ([address] 10/14/2022    Congestive heart failure due to cardiomyopathy ([address] 21/63/4858    Acute systolic CHF (congestive heart failure) ([address] 10/12/2022    Pneumonia 12/28/2018    Dyslipidemia     Ureteral stone with hydronephrosis 11/04/2018    Acute pulmonary edema ([address] 11/04/2018    Elevated serum immunoglobulin free light chains     CHAD (acute kidney injury) ([address] 73/34/2765    Metabolic acidosis 03/13/3472    Shock ([facility] 08/23/2018    DM (diabetes mellitus), type 2 with neurological complications ([facility] 16/12/9123    Anxiety 01/26/2016    CAD (coronary artery disease)     Hx of CABG     ICD (implantable cardioverter-defibrillator) in place     H/O mitral valve repair     Hx of myocardial infarction     Hypertension     Onychomycosis of toenail 11/19/2015    S/P cardiac cath-Patent grafts 6/5/15 06/04/2015    Acute coronary syndrome ([facility] 05/26/2015           Recommended Follow-up:     palliation      Above mentioned assessment and plan was discussed by me with the admitting medicine resident. The medicine team assigned to the patient by medicine admitting resident will be following up the patient from now onwards on the floor.      ---  Hal Mckeon DO, MS  Emergency Medicine Resident  [address]  12/30/22 6:10 PM

## 2022-12-30 NOTE — PLAN OF CARE
PROVIDE ADEQUATE OXYGENATION WITH ACCEPTABLE SP02/ABG'S    [x]  IDENTIFY APPROPRIATE OXYGEN THERAPY  [x]   MONITOR SP02/ABG'S AS NEEDED   [x]   PATIENT EDUCATION AS NEEDED   MECHANICAL VENTILATION     [x]  PROVIDE OPTIMAL VENTILATION  [x]   ASSESS FOR EXTUBATION READINESS  [x]   ASSESS FOR WEANING READINESS  [x]  EXTUBATE AS TOLERATED  [x]  IMPLEMENT ADULT MECHANICAL VENTILATION PROTOCOL  [x]  MAINTAIN ADEQUATE OXYGENATION  [x]  PERFORM SPONTANEOUS WEANING TRIAL AS TOLERATED     Problem: Chronic Conditions and Co-morbidities  Goal: Patient's chronic conditions and co-morbidity symptoms are monitored and maintained or improved  12/30/2022 0931 by Erlin Graham RN  Outcome: Progressing  12/30/2022 0554 by Anny Troncoso RN  Outcome: Not Progressing     Problem: Pain  Goal: Verbalizes/displays adequate comfort level or baseline comfort level  12/30/2022 0931 by Erlin Graham RN  Outcome: Progressing  12/30/2022 0554 by Anny Troncoso RN  Outcome: Not Progressing     Problem: Respiratory - Adult  Goal: Achieves optimal ventilation and oxygenation  12/30/2022 1025 by Pedro Giraldo RCP  Outcome: Not Progressing  12/30/2022 0931 by Erlin Graham RN  Outcome: Progressing  12/30/2022 0554 by Anny Troncoso RN  Outcome: Progressing

## 2022-12-31 LAB
CULTURE: ABNORMAL
DIRECT EXAM: ABNORMAL
SPECIMEN DESCRIPTION: ABNORMAL

## 2022-12-31 NOTE — PLAN OF CARE
Problem: Chronic Conditions and Co-morbidities  Goal: Patient's chronic conditions and co-morbidity symptoms are monitored and maintained or improved  Outcome: Progressing     Problem: Discharge Planning  Goal: Discharge to home or other facility with appropriate resources  Outcome: Progressing     Problem: Pain  Goal: Verbalizes/displays adequate comfort level or baseline comfort level  Outcome: Progressing     Problem: Respiratory - Adult  Goal: Achieves optimal ventilation and oxygenation  Outcome: Progressing     Problem: Skin/Tissue Integrity  Goal: Absence of new skin breakdown  Description: 1. Monitor for areas of redness and/or skin breakdown  2. Assess vascular access sites hourly  3. Every 4-6 hours minimum:  Change oxygen saturation probe site  4. Every 4-6 hours:  If on nasal continuous positive airway pressure, respiratory therapy assess nares and determine need for appliance change or resting period.   Outcome: Progressing     Problem: Safety - Adult  Goal: Free from fall injury  Outcome: Progressing     Problem: ABCDS Injury Assessment  Goal: Absence of physical injury  Outcome: Progressing  Flowsheets (Taken 12/30/2022 2000)  Absence of Physical Injury: Implement safety measures based on patient assessment     Problem: Nutrition Deficit:  Goal: Optimize nutritional status  Outcome: Progressing

## 2022-12-31 NOTE — PROGRESS NOTES
[address] [facility] [address]  PROGRESS NOTE    Shift date: 12.30.2022  Shift day: Friday   Shift # 2    Room # 3025/3025-01   Name: Delvis Zheng                Pentecostalism: None   Place of Tenriism: None    Referral: Routine Visit (End of Life - [facility])    Admit Date & Time: 12/14/2022 10:09 AM    Assessment:  Delvis Zheng is a 60 y.o. female in the hospital because of organ donation. Upon entering the room writer observes  bedside appearing sad.  asked for prayer. Discussed patient's genesis and life. Discussed the condition of the patient leading to the current situation. Discussed talking to children about end of life issues and his concerns regarding the grandchildren. Asked for \"last rites\" for the patient. States that she is not Tenriism and did not go to [employer] but he did. Concerns around the patient dying alone and wants to make sure that the patient will not die alone. Intervention:  Writer introduced self and title as  Writer offered space for the family  to express feelings, needs, and concerns and provided a ministry presence. Determined support to be available. Outcome:  Family receptive to spiritual care and support. Plan:  Chaplains will remain available to offer spiritual and emotional support as needed.       Electronically signed by Marisa Ayala on 12/30/2022 at 7:53 PM.  Dheeraj Ansari  [phone]       12/30/22 1630   Encounter Summary   Service Provided For: Family;Significant other   Referral/Consult From: Other    Support System Spouse   Last Encounter  12/30/22   Complexity of Encounter High   Begin Time 1630   End Time  1800   Total Time Calculated 90 min   Encounter    Type Follow up   Rituals, Rites and Sacraments   Type Sacrament (Comment)  (Ep[employer]opalian Sacrament of the Sick)   Grief, Loss, and Adjustments   Type Anticipatory Grief Assessment/Intervention/Outcome   Assessment Anxious; Despair;Tearful;Desire for reconciliation   Intervention Active listening;Discussed illness injury and its impact; Explored/Affirmed feelings, thoughts, concerns;Prayer (assurance of)/O'Brien;Sustaining Presence/Ministry of presence; Explored Coping Skills/Resources; End of Life Care;Discussed meaning/purpose;Discussed death, afterlife; Discussed belief system/Hinduism practices/genesis;Discussed relationship with God; Facilitated forgiveness;Grief Care;Nurtured Hope   Outcome Engaged in conversation;Expressed feelings, needs, and concerns;Expressed Gratitude     Electronically signed by Trudi Rausch on 12/30/2022 at 7:53 PM

## 2022-12-31 NOTE — PROGRESS NOTES
This writer received message from nurse that pt had passed. This writer responded to this call by going to unit. Per other  who spoke with pt's  earlier pt's  does not plan on coming back to the hospital. This writer called pt's  but there was no answer. This writer informed pt's nurse of this. This writer also informed night  of this. Night  will follow up with pt's nurse for completion of release of body form.       12/30/22 3897   Encounter Summary   Referral/Consult From: Nurse   Support System Spouse   Last Encounter  12/30/22   Complexity of Encounter Moderate   Begin Time 2125   End Time  2145   Total Time Calculated 20 min   Grief, Loss, and Adjustments   Type Death   Assessment/Intervention/Outcome   Assessment Unable to assess  (pt passed and  did not come to hospital - unable to reach )   Intervention Sustaining Presence/Ministry of presence

## 2022-12-31 NOTE — PROGRESS NOTES
Writer notified family in regards to the passing of the patient. Writer also notified provider, nurse manager , house supervisor, , life connection and coroners office.

## 2022-12-31 NOTE — PROGRESS NOTES
[facility] [address]   Patient Death Note  DEATH   Shift date: 22    Shift day: Saturday  Shift #: 3                 Room # 2609/4676-38   Name: Yvonne Meehan            Age: 60 y.o. Gender: female          Tenriism: None      Place of Taoist: Unknown  Admit Date & Time: 2022 10:09 AM     Referral: Alyson Higgins   Actual date of death: 22   TOD: 11:00PM       SITUATION AT DEATH:  Cardiac Arrest.    IS THIS A 'S CASE? No    SPIRITUAL/EMOTIONAL INTERVENTION:  Upon arrival, patient had already . Alyson Higgins handed off  case to [facility] [address] writing the death note.  met and spoke to nurse of the patient. Per nurse practitioner, Doctor Davide Garcia will write out the death certificate. The Doctor's contact name is below. Family Received Grief Packet? Unknown       NAME AND PHONE NUMBER OF DOCTOR SIGNING DEATH CERTIFICATE: Doctor Davide Garcia [phone]      [address] [place] [address] Road will need to call patient's spouse on  to get the  home chosen. Copy of COMPLETED Release of Body Form Received? Yes    Patient's belongings: With family.  HOME:  Name: [facility]  City:   Phone Number:     NEXT OF KIN:  Name: Yakelin Sanchez  Relationship: Spouse  [address] [facility]: [place]  Zip code: [address]   Phone Number: [phone]    ANY FOLLOW-UP NEEDED? Yes    IF SO, WHAT? Call the spouse of the patient to verify the  home.     Electronically signed by Mauro Hogan Resident, on 2022 at 1:32 AM.  [facility]  [phone]       22 0130   Encounter Summary   Service Provided For: Patient   Referral/Consult From: Other    Support System Spouse   Last Encounter  22   Complexity of Encounter High   Begin Time 0120   End Time  0131   Total Time Calculated 11 min   Encounter    Type Follow up   Grief, Loss, and Adjustments Type Death   Assessment/Intervention/Outcome   Assessment Unable to assess   Intervention Sustaining Presence/Ministry of presence   Outcome Did not respond

## 2023-01-01 LAB
CULTURE: NORMAL
CULTURE: NORMAL
SPECIMEN DESCRIPTION: NORMAL
SPECIMEN DESCRIPTION: NORMAL

## 2023-01-01 NOTE — DISCHARGE SUMMARY
[facility]  Office: [address], DO, Ricardo Solano, DO, Livia Drake, DO, Too Gonzalez, DO, Anju Conde MD, Adelso Horta MD, Enrico Boyer MD, Luigi Petit MD,  Fadumo Maza MD, Glo Moritz, MD, Fredrick Pineda DO, Beverly Mcmanus MD,  Clover Ruiz, DO, Jay Parmar MD, Tariq Singh MD, Palmira Gutierrez, DO, Doyne Apgar, MD, Arturo Perry MD, Rian Pimentel, DO, Columba James MD, Isamar Wilkes MD, Dilcia Vaca MD, Eunice Manzano MD, Patrice Pina, DO, Jared Bell MD, Ollie Boyd MD, Lolis Saini, CNP,  Sonia Farnsworth, CNP, Elizabeth Houser, CNP, Kamilla Rose, CNP,  Michael Turner, [facility], Jenny Neely, CNP, Colonel Botello, CNP, Ely Lara, CNP, Tami Diaz, CNP, Claudia Valadez, CNP, Pratima Suarez PA-C, Willam Jennings, CNS, Zahraa Owens, CNP, Sasha Arenasers, [address] [place] [address]    Discharge Summary     Patient ID: Guanakito Carreon  :  1960   MRN: [id number]     ACCOUNT:  [de-identified]   Patient's PCP: Seth Rodriguez MD  Admit Date: 2022   Discharge Date: 22     Length of Stay: 17  Code Status:  Prior  Admitting Physician: Violeta Bowie MD  Discharge Physician: Fredrick Pineda DO     Active Discharge Diagnoses:     Hospital Problem Lists:  Principal Problem:    Cardiac arrest [facility])  Active Problems:    Acute on chronic systolic congestive heart failure [facility])    Acute respiratory failure with hypoxia and hypercapnia (HCC)    Pulmonary hypertension (HCC)    Diabetes mellitus with kidney disease ([facility]    NSTEMI (non-ST elevated myocardial infarction) [facility])    Ventricular tachyarrhythmia    Palliative care encounter    Acute anoxic encephalopathy ([facility]    CHAD (acute kidney injury) ([address]    Metabolic acidosis    Shock ([address]    Acute pulmonary edema ([address]  Resolved Problems:    * No resolved hospital problems.  *      Admission Condition:  good     Discharged Condition: good    Hospital Stay:     Hospital Course:    From Summary  Briefly, patient is a 50-year-old female with history of CABG, AICD placement with EF of 1 5 less than 5 per diabetes mellitus who had an unwitnessed PEA cardiac arrest, found by the  and was able to perform CPR and on EMS contact patient had blood sugar of 18. Patient underwent CPR with 6 doses of epi and got ROSC. Patient was initially transferred to [facility] for further care and had repeat PEA arrest during the time of transfer. Patient was further transferred to [facility] for further care. Patient had poor neurological examinations, reportedly suffered an anoxic brain injury after PEA arrest.  Patient was also evaluated by general surgery for trach and PEG but determined the patient is an appropriate and a poor candidate for this. Ultimately, the patient has been made DNR CC and was scheduled for organ donation. The patient was taken to the OR for an donation, but did not pass in the timeframe. Patient was transferred back to ICU and then transferred onto the floor for palliative care. Patient eventually passed on 12/30/2022 time of death was 11 PM      Significant therapeutic interventions:   none    Significant Diagnostic Studies:   Labs / Micro:  CBC:   Lab Results   Component Value Date/Time    WBC 8.4 12/30/2022 10:26 AM    RBC 3.02 12/30/2022 10:26 AM    HGB 7.7 12/30/2022 10:26 AM    HCT 26.2 12/30/2022 10:26 AM    MCV 86.8 12/30/2022 10:26 AM    MCH 25.5 12/30/2022 10:26 AM    MCHC 29.4 12/30/2022 10:26 AM    RDW 18.8 12/30/2022 10:26 AM     12/30/2022 10:26 AM        Radiology:  XR CHEST (SINGLE VIEW FRONTAL)    Result Date: 12/29/2022  1. Unchanged position of support devices. 2. More prominent left basilar opacity with probable small left effusion. Otherwise unchanged appearance of perihilar and right lower lung field ground-glass opacities. 3.  Lung measurements, as above.      XR CHEST (SINGLE VIEW FRONTAL)    Result Date: 2022  Lower end of the ETT is 5.8 cm above tylor. Decreased infiltrates, and/or atelectasis in the right lower lung and perihilar area. Mild focal pleural-parenchymal fibrosis or atelectasis or pleural thickening in the left CP angle area. Rest of left lung field appears to be fairly clear. Decreased pulmonary vascular congestion. Currently minimal pulmonary vascular congestion without pulmonary edema. XR CHEST PORTABLE    Result Date: 2022  Satisfactory position of support devices. No significant change from prior exam. Small bilateral pleural effusions and bibasilar atelectasis. XR CHEST PORTABLE    Result Date: 2022  Overall improving congestive changes with mild patchy bibasilar opacities, greater on the right     CT CHEST ABDOMEN PELVIS WO CONTRAST Additional Contrast? None    Result Date: 2022  1. Moderate right pleural effusion. Small-moderate left pleural effusion. 2. Patchy areas of nodular consolidation concerning for pneumonia with similar distribution to the prior exam. 3. Improvement of the pulmonary edema with resolution of the interlobular septal thickening. 4. Bilateral rib fractures likely from recent CPR. 5. Small amount of abdominal and pelvic ascites. Consultations:    Consults:     Final Specialist Recommendations/Findings:   IP CONSULT TO CRITICAL CARE  IP CONSULT TO CARDIOLOGY  IP CONSULT TO NEUROCRITICAL CARE  IP CONSULT TO NEPHROLOGY  IP CONSULT TO PALLIATIVE CARE  IP CONSULT TO DIETITIAN  IP CONSULT TO PALLIATIVE CARE  IP CONSULT TO GENERAL SURGERY  IP CONSULT TO ETHICS  IP CONSULT TO IV TEAM  IP CONSULT TO IV TEAM  IP CONSULT TO SPIRITUAL SERVICES      The patient was seen and examined on day of discharge and this discharge summary is in conjunction with any daily progress note from day of discharge. Discharge plan:     Disposition:     Physician Follow Up:     No follow-up provider specified.      Requiring Further Evaluation/Follow Up POST HOSPITALIZATION/Incidental Findings: none    Diet: regular diet    Activity: As tolerated    Instructions to Patient:   none    Discharge Medications:      Medication List        CONTINUE taking these medications      BD Pen Needle Elizabeth 2nd Gen 32G X 4 MM Misc  Generic drug: Insulin Pen Needle  use 1 PEN NEEDLE to inject MEDICATION subcutaneously daily     * blood glucose monitor kit and supplies  Use to test daily     * glucose monitoring kit  Please dispense meter covered by patients insurance. Lancets Misc  Pt testing BS once daily and prn     * Nebulizer Compressor Kit  1 kit by Does not apply route once for 1 dose     * Nebulizer/Tubing/Mouthpiece Kit  1 kit by Does not apply route daily as needed (for SOB, cough)           * This list has 4 medication(s) that are the same as other medications prescribed for you. Read the directions carefully, and ask your doctor or other care provider to review them with you. ASK your doctor about these medications      albuterol (2.5 MG/3ML) 0.083% nebulizer solution  Commonly known as: PROVENTIL  Take 3 mLs by nebulization every 6 hours as needed for Wheezing (as needed for wheezing)     atorvastatin 80 MG tablet  Commonly known as: LIPITOR  1 tab daily     * blood glucose test strips strip  Commonly known as: [employer] Test  1 each by In Vitro route daily Pt testing BS once daily and prn     * blood glucose test strips  Use to test daily     * blood glucose test strips  Testing BS once daily and as needed.  Dispense strips to go with new glucometer     carvedilol 3.125 MG tablet  Commonly known as: COREG  take 1 tablet by mouth twice a day with meals     clopidogrel 75 MG tablet  Commonly known as: PLAVIX  Take 1 tab daily     furosemide 40 MG tablet  Commonly known as: LASIX  Take 1 tablet by mouth in the morning and 1 tablet in the evening.     gabapentin 400 MG capsule  Commonly known as: NEURONTIN  Take 1 capsule by mouth 3 times daily for 180 days. lactobacillus capsule  Take 1 capsule by mouth 2 times daily (with meals)     Lantus SoloStar 100 UNIT/ML injection pen  Generic drug: insulin glargine  INJECT 45 UNITS SUBCUTANEOUSLY EVERY MORNING AND 25 UNITS EVERY EVENING     midodrine 5 MG tablet  Commonly known as: PROAMATINE  take 1 tablet by mouth three times a day     sertraline 100 MG tablet  Commonly known as: ZOLOFT  Take one tablet by mouth once daily     therapeutic multivitamin-minerals tablet  Take 1 tablet by mouth daily (with breakfast)     Vitamin D3 125 MCG (5000 UT) Tabs           * This list has 3 medication(s) that are the same as other medications prescribed for you. Read the directions carefully, and ask your doctor or other care provider to review them with you. No discharge procedures on file. Time Spent on discharge is  41 mins in patient examination, evaluation, counseling as well as medication reconciliation, prescriptions for required medications, discharge plan and follow up. Electronically signed by   Deejay Cisse DO  1/1/2023  7:37 AM      Thank you Dr. Lindsay Santana MD for the opportunity to be involved in this patient's care.

## 2023-01-03 LAB
CULTURE: NORMAL
CULTURE: NORMAL
SPECIMEN DESCRIPTION: NORMAL
SPECIMEN DESCRIPTION: NORMAL

## 2023-01-03 NOTE — PROGRESS NOTES
Physician Progress Note      PATIENT:               Manpreet Stanford  CSN #:                  [id number]  :                       1960  ADMIT DATE:       2022 10:09 AM  DISCH DATE:        2022 2:41 AM  RESPONDING  PROVIDER #:        Zaynab Laura DO          QUERY TEXT:    Patient admitted following cardiac arrest.  Per H&P PEA ? Secondary to   hypoglycemia. If possible, please document in progress notes and discharge summary the cause   of cardiac arrest:      The medical record reflects the following:  Risk Factors: Severe Cardiomyopathy, DM, chronic systolic heart failure, CKD  Clinical Indicators: Patient was found to have low blood glucose of 18mg > 2   amp of dextrose given. Patient was also found in PEA arrest, 3 rounds of 6 mg of epi given, calcium   chloride and atropine was given en route  Troponin trends  107>191, 12/15 191>190,  146,   97  Patient is on amiodarone drip she had episode of V. tach/frequent ventricular   beats   on ICD interrogation, no V. fib or V. tach recorded  Treatment: Heparin gtt, Amiodarone gtt, Glucose monitoring, Neurology consult,   Cardiology consult, lab monitoring, imaging    Thank you please contact me for any questions! Jeromy Clemons RN, CCDS, CDI Supervisor [phone]  Options provided:  -- Cardiac Arrest likely due to HYPOGLYCEMIA  -- Cardiac Arrest likely due to NSTEMI  -- Cardiac Arrest due to VFib  -- Other - I will add my own diagnosis  -- Disagree - Not applicable / Not valid  -- Disagree - Clinically unable to determine / Unknown  -- Refer to Clinical Documentation Reviewer    PROVIDER RESPONSE TEXT:    This patient had cardiac arrest likely due to HYPOGLYCEMIA.     Query created by: Orlando Ley on 1/3/2023 10:44 AM      Electronically signed by:  Zaynab Laura DO 1/3/2023 5:25 PM

## 2023-01-17 NOTE — TELEPHONE ENCOUNTER
Jenni London MD  You 1 hour ago (2:03 PM)     [facility] I saw the patient on 12/18/2022 last time. Dr. Sheba Ac was the one who was seen the patient in ICU.   Please asked Dr. Sheba Ac to sign

## 2023-01-19 NOTE — TELEPHONE ENCOUNTER
Received the DC in Dr Kelli Grace name, emailed back asking them to send a new one in Dr Ashtyn Jacome name

## 2023-01-24 NOTE — TELEPHONE ENCOUNTER
DC received in Dr Arslan Stern name, its on doctors' desk for completion. Writer had sent a note 1/23/23: HI, just checking on this death certificate? Need it to be in Dr Mami Saldaña name. He will be in the office Friday this week seeing patients. If you need it sooner, let me know. I'll try to track him down where he is at.

## 2023-01-27 NOTE — TELEPHONE ENCOUNTER
DC emailed to [facility], scanned into chart, original is at the  of the office.

## 2024-03-07 NOTE — PROGRESS NOTES
PATIENT REFUSES TO WEAR BIPAP     [x] Risks and benefits explained to patient   [x] Patient refuses to wear Bipap stating she would prefer to stay on the HFNC instead of wearing the BIPAP. States she will call out if she changes her mind  [x] Patient verbalizes understanding of information presented. The defibrillation pads were placed in the posterior/lateral position.
